# Patient Record
Sex: MALE | Race: WHITE | NOT HISPANIC OR LATINO | Employment: OTHER | URBAN - NONMETROPOLITAN AREA
[De-identification: names, ages, dates, MRNs, and addresses within clinical notes are randomized per-mention and may not be internally consistent; named-entity substitution may affect disease eponyms.]

---

## 2023-11-13 ENCOUNTER — APPOINTMENT (EMERGENCY)
Dept: RADIOLOGY | Facility: HOSPITAL | Age: 68
End: 2023-11-13
Payer: COMMERCIAL

## 2023-11-13 ENCOUNTER — HOSPITAL ENCOUNTER (EMERGENCY)
Facility: HOSPITAL | Age: 68
Discharge: HOME | End: 2023-11-13
Attending: EMERGENCY MEDICINE
Payer: COMMERCIAL

## 2023-11-13 VITALS
WEIGHT: 257.5 LBS | DIASTOLIC BLOOD PRESSURE: 60 MMHG | SYSTOLIC BLOOD PRESSURE: 95 MMHG | TEMPERATURE: 97.7 F | HEART RATE: 109 BPM | RESPIRATION RATE: 18 BRPM | OXYGEN SATURATION: 95 %

## 2023-11-13 DIAGNOSIS — L03.115 CELLULITIS OF RIGHT LOWER EXTREMITY: Primary | ICD-10-CM

## 2023-11-13 LAB
ALBUMIN SERPL BCP-MCNC: 4.1 G/DL (ref 3.5–5)
ALP SERPL-CCNC: 56 U/L (ref 34–104)
ALT SERPL W P-5'-P-CCNC: 14 U/L (ref 7–52)
ANION GAP SERPL CALCULATED.3IONS-SCNC: 9 MMOL/L
AST SERPL W P-5'-P-CCNC: 9 U/L (ref 13–39)
BASOPHILS # BLD AUTO: 0.05 THOUSANDS/ÂΜL (ref 0–0.1)
BASOPHILS NFR BLD AUTO: 1 % (ref 0–1)
BILIRUB SERPL-MCNC: 0.76 MG/DL (ref 0.2–1)
BUN SERPL-MCNC: 31 MG/DL (ref 5–25)
CALCIUM SERPL-MCNC: 8.7 MG/DL (ref 8.4–10.2)
CHLORIDE SERPL-SCNC: 99 MMOL/L (ref 96–108)
CO2 SERPL-SCNC: 27 MMOL/L (ref 21–32)
CREAT SERPL-MCNC: 1.34 MG/DL (ref 0.6–1.3)
EOSINOPHIL # BLD AUTO: 0.13 THOUSAND/ÂΜL (ref 0–0.61)
EOSINOPHIL NFR BLD AUTO: 1 % (ref 0–6)
ERYTHROCYTE [DISTWIDTH] IN BLOOD BY AUTOMATED COUNT: 12.9 % (ref 11.6–15.1)
ERYTHROCYTE [SEDIMENTATION RATE] IN BLOOD: 10 MM/HOUR (ref 0–19)
GFR SERPL CREATININE-BSD FRML MDRD: 53 ML/MIN/1.73SQ M
GLUCOSE SERPL-MCNC: 213 MG/DL (ref 65–140)
HCT VFR BLD AUTO: 46.3 % (ref 36.5–49.3)
HGB BLD-MCNC: 15.9 G/DL (ref 12–17)
IMM GRANULOCYTES # BLD AUTO: 0.07 THOUSAND/UL (ref 0–0.2)
IMM GRANULOCYTES NFR BLD AUTO: 1 % (ref 0–2)
LYMPHOCYTES # BLD AUTO: 1.64 THOUSANDS/ÂΜL (ref 0.6–4.47)
LYMPHOCYTES NFR BLD AUTO: 17 % (ref 14–44)
MCH RBC QN AUTO: 31.6 PG (ref 26.8–34.3)
MCHC RBC AUTO-ENTMCNC: 34.3 G/DL (ref 31.4–37.4)
MCV RBC AUTO: 92 FL (ref 82–98)
MONOCYTES # BLD AUTO: 0.75 THOUSAND/ÂΜL (ref 0.17–1.22)
MONOCYTES NFR BLD AUTO: 8 % (ref 4–12)
NEUTROPHILS # BLD AUTO: 7.11 THOUSANDS/ÂΜL (ref 1.85–7.62)
NEUTS SEG NFR BLD AUTO: 72 % (ref 43–75)
NRBC BLD AUTO-RTO: 0 /100 WBCS
PLATELET # BLD AUTO: 221 THOUSANDS/UL (ref 149–390)
PMV BLD AUTO: 9.5 FL (ref 8.9–12.7)
POTASSIUM SERPL-SCNC: 4.4 MMOL/L (ref 3.5–5.3)
PROT SERPL-MCNC: 6.4 G/DL (ref 6.4–8.4)
RBC # BLD AUTO: 5.03 MILLION/UL (ref 3.88–5.62)
SODIUM SERPL-SCNC: 135 MMOL/L (ref 135–147)
WBC # BLD AUTO: 9.75 THOUSAND/UL (ref 4.31–10.16)

## 2023-11-13 PROCEDURE — 99284 EMERGENCY DEPT VISIT MOD MDM: CPT | Performed by: EMERGENCY MEDICINE

## 2023-11-13 PROCEDURE — 99283 EMERGENCY DEPT VISIT LOW MDM: CPT

## 2023-11-13 PROCEDURE — 36415 COLL VENOUS BLD VENIPUNCTURE: CPT | Performed by: EMERGENCY MEDICINE

## 2023-11-13 PROCEDURE — 85025 COMPLETE CBC W/AUTO DIFF WBC: CPT | Performed by: EMERGENCY MEDICINE

## 2023-11-13 PROCEDURE — 85652 RBC SED RATE AUTOMATED: CPT | Performed by: EMERGENCY MEDICINE

## 2023-11-13 PROCEDURE — 73630 X-RAY EXAM OF FOOT: CPT

## 2023-11-13 PROCEDURE — 80053 COMPREHEN METABOLIC PANEL: CPT | Performed by: EMERGENCY MEDICINE

## 2023-11-13 RX ORDER — AMOXICILLIN AND CLAVULANATE POTASSIUM 875; 125 MG/1; MG/1
1 TABLET, FILM COATED ORAL EVERY 12 HOURS
Qty: 20 TABLET | Refills: 0 | Status: SHIPPED | OUTPATIENT
Start: 2023-11-13 | End: 2023-11-23

## 2023-11-13 RX ORDER — AMOXICILLIN AND CLAVULANATE POTASSIUM 875; 125 MG/1; MG/1
1 TABLET, FILM COATED ORAL ONCE
Status: COMPLETED | OUTPATIENT
Start: 2023-11-13 | End: 2023-11-13

## 2023-11-13 RX ADMIN — AMOXICILLIN AND CLAVULANATE POTASSIUM 1 TABLET: 875; 125 TABLET, FILM COATED ORAL at 10:25

## 2023-11-13 NOTE — ED PROVIDER NOTES
History  Chief Complaint   Patient presents with    Toe Pain     Pt reports pain to right 5th toe x1 week. Patient planes of pain in the right fifth toe for over 1 week. Seen at another facility and told he had gout. Was treated. No relief. Is diabetic. No fevers or chills. No nausea vomiting or diarrhea. No known trauma. History provided by:  Patient   used: No    Toe Pain  Location:  Right fifth toe  Quality:  Achy  Severity:  Mild  Onset quality:  Gradual  Duration:  1 week  Timing:  Constant  Progression:  Worsening  Chronicity:  New  Context:  Atraumatic toe pain  Relieved by:  Nothing  Worsened by: Movement and palpation  Ineffective treatments:  Anti-inflammatory medicine  Associated symptoms: no abdominal pain, no chest pain, no cough, no diarrhea, no ear pain, no fever, no headaches, no loss of consciousness, no nausea, no rash, no shortness of breath, no sore throat, no vomiting and no wheezing        None       Past Medical History:   Diagnosis Date    COPD (chronic obstructive pulmonary disease) ([address])     Diabetes mellitus ([address] )     Sleep apnea        Past Surgical History:   Procedure Laterality Date    BACK SURGERY         History reviewed. No pertinent family history. I have reviewed and agree with the history as documented. E-Cigarette/Vaping     E-Cigarette/Vaping Substances     Social History     Tobacco Use    Smoking status: Every Day     Packs/day: 1.00     Types: Cigarettes    Smokeless tobacco: Never   Substance Use Topics    Alcohol use: Yes     Alcohol/week: 7.0 standard drinks of alcohol     Types: 7 Shots of liquor per week    Drug use: Never       Review of Systems   Constitutional:  Negative for chills and fever. HENT:  Negative for ear pain, hearing loss, sore throat, trouble swallowing and voice change. Eyes:  Negative for pain and discharge. Respiratory:  Negative for cough, shortness of breath and wheezing.     Cardiovascular:  Negative for chest pain and palpitations. Gastrointestinal:  Negative for abdominal pain, blood in stool, constipation, diarrhea, nausea and vomiting. Genitourinary:  Negative for dysuria, flank pain, frequency and hematuria. Musculoskeletal:  Positive for arthralgias. Negative for joint swelling, neck pain and neck stiffness. Skin:  Negative for rash and wound. Neurological:  Negative for dizziness, seizures, loss of consciousness, syncope, facial asymmetry and headaches. Psychiatric/Behavioral:  Negative for hallucinations, self-injury and suicidal ideas. All other systems reviewed and are negative. Physical Exam  Physical Exam  Vitals and nursing note reviewed. Constitutional:       General: He is not in acute distress. Appearance: He is well-developed. HENT:      Head: Normocephalic and atraumatic. Right Ear: External ear normal.      Left Ear: External ear normal.   Eyes:      General: No scleral icterus. Right eye: No discharge. Left eye: No discharge. Extraocular Movements: Extraocular movements intact. Conjunctiva/sclera: Conjunctivae normal.   Cardiovascular:      Rate and Rhythm: Normal rate and regular rhythm. Heart sounds: Normal heart sounds. No murmur heard. Pulmonary:      Effort: Pulmonary effort is normal.      Breath sounds: Normal breath sounds. No wheezing or rales. Abdominal:      General: Bowel sounds are normal. There is no distension. Palpations: Abdomen is soft. Tenderness: There is no abdominal tenderness. There is no guarding or rebound. Musculoskeletal:         General: Swelling and tenderness present. No deformity. Normal range of motion. Cervical back: Normal range of motion and neck supple. Comments: There is no obvious bony deformity between the fourth and fifth toes. There is slight erythema and swelling and warmth at the base of the fourth and fifth toes.   There is an ulcer noted on the lateral aspect of the fourth toe. Most likely pressure sore from the fifth toe. Skin:     General: Skin is warm and dry. Findings: No rash. Neurological:      General: No focal deficit present. Mental Status: He is alert and oriented to person, place, and time. Cranial Nerves: No cranial nerve deficit. Psychiatric:         Mood and Affect: Mood normal.         Behavior: Behavior normal.         Thought Content:  Thought content normal.         Judgment: Judgment normal.         Vital Signs  ED Triage Vitals [11/13/23 0941]   Temperature Pulse Respirations Blood Pressure SpO2   97.7 °F (36.5 °C) (!) 115 18 132/77 95 %      Temp Source Heart Rate Source Patient Position - Orthostatic VS BP Location FiO2 (%)   Temporal Monitor Sitting Right arm --      Pain Score       --           Vitals:    11/13/23 0941 11/13/23 0945   BP: 132/77 123/60   Pulse: (!) 115 (!) 116   Patient Position - Orthostatic VS: Sitting          Visual Acuity      ED Medications  Medications   amoxicillin-clavulanate (AUGMENTIN) 875-125 mg per tablet 1 tablet (has no administration in time range)       Diagnostic Studies  Results Reviewed       Procedure Component Value Units Date/Time    Comprehensive metabolic panel [[id number]]  (Abnormal) Collected: 11/13/23 0947    Lab Status: Final result Specimen: Blood from Arm, Left Updated: 11/13/23 1009     Sodium 135 mmol/L      Potassium 4.4 mmol/L      Chloride 99 mmol/L      CO2 27 mmol/L      ANION GAP 9 mmol/L      BUN 31 mg/dL      Creatinine 1.34 mg/dL      Glucose 213 mg/dL      Calcium 8.7 mg/dL      AST 9 U/L      ALT 14 U/L      Alkaline Phosphatase 56 U/L      Total Protein 6.4 g/dL      Albumin 4.1 g/dL      Total Bilirubin 0.76 mg/dL      eGFR 53 ml/min/1.73sq m     Narrative:      [place] guidelines for Chronic Kidney Disease (CKD):     Stage 1 with normal or high GFR (GFR > 90 mL/min/1.73 square meters)    Stage 2 Mild CKD (GFR = 60-89 mL/min/1.73 square meters)    Stage 3A Moderate CKD (GFR = 45-59 mL/min/1.73 square meters)    Stage 3B Moderate CKD (GFR = 30-44 mL/min/1.73 square meters)    Stage 4 Severe CKD (GFR = 15-29 mL/min/1.73 square meters)    Stage 5 End Stage CKD (GFR <15 mL/min/1.73 square meters)  Note: GFR calculation is accurate only with a steady state creatinine    Sedimentation rate, automated [[id number]]  (Normal) Collected: 11/13/23 0947    Lab Status: Final result Specimen: Blood from Arm, Left Updated: 11/13/23 1002     Sed Rate 10 mm/hour     CBC and differential [[id number]] Collected: 11/13/23 0947    Lab Status: Final result Specimen: Blood from Arm, Left Updated: 11/13/23 0954     WBC 9.75 Thousand/uL      RBC 5.03 Million/uL      Hemoglobin 15.9 g/dL      Hematocrit 46.3 %      MCV 92 fL      MCH 31.6 pg      MCHC 34.3 g/dL      RDW 12.9 %      MPV 9.5 fL      Platelets 992 Thousands/uL      nRBC 0 /100 WBCs      Neutrophils Relative 72 %      Immat GRANS % 1 %      Lymphocytes Relative 17 %      Monocytes Relative 8 %      Eosinophils Relative 1 %      Basophils Relative 1 %      Neutrophils Absolute 7.11 Thousands/µL      Immature Grans Absolute 0.07 Thousand/uL      Lymphocytes Absolute 1.64 Thousands/µL      Monocytes Absolute 0.75 Thousand/µL      Eosinophils Absolute 0.13 Thousand/µL      Basophils Absolute 0.05 Thousands/µL                    XR foot 3+ views RIGHT   ED Interpretation by Vlad Esteban MD (11/13 1002)   No fracture or osteomyelitis noted. Procedures  Procedures         ED Course                               SBIRT 20yo+      Flowsheet Row Most Recent Value   Initial Alcohol Screen: US AUDIT-C     1. How often do you have a drink containing alcohol? 0 Filed at: 11/13/2023 0943   2. How many drinks containing alcohol do you have on a typical day you are drinking? 0 Filed at: 11/13/2023 0943   3b. FEMALE Any Age, or MALE 65+: How often do you have 4 or more drinks on one occassion?  0 Filed at: 11/13/2023 0943   Audit-C Score 0 Filed at: 11/13/2023 6105   DEB: How many times in the past year have you. .. Used an illegal drug or used a prescription medication for non-medical reasons? Never Filed at: 11/13/2023 8405                      Medical Decision Making  Amount and/or Complexity of Data Reviewed  Labs: ordered. Decision-making details documented in ED Course. Radiology: ordered and independent interpretation performed. Decision-making details documented in ED Course. Discussion of management or test interpretation with external provider(s): Differential diagnosis includes but not limited to toe ulceration, cellulitis, osteomyelitis. Risk  Prescription drug management. Disposition  Final diagnoses:   Cellulitis of right lower extremity     Time reflects when diagnosis was documented in both MDM as applicable and the Disposition within this note       Time User Action Codes Description Comment    11/13/2023 10:13 AM Fausto Perez Add [G11.499] Cellulitis of right lower extremity           ED Disposition       ED Disposition   Discharge    Condition   Stable    Date/Time   Mon Nov 13, 2023 [address] discharge to home/self care. Follow-up Information       Follow up With Specialties Details Why Contact Info    Deepak Mejia DPM Podiatry, Wound Care, General Surgery Call today  [address] [facility] [address] [phone]              Patient's Medications   Discharge Prescriptions    AMOXICILLIN-CLAVULANATE (AUGMENTIN) 875-125 MG PER TABLET    Take 1 tablet by mouth every 12 (twelve) hours for 10 days       Start Date: 11/13/2023End Date: 11/23/2023       Order Dose: 1 tablet       Quantity: 20 tablet    Refills: 0       No discharge procedures on file.     PDMP Review       None            ED Provider  Electronically Signed by             Fausto Perez MD  11/13/23 7246

## 2024-04-16 ENCOUNTER — APPOINTMENT (EMERGENCY)
Dept: RADIOLOGY | Facility: HOSPITAL | Age: 69
DRG: 683 | End: 2024-04-16
Payer: COMMERCIAL

## 2024-04-16 ENCOUNTER — APPOINTMENT (EMERGENCY)
Dept: CT IMAGING | Facility: HOSPITAL | Age: 69
DRG: 683 | End: 2024-04-16
Payer: COMMERCIAL

## 2024-04-16 ENCOUNTER — HOSPITAL ENCOUNTER (INPATIENT)
Facility: HOSPITAL | Age: 69
LOS: 5 days | Discharge: HOME HEALTH | DRG: 683 | End: 2024-04-21
Attending: EMERGENCY MEDICINE | Admitting: FAMILY MEDICINE
Payer: COMMERCIAL

## 2024-04-16 DIAGNOSIS — N17.9 AKI (ACUTE KIDNEY INJURY) (HCC): ICD-10-CM

## 2024-04-16 DIAGNOSIS — E83.42 HYPOMAGNESEMIA: ICD-10-CM

## 2024-04-16 DIAGNOSIS — E86.0 DEHYDRATION: Primary | ICD-10-CM

## 2024-04-16 PROBLEM — I25.10 CAD (CORONARY ARTERY DISEASE): Status: ACTIVE | Noted: 2024-04-16

## 2024-04-16 PROBLEM — E11.9 DIABETES MELLITUS (HCC): Status: ACTIVE | Noted: 2024-04-16

## 2024-04-16 PROBLEM — I50.9 CHF (CONGESTIVE HEART FAILURE) (HCC): Status: ACTIVE | Noted: 2024-04-16

## 2024-04-16 PROBLEM — R11.2 NAUSEA AND VOMITING: Status: ACTIVE | Noted: 2024-04-16

## 2024-04-16 PROBLEM — A04.8 HELICOBACTER PYLORI INFECTION: Status: ACTIVE | Noted: 2024-04-16

## 2024-04-16 PROBLEM — E87.20 METABOLIC ACIDOSIS: Status: ACTIVE | Noted: 2024-04-16

## 2024-04-16 PROBLEM — R51.9 HEADACHE: Status: ACTIVE | Noted: 2024-04-16

## 2024-04-16 PROBLEM — E87.5 HYPERKALEMIA: Status: ACTIVE | Noted: 2024-04-16

## 2024-04-16 PROBLEM — N18.9 ACUTE ON CHRONIC KIDNEY FAILURE  (HCC): Status: ACTIVE | Noted: 2024-04-16

## 2024-04-16 LAB
ALBUMIN SERPL BCP-MCNC: 4 G/DL (ref 3.5–5)
ALP SERPL-CCNC: 61 U/L (ref 34–104)
ALT SERPL W P-5'-P-CCNC: 18 U/L (ref 7–52)
ANION GAP SERPL CALCULATED.3IONS-SCNC: 13 MMOL/L (ref 4–13)
ANION GAP SERPL CALCULATED.3IONS-SCNC: 14 MMOL/L (ref 4–13)
APTT PPP: 28 SECONDS (ref 23–37)
AST SERPL W P-5'-P-CCNC: 13 U/L (ref 13–39)
BACTERIA UR QL AUTO: NORMAL /HPF
BASOPHILS # BLD AUTO: 0.04 THOUSANDS/ÂΜL (ref 0–0.1)
BASOPHILS NFR BLD AUTO: 0 % (ref 0–1)
BILIRUB SERPL-MCNC: 0.81 MG/DL (ref 0.2–1)
BILIRUB UR QL STRIP: NEGATIVE
BUN SERPL-MCNC: 102 MG/DL (ref 5–25)
BUN SERPL-MCNC: 103 MG/DL (ref 5–25)
CALCIUM SERPL-MCNC: 7.9 MG/DL (ref 8.4–10.2)
CALCIUM SERPL-MCNC: 8.4 MG/DL (ref 8.4–10.2)
CARDIAC TROPONIN I PNL SERPL HS: 4 NG/L
CHLORIDE SERPL-SCNC: 102 MMOL/L (ref 96–108)
CHLORIDE SERPL-SCNC: 102 MMOL/L (ref 96–108)
CLARITY UR: CLEAR
CO2 SERPL-SCNC: 14 MMOL/L (ref 21–32)
CO2 SERPL-SCNC: 15 MMOL/L (ref 21–32)
COLOR UR: YELLOW
CREAT SERPL-MCNC: 4.41 MG/DL (ref 0.6–1.3)
CREAT SERPL-MCNC: 5.1 MG/DL (ref 0.6–1.3)
CREAT UR-MCNC: 149.9 MG/DL
EOSINOPHIL # BLD AUTO: 1.05 THOUSAND/ÂΜL (ref 0–0.61)
EOSINOPHIL NFR BLD AUTO: 11 % (ref 0–6)
ERYTHROCYTE [DISTWIDTH] IN BLOOD BY AUTOMATED COUNT: 12.9 % (ref 11.6–15.1)
FLUAV RNA RESP QL NAA+PROBE: NEGATIVE
FLUBV RNA RESP QL NAA+PROBE: NEGATIVE
GFR SERPL CREATININE-BSD FRML MDRD: 10 ML/MIN/1.73SQ M
GFR SERPL CREATININE-BSD FRML MDRD: 12 ML/MIN/1.73SQ M
GLUCOSE SERPL-MCNC: 103 MG/DL (ref 65–140)
GLUCOSE SERPL-MCNC: 137 MG/DL (ref 65–140)
GLUCOSE SERPL-MCNC: 140 MG/DL (ref 65–140)
GLUCOSE SERPL-MCNC: 84 MG/DL (ref 65–140)
GLUCOSE UR STRIP-MCNC: NEGATIVE MG/DL
HCT VFR BLD AUTO: 43.4 % (ref 36.5–49.3)
HGB BLD-MCNC: 14.9 G/DL (ref 12–17)
HGB UR QL STRIP.AUTO: NEGATIVE
IMM GRANULOCYTES # BLD AUTO: 0.03 THOUSAND/UL (ref 0–0.2)
IMM GRANULOCYTES NFR BLD AUTO: 0 % (ref 0–2)
INR PPP: 1.09 (ref 0.84–1.19)
KETONES UR STRIP-MCNC: NEGATIVE MG/DL
LACTATE SERPL-SCNC: 1.8 MMOL/L (ref 0.5–2)
LEUKOCYTE ESTERASE UR QL STRIP: NEGATIVE
LIPASE SERPL-CCNC: 54 U/L (ref 11–82)
LYMPHOCYTES # BLD AUTO: 2.09 THOUSANDS/ÂΜL (ref 0.6–4.47)
LYMPHOCYTES NFR BLD AUTO: 22 % (ref 14–44)
MAGNESIUM SERPL-MCNC: 1.6 MG/DL (ref 1.9–2.7)
MCH RBC QN AUTO: 32.7 PG (ref 26.8–34.3)
MCHC RBC AUTO-ENTMCNC: 34.3 G/DL (ref 31.4–37.4)
MCV RBC AUTO: 95 FL (ref 82–98)
MONOCYTES # BLD AUTO: 0.82 THOUSAND/ÂΜL (ref 0.17–1.22)
MONOCYTES NFR BLD AUTO: 9 % (ref 4–12)
NEUTROPHILS # BLD AUTO: 5.64 THOUSANDS/ÂΜL (ref 1.85–7.62)
NEUTS SEG NFR BLD AUTO: 58 % (ref 43–75)
NITRITE UR QL STRIP: NEGATIVE
NON-SQ EPI CELLS URNS QL MICRO: NORMAL /HPF
NRBC BLD AUTO-RTO: 0 /100 WBCS
PH UR STRIP.AUTO: 5 [PH]
PLATELET # BLD AUTO: 155 THOUSANDS/UL (ref 149–390)
PMV BLD AUTO: 10.5 FL (ref 8.9–12.7)
POTASSIUM SERPL-SCNC: 5 MMOL/L (ref 3.5–5.3)
POTASSIUM SERPL-SCNC: 5.4 MMOL/L (ref 3.5–5.3)
PROT SERPL-MCNC: 6.5 G/DL (ref 6.4–8.4)
PROT UR STRIP-MCNC: NEGATIVE MG/DL
PROTHROMBIN TIME: 14.4 SECONDS (ref 11.6–14.5)
RBC # BLD AUTO: 4.56 MILLION/UL (ref 3.88–5.62)
RBC #/AREA URNS AUTO: NORMAL /HPF
RSV RNA RESP QL NAA+PROBE: NEGATIVE
SARS-COV-2 RNA RESP QL NAA+PROBE: NEGATIVE
SODIUM 24H UR-SCNC: 30 MOL/L
SODIUM SERPL-SCNC: 130 MMOL/L (ref 135–147)
SODIUM SERPL-SCNC: 130 MMOL/L (ref 135–147)
SP GR UR STRIP.AUTO: 1.02 (ref 1–1.03)
UROBILINOGEN UR QL STRIP.AUTO: 0.2 E.U./DL
UUN 24H UR-MCNC: 414 MG/DL
WBC # BLD AUTO: 9.67 THOUSAND/UL (ref 4.31–10.16)
WBC #/AREA URNS AUTO: NORMAL /HPF

## 2024-04-16 PROCEDURE — 70450 CT HEAD/BRAIN W/O DYE: CPT

## 2024-04-16 PROCEDURE — 99285 EMERGENCY DEPT VISIT HI MDM: CPT

## 2024-04-16 PROCEDURE — 85730 THROMBOPLASTIN TIME PARTIAL: CPT | Performed by: EMERGENCY MEDICINE

## 2024-04-16 PROCEDURE — 84300 ASSAY OF URINE SODIUM: CPT

## 2024-04-16 PROCEDURE — 82570 ASSAY OF URINE CREATININE: CPT

## 2024-04-16 PROCEDURE — 83605 ASSAY OF LACTIC ACID: CPT | Performed by: EMERGENCY MEDICINE

## 2024-04-16 PROCEDURE — 0241U HB NFCT DS VIR RESP RNA 4 TRGT: CPT | Performed by: EMERGENCY MEDICINE

## 2024-04-16 PROCEDURE — 96365 THER/PROPH/DIAG IV INF INIT: CPT

## 2024-04-16 PROCEDURE — 85025 COMPLETE CBC W/AUTO DIFF WBC: CPT | Performed by: EMERGENCY MEDICINE

## 2024-04-16 PROCEDURE — 81001 URINALYSIS AUTO W/SCOPE: CPT

## 2024-04-16 PROCEDURE — 71045 X-RAY EXAM CHEST 1 VIEW: CPT

## 2024-04-16 PROCEDURE — 87505 NFCT AGENT DETECTION GI: CPT | Performed by: EMERGENCY MEDICINE

## 2024-04-16 PROCEDURE — 84540 ASSAY OF URINE/UREA-N: CPT

## 2024-04-16 PROCEDURE — 87493 C DIFF AMPLIFIED PROBE: CPT | Performed by: EMERGENCY MEDICINE

## 2024-04-16 PROCEDURE — 99285 EMERGENCY DEPT VISIT HI MDM: CPT | Performed by: EMERGENCY MEDICINE

## 2024-04-16 PROCEDURE — 85610 PROTHROMBIN TIME: CPT | Performed by: EMERGENCY MEDICINE

## 2024-04-16 PROCEDURE — 83690 ASSAY OF LIPASE: CPT | Performed by: EMERGENCY MEDICINE

## 2024-04-16 PROCEDURE — 74176 CT ABD & PELVIS W/O CONTRAST: CPT

## 2024-04-16 PROCEDURE — 99223 1ST HOSP IP/OBS HIGH 75: CPT | Performed by: FAMILY MEDICINE

## 2024-04-16 PROCEDURE — 93005 ELECTROCARDIOGRAM TRACING: CPT

## 2024-04-16 PROCEDURE — 82948 REAGENT STRIP/BLOOD GLUCOSE: CPT

## 2024-04-16 PROCEDURE — 84484 ASSAY OF TROPONIN QUANT: CPT | Performed by: EMERGENCY MEDICINE

## 2024-04-16 PROCEDURE — 83735 ASSAY OF MAGNESIUM: CPT | Performed by: EMERGENCY MEDICINE

## 2024-04-16 PROCEDURE — 96375 TX/PRO/DX INJ NEW DRUG ADDON: CPT

## 2024-04-16 PROCEDURE — 36415 COLL VENOUS BLD VENIPUNCTURE: CPT | Performed by: EMERGENCY MEDICINE

## 2024-04-16 PROCEDURE — 80053 COMPREHEN METABOLIC PANEL: CPT | Performed by: EMERGENCY MEDICINE

## 2024-04-16 PROCEDURE — 80048 BASIC METABOLIC PNL TOTAL CA: CPT | Performed by: FAMILY MEDICINE

## 2024-04-16 RX ORDER — ONDANSETRON 2 MG/ML
4 INJECTION INTRAMUSCULAR; INTRAVENOUS EVERY 6 HOURS PRN
Status: DISCONTINUED | OUTPATIENT
Start: 2024-04-16 | End: 2024-04-21 | Stop reason: HOSPADM

## 2024-04-16 RX ORDER — ASPIRIN 81 MG/1
81 TABLET, CHEWABLE ORAL DAILY
COMMUNITY

## 2024-04-16 RX ORDER — ATORVASTATIN CALCIUM 20 MG/1
20 TABLET, FILM COATED ORAL DAILY
COMMUNITY
Start: 2024-01-03

## 2024-04-16 RX ORDER — ATORVASTATIN CALCIUM 20 MG/1
20 TABLET, FILM COATED ORAL
Status: DISCONTINUED | OUTPATIENT
Start: 2024-04-16 | End: 2024-04-21 | Stop reason: HOSPADM

## 2024-04-16 RX ORDER — METRONIDAZOLE 250 MG/1
250 TABLET ORAL EVERY 8 HOURS SCHEDULED
COMMUNITY
Start: 2024-03-28 | End: 2024-04-21

## 2024-04-16 RX ORDER — LISINOPRIL 20 MG/1
20 TABLET ORAL DAILY
COMMUNITY
Start: 2023-11-01

## 2024-04-16 RX ORDER — MAGNESIUM SULFATE 1 G/100ML
1 INJECTION INTRAVENOUS ONCE
Status: COMPLETED | OUTPATIENT
Start: 2024-04-16 | End: 2024-04-16

## 2024-04-16 RX ORDER — METRONIDAZOLE 500 MG/1
250 TABLET ORAL EVERY 8 HOURS SCHEDULED
Status: CANCELLED | OUTPATIENT
Start: 2024-04-16 | End: 2024-04-27

## 2024-04-16 RX ORDER — INSULIN LISPRO 100 [IU]/ML
1-6 INJECTION, SOLUTION INTRAVENOUS; SUBCUTANEOUS
Status: DISCONTINUED | OUTPATIENT
Start: 2024-04-16 | End: 2024-04-21 | Stop reason: HOSPADM

## 2024-04-16 RX ORDER — ONDANSETRON 2 MG/ML
4 INJECTION INTRAMUSCULAR; INTRAVENOUS ONCE
Status: COMPLETED | OUTPATIENT
Start: 2024-04-16 | End: 2024-04-16

## 2024-04-16 RX ORDER — ALBUTEROL SULFATE 90 UG/1
1 AEROSOL, METERED RESPIRATORY (INHALATION) EVERY 4 HOURS PRN
Status: DISCONTINUED | OUTPATIENT
Start: 2024-04-16 | End: 2024-04-21 | Stop reason: HOSPADM

## 2024-04-16 RX ORDER — METFORMIN HYDROCHLORIDE 500 MG/1
1000 TABLET, EXTENDED RELEASE ORAL 2 TIMES DAILY WITH MEALS
COMMUNITY
Start: 2024-02-08

## 2024-04-16 RX ORDER — CETIRIZINE HYDROCHLORIDE 5 MG/1
5 TABLET ORAL DAILY
Status: ON HOLD | COMMUNITY
Start: 2024-03-27 | End: 2024-04-16 | Stop reason: ALTCHOICE

## 2024-04-16 RX ORDER — METOPROLOL SUCCINATE 50 MG/1
50 TABLET, EXTENDED RELEASE ORAL DAILY
Status: DISCONTINUED | OUTPATIENT
Start: 2024-04-17 | End: 2024-04-17

## 2024-04-16 RX ORDER — DULOXETIN HYDROCHLORIDE 20 MG/1
20 CAPSULE, DELAYED RELEASE ORAL DAILY
COMMUNITY

## 2024-04-16 RX ORDER — TETRACYCLINE HYDROCHLORIDE 500 MG/1
500 CAPSULE ORAL 4 TIMES DAILY
COMMUNITY
Start: 2024-03-28 | End: 2024-04-21

## 2024-04-16 RX ORDER — GABAPENTIN 400 MG/1
400 CAPSULE ORAL 3 TIMES DAILY
Status: ON HOLD | COMMUNITY
End: 2024-04-16 | Stop reason: ALTCHOICE

## 2024-04-16 RX ORDER — ALOGLIPTIN 25 MG/1
25 TABLET, FILM COATED ORAL DAILY
Status: ON HOLD | COMMUNITY
End: 2024-04-16

## 2024-04-16 RX ORDER — HEPARIN SODIUM 5000 [USP'U]/ML
5000 INJECTION, SOLUTION INTRAVENOUS; SUBCUTANEOUS EVERY 8 HOURS SCHEDULED
Status: DISCONTINUED | OUTPATIENT
Start: 2024-04-16 | End: 2024-04-21 | Stop reason: HOSPADM

## 2024-04-16 RX ORDER — FUROSEMIDE 40 MG/1
40 TABLET ORAL DAILY
COMMUNITY
Start: 2023-11-01

## 2024-04-16 RX ORDER — CETIRIZINE HYDROCHLORIDE 5 MG/1
5 TABLET ORAL DAILY
COMMUNITY

## 2024-04-16 RX ORDER — METOPROLOL SUCCINATE 50 MG/1
50 TABLET, EXTENDED RELEASE ORAL DAILY
COMMUNITY
Start: 2023-11-15

## 2024-04-16 RX ORDER — ACETAMINOPHEN 325 MG/1
650 TABLET ORAL EVERY 6 HOURS PRN
Status: DISCONTINUED | OUTPATIENT
Start: 2024-04-16 | End: 2024-04-21 | Stop reason: HOSPADM

## 2024-04-16 RX ORDER — SPIRONOLACTONE 25 MG/1
25 TABLET ORAL DAILY
Status: ON HOLD | COMMUNITY
End: 2024-04-16 | Stop reason: ALTCHOICE

## 2024-04-16 RX ORDER — ASPIRIN 81 MG/1
81 TABLET, CHEWABLE ORAL DAILY
Status: DISCONTINUED | OUTPATIENT
Start: 2024-04-17 | End: 2024-04-21 | Stop reason: HOSPADM

## 2024-04-16 RX ORDER — KETOROLAC TROMETHAMINE 30 MG/ML
15 INJECTION, SOLUTION INTRAMUSCULAR; INTRAVENOUS ONCE
Status: COMPLETED | OUTPATIENT
Start: 2024-04-16 | End: 2024-04-16

## 2024-04-16 RX ORDER — PANTOPRAZOLE SODIUM 20 MG/1
20 TABLET, DELAYED RELEASE ORAL
Status: DISCONTINUED | OUTPATIENT
Start: 2024-04-17 | End: 2024-04-21 | Stop reason: HOSPADM

## 2024-04-16 RX ORDER — OMEPRAZOLE 20 MG/1
20 TABLET, DELAYED RELEASE ORAL 2 TIMES DAILY
COMMUNITY
Start: 2024-03-28 | End: 2024-04-27

## 2024-04-16 RX ORDER — BISMUTH SUBSALICYLATE 262 MG/1
524 TABLET, CHEWABLE ORAL
COMMUNITY
Start: 2024-03-28 | End: 2024-04-21

## 2024-04-16 RX ORDER — PREGABALIN 200 MG/1
200 CAPSULE ORAL 2 TIMES DAILY
COMMUNITY
Start: 2024-03-04

## 2024-04-16 RX ORDER — ISOSORBIDE MONONITRATE 30 MG/1
30 TABLET, EXTENDED RELEASE ORAL
COMMUNITY
Start: 2024-02-06

## 2024-04-16 RX ORDER — GLIPIZIDE 10 MG/1
1 TABLET ORAL
Status: ON HOLD | COMMUNITY
End: 2024-04-16

## 2024-04-16 RX ORDER — ALBUTEROL SULFATE 90 UG/1
1 AEROSOL, METERED RESPIRATORY (INHALATION) EVERY 4 HOURS PRN
COMMUNITY

## 2024-04-16 RX ORDER — PREGABALIN 100 MG/1
100 CAPSULE ORAL 2 TIMES DAILY
Status: DISCONTINUED | OUTPATIENT
Start: 2024-04-16 | End: 2024-04-21 | Stop reason: HOSPADM

## 2024-04-16 RX ORDER — DULOXETIN HYDROCHLORIDE 20 MG/1
20 CAPSULE, DELAYED RELEASE ORAL DAILY
Status: DISCONTINUED | OUTPATIENT
Start: 2024-04-16 | End: 2024-04-21 | Stop reason: HOSPADM

## 2024-04-16 RX ADMIN — SODIUM BICARBONATE 100 ML/HR: 84 INJECTION, SOLUTION INTRAVENOUS at 16:57

## 2024-04-16 RX ADMIN — DULOXETINE HYDROCHLORIDE 20 MG: 20 CAPSULE, DELAYED RELEASE ORAL at 16:46

## 2024-04-16 RX ADMIN — ONDANSETRON 4 MG: 2 INJECTION INTRAMUSCULAR; INTRAVENOUS at 11:16

## 2024-04-16 RX ADMIN — HEPARIN SODIUM 5000 UNITS: 5000 INJECTION, SOLUTION INTRAVENOUS; SUBCUTANEOUS at 21:23

## 2024-04-16 RX ADMIN — MAGNESIUM SULFATE HEPTAHYDRATE 1 G: 1 INJECTION, SOLUTION INTRAVENOUS at 12:01

## 2024-04-16 RX ADMIN — ACETAMINOPHEN 325MG 650 MG: 325 TABLET ORAL at 20:35

## 2024-04-16 RX ADMIN — PREGABALIN 100 MG: 100 CAPSULE ORAL at 17:21

## 2024-04-16 RX ADMIN — SODIUM BICARBONATE 125 ML/HR: 84 INJECTION, SOLUTION INTRAVENOUS at 17:41

## 2024-04-16 RX ADMIN — SODIUM CHLORIDE 2000 ML: 0.9 INJECTION, SOLUTION INTRAVENOUS at 11:47

## 2024-04-16 RX ADMIN — KETOROLAC TROMETHAMINE 15 MG: 30 INJECTION, SOLUTION INTRAMUSCULAR; INTRAVENOUS at 11:16

## 2024-04-16 RX ADMIN — HEPARIN SODIUM 5000 UNITS: 5000 INJECTION, SOLUTION INTRAVENOUS; SUBCUTANEOUS at 16:46

## 2024-04-16 RX ADMIN — ATORVASTATIN CALCIUM 20 MG: 20 TABLET, FILM COATED ORAL at 16:46

## 2024-04-16 NOTE — ASSESSMENT & PLAN NOTE
Patient describes a tension type headache with neck pain  Has been going on approximately 2 days, then had nausea vomiting  CT head negative  No signs of hypertensive crisis    Treat conservatively and supportively

## 2024-04-16 NOTE — ASSESSMENT & PLAN NOTE
Creatine on admission 5.1, Baseline creatinine 1.2-1.3, and as high as 1.8 in November 2023  Etiology likely dehydration due to nausea vomiting diarrhea, need to rule out toxicity from H. pylori cocktail?  Recently started end of last month  Imaging CT abdomen pelvis without hydronephrosis or signs of obstruction    Lab Results   Component Value Date    CREATININE 5.10 (H) 04/16/2024    CREATININE 1.34 (H) 11/13/2023     Hold ACE/ARB and diuretic therapy  Avoid hypotension, nephrotoxins, and NSAIDS if possible    Urine studies ordered   IV fluids - supplemented 2 L bolus in ED, continue bicarb gtt  Strict I & Os  Urinary retention protocol and bladder scan   Nephrology consult; recommendations appreciated   With low bicarb will start bicarb gtt

## 2024-04-16 NOTE — ASSESSMENT & PLAN NOTE
Presented with mild hyperkalemia in setting of SUSHILA and metabolic acidosis  Treated with 2 L of IV normal saline bolus in the ER  Continue bicarb drip  Recheck BMP

## 2024-04-16 NOTE — PLAN OF CARE
Problem: PAIN - ADULT  Goal: Verbalizes/displays adequate comfort level or baseline comfort level  Description: Interventions:  - Encourage patient to monitor pain and request assistance  - Assess pain using appropriate pain scale  - Administer analgesics based on type and severity of pain and evaluate response  - Implement non-pharmacological measures as appropriate and evaluate response  - Consider cultural and social influences on pain and pain management  - Notify physician/advanced practitioner if interventions unsuccessful or patient reports new pain  Outcome: Progressing     Problem: INFECTION - ADULT  Goal: Absence or prevention of progression during hospitalization  Description: INTERVENTIONS:  - Assess and monitor for signs and symptoms of infection  - Monitor lab/diagnostic results  - Monitor all insertion sites, i.e. indwelling lines, tubes, and drains  - Monitor endotracheal if appropriate and nasal secretions for changes in amount and color  - [place] appropriate cooling/warming therapies per order  - Administer medications as ordered  - Instruct and encourage patient and family to use good hand hygiene technique  - Identify and instruct in appropriate isolation precautions for identified infection/condition  Outcome: Progressing  Goal: Absence of fever/infection during neutropenic period  Description: INTERVENTIONS:  - Monitor WBC    Outcome: Progressing     Problem: SAFETY ADULT  Goal: Patient will remain free of falls  Description: INTERVENTIONS:  - Educate patient/family on patient safety including physical limitations  - Instruct patient to call for assistance with activity   - Consult OT/PT to assist with strengthening/mobility   - Keep Call bell within reach  - Keep bed low and locked with side rails adjusted as appropriate  - Keep care items and personal belongings within reach  - Initiate and maintain comfort rounds  - Make Fall Risk Sign visible to staff  - Offer Toileting every 2 Hours,  in advance of need  - Initiate/Maintain bed alarm  - Obtain necessary fall risk management equipment: yes  - Apply yellow socks and bracelet for high fall risk patients  - Consider moving patient to room near nurses station  Outcome: Progressing  Goal: Maintain or return to baseline ADL function  Description: INTERVENTIONS:  -  Assess patient's ability to carry out ADLs; assess patient's baseline for ADL function and identify physical deficits which impact ability to perform ADLs (bathing, care of mouth/teeth, toileting, grooming, dressing, etc.)  - Assess/evaluate cause of self-care deficits   - Assess range of motion  - Assess patient's mobility; develop plan if impaired  - Assess patient's need for assistive devices and provide as appropriate  - Encourage maximum independence but intervene and supervise when necessary  - Involve family in performance of ADLs  - Assess for home care needs following discharge   - Consider OT consult to assist with ADL evaluation and planning for discharge  - Provide patient education as appropriate  Outcome: Progressing  Goal: Maintains/Returns to pre admission functional level  Description: INTERVENTIONS:  - Perform AM-PAC 6 Click Basic Mobility/ Daily Activity assessment daily.  - Set and communicate daily mobility goal to care team and patient/family/caregiver.   - Collaborate with rehabilitation services on mobility goals if consulted  - Perform Range of Motion 3 times a day.  - Reposition patient every 2 hours.  - Dangle patient 3 times a day  - Stand patient 3 times a day  - Ambulate patient 3 times a day  - Out of bed to chair 3 times a day   - Out of bed for meals 3 times a day  - Out of bed for toileting  - Record patient progress and toleration of activity level   Outcome: Progressing     Problem: DISCHARGE PLANNING  Goal: Discharge to home or other facility with appropriate resources  Description: INTERVENTIONS:  - Identify barriers to discharge w/patient and  caregiver  - Arrange for needed discharge resources and transportation as appropriate  - Identify discharge learning needs (meds, wound care, etc.)  - Arrange for interpretive services to assist at discharge as needed  - Refer to Case Management Department for coordinating discharge planning if the patient needs post-hospital services based on physician/advanced practitioner order or complex needs related to functional status, cognitive ability, or social support system  Outcome: Progressing     Problem: Knowledge Deficit  Goal: Patient/family/caregiver demonstrates understanding of disease process, treatment plan, medications, and discharge instructions  Description: Complete learning assessment and assess knowledge base.  Interventions:  - Provide teaching at level of understanding  - Provide teaching via preferred learning methods  Outcome: Progressing

## 2024-04-16 NOTE — CASE MANAGEMENT
Case Management Assessment & Discharge Planning Note    Patient name Clay Marcial  Location /-01 MRN [id number]  : 1955 Date 2024       Current Admission Date: 2024  Current Admission Diagnosis:Acute on chronic kidney failure  (HCC)   Patient Active Problem List    Diagnosis Date Noted    Diabetes mellitus (HCC) 2024    CAD (coronary artery disease) 2024    CHF (congestive heart failure) ([facility]) 2024    Acute on chronic kidney failure  (HCC) 2024    Metabolic acidosis 2024    Nausea and vomiting 2024    Hyperkalemia 2024    Helicobacter pylori infection 2024    Headache 2024      LOS (days): 0  Geometric Mean LOS (GMLOS) (days):   Days to GMLOS:     OBJECTIVE:    Risk of Unplanned Readmission Score: 12.11       Current admission status: Inpatient   Preferred Pharmacy:   [facility]/pharmacy #[id number] - [place] [address]  Phone: [phone] Fax: [phone]    Primary Care Provider: No primary care provider on file.    Primary Insurance: [facility]  Secondary Insurance:     ASSESSMENT:  Active Health Care Proxies       Aixa Erickson Health Care Representative - Friend   Primary Phone: [phone] (Mobile)                 Advance Directives  Does patient have a Health Care POA?: Yes  Does patient have Advance Directives?: Yes  Advance Directives: Living will, Power of  for health care (Pt will have Adv Dir brought in.)  Primary Contact: Aixa         Readmission Root Cause  30 Day Readmission: No    Patient Information  Admitted from:: Home  Mental Status: Alert  During Assessment patient was accompanied by: Friend  Assessment information provided by:: Patient  Primary Caregiver: Friend  Caregiver's Name:: Aixa or Abbi  Caregiver's Relationship to Patient:: Friend  Caregiver's Telephone Number:: Robinhood  Support Systems: Friend, Other (Comment) ([facility])  [facility]  Residence: [facility]  [facility] [place]  Home entry access options. Select all that apply.: Stairs  Number of steps to enter home.: 3  Type of Current Residence: 2 story home  Upon entering residence, is there a bedroom on the main floor (no further steps)?: No  A bedroom is located on the following floor levels of residence (select all that apply):: 2nd Floor  Upon entering residence, is there a bathroom on the main floor (no further steps)?: No  Indicate which floors of current residence have a bathroom (select all the apply):: 2nd Floor  Number of steps to 2nd floor from main floor: One Flight  Living Arrangements: Lives Alone  Is patient a ?: Yes  Is patient active with [facility] ([facility])?: Yes  Is patient service connected?:  (Need to call [facility] to determine)    Activities of Daily Living Prior to Admission  Functional Status: Independent  Completes ADLs independently?: Yes  Ambulates independently?: Yes  Does patient use assisted devices?: Yes  Assisted Devices (DME) used: Bedside Commode, CPAP, Shower Chair, Rollator, Wheelchair, Walker, Straight Cane  Does patient currently own DME?: Yes  What DME does the patient currently own?: Bedside Commode, CPAP, Rollator, Shower Chair, Walker, Wheelchair, Straight Cane  Does patient have a history of Outpatient Therapy (PT/OT)?: No  Does the patient have a history of Short-Term Rehab?: No  Does patient have a history of HHC?: No  Does patient currently have HHC?: No    Current Home Health Care  Type of Current Home Care Services: None    Patient Information Continued  Income Source: Pension/skilled nursing  Does patient have prescription coverage?: Yes  Does patient receive dialysis treatments?: No  Does patient have a history of substance abuse?: No    Means of Transportation  Means of Transport to Appts:: Drives Self (Drove to [facility])      Social Determinants of Health (SDOH)      Flowsheet Row Most Recent Value   Housing Stability    In the last  12 months, how many places have you lived? 1   In the last 12 months, was there a time when you did not have a steady place to sleep or slept in a shelter (including now)? N   Transportation Needs    In the past 12 months, has lack of transportation kept you from medical appointments or from getting medications? no   In the past 12 months, has lack of transportation kept you from meetings, work, or from getting things needed for daily living? No   Food Insecurity    Within the past 12 months, you worried that your food would run out before you got the money to buy more. Never true   Within the past 12 months, the food you bought just didn't last and you didn't have money to get more. Never true   Utilities    In the past 12 months has the electric, gas, oil, or water company threatened to shut off services in your home? No            DISCHARGE DETAILS:    Patient lives alone in 2SH, bedroom, bathroom on second floor.  Patient is able to manage stairs but reports it is becoming more challenging.      [facility]. Unsure of service connection.  CM at [facility] is Viv Hernandez, phone [phone].  CM to call during business hours tomorrow and discuss current [facility] support and any newly identified needs.      No Hx HH or SNF.  Sees [facility] in [place] (unsure of providers name) Meds through [facility] or [employer].     DCP: Home? [facility] insurance.   CM at [facility] is Viv Hernandez, phone [phone].    Discharge planning discussed with:: Patient, friend Abbi  [place] of Choice: Yes  Comments - Freedom of Choice: Care through [facility]  CM contacted family/caregiver?: Yes  Were Treatment Team discharge recommendations reviewed with patient/caregiver?: Yes  Did patient/caregiver verbalize understanding of patient care needs?: Yes  Were patient/caregiver advised of the risks associated with not following Treatment Team discharge recommendations?: Yes    Contacts  Patient Contacts: Aixa Go  Relationship to  Patient:: Friend  Contact Method: In Person  Reason/Outcome: Continuity of Care, Emergency Contact, Discharge Planning

## 2024-04-16 NOTE — ED PROVIDER NOTES
History  Chief Complaint   Patient presents with    Vomiting     Pt. Voiced also has had headache.  Symptoms have been going on for 2 days      Patient complains of headache and neck pain that started 2 days ago.  Followed shortly by nausea and vomiting.  Also with diarrhea.  Slightly congested.  Slight cough.  No fevers or chills.  No recent travel.  No sick contacts.  No bloody stools or black stools.  No hematemesis.  Did not take any of his medications.  Minimal p.o. intake over the last 2 days.      History provided by:  Patient   used: No    Vomiting  Severity:  Mild  Duration:  2 days  Timing:  Constant  Progression:  Unchanged  Chronicity:  New  Recent urination:  Normal  Relieved by:  Nothing  Worsened by:  Nothing  Ineffective treatments:  None tried  Associated symptoms: abdominal pain, cough, diarrhea, headaches and URI    Associated symptoms: no chills, no fever and no sore throat        Prior to Admission Medications   Prescriptions Last Dose Informant Patient Reported? Taking?   Alogliptin Benzoate 25 MG TABS   Yes Yes   Sig: Take 25 mg by mouth in the morning   Empagliflozin 25 MG TABS   Yes Yes   Sig: Take 25 mg by mouth in the morning   albuterol (PROVENTIL HFA,VENTOLIN HFA) 90 mcg/act inhaler   Yes Yes   Sig: Inhale 1 puff every 4 (four) hours as needed for shortness of breath or wheezing   atorvastatin (LIPITOR) 20 mg tablet   Yes Yes   Sig: Take 20 mg by mouth daily   cetirizine (ZyrTEC) 5 MG tablet   Yes Yes   Sig: Take 5 mg by mouth daily   furosemide (LASIX) 40 mg tablet   Yes Yes   Sig: Take 40 mg by mouth daily   gabapentin (NEURONTIN) 400 mg capsule   Yes Yes   Sig: Take 400 mg by mouth 3 (three) times a day   glipiZIDE (GLUCOTROL) 10 mg tablet   Yes Yes   Sig: Take 1 tablet by mouth 2 (two) times a day before meals   isosorbide mononitrate (IMDUR) 30 mg 24 hr tablet   Yes Yes   Sig: Take 30 mg by mouth   lisinopril (ZESTRIL) 20 mg tablet   Yes Yes   Sig: Take 20 mg by  mouth daily   metFORMIN (GLUCOPHAGE-XR) 500 mg 24 hr tablet   Yes Yes   Sig: Take 1,000 mg by mouth 2 (two) times a day with meals   metoprolol succinate (TOPROL-XL) 50 mg 24 hr tablet   Yes Yes   Sig: Take 50 mg by mouth daily   metroNIDAZOLE (FLAGYL) 250 mg tablet   Yes Yes   Sig: Take 250 mg by mouth every 8 (eight) hours   pregabalin (LYRICA) 200 MG capsule   Yes Yes   Sig: Take 200 mg by mouth 2 (two) times a day   spironolactone (ALDACTONE) 25 mg tablet   Yes Yes   Sig: Take 25 mg by mouth daily   tetracycline (ACHROMYCIN,SUMYCIN) 500 MG capsule   Yes Yes   Sig: Take 500 mg by mouth 4 (four) times a day   tiotropium-olodaterol (STIOLTO RESPIMAT) 2.5-2.5 MCG/ACT inhaler   Yes Yes   Sig: Inhale 2 puffs daily      Facility-Administered Medications: None       Past Medical History:   Diagnosis Date    COPD (chronic obstructive pulmonary disease) (HCC)     Diabetes mellitus (HCC)     Sleep apnea        Past Surgical History:   Procedure Laterality Date    BACK SURGERY         History reviewed. No pertinent family history.  I have reviewed and agree with the history as documented.    E-Cigarette/Vaping     E-Cigarette/Vaping Substances     Social History     Tobacco Use    Smoking status: Former     Current packs/day: 1.00     Types: Cigarettes    Smokeless tobacco: Never   Substance Use Topics    Alcohol use: Yes     Alcohol/week: 7.0 standard drinks of alcohol     Types: 7 Shots of liquor per week    Drug use: Never       Review of Systems   Constitutional:  Negative for chills and fever.   HENT:  Positive for congestion. Negative for ear pain, hearing loss, sore throat, trouble swallowing and voice change.    Eyes:  Negative for pain and discharge.   Respiratory:  Positive for cough. Negative for shortness of breath and wheezing.    Cardiovascular:  Negative for chest pain and palpitations.   Gastrointestinal:  Positive for abdominal pain, diarrhea and vomiting. Negative for blood in stool, constipation and  nausea.   Genitourinary:  Negative for dysuria, flank pain, frequency and hematuria.   Musculoskeletal:  Negative for joint swelling, neck pain and neck stiffness.   Skin:  Negative for rash and wound.   Neurological:  Positive for headaches. Negative for dizziness, seizures, syncope and facial asymmetry.   Psychiatric/Behavioral:  Negative for hallucinations, self-injury and suicidal ideas.    All other systems reviewed and are negative.      Physical Exam  Physical Exam  Vitals and nursing note reviewed.   Constitutional:       General: He is not in acute distress.     Appearance: He is well-developed.   HENT:      Head: Normocephalic and atraumatic.      Right Ear: External ear normal.      Left Ear: External ear normal.   Eyes:      General: No scleral icterus.        Right eye: No discharge.         Left eye: No discharge.      Extraocular Movements: Extraocular movements intact.      Conjunctiva/sclera: Conjunctivae normal.   Neck:      Comments: No bruit or thrill noted  Cardiovascular:      Rate and Rhythm: Normal rate and regular rhythm.      Heart sounds: Normal heart sounds. No murmur heard.  Pulmonary:      Effort: Pulmonary effort is normal.      Breath sounds: Normal breath sounds. No wheezing or rales.   Abdominal:      General: Bowel sounds are normal. There is no distension.      Palpations: Abdomen is soft.      Tenderness: There is no abdominal tenderness. There is no guarding or rebound.   Musculoskeletal:         General: No deformity. Normal range of motion.      Cervical back: Normal range of motion and neck supple.   Skin:     General: Skin is warm and dry.      Findings: No rash.   Neurological:      General: No focal deficit present.      Mental Status: He is alert and oriented to person, place, and time.      Cranial Nerves: No cranial nerve deficit.   Psychiatric:         Mood and Affect: Mood normal.         Behavior: Behavior normal.         Thought Content: Thought content normal.          Judgment: Judgment normal.         Vital Signs  ED Triage Vitals   Temperature Pulse Respirations Blood Pressure SpO2   04/16/24 1112 04/16/24 1115 04/16/24 1112 04/16/24 1112 04/16/24 1112   98.8 °F (37.1 °C) 89 18 132/51 95 %      Temp Source Heart Rate Source Patient Position - Orthostatic VS BP Location FiO2 (%)   04/16/24 1112 04/16/24 1112 04/16/24 1112 04/16/24 1112 --   Oral Monitor Lying Left arm       Pain Score       04/16/24 1112       6           Vitals:    04/16/24 1112 04/16/24 1115 04/16/24 1205   BP: 132/51  123/58   Pulse:  89 86   Patient Position - Orthostatic VS: Lying  Lying         Visual Acuity      ED Medications  Medications   sodium chloride 0.9 % bolus 2,000 mL (2,000 mL Intravenous New Bag 4/16/24 1147)   ketorolac (TORADOL) injection 15 mg (15 mg Intravenous Given 4/16/24 1116)   ondansetron (ZOFRAN) injection 4 mg (4 mg Intravenous Given 4/16/24 1116)   magnesium sulfate IVPB (premix) SOLN 1 g (0 g Intravenous Stopped 4/16/24 1259)       Diagnostic Studies  Results Reviewed       Procedure Component Value Units Date/Time    FLU/RSV/COVID - if FLU/RSV clinically relevant [[id number]]  (Normal) Collected: 04/16/24 1115    Lab Status: Final result Specimen: Nares from Nose Updated: 04/16/24 1202     SARS-CoV-2 Negative     INFLUENZA A PCR Negative     INFLUENZA B PCR Negative     RSV PCR Negative    Narrative:      FOR PEDIATRIC PATIENTS - copy/paste COVID Guidelines URL to browser: [email or web address]    SARS-CoV-2 assay is a Nucleic Acid Amplification assay intended for the  qualitative detection of nucleic acid from SARS-CoV-2 in nasopharyngeal  swabs. Results are for the presumptive identification of SARS-CoV-2 RNA.    Positive results are indicative of infection with SARS-CoV-2, the virus  causing COVID-19, but do not rule out bacterial infection or co-infection  with other viruses. Laboratories within the [place] and  its  territories are required to report all positive results to the appropriate  public health authorities. Negative results do not preclude SARS-CoV-2  infection and should not be used as the sole basis for treatment or other  patient management decisions. Negative results must be combined with  clinical observations, patient history, and epidemiological information.  This test has not been FDA cleared or approved.    This test has been authorized by FDA under an Emergency Use Authorization  (EUA). This test is only authorized for the duration of time the  declaration that circumstances exist justifying the authorization of the  emergency use of an in vitro diagnostic tests for detection of SARS-CoV-2  virus and/or diagnosis of COVID-19 infection under section 564(b)(1) of  the Act, 21 U.S.C. 360bbb-3(b)(1), unless the authorization is terminated  or revoked sooner. The test has been validated but independent review by FDA  and CLIA is pending.    Test performed using [employer] Gene[employer]: This RT-PCR assay targets N2,  a region unique to SARS-CoV-2. A conserved region in the E-gene was chosen  for pan-Sarbecovirus detection which includes SARS-CoV-2.    According to CMS-2020-01-R, this platform meets the definition of high-throughput technology.    HS Troponin 0hr (reflex protocol) [[id number]]  (Normal) Collected: 04/16/24 1115    Lab Status: Final result Specimen: Blood from Arm, Left Updated: 04/16/24 1147     hs TnI 0hr 4 ng/L     Comprehensive metabolic panel [[id number]]  (Abnormal) Collected: 04/16/24 1115    Lab Status: Final result Specimen: Blood from Arm, Left Updated: 04/16/24 1140     Sodium 130 mmol/L      Potassium 5.4 mmol/L      Chloride 102 mmol/L      CO2 14 mmol/L      ANION GAP 14 mmol/L       mg/dL      Creatinine 5.10 mg/dL      Glucose 84 mg/dL      Calcium 8.4 mg/dL      AST 13 U/L      ALT 18 U/L      Alkaline Phosphatase 61 U/L      Total Protein 6.5 g/dL      Albumin 4.0 g/dL       Total Bilirubin 0.81 mg/dL      eGFR 10 ml/min/1.73sq m     Narrative:      National Kidney Disease Foundation guidelines for Chronic Kidney Disease (CKD):     Stage 1 with normal or high GFR (GFR > 90 mL/min/1.73 square meters)    Stage 2 Mild CKD (GFR = 60-89 mL/min/1.73 square meters)    Stage 3A Moderate CKD (GFR = 45-59 mL/min/1.73 square meters)    Stage 3B Moderate CKD (GFR = 30-44 mL/min/1.73 square meters)    Stage 4 Severe CKD (GFR = 15-29 mL/min/1.73 square meters)    Stage 5 End Stage CKD (GFR <15 mL/min/1.73 square meters)  Note: GFR calculation is accurate only with a steady state creatinine    Magnesium [[id number]]  (Abnormal) Collected: 04/16/24 1115    Lab Status: Final result Specimen: Blood from Arm, Left Updated: 04/16/24 1140     Magnesium 1.6 mg/dL     Lipase [[id number]]  (Normal) Collected: 04/16/24 1115    Lab Status: Final result Specimen: Blood from Arm, Left Updated: 04/16/24 1140     Lipase 54 u/L     Lactic acid, plasma (w/reflex if result > 2.0) [[id number]]  (Normal) Collected: 04/16/24 1115    Lab Status: Final result Specimen: Blood from Arm, Left Updated: 04/16/24 1140     LACTIC ACID 1.8 mmol/L     Narrative:      Result may be elevated if tourniquet was used during collection.    Protime-INR [[id number]]  (Normal) Collected: 04/16/24 1115    Lab Status: Final result Specimen: Blood from Arm, Left Updated: 04/16/24 1137     Protime 14.4 seconds      INR 1.09    APTT [[id number]]  (Normal) Collected: 04/16/24 1115    Lab Status: Final result Specimen: Blood from Arm, Left Updated: 04/16/24 1137     PTT 28 seconds     CBC and differential [[id number]]  (Abnormal) Collected: 04/16/24 1115    Lab Status: Final result Specimen: Blood from Arm, Left Updated: 04/16/24 1123     WBC 9.67 Thousand/uL      RBC 4.56 Million/uL      Hemoglobin 14.9 g/dL      Hematocrit 43.4 %      MCV 95 fL      MCH 32.7 pg      MCHC 34.3 g/dL      RDW 12.9 %      MPV 10.5 fL      Platelets 155 Thousands/uL       nRBC 0 /100 WBCs      Segmented % 58 %      Immature Grans % 0 %      Lymphocytes % 22 %      Monocytes % 9 %      Eosinophils Relative 11 %      Basophils Relative 0 %      Absolute Neutrophils 5.64 Thousands/µL      Absolute Immature Grans 0.03 Thousand/uL      Absolute Lymphocytes 2.09 Thousands/µL      Absolute Monocytes 0.82 Thousand/µL      Eosinophils Absolute 1.05 Thousand/µL      Basophils Absolute 0.04 Thousands/µL     Stool Enteric Bacterial Panel by PCR [[id number]]     Lab Status: No result Specimen: Stool     Clostridium difficile toxin by PCR with EIA [[id number]]     Lab Status: No result Specimen: Stool                    CT head without contrast   Final Result by Denisa Townsend MD (04/16 1237)      No acute intracranial abnormality.  Chronic microangiopathic changes.                  Workstation performed: [id number]         CT abdomen pelvis wo contrast   Final Result by Denisa Townsend MD (04/16 9018)         1. No hydronephrosis or nephrolithiasis.   2. No evidence for bowel obstruction.   3. Hepatic steatosis and cholelithiasis with no signs of acute cholecystitis.      Workstation performed: [id number]         XR chest 1 view portable   ED Interpretation by Nikos Becerra MD (04/16 2057)   NAD                 Procedures  ECG 12 Lead Documentation Only    Date/Time: 4/16/2024 11:12 AM    Performed by: Nikos Becerra MD  Authorized by: Nikos Becerra MD    ECG reviewed by me, the ED Provider: yes    Patient location:  ED  Previous ECG:     Previous ECG:  Unavailable  Rate:     ECG rate:  80  Rhythm:     Rhythm: sinus rhythm    Ectopy:     Ectopy: none    QRS:     QRS axis:  Normal           ED Course  ED Course as of 04/16/24 1259   Tue Apr 16, 2024   1142 His creatinine was 1.34 in November.   1147 Patient seen.  Headache improved.  Neurologic exam is nonfocal.                               SBIRT 22yo+      Flowsheet Row Most Recent Value   Initial Alcohol Screen: US AUDIT-C      1. How often do you have a drink containing alcohol? 5 Filed at: 04/16/2024 1123   2. How many drinks containing alcohol do you have on a typical day you are drinking?  1 Filed at: 04/16/2024 1123   3b. FEMALE Any Age, or MALE 65+: How often do you have 4 or more drinks on one occassion? 0 Filed at: 04/16/2024 1123   Audit-C Score 6 Filed at: 04/16/2024 1123   DEB: How many times in the past year have you...    Used an illegal drug or used a prescription medication for non-medical reasons? Never Filed at: 04/16/2024 1123                      Medical Decision Making  Amount and/or Complexity of Data Reviewed  Labs: ordered.  Radiology: ordered and independent interpretation performed.    Risk  Prescription drug management.  Decision regarding hospitalization.             Disposition  Final diagnoses:   Dehydration   SUSHILA (acute kidney injury) (HCC)   Hypomagnesemia     Time reflects when diagnosis was documented in both MDM as applicable and the Disposition within this note       Time User Action Codes Description Comment    4/16/2024 11:47 AM Nikos Becerra Add [E86.0] Dehydration     4/16/2024 11:47 AM Nikos Becerra Add [N17.9] SUSHILA (acute kidney injury) (HCC)     4/16/2024 11:47 AM Nikos Becerra Add [E83.42] Hypomagnesemia           ED Disposition       ED Disposition   Admit    Condition   Stable    Date/Time   Tue Apr 16, 2024 1252    Comment   Admit to Dr. York service               Follow-up Information    None         Patient's Medications   Discharge Prescriptions    No medications on file       No discharge procedures on file.    PDMP Review       None            ED Provider  Electronically Signed by             Nikos Becerra MD  04/16/24 2064

## 2024-04-16 NOTE — ASSESSMENT & PLAN NOTE
Recently diagnosed by EGD, started on quadruple therapy 3/28 - 4/27  Bismuth subsalicylate 524 mg 4 times daily  Tetracycline 500 mg 4 times daily  Metronidazole 250 mg 4 times daily  Omeprazole 20 mg twice daily    Hold bismuth subsalicylate with metabolic acidosis and SUSHILA  Hold antibiotics with diarrhea starting post antibiotic initiation until C. difficile ruled out  Can continue omeprazole  Patient denies any abdominal pain, has been having nausea vomiting

## 2024-04-16 NOTE — ASSESSMENT & PLAN NOTE
In setting of dehydration and possibly bismuth subsalicylate use  With concurrent SUSHILA and mild hyper kalemia  Anion gap of 14    Continue bicarb drip  S/p 2 L nasal saline bolus  Monitor on BMP

## 2024-04-16 NOTE — H&P
[facility]  H&P  Name: Clay Marcial 68 y.o. male I MRN: [id number]  Unit/Bed#: -01 I Date of Admission: 4/16/2024   Date of Service: 4/16/2024 I Hospital Day: 0      Assessment/Plan   * Acute on chronic kidney failure  (HCC)  Assessment & Plan  Creatine on admission 5.1, Baseline creatinine 1.2-1.3, and as high as 1.8 in November 2023  Etiology likely dehydration due to nausea vomiting diarrhea, need to rule out toxicity from H. pylori cocktail?  Recently started end of last month  Imaging CT abdomen pelvis without hydronephrosis or signs of obstruction    Lab Results   Component Value Date    CREATININE 5.10 (H) 04/16/2024    CREATININE 1.34 (H) 11/13/2023     Hold ACE/ARB and diuretic therapy  Avoid hypotension, nephrotoxins, and NSAIDS if possible    Urine studies ordered   IV fluids - supplemented 2 L bolus in ED, continue bicarb gtt  Strict I & Os  Urinary retention protocol and bladder scan   Nephrology consult; recommendations appreciated   With low bicarb will start bicarb gtt       Helicobacter pylori infection  Assessment & Plan  Recently diagnosed by EGD, started on quadruple therapy 3/28 - 4/27  Bismuth subsalicylate 524 mg 4 times daily  Tetracycline 500 mg 4 times daily  Metronidazole 250 mg 4 times daily  Omeprazole 20 mg twice daily    Hold bismuth subsalicylate with metabolic acidosis and SUSHILA  Hold antibiotics with diarrhea starting post antibiotic initiation until C. difficile ruled out  Can continue omeprazole  Patient denies any abdominal pain, has been having nausea vomiting    Hyperkalemia  Assessment & Plan  Presented with mild hyperkalemia in setting of SUSHILA and metabolic acidosis  Treated with 2 L of IV normal saline bolus in the ER  Continue bicarb drip  Recheck BMP    Nausea and vomiting  Assessment & Plan  Has nausea and vomiting with diarrhea  States he feels like he has been trying to keep hydrated  Denies any abdominal pain    CT abdomen pelvis  "negative for acute pathology  COVID/flu/RSV negative  Treat conservatively    Metabolic acidosis  Assessment & Plan  In setting of dehydration and possibly bismuth subsalicylate use  With concurrent SUSHILA and mild hyper kalemia  Anion gap of 14    Continue bicarb drip  S/p 2 L nasal saline bolus  Monitor on BMP    CHF (congestive heart failure) (HCC)  Assessment & Plan  Wt Readings from Last 3 Encounters:   04/16/24 126 kg (277 lb 12.5 oz)   11/13/23 117 kg (257 lb 8 oz)     Patient with noted history of systolic CHF however no echo is available to review in chart review  Presenting with SUSHILA, hold diuretics, ACE  Can continue beta-blocker with blood pressure hold parameters  Received 2 L bolus in the ER, bicarb drip  Monitor intake and output and daily weights    CAD (coronary artery disease)  Assessment & Plan  Denies any chest pain  EKG with sinus rhythm rate 80  Troponin negative  Continue aspirin, statin and beta-blocker    Diabetes mellitus (HCC)  Assessment & Plan  No results found for: \"HGBA1C\"    No results for input(s): \"POCGLU\" in the last 72 hours.    Blood Sugar Average: Last 72 hrs:    Not maintained on insulin as outpatient, hold oral regimen  Maintain on correctional scale and diabetic diet         VTE Pharmacologic Prophylaxis:   High Risk (Score >/= 5) - Pharmacological DVT Prophylaxis Ordered: heparin. Sequential Compression Devices Ordered.  Code Status: Level 3 - DNAR and DNI   Discussion with family: Updated  (friend) at bedside.    Anticipated Length of Stay: Patient will be admitted on an inpatient basis with an anticipated length of stay of greater than 2 midnights secondary to SUSHILA.    Total Time Spent on Date of Encounter in care of patient:  mins. This time was spent on one or more of the following: performing physical exam; counseling and coordination of care; obtaining or reviewing history; documenting in the medical record; reviewing/ordering tests, medications or procedures; " communicating with other healthcare professionals and discussing with patient's family/caregivers.    Chief Complaint: Nausea vomiting    History of Present Illness:  Clay Marcial is a 68 y.o. male with a PMH of COPD, diabetes, recently diagnosed H. pylori infection, CAD, CHF, CKD who presents with headache and neck pain starting 2 days ago followed by nausea vomiting and diarrhea.  Feels slightly congested.  Has not had any fevers or chills, no recent travel and no sick contacts.  Diarrhea has been nonbloody and emesis none bilious, no hematemesis.  Last few days has not taken his medications.  Initially reports minimal p.o. intake over the last 2 days however states he has been trying to keep hydrated.  States he stopped smoking approximately 4 months ago and drinks about 1 drink per day, has never had any alcohol withdrawal.     Review of Systems:  Review of Systems   Constitutional:  Positive for fatigue. Negative for activity change, chills and fever.   HENT:  Positive for congestion. Negative for rhinorrhea and sore throat.    Eyes:  Negative for visual disturbance.   Respiratory:  Negative for cough, chest tightness and shortness of breath.    Cardiovascular:  Negative for chest pain and palpitations.   Gastrointestinal:  Positive for diarrhea, nausea and vomiting. Negative for abdominal pain and constipation.   Genitourinary:  Negative for difficulty urinating, dysuria, frequency and urgency.   Musculoskeletal:  Positive for neck pain. Negative for arthralgias and myalgias.   Skin:  Negative for rash.   Neurological:  Positive for headaches. Negative for seizures, syncope and weakness.   All other systems reviewed and are negative.      Past Medical and Surgical History:   Past Medical History:   Diagnosis Date    COPD (chronic obstructive pulmonary disease) (HCC)     Diabetes mellitus (HCC) 4/16/2024    Sleep apnea        Past Surgical History:   Procedure Laterality Date    BACK SURGERY          Meds/Allergies:  Prior to Admission medications    Medication Sig Start Date End Date Taking? Authorizing Provider   albuterol (PROVENTIL HFA,VENTOLIN HFA) 90 mcg/act inhaler Inhale 1 puff every 4 (four) hours as needed for shortness of breath or wheezing   Yes Historical Provider, MD   aspirin 81 mg chewable tablet Chew 81 mg daily   Yes Historical Provider, MD   atorvastatin (LIPITOR) 20 mg tablet Take 20 mg by mouth daily 1/3/24  Yes Historical Provider, MD   Empagliflozin 25 MG TABS Take 25 mg by mouth in the morning 2/8/24  Yes Historical Provider, MD   furosemide (LASIX) 40 mg tablet Take 40 mg by mouth daily 11/1/23  Yes Historical Provider, MD   isosorbide mononitrate (IMDUR) 30 mg 24 hr tablet Take 30 mg by mouth 2/6/24  Yes Historical Provider, MD   lisinopril (ZESTRIL) 20 mg tablet Take 20 mg by mouth daily 11/1/23  Yes Historical Provider, MD   metFORMIN (GLUCOPHAGE-XR) 500 mg 24 hr tablet Take 1,000 mg by mouth 2 (two) times a day with meals 2/8/24  Yes Historical Provider, MD   metoprolol succinate (TOPROL-XL) 50 mg 24 hr tablet Take 50 mg by mouth daily 11/15/23  Yes Historical Provider, MD   metroNIDAZOLE (FLAGYL) 250 mg tablet Take 250 mg by mouth every 8 (eight) hours 3/28/24 4/27/24 Yes Historical Provider, MD   omeprazole (PriLOSEC OTC) 20 MG tablet Take 20 mg by mouth 2 (two) times a day 3/28/24 4/27/24 Yes Historical Provider, MD   pregabalin (LYRICA) 200 MG capsule Take 200 mg by mouth 2 (two) times a day 3/4/24  Yes Historical Provider, MD   tetracycline (ACHROMYCIN,SUMYCIN) 500 MG capsule Take 500 mg by mouth 4 (four) times a day 3/28/24 4/27/24 Yes Historical Provider, MD   tiotropium-olodaterol (STIOLTO RESPIMAT) 2.5-2.5 MCG/ACT inhaler Inhale 2 puffs daily   Yes Historical Provider, MD   Alogliptin Benzoate 25 MG TABS Take 25 mg by mouth in the morning  4/16/24 Yes Historical Provider, MD   cetirizine (ZyrTEC) 5 MG tablet Take 5 mg by mouth daily 3/27/24 4/16/24 Yes Historical  "Provider, MD   gabapentin (NEURONTIN) 400 mg capsule Take 400 mg by mouth 3 (three) times a day  4/16/24 Yes Historical Provider, MD   glipiZIDE (GLUCOTROL) 10 mg tablet Take 1 tablet by mouth 2 (two) times a day before meals  4/16/24 Yes Historical Provider, MD   spironolactone (ALDACTONE) 25 mg tablet Take 25 mg by mouth daily  4/16/24 Yes Historical Provider, MD   tiotropium-olodaterol (STIOLTO RESPIMAT) 2.5-2.5 MCG/ACT inhaler Inhale 2 puffs daily 2/20/24 4/16/24 Yes Historical Provider, MD   bismuth subsalicylate (PEPTO BISMOL) 262 MG chewable tablet Chew 524 mg 4 (four) times a day (before meals and at bedtime) 3/28/24 4/27/24  Historical Provider, MD   DULoxetine (CYMBALTA) 20 mg capsule Take 20 mg by mouth daily    Historical Provider, MD     I have reviewed home medications using recent Epic encounter.    Allergies: No Known Allergies    Social History:  Marital Status: Single   Occupation: None  Patient Pre-hospital Living Situation: Home  Patient Pre-hospital Level of Mobility: walks  Patient Pre-hospital Diet Restrictions: None  Substance Use History:   Social History     Substance and Sexual Activity   Alcohol Use Yes    Alcohol/week: 7.0 standard drinks of alcohol    Types: 7 Shots of liquor per week     Social History     Tobacco Use   Smoking Status Former    Current packs/day: 1.00    Types: Cigarettes   Smokeless Tobacco Never     Social History     Substance and Sexual Activity   Drug Use Never       Family History:  History reviewed. No pertinent family history.    Physical Exam:     Vitals:   Blood Pressure: 104/53 (04/16/24 1612)  Pulse: 86 (04/16/24 1612)  Temperature: (!) 97.3 °F (36.3 °C) (04/16/24 1612)  Temp Source: Temporal (04/16/24 1300)  Respirations: 16 (04/16/24 1612)  Height: 5' 11\" (180.3 cm) (04/16/24 1112)  Weight - Scale: 126 kg (277 lb 12.5 oz) (04/16/24 1345)  SpO2: 98 % (04/16/24 1612)    Physical Exam  Vitals and nursing note reviewed.   Constitutional:       General: He is " not in acute distress.     Appearance: Normal appearance. He is obese. He is ill-appearing.   HENT:      Head: Normocephalic and atraumatic.      Nose: No congestion.      Mouth/Throat:      Mouth: Mucous membranes are moist.   Eyes:      Conjunctiva/sclera: Conjunctivae normal.   Cardiovascular:      Rate and Rhythm: Normal rate and regular rhythm.      Pulses: Normal pulses.      Heart sounds: Normal heart sounds. No murmur heard.  Pulmonary:      Effort: Pulmonary effort is normal. No respiratory distress.      Comments: Diminished diffusely  Abdominal:      General: Bowel sounds are normal. There is distension (obsese abdomen, patient states is per usual).      Palpations: Abdomen is soft.      Tenderness: There is no abdominal tenderness.   Musculoskeletal:         General: Normal range of motion.      Right lower leg: Edema (trace) present.      Left lower leg: No edema (trace).   Skin:     General: Skin is warm and dry.   Neurological:      Mental Status: He is alert and oriented to person, place, and time.          Additional Data:     Lab Results:  Results from last 7 days   Lab Units 04/16/24  1115   WBC Thousand/uL 9.67   HEMOGLOBIN g/dL 14.9   HEMATOCRIT % 43.4   PLATELETS Thousands/uL 155   SEGS PCT % 58   LYMPHO PCT % 22   MONO PCT % 9   EOS PCT % 11*     Results from last 7 days   Lab Units 04/16/24  1115   SODIUM mmol/L 130*   POTASSIUM mmol/L 5.4*   CHLORIDE mmol/L 102   CO2 mmol/L 14*   BUN mg/dL 103*   CREATININE mg/dL 5.10*   ANION GAP mmol/L 14*   CALCIUM mg/dL 8.4   ALBUMIN g/dL 4.0   TOTAL BILIRUBIN mg/dL 0.81   ALK PHOS U/L 61   ALT U/L 18   AST U/L 13   GLUCOSE RANDOM mg/dL 84     Results from last 7 days   Lab Units 04/16/24  1115   INR  1.09             Results from last 7 days   Lab Units 04/16/24  1115   LACTIC ACID mmol/L 1.8       Lines/Drains:  Invasive Devices       Peripheral Intravenous Line  Duration             Peripheral IV 04/16/24 Left Antecubital <1 day                         Imaging: Reviewed radiology reports from this admission including: abdominal/pelvic CT and CT head  CT head without contrast   Final Result by Denisa Townsend MD (04/16 1237)      No acute intracranial abnormality.  Chronic microangiopathic changes.                  Workstation performed: [id number]         CT abdomen pelvis wo contrast   Final Result by Denisa Townsend MD (04/16 1241)         1. No hydronephrosis or nephrolithiasis.   2. No evidence for bowel obstruction.   3. Hepatic steatosis and cholelithiasis with no signs of acute cholecystitis.      Workstation performed: [id number]         XR chest 1 view portable   ED Interpretation by Nikos Becerra MD (04/16 0537)   NAD          EKG and Other Studies Reviewed on Admission:   EKG: NSR. HR 80.    ** Please Note: This note has been constructed using a voice recognition system. **

## 2024-04-16 NOTE — ASSESSMENT & PLAN NOTE
Wt Readings from Last 3 Encounters:   04/16/24 126 kg (277 lb 12.5 oz)   11/13/23 117 kg (257 lb 8 oz)     Patient with noted history of systolic CHF however no echo is available to review in chart review  Presenting with SUSHILA, hold diuretics, ACE  Can continue beta-blocker with blood pressure hold parameters  Received 2 L bolus in the ER, bicarb drip  Monitor intake and output and daily weights

## 2024-04-16 NOTE — ASSESSMENT & PLAN NOTE
"No results found for: \"HGBA1C\"    No results for input(s): \"POCGLU\" in the last 72 hours.    Blood Sugar Average: Last 72 hrs:    Not maintained on insulin as outpatient, hold oral regimen  Maintain on correctional scale and diabetic diet  "

## 2024-04-16 NOTE — ASSESSMENT & PLAN NOTE
Has nausea and vomiting with diarrhea  States he feels like he has been trying to keep hydrated  Denies any abdominal pain    CT abdomen pelvis negative for acute pathology  COVID/flu/RSV negative  Treat conservatively

## 2024-04-16 NOTE — ASSESSMENT & PLAN NOTE
Denies any chest pain  EKG with sinus rhythm rate 80  Troponin negative  Continue aspirin, statin and beta-blocker

## 2024-04-17 LAB
ANION GAP SERPL CALCULATED.3IONS-SCNC: 10 MMOL/L (ref 4–13)
ARTERIAL PATENCY WRIST A: YES
ATRIAL RATE: 84 BPM
B-OH-BUTYR SERPL-MCNC: <0.05 MMOL/L (ref 0.02–0.27)
BASE EXCESS BLDA CALC-SCNC: -14.4 MMOL/L
BUN SERPL-MCNC: 102 MG/DL (ref 5–25)
C COLI+JEJUNI TUF STL QL NAA+PROBE: NEGATIVE
C DIFF TOX GENS STL QL NAA+PROBE: NEGATIVE
CALCIUM SERPL-MCNC: 8 MG/DL (ref 8.4–10.2)
CHLORIDE SERPL-SCNC: 105 MMOL/L (ref 96–108)
CO2 SERPL-SCNC: 16 MMOL/L (ref 21–32)
CREAT SERPL-MCNC: 3.95 MG/DL (ref 0.6–1.3)
EC STX1+STX2 GENES STL QL NAA+PROBE: NEGATIVE
ERYTHROCYTE [DISTWIDTH] IN BLOOD BY AUTOMATED COUNT: 12.7 % (ref 11.6–15.1)
GFR SERPL CREATININE-BSD FRML MDRD: 14 ML/MIN/1.73SQ M
GLUCOSE SERPL-MCNC: 108 MG/DL (ref 65–140)
GLUCOSE SERPL-MCNC: 125 MG/DL (ref 65–140)
GLUCOSE SERPL-MCNC: 201 MG/DL (ref 65–140)
GLUCOSE SERPL-MCNC: 204 MG/DL (ref 65–140)
GLUCOSE SERPL-MCNC: 242 MG/DL (ref 65–140)
HCO3 BLDA-SCNC: 12.5 MMOL/L (ref 22–28)
HCT VFR BLD AUTO: 37.4 % (ref 36.5–49.3)
HGB BLD-MCNC: 12.7 G/DL (ref 12–17)
MAGNESIUM SERPL-MCNC: 1.8 MG/DL (ref 1.9–2.7)
MCH RBC QN AUTO: 32 PG (ref 26.8–34.3)
MCHC RBC AUTO-ENTMCNC: 34 G/DL (ref 31.4–37.4)
MCV RBC AUTO: 94 FL (ref 82–98)
NON VENT TYPE- NRB: 21
O2 CT BLDA-SCNC: 19.1 ML/DL (ref 16–23)
OXYHGB MFR BLDA: 94 % (ref 94–97)
P AXIS: 41 DEGREES
PCO2 BLDA: 33.1 MM HG (ref 36–44)
PH BLDA: 7.2 [PH] (ref 7.35–7.45)
PHOSPHATE SERPL-MCNC: 7.6 MG/DL (ref 2.3–4.1)
PLATELET # BLD AUTO: 119 THOUSANDS/UL (ref 149–390)
PMV BLD AUTO: 10.2 FL (ref 8.9–12.7)
PO2 BLDA: 82.7 MM HG (ref 75–129)
POTASSIUM SERPL-SCNC: 5.1 MMOL/L (ref 3.5–5.3)
PR INTERVAL: 194 MS
QRS AXIS: 86 DEGREES
QRSD INTERVAL: 104 MS
QT INTERVAL: 366 MS
QTC INTERVAL: 432 MS
RBC # BLD AUTO: 3.97 MILLION/UL (ref 3.88–5.62)
SALMONELLA SP SPAO STL QL NAA+PROBE: NEGATIVE
SHIGELLA SP+EIEC IPAH STL QL NAA+PROBE: NEGATIVE
SODIUM SERPL-SCNC: 131 MMOL/L (ref 135–147)
SPECIMEN SOURCE: ABNORMAL
T WAVE AXIS: -5 DEGREES
VENTRICULAR RATE: 84 BPM
WBC # BLD AUTO: 5.77 THOUSAND/UL (ref 4.31–10.16)

## 2024-04-17 PROCEDURE — 97116 GAIT TRAINING THERAPY: CPT

## 2024-04-17 PROCEDURE — 82948 REAGENT STRIP/BLOOD GLUCOSE: CPT

## 2024-04-17 PROCEDURE — 83735 ASSAY OF MAGNESIUM: CPT

## 2024-04-17 PROCEDURE — 82010 KETONE BODYS QUAN: CPT | Performed by: INTERNAL MEDICINE

## 2024-04-17 PROCEDURE — 36600 WITHDRAWAL OF ARTERIAL BLOOD: CPT

## 2024-04-17 PROCEDURE — 97167 OT EVAL HIGH COMPLEX 60 MIN: CPT

## 2024-04-17 PROCEDURE — 82805 BLOOD GASES W/O2 SATURATION: CPT | Performed by: INTERNAL MEDICINE

## 2024-04-17 PROCEDURE — 97535 SELF CARE MNGMENT TRAINING: CPT

## 2024-04-17 PROCEDURE — 97163 PT EVAL HIGH COMPLEX 45 MIN: CPT

## 2024-04-17 PROCEDURE — 85027 COMPLETE CBC AUTOMATED: CPT

## 2024-04-17 PROCEDURE — 80048 BASIC METABOLIC PNL TOTAL CA: CPT

## 2024-04-17 PROCEDURE — 99223 1ST HOSP IP/OBS HIGH 75: CPT | Performed by: INTERNAL MEDICINE

## 2024-04-17 PROCEDURE — 84100 ASSAY OF PHOSPHORUS: CPT

## 2024-04-17 PROCEDURE — 99232 SBSQ HOSP IP/OBS MODERATE 35: CPT

## 2024-04-17 RX ORDER — MAGNESIUM SULFATE HEPTAHYDRATE 40 MG/ML
2 INJECTION, SOLUTION INTRAVENOUS ONCE
Qty: 50 ML | Refills: 0 | Status: COMPLETED | OUTPATIENT
Start: 2024-04-17 | End: 2024-04-17

## 2024-04-17 RX ADMIN — SODIUM BICARBONATE 125 ML/HR: 84 INJECTION, SOLUTION INTRAVENOUS at 09:22

## 2024-04-17 RX ADMIN — SODIUM BICARBONATE 100 ML/HR: 84 INJECTION, SOLUTION INTRAVENOUS at 22:06

## 2024-04-17 RX ADMIN — INSULIN LISPRO 3 UNITS: 100 INJECTION, SOLUTION INTRAVENOUS; SUBCUTANEOUS at 17:12

## 2024-04-17 RX ADMIN — SODIUM BICARBONATE 100 ML/HR: 84 INJECTION, SOLUTION INTRAVENOUS at 11:02

## 2024-04-17 RX ADMIN — HEPARIN SODIUM 5000 UNITS: 5000 INJECTION, SOLUTION INTRAVENOUS; SUBCUTANEOUS at 21:27

## 2024-04-17 RX ADMIN — ASPIRIN 81 MG CHEWABLE TABLET 81 MG: 81 TABLET CHEWABLE at 09:37

## 2024-04-17 RX ADMIN — DULOXETINE HYDROCHLORIDE 20 MG: 20 CAPSULE, DELAYED RELEASE ORAL at 09:37

## 2024-04-17 RX ADMIN — PANTOPRAZOLE SODIUM 20 MG: 20 TABLET, DELAYED RELEASE ORAL at 05:03

## 2024-04-17 RX ADMIN — ATORVASTATIN CALCIUM 20 MG: 20 TABLET, FILM COATED ORAL at 17:11

## 2024-04-17 RX ADMIN — HEPARIN SODIUM 5000 UNITS: 5000 INJECTION, SOLUTION INTRAVENOUS; SUBCUTANEOUS at 05:04

## 2024-04-17 RX ADMIN — PREGABALIN 100 MG: 100 CAPSULE ORAL at 17:11

## 2024-04-17 RX ADMIN — HEPARIN SODIUM 5000 UNITS: 5000 INJECTION, SOLUTION INTRAVENOUS; SUBCUTANEOUS at 14:58

## 2024-04-17 RX ADMIN — INSULIN LISPRO 2 UNITS: 100 INJECTION, SOLUTION INTRAVENOUS; SUBCUTANEOUS at 11:56

## 2024-04-17 RX ADMIN — MAGNESIUM SULFATE HEPTAHYDRATE 2 G: 40 INJECTION, SOLUTION INTRAVENOUS at 09:35

## 2024-04-17 RX ADMIN — PREGABALIN 100 MG: 100 CAPSULE ORAL at 09:37

## 2024-04-17 NOTE — PLAN OF CARE
Problem: PAIN - ADULT  Goal: Verbalizes/displays adequate comfort level or baseline comfort level  Description: Interventions:  - Encourage patient to monitor pain and request assistance  - Assess pain using appropriate pain scale  - Administer analgesics based on type and severity of pain and evaluate response  - Implement non-pharmacological measures as appropriate and evaluate response  - Consider cultural and social influences on pain and pain management  - Notify physician/advanced practitioner if interventions unsuccessful or patient reports new pain  Outcome: Progressing     Problem: INFECTION - ADULT  Goal: Absence or prevention of progression during hospitalization  Description: INTERVENTIONS:  - Assess and monitor for signs and symptoms of infection  - Monitor lab/diagnostic results  - Monitor all insertion sites, i.e. indwelling lines, tubes, and drains  - Monitor endotracheal if appropriate and nasal secretions for changes in amount and color  - Black appropriate cooling/warming therapies per order  - Administer medications as ordered  - Instruct and encourage patient and family to use good hand hygiene technique  - Identify and instruct in appropriate isolation precautions for identified infection/condition  Outcome: Progressing  Goal: Absence of fever/infection during neutropenic period  Description: INTERVENTIONS:  - Monitor WBC    Outcome: Progressing     Problem: SAFETY ADULT  Goal: Patient will remain free of falls  Description: INTERVENTIONS:  - Educate patient/family on patient safety including physical limitations  - Instruct patient to call for assistance with activity   - Consult OT/PT to assist with strengthening/mobility   - Keep Call bell within reach  - Keep bed low and locked with side rails adjusted as appropriate  - Keep care items and personal belongings within reach  - Initiate and maintain comfort rounds  - Make Fall Risk Sign visible to staff  - Offer Toileting every 2 Hours,  in advance of need  - Initiate/Maintain bed/chair alarm    - Apply yellow socks and bracelet for high fall risk patients  - Consider moving patient to room near nurses station  Outcome: Progressing  Goal: Maintain or return to baseline ADL function  Description: INTERVENTIONS:  -  Assess patient's ability to carry out ADLs; assess patient's baseline for ADL function and identify physical deficits which impact ability to perform ADLs (bathing, care of mouth/teeth, toileting, grooming, dressing, etc.)  - Assess/evaluate cause of self-care deficits   - Assess range of motion  - Assess patient's mobility; develop plan if impaired  - Assess patient's need for assistive devices and provide as appropriate  - Encourage maximum independence but intervene and supervise when necessary  - Involve family in performance of ADLs  - Assess for home care needs following discharge   - Consider OT consult to assist with ADL evaluation and planning for discharge  - Provide patient education as appropriate  Outcome: Progressing  Goal: Maintains/Returns to pre admission functional level  Description: INTERVENTIONS:  - Perform AM-PAC 6 Click Basic Mobility/ Daily Activity assessment daily.  - Set and communicate daily mobility goal to care team and patient/family/caregiver.   - Collaborate with rehabilitation services on mobility goals if consulted  - Stand patient 3 times a day  - Ambulate patient 3 times a day  - Out of bed to chair 3 times a day   - Out of bed for meals 3 times a day  - Out of bed for toileting  - Record patient progress and toleration of activity level   Outcome: Progressing     Problem: DISCHARGE PLANNING  Goal: Discharge to home or other facility with appropriate resources  Description: INTERVENTIONS:  - Identify barriers to discharge w/patient and caregiver  - Arrange for needed discharge resources and transportation as appropriate  - Identify discharge learning needs (meds, wound care, etc.)  - Arrange for  interpretive services to assist at discharge as needed  - Refer to Case Management Department for coordinating discharge planning if the patient needs post-hospital services based on physician/advanced practitioner order or complex needs related to functional status, cognitive ability, or social support system  Outcome: Progressing     Problem: Knowledge Deficit  Goal: Patient/family/caregiver demonstrates understanding of disease process, treatment plan, medications, and discharge instructions  Description: Complete learning assessment and assess knowledge base.  Interventions:  - Provide teaching at level of understanding  - Provide teaching via preferred learning methods  Outcome: Progressing

## 2024-04-17 NOTE — PHYSICAL THERAPY NOTE
" PHYSICAL THERAPY EVALUATION      NAME:  Clay Marcial  DATE: 04/17/24    AGE:   68 y.o.  Mrn:   [id number]  ADMIT DX:  Dehydration [E86.0]  Hypomagnesemia [E83.42]  Vomiting [R11.10]  SUSHILA (acute kidney injury) (HCC) [N17.9]    Past Medical History:   Diagnosis Date    COPD (chronic obstructive pulmonary disease) ([facility])     Diabetes mellitus ([facility]) 4/16/2024    Sleep apnea      Length Of Stay: 1  Performed at least 2 patient identifiers during session: Name and Birthday       PHYSICAL THERAPY EVALUATION:       04/17/24 0934   PT Last Visit   PT Visit Date 04/17/24   Note Type   Note type Evaluation   Pain Assessment   Pain Assessment Tool 0-10   Pain Score No Pain   Restrictions/Precautions   Weight Bearing Precautions Per Order No   Other Precautions Fall Risk;Multiple lines;Contact/isolation;Chair Alarm;Bed Alarm  (rule-out C-diff)   Home Living   Type of Home House   Home Layout Two level;Bed/bath upstairs;Stairs to enter with rails  (7 ADA B rails; full bathroom upstairs L rail initially then no rail on 2nd half of stairs)   Bathroom Shower/Tub Tub/shower unit   Bathroom Toilet Raised   Bathroom Equipment Grab bars in shower;Shower chair;Grab bars around toilet   Home Equipment Walker;Cane  (\"2 lalita's\")   Prior Function   Level of Butts Independent with ADLs;Independent with functional mobility;Independent with IADLS   Lives With Alone   Receives Help From Friend(s)   IADLs Independent with driving;Independent with meal prep;Independent with medication management   Falls in the last 6 months 0   Comments Mod (I) cane PRN   General   Family/Caregiver Present No   Cognition   Overall Cognitive Status WFL   Arousal/Participation Cooperative   Orientation Level Oriented X4   Following Commands Follows one step commands without difficulty   RLE Assessment   RLE Assessment WFL   LLE Assessment   LLE Assessment WFL   Light Touch   RLE Light Touch Impaired   RLE Light Touch Comments foot/toes   LLE Light Touch " Impaired   LLE Light Touch Comments foot/toes   Proprioception   RLE Proprioception Grossly intact   LLE Proprioception Grossly Intact   Bed Mobility   Supine to Sit 6  Modified independent   Additional items HOB elevated   Transfers   Sit to Stand 5  Supervision   Stand to Sit 5  Supervision   Stand pivot 5  Supervision   Additional items Increased time required;Verbal cues   Additional Comments Using RW   Ambulation/Elevation   Gait pattern Improper Weight shift;Inconsistent michael   Gait Assistance 5  Supervision   Additional items Verbal cues   Assistive Device Rolling walker   Distance 15 ft   Stair Management Assistance   (see tx aspect of note)   Balance   Static Sitting Normal   Dynamic Sitting Good   Static Standing Fair +   Dynamic Standing Fair   Ambulatory Fair   Endurance Deficit   Endurance Deficit Yes   Endurance Deficit Description fatigue, lightheaded, SOB   Activity Tolerance   Activity Tolerance Patient limited by fatigue  (feeling lightheaded)   Medical Staff Made Aware OT Dinorah   Nurse Made Aware Yes   Assessment   Prognosis Good   Problem List Decreased strength;Decreased endurance;Impaired balance;Decreased mobility;Obesity;Impaired sensation   Barriers to Discharge Other (Comment)   Barriers to Discharge Comments bed/bath on 2nd floor in which there is only 1 railing part way up to stairs to which the last few stairs have no rail   Goals   Patient Goals go home   STG Expiration Date 05/01/24   PT Treatment Day 1   Plan   Treatment/Interventions Functional transfer training;LE strengthening/ROM;Elevations;Therapeutic exercise;Endurance training;Patient/family training;Equipment eval/education;Gait training;Compensatory technique education;Spoke to nursing;OT   PT Frequency 3-5x/wk   Discharge Recommendation   Rehab Resource Intensity Level, PT III (Minimum Resource Intensity)   Equipment Recommended   (may benefit from hospital bed on 1st flr to allow for complete 1st flr set up)    Additional Comments using RW - pt owns   Additional Comments 2 get full handrail(s) installed for stairs to 2nd floor, until then utilize BSC on 1st flr   AM-PAC Basic Mobility Inpatient   Turning in Flat Bed Without Bedrails 4   Lying on Back to Sitting on Edge of Flat Bed Without Bedrails 4   Moving Bed to Chair 3   Standing Up From Chair Using Arms 3   Walk in Room 3   Climb 3-5 Stairs With Railing 3   Basic Mobility Inpatient Raw Score 20   Basic Mobility Standardized Score 43.99   [facility] Highest Level Of Mobility   JH-HLM Goal 6: Walk 10 steps or more   JH-HLM Achieved 7: Walk 25 feet or more   Additional Treatment Session   Start Time 1000   End Time 1017   Treatment Assessment Pt tolerates tx session fair.  Interventions consisting of transfer training, gait training, and stair negotiation.  Pt limited by SOB with exertion however SpO2 remains >90% throughout with exertion.  Discussed with OT about pt's BR on 2nd flr, and she is ordering a BSC for pt for 1st flr toileting.   Equipment Use Pt performs transfers with SPV using RW.  Pt ambulates 15 ft x 1 and 20 ft x 2 SPV RW.  Pt navigates 4 steps using B rails and then 4 more steps using R rail only SPV.   End of Consult   Patient Position at End of Consult Bedside chair;Bed/Chair alarm activated;All needs within reach      04/17/24 0956 04/17/24 0957   Vitals   Blood Pressure 103/60 102/60   MAP (mmHg) 74 74   BP Location Right arm Right arm   BP Method Automatic Automatic   Patient Position - Orthostatic VS Sitting Standing     (Please find full objective findings from PT assessment regarding body systems outlined above).     Assessment: Pt is a 68 y.o. male seen for PT evaluation s/p admission to [facility] on 4/16/2024 with Acute on chronic kidney failure  (HCC).  Order placed for PT services.  Upon evaluation: Pt is presenting with impaired functional mobility due to decreased strength, decreased endurance, impaired balance,  gait deviations, impaired judgment, and fall risk requiring  SPV assistance for transfers and ambulation with RW . Pt's clinical presentation is currently unstable/unpredictable given the functional mobility deficits above, especially decreased activity tolerance, decreased functional mobility tolerance, and SOB upon exertion, coupled with fall risks as indicated by -PAC 6-Clicks: 20/24 as well as obesity and combined with medical complications of abnormal renal lab values, abnormal blood sugars, abnormal sodium values, abnormal CO2 values, and need for input for mobility technique/safety.  Pt's PMHx and comorbidities that may affect physical performance and progress include: arthritis, CAD, CHF, COPD, and DM. Personal factors affecting pt at time of IE include: step(s) to enter environment, multi-level environment, inability to perform IADLs, and inability to navigate community distances. Pt will benefit from continued skilled PT services to address deficits as defined above and to maximize level of functional mobility to facilitate return toward PLOF and improved QOL. From PT/mobility standpoint, recommendation at time of d/c would be Level III (Minimum Resource Intensity) and with walker pending progress in order to reduce fall risk and maximize pt's functional independence and consistency with mobility in order to facilitate return to PLOF.  Recommend trial with walker next 1-2 sessions, ther ex next 1-2 sessions, and stair negotiation .     The patient's -[facility] Basic Mobility Inpatient Short Form Raw Score is 20. A Raw score of greater than 16 suggests the patient may benefit from discharge to home. Please also refer to the recommendation of the Physical Therapist for safe discharge planning.       Goals: Pt will: Pt will perform transfers with modified I to increase Indep in home alone environment and prepare for ambulation. Pt will ambulate with RW for >/= 150 ft with  modified I  to decrease risk for  falls, improve activity tolerance, improve gait quality, and promote proper use of assistive device and to access home environment. Pt will complete >/= 12 steps with with unilateral handrail with modified I to return to home with ADA and return to [facility] home. Pt will participate in objective balance assessment to determine baseline fall risk.        Rm Granados, PT,DPT

## 2024-04-17 NOTE — PLAN OF CARE
Problem: OCCUPATIONAL THERAPY ADULT  Goal: Performs self-care activities at highest level of function for planned discharge setting.  See evaluation for individualized goals.  Description: Treatment Interventions: ADL retraining, Visual perceptual retraining, Functional transfer training, Endurance training, UE strengthening/ROM, Cognitive reorientation, Patient/family training, Equipment evaluation/education, Neuromuscular reeducation, Compensatory technique education, Fine motor coordination activities, UE splinting, Continued evaluation, Cardiac education, Activityengagement, Energy conservation          See flowsheet documentation for full assessment, interventions and recommendations.   Note: Limitation: Decreased ADL status, Decreased endurance, Decreased self-care trans, Decreased high-level ADLs  Prognosis: Good  Assessment: Pt is a 68 y.o. male, admitted to [facility] 4/16/2024 d/t experiencing headache and neck pain. Dx: acute on chronic kidney failure. Pt with PMHx impacting their performance during ADL tasks, including: COPD, DM, sleep apnea, CAD, CHF, CKD. Prior to admission to the hospital Pt was performing ADLs without physical assistance. IADLs without physical assistance. Functional transfers/ambulation without physical assistance. Cognitive status was PTA was Intact. OT order placed to assess Pt's ADLs, cognitive status, and performance during functional tasks in order to maximize safety and independence while making most appropriate d/c recommendations. Pt's clinical presentation is currently unstable/unpredictable given new onset deficits that effect Pt's occupational performance and ability to safely return to PLOF including decrease activity tolerance, decrease standing balance, decrease performance during ADL tasks, decrease generalized strength, decrease activity engagement, and decrease performance during functional transfers combined with medical complications of hypotension, abnormal renal lab  values, abnormal CBC, abnormal sodium values, abnormal CO2 values, and need for input for mobility technique/safety. Personal factors affecting Pt at time of initial evaluation include: step(s) to enter environment, multi-level environment, limited home support, and limited insight into impairments. Pt will benefit from continued skilled OT services to address deficits as defined above and to maximize level independence/participation during ADLs and functional tasks to facilitate return toward PLOF and improved quality of life. From an occupational therapy standpoint, recommendation at time of d/c would be Level III: Minimum Resource Intensity Therapy.     Rehab Resource Intensity Level, OT: III (Minimum Resource Intensity)

## 2024-04-17 NOTE — PLAN OF CARE
Problem: PHYSICAL THERAPY ADULT  Goal: Performs mobility at highest level of function for planned discharge setting.  See evaluation for individualized goals.  Description: Treatment/Interventions: Functional transfer training, LE strengthening/ROM, Elevations, Therapeutic exercise, Endurance training, Patient/family training, Equipment eval/education, Gait training, Compensatory technique education, Spoke to nursing, OT  Equipment Recommended:  (may benefit from hospital bed on 1st flr to allow for complete 1st flr set up)       See flowsheet documentation for full assessment, interventions and recommendations.  Note: Prognosis: Good  Problem List: Decreased strength, Decreased endurance, Impaired balance, Decreased mobility, Obesity, Impaired sensation  Assessment: Pt is a 68 y.o. male seen for PT evaluation s/p admission to [facility] on 4/16/2024 with Acute on chronic kidney failure  (HCC).  Order placed for PT services.  Upon evaluation: Pt is presenting with impaired functional mobility due to decreased strength, decreased endurance, impaired balance, gait deviations, impaired judgment, and fall risk requiring  SPV assistance for transfers and ambulation with RW . Pt's clinical presentation is currently unstable/unpredictable given the functional mobility deficits above, especially decreased activity tolerance, decreased functional mobility tolerance, and SOB upon exertion, coupled with fall risks as indicated by AM-PAC 6-Clicks: 20/24 as well as obesity and combined with medical complications of abnormal renal lab values, abnormal blood sugars, abnormal sodium values, abnormal CO2 values, and need for input for mobility technique/safety.  Pt's PMHx and comorbidities that may affect physical performance and progress include: arthritis, CAD, CHF, COPD, and DM. Personal factors affecting pt at time of IE include: step(s) to enter environment, multi-level environment, inability to perform  IADLs, and inability to navigate community distances. Pt will benefit from continued skilled PT services to address deficits as defined above and to maximize level of functional mobility to facilitate return toward PLOF and improved QOL. From PT/mobility standpoint, recommendation at time of d/c would be Level III (Minimum Resource Intensity) and with walker pending progress in order to reduce fall risk and maximize pt's functional independence and consistency with mobility in order to facilitate return to PLOF.  Recommend trial with walker next 1-2 sessions, ther ex next 1-2 sessions, and stair negotiation .  Barriers to Discharge: Other (Comment)  Barriers to Discharge Comments: bed/bath on 2nd floor in which there is only 1 railing part way up to stairs to which the last few stairs have no rail  Rehab Resource Intensity Level, PT: III (Minimum Resource Intensity)    See flowsheet documentation for full assessment.

## 2024-04-17 NOTE — CASE MANAGEMENT
Case Management Discharge Planning Note    Patient name Clay Marcial  Location /-01 MRN [id number]  : 1955 Date 2024       Current Admission Date: 2024  Current Admission Diagnosis:Acute on chronic kidney failure  (HCC)   Patient Active Problem List    Diagnosis Date Noted    Diabetes mellitus (HCC) 2024    CAD (coronary artery disease) 2024    CHF (congestive heart failure) ([facility]) 2024    Acute on chronic kidney failure  (HCC) 2024    Metabolic acidosis 2024    Nausea and vomiting 2024    Hyperkalemia 2024    Helicobacter pylori infection 2024    Headache 2024      LOS (days): 1  Geometric Mean LOS (GMLOS) (days): 3.1  Days to GMLOS:2     OBJECTIVE:  Risk of Unplanned Readmission Score: 21.18         Current admission status: Inpatient   Preferred Pharmacy:   [facility]/pharmacy #[id number] [place] [address]  Phone: [phone] Fax: [phone]    Primary Care Provider: No primary care provider on file.    Primary Insurance: [facility]  Secondary Insurance:     DISCHARGE DETAILS:       Requested Home Health Care         Is the patient interested in C at discharge?: Yes  Home Health Discipline requested:: Occupational Therapy, Physical Therapy, Nursing  Home Health Agency Name:: [facility]  Home Health Follow-Up Provider:: PCP  Home Health Services Needed:: Evaluate Functional Status and Safety, Strengthening/Theraputic Exercises to Improve Function, Gait/ADL Training, Heart Failure Management  Homebound Criteria Met:: Requires the Assistance of Another Person for Safe Ambulation or to Leave the Home, Uses an Assist Device (i.e. cane, walker, etc)  Supporting Clincal Findings:: Limited Endurance, Fatigues Easliy in Short Distances

## 2024-04-17 NOTE — ASSESSMENT & PLAN NOTE
Wt Readings from Last 3 Encounters:   04/17/24 126 kg (277 lb 12.8 oz)   11/13/23 117 kg (257 lb 8 oz)     Patient with noted history of systolic CHF however no echo is available to review in chart review  Presenting with SUSHILA, hold diuretics, ACE  Can continue beta-blocker with blood pressure hold parameters  Received 2 L bolus in the ER, bicarb drip  Monitor intake and output and daily weights

## 2024-04-17 NOTE — PLAN OF CARE
Problem: PHYSICAL THERAPY ADULT  Goal: Performs mobility at highest level of function for planned discharge setting.  See evaluation for individualized goals.  Description: Treatment/Interventions: Functional transfer training, LE strengthening/ROM, Elevations, Therapeutic exercise, Endurance training, Patient/family training, Equipment eval/education, Gait training, Compensatory technique education, Spoke to nursing, OT  Equipment Recommended:  (may benefit from hospital bed on 1st flr to allow for complete 1st flr set up)       See flowsheet documentation for full assessment, interventions and recommendations.  4/17/2024 1240 by Rm Granados PT  Outcome: Progressing  Note: Prognosis: Good  Problem List: Decreased strength, Decreased endurance, Impaired balance, Decreased mobility, Obesity, Impaired sensation  Pt tolerates tx session fair.  Interventions consisting of transfer training, gait training, and stair negotiation.  Pt limited by SOB with exertion however SpO2 remains >90% throughout with exertion.  Discussed with OT about pt's BR on 2nd flr, and she is ordering a BSC for pt for 1st flr toileting.  Barriers to Discharge: Other (Comment)  Barriers to Discharge Comments: bed/bath on 2nd floor in which there is only 1 railing part way up to stairs to which the last few stairs have no rail  Rehab Resource Intensity Level, PT: III (Minimum Resource Intensity)    See flowsheet documentation for full assessment.

## 2024-04-17 NOTE — CONSULTS
Consultation - Nephrology   Clay DAVID Marcial 68 y.o. male MRN: [id number]  Unit/Bed#: -01 Encounter: [id number]    ASSESSMENT & PLAN    68-year-old male with a history of COPD, diabetes, had a recent diagnosis of an H. pylori infection, coronary artery disease, congestive heart failure who presented with head and neck pain followed by nausea vomiting diarrhea complicated by acute kidney injury on chronic kidney disease and a euglycemic anion gap metabolic acidosis    Acute kidney injury present on admission  Patient's creatinine was 5.1 and is now trending down  Patient's urinalysis was bland  Was on an SGLT2i inhibitor, ACE inhibitor loop diuretic, metformin, statin, tetracycline, PPI, metronidazole which may have contributed to patient's acute kidney injury  Good urine output with 1.8 L  CT of the chest abdomen pelvis without contrast shows unremarkable kidneys with no hydronephrosis unremarkable adrenal glands  Would continue bicarb drip  CKD stage III with a baseline creatinine of 1.2-1.3  In the setting of congestive heart failure and type 2 diabetes hopefully creatinine will get to baseline  Anion gap metabolic acidosis  Metformin can cause a a type B acidosis, it can cause a type a acidosis with overdose.  Current lactic acid level 1.8  Anion gap was as high as 14 now down to 10.  Bicarb was as low as 14 now up to 16  Checking an ABG and beta hydroxybutyrate  Was on SGLT2i which can cause euglycemic DKA  Urinalysis was bland however patient admits he was taking it.  Would hold SGLT2i's indefinitely because of this  Is acute kidney injury could have contributed to an anion gap acidosis as well  Continue high sodium bicarbonate drip  Hyperkalemia  Potassium is elevated at 5.3, bicarb drip should help.  ACE inhibitor on hold.  Good urine output  Congestive heart failure with coronary artery disease  Monitor for decompensation and back off of bicarb drip and start Lasix if clinically volume overloaded  Type  2 diabetes mellitus  Avoid hypoglycemia      HISTORY OF PRESENT ILLNESS:  Requesting Physician: Felicia York MD  Reason for Consult: Acute kidney injury    Clay Marcial is a 68 y.o. year old male who was admitted for nausea vomiting diarrhea.  Feeling unwell.  [facility]  He is on a good regimen of medications for coronary artery disease and an ischemic cardiomyopathy.  He is also type II diabetic.  He was feeling in his usual state of health when he was noticing more had neck pain and subsequently had nausea vomiting diarrhea.  He currently feels better.  He denies any acute fevers or chills no nausea or vomiting.  Now.  He is more alert and oriented and is able to answer questions appropriately.  He was also being treated for H. pylori.  His current treatment program for this is on hold.  He was on lisinopril.  He was on empagliflozin and he was taking these regularly    PAST MEDICAL HISTORY:  Past Medical History:   Diagnosis Date    COPD (chronic obstructive pulmonary disease) ([facility])     Diabetes mellitus ([facility]) 4/16/2024    Sleep apnea        PAST SURGICAL HISTORY:  Past Surgical History:   Procedure Laterality Date    BACK SURGERY         ALLERGIES:  No Known Allergies    SOCIAL HISTORY:  Social History     Substance and Sexual Activity   Alcohol Use Yes    Alcohol/week: 7.0 standard drinks of alcohol    Types: 7 Shots of liquor per week     Social History     Substance and Sexual Activity   Drug Use Never     Social History     Tobacco Use   Smoking Status Former    Current packs/day: 1.00    Types: Cigarettes   Smokeless Tobacco Never       FAMILY HISTORY:  History reviewed. No pertinent family history.    MEDICATIONS:    Current Facility-Administered Medications:     acetaminophen (TYLENOL) tablet 650 mg, 650 mg, Oral, Q6H PRN, Jenni Frazier PA-C, 650 mg at 04/16/24 2035    albuterol (PROVENTIL HFA,VENTOLIN HFA) inhaler 1 puff, 1 puff, Inhalation, Q4H PRN, Jenni Frazier PA-C     aspirin chewable tablet 81 mg, 81 mg, Oral, Daily, Jenni Frazier PA-C, 81 mg at 04/17/24 0937    atorvastatin (LIPITOR) tablet 20 mg, 20 mg, Oral, Daily With Dinner, Jenni Frazier PA-C, 20 mg at 04/16/24 1646    DULoxetine (CYMBALTA) delayed release capsule 20 mg, 20 mg, Oral, Daily, Jenni Frazier PA-C, 20 mg at 04/17/24 0937    heparin (porcine) subcutaneous injection 5,000 Units, 5,000 Units, Subcutaneous, Q8H EPHRAIM, Jenni Frazier PA-C, 5,000 Units at 04/17/24 0504    insulin lispro (HumALOG/ADMELOG) 100 units/mL subcutaneous injection 1-6 Units, 1-6 Units, Subcutaneous, TID AC **AND** Fingerstick Glucose (POCT), , , TID AC, Jenni Frazier PA-C    magnesium sulfate 2 g/50 mL IVPB (premix) 2 g, 2 g, Intravenous, Once, Jenni Frazier PA-C, Last Rate: 25 mL/hr at 04/17/24 0935, 2 g at 04/17/24 0935    umeclidinium 62.5 mcg/actuation inhaler AEPB 1 puff, 1 puff, Inhalation, Daily **AND** olodaterol HCl (STRIVERDI RESPIMAT) inhaler 2 puff, 2 puff, Inhalation, Daily, Jenni Frazier PA-C    ondansetron (ZOFRAN) injection 4 mg, 4 mg, Intravenous, Q6H PRN, Jenni Frazier PA-C    pantoprazole (PROTONIX) EC tablet 20 mg, 20 mg, Oral, Early Morning, Jenni Frazier PA-C, 20 mg at 04/17/24 0503    pregabalin (LYRICA) capsule 100 mg, 100 mg, Oral, BID, Jenni Frazier PA-C, 100 mg at 04/17/24 0937    sodium bicarbonate 150 mEq in dextrose 5 % 1,000 mL infusion, 100 mL/hr, Intravenous, Continuous, Ildefonso Prasad,     REVIEW OF SYSTEMS:    A 12 point review of systems was performed and was negative besides what is mentioned in HPI    OBJECTIVE:    Vitals:    04/17/24 0600 04/17/24 0725 04/17/24 0956 04/17/24 0957   BP:  (!) 101/39 103/60 102/60   BP Location:   Right arm Right arm   Pulse:  83 98    Resp:  16     Temp:  97.7 °F (36.5 °C)     TempSrc:       SpO2:  94% 97%    Weight: 126 kg (277 lb 12.8 oz)      Height:            Temp:  [97.3 °F (36.3 °C)-98.8 °F (37.1 °C)] 97.7 °F (36.5 °C)  HR:  [79-98] 98  Resp:  [16-18]  "16  BP: ()/(20-66) 102/60  SpO2:  [92 %-99 %] 97 %   Body mass index is 38.75 kg/m².    I/O last 3 completed shifts:  In: 100 [IV Piggyback:100]  Out: 1850 [Urine:1850]  I/O this shift:  In: 270 [P.O.:270]  Out: -     Weight (last 2 days)       Date/Time Weight    04/17/24 0600 126 (277.8)    04/16/24 1848 126 (277.56)    04/16/24 1546 126 (277.56)    04/16/24 1345 126 (277.78)    04/16/24 1112 126 (277.12)             Physical exam:    General: no acute distress, cooperative  Eyes: conjunctivae pink, anicteric sclerae  ENT: lips and mucous membranes moist, no exudates, normal external ears  Neck: ROM intact, no JVD  Chest: No respiratory distress, no accessory muscle use  CVS: normal rate, non pericardial friction rub  Abdomen: soft, non-tender, non-distended, normoactive bowel sounds  Extremities: no edema of both legs  Skin: no rash  Neuro: awake, alert, oriented, grossly intact  Psych:  Pleasant affect      Invasive Devices:      Lab Results:   Results from last 7 days   Lab Units 04/17/24  0438 04/16/24  2117 04/16/24  1831 04/16/24  1115   WBC Thousand/uL 5.77  --   --  9.67   HEMOGLOBIN g/dL 12.7  --   --  14.9   HEMATOCRIT % 37.4  --   --  43.4   PLATELETS Thousands/uL 119*  --   --  155   POTASSIUM mmol/L 5.1 5.0  --  5.4*   CHLORIDE mmol/L 105 102  --  102   CO2 mmol/L 16* 15*  --  14*   BUN mg/dL 102* 102*  --  103*   CREATININE mg/dL 3.95* 4.41*  --  5.10*   CALCIUM mg/dL 8.0* 7.9*  --  8.4   MAGNESIUM mg/dL 1.8*  --   --  1.6*   PHOSPHORUS mg/dL 7.6*  --   --   --    ALK PHOS U/L  --   --   --  61   ALT U/L  --   --   --  18   AST U/L  --   --   --  13   NITRITE UA   --   --  Negative  --    BLOOD UA   --   --  Negative  --    LEUKOCYTES UA   --   --  Negative  --          Portions of the record may have been created with voice recognition software. Occasional wrong word or \"sound a like\" substitutions may have occurred due to the inherent limitations of voice recognition software. Read the chart " carefully and recognize, using context, where substitutions have occurred.If you have any questions, please contact the dictating provider.

## 2024-04-17 NOTE — PROGRESS NOTES
[facility]  Progress Note  Name: Clay Marcial I  MRN: [id number]  Unit/Bed#: -01 I Date of Admission: 4/16/2024   Date of Service: 4/17/2024 I Hospital Day: 1    Assessment/Plan   * Acute on chronic kidney failure  (HCC)  Assessment & Plan  Creatine on admission 5.1, Baseline creatinine 1.2-1.3, and as high as 1.8 in November 2023  Etiology likely dehydration due to nausea vomiting diarrhea, need to rule out toxicity from H. pylori cocktail?  Recently started end of last month  Imaging CT abdomen pelvis without hydronephrosis or signs of obstruction    Lab Results   Component Value Date    CREATININE 3.95 (H) 04/17/2024    CREATININE 4.41 (H) 04/16/2024     Hold ACE/ARB and diuretic therapy  Avoid hypotension, nephrotoxins, and NSAIDS if possible    Urine studies ordered   IV fluids - supplemented 2 L bolus in ED  Strict I & Os  Urinary retention protocol and bladder scan   Nephrology consult; recommendations appreciated   Continue bicarb gtt       Headache  Assessment & Plan  Patient describes a tension type headache with neck pain  Has been going on approximately 2 days, then had nausea vomiting  CT head negative  No signs of hypertensive crisis    Treat conservatively and supportively    Helicobacter pylori infection  Assessment & Plan  Recently diagnosed by EGD, started on quadruple therapy 3/28 - 4/27  Bismuth subsalicylate 524 mg 4 times daily  Tetracycline 500 mg 4 times daily  Metronidazole 250 mg 4 times daily  Omeprazole 20 mg twice daily    Hold bismuth subsalicylate with metabolic acidosis and SUSHILA  Hold antibiotics with diarrhea starting post antibiotic initiation until C. difficile ruled out  Can continue omeprazole  Patient denies any abdominal pain, has been having nausea vomiting    Hyperkalemia  Assessment & Plan  Presented with mild hyperkalemia in setting of SUSHILA and metabolic acidosis  Treated with 2 L of IV normal saline bolus in the ER  Continue bicarb  "drip  Recheck BMP    Nausea and vomiting  Assessment & Plan  Has nausea and vomiting with diarrhea  States he feels like he has been trying to keep hydrated  Denies any abdominal pain    CT abdomen pelvis negative for acute pathology  COVID/flu/RSV negative  Treat conservatively    Metabolic acidosis  Assessment & Plan  In setting of dehydration and possibly bismuth subsalicylate use  With concurrent SUSHILA and mild hyper kalemia  Anion gap of 14    Continue bicarb drip  S/p 2 L nasal saline bolus  Monitor on BMP    CHF (congestive heart failure) ([facility])  Assessment & Plan  Wt Readings from Last 3 Encounters:   04/17/24 126 kg (277 lb 12.8 oz)   11/13/23 117 kg (257 lb 8 oz)     Patient with noted history of systolic CHF however no echo is available to review in chart review  Presenting with SUSHILA, hold diuretics, ACE  Can continue beta-blocker with blood pressure hold parameters  Received 2 L bolus in the ER, bicarb drip  Monitor intake and output and daily weights    CAD (coronary artery disease)  Assessment & Plan  Denies any chest pain  EKG with sinus rhythm rate 80  Troponin negative  Continue aspirin, statin and beta-blocker    Diabetes mellitus ([facility])  Assessment & Plan  No results found for: \"HGBA1C\"    Recent Labs     04/16/24  1634 04/16/24  2043 04/17/24  0720   POCGLU 103 140 108       Blood Sugar Average: Last 72 hrs:  (P) 117  Not maintained on insulin as outpatient, hold oral regimen  Maintain on correctional scale and diabetic diet             VTE Pharmacologic Prophylaxis:   Moderate Risk (Score 3-4) - Pharmacological DVT Prophylaxis Ordered: heparin.    Mobility:   Basic Mobility Inpatient Raw Score: 20  JH-HLM Goal: 6: Walk 10 steps or more  JH-HLM Achieved: 7: Walk 25 feet or more  JH-HLM Goal NOT achieved. Continue with multidisciplinary rounding and encourage appropriate mobility to improve upon JH-HLM goals.    Patient Centered Rounds: I performed bedside rounds with nursing staff today.   Discussions " with Specialists or Other Care Team Provider: CM, nephrology     Education and Discussions with Family / Patient: Patient declined call to .     Total Time Spent on Date of Encounter in care of patient:  mins. This time was spent on one or more of the following: performing physical exam; counseling and coordination of care; obtaining or reviewing history; documenting in the medical record; reviewing/ordering tests, medications or procedures; communicating with other healthcare professionals and discussing with patient's family/caregivers.    Current Length of Stay: 1 day(s)  Current Patient Status: Inpatient   Certification Statement: The patient will continue to require additional inpatient hospital stay due to SUSHILA  Discharge Plan: Anticipate discharge in 48-72 hrs to discharge location to be determined pending rehab evaluations.    Code Status: Level 3 - DNAR and DNI    Subjective:   Seen and examined. No events overnight. Overall feels a bit better.    Objective:     Vitals:   Temp (24hrs), Av.5 °F (36.4 °C), Min:97.3 °F (36.3 °C), Max:97.7 °F (36.5 °C)    Temp:  [97.3 °F (36.3 °C)-97.7 °F (36.5 °C)] 97.7 °F (36.5 °C)  HR:  [81-98] 98  Resp:  [16] 16  BP: ()/(20-66) 102/60  SpO2:  [92 %-98 %] 97 %  Body mass index is 38.75 kg/m².     Input and Output Summary (last 24 hours):     Intake/Output Summary (Last 24 hours) at 2024 1353  Last data filed at 2024 1102  Gross per 24 hour   Intake 320 ml   Output 2450 ml   Net -2130 ml       Physical Exam:   Physical Exam  Vitals and nursing note reviewed.   Constitutional:       General: He is not in acute distress.     Appearance: Normal appearance. He is obese. He is ill-appearing.   HENT:      Head: Normocephalic and atraumatic.      Nose: No congestion.      Mouth/Throat:      Mouth: Mucous membranes are moist.   Eyes:      Conjunctiva/sclera: Conjunctivae normal.   Cardiovascular:      Rate and Rhythm: Normal rate and regular rhythm.       Pulses: Normal pulses.      Heart sounds: Normal heart sounds. No murmur heard.  Pulmonary:      Effort: Pulmonary effort is normal. No respiratory distress.      Comments: Diminished diffusely  Abdominal:      General: Bowel sounds are normal. Distension: obsese abdomen, patient states is per usual.      Palpations: Abdomen is soft.      Tenderness: There is no abdominal tenderness.   Musculoskeletal:         General: Normal range of motion.      Right lower leg: Edema (trace) present.      Left lower leg: Edema (trace) present.   Skin:     General: Skin is warm and dry.   Neurological:      Mental Status: He is alert and oriented to person, place, and time.          Additional Data:     Labs:  Results from last 7 days   Lab Units 04/17/24  0438 04/16/24  1115   WBC Thousand/uL 5.77 9.67   HEMOGLOBIN g/dL 12.7 14.9   HEMATOCRIT % 37.4 43.4   PLATELETS Thousands/uL 119* 155   SEGS PCT %  --  58   LYMPHO PCT %  --  22   MONO PCT %  --  9   EOS PCT %  --  11*     Results from last 7 days   Lab Units 04/17/24  0438 04/16/24  2117 04/16/24  1115   SODIUM mmol/L 131*   < > 130*   POTASSIUM mmol/L 5.1   < > 5.4*   CHLORIDE mmol/L 105   < > 102   CO2 mmol/L 16*   < > 14*   BUN mg/dL 102*   < > 103*   CREATININE mg/dL 3.95*   < > 5.10*   ANION GAP mmol/L 10   < > 14*   CALCIUM mg/dL 8.0*   < > 8.4   ALBUMIN g/dL  --   --  4.0   TOTAL BILIRUBIN mg/dL  --   --  0.81   ALK PHOS U/L  --   --  61   ALT U/L  --   --  18   AST U/L  --   --  13   GLUCOSE RANDOM mg/dL 125   < > 84    < > = values in this interval not displayed.     Results from last 7 days   Lab Units 04/16/24  1115   INR  1.09     Results from last 7 days   Lab Units 04/17/24  1142 04/17/24  0720 04/16/24  2043 04/16/24  1634   POC GLUCOSE mg/dl 204* 108 140 103         Results from last 7 days   Lab Units 04/16/24  1115   LACTIC ACID mmol/L 1.8       Lines/Drains:  Invasive Devices       Peripheral Intravenous Line  Duration             Peripheral IV 04/16/24  Left Antecubital 1 day                          Imaging: No pertinent imaging reviewed.    Recent Cultures (last 7 days):   Results from last 7 days   Lab Units 04/16/24  1831   C DIFF TOXIN B BY PCR  Negative       Last 24 Hours Medication List:   Current Facility-Administered Medications   Medication Dose Route Frequency Provider Last Rate    acetaminophen  650 mg Oral Q6H PRN Jenni Frazier PA-C      albuterol  1 puff Inhalation Q4H PRN Jenni Frazier PA-C      aspirin  81 mg Oral Daily Jenni Frazier PA-C      atorvastatin  20 mg Oral Daily With Dinner Jenni Frazier PA-C      DULoxetine  20 mg Oral Daily Jenni Frazier PA-C      heparin (porcine)  5,000 Units Subcutaneous Q8H EPHRAIM Jenni Frazier PA-C      insulin lispro  1-6 Units Subcutaneous TID AC Jenni Frazier PA-C      umeclidinium  1 puff Inhalation Daily Jenni Frazier PA-C      And    olodaterol HCl  2 puff Inhalation Daily Jenni Frazier PA-C      ondansetron  4 mg Intravenous Q6H PRN Jenni Frazier PA-C      pantoprazole  20 mg Oral Early Morning Jenni Frazier PA-C      pregabalin  100 mg Oral BID Jenni Frazier PA-C      sodium bicarbonate 150 mEq in dextrose 5 % 1,000 mL infusion  100 mL/hr Intravenous Continuous Ildefonso Prasad,  mL/hr (04/17/24 1102)        Today, Patient Was Seen By: Jenni Frazier PA-C    **Please Note: This note may have been constructed using a voice recognition system.**

## 2024-04-17 NOTE — PLAN OF CARE
Problem: PAIN - ADULT  Goal: Verbalizes/displays adequate comfort level or baseline comfort level  Description: Interventions:  - Encourage patient to monitor pain and request assistance  - Assess pain using appropriate pain scale  - Administer analgesics based on type and severity of pain and evaluate response  - Implement non-pharmacological measures as appropriate and evaluate response  - Consider cultural and social influences on pain and pain management  - Notify physician/advanced practitioner if interventions unsuccessful or patient reports new pain  Outcome: Progressing     Problem: INFECTION - ADULT  Goal: Absence or prevention of progression during hospitalization  Description: INTERVENTIONS:  - Assess and monitor for signs and symptoms of infection  - Monitor lab/diagnostic results  - Monitor all insertion sites, i.e. indwelling lines, tubes, and drains  - Monitor endotracheal if appropriate and nasal secretions for changes in amount and color  - [place] appropriate cooling/warming therapies per order  - Administer medications as ordered  - Instruct and encourage patient and family to use good hand hygiene technique  - Identify and instruct in appropriate isolation precautions for identified infection/condition  Outcome: Progressing  Goal: Absence of fever/infection during neutropenic period  Description: INTERVENTIONS:  - Monitor WBC    Outcome: Progressing     Problem: SAFETY ADULT  Goal: Patient will remain free of falls  Description: INTERVENTIONS:  - Educate patient/family on patient safety including physical limitations  - Instruct patient to call for assistance with activity   - Consult OT/PT to assist with strengthening/mobility   - Keep Call bell within reach  - Keep bed low and locked with side rails adjusted as appropriate  - Keep care items and personal belongings within reach  - Initiate and maintain comfort rounds  - Make Fall Risk Sign visible to staff  - Offer Toileting every 2 Hours,  in advance of need  - Initiate/Maintain bed alarm  - Obtain necessary fall risk management equipment  - Apply yellow socks and bracelet for high fall risk patients  - Consider moving patient to room near nurses station  Outcome: Progressing  Goal: Maintain or return to baseline ADL function  Description: INTERVENTIONS:  -  Assess patient's ability to carry out ADLs; assess patient's baseline for ADL function and identify physical deficits which impact ability to perform ADLs (bathing, care of mouth/teeth, toileting, grooming, dressing, etc.)  - Assess/evaluate cause of self-care deficits   - Assess range of motion  - Assess patient's mobility; develop plan if impaired  - Assess patient's need for assistive devices and provide as appropriate  - Encourage maximum independence but intervene and supervise when necessary  - Involve family in performance of ADLs  - Assess for home care needs following discharge   - Consider OT consult to assist with ADL evaluation and planning for discharge  - Provide patient education as appropriate  Outcome: Progressing  Goal: Maintains/Returns to pre admission functional level  Description: INTERVENTIONS:  - Perform AM-PAC 6 Click Basic Mobility/ Daily Activity assessment daily.  - Set and communicate daily mobility goal to care team and patient/family/caregiver.   - Collaborate with rehabilitation services on mobility goals if consulted  - Perform Range of Motion 3 times a day.  - Reposition patient every 2 hours.  - Dangle patient 3 times a day  - Stand patient 3 times a day  - Ambulate patient 3 times a day  - Out of bed to chair 3 times a day   - Out of bed for meals 3 times a day  - Out of bed for toileting  - Record patient progress and toleration of activity level   Outcome: Progressing     Problem: DISCHARGE PLANNING  Goal: Discharge to home or other facility with appropriate resources  Description: INTERVENTIONS:  - Identify barriers to discharge w/patient and caregiver  -  Arrange for needed discharge resources and transportation as appropriate  - Identify discharge learning needs (meds, wound care, etc.)  - Arrange for interpretive services to assist at discharge as needed  - Refer to Case Management Department for coordinating discharge planning if the patient needs post-hospital services based on physician/advanced practitioner order or complex needs related to functional status, cognitive ability, or social support system  Outcome: Progressing     Problem: Knowledge Deficit  Goal: Patient/family/caregiver demonstrates understanding of disease process, treatment plan, medications, and discharge instructions  Description: Complete learning assessment and assess knowledge base.  Interventions:  - Provide teaching at level of understanding  - Provide teaching via preferred learning methods  Outcome: Progressing

## 2024-04-17 NOTE — CASE MANAGEMENT
Case Management Progress Note    Patient name Clay Marcial  Location /-01 MRN [id number]  : 1955 Date 2024       LOS (days): 1  Geometric Mean LOS (GMLOS) (days):   Days to GMLOS:        OBJECTIVE:        Current admission status: Inpatient  Preferred Pharmacy:   [facility]/pharmacy #[id number] - [place], [place] - [address] [place] [id number]  Phone: [phone] Fax: [phone]    Primary Care Provider: No primary care provider on file.    Primary Insurance: [facility] [employer] [facility]  Secondary Insurance:     PROGRESS NOTE:    DC recommendations are for home health (PT, OT, RN) and BSC.  Met with patient who is agreeable to these suggestions.  [facility] and is not service connected.      CM call to [facility] CM Viv at [phone].  Left VM at 1316.    CM call to [facility] CM Sanchez at [phone], ext [phone].  Left VM at 1325    Update 1418  Call back from Sanchez.  Patient has Medicare Part A, Policy number [id number].  May be able to arrange HH using MCR plan.  DME would still need to be arranged by [facility].      HH referrals sent in Aidin.  Pending any accepting agency.  Pending call back from [facility] regarding process to order BSC.      Update 3272  [facility] Deena H[facility] at [facility].     Call back from [facility], emailing [facility] service request form.  Need to complete and fax back with therapy notes, HP.  BSC will be ordered and delivered through [facility].  Pending MD signature.     Merced HARRISONN, RN  RN Case Manager  Office of [facility]  [phone]  Fax [phone]    DCP: [facility]  accepting.  BSC will be provided by [facility], shipped to 's home after dc.

## 2024-04-17 NOTE — OCCUPATIONAL THERAPY NOTE
"    Occupational Therapy Evaluation     Patient Name: Clay Marcial  Today's Date: 4/17/2024  Problem List  Principal Problem:    Acute on chronic kidney failure  (HCC)  Active Problems:    Diabetes mellitus (HCC)    CAD (coronary artery disease)    CHF (congestive heart failure) (Formerly [facility])    Metabolic acidosis    Nausea and vomiting    Hyperkalemia    Helicobacter pylori infection    Headache    Past Medical History  Past Medical History:   Diagnosis Date    COPD (chronic obstructive pulmonary disease) (Formerly [facility])     Diabetes mellitus (Formerly [facility]) 4/16/2024    Sleep apnea      Past Surgical History  Past Surgical History:   Procedure Laterality Date    BACK SURGERY          04/17/24 0935   Note Type   Note type Evaluation   Pain Assessment   Pain Assessment Tool 0-10   Pain Score No Pain   Restrictions/Precautions   Other Precautions Chair Alarm;Bed Alarm;Multiple lines;Fall Risk;Contact/isolation   Home Living   Type of Home House  (7 ADA B HR)   Home Layout Two level;Bed/bath upstairs   Bathroom Shower/Tub Tub/shower unit   Bathroom Toilet Raised   Bathroom Equipment Grab bars in shower;Shower chair;Grab bars around toilet   Home Equipment Walker;Cane;Electric scooter   Additional Comments Pt reports living in a 2SH alone. SPC use at baseline PRN   Prior Function   Level of [place] Independent with ADLs;Independent with functional mobility;Independent with IADLS   Lives With Alone   Receives Help From Friend(s)   IADLs Independent with meal prep;Independent with driving;Independent with medication management   Falls in the last 6 months 0   Subjective   Subjective \"[facility]\"   ADL   UB Dressing Assistance 5  Supervision/Setup   UB Dressing Deficit Setup;Increased time to complete   LB Dressing Assistance 5  Supervision/Setup   LB Dressing Deficit Setup;Requires assistive device for steadying;Verbal cueing;Increased time to complete   Additional Comments UB ADLs @ S/set-up while seated at EOB. LB ADLs @ S. " Pt able to don socks while seated at EOB. See tx assessment for greater detail regarding ADL performance.   Bed Mobility   Supine to Sit 6  Modified independent   Additional items HOB elevated   Additional Comments Pt supine in bed at beginning of session. Supine to sit @ Mod I.   Transfers   Sit to Stand 5  Supervision   Additional items Increased time required;Verbal cues   Stand to Sit 5  Supervision   Additional items Increased time required;Verbal cues   Stand pivot 5  Supervision   Additional items Increased time required;Verbal cues   Additional Comments STS from EOB to RW @ S. Pt able to complete functional mobility around room to bathroom with RW @ S.   Balance   Static Sitting Normal   Dynamic Sitting Good   Static Standing Fair +   Dynamic Standing Fair   Activity Tolerance   Activity Tolerance Patient limited by fatigue   Medical Staff Made Aware Spoke with PT Rm   Nurse Made Aware Spoke with RN Jackelyn   RUE Assessment   RUE Assessment WFL   LUE Assessment   LUE Assessment WFL   Hand Function   Gross Motor Coordination Functional   Fine Motor Coordination Functional   Cognition   Overall Cognitive Status WFL   Arousal/Participation Alert;Responsive;Cooperative   Attention Within functional limits   Orientation Level Oriented X4   Memory Within functional limits   Following Commands Follows one step commands without difficulty   Assessment   Limitation Decreased ADL status;Decreased endurance;Decreased self-care trans;Decreased high-level ADLs   Prognosis Good   Assessment Pt is a 68 y.o. male, admitted to [facility] 4/16/2024 d/t experiencing headache and neck pain. Dx: acute on chronic kidney failure. Pt with PMHx impacting their performance during ADL tasks, including: COPD, DM, sleep apnea, CAD, CHF, CKD. Prior to admission to the hospital Pt was performing ADLs without physical assistance. IADLs without physical assistance. Functional transfers/ambulation without physical assistance. Cognitive status was  PTA was Intact. OT order placed to assess Pt's ADLs, cognitive status, and performance during functional tasks in order to maximize safety and independence while making most appropriate d/c recommendations. Pt's clinical presentation is currently unstable/unpredictable given new onset deficits that effect Pt's occupational performance and ability to safely return to PLOF including decrease activity tolerance, decrease standing balance, decrease performance during ADL tasks, decrease generalized strength, decrease activity engagement, and decrease performance during functional transfers combined with medical complications of hypotension, abnormal renal lab values, abnormal CBC, abnormal sodium values, abnormal CO2 values, and need for input for mobility technique/safety. Personal factors affecting Pt at time of initial evaluation include: step(s) to enter environment, multi-level environment, limited home support, and limited insight into impairments. Pt will benefit from continued skilled OT services to address deficits as defined above and to maximize level independence/participation during ADLs and functional tasks to facilitate return toward PLOF and improved quality of life. From an occupational therapy standpoint, recommendation at time of d/c would be Level III: Minimum Resource Intensity Therapy.   Plan   Treatment Interventions ADL retraining;Visual perceptual retraining;Functional transfer training;Endurance training;UE strengthening/ROM;Cognitive reorientation;Patient/family training;Equipment evaluation/education;Neuromuscular reeducation;Compensatory technique education;Fine motor coordination activities;UE splinting;Continued evaluation;Cardiac education;Activityengagement;Energy conservation   Goal Expiration Date 05/01/24   OT Treatment Day 1   OT Frequency 1-2x/wk   Discharge Recommendation   Rehab Resource Intensity Level, OT III (Minimum Resource Intensity)   Additional Comments  Pt would benefit  from a [facility] upon return to home as he does not have a first-floor bathroom and is a significant fall safety risk to complete stairs multiple times a day for toileting.   [facility] Daily Activity Inpatient   Lower Body Dressing 3   Bathing 3   Toileting 3   Upper Body Dressing 4   Grooming 3   Eating 4   Daily Activity Raw Score 20   Daily Activity Standardized Score (Calc for Raw Score >=11) 42.03   AM-[facility] Applied Cognition Inpatient   Following a Speech/Presentation 3   Understanding Ordinary Conversation 4   Taking Medications 3   Remembering Where Things Are Placed or Put Away 4   Remembering List of 4-5 Errands 2   Taking Care of Complicated Tasks 2   Applied Cognition Raw Score 18   Applied Cognition Standardized Score 38.07   Additional Treatment Session   Start Time 0955   End Time 1010   Treatment Assessment Pt completed OT tx session #1 focused on ADL performance and functional mobility. Pt seated on toilet at beginning of session. STS from toilet @ S. Pt able to complete posterior perciare and clothing management while standing @ S with cues for thoroughness. Pt able to wash hands while standing at sink @ S. Pt completing functional mobility from bathroom to chair with RW @ S. While seated in chair, pt able to thread briefs. Pt significantly SOB, requiring rest break prior to clothing management while standing, Pt able pull up briefs while standing @ S. Pt seated OOB in chair with PT at end of session with all needs met.     The patient's raw score on the AM-PAC Daily Activity Inpatient Short Form is 20. A raw score of greater than or equal to 19 suggests the patient may benefit from discharge to home. Please refer to the recommendation of the Occupational Therapist for safe discharge planning.    Pt goals to be met by 5/1/2024    Pt will demonstrate ability to complete grooming/hygiene tasks @ Mod I after set-up.  Pt will demonstrate ability to complete supine<>sit @ Mod I in order to increase safety and  independence during ADL tasks.  Pt will demonstrate ability to complete UB ADLs including washing/dressing @ Mod I in order to increase performance and participation during meaningful tasks  Pt will demonstrate ability to complete LB dressing @ Mod I in order to increase safety and independence during meaningful tasks.   Pt will demonstrate ability to milagros/doff socks/shoes while sitting EOB @ Mod I in order to increase safety and independence during meaningful tasks.   Pt will demonstrate ability to complete toileting tasks including CM and pericare @ Mod I in order to increase safety and independence during meaningful tasks.  Pt will demonstrate ability to complete EOB, chair, toilet/commode transfers @ Mod I in order to increase performance and participation during functional tasks.  Pt will demonstrate ability to stand for 5-8 minutes while maintaining Fair + balance with use of LRAD for UB support PRN.  Pt will demonstrate ability to tolerate 30-35 minute OT session with no vc'ing for deep breathing or use of energy conservation techniques in order to increase activity tolerance during functional tasks.   Pt will demonstrate Good carryover of use of energy conservation/compensatory strategies during ADLs and functional tasks in order to increase safety and reduce risk for falls.   Pt will demonstrate Good attention and participation in continued evaluation of functional ambulation house hold distances in order to assist with safe d/c planning.  Pt will attend to continued cognitive assessments 100% of the time in order to provide most appropriate d/c recommendations.   Pt will follow 100% simple 2-step commands and be A&O x4 consistently with environmental cues to increase participation in functional activities.   Pt will identify 3 areas of interest/hobbies and 1 intervention on how to incorporate into daily life in order to increase interaction with environment and peers as well as increase participation in  meaningful tasks.   Pt will demonstrate 100% carryover of BUE HEP in order to increase BUE MS and increase performance during functional tasks upon d/c home.    Dinorah Rodas, OTR/L

## 2024-04-17 NOTE — ASSESSMENT & PLAN NOTE
"No results found for: \"HGBA1C\"    Recent Labs     04/16/24  1634 04/16/24  2043 04/17/24  0720   POCGLU 103 140 108       Blood Sugar Average: Last 72 hrs:  (P) 117  Not maintained on insulin as outpatient, hold oral regimen  Maintain on correctional scale and diabetic diet  "

## 2024-04-17 NOTE — ASSESSMENT & PLAN NOTE
Creatine on admission 5.1, Baseline creatinine 1.2-1.3, and as high as 1.8 in November 2023  Etiology likely dehydration due to nausea vomiting diarrhea, need to rule out toxicity from H. pylori cocktail?  Recently started end of last month  Imaging CT abdomen pelvis without hydronephrosis or signs of obstruction    Lab Results   Component Value Date    CREATININE 3.95 (H) 04/17/2024    CREATININE 4.41 (H) 04/16/2024     Hold ACE/ARB and diuretic therapy  Avoid hypotension, nephrotoxins, and NSAIDS if possible    Urine studies ordered   IV fluids - supplemented 2 L bolus in ED  Strict I & Os  Urinary retention protocol and bladder scan   Nephrology consult; recommendations appreciated   Continue bicarb gtt

## 2024-04-18 PROBLEM — K59.1 FUNCTIONAL DIARRHEA: Status: ACTIVE | Noted: 2024-04-16

## 2024-04-18 PROBLEM — E87.20 METABOLIC ACIDOSIS: Status: RESOLVED | Noted: 2024-04-16 | Resolved: 2024-04-18

## 2024-04-18 PROBLEM — R51.9 HEADACHE: Status: RESOLVED | Noted: 2024-04-16 | Resolved: 2024-04-18

## 2024-04-18 PROBLEM — E87.5 HYPERKALEMIA: Status: RESOLVED | Noted: 2024-04-16 | Resolved: 2024-04-18

## 2024-04-18 LAB
ANION GAP SERPL CALCULATED.3IONS-SCNC: 8 MMOL/L (ref 4–13)
BUN SERPL-MCNC: 87 MG/DL (ref 5–25)
CALCIUM SERPL-MCNC: 8.4 MG/DL (ref 8.4–10.2)
CHLORIDE SERPL-SCNC: 105 MMOL/L (ref 96–108)
CO2 SERPL-SCNC: 22 MMOL/L (ref 21–32)
CREAT SERPL-MCNC: 2.4 MG/DL (ref 0.6–1.3)
ERYTHROCYTE [DISTWIDTH] IN BLOOD BY AUTOMATED COUNT: 12.8 % (ref 11.6–15.1)
GFR SERPL CREATININE-BSD FRML MDRD: 26 ML/MIN/1.73SQ M
GLUCOSE SERPL-MCNC: 165 MG/DL (ref 65–140)
GLUCOSE SERPL-MCNC: 192 MG/DL (ref 65–140)
GLUCOSE SERPL-MCNC: 202 MG/DL (ref 65–140)
GLUCOSE SERPL-MCNC: 204 MG/DL (ref 65–140)
GLUCOSE SERPL-MCNC: 212 MG/DL (ref 65–140)
HCT VFR BLD AUTO: 37.1 % (ref 36.5–49.3)
HGB BLD-MCNC: 13 G/DL (ref 12–17)
MCH RBC QN AUTO: 32.4 PG (ref 26.8–34.3)
MCHC RBC AUTO-ENTMCNC: 35 G/DL (ref 31.4–37.4)
MCV RBC AUTO: 93 FL (ref 82–98)
PLATELET # BLD AUTO: 113 THOUSANDS/UL (ref 149–390)
PMV BLD AUTO: 10.8 FL (ref 8.9–12.7)
POTASSIUM SERPL-SCNC: 5.3 MMOL/L (ref 3.5–5.3)
RBC # BLD AUTO: 4.01 MILLION/UL (ref 3.88–5.62)
SODIUM SERPL-SCNC: 135 MMOL/L (ref 135–147)
WBC # BLD AUTO: 5.33 THOUSAND/UL (ref 4.31–10.16)

## 2024-04-18 PROCEDURE — 82948 REAGENT STRIP/BLOOD GLUCOSE: CPT

## 2024-04-18 PROCEDURE — 85027 COMPLETE CBC AUTOMATED: CPT

## 2024-04-18 PROCEDURE — 99233 SBSQ HOSP IP/OBS HIGH 50: CPT | Performed by: INTERNAL MEDICINE

## 2024-04-18 PROCEDURE — 80048 BASIC METABOLIC PNL TOTAL CA: CPT

## 2024-04-18 PROCEDURE — 99232 SBSQ HOSP IP/OBS MODERATE 35: CPT

## 2024-04-18 RX ORDER — ALBUMIN (HUMAN) 12.5 G/50ML
25 SOLUTION INTRAVENOUS 2 TIMES DAILY
Qty: 200 ML | Refills: 0 | Status: COMPLETED | OUTPATIENT
Start: 2024-04-18 | End: 2024-04-18

## 2024-04-18 RX ORDER — SACCHAROMYCES BOULARDII 250 MG
250 CAPSULE ORAL 2 TIMES DAILY
Status: DISCONTINUED | OUTPATIENT
Start: 2024-04-18 | End: 2024-04-21 | Stop reason: HOSPADM

## 2024-04-18 RX ORDER — LOPERAMIDE HYDROCHLORIDE 2 MG/1
2 CAPSULE ORAL 3 TIMES DAILY PRN
Status: DISCONTINUED | OUTPATIENT
Start: 2024-04-18 | End: 2024-04-21 | Stop reason: HOSPADM

## 2024-04-18 RX ORDER — ALBUMIN (HUMAN) 12.5 G/50ML
25 SOLUTION INTRAVENOUS ONCE
Status: DISCONTINUED | OUTPATIENT
Start: 2024-04-18 | End: 2024-04-18

## 2024-04-18 RX ADMIN — DULOXETINE HYDROCHLORIDE 20 MG: 20 CAPSULE, DELAYED RELEASE ORAL at 08:58

## 2024-04-18 RX ADMIN — PREGABALIN 100 MG: 100 CAPSULE ORAL at 08:58

## 2024-04-18 RX ADMIN — INSULIN LISPRO 2 UNITS: 100 INJECTION, SOLUTION INTRAVENOUS; SUBCUTANEOUS at 11:35

## 2024-04-18 RX ADMIN — Medication 250 MG: at 13:35

## 2024-04-18 RX ADMIN — PREGABALIN 100 MG: 100 CAPSULE ORAL at 17:43

## 2024-04-18 RX ADMIN — ALBUMIN (HUMAN) 25 G: 0.25 INJECTION, SOLUTION INTRAVENOUS at 10:56

## 2024-04-18 RX ADMIN — HEPARIN SODIUM 5000 UNITS: 5000 INJECTION, SOLUTION INTRAVENOUS; SUBCUTANEOUS at 14:00

## 2024-04-18 RX ADMIN — ALBUMIN (HUMAN) 25 G: 0.25 INJECTION, SOLUTION INTRAVENOUS at 21:10

## 2024-04-18 RX ADMIN — HEPARIN SODIUM 5000 UNITS: 5000 INJECTION, SOLUTION INTRAVENOUS; SUBCUTANEOUS at 21:10

## 2024-04-18 RX ADMIN — INSULIN LISPRO 2 UNITS: 100 INJECTION, SOLUTION INTRAVENOUS; SUBCUTANEOUS at 07:53

## 2024-04-18 RX ADMIN — PANTOPRAZOLE SODIUM 20 MG: 20 TABLET, DELAYED RELEASE ORAL at 05:37

## 2024-04-18 RX ADMIN — Medication 250 MG: at 17:47

## 2024-04-18 RX ADMIN — INSULIN LISPRO 2 UNITS: 100 INJECTION, SOLUTION INTRAVENOUS; SUBCUTANEOUS at 17:43

## 2024-04-18 RX ADMIN — HEPARIN SODIUM 5000 UNITS: 5000 INJECTION, SOLUTION INTRAVENOUS; SUBCUTANEOUS at 05:37

## 2024-04-18 RX ADMIN — ASPIRIN 81 MG CHEWABLE TABLET 81 MG: 81 TABLET CHEWABLE at 08:58

## 2024-04-18 RX ADMIN — LOPERAMIDE HYDROCHLORIDE 2 MG: 2 CAPSULE ORAL at 13:38

## 2024-04-18 RX ADMIN — ATORVASTATIN CALCIUM 20 MG: 20 TABLET, FILM COATED ORAL at 17:43

## 2024-04-18 NOTE — ASSESSMENT & PLAN NOTE
Creatine on admission 5.1, Baseline creatinine 1.2-1.3, and as high as 1.8 in November 2023  Etiology likely dehydration due to nausea vomiting diarrhea, need to rule out toxicity from H. pylori cocktail?  Recently started end of last month  Imaging CT abdomen pelvis without hydronephrosis or signs of obstruction    Lab Results   Component Value Date    CREATININE 2.40 (H) 04/18/2024    CREATININE 3.95 (H) 04/17/2024     Hold ACE/ARB and diuretic therapy  Avoid hypotension, nephrotoxins, and NSAIDS if possible    Urine studies ordered   IV fluids - supplemented 2 L bolus in ED  Strict I & Os  Urinary retention protocol and bladder scan   Nephrology consult- stop bicarb gtt. Start albumin

## 2024-04-18 NOTE — PLAN OF CARE
Problem: PAIN - ADULT  Goal: Verbalizes/displays adequate comfort level or baseline comfort level  Description: Interventions:  - Encourage patient to monitor pain and request assistance  - Assess pain using appropriate pain scale  - Administer analgesics based on type and severity of pain and evaluate response  - Implement non-pharmacological measures as appropriate and evaluate response  - Consider cultural and social influences on pain and pain management  - Notify physician/advanced practitioner if interventions unsuccessful or patient reports new pain  Outcome: Progressing     Problem: INFECTION - ADULT  Goal: Absence or prevention of progression during hospitalization  Description: INTERVENTIONS:  - Assess and monitor for signs and symptoms of infection  - Monitor lab/diagnostic results  - Monitor all insertion sites, i.e. indwelling lines, tubes, and drains  - Monitor endotracheal if appropriate and nasal secretions for changes in amount and color  - [place] appropriate cooling/warming therapies per order  - Administer medications as ordered  - Instruct and encourage patient and family to use good hand hygiene technique  - Identify and instruct in appropriate isolation precautions for identified infection/condition  Outcome: Progressing  Goal: Absence of fever/infection during neutropenic period  Description: INTERVENTIONS:  - Monitor WBC    Outcome: Progressing     Problem: SAFETY ADULT  Goal: Patient will remain free of falls  Description: INTERVENTIONS:  - Educate patient/family on patient safety including physical limitations  - Instruct patient to call for assistance with activity   - Consult OT/PT to assist with strengthening/mobility   - Keep Call bell within reach  - Keep bed low and locked with side rails adjusted as appropriate  - Keep care items and personal belongings within reach  - Initiate and maintain comfort rounds  - Make Fall Risk Sign visible to staff  - Offer Toileting every 2 Hours,  in advance of need  - Initiate/Maintain bed alarm  - Obtain necessary fall risk management equipment: yes  - Apply yellow socks and bracelet for high fall risk patients  - Consider moving patient to room near nurses station  Outcome: Progressing  Goal: Maintain or return to baseline ADL function  Description: INTERVENTIONS:  -  Assess patient's ability to carry out ADLs; assess patient's baseline for ADL function and identify physical deficits which impact ability to perform ADLs (bathing, care of mouth/teeth, toileting, grooming, dressing, etc.)  - Assess/evaluate cause of self-care deficits   - Assess range of motion  - Assess patient's mobility; develop plan if impaired  - Assess patient's need for assistive devices and provide as appropriate  - Encourage maximum independence but intervene and supervise when necessary  - Involve family in performance of ADLs  - Assess for home care needs following discharge   - Consider OT consult to assist with ADL evaluation and planning for discharge  - Provide patient education as appropriate  Outcome: Progressing  Goal: Maintains/Returns to pre admission functional level  Description: INTERVENTIONS:  - Perform AM-PAC 6 Click Basic Mobility/ Daily Activity assessment daily.  - Set and communicate daily mobility goal to care team and patient/family/caregiver.   - Collaborate with rehabilitation services on mobility goals if consulted  - Perform Range of Motion 3 times a day.  - Reposition patient every 2 hours.  - Dangle patient 3 times a day  - Stand patient 3 times a day  - Ambulate patient 3 times a day  - Out of bed to chair 3 times a day   - Out of bed for meals 3 times a day  - Out of bed for toileting  - Record patient progress and toleration of activity level   Outcome: Progressing     Problem: DISCHARGE PLANNING  Goal: Discharge to home or other facility with appropriate resources  Description: INTERVENTIONS:  - Identify barriers to discharge w/patient and  caregiver  - Arrange for needed discharge resources and transportation as appropriate  - Identify discharge learning needs (meds, wound care, etc.)  - Arrange for interpretive services to assist at discharge as needed  - Refer to Case Management Department for coordinating discharge planning if the patient needs post-hospital services based on physician/advanced practitioner order or complex needs related to functional status, cognitive ability, or social support system  Outcome: Progressing     Problem: Knowledge Deficit  Goal: Patient/family/caregiver demonstrates understanding of disease process, treatment plan, medications, and discharge instructions  Description: Complete learning assessment and assess knowledge base.  Interventions:  - Provide teaching at level of understanding  - Provide teaching via preferred learning methods  Outcome: Progressing

## 2024-04-18 NOTE — PLAN OF CARE
Problem: PAIN - ADULT  Goal: Verbalizes/displays adequate comfort level or baseline comfort level  Description: Interventions:  - Encourage patient to monitor pain and request assistance  - Assess pain using appropriate pain scale  - Administer analgesics based on type and severity of pain and evaluate response  - Implement non-pharmacological measures as appropriate and evaluate response  - Consider cultural and social influences on pain and pain management  - Notify physician/advanced practitioner if interventions unsuccessful or patient reports new pain  Outcome: Progressing     Problem: INFECTION - ADULT  Goal: Absence or prevention of progression during hospitalization  Description: INTERVENTIONS:  - Assess and monitor for signs and symptoms of infection  - Monitor lab/diagnostic results  - Monitor all insertion sites, i.e. indwelling lines, tubes, and drains  - Monitor endotracheal if appropriate and nasal secretions for changes in amount and color  - [place] appropriate cooling/warming therapies per order  - Administer medications as ordered  - Instruct and encourage patient and family to use good hand hygiene technique  - Identify and instruct in appropriate isolation precautions for identified infection/condition  Outcome: Progressing  Goal: Absence of fever/infection during neutropenic period  Description: INTERVENTIONS:  - Monitor WBC    Outcome: Progressing     Problem: SAFETY ADULT  Goal: Patient will remain free of falls  Description: INTERVENTIONS:  - Educate patient/family on patient safety including physical limitations  - Instruct patient to call for assistance with activity   - Consult OT/PT to assist with strengthening/mobility   - Keep Call bell within reach  - Keep bed low and locked with side rails adjusted as appropriate  - Keep care items and personal belongings within reach  - Initiate and maintain comfort rounds  - Make Fall Risk Sign visible to staff  - Offer Toileting every 2 Hours,  in advance of need  - Initiate/Maintain bed alarm  - Obtain necessary fall risk management equipment: walker  - Apply yellow socks and bracelet for high fall risk patients  - Consider moving patient to room near nurses station  Outcome: Progressing  Goal: Maintain or return to baseline ADL function  Description: INTERVENTIONS:  -  Assess patient's ability to carry out ADLs; assess patient's baseline for ADL function and identify physical deficits which impact ability to perform ADLs (bathing, care of mouth/teeth, toileting, grooming, dressing, etc.)  - Assess/evaluate cause of self-care deficits   - Assess range of motion  - Assess patient's mobility; develop plan if impaired  - Assess patient's need for assistive devices and provide as appropriate  - Encourage maximum independence but intervene and supervise when necessary  - Involve family in performance of ADLs  - Assess for home care needs following discharge   - Consider OT consult to assist with ADL evaluation and planning for discharge  - Provide patient education as appropriate  Outcome: Progressing  Goal: Maintains/Returns to pre admission functional level  Description: INTERVENTIONS:  - Perform AM-PAC 6 Click Basic Mobility/ Daily Activity assessment daily.  - Set and communicate daily mobility goal to care team and patient/family/caregiver.   - Collaborate with rehabilitation services on mobility goals if consulted  - Perform Range of Motion 3 times a day.  - Reposition patient every 2 hours.  - Dangle patient 3 times a day  - Stand patient 3 times a day  - Ambulate patient 3 times a day  - Out of bed to chair 3 times a day   - Out of bed for meals 3 times a day  - Out of bed for toileting  - Record patient progress and toleration of activity level   Outcome: Progressing     Problem: DISCHARGE PLANNING  Goal: Discharge to home or other facility with appropriate resources  Description: INTERVENTIONS:  - Identify barriers to discharge w/patient and  caregiver  - Arrange for needed discharge resources and transportation as appropriate  - Identify discharge learning needs (meds, wound care, etc.)  - Arrange for interpretive services to assist at discharge as needed  - Refer to Case Management Department for coordinating discharge planning if the patient needs post-hospital services based on physician/advanced practitioner order or complex needs related to functional status, cognitive ability, or social support system  Outcome: Progressing     Problem: Knowledge Deficit  Goal: Patient/family/caregiver demonstrates understanding of disease process, treatment plan, medications, and discharge instructions  Description: Complete learning assessment and assess knowledge base.  Interventions:  - Provide teaching at level of understanding  - Provide teaching via preferred learning methods  Outcome: Progressing

## 2024-04-18 NOTE — ASSESSMENT & PLAN NOTE
In setting of dehydration and possibly bismuth subsalicylate use  With concurrent SUSHILA and mild hyper kalemia  Anion gap of 14 - resolved   Continue bicarb drip  S/p 2 L nasal saline bolus  Monitor on BMP

## 2024-04-18 NOTE — ASSESSMENT & PLAN NOTE
Wt Readings from Last 3 Encounters:   04/18/24 124 kg (274 lb 4 oz)   11/13/23 117 kg (257 lb 8 oz)     Patient with noted history of systolic CHF however no echo is available to review in chart review  Presenting with SUSHILA, hold diuretics, ACE  Can continue beta-blocker with blood pressure hold parameters  Received 2 L bolus in the ER, bicarb drip has since been stopped  Monitor intake and output and daily weights

## 2024-04-18 NOTE — PROGRESS NOTES
[facility]  Progress Note  Name: Clay Marcial I  MRN: [id number]  Unit/Bed#: -01 I Date of Admission: 4/16/2024   Date of Service: 4/18/2024 I Hospital Day: 2    Assessment/Plan   * Acute on chronic kidney failure  (HCC)  Assessment & Plan  Creatine on admission 5.1, Baseline creatinine 1.2-1.3, and as high as 1.8 in November 2023  Etiology likely dehydration due to nausea vomiting diarrhea, need to rule out toxicity from H. pylori cocktail?  Recently started end of last month  Imaging CT abdomen pelvis without hydronephrosis or signs of obstruction    Lab Results   Component Value Date    CREATININE 2.40 (H) 04/18/2024    CREATININE 3.95 (H) 04/17/2024     Hold ACE/ARB and diuretic therapy  Avoid hypotension, nephrotoxins, and NSAIDS if possible    Urine studies ordered   IV fluids - supplemented 2 L bolus in ED  Strict I & Os  Urinary retention protocol and bladder scan   Nephrology consult- stop bicarb gtt. Start albumin     Helicobacter pylori infection  Assessment & Plan  Recently diagnosed by EGD, started on quadruple therapy 3/28 - 4/27  Bismuth subsalicylate 524 mg 4 times daily  Tetracycline 500 mg 4 times daily  Metronidazole 250 mg 4 times daily  Omeprazole 20 mg twice daily  Hold bismuth subsalicylate with metabolic acidosis and SUSHILA  Hold antibiotics with diarrhea starting post antibiotic initiation - c diff negative   Can continue omeprazole  Patient denies any abdominal pain, has been having diarrhea    Functional diarrhea  Assessment & Plan  Has nausea and vomiting with diarrhea  States he feels like he has been trying to keep hydrated  Denies any abdominal pain  CT abdomen pelvis negative for acute pathology  COVID/flu/RSV negative  C diff and stool enteric negative   Treat conservatively  Prn imodium and started florastor     CHF (congestive heart failure) ([facility])  Assessment & Plan  Wt Readings from Last 3 Encounters:   04/18/24 124 kg (274 lb 4 oz)   11/13/23  "117 kg (257 lb 8 oz)     Patient with noted history of systolic CHF however no echo is available to review in chart review  Presenting with SUSHILA, hold diuretics, ACE  Can continue beta-blocker with blood pressure hold parameters  Received 2 L bolus in the ER, bicarb drip has since been stopped  Monitor intake and output and daily weights    CAD (coronary artery disease)  Assessment & Plan  Denies any chest pain  EKG with sinus rhythm rate 80  Troponin negative  Continue aspirin, statin and beta-blocker    Diabetes mellitus (HCC)  Assessment & Plan  No results found for: \"HGBA1C\"    Recent Labs     04/17/24  1142 04/17/24  1558 04/17/24  2103 04/18/24  0724   POCGLU 204* 242* 201* 212*         Blood Sugar Average: Last 72 hrs:  (P) [id number]  Not maintained on insulin as outpatient, hold oral regimen  Maintain on correctional scale and diabetic diet  Nephro recommending to hold SGLT2i indefinitely on discharge     Metabolic acidosis-resolved as of 4/18/2024  Assessment & Plan  In setting of dehydration and possibly bismuth subsalicylate use  With concurrent SUSHILA and mild hyper kalemia  Anion gap of 14 - resolved   Continue bicarb drip  S/p 2 L nasal saline bolus  Monitor on BMP             VTE Pharmacologic Prophylaxis:   Moderate Risk (Score 3-4) - Pharmacological DVT Prophylaxis Ordered: heparin.    Mobility:   Basic Mobility Inpatient Raw Score: 20  JH-HLM Goal: 6: Walk 10 steps or more  JH-HLM Achieved: 8: Walk 250 feet ot more  JH-HLM Goal achieved. Continue to encourage appropriate mobility.    Patient Centered Rounds: I performed bedside rounds with nursing staff today.   Discussions with Specialists or Other Care Team Provider: nursing, nephrology and cm    Education and Discussions with Family / Patient: Patient declined call to .     Total Time Spent on Date of Encounter in care of patient:  mins. This time was spent on one or more of the following: performing physical exam; " counseling and coordination of care; obtaining or reviewing history; documenting in the medical record; reviewing/ordering tests, medications or procedures; communicating with other healthcare professionals and discussing with patient's family/caregivers.    Current Length of Stay: 2 day(s)  Current Patient Status: Inpatient   Certification Statement: The patient will continue to require additional inpatient hospital stay due to requiring iv fluids and monitoring creatinine   Discharge Plan: Anticipate discharge in 24-48 hrs to home with home services.    Code Status: Level 3 - DNAR and DNI    Subjective:   Patient states that he is not feeling great because he was having diarrhea all night, but denies chest pain, shortness of breath, abdominal pain, nausea or vomiting. Agreeable to trial imodium.    Objective:     Vitals:   Temp (24hrs), Av.8 °F (36.6 °C), Min:97.7 °F (36.5 °C), Max:97.9 °F (36.6 °C)    Temp:  [97.7 °F (36.5 °C)-97.9 °F (36.6 °C)] 97.9 °F (36.6 °C)  HR:  [] 92  Resp:  [16-18] 17  BP: ()/(40-68) 100/55  SpO2:  [95 %] 95 %  Body mass index is 38.25 kg/m².     Input and Output Summary (last 24 hours):     Intake/Output Summary (Last 24 hours) at 2024 1216  Last data filed at 2024 1100  Gross per 24 hour   Intake 3026.67 ml   Output 4325 ml   Net -1298.33 ml       Physical Exam:   Physical Exam  Vitals reviewed.   Constitutional:       General: He is not in acute distress.     Appearance: Normal appearance. He is obese. He is not ill-appearing.   HENT:      Head: Normocephalic and atraumatic.      Nose: Nose normal.      Mouth/Throat:      Mouth: Mucous membranes are moist.      Pharynx: Oropharynx is clear.   Eyes:      Extraocular Movements: Extraocular movements intact.      Conjunctiva/sclera: Conjunctivae normal.   Cardiovascular:      Rate and Rhythm: Normal rate and regular rhythm.      Pulses: Normal pulses.      Heart sounds: Normal heart sounds. No murmur  heard.  Pulmonary:      Effort: Pulmonary effort is normal. No respiratory distress.      Breath sounds: Normal breath sounds. No wheezing.   Abdominal:      General: Abdomen is flat. Bowel sounds are normal. There is no distension.      Palpations: Abdomen is soft.      Tenderness: There is no abdominal tenderness. There is no guarding.   Musculoskeletal:         General: Normal range of motion.      Cervical back: Normal range of motion.      Right lower leg: No edema.      Left lower leg: No edema.   Skin:     General: Skin is warm.      Comments: Chronic venous stasis changes to BLLE   Neurological:      General: No focal deficit present.      Mental Status: He is alert and oriented to person, place, and time. Mental status is at baseline.      Motor: No weakness.   Psychiatric:         Mood and Affect: Mood normal.         Behavior: Behavior normal.         Thought Content: Thought content normal.         Judgment: Judgment normal.          Additional Data:     Labs:  Results from last 7 days   Lab Units 04/18/24  0451 04/17/24  0438 04/16/24  1115   WBC Thousand/uL 5.33   < > 9.67   HEMOGLOBIN g/dL 13.0   < > 14.9   HEMATOCRIT % 37.1   < > 43.4   PLATELETS Thousands/uL 113*   < > 155   SEGS PCT %  --   --  58   LYMPHO PCT %  --   --  22   MONO PCT %  --   --  9   EOS PCT %  --   --  11*    < > = values in this interval not displayed.     Results from last 7 days   Lab Units 04/18/24  0451 04/16/24  2117 04/16/24  1115   SODIUM mmol/L 135   < > 130*   POTASSIUM mmol/L 5.3   < > 5.4*   CHLORIDE mmol/L 105   < > 102   CO2 mmol/L 22   < > 14*   BUN mg/dL 87*   < > 103*   CREATININE mg/dL 2.40*   < > 5.10*   ANION GAP mmol/L 8   < > 14*   CALCIUM mg/dL 8.4   < > 8.4   ALBUMIN g/dL  --   --  4.0   TOTAL BILIRUBIN mg/dL  --   --  0.81   ALK PHOS U/L  --   --  61   ALT U/L  --   --  18   AST U/L  --   --  13   GLUCOSE RANDOM mg/dL 202*   < > 84    < > = values in this interval not displayed.     Results from last 7  days   Lab Units 04/16/24  1115   INR  1.09     Results from last 7 days   Lab Units 04/18/24  1116 04/18/24  0724 04/17/24  2103 04/17/24  1558 04/17/24  1142 04/17/24  0720 04/16/24  2043 04/16/24  1634   POC GLUCOSE mg/dl 204* 212* 201* 242* 204* 108 140 103         Results from last 7 days   Lab Units 04/16/24  1115   LACTIC ACID mmol/L 1.8       Lines/Drains:  Invasive Devices       Peripheral Intravenous Line  Duration             Peripheral IV 04/16/24 Left Antecubital 2 days                          Imaging: Reviewed radiology reports from this admission including: abdominal/pelvic CT    Recent Cultures (last 7 days):   Results from last 7 days   Lab Units 04/16/24  1831   C DIFF TOXIN B BY PCR  Negative       Last 24 Hours Medication List:   Current Facility-Administered Medications   Medication Dose Route Frequency Provider Last Rate    acetaminophen  650 mg Oral Q6H PRN Jenni Frazier PA-C      albumin human  25 g Intravenous BID Ildefonso Prasad DO      albuterol  1 puff Inhalation Q4H PRN Jenni Frazier PA-C      aspirin  81 mg Oral Daily Jenni Frazier PA-C      atorvastatin  20 mg Oral Daily With Dinner Jenni Frazier PA-C      DULoxetine  20 mg Oral Daily Jenni Frazier PA-C      heparin (porcine)  5,000 Units Subcutaneous Q8H [facility] Jenni Frazier PA-C      insulin lispro  1-6 Units Subcutaneous TID AC Jenni Frazier PA-C      loperamide  2 mg Oral TID PRN Juliana Partida PA-C      umeclidinium  1 puff Inhalation Daily Jenni Frazier PA-C      And    olodaterol HCl  2 puff Inhalation Daily Jenni Frazier PA-C      ondansetron  4 mg Intravenous Q6H PRN Jenni Frazier PA-C      pantoprazole  20 mg Oral Early Morning Jenni Frazier PA-C      pregabalin  100 mg Oral BID Jenni Frazier PA-C      saccharomyces boulardii  250 mg Oral BID Juliana Partida PA-C          Today, Patient Was Seen By: Juliana Partida PA-C    **Please Note: This note may have been constructed using a voice recognition system.**

## 2024-04-18 NOTE — ASSESSMENT & PLAN NOTE
"No results found for: \"HGBA1C\"    Recent Labs     04/17/24  1142 04/17/24  1558 04/17/24  2103 04/18/24  0724   POCGLU 204* 242* 201* 212*         Blood Sugar Average: Last 72 hrs:  (P) [id number]  Not maintained on insulin as outpatient, hold oral regimen  Maintain on correctional scale and diabetic diet  Nephro recommending to hold SGLT2i indefinitely on discharge   "

## 2024-04-18 NOTE — ASSESSMENT & PLAN NOTE
Has nausea and vomiting with diarrhea  States he feels like he has been trying to keep hydrated  Denies any abdominal pain  CT abdomen pelvis negative for acute pathology  COVID/flu/RSV negative  C diff and stool enteric negative   Treat conservatively  Prn imodium and started florastor

## 2024-04-18 NOTE — CASE MANAGEMENT
Case Management Progress Note    Patient name Clay Marcial  Location /-01 MRN [id number]  : 1955 Date 2024       LOS (days): 2  Geometric Mean LOS (GMLOS) (days): 3.1  Days to GMLOS:1.3        OBJECTIVE:        Current admission status: Inpatient  Preferred Pharmacy:   [id number] [place], [place] - [address] [place] [id number]  Phone: [phone] Fax: [phone]    Primary Care Provider: No primary care provider on file.    Primary Insurance: MEDICARE  Secondary Insurance: [facility]    PROGRESS NOTE:    Updated patient and eliana Kruse at bedside regarding the following dc arrangements.  Received copy of Adv Directives.  Placed in bin to scan to chart.      [facility] - Request for service [facility] form completed, signed by MD and faxed with supporting documentation to [phone].  [facility] will [facility] patient's house.    [facility] - accepting.  Order request for PT/RN completed.  Will update HH at time of dc.  Info on AVS for patient.    Transportation - patient will have ride from eliana Kruse on day of dc.

## 2024-04-18 NOTE — PROGRESS NOTES
REQUIRED DOCUMENTATION:     1. This service was provided via Telemedicine.  2. Provider located at [place], [place].  3. TeleMed provider: Ildefonso Prasad DO.  4. Identify all parties in room with patient during tele consult:  Clay Marcial  5.Patient was then informed that this was a Telemedicine visit and that the exam was being conducted confidentially over secure lines. My office door was closed. No one else was in the room.  Patient acknowledged consent and understanding of privacy and security of the Telemedicine visit, and gave us permission to have the assistant stay in the room in order to assist with the history and to conduct the exam.  I informed the patient that I have reviewed their record in Epic and presented the opportunity for them to ask any questions regarding the visit today.  The patient agreed to participate.     NEPHROLOGY PROGRESS NOTE   Clay Marcial 68 y.o. male MRN: [id number]  Unit/Bed#: -01 Encounter: [id number]      ASSESSMENT & PLAN      68-year-old male with a history of COPD, diabetes, had a recent diagnosis of an H. pylori infection, coronary artery disease, congestive heart failure who presented with head and neck pain followed by nausea vomiting diarrhea complicated by acute kidney injury on chronic kidney disease and a euglycemic anion gap metabolic acidosis     Acute kidney injury present on admission  Patient's creatinine was 5.1 and is now trending down  Patient's urinalysis was bland  Was on an SGLT2i inhibitor, ACE inhibitor loop diuretic, metformin, statin, tetracycline, PPI, metronidazole which likely contributed to patient's acute kidney injury  Good urine output with 1.8 L  CT of the chest abdomen pelvis without contrast shows unremarkable kidneys with no hydronephrosis unremarkable adrenal glands  Given history or CHF and tolerating po intake and also now that bicarb is improved will d/c bicarb gtt and start albumin 25 grams x 2  CKD stage III with a baseline  creatinine of 1.2-1.3  In the setting of congestive heart failure and type 2 diabetes hopefully creatinine will get to baseline  Now improving  Anion gap metabolic acidosis  Metformin can cause a a type B acidosis, it can cause a type a acidosis with overdose.  Current lactic acid level 1.8  Anion gap is closed  ABG shows bicarb of 7.2 and mild respirtory acidosis with his history of apnea but metabolic acidosis is present  Was on SGLT2i which can cause euglycemic DKA, beta hydroxybutarate normal  Urinalysis was bland however patient admits he was taking it.  Would hold SGLT2i's indefinitely because of this  Is acute kidney injury could have contributed to an anion gap acidosis as well  Now improved  Hyperkalemia  Potassium is elevated but stable at 5.3  Start low k diet  Congestive heart failure with coronary artery disease  Monitor for decompensation and back off of bicarb drip and start Lasix if clinically volume overloaded  Having some low bps can add midodrine if needed but will start with albumin  Also may be inaccurate bp reading as he is asymptomatic  Type 2 diabetes mellitus  Avoid hypoglycemia    SUBJECTIVE:    Patient feeling better ambulating does not have symptoms from when bp was low  No cp, sob, fevers, chills  Tolerating medications   Tolerating po intake well    12 point review of systems was otherwise negative besides what is mentioned above.    Medications:    Current Facility-Administered Medications:     acetaminophen (TYLENOL) tablet 650 mg, 650 mg, Oral, Q6H PRN, Jenni Frazier PA-C, 650 mg at 04/16/24 2035    albumin human (FLEXBUMIN) 25 % injection 25 g, 25 g, Intravenous, BID, Ildefonso Prasad,     albuterol (PROVENTIL HFA,VENTOLIN HFA) inhaler 1 puff, 1 puff, Inhalation, Q4H PRN, Jenni Frazier PA-C    aspirin chewable tablet 81 mg, 81 mg, Oral, Daily, Jenni Frazier PA-C, 81 mg at 04/18/24 0858    atorvastatin (LIPITOR) tablet 20 mg, 20 mg, Oral, Daily With Dinner, Jenni Frazier PA-C, 20  mg at 04/17/24 1711    DULoxetine (CYMBALTA) delayed release capsule 20 mg, 20 mg, Oral, Daily, Jenni Frazier PA-C, 20 mg at 04/18/24 0858    heparin (porcine) subcutaneous injection 5,000 Units, 5,000 Units, Subcutaneous, Q8H EPHRAIM, Jenni Frazier PA-C, 5,000 Units at 04/18/24 0537    insulin lispro (HumALOG/ADMELOG) 100 units/mL subcutaneous injection 1-6 Units, 1-6 Units, Subcutaneous, TID AC, 2 Units at 04/18/24 0753 **AND** Fingerstick Glucose (POCT), , , TID AC, Jenni Frazier PA-C    umeclidinium 62.5 mcg/actuation inhaler AEPB 1 puff, 1 puff, Inhalation, Daily **AND** olodaterol HCl (STRIVERDI RESPIMAT) inhaler 2 puff, 2 puff, Inhalation, Daily, Jenni Frazier PA-C    ondansetron (ZOFRAN) injection 4 mg, 4 mg, Intravenous, Q6H PRN, Jenni Frazier PA-C    pantoprazole (PROTONIX) EC tablet 20 mg, 20 mg, Oral, Early Morning, Jenni Frazier PA-C, 20 mg at 04/18/24 0537    pregabalin (LYRICA) capsule 100 mg, 100 mg, Oral, BID, Jenni Frazier PA-C, 100 mg at 04/18/24 0858    OBJECTIVE:    Vitals:    04/17/24 1500 04/17/24 2219 04/18/24 0555 04/18/24 0717   BP:  91/68  100/55   BP Location:       Pulse: 89 100  92   Resp:  18  17   Temp:  97.7 °F (36.5 °C)  97.9 °F (36.6 °C)   TempSrc:       SpO2:  95%  95%   Weight:   124 kg (274 lb 4 oz)    Height:            Temp:  [97.7 °F (36.5 °C)-97.9 °F (36.6 °C)] 97.9 °F (36.6 °C)  HR:  [] 92  Resp:  [16-18] 17  BP: ()/(40-68) 100/55  SpO2:  [95 %-97 %] 95 %     Body mass index is 38.25 kg/m².    Weight (last 2 days)       Date/Time Weight    04/18/24 0555 124 (274.25)    04/17/24 0600 126 (277.8)    04/16/24 1848 126 (277.56)    04/16/24 1546 126 (277.56)    04/16/24 1345 126 (277.78)    04/16/24 1112 126 (277.12)            I/O last 3 completed shifts:  In: 3106.7 [P.O.:1950; I.V.:1106.7; IV Piggyback:50]  Out: 4825 [Urine:4825]    I/O this shift:  In: 240 [P.O.:240]  Out: 600 [Urine:600]      Physical exam:    General: no acute distress, cooperative  Eyes:  "conjunctivae pink, anicteric sclerae  ENT: lips and mucous membranes moist, no exudates, normal external ears  Neck: ROM intact, no JVD  Chest: No respiratory distress, no accessory muscle use  CVS: normal rate, non pericardial friction rub,   Abdomen: soft, non-tender, non-distended, normoactive bowel sounds  Extremities: no edema of both legs  Skin: no rash  Neuro: awake, alert, oriented, grossly intact  Psych:  Pleasant affect    Invasive Devices:      Lab Results:   Results from last 7 days   Lab Units 04/18/24  0451 04/17/24  0438 04/16/24  2117 04/16/24  1831 04/16/24  1115   WBC Thousand/uL 5.33 5.77  --   --  9.67   HEMOGLOBIN g/dL 13.0 12.7  --   --  14.9   HEMATOCRIT % 37.1 37.4  --   --  43.4   PLATELETS Thousands/uL 113* 119*  --   --  155   POTASSIUM mmol/L 5.3 5.1 5.0  --  5.4*   CHLORIDE mmol/L 105 105 102  --  102   CO2 mmol/L 22 16* 15*  --  14*   BUN mg/dL 87* 102* 102*  --  103*   CREATININE mg/dL 2.40* 3.95* 4.41*  --  5.10*   CALCIUM mg/dL 8.4 8.0* 7.9*  --  8.4   MAGNESIUM mg/dL  --  1.8*  --   --  1.6*   PHOSPHORUS mg/dL  --  7.6*  --   --   --    ALK PHOS U/L  --   --   --   --  61   ALT U/L  --   --   --   --  18   AST U/L  --   --   --   --  13   NITRITE UA   --   --   --  Negative  --    LEUKOCYTES UA   --   --   --  Negative  --    BLOOD UA   --   --   --  Negative  --          Portions of the record may have been created with voice recognition software. Occasional wrong word or \"sound a like\" substitutions may have occurred due to the inherent limitations of voice recognition software. Read the chart carefully and recognize, using context, where substitutions have occurred.If you have any questions, please contact the dictating provider.    "

## 2024-04-18 NOTE — ASSESSMENT & PLAN NOTE
Recently diagnosed by EGD, started on quadruple therapy 3/28 - 4/27  Bismuth subsalicylate 524 mg 4 times daily  Tetracycline 500 mg 4 times daily  Metronidazole 250 mg 4 times daily  Omeprazole 20 mg twice daily  Hold bismuth subsalicylate with metabolic acidosis and SUSHILA  Hold antibiotics with diarrhea starting post antibiotic initiation - c diff negative   Can continue omeprazole  Patient denies any abdominal pain, has been having diarrhea

## 2024-04-19 LAB
ANION GAP SERPL CALCULATED.3IONS-SCNC: 6 MMOL/L (ref 4–13)
BUN SERPL-MCNC: 63 MG/DL (ref 5–25)
CALCIUM SERPL-MCNC: 9 MG/DL (ref 8.4–10.2)
CHLORIDE SERPL-SCNC: 108 MMOL/L (ref 96–108)
CO2 SERPL-SCNC: 24 MMOL/L (ref 21–32)
CREAT SERPL-MCNC: 1.62 MG/DL (ref 0.6–1.3)
ERYTHROCYTE [DISTWIDTH] IN BLOOD BY AUTOMATED COUNT: 12.9 % (ref 11.6–15.1)
GFR SERPL CREATININE-BSD FRML MDRD: 42 ML/MIN/1.73SQ M
GLUCOSE SERPL-MCNC: 159 MG/DL (ref 65–140)
GLUCOSE SERPL-MCNC: 160 MG/DL (ref 65–140)
GLUCOSE SERPL-MCNC: 197 MG/DL (ref 65–140)
GLUCOSE SERPL-MCNC: 212 MG/DL (ref 65–140)
GLUCOSE SERPL-MCNC: 228 MG/DL (ref 65–140)
HCT VFR BLD AUTO: 36.3 % (ref 36.5–49.3)
HGB BLD-MCNC: 12.7 G/DL (ref 12–17)
MAGNESIUM SERPL-MCNC: 2.1 MG/DL (ref 1.9–2.7)
MCH RBC QN AUTO: 32.9 PG (ref 26.8–34.3)
MCHC RBC AUTO-ENTMCNC: 35 G/DL (ref 31.4–37.4)
MCV RBC AUTO: 94 FL (ref 82–98)
PLATELET # BLD AUTO: 99 THOUSANDS/UL (ref 149–390)
PMV BLD AUTO: 10.4 FL (ref 8.9–12.7)
POTASSIUM SERPL-SCNC: 5.7 MMOL/L (ref 3.5–5.3)
RBC # BLD AUTO: 3.86 MILLION/UL (ref 3.88–5.62)
SODIUM SERPL-SCNC: 138 MMOL/L (ref 135–147)
WBC # BLD AUTO: 4.46 THOUSAND/UL (ref 4.31–10.16)

## 2024-04-19 PROCEDURE — 99232 SBSQ HOSP IP/OBS MODERATE 35: CPT

## 2024-04-19 PROCEDURE — 85027 COMPLETE CBC AUTOMATED: CPT

## 2024-04-19 PROCEDURE — 97116 GAIT TRAINING THERAPY: CPT

## 2024-04-19 PROCEDURE — 99233 SBSQ HOSP IP/OBS HIGH 50: CPT | Performed by: INTERNAL MEDICINE

## 2024-04-19 PROCEDURE — 97535 SELF CARE MNGMENT TRAINING: CPT

## 2024-04-19 PROCEDURE — 80048 BASIC METABOLIC PNL TOTAL CA: CPT

## 2024-04-19 PROCEDURE — 82948 REAGENT STRIP/BLOOD GLUCOSE: CPT

## 2024-04-19 PROCEDURE — 83735 ASSAY OF MAGNESIUM: CPT

## 2024-04-19 RX ORDER — SODIUM BICARBONATE 650 MG/1
325 TABLET ORAL 2 TIMES DAILY
Status: DISCONTINUED | OUTPATIENT
Start: 2024-04-19 | End: 2024-04-21 | Stop reason: HOSPADM

## 2024-04-19 RX ADMIN — SODIUM BICARBONATE 650 MG TABLET 325 MG: at 14:39

## 2024-04-19 RX ADMIN — HEPARIN SODIUM 5000 UNITS: 5000 INJECTION, SOLUTION INTRAVENOUS; SUBCUTANEOUS at 05:49

## 2024-04-19 RX ADMIN — INSULIN LISPRO 1 UNITS: 100 INJECTION, SOLUTION INTRAVENOUS; SUBCUTANEOUS at 07:59

## 2024-04-19 RX ADMIN — HEPARIN SODIUM 5000 UNITS: 5000 INJECTION, SOLUTION INTRAVENOUS; SUBCUTANEOUS at 14:39

## 2024-04-19 RX ADMIN — Medication 250 MG: at 08:00

## 2024-04-19 RX ADMIN — PREGABALIN 100 MG: 100 CAPSULE ORAL at 08:00

## 2024-04-19 RX ADMIN — INSULIN LISPRO 2 UNITS: 100 INJECTION, SOLUTION INTRAVENOUS; SUBCUTANEOUS at 17:27

## 2024-04-19 RX ADMIN — HEPARIN SODIUM 5000 UNITS: 5000 INJECTION, SOLUTION INTRAVENOUS; SUBCUTANEOUS at 21:08

## 2024-04-19 RX ADMIN — INSULIN LISPRO 2 UNITS: 100 INJECTION, SOLUTION INTRAVENOUS; SUBCUTANEOUS at 12:04

## 2024-04-19 RX ADMIN — Medication 250 MG: at 17:26

## 2024-04-19 RX ADMIN — PREGABALIN 100 MG: 100 CAPSULE ORAL at 17:26

## 2024-04-19 RX ADMIN — ATORVASTATIN CALCIUM 20 MG: 20 TABLET, FILM COATED ORAL at 17:26

## 2024-04-19 RX ADMIN — ASPIRIN 81 MG CHEWABLE TABLET 81 MG: 81 TABLET CHEWABLE at 08:00

## 2024-04-19 RX ADMIN — DULOXETINE HYDROCHLORIDE 20 MG: 20 CAPSULE, DELAYED RELEASE ORAL at 08:00

## 2024-04-19 RX ADMIN — SODIUM BICARBONATE 650 MG TABLET 325 MG: at 21:07

## 2024-04-19 RX ADMIN — PANTOPRAZOLE SODIUM 20 MG: 20 TABLET, DELAYED RELEASE ORAL at 05:49

## 2024-04-19 NOTE — PLAN OF CARE
Problem: OCCUPATIONAL THERAPY ADULT  Goal: Performs self-care activities at highest level of function for planned discharge setting.  See evaluation for individualized goals.  Description: Treatment Interventions: ADL retraining, Visual perceptual retraining, Functional transfer training, Endurance training, UE strengthening/ROM, Cognitive reorientation, Patient/family training, Equipment evaluation/education, Neuromuscular reeducation, Compensatory technique education, Fine motor coordination activities, UE splinting, Continued evaluation, Cardiac education, Activityengagement, Energy conservation          See flowsheet documentation for full assessment, interventions and recommendations.   Outcome: Progressing  Note: Limitation: Decreased ADL status, Decreased endurance, Decreased self-care trans, Decreased high-level ADLs  Prognosis: Good  Assessment: Pt completed OT tx session #2 focused on ADL performance and functional mobility. Pt alert and agreeable to participate. Pt demonstrated improvements in activity tolerance and functional mobility this session but continues fatigue easily and require rest breaks throughout. Pt currently completing UB ADLs @ S/set-up, LB ADLs @ S, and functional mobility with RW @ S. Pt demonstrating Good participation and Good potential to achieve goals but is currently demonstrating deficits in decrease activity tolerance, decrease standing balance, decrease performance during ADL tasks, decrease safety awareness , decrease generalized strength, decrease activity engagement, and decrease performance during functional transfers. Continue to recommend Level III: Minimum Resource Intensity Therapy upon discharge to increase safety and independence in ADL tasks and functional mobility.     Rehab Resource Intensity Level, OT: III (Minimum Resource Intensity)

## 2024-04-19 NOTE — PROGRESS NOTES
[facility]  Progress Note  Name: Clay Marcial I  MRN: [id number]  Unit/Bed#: -01 I Date of Admission: 4/16/2024   Date of Service: 4/19/2024 I Hospital Day: 3    Assessment/Plan   * Acute on chronic kidney failure  (HCC)  Assessment & Plan  Creatine on admission 5.1, Baseline creatinine 1.2-1.3, and as high as 1.8 in November 2023  Etiology likely dehydration due to nausea vomiting diarrhea, need to rule out toxicity from H. pylori cocktail?  Recently started end of last month  Imaging CT abdomen pelvis without hydronephrosis or signs of obstruction    Lab Results   Component Value Date    CREATININE 1.62 (H) 04/19/2024    CREATININE 2.40 (H) 04/18/2024     Hold ACE/ARB and diuretic therapy  Avoid hypotension, nephrotoxins, and NSAIDS if possible    Urine studies ordered   IV fluids - supplemented 2 L bolus in ED  Strict I & Os  Urinary retention protocol and bladder scan   Nephrology consult- stop fluids and monitor. Can give albumin in BP drops    Helicobacter pylori infection  Assessment & Plan  Recently diagnosed by EGD, started on quadruple therapy 3/28 - 4/27  Bismuth subsalicylate 524 mg 4 times daily  Tetracycline 500 mg 4 times daily  Metronidazole 250 mg 4 times daily  Omeprazole 20 mg twice daily  Hold bismuth subsalicylate with metabolic acidosis and SUSHILA  Hold antibiotics with diarrhea starting post antibiotic initiation - c diff negative   Can continue omeprazole  Patient denies any abdominal pain, has been having diarrhea    Hyperkalemia  Assessment & Plan  Presented with mild hyperkalemia in setting of SUSHILA and metabolic acidosis  Start sodium bicarb and low potassium diet   Recheck BMP    Functional diarrhea  Assessment & Plan  Has nausea and vomiting with diarrhea  States he feels like he has been trying to keep hydrated  Denies any abdominal pain  CT abdomen pelvis negative for acute pathology  COVID/flu/RSV negative  C diff and stool enteric negative   Treat  "conservatively  Prn imodium and started florastor     CHF (congestive heart failure) (HCC)  Assessment & Plan  Wt Readings from Last 3 Encounters:   04/19/24 122 kg (269 lb 13.5 oz)   11/13/23 117 kg (257 lb 8 oz)     Patient with noted history of systolic CHF however no echo is available to review in chart review  Presenting with SUSHILA, hold diuretics, ACE  Can continue beta-blocker with blood pressure hold parameters  Received 2 L bolus in the ER, bicarb drip has since been stopped  Monitor intake and output and daily weights    CAD (coronary artery disease)  Assessment & Plan  Denies any chest pain  EKG with sinus rhythm rate 80  Troponin negative  Continue aspirin, statin and beta-blocker    Diabetes mellitus (HCC)  Assessment & Plan  No results found for: \"HGBA1C\"    Recent Labs     04/18/24  1116 04/18/24  1606 04/18/24  2106 04/19/24  0711   POCGLU 204* 192* 165* 160*         Blood Sugar Average: Last 72 hrs:  (P) 175.1486171343963842  Not maintained on insulin as outpatient, hold oral regimen  Maintain on correctional scale and diabetic diet  Nephro recommending to hold SGLT2i indefinitely on discharge              VTE Pharmacologic Prophylaxis:   Moderate Risk (Score 3-4) - Pharmacological DVT Prophylaxis Ordered: heparin.    Mobility:   Basic Mobility Inpatient Raw Score: 24  JH-HLM Goal: 8: Walk 250 feet or more  JH-HLM Achieved: 8: Walk 250 feet ot more  JH-HLM Goal achieved. Continue to encourage appropriate mobility.    Patient Centered Rounds: I performed bedside rounds with nursing staff today.   Discussions with Specialists or Other Care Team Provider: nursing and cm    Education and Discussions with Family / Patient: Patient declined call to .     Total Time Spent on Date of Encounter in care of patient:  mins. This time was spent on one or more of the following: performing physical exam; counseling and coordination of care; obtaining or reviewing history; documenting in the medical " record; reviewing/ordering tests, medications or procedures; communicating with other healthcare professionals and discussing with patient's family/caregivers.    Current Length of Stay: 3 day(s)  Current Patient Status: Inpatient   Certification Statement: The patient will continue to require additional inpatient hospital stay due to requiring monitoring of creatinine and diarrhea   Discharge Plan: Anticipate discharge in 24-48 hrs to home with home services.    Code Status: Level 3 - DNAR and DNI    Subjective:   Patient states that he is feeling well today. Diarrhea has resolved and he has not had any further since yesterday. Denies chest pain, shortness of breath and abdominal pain. Does not offer any complaints and is looking forward to going home soon since he is feeling better.    Objective:     Vitals:   Temp (24hrs), Av °F (36.7 °C), Min:97.7 °F (36.5 °C), Max:98.1 °F (36.7 °C)    Temp:  [97.7 °F (36.5 °C)-98.1 °F (36.7 °C)] 97.7 °F (36.5 °C)  HR:  [82-94] 82  Resp:  [17-18] 18  BP: (110-137)/(55-69) 110/55  SpO2:  [91 %-95 %] 91 %  Body mass index is 37.64 kg/m².     Input and Output Summary (last 24 hours):     Intake/Output Summary (Last 24 hours) at 2024 1145  Last data filed at 2024 0828  Gross per 24 hour   Intake 1680 ml   Output 2725 ml   Net -1045 ml       Physical Exam:   Physical Exam  Vitals reviewed.   Constitutional:       General: He is not in acute distress.     Appearance: Normal appearance. He is obese. He is not ill-appearing.   HENT:      Head: Normocephalic and atraumatic.      Nose: Nose normal.      Mouth/Throat:      Mouth: Mucous membranes are moist.      Pharynx: Oropharynx is clear.   Eyes:      Extraocular Movements: Extraocular movements intact.      Conjunctiva/sclera: Conjunctivae normal.   Cardiovascular:      Rate and Rhythm: Normal rate and regular rhythm.      Pulses: Normal pulses.      Heart sounds: Normal heart sounds. No murmur heard.  Pulmonary:       Effort: Pulmonary effort is normal. No respiratory distress.      Breath sounds: Normal breath sounds. No wheezing.   Abdominal:      General: Abdomen is flat. Bowel sounds are normal. There is no distension.      Palpations: Abdomen is soft.      Tenderness: There is no abdominal tenderness. There is no guarding.   Musculoskeletal:         General: Normal range of motion.      Cervical back: Normal range of motion.      Right lower leg: No edema.      Left lower leg: No edema.   Skin:     General: Skin is warm.   Neurological:      General: No focal deficit present.      Mental Status: He is alert and oriented to person, place, and time. Mental status is at baseline.      Motor: No weakness.   Psychiatric:         Mood and Affect: Mood normal.         Behavior: Behavior normal.         Thought Content: Thought content normal.         Judgment: Judgment normal.          Additional Data:     Labs:  Results from last 7 days   Lab Units 04/19/24  0547 04/17/24  0438 04/16/24  1115   WBC Thousand/uL 4.46   < > 9.67   HEMOGLOBIN g/dL 12.7   < > 14.9   HEMATOCRIT % 36.3*   < > 43.4   PLATELETS Thousands/uL 99*   < > 155   SEGS PCT %  --   --  58   LYMPHO PCT %  --   --  22   MONO PCT %  --   --  9   EOS PCT %  --   --  11*    < > = values in this interval not displayed.     Results from last 7 days   Lab Units 04/19/24  0547 04/16/24  2117 04/16/24  1115   SODIUM mmol/L 138   < > 130*   POTASSIUM mmol/L 5.7*   < > 5.4*   CHLORIDE mmol/L 108   < > 102   CO2 mmol/L 24   < > 14*   BUN mg/dL 63*   < > 103*   CREATININE mg/dL 1.62*   < > 5.10*   ANION GAP mmol/L 6   < > 14*   CALCIUM mg/dL 9.0   < > 8.4   ALBUMIN g/dL  --   --  4.0   TOTAL BILIRUBIN mg/dL  --   --  0.81   ALK PHOS U/L  --   --  61   ALT U/L  --   --  18   AST U/L  --   --  13   GLUCOSE RANDOM mg/dL 159*   < > 84    < > = values in this interval not displayed.     Results from last 7 days   Lab Units 04/16/24  1115   INR  1.09     Results from last 7 days   Lab  Units 04/19/24  1105 04/19/24  0711 04/18/24  2106 04/18/24  1606 04/18/24  1116 04/18/24  0724 04/17/24  2103 04/17/24  1558 04/17/24  1142 04/17/24  0720 04/16/24  2043 04/16/24  1634   POC GLUCOSE mg/dl 228* 160* 165* 192* 204* 212* 201* 242* 204* 108 140 103         Results from last 7 days   Lab Units 04/16/24  1115   LACTIC ACID mmol/L 1.8       Lines/Drains:  Invasive Devices       Peripheral Intravenous Line  Duration             Peripheral IV 04/16/24 Left Antecubital 3 days                          Imaging: Reviewed radiology reports from this admission including: abdominal/pelvic CT    Recent Cultures (last 7 days):   Results from last 7 days   Lab Units 04/16/24  1831   C DIFF TOXIN B BY PCR  Negative       Last 24 Hours Medication List:   Current Facility-Administered Medications   Medication Dose Route Frequency Provider Last Rate    acetaminophen  650 mg Oral Q6H PRN Jenni Frazier PA-C      albuterol  1 puff Inhalation Q4H PRN Jenni Frazier PA-C      aspirin  81 mg Oral Daily Jenni Frazier PA-C      atorvastatin  20 mg Oral Daily With Dinner Jenni Frazier PA-C      DULoxetine  20 mg Oral Daily Jenni Frazier PA-C      heparin (porcine)  5,000 Units Subcutaneous Q8H [facility] Jenni Frazier PA-C      insulin lispro  1-6 Units Subcutaneous TID AC Jenni Frazier PA-C      loperamide  2 mg Oral TID PRN Juliana Partida PA-C      umeclidinium  1 puff Inhalation Daily Jenni Frazier PA-C      And    olodaterol HCl  2 puff Inhalation Daily Jenni Frazier PA-C      ondansetron  4 mg Intravenous Q6H PRN Jenni Frazier PA-C      pantoprazole  20 mg Oral Early Morning Jenni Frazier PA-C      pregabalin  100 mg Oral BID Jenni Frazier PA-C      saccharomyces boulardii  250 mg Oral BID Juliana Partida PA-C          Today, Patient Was Seen By: Juliana Partida PA-C    **Please Note: This note may have been constructed using a voice recognition system.**

## 2024-04-19 NOTE — OCCUPATIONAL THERAPY NOTE
Occupational Therapy Progress Note     Patient Name: Clay Marcial  Today's Date: 4/19/2024  Problem List  Principal Problem:    Acute on chronic kidney failure  (HCC)  Active Problems:    Diabetes mellitus (HCC)    CAD (coronary artery disease)    CHF (congestive heart failure) (HCC)    Functional diarrhea    Hyperkalemia    Helicobacter pylori infection       04/19/24 1003   Note Type   Note Type Treatment   Pain Assessment   Pain Assessment Tool 0-10   Pain Score No Pain   Restrictions/Precautions   Other Precautions Chair Alarm;Bed Alarm;Fall Risk   ADL   Where Assessed Chair   UB Bathing Assistance 5  Supervision/Setup   UB Bathing Deficit Setup;Increased time to complete   LB Bathing Assistance 5  Supervision/Setup   LB Bathing Deficit Setup;Verbal cueing;Increased time to complete   UB Dressing Assistance 5  Supervision/Setup   UB Dressing Deficit Setup;Verbal cueing;Increased time to complete   UB Dressing Comments Pt able to don/doff hospital gown provided set-up   LB Dressing Assistance 5  Supervision/Setup   LB Dressing Deficit Setup;Requires assistive device for steadying;Verbal cueing;Increased time to complete   LB Dressing Comments Pt able to doff/don socks while seated and thread briefs, clothing management while standing @ S   Toileting Assistance    (SBA)   Toileting Deficit Setup;Verbal cueing;Increased time to complete   Toileting Comments Pt able to void into toilet while standing with SBA for safety   Bed Mobility   Supine to Sit 6  Modified independent   Additional items HOB elevated   Additional Comments Pt supine in bed at beginning of session. Supine to sit @ Mod I   Transfers   Sit to Stand 5  Supervision   Additional items Increased time required;Verbal cues   Stand to Sit 5  Supervision   Additional items Increased time required;Verbal cues   Stand pivot   (SBA)   Additional items Increased time required;Verbal cues   Additional Comments STS from EOB and chair @ S. Pt able to  complete functional mobility around room and in hallway without AD @ SBA, with RW @ S. Pt seated OOB in chair at end of session with chair alarm intact, call bell within reach and all needs met.   Cognition   Overall Cognitive Status WFL   Arousal/Participation Alert;Responsive;Cooperative   Attention Within functional limits   Orientation Level Oriented X4   Memory Within functional limits   Following Commands Follows one step commands without difficulty   Activity Tolerance   Activity Tolerance Patient tolerated treatment well   Medical Staff Made Aware Spoke with PT Rm   Assessment   Assessment Pt completed OT tx session #2 focused on ADL performance and functional mobility. Pt alert and agreeable to participate. Pt demonstrated improvements in activity tolerance and functional mobility this session but continues fatigue easily and require rest breaks throughout. Pt currently completing UB ADLs @ S/set-up, LB ADLs @ S, and functional mobility with RW @ S. Pt demonstrating Good participation and Good potential to achieve goals but is currently demonstrating deficits in decrease activity tolerance, decrease standing balance, decrease performance during ADL tasks, decrease safety awareness , decrease generalized strength, decrease activity engagement, and decrease performance during functional transfers. Continue to recommend Level III: Minimum Resource Intensity Therapy upon discharge to increase safety and independence in ADL tasks and functional mobility.   Plan   Treatment Interventions ADL retraining;Functional transfer training   Goal Expiration Date 05/01/24   OT Treatment Day 2   OT Frequency 1-2x/wk   Discharge Recommendation   Rehab Resource Intensity Level, OT III (Minimum Resource Intensity)   AM-PAC Daily Activity Inpatient   Lower Body Dressing 3   Bathing 3   Toileting 3   Upper Body Dressing 4   Grooming 3   Eating 4   Daily Activity Raw Score 20   Daily Activity Standardized Score (Calc for Raw  Score >=11) 42.03   -[facility] Applied Cognition Inpatient   Following a Speech/Presentation 3   Understanding Ordinary Conversation 4   Taking Medications 3   Remembering Where Things Are Placed or Put Away 4   Remembering List of 4-5 Errands 2   Taking Care of Complicated Tasks 2   Applied Cognition Raw Score 18   Applied Cognition Standardized Score 38.07     The patient's raw score on the AM-PAC Daily Activity Inpatient Short Form is 20. A raw score of greater than or equal to 19 suggests the patient may benefit from discharge to home. Please refer to the recommendation of the Occupational Therapist for safe discharge planning.    Pt goals to be met by 5/1/2024    Pt will demonstrate ability to complete grooming/hygiene tasks @ Mod I after set-up.  Pt will demonstrate ability to complete supine<>sit @ Mod I in order to increase safety and independence during ADL tasks.  Pt will demonstrate ability to complete UB ADLs including washing/dressing @ Mod I in order to increase performance and participation during meaningful tasks  Pt will demonstrate ability to complete LB dressing @ Mod I in order to increase safety and independence during meaningful tasks.   Pt will demonstrate ability to milagros/doff socks/shoes while sitting EOB @ Mod I in order to increase safety and independence during meaningful tasks.   Pt will demonstrate ability to complete toileting tasks including CM and pericare @ Mod I in order to increase safety and independence during meaningful tasks.  Pt will demonstrate ability to complete EOB, chair, toilet/commode transfers @ Mod I in order to increase performance and participation during functional tasks.  Pt will demonstrate ability to stand for 5-8 minutes while maintaining Fair + balance with use of LRAD for UB support PRN.  Pt will demonstrate ability to tolerate 30-35 minute OT session with no vc'ing for deep breathing or use of energy conservation techniques in order to increase activity  tolerance during functional tasks.   Pt will demonstrate Good carryover of use of energy conservation/compensatory strategies during ADLs and functional tasks in order to increase safety and reduce risk for falls.   Pt will demonstrate Good attention and participation in continued evaluation of functional ambulation house hold distances in order to assist with safe d/c planning.  Pt will attend to continued cognitive assessments 100% of the time in order to provide most appropriate d/c recommendations.   Pt will follow 100% simple 2-step commands and be A&O x4 consistently with environmental cues to increase participation in functional activities.   Pt will identify 3 areas of interest/hobbies and 1 intervention on how to incorporate into daily life in order to increase interaction with environment and peers as well as increase participation in meaningful tasks.   Pt will demonstrate 100% carryover of BUE HEP in order to increase BUE MS and increase performance during functional tasks upon d/c home.    Dinorah Rodas, OTR/L

## 2024-04-19 NOTE — ASSESSMENT & PLAN NOTE
Wt Readings from Last 3 Encounters:   04/19/24 122 kg (269 lb 13.5 oz)   11/13/23 117 kg (257 lb 8 oz)     Patient with noted history of systolic CHF however no echo is available to review in chart review  Presenting with SUSHILA, hold diuretics, ACE  Can continue beta-blocker with blood pressure hold parameters  Received 2 L bolus in the ER, bicarb drip has since been stopped  Monitor intake and output and daily weights

## 2024-04-19 NOTE — ASSESSMENT & PLAN NOTE
"No results found for: \"HGBA1C\"    Recent Labs     04/18/24  1116 04/18/24  1606 04/18/24  2106 04/19/24  0711   POCGLU 204* 192* 165* 160*         Blood Sugar Average: Last 72 hrs:  (P) 175.2142554567233121  Not maintained on insulin as outpatient, hold oral regimen  Maintain on correctional scale and diabetic diet  Nephro recommending to hold SGLT2i indefinitely on discharge   "

## 2024-04-19 NOTE — PLAN OF CARE
Problem: PAIN - ADULT  Goal: Verbalizes/displays adequate comfort level or baseline comfort level  Description: Interventions:  - Encourage patient to monitor pain and request assistance  - Assess pain using appropriate pain scale  - Administer analgesics based on type and severity of pain and evaluate response  - Implement non-pharmacological measures as appropriate and evaluate response  - Consider cultural and social influences on pain and pain management  - Notify physician/advanced practitioner if interventions unsuccessful or patient reports new pain  Outcome: Progressing     Problem: INFECTION - ADULT  Goal: Absence or prevention of progression during hospitalization  Description: INTERVENTIONS:  - Assess and monitor for signs and symptoms of infection  - Monitor lab/diagnostic results  - Monitor all insertion sites, i.e. indwelling lines, tubes, and drains  - Monitor endotracheal if appropriate and nasal secretions for changes in amount and color  - [place] appropriate cooling/warming therapies per order  - Administer medications as ordered  - Instruct and encourage patient and family to use good hand hygiene technique  - Identify and instruct in appropriate isolation precautions for identified infection/condition  Outcome: Progressing  Goal: Absence of fever/infection during neutropenic period  Description: INTERVENTIONS:  - Monitor WBC    Outcome: Progressing     Problem: SAFETY ADULT  Goal: Patient will remain free of falls  Description: INTERVENTIONS:  - Educate patient/family on patient safety including physical limitations  - Instruct patient to call for assistance with activity   - Consult OT/PT to assist with strengthening/mobility   - Keep Call bell within reach  - Keep bed low and locked with side rails adjusted as appropriate  - Keep care items and personal belongings within reach  - Initiate and maintain comfort rounds  - Make Fall Risk Sign visible to staff  - Offer Toileting every 2 Hours,  in advance of need  - Initiate/Maintain bed alarm  - Obtain necessary fall risk management equipment: wqalker   - Apply yellow socks and bracelet for high fall risk patients  - Consider moving patient to room near nurses station  Outcome: Progressing  Goal: Maintain or return to baseline ADL function  Description: INTERVENTIONS:  -  Assess patient's ability to carry out ADLs; assess patient's baseline for ADL function and identify physical deficits which impact ability to perform ADLs (bathing, care of mouth/teeth, toileting, grooming, dressing, etc.)  - Assess/evaluate cause of self-care deficits   - Assess range of motion  - Assess patient's mobility; develop plan if impaired  - Assess patient's need for assistive devices and provide as appropriate  - Encourage maximum independence but intervene and supervise when necessary  - Involve family in performance of ADLs  - Assess for home care needs following discharge   - Consider OT consult to assist with ADL evaluation and planning for discharge  - Provide patient education as appropriate  Outcome: Progressing  Goal: Maintains/Returns to pre admission functional level  Description: INTERVENTIONS:  - Perform AM-PAC 6 Click Basic Mobility/ Daily Activity assessment daily.  - Set and communicate daily mobility goal to care team and patient/family/caregiver.   - Collaborate with rehabilitation services on mobility goals if consulted  - Perform Range of Motion 3 times a day.  - Reposition patient every 2 hours.  - Dangle patient 3 times a day  - Stand patient 3 times a day  - Ambulate patient 3 times a day  - Out of bed to chair 3 times a day   - Out of bed for meals 3 times a day  - Out of bed for toileting  - Record patient progress and toleration of activity level   Outcome: Progressing     Problem: DISCHARGE PLANNING  Goal: Discharge to home or other facility with appropriate resources  Description: INTERVENTIONS:  - Identify barriers to discharge w/patient and  caregiver  - Arrange for needed discharge resources and transportation as appropriate  - Identify discharge learning needs (meds, wound care, etc.)  - Arrange for interpretive services to assist at discharge as needed  - Refer to Case Management Department for coordinating discharge planning if the patient needs post-hospital services based on physician/advanced practitioner order or complex needs related to functional status, cognitive ability, or social support system  Outcome: Progressing     Problem: Knowledge Deficit  Goal: Patient/family/caregiver demonstrates understanding of disease process, treatment plan, medications, and discharge instructions  Description: Complete learning assessment and assess knowledge base.  Interventions:  - Provide teaching at level of understanding  - Provide teaching via preferred learning methods  Outcome: Progressing

## 2024-04-19 NOTE — PHYSICAL THERAPY NOTE
PHYSICAL THERAPY TREATMENT NOTE      NAME:  Clay Marcial  DATE: 04/19/24    Length Of Stay: 3  Performed at least 2 patient identifiers during session: Name and Birthday    TREATMENT FLOW SHEET:       04/19/24 1001   PT Last Visit   PT Visit Date 04/19/24   Note Type   Note Type Treatment   Pain Assessment   Pain Assessment Tool 0-10   Pain Score No Pain   Restrictions/Precautions   Weight Bearing Precautions Per Order No   Other Precautions Chair Alarm;Bed Alarm;Fall Risk   General   Chart Reviewed Yes   Response to Previous Treatment Patient with no complaints from previous session.   Family/Caregiver Present No   Cognition   Overall Cognitive Status WFL   Arousal/Participation Cooperative   Orientation Level Oriented X4   Bed Mobility   Supine to Sit 6  Modified independent   Transfers   Sit to Stand 5  Supervision   Stand to Sit 5  Supervision   Stand pivot 5  Supervision   Additional Comments No AD vs. RW   Ambulation/Elevation   Gait Assistance 5  Supervision   Assistive Device   (No AD vs. RW (longer distances))   Distance 260 ft using RW; 15 ft x 2 no AD   Stair Management Assistance 5  Supervision   Stair Management Technique Alternating pattern  (B rails vs. single rail vs. no rail)   Number of Stairs 8   Balance   Static Sitting Normal   Dynamic Sitting Good   Static Standing Good   Dynamic Standing Fair +   Ambulatory Fair +   Activity Tolerance   Activity Tolerance Patient tolerated treatment well   Medical Staff Made Aware OT Dinorah   Nurse Made Aware RN Amber   Assessment   Prognosis Good   Assessment Pt tolerates tx session well.  Interventions consisting of transfer training, gait training with and without AD, and stair negotiation.  Pt progresses in overall functional mobility status.  Pt states he is eager to D/C home without any complaints.  PT again educates pt on importance of getting a full railing installed on stairs at home.  No c/o feeling SOB, dizzy, or lightheaded throughout.    Barriers to Discharge None   Goals   STG Expiration Date 05/01/24   PT Treatment Day 2   Plan   Progress Progressing toward goals   PT Frequency 3-5x/wk   Discharge Recommendation   Rehab Resource Intensity Level, PT III (Minimum Resource Intensity)   Additional Comments using RW for outdoor community mobility   AM-PAC Basic Mobility Inpatient   Turning in Flat Bed Without Bedrails 4   Lying on Back to Sitting on Edge of Flat Bed Without Bedrails 4   Moving Bed to Chair 3   Standing Up From Chair Using Arms 3   Walk in Room 3   Climb 3-5 Stairs With Railing 3   Basic Mobility Inpatient Raw Score 20   Basic Mobility Standardized Score 43.99   [facility] Highest Level Of Mobility   -HLM Goal 6: Walk 10 steps or more   -HLM Achieved 8: Walk 250 feet or more   Education   Education Provided Mobility training   Patient Demonstrates acceptance/verbal understanding   End of Consult   Patient Position at End of Consult Bedside chair;Bed/Chair alarm activated;All needs within reach       The patient's AM-PAC Basic Mobility Inpatient Short Form Raw Score is 20. A Raw score of greater than 16 suggests the patient may benefit from discharge to home. Please also refer to the recommendation of the Physical Therapist for safe discharge planning.         Rm Granados, PT,DPT

## 2024-04-19 NOTE — ASSESSMENT & PLAN NOTE
Creatine on admission 5.1, Baseline creatinine 1.2-1.3, and as high as 1.8 in November 2023  Etiology likely dehydration due to nausea vomiting diarrhea, need to rule out toxicity from H. pylori cocktail?  Recently started end of last month  Imaging CT abdomen pelvis without hydronephrosis or signs of obstruction    Lab Results   Component Value Date    CREATININE 1.62 (H) 04/19/2024    CREATININE 2.40 (H) 04/18/2024     Hold ACE/ARB and diuretic therapy  Avoid hypotension, nephrotoxins, and NSAIDS if possible    Urine studies ordered   IV fluids - supplemented 2 L bolus in ED  Strict I & Os  Urinary retention protocol and bladder scan   Nephrology consult- stop fluids and monitor. Can give albumin in BP drops

## 2024-04-19 NOTE — ASSESSMENT & PLAN NOTE
Presented with mild hyperkalemia in setting of SUSHILA and metabolic acidosis  Start sodium bicarb and low potassium diet   Recheck BMP

## 2024-04-19 NOTE — PROGRESS NOTES
REQUIRED DOCUMENTATION:     1. This service was provided via Telemedicine.  2. Provider located at [place].  3. TeleMed provider: Ildefonso Prasad DO.  4. Identify all parties in room with patient during tele consult: none  5.Patient was then informed that this was a Telemedicine visit and that the exam was being conducted confidentially over secure lines. My office door was closed. No one else was in the room.  Patient acknowledged consent and understanding of privacy and security of the Telemedicine visit, and gave us permission to have the assistant stay in the room in order to assist with the history and to conduct the exam.  I informed the patient that I have reviewed their record in Epic and presented the opportunity for them to ask any questions regarding the visit today.  The patient agreed to participate.       NEPHROLOGY PROGRESS NOTE   Clay Marcial 68 y.o. male MRN: [id number]  Unit/Bed#: -01 Encounter: [id number]      ASSESSMENT & PLAN       68-year-old male with a history of COPD, diabetes, had a recent diagnosis of an H. pylori infection, coronary artery disease, congestive heart failure who presented with head and neck pain followed by nausea vomiting diarrhea complicated by acute kidney injury on chronic kidney disease and a euglycemic anion gap metabolic acidosis     Acute kidney injury present on admission  Patient's creatinine was 5.1 and is now trending down to 1.6  Patient's urinalysis was bland  Was on an SGLT2i inhibitor, ACE inhibitor loop diuretic, metformin, statin, tetracycline, PPI, metronidazole which likely contributed to patient's acute kidney injury  Good urine output may have a post ATN diuresis.  3 L out.  Appears euvolemic and diarrhea has improved so we will hold off on further intravenous fluids  CT of the chest abdomen pelvis without contrast shows unremarkable kidneys with no hydronephrosis unremarkable adrenal glands  Overall stable.  If blood pressure drops can  restart albumin.    CKD stage III with a baseline creatinine of 1.2-1.3  In the setting of congestive heart failure and type 2 diabetes hopefully creatinine will get to baseline  Continues to improve daily.    Anion gap metabolic acidosis  Metformin can cause a a type B acidosis, it can cause a type a acidosis with overdose.lactic acid level 1.8  Anion gap is closed  ABG shows bicarb of 7.2 and mild respirtory acidosis with his history of apnea but metabolic acidosis is present  Was on SGLT2i which can cause euglycemic DKA, beta hydroxybutarate normal  Urinalysis was bland however patient admits he was taking it.  Would hold SGLT2i's as of this point.  His acute kidney injury could have contributed to an anion gap acidosis as well    Hyperkalemia  Potassium is slightly higher at 5.7.  He does have some CKD we will start with sodium bicarbonate and a low potassium diet.  Maintain a bicarb of 24-26.  If stable, diarrhea improved and potassium still elevated tomorrow can give a dose of Lasix.  Or restart his home Lasix dose.  Given his H. pylori infection hesitating to place on a potassium binder    Congestive heart failure with coronary artery disease  Currently no signs of decompensation  Euvolemic    Type 2 diabetes mellitus  Avoid hypoglycemia        SUBJECTIVE:    Patient was seen today.  He offers no acute complaints.  No chest pain or shortness of breath no fevers or chills.  12 point review of systems was otherwise negative besides what is mentioned above.    Medications:    Current Facility-Administered Medications:     acetaminophen (TYLENOL) tablet 650 mg, 650 mg, Oral, Q6H PRN, Jenni Frazier PA-C, 650 mg at 04/16/24 2035    albuterol (PROVENTIL HFA,VENTOLIN HFA) inhaler 1 puff, 1 puff, Inhalation, Q4H PRN, Jenni Frazier PA-C    aspirin chewable tablet 81 mg, 81 mg, Oral, Daily, Jenni Frazier PA-C, 81 mg at 04/19/24 0800    atorvastatin (LIPITOR) tablet 20 mg, 20 mg, Oral, Daily With Dinner, Jenni  CLAUDINE Frazier, 20 mg at 04/18/24 1743    DULoxetine (CYMBALTA) delayed release capsule 20 mg, 20 mg, Oral, Daily, Jenni Frazier PA-C, 20 mg at 04/19/24 0800    heparin (porcine) subcutaneous injection 5,000 Units, 5,000 Units, Subcutaneous, Q8H EPHRAIM, Jenni Frazier PA-C, 5,000 Units at 04/19/24 0549    insulin lispro (HumALOG/ADMELOG) 100 units/mL subcutaneous injection 1-6 Units, 1-6 Units, Subcutaneous, TID AC, 1 Units at 04/19/24 0759 **AND** Fingerstick Glucose (POCT), , , TID AC, Jenni Frazier PA-C    loperamide (IMODIUM) capsule 2 mg, 2 mg, Oral, TID PRN, Juliana Partida PA-C, 2 mg at 04/18/24 1338    umeclidinium 62.5 mcg/actuation inhaler AEPB 1 puff, 1 puff, Inhalation, Daily **AND** olodaterol HCl (STRIVERDI RESPIMAT) inhaler 2 puff, 2 puff, Inhalation, Daily, Jenni Frazier PA-C    ondansetron (ZOFRAN) injection 4 mg, 4 mg, Intravenous, Q6H PRN, Jenni Frazier PA-C    pantoprazole (PROTONIX) EC tablet 20 mg, 20 mg, Oral, Early Morning, Jenni Frazier PA-C, 20 mg at 04/19/24 0549    pregabalin (LYRICA) capsule 100 mg, 100 mg, Oral, BID, Jenni Frazier PA-C, 100 mg at 04/19/24 0800    saccharomyces boulardii (FLORASTOR) capsule 250 mg, 250 mg, Oral, BID, Juliana Partida PA-C, 250 mg at 04/19/24 0800    OBJECTIVE:    Vitals:    04/18/24 1507 04/18/24 2246 04/19/24 0541 04/19/24 0742   BP: 129/69 137/61  110/55   Pulse: 94 89  82   Resp: 18 17 18   Temp: 97.9 °F (36.6 °C) 98.1 °F (36.7 °C)  97.7 °F (36.5 °C)   TempSrc:       SpO2: 95% 95%  91%   Weight:   122 kg (269 lb 13.5 oz)    Height:            Temp:  [97.7 °F (36.5 °C)-98.1 °F (36.7 °C)] 97.7 °F (36.5 °C)  HR:  [82-94] 82  Resp:  [17-18] 18  BP: (110-137)/(55-69) 110/55  SpO2:  [91 %-95 %] 91 %     Body mass index is 37.64 kg/m².    Weight (last 2 days)       Date/Time Weight    04/19/24 0541 122 (269.84)    04/18/24 1415 125 (275)    04/18/24 0555 124 (274.25)    04/17/24 0600 126 (277.8)            I/O last 3 completed shifts:  In: 3866.7 [P.O.:2760;  "I.V.:1106.7]  Out: 5100 [Urine:5100]    I/O this shift:  In: 120 [P.O.:120]  Out: 250 [Urine:250]      Physical exam:    General: no acute distress, cooperative  Eyes: conjunctivae pink, anicteric sclerae  ENT: lips and mucous membranes moist, no exudates, normal external ears  Neck: ROM intact, no JVD  Chest: No respiratory distress, no accessory muscle use  CVS: normal rate, non pericardial friction rub  Abdomen: soft, non-tender, non-distended, normoactive bowel sounds  Extremities: no edema of both legs  Skin: no rash  Neuro: awake, alert, oriented, grossly intact  Psych:  Pleasant affect    Invasive Devices:      Lab Results:   Results from last 7 days   Lab Units 04/19/24  0547 04/18/24  0451 04/17/24  0438 04/16/24  2117 04/16/24  1831 04/16/24  1115   WBC Thousand/uL 4.46 5.33 5.77  --   --  9.67   HEMOGLOBIN g/dL 12.7 13.0 12.7  --   --  14.9   HEMATOCRIT % 36.3* 37.1 37.4  --   --  43.4   PLATELETS Thousands/uL 99* 113* 119*  --   --  155   POTASSIUM mmol/L 5.7* 5.3 5.1 5.0  --  5.4*   CHLORIDE mmol/L 108 105 105 102  --  102   CO2 mmol/L 24 22 16* 15*  --  14*   BUN mg/dL 63* 87* 102* 102*  --  103*   CREATININE mg/dL 1.62* 2.40* 3.95* 4.41*  --  5.10*   CALCIUM mg/dL 9.0 8.4 8.0* 7.9*  --  8.4   MAGNESIUM mg/dL 2.1  --  1.8*  --   --  1.6*   PHOSPHORUS mg/dL  --   --  7.6*  --   --   --    ALK PHOS U/L  --   --   --   --   --  61   ALT U/L  --   --   --   --   --  18   AST U/L  --   --   --   --   --  13   NITRITE UA   --   --   --   --  Negative  --    LEUKOCYTES UA   --   --   --   --  Negative  --    BLOOD UA   --   --   --   --  Negative  --          Portions of the record may have been created with voice recognition software. Occasional wrong word or \"sound a like\" substitutions may have occurred due to the inherent limitations of voice recognition software. Read the chart carefully and recognize, using context, where substitutions have occurred.If you have any questions, please contact the " dictating provider.

## 2024-04-19 NOTE — PLAN OF CARE
Problem: PHYSICAL THERAPY ADULT  Goal: Performs mobility at highest level of function for planned discharge setting.  See evaluation for individualized goals.  Description: Treatment/Interventions: Functional transfer training, LE strengthening/ROM, Elevations, Therapeutic exercise, Endurance training, Patient/family training, Equipment eval/education, Gait training, Compensatory technique education, Spoke to nursing, OT         See flowsheet documentation for full assessment, interventions and recommendations.  Outcome: Progressing  Note: Prognosis: Good  Problem List: Decreased strength, Decreased endurance, Impaired balance, Decreased mobility, Obesity, Impaired sensation  Assessment: Pt tolerates tx session well.  Interventions consisting of transfer training, gait training with and without AD, and stair negotiation.  Pt progresses in overall functional mobility status.  Pt states he is eager to D/C home without any complaints.  PT again educates pt on importance of getting a full railing installed on stairs at home.  No c/o feeling SOB, dizzy, or lightheaded throughout.  Barriers to Discharge: None  Barriers to Discharge Comments: bed/bath on 2nd floor in which there is only 1 railing part way up to stairs to which the last few stairs have no rail  Rehab Resource Intensity Level, PT: III (Minimum Resource Intensity)    See flowsheet documentation for full assessment.

## 2024-04-19 NOTE — PLAN OF CARE
Problem: PAIN - ADULT  Goal: Verbalizes/displays adequate comfort level or baseline comfort level  Description: Interventions:  - Encourage patient to monitor pain and request assistance  - Assess pain using appropriate pain scale  - Administer analgesics based on type and severity of pain and evaluate response  - Implement non-pharmacological measures as appropriate and evaluate response  - Consider cultural and social influences on pain and pain management  - Notify physician/advanced practitioner if interventions unsuccessful or patient reports new pain  Outcome: Progressing     Problem: INFECTION - ADULT  Goal: Absence or prevention of progression during hospitalization  Description: INTERVENTIONS:  - Assess and monitor for signs and symptoms of infection  - Monitor lab/diagnostic results  - Monitor all insertion sites, i.e. indwelling lines, tubes, and drains  - Monitor endotracheal if appropriate and nasal secretions for changes in amount and color  - [place] appropriate cooling/warming therapies per order  - Administer medications as ordered  - Instruct and encourage patient and family to use good hand hygiene technique  - Identify and instruct in appropriate isolation precautions for identified infection/condition  Outcome: Progressing  Goal: Absence of fever/infection during neutropenic period  Description: INTERVENTIONS:  - Monitor WBC    Outcome: Progressing     Problem: SAFETY ADULT  Goal: Patient will remain free of falls  Description: INTERVENTIONS:  - Educate patient/family on patient safety including physical limitations  - Instruct patient to call for assistance with activity   - Consult OT/PT to assist with strengthening/mobility   - Keep Call bell within reach  - Keep bed low and locked with side rails adjusted as appropriate  - Keep care items and personal belongings within reach  - Initiate and maintain comfort rounds  - Make Fall Risk Sign visible to staff  - Offer Toileting every 2 Hours,  in advance of need  - Initiate/Maintain bed alarm  - Obtain necessary fall risk management equipment  - Apply yellow socks and bracelet for high fall risk patients  - Consider moving patient to room near nurses station  Outcome: Progressing  Goal: Maintain or return to baseline ADL function  Description: INTERVENTIONS:  -  Assess patient's ability to carry out ADLs; assess patient's baseline for ADL function and identify physical deficits which impact ability to perform ADLs (bathing, care of mouth/teeth, toileting, grooming, dressing, etc.)  - Assess/evaluate cause of self-care deficits   - Assess range of motion  - Assess patient's mobility; develop plan if impaired  - Assess patient's need for assistive devices and provide as appropriate  - Encourage maximum independence but intervene and supervise when necessary  - Involve family in performance of ADLs  - Assess for home care needs following discharge   - Consider OT consult to assist with ADL evaluation and planning for discharge  - Provide patient education as appropriate  Outcome: Progressing  Goal: Maintains/Returns to pre admission functional level  Description: INTERVENTIONS:  - Perform AM-PAC 6 Click Basic Mobility/ Daily Activity assessment daily.  - Set and communicate daily mobility goal to care team and patient/family/caregiver.   - Collaborate with rehabilitation services on mobility goals if consulted  - Perform Range of Motion 3 times a day.  - Reposition patient every 2 hours.  - Dangle patient 3 times a day  - Stand patient 3 times a day  - Ambulate patient 3 times a day  - Out of bed to chair 3 times a day   - Out of bed for meals 3 times a day  - Out of bed for toileting  - Record patient progress and toleration of activity level   Outcome: Progressing     Problem: DISCHARGE PLANNING  Goal: Discharge to home or other facility with appropriate resources  Description: INTERVENTIONS:  - Identify barriers to discharge w/patient and caregiver  -  Arrange for needed discharge resources and transportation as appropriate  - Identify discharge learning needs (meds, wound care, etc.)  - Arrange for interpretive services to assist at discharge as needed  - Refer to Case Management Department for coordinating discharge planning if the patient needs post-hospital services based on physician/advanced practitioner order or complex needs related to functional status, cognitive ability, or social support system  Outcome: Progressing     Problem: Knowledge Deficit  Goal: Patient/family/caregiver demonstrates understanding of disease process, treatment plan, medications, and discharge instructions  Description: Complete learning assessment and assess knowledge base.  Interventions:  - Provide teaching at level of understanding  - Provide teaching via preferred learning methods  Outcome: Progressing

## 2024-04-20 PROBLEM — K59.1 FUNCTIONAL DIARRHEA: Status: RESOLVED | Noted: 2024-04-16 | Resolved: 2024-04-20

## 2024-04-20 LAB
ANION GAP SERPL CALCULATED.3IONS-SCNC: 5 MMOL/L (ref 4–13)
ANION GAP SERPL CALCULATED.3IONS-SCNC: 6 MMOL/L (ref 4–13)
BUN SERPL-MCNC: 43 MG/DL (ref 5–25)
BUN SERPL-MCNC: 46 MG/DL (ref 5–25)
CALCIUM SERPL-MCNC: 9 MG/DL (ref 8.4–10.2)
CALCIUM SERPL-MCNC: 9.3 MG/DL (ref 8.4–10.2)
CHLORIDE SERPL-SCNC: 103 MMOL/L (ref 96–108)
CHLORIDE SERPL-SCNC: 106 MMOL/L (ref 96–108)
CO2 SERPL-SCNC: 25 MMOL/L (ref 21–32)
CO2 SERPL-SCNC: 27 MMOL/L (ref 21–32)
CREAT SERPL-MCNC: 1.42 MG/DL (ref 0.6–1.3)
CREAT SERPL-MCNC: 1.42 MG/DL (ref 0.6–1.3)
ERYTHROCYTE [DISTWIDTH] IN BLOOD BY AUTOMATED COUNT: 12.6 % (ref 11.6–15.1)
GFR SERPL CREATININE-BSD FRML MDRD: 49 ML/MIN/1.73SQ M
GFR SERPL CREATININE-BSD FRML MDRD: 49 ML/MIN/1.73SQ M
GLUCOSE SERPL-MCNC: 198 MG/DL (ref 65–140)
GLUCOSE SERPL-MCNC: 211 MG/DL (ref 65–140)
GLUCOSE SERPL-MCNC: 218 MG/DL (ref 65–140)
GLUCOSE SERPL-MCNC: 220 MG/DL (ref 65–140)
GLUCOSE SERPL-MCNC: 243 MG/DL (ref 65–140)
GLUCOSE SERPL-MCNC: 273 MG/DL (ref 65–140)
HCT VFR BLD AUTO: 37.3 % (ref 36.5–49.3)
HGB BLD-MCNC: 12.5 G/DL (ref 12–17)
MCH RBC QN AUTO: 32.2 PG (ref 26.8–34.3)
MCHC RBC AUTO-ENTMCNC: 33.5 G/DL (ref 31.4–37.4)
MCV RBC AUTO: 96 FL (ref 82–98)
PLATELET # BLD AUTO: 86 THOUSANDS/UL (ref 149–390)
PMV BLD AUTO: 10.6 FL (ref 8.9–12.7)
POTASSIUM SERPL-SCNC: 5.4 MMOL/L (ref 3.5–5.3)
POTASSIUM SERPL-SCNC: 5.4 MMOL/L (ref 3.5–5.3)
RBC # BLD AUTO: 3.88 MILLION/UL (ref 3.88–5.62)
SODIUM SERPL-SCNC: 135 MMOL/L (ref 135–147)
SODIUM SERPL-SCNC: 137 MMOL/L (ref 135–147)
WBC # BLD AUTO: 4.52 THOUSAND/UL (ref 4.31–10.16)

## 2024-04-20 PROCEDURE — 99232 SBSQ HOSP IP/OBS MODERATE 35: CPT | Performed by: NURSE PRACTITIONER

## 2024-04-20 PROCEDURE — 80048 BASIC METABOLIC PNL TOTAL CA: CPT

## 2024-04-20 PROCEDURE — 85027 COMPLETE CBC AUTOMATED: CPT

## 2024-04-20 PROCEDURE — 82948 REAGENT STRIP/BLOOD GLUCOSE: CPT

## 2024-04-20 PROCEDURE — 99232 SBSQ HOSP IP/OBS MODERATE 35: CPT

## 2024-04-20 RX ORDER — FUROSEMIDE 40 MG/1
40 TABLET ORAL DAILY
Status: DISCONTINUED | OUTPATIENT
Start: 2024-04-20 | End: 2024-04-21 | Stop reason: HOSPADM

## 2024-04-20 RX ADMIN — Medication 250 MG: at 08:31

## 2024-04-20 RX ADMIN — PREGABALIN 100 MG: 100 CAPSULE ORAL at 08:31

## 2024-04-20 RX ADMIN — INSULIN LISPRO 3 UNITS: 100 INJECTION, SOLUTION INTRAVENOUS; SUBCUTANEOUS at 11:53

## 2024-04-20 RX ADMIN — FUROSEMIDE 40 MG: 40 TABLET ORAL at 08:31

## 2024-04-20 RX ADMIN — DULOXETINE HYDROCHLORIDE 20 MG: 20 CAPSULE, DELAYED RELEASE ORAL at 08:31

## 2024-04-20 RX ADMIN — SODIUM ZIRCONIUM CYCLOSILICATE 10 G: 10 POWDER, FOR SUSPENSION ORAL at 15:30

## 2024-04-20 RX ADMIN — Medication 250 MG: at 17:00

## 2024-04-20 RX ADMIN — INSULIN LISPRO 4 UNITS: 100 INJECTION, SOLUTION INTRAVENOUS; SUBCUTANEOUS at 17:00

## 2024-04-20 RX ADMIN — HEPARIN SODIUM 5000 UNITS: 5000 INJECTION, SOLUTION INTRAVENOUS; SUBCUTANEOUS at 22:10

## 2024-04-20 RX ADMIN — SODIUM BICARBONATE 650 MG TABLET 325 MG: at 22:10

## 2024-04-20 RX ADMIN — HEPARIN SODIUM 5000 UNITS: 5000 INJECTION, SOLUTION INTRAVENOUS; SUBCUTANEOUS at 05:34

## 2024-04-20 RX ADMIN — PANTOPRAZOLE SODIUM 20 MG: 20 TABLET, DELAYED RELEASE ORAL at 05:35

## 2024-04-20 RX ADMIN — PREGABALIN 100 MG: 100 CAPSULE ORAL at 17:00

## 2024-04-20 RX ADMIN — ATORVASTATIN CALCIUM 20 MG: 20 TABLET, FILM COATED ORAL at 17:00

## 2024-04-20 RX ADMIN — SODIUM BICARBONATE 650 MG TABLET 325 MG: at 08:31

## 2024-04-20 RX ADMIN — ASPIRIN 81 MG CHEWABLE TABLET 81 MG: 81 TABLET CHEWABLE at 08:31

## 2024-04-20 RX ADMIN — HEPARIN SODIUM 5000 UNITS: 5000 INJECTION, SOLUTION INTRAVENOUS; SUBCUTANEOUS at 15:30

## 2024-04-20 RX ADMIN — INSULIN LISPRO 2 UNITS: 100 INJECTION, SOLUTION INTRAVENOUS; SUBCUTANEOUS at 08:19

## 2024-04-20 NOTE — ASSESSMENT & PLAN NOTE
Creatine on admission 5.1, Baseline creatinine 1.2-1.3, and as high as 1.8 in November 2023  Etiology likely dehydration due to nausea vomiting diarrhea, need to rule out toxicity from H. pylori cocktail?  Recently started end of last month  Imaging CT abdomen pelvis without hydronephrosis or signs of obstruction    Lab Results   Component Value Date    CREATININE 1.42 (H) 04/20/2024    CREATININE 1.62 (H) 04/19/2024     Hold ACE/ARB and diuretic therapy  Avoid hypotension, nephrotoxins, and NSAIDS if possible    Urine studies ordered   IV fluids - supplemented 2 L bolus in ED  Strict I & Os  Urinary retention protocol and bladder scan   Nephrology consult- stop fluids and monitor. Can give albumin in BP drops

## 2024-04-20 NOTE — ASSESSMENT & PLAN NOTE
Presented with mild hyperkalemia in setting of SUSHILA and metabolic acidosis  Start sodium bicarb and low potassium diet   Recheck BMP  Will give dose of lokelma x1  Resumed lasix 4/20

## 2024-04-20 NOTE — ASSESSMENT & PLAN NOTE
Wt Readings from Last 3 Encounters:   04/20/24 121 kg (267 lb 10.2 oz)   11/13/23 117 kg (257 lb 8 oz)     Patient with noted history of systolic CHF however no echo is available to review in chart review  Presenting with SUSHILA, hold diuretics, ACE  Received 2 L bolus in the ER, bicarb drip has since been stopped  Monitor intake and output and daily weights  Resumed lasix 4/20

## 2024-04-20 NOTE — ASSESSMENT & PLAN NOTE
Recently diagnosed by EGD, started on quadruple therapy 3/28 - 4/27  Bismuth subsalicylate 524 mg 4 times daily  Tetracycline 500 mg 4 times daily  Metronidazole 250 mg 4 times daily  Omeprazole 20 mg twice daily  Hold bismuth subsalicylate with metabolic acidosis and SUSHILA  Hold antibiotics with diarrhea starting post antibiotic initiation - c diff negative   Can continue omeprazole  Patient denies any abdominal pain, has been having diarrhea  Will stop on discharge - should follow up with GI

## 2024-04-20 NOTE — CASE MANAGEMENT
Case Management Discharge Planning Note    Patient name Clay Marcial  Location /-01 MRN [id number]  : 1955 Date 2024       Current Admission Date: 2024  Current Admission Diagnosis:Acute on chronic kidney failure  (HCC)   Patient Active Problem List    Diagnosis Date Noted    Diabetes mellitus (HCC) 2024    CAD (coronary artery disease) 2024    CHF (congestive heart failure) ([facility]) 2024    Acute on chronic kidney failure  (HCC) 2024    Functional diarrhea 2024    Hyperkalemia 2024    Helicobacter pylori infection 2024      LOS (days): 4  Geometric Mean LOS (GMLOS) (days): 3.1  Days to GMLOS:-0.7     OBJECTIVE:  Risk of Unplanned Readmission Score: 17.66         Current admission status: Inpatient   Preferred Pharmacy:   [facility]/pharmacy #[id number] [place], [place] - [address] [place] [id number]  Phone: [phone] Fax: [phone]    Primary Care Provider: No primary care provider on file.    Primary Insurance: [facility]  Secondary Insurance: MEDICARE    DISCHARGE DETAILS:    Per provider possible dc today.    Discharge planning discussed with:: patient  Freedom of Choice: Yes  Comments - Freedom of Choice: Discussed dc, patient is aware he is set up with [facility]              Treatment Team Recommendation: Home with Home Health Care  Discharge Destination Plan:: Home with Home Health Care  Transport at Discharge : Family                             IMM Given (Date):: 24  IMM Given to:: Patient      CM spoke to patient at the bedside, reviewed DC IMM with patient and informed that patient can stay an additional 4 hours for reconsidering appealing the discharge as the medicare rights were review on the day of discharge. Pt verbalized understanding and feels ready to go home and does not intend to stay 4 hours to reconsider.

## 2024-04-20 NOTE — PLAN OF CARE
Problem: PAIN - ADULT  Goal: Verbalizes/displays adequate comfort level or baseline comfort level  Description: Interventions:  - Encourage patient to monitor pain and request assistance  - Assess pain using appropriate pain scale  - Administer analgesics based on type and severity of pain and evaluate response  - Implement non-pharmacological measures as appropriate and evaluate response  - Consider cultural and social influences on pain and pain management  - Notify physician/advanced practitioner if interventions unsuccessful or patient reports new pain  Outcome: Progressing     Problem: INFECTION - ADULT  Goal: Absence or prevention of progression during hospitalization  Description: INTERVENTIONS:  - Assess and monitor for signs and symptoms of infection  - Monitor lab/diagnostic results  - Monitor all insertion sites, i.e. indwelling lines, tubes, and drains  - Monitor endotracheal if appropriate and nasal secretions for changes in amount and color  - [place] appropriate cooling/warming therapies per order  - Administer medications as ordered  - Instruct and encourage patient and family to use good hand hygiene technique  - Identify and instruct in appropriate isolation precautions for identified infection/condition  Outcome: Progressing     Problem: SAFETY ADULT  Goal: Patient will remain free of falls  Description: INTERVENTIONS:  - Educate patient/family on patient safety including physical limitations  - Instruct patient to call for assistance with activity   - Consult OT/PT to assist with strengthening/mobility   - Keep Call bell within reach  - Keep bed low and locked with side rails adjusted as appropriate  - Keep care items and personal belongings within reach    - Apply yellow socks and bracelet for high fall risk patients  - Consider moving patient to room near nurses station  Outcome: Progressing

## 2024-04-20 NOTE — PLAN OF CARE
Problem: PAIN - ADULT  Goal: Verbalizes/displays adequate comfort level or baseline comfort level  Description: Interventions:  - Encourage patient to monitor pain and request assistance  - Assess pain using appropriate pain scale  - Administer analgesics based on type and severity of pain and evaluate response  - Implement non-pharmacological measures as appropriate and evaluate response  - Consider cultural and social influences on pain and pain management  - Notify physician/advanced practitioner if interventions unsuccessful or patient reports new pain  Outcome: Progressing     Problem: INFECTION - ADULT  Goal: Absence or prevention of progression during hospitalization  Description: INTERVENTIONS:  - Assess and monitor for signs and symptoms of infection  - Monitor lab/diagnostic results  - Monitor all insertion sites, i.e. indwelling lines, tubes, and drains  - Monitor endotracheal if appropriate and nasal secretions for changes in amount and color  - [place] appropriate cooling/warming therapies per order  - Administer medications as ordered  - Instruct and encourage patient and family to use good hand hygiene technique  - Identify and instruct in appropriate isolation precautions for identified infection/condition  Outcome: Progressing  Goal: Absence of fever/infection during neutropenic period  Description: INTERVENTIONS:  - Monitor WBC    Outcome: Progressing     Problem: SAFETY ADULT  Goal: Patient will remain free of falls  Description: INTERVENTIONS:  - Educate patient/family on patient safety including physical limitations  - Instruct patient to call for assistance with activity   - Consult OT/PT to assist with strengthening/mobility   - Keep Call bell within reach  - Keep bed low and locked with side rails adjusted as appropriate  - Keep care items and personal belongings within reach  - Initiate and maintain comfort rounds  - Make Fall Risk Sign visible to staff  - Offer Toileting every 2 Hours,  in advance of need  - Initiate/Maintain bed/chair alarm  - Obtain necessary fall risk management equipment  - Apply yellow socks and bracelet for high fall risk patients  - Consider moving patient to room near nurses station  Outcome: Progressing  Goal: Maintain or return to baseline ADL function  Description: INTERVENTIONS:  -  Assess patient's ability to carry out ADLs; assess patient's baseline for ADL function and identify physical deficits which impact ability to perform ADLs (bathing, care of mouth/teeth, toileting, grooming, dressing, etc.)  - Assess/evaluate cause of self-care deficits   - Assess range of motion  - Assess patient's mobility; develop plan if impaired  - Assess patient's need for assistive devices and provide as appropriate  - Encourage maximum independence but intervene and supervise when necessary  - Involve family in performance of ADLs  - Assess for home care needs following discharge   - Consider OT consult to assist with ADL evaluation and planning for discharge  - Provide patient education as appropriate  Outcome: Progressing  Goal: Maintains/Returns to pre admission functional level  Description: INTERVENTIONS:  - Perform AM-PAC 6 Click Basic Mobility/ Daily Activity assessment daily.  - Set and communicate daily mobility goal to care team and patient/family/caregiver.   - Collaborate with rehabilitation services on mobility goals if consulted  - Reposition patient every 2 hours.  - Stand patient 3 times a day  - Ambulate patient 3 times a day  - Out of bed to chair 3 times a day   - Out of bed for meals 3 times a day  - Out of bed for toileting  - Record patient progress and toleration of activity level   Outcome: Progressing     Problem: DISCHARGE PLANNING  Goal: Discharge to home or other facility with appropriate resources  Description: INTERVENTIONS:  - Identify barriers to discharge w/patient and caregiver  - Arrange for needed discharge resources and transportation as  appropriate  - Identify discharge learning needs (meds, wound care, etc.)  - Arrange for interpretive services to assist at discharge as needed  - Refer to Case Management Department for coordinating discharge planning if the patient needs post-hospital services based on physician/advanced practitioner order or complex needs related to functional status, cognitive ability, or social support system  Outcome: Progressing     Problem: Knowledge Deficit  Goal: Patient/family/caregiver demonstrates understanding of disease process, treatment plan, medications, and discharge instructions  Description: Complete learning assessment and assess knowledge base.  Interventions:  - Provide teaching at level of understanding  - Provide teaching via preferred learning methods  Outcome: Progressing

## 2024-04-20 NOTE — PROGRESS NOTES
[facility]  Progress Note  Name: Clay Marcial I  MRN: [id number]  Unit/Bed#: -01 I Date of Admission: 4/16/2024   Date of Service: 4/20/2024 I Hospital Day: 4    Assessment/Plan   * Acute on chronic kidney failure  (HCC)  Assessment & Plan  Creatine on admission 5.1, Baseline creatinine 1.2-1.3, and as high as 1.8 in November 2023  Etiology likely dehydration due to nausea vomiting diarrhea, need to rule out toxicity from H. pylori cocktail?  Recently started end of last month  Imaging CT abdomen pelvis without hydronephrosis or signs of obstruction    Lab Results   Component Value Date    CREATININE 1.42 (H) 04/20/2024    CREATININE 1.62 (H) 04/19/2024     Hold ACE/ARB and diuretic therapy  Avoid hypotension, nephrotoxins, and NSAIDS if possible    Urine studies ordered   IV fluids - supplemented 2 L bolus in ED  Strict I & Os  Urinary retention protocol and bladder scan   Nephrology consult- stop fluids and monitor. Can give albumin in BP drops    Helicobacter pylori infection  Assessment & Plan  Recently diagnosed by EGD, started on quadruple therapy 3/28 - 4/27  Bismuth subsalicylate 524 mg 4 times daily  Tetracycline 500 mg 4 times daily  Metronidazole 250 mg 4 times daily  Omeprazole 20 mg twice daily  Hold bismuth subsalicylate with metabolic acidosis and SUSHILA  Hold antibiotics with diarrhea starting post antibiotic initiation - c diff negative   Can continue omeprazole  Patient denies any abdominal pain, has been having diarrhea  Will stop on discharge - should follow up with GI    Hyperkalemia  Assessment & Plan  Presented with mild hyperkalemia in setting of SUSHILA and metabolic acidosis  Start sodium bicarb and low potassium diet   Recheck BMP  Will give dose of lokelma x1  Resumed lasix 4/20    CHF (congestive heart failure) (HCC)  Assessment & Plan  Wt Readings from Last 3 Encounters:   04/20/24 121 kg (267 lb 10.2 oz)   11/13/23 117 kg (257 lb 8 oz)     Patient with  "noted history of systolic CHF however no echo is available to review in chart review  Presenting with SUSHILA, hold diuretics, ACE  Received 2 L bolus in the ER, bicarb drip has since been stopped  Monitor intake and output and daily weights  Resumed lasix 4/20    CAD (coronary artery disease)  Assessment & Plan  Denies any chest pain  EKG with sinus rhythm rate 80  Troponin negative  Continue aspirin, statin and beta-blocker    Diabetes mellitus (HCC)  Assessment & Plan  No results found for: \"HGBA1C\"    Recent Labs     04/19/24  1105 04/19/24  1647 04/19/24  2104 04/20/24  0758   POCGLU 228* 197* 212* 218*         Blood Sugar Average: Last 72 hrs:  (P) 195.7450056572163289  Not maintained on insulin as outpatient, hold oral regimen  Maintain on correctional scale and diabetic diet  Nephro recommending to hold SGLT2i indefinitely on discharge              VTE Pharmacologic Prophylaxis:   Moderate Risk (Score 3-4) - Pharmacological DVT Prophylaxis Ordered: heparin.    Mobility:   Basic Mobility Inpatient Raw Score: 20  JH-HLM Goal: 6: Walk 10 steps or more  JH-HLM Achieved: 8: Walk 250 feet ot more  JH-HLM Goal achieved. Continue to encourage appropriate mobility.    Patient Centered Rounds: I performed bedside rounds with nursing staff today.   Discussions with Specialists or Other Care Team Provider: nursing, cm and nephrology    Education and Discussions with Family / Patient: Patient declined call to .     Total Time Spent on Date of Encounter in care of patient: This time was spent on one or more of the following: performing physical exam; counseling and coordination of care; obtaining or reviewing history; documenting in the medical record; reviewing/ordering tests, medications or procedures; communicating with other healthcare professionals and discussing with patient's family/caregivers.    Current Length of Stay: 4 day(s)  Current Patient Status: Inpatient   Certification Statement: The patient " will continue to require additional inpatient hospital stay due to monitoring hyperkalemia   Discharge Plan: Anticipate discharge in 24-48 hrs to home with home services.    Code Status: Level 3 - DNAR and DNI    Subjective:   Patient states that he is feeling well today and looking forward to going home. Denies chest pain and shortness of breath.    Objective:     Vitals:   Temp (24hrs), Av °F (36.7 °C), Min:97.7 °F (36.5 °C), Max:98.4 °F (36.9 °C)    Temp:  [97.7 °F (36.5 °C)-98.4 °F (36.9 °C)] 97.9 °F (36.6 °C)  HR:  [81-97] 81  Resp:  [17-18] 18  BP: (121-147)/(63-71) 121/65  SpO2:  [95 %-97 %] 95 %  Body mass index is 37.33 kg/m².     Input and Output Summary (last 24 hours):     Intake/Output Summary (Last 24 hours) at 2024 1338  Last data filed at 2024 1253  Gross per 24 hour   Intake 1680 ml   Output 2850 ml   Net -1170 ml       Physical Exam:   Physical Exam  Vitals reviewed.   Constitutional:       General: He is not in acute distress.     Appearance: Normal appearance. He is obese. He is not ill-appearing.   HENT:      Head: Normocephalic and atraumatic.      Nose: Nose normal.      Mouth/Throat:      Mouth: Mucous membranes are moist.      Pharynx: Oropharynx is clear.   Eyes:      Extraocular Movements: Extraocular movements intact.      Conjunctiva/sclera: Conjunctivae normal.   Cardiovascular:      Rate and Rhythm: Normal rate and regular rhythm.      Pulses: Normal pulses.      Heart sounds: Normal heart sounds. No murmur heard.  Pulmonary:      Effort: Pulmonary effort is normal. No respiratory distress.      Breath sounds: Normal breath sounds. No wheezing.   Abdominal:      General: Abdomen is flat. Bowel sounds are normal. There is no distension.      Palpations: Abdomen is soft.      Tenderness: There is no abdominal tenderness. There is no guarding.   Musculoskeletal:         General: Normal range of motion.      Cervical back: Normal range of motion.      Right lower leg: No  edema.      Left lower leg: No edema.   Skin:     General: Skin is warm.   Neurological:      General: No focal deficit present.      Mental Status: He is alert and oriented to person, place, and time. Mental status is at baseline.      Motor: No weakness.   Psychiatric:         Mood and Affect: Mood normal.         Behavior: Behavior normal.         Thought Content: Thought content normal.         Judgment: Judgment normal.          Additional Data:     Labs:  Results from last 7 days   Lab Units 04/20/24  0521 04/17/24  0438 04/16/24  1115   WBC Thousand/uL 4.52   < > 9.67   HEMOGLOBIN g/dL 12.5   < > 14.9   HEMATOCRIT % 37.3   < > 43.4   PLATELETS Thousands/uL 86*   < > 155   SEGS PCT %  --   --  58   LYMPHO PCT %  --   --  22   MONO PCT %  --   --  9   EOS PCT %  --   --  11*    < > = values in this interval not displayed.     Results from last 7 days   Lab Units 04/20/24  1159 04/16/24  2117 04/16/24  1115   SODIUM mmol/L 135   < > 130*   POTASSIUM mmol/L 5.4*   < > 5.4*   CHLORIDE mmol/L 103   < > 102   CO2 mmol/L 27   < > 14*   BUN mg/dL 43*   < > 103*   CREATININE mg/dL 1.42*   < > 5.10*   ANION GAP mmol/L 5   < > 14*   CALCIUM mg/dL 9.3   < > 8.4   ALBUMIN g/dL  --   --  4.0   TOTAL BILIRUBIN mg/dL  --   --  0.81   ALK PHOS U/L  --   --  61   ALT U/L  --   --  18   AST U/L  --   --  13   GLUCOSE RANDOM mg/dL 220*   < > 84    < > = values in this interval not displayed.     Results from last 7 days   Lab Units 04/16/24  1115   INR  1.09     Results from last 7 days   Lab Units 04/20/24  1131 04/20/24  0758 04/19/24  2104 04/19/24  1647 04/19/24  1105 04/19/24  0711 04/18/24  2106 04/18/24  1606 04/18/24  1116 04/18/24  0724 04/17/24  2103 04/17/24  1558   POC GLUCOSE mg/dl 243* 218* 212* 197* 228* 160* 165* 192* 204* 212* 201* 242*         Results from last 7 days   Lab Units 04/16/24  1115   LACTIC ACID mmol/L 1.8       Lines/Drains:  Invasive Devices       Peripheral Intravenous Line  Duration              Peripheral IV 04/20/24 Right;Ventral (anterior) Forearm <1 day                          Imaging: No pertinent imaging reviewed.    Recent Cultures (last 7 days):   Results from last 7 days   Lab Units 04/16/24  1831   C DIFF TOXIN B BY PCR  Negative       Last 24 Hours Medication List:   Current Facility-Administered Medications   Medication Dose Route Frequency Provider Last Rate    acetaminophen  650 mg Oral Q6H PRN Jenni Frazier PA-C      albuterol  1 puff Inhalation Q4H PRN Jenni Frazier PA-C      aspirin  81 mg Oral Daily Jenni Frazier PA-C      atorvastatin  20 mg Oral Daily With Dinner Jenni Frazier PA-C      DULoxetine  20 mg Oral Daily Jenni Frazier, CLAUDINE      furosemide  40 mg Oral Daily Juliana Partida PA-C      heparin (porcine)  5,000 Units Subcutaneous Q8H EPHRAIM Jenni Frazier PA-C      insulin lispro  1-6 Units Subcutaneous TID AC Jenni Frazier PA-C      loperamide  2 mg Oral TID PRN Juliana Partida PA-C      umeclidinium  1 puff Inhalation Daily Jenni Frazier PA-C      And    olodaterol HCl  2 puff Inhalation Daily Jenni Frazier PA-C      ondansetron  4 mg Intravenous Q6H PRN Jenni Frazier PA-C      pantoprazole  20 mg Oral Early Morning Jenni Frazier PA-C      pregabalin  100 mg Oral BID Jenni Frazier PA-C      saccharomyces boulardii  250 mg Oral BID Juliana Partida PA-C      sodium bicarbonate  325 mg Oral BID Ildefonso Prasad DO          Today, Patient Was Seen By: Juliana Partida PA-C    **Please Note: This note may have been constructed using a voice recognition system.**

## 2024-04-20 NOTE — PROGRESS NOTES
NEPHROLOGY PROGRESS NOTE   Clay Marcial 68 y.o. male MRN: [id number]  Unit/Bed#: -01 Encounter: [id number]    Assessment/Plan:    67 yo man with CKD 3, COPD, T2DM, HF, CAD, recent diagnosis of H. pylori who presented with head and neck pain, N/V/D complicated by SUSHILA on CKD, euglycemic anion gap metabolic acidosis    1.  Acute kidney injury (POA) atop chronic kidney disease  Peak creatinine 5.1 mg/dL and nearing baseline 1.4 mg/dL today.  Etiology attributed to ATN.  UA bland.  Patient hide polypharmacy contributing to acute kidney injury including ACE inhibitor, diuretic, SGLT2 inhibitor, tetracycline, PPI, metformin.    Lasix resumed today.  Remains hyperkalemic-not safe for discharge at this time.  Treat with 10 g Lokelma in addition to Lasix.  Repeat BMP in AM.  Patient agreeable to plan  2.  Stage III chronic kidney disease with baseline creatinine 1.2-1.3 mg/dL  Patient states he follows with [facility] nephrology  3.  Anion gap metabolic acidosis  Beta hydroxybutyrate was normal.  Lactic acid was normal.  AGMA is resolved.  He is on low-dose bicarbonate supplementation to assist with hyperkalemia.  Wean as able  4.  Hyperkalemia  Continue low potassium diet.  Lasix resumed.  Give 1 dose Lokelma  5.  Congestive heart failure with coronary artery disease  Examines mildly hypervolemic but compensated.  Lasix restarted  6.  Type 2 diabetes mellitus  Hold SGLT2 inhibitor indefinitely      ROS  Examined sitting upright in bed.  States he always has 1 leg with swelling.  [facility]  Willing to stay overnight to lower potassium.  A complete 10 point review of systems have been performed and are otherwise negative.       Historical Information   Past Medical History:   Diagnosis Date    COPD (chronic obstructive pulmonary disease) (HCC)     Diabetes mellitus (HCC) 4/16/2024    Sleep apnea      Past Surgical History:   Procedure Laterality Date    BACK SURGERY       Social History  "  Social History     Substance and Sexual Activity   Alcohol Use Yes    Alcohol/week: 7.0 standard drinks of alcohol    Types: 7 Shots of liquor per week     Social History     Substance and Sexual Activity   Drug Use Never     Social History     Tobacco Use   Smoking Status Former    Current packs/day: 1.00    Types: Cigarettes   Smokeless Tobacco Never       Family History:   History reviewed. No pertinent family history.    Medications:  Pertinent medications were reviewed  Current Facility-Administered Medications   Medication Dose Route Frequency Provider Last Rate    acetaminophen  650 mg Oral Q6H PRN Jenni Frazier PA-C      albuterol  1 puff Inhalation Q4H PRN Jenni Frazier PA-C      aspirin  81 mg Oral Daily Jenni Frazier PA-C      atorvastatin  20 mg Oral Daily With Dinner Jenni Frazier PA-C      DULoxetine  20 mg Oral Daily Jenni Frazier PA-C      furosemide  40 mg Oral Daily Juliana Partida PA-C      heparin (porcine)  5,000 Units Subcutaneous Q8H EPHRAIM Jenni Frazier PA-C      insulin lispro  1-6 Units Subcutaneous TID AC Jenni Frazier PA-C      loperamide  2 mg Oral TID PRN Juliana Partida PA-C      umeclidinium  1 puff Inhalation Daily Jenni Frazier PA-C      And    olodaterol HCl  2 puff Inhalation Daily Jenni Frazier PA-C      ondansetron  4 mg Intravenous Q6H PRN Jenni Frazier PA-C      pantoprazole  20 mg Oral Early Morning Jenni Frazier PA-C      pregabalin  100 mg Oral BID Jenni Frazier PA-C      saccharomyces boulardii  250 mg Oral BID Juliana Partida PA-C      sodium bicarbonate  325 mg Oral BID Ildefonso Allenra, DO      Sodium Zirconium Cyclosilicate  10 g Oral Once Bridget Piña, CRNP           No Known Allergies      Vitals:   /65   Pulse 81   Temp 97.9 °F (36.6 °C)   Resp 18   Ht 5' 11\" (1.803 m)   Wt 121 kg (267 lb 10.2 oz)   SpO2 95%   BMI 37.33 kg/m²   Body mass index is 37.33 kg/m².  SpO2: 95 %,   SpO2 Activity: At Rest,   O2 Device: None (Room air)      Intake/Output Summary " "(Last 24 hours) at 4/20/2024 1453  Last data filed at 4/20/2024 1410  Gross per 24 hour   Intake 1680 ml   Output 3175 ml   Net -1495 ml     Invasive Devices       Peripheral Intravenous Line  Duration             Peripheral IV 04/20/24 Right;Ventral (anterior) Forearm <1 day                    Physical Exam  General: conscious, cooperative, in no acute distress  Eyes: conjunctivae pink, anicteric sclerae  ENT: lips and mucous membranes moist  Neck: supple, no JVD, no masses  Chest: Diminished breath sounds bilaterally, fine expiratory wheezing  CVS: S1 & S2, normal rate, regular rhythm  Abdomen: soft, non-tender, non-distended, normoactive bowel sounds obese  Extremities: +1 edema to left leg, no edema to right leg  Skin: no rash  Neuro: awake, alert, oriented      Diagnostic Data:  Lab: I have personally reviewed pertinent lab results.,   CBC:  Results from last 7 days   Lab Units 04/20/24  0521   WBC Thousand/uL 4.52   HEMOGLOBIN g/dL 12.5   HEMATOCRIT % 37.3   PLATELETS Thousands/uL 86*      CMP:   Lab Results   Component Value Date    SODIUM 135 04/20/2024    K 5.4 (H) 04/20/2024     04/20/2024    CO2 27 04/20/2024    BUN 43 (H) 04/20/2024    CREATININE 1.42 (H) 04/20/2024    CALCIUM 9.3 04/20/2024    EGFR 49 04/20/2024   ,   PT/INR: No results found for: \"PT\", \"INR\",   Magnesium: No components found for: \"MAG\",  Phosphorous: No results found for: \"PHOS\"    Microbiology:  @LABRCNTIP,(urinecx:7)@        ASHLEY Gordillo    Portions of the record may have been created with voice recognition software. Occasional wrong word or \"sound a like\" substitutions may have occurred due to the inherent limitations of voice recognition software. Read the chart carefully and recognize, using context, where substitutions have occurred.  "

## 2024-04-20 NOTE — ASSESSMENT & PLAN NOTE
"No results found for: \"HGBA1C\"    Recent Labs     04/19/24  1105 04/19/24  1647 04/19/24  2104 04/20/24  0758   POCGLU 228* 197* 212* 218*         Blood Sugar Average: Last 72 hrs:  (P) 195.7361634808633168  Not maintained on insulin as outpatient, hold oral regimen  Maintain on correctional scale and diabetic diet  Nephro recommending to hold SGLT2i indefinitely on discharge   "

## 2024-04-21 VITALS
HEART RATE: 100 BPM | SYSTOLIC BLOOD PRESSURE: 134 MMHG | BODY MASS INDEX: 37.31 KG/M2 | HEIGHT: 71 IN | WEIGHT: 266.54 LBS | RESPIRATION RATE: 18 BRPM | DIASTOLIC BLOOD PRESSURE: 73 MMHG | TEMPERATURE: 97.5 F | OXYGEN SATURATION: 95 %

## 2024-04-21 PROBLEM — E87.5 HYPERKALEMIA: Status: RESOLVED | Noted: 2024-04-16 | Resolved: 2024-04-21

## 2024-04-21 LAB
ANION GAP SERPL CALCULATED.3IONS-SCNC: 5 MMOL/L (ref 4–13)
BUN SERPL-MCNC: 41 MG/DL (ref 5–25)
CALCIUM SERPL-MCNC: 8.9 MG/DL (ref 8.4–10.2)
CHLORIDE SERPL-SCNC: 102 MMOL/L (ref 96–108)
CO2 SERPL-SCNC: 29 MMOL/L (ref 21–32)
CREAT SERPL-MCNC: 1.34 MG/DL (ref 0.6–1.3)
GFR SERPL CREATININE-BSD FRML MDRD: 53 ML/MIN/1.73SQ M
GLUCOSE SERPL-MCNC: 212 MG/DL (ref 65–140)
GLUCOSE SERPL-MCNC: 224 MG/DL (ref 65–140)
POTASSIUM SERPL-SCNC: 4.8 MMOL/L (ref 3.5–5.3)
SODIUM SERPL-SCNC: 136 MMOL/L (ref 135–147)

## 2024-04-21 PROCEDURE — 99239 HOSP IP/OBS DSCHRG MGMT >30: CPT

## 2024-04-21 PROCEDURE — 82948 REAGENT STRIP/BLOOD GLUCOSE: CPT

## 2024-04-21 PROCEDURE — 80048 BASIC METABOLIC PNL TOTAL CA: CPT

## 2024-04-21 RX ORDER — SODIUM BICARBONATE 325 MG/1
325 TABLET ORAL 2 TIMES DAILY
Qty: 30 TABLET | Refills: 0 | Status: SHIPPED | OUTPATIENT
Start: 2024-04-21 | End: 2024-04-22

## 2024-04-21 RX ADMIN — INSULIN LISPRO 2 UNITS: 100 INJECTION, SOLUTION INTRAVENOUS; SUBCUTANEOUS at 08:20

## 2024-04-21 RX ADMIN — Medication 250 MG: at 08:20

## 2024-04-21 RX ADMIN — SODIUM BICARBONATE 650 MG TABLET 325 MG: at 08:20

## 2024-04-21 RX ADMIN — HEPARIN SODIUM 5000 UNITS: 5000 INJECTION, SOLUTION INTRAVENOUS; SUBCUTANEOUS at 05:00

## 2024-04-21 RX ADMIN — PREGABALIN 100 MG: 100 CAPSULE ORAL at 08:20

## 2024-04-21 RX ADMIN — PANTOPRAZOLE SODIUM 20 MG: 20 TABLET, DELAYED RELEASE ORAL at 05:00

## 2024-04-21 RX ADMIN — FUROSEMIDE 40 MG: 40 TABLET ORAL at 08:20

## 2024-04-21 RX ADMIN — DULOXETINE HYDROCHLORIDE 20 MG: 20 CAPSULE, DELAYED RELEASE ORAL at 08:20

## 2024-04-21 RX ADMIN — ASPIRIN 81 MG CHEWABLE TABLET 81 MG: 81 TABLET CHEWABLE at 08:20

## 2024-04-21 NOTE — CASE MANAGEMENT
Case Management Discharge Planning Note    Patient name Clay Marcial  Location /-01 MRN [id number]  : 1955 Date 2024       Current Admission Date: 2024  Current Admission Diagnosis:Acute on chronic kidney failure  (HCC)   Patient Active Problem List    Diagnosis Date Noted    Diabetes mellitus (HCC) 2024    CAD (coronary artery disease) 2024    CHF (congestive heart failure) (HCC) 2024    Acute on chronic kidney failure  (HCC) 2024    Helicobacter pylori infection 2024      LOS (days): 5  Geometric Mean LOS (GMLOS) (days): 3.1  Days to GMLOS:-1.9     OBJECTIVE:  Risk of Unplanned Readmission Score: 18.34         Current admission status: Inpatient   Preferred Pharmacy:   [facility]/pharmacy #[id number] [place] [address]  Phone: [phone] Fax: [phone]    Primary Care Provider: No primary care provider on file.    Primary Insurance: [facility]  Secondary Insurance: MEDICARE    DISCHARGE DETAILS:        Patient is being dc today: [facility]  [facility] was forward to them as well.

## 2024-04-21 NOTE — ASSESSMENT & PLAN NOTE
Wt Readings from Last 3 Encounters:   04/21/24 121 kg (266 lb 8.6 oz)   11/13/23 117 kg (257 lb 8 oz)     Patient with noted history of systolic CHF however no echo is available to review in chart review  Presenting with SUSHILA, hold diuretics, ACE  Received 2 L bolus in the ER, bicarb drip has since been stopped  Monitor intake and output and daily weights  Resumed lasix 4/20

## 2024-04-21 NOTE — PLAN OF CARE
Problem: PAIN - ADULT  Goal: Verbalizes/displays adequate comfort level or baseline comfort level  Description: Interventions:  - Encourage patient to monitor pain and request assistance  - Assess pain using appropriate pain scale  - Administer analgesics based on type and severity of pain and evaluate response  - Implement non-pharmacological measures as appropriate and evaluate response  - Consider cultural and social influences on pain and pain management  - Notify physician/advanced practitioner if interventions unsuccessful or patient reports new pain  4/21/2024 1157 by Ameena Jensen RN  Outcome: Adequate for Discharge  4/21/2024 1107 by Ameena Jensen RN  Outcome: Progressing     Problem: INFECTION - ADULT  Goal: Absence or prevention of progression during hospitalization  Description: INTERVENTIONS:  - Assess and monitor for signs and symptoms of infection  - Monitor lab/diagnostic results  - Monitor all insertion sites, i.e. indwelling lines, tubes, and drains  - Monitor endotracheal if appropriate and nasal secretions for changes in amount and color  - [place] appropriate cooling/warming therapies per order  - Administer medications as ordered  - Instruct and encourage patient and family to use good hand hygiene technique  - Identify and instruct in appropriate isolation precautions for identified infection/condition  4/21/2024 1157 by Ameena Jensen RN  Outcome: Adequate for Discharge  4/21/2024 1107 by Ameena Jensen RN  Outcome: Progressing  Goal: Absence of fever/infection during neutropenic period  Description: INTERVENTIONS:  - Monitor WBC    4/21/2024 1157 by Ameena Jensen RN  Outcome: Adequate for Discharge  4/21/2024 1107 by Ameena Jensen RN  Outcome: Progressing     Problem: SAFETY ADULT  Goal: Patient will remain free of falls  Description: INTERVENTIONS:  - Educate patient/family on patient safety including physical limitations  - Instruct patient to call for assistance with  activity   - Consult OT/PT to assist with strengthening/mobility   - Keep Call bell within reach  - Keep bed low and locked with side rails adjusted as appropriate  - Keep care items and personal belongings within reach  - Initiate and maintain comfort rounds  - Make Fall Risk Sign visible to staff    - Apply yellow socks and bracelet for high fall risk patients  - Consider moving patient to room near nurses station  4/21/2024 1157 by Ameena Jensen RN  Outcome: Adequate for Discharge  4/21/2024 1107 by Ameena Jensen RN  Outcome: Progressing  Goal: Maintain or return to baseline ADL function  Description: INTERVENTIONS:  -  Assess patient's ability to carry out ADLs; assess patient's baseline for ADL function and identify physical deficits which impact ability to perform ADLs (bathing, care of mouth/teeth, toileting, grooming, dressing, etc.)  - Assess/evaluate cause of self-care deficits   - Assess range of motion  - Assess patient's mobility; develop plan if impaired  - Assess patient's need for assistive devices and provide as appropriate  - Encourage maximum independence but intervene and supervise when necessary  - Involve family in performance of ADLs  - Assess for home care needs following discharge   - Consider OT consult to assist with ADL evaluation and planning for discharge  - Provide patient education as appropriate  4/21/2024 1157 by Ameena Jensen RN  Outcome: Adequate for Discharge  4/21/2024 1107 by Ameena Jensen RN  Outcome: Progressing  Goal: Maintains/Returns to pre admission functional level  Description: INTERVENTIONS:  - Perform AM-PAC 6 Click Basic Mobility/ Daily Activity assessment daily.  - Set and communicate daily mobility goal to care team and patient/family/caregiver.   - Collaborate with rehabilitation services on mobility goals if consulted    - Out of bed for toileting  - Record patient progress and toleration of activity level   4/21/2024 1157 by Ameena Jensen  RN  Outcome: Adequate for Discharge  4/21/2024 1107 by Ameena Jensen RN  Outcome: Progressing     Problem: DISCHARGE PLANNING  Goal: Discharge to home or other facility with appropriate resources  Description: INTERVENTIONS:  - Identify barriers to discharge w/patient and caregiver  - Arrange for needed discharge resources and transportation as appropriate  - Identify discharge learning needs (meds, wound care, etc.)  - Arrange for interpretive services to assist at discharge as needed  - Refer to Case Management Department for coordinating discharge planning if the patient needs post-hospital services based on physician/advanced practitioner order or complex needs related to functional status, cognitive ability, or social support system  4/21/2024 1157 by Ameena Jensen RN  Outcome: Adequate for Discharge  4/21/2024 1107 by Ameena eJnsen RN  Outcome: Progressing     Problem: Knowledge Deficit  Goal: Patient/family/caregiver demonstrates understanding of disease process, treatment plan, medications, and discharge instructions  Description: Complete learning assessment and assess knowledge base.  Interventions:  - Provide teaching at level of understanding  - Provide teaching via preferred learning methods  4/21/2024 1157 by Ameena Jensen RN  Outcome: Adequate for Discharge  4/21/2024 1107 by Ameena Jensen RN  Outcome: Progressing

## 2024-04-21 NOTE — NURSING NOTE
"Patient discharging home today; patient refused 11am blood glucose check and 1130 insulin due to patient discharging and patient stating \"Im leaving\";  IV removed intact, no bleeding noted; AVS printed and reviewed with patient and understanding verbalized; patient ambulates to exits per his request accompanied by family member and this RN.  "

## 2024-04-21 NOTE — ASSESSMENT & PLAN NOTE
"No results found for: \"HGBA1C\"    Recent Labs     04/20/24  1131 04/20/24  1631 04/20/24  2054 04/21/24  0710   POCGLU 243* 273* 211* 224*         Blood Sugar Average: Last 72 hrs:  (P) 210.1252829641189908  Not maintained on insulin as outpatient, hold oral regimen  Maintain on correctional scale and diabetic diet  Nephro recommending to hold SGLT2i indefinitely on discharge   "

## 2024-04-21 NOTE — ASSESSMENT & PLAN NOTE
Creatine on admission 5.1, Baseline creatinine 1.2-1.3, and as high as 1.8 in November 2023  Etiology likely dehydration due to nausea vomiting diarrhea, need to rule out toxicity from H. pylori cocktail?  Recently started end of last month  Imaging CT abdomen pelvis without hydronephrosis or signs of obstruction    Lab Results   Component Value Date    CREATININE 1.34 (H) 04/21/2024    CREATININE 1.42 (H) 04/20/2024     resume ACE/ARB and diuretic therapy  Avoid hypotension, nephrotoxins, and NSAIDS if possible    IV fluids - supplemented 2 L bolus in ED  Strict I & Os  Bmp in 5 days

## 2024-04-21 NOTE — DISCHARGE SUMMARY
[facility]  Discharge- Clay Marcial 1955, 68 y.o. male MRN: [id number]  Unit/Bed#: -01 Encounter: [id number]  Primary Care Provider: No primary care provider on file.   Date and time admitted to hospital: 4/16/2024 11:07 AM    * Acute on chronic kidney failure  (HCC)  Assessment & Plan  Creatine on admission 5.1, Baseline creatinine 1.2-1.3, and as high as 1.8 in November 2023  Etiology likely dehydration due to nausea vomiting diarrhea, need to rule out toxicity from H. pylori cocktail?  Recently started end of last month  Imaging CT abdomen pelvis without hydronephrosis or signs of obstruction    Lab Results   Component Value Date    CREATININE 1.34 (H) 04/21/2024    CREATININE 1.42 (H) 04/20/2024     resume ACE/ARB and diuretic therapy  Avoid hypotension, nephrotoxins, and NSAIDS if possible    IV fluids - supplemented 2 L bolus in ED  Strict I & Os  Bmp in 5 days    Helicobacter pylori infection  Assessment & Plan  Recently diagnosed by EGD, started on quadruple therapy 3/28 - 4/27  Bismuth subsalicylate 524 mg 4 times daily  Tetracycline 500 mg 4 times daily  Metronidazole 250 mg 4 times daily  Omeprazole 20 mg twice daily  Hold bismuth subsalicylate with metabolic acidosis and SUSHILA  Hold antibiotics with diarrhea starting post antibiotic initiation - c diff negative   Can continue omeprazole  Patient denies any abdominal pain, has been having diarrhea  Will stop on discharge - should follow up with GI    CHF (congestive heart failure) ([facility])  Assessment & Plan  Wt Readings from Last 3 Encounters:   04/21/24 121 kg (266 lb 8.6 oz)   11/13/23 117 kg (257 lb 8 oz)     Patient with noted history of systolic CHF however no echo is available to review in chart review  Presenting with SUSHILA, hold diuretics, ACE  Received 2 L bolus in the ER, bicarb drip has since been stopped  Monitor intake and output and daily weights  Resumed lasix 4/20    CAD (coronary artery  "disease)  Assessment & Plan  Denies any chest pain  EKG with sinus rhythm rate 80  Troponin negative  Continue aspirin, statin and beta-blocker    Diabetes mellitus (HCC)  Assessment & Plan  No results found for: \"HGBA1C\"    Recent Labs     04/20/24  1131 04/20/24  1631 04/20/24  2054 04/21/24  0710   POCGLU 243* 273* 211* 224*         Blood Sugar Average: Last 72 hrs:  (P) 210.2006505394744106  Not maintained on insulin as outpatient, hold oral regimen  Maintain on correctional scale and diabetic diet  Nephro recommending to hold SGLT2i indefinitely on discharge     Hyperkalemia-resolved as of 4/21/2024  Assessment & Plan  Presented with mild hyperkalemia in setting of SUSHILA and metabolic acidosis  Start sodium bicarb and low potassium diet   Recheck BMP  Will give dose of lokelma x1  Resumed lasix 4/20      Medical Problems       Resolved Problems  Date Reviewed: 4/21/2024            Resolved    Metabolic acidosis 4/18/2024     Resolved by  Juliana Partida PA-C    Functional diarrhea 4/20/2024     Resolved by  Juliana Partida PA-C    Hyperkalemia 4/21/2024     Resolved by  Juliana Partida PA-C    Headache 4/18/2024     Resolved by  Juliana Partida PA-C        Discharging Physician / Practitioner: Juliana Partida PA-C  PCP: No primary care provider on file.  Admission Date:   Admission Orders (From admission, onward)       Ordered        04/16/24 1259  INPATIENT ADMISSION  Once                          Discharge Date: 04/21/24    Consultations During Hospital Stay:  Nephrology     Procedures Performed:   None     Significant Findings / Test Results:   Chest x-ray with no acute cardiopulmonary disease  CT head with no acute intracranial normality.  Chronic microangiopathic changes  CT abdomen and pelvis with no hydronephrosis or nephrolithiasis.  No evidence for bowel obstruction.  Hepatic steatosis and cholelithiasis with no signs of acute cholecystitis.    Incidental Findings:   None      Test Results Pending at Discharge " "(will require follow up):   None      Outpatient Tests Requested:  None     Complications:  none     Reason for Admission: SUSHILA on CKD    Hospital Course:   Clay Marcial is a 68 y.o. male patient who originally presented to the hospital on 4/16/2024 due to SUSHILA.  Patient was taking quadruple therapy for H. pylori along with all other medications and developed diarrhea which resulted in SUSHILA on CKD.  Nephrology was consulted and patient was treated with IV fluids and cessation of H. pylori medications.  Patient's creatinine returned to baseline at time of discharge.  Patient with intermittent hyperkalemia and was treated with Lokelma.  Patient's diarrhea resolved with addition of Florastor and 1 dose of Imodium after C. difficile and stool enteric resulted negative.  Patient is to follow-up with his GI for H. pylori.  Patient is also to follow-up with nephrologist for management of creatinine.    Please see above list of diagnoses and related plan for additional information.     Condition at Discharge: good    Discharge Day Visit / Exam:   Subjective: Patient states that he is feeling well today and looking forward to going home.  Denies chest pain or shortness of breath.  Does not offer any further complaints at this time.  Vitals: Blood Pressure: 134/73 (04/21/24 0713)  Pulse: 100 (04/21/24 0713)  Temperature: 97.5 °F (36.4 °C) (04/21/24 0713)  Temp Source: Temporal (04/18/24 1415)  Respirations: 18 (04/21/24 0713)  Height: 5' 11\" (180.3 cm) (04/18/24 1416)  Weight - Scale: 121 kg (266 lb 8.6 oz) (04/21/24 0600)  SpO2: 95 % (04/21/24 0713)  Exam:   Physical Exam  Vitals reviewed.   Constitutional:       General: He is not in acute distress.     Appearance: Normal appearance. He is obese. He is not ill-appearing.   HENT:      Head: Normocephalic and atraumatic.      Nose: Nose normal.      Mouth/Throat:      Mouth: Mucous membranes are moist.      Pharynx: Oropharynx is clear.   Eyes:      Extraocular Movements: " Extraocular movements intact.      Conjunctiva/sclera: Conjunctivae normal.   Cardiovascular:      Rate and Rhythm: Normal rate and regular rhythm.      Pulses: Normal pulses.      Heart sounds: Normal heart sounds. No murmur heard.  Pulmonary:      Effort: Pulmonary effort is normal. No respiratory distress.      Breath sounds: Normal breath sounds. No wheezing.   Abdominal:      General: Abdomen is flat. Bowel sounds are normal. There is no distension.      Palpations: Abdomen is soft.      Tenderness: There is no abdominal tenderness. There is no guarding.   Musculoskeletal:         General: Normal range of motion.      Cervical back: Normal range of motion.      Right lower leg: No edema.      Left lower leg: No edema.   Skin:     General: Skin is warm.   Neurological:      General: No focal deficit present.      Mental Status: He is alert and oriented to person, place, and time. Mental status is at baseline.      Motor: No weakness.   Psychiatric:         Mood and Affect: Mood normal.         Behavior: Behavior normal.         Thought Content: Thought content normal.         Judgment: Judgment normal.          Discussion with Family: Patient declined call to .     Discharge instructions/Information to patient and family:   See after visit summary for information provided to patient and family.      Provisions for Follow-Up Care:  See after visit summary for information related to follow-up care and any pertinent home health orders.      Mobility at time of Discharge:   Basic Mobility Inpatient Raw Score: 23  JH-HLM Goal: 7: Walk 25 feet or more  JH-HLM Achieved: 8: Walk 250 feet ot more  HLM Goal achieved. Continue to encourage appropriate mobility.     Disposition:   Home with [facility]NA Services (Reminder: Complete face to face encounter)    Planned Readmission: none     Discharge Statement:  I spent 45 minutes discharging the patient. This time was spent on the day of discharge. I had direct contact  with the patient on the day of discharge. Greater than 50% of the total time was spent examining patient, answering all patient questions, arranging and discussing plan of care with patient as well as directly providing post-discharge instructions.  Additional time then spent on discharge activities.    Discharge Medications:  See after visit summary for reconciled discharge medications provided to patient and/or family.      **Please Note: This note may have been constructed using a voice recognition system**

## 2024-04-21 NOTE — PLAN OF CARE
Problem: DISCHARGE PLANNING  Goal: Discharge to home or other facility with appropriate resources  Description: INTERVENTIONS:  - Identify barriers to discharge w/patient and caregiver  - Arrange for needed discharge resources and transportation as appropriate  - Identify discharge learning needs (meds, wound care, etc.)  - Arrange for interpretive services to assist at discharge as needed  - Refer to Case Management Department for coordinating discharge planning if the patient needs post-hospital services based on physician/advanced practitioner order or complex needs related to functional status, cognitive ability, or social support system  Outcome: Progressing     Problem: PAIN - ADULT  Goal: Verbalizes/displays adequate comfort level or baseline comfort level  Description: Interventions:  - Encourage patient to monitor pain and request assistance  - Assess pain using appropriate pain scale  - Administer analgesics based on type and severity of pain and evaluate response  - Implement non-pharmacological measures as appropriate and evaluate response  - Consider cultural and social influences on pain and pain management  - Notify physician/advanced practitioner if interventions unsuccessful or patient reports new pain  Outcome: Progressing

## 2024-04-22 RX ORDER — SODIUM BICARBONATE 325 MG/1
325 TABLET ORAL 2 TIMES DAILY
Qty: 30 TABLET | Refills: 0 | Status: SHIPPED | OUTPATIENT
Start: 2024-04-22

## 2024-04-22 NOTE — CASE MANAGEMENT
Case Management Progress Note    Patient name Clay Marcial  Location /-01 MRN [id number]  : 1955 Date 2024       LOS (days): 5  Geometric Mean LOS (GMLOS) (days): 3.1  Days to GMLOS:-1.9        OBJECTIVE:        Current admission status: Inpatient  Preferred Pharmacy:   [facility]/pharmacy #[id number] - [place], [place] - [address] [address]  Phone: [phone] Fax: [phone]    [facility] - [place], [place] - [address]  [address] [facility] [address]  Phone: [phone] Fax: [phone]    Primary Care Provider: No primary care provider on file.    Primary Insurance: [facility]  Secondary Insurance: MEDICARE    PROGRESS NOTE:      Received a call from Delia ( from the [facility]) about pt Clay Marcial  1955 is requesting a callback at [phone] ext [phone].   Pt did not  his Sodium Bicarb tablets at the [facility].  CM requested [facility] [place]- provider will escribe

## 2024-04-25 ENCOUNTER — LAB REQUISITION (OUTPATIENT)
Dept: LAB | Facility: HOSPITAL | Age: 69
End: 2024-04-25
Payer: OTHER GOVERNMENT

## 2024-04-25 DIAGNOSIS — N17.9 ACUTE KIDNEY FAILURE, UNSPECIFIED (HCC): ICD-10-CM

## 2024-04-25 LAB
ANION GAP SERPL CALCULATED.3IONS-SCNC: 7 MMOL/L (ref 4–13)
BUN SERPL-MCNC: 25 MG/DL (ref 5–25)
CALCIUM SERPL-MCNC: 8.7 MG/DL (ref 8.4–10.2)
CHLORIDE SERPL-SCNC: 102 MMOL/L (ref 96–108)
CO2 SERPL-SCNC: 27 MMOL/L (ref 21–32)
CREAT SERPL-MCNC: 1.31 MG/DL (ref 0.6–1.3)
GFR SERPL CREATININE-BSD FRML MDRD: 54 ML/MIN/1.73SQ M
GLUCOSE SERPL-MCNC: 334 MG/DL (ref 65–140)
POTASSIUM SERPL-SCNC: 4.3 MMOL/L (ref 3.5–5.3)
SODIUM SERPL-SCNC: 136 MMOL/L (ref 135–147)

## 2024-04-25 PROCEDURE — 80048 BASIC METABOLIC PNL TOTAL CA: CPT | Performed by: PHYSICIAN ASSISTANT

## 2024-05-18 ENCOUNTER — HOSPITAL ENCOUNTER (EMERGENCY)
Facility: HOSPITAL | Age: 69
Discharge: HOME | End: 2024-05-18
Attending: EMERGENCY MEDICINE
Payer: COMMERCIAL

## 2024-05-18 ENCOUNTER — APPOINTMENT (EMERGENCY)
Dept: CT IMAGING | Facility: HOSPITAL | Age: 69
End: 2024-05-18
Payer: COMMERCIAL

## 2024-05-18 VITALS
RESPIRATION RATE: 22 BRPM | BODY MASS INDEX: 39.97 KG/M2 | HEIGHT: 71 IN | SYSTOLIC BLOOD PRESSURE: 112 MMHG | HEART RATE: 92 BPM | DIASTOLIC BLOOD PRESSURE: 60 MMHG | OXYGEN SATURATION: 97 % | TEMPERATURE: 98.1 F | WEIGHT: 285.5 LBS

## 2024-05-18 DIAGNOSIS — R31.9 HEMATURIA: Primary | ICD-10-CM

## 2024-05-18 DIAGNOSIS — E83.42 HYPOMAGNESEMIA: ICD-10-CM

## 2024-05-18 LAB
ALBUMIN SERPL BCP-MCNC: 4.1 G/DL (ref 3.5–5)
ALP SERPL-CCNC: 65 U/L (ref 34–104)
ALT SERPL W P-5'-P-CCNC: 35 U/L (ref 7–52)
ANION GAP SERPL CALCULATED.3IONS-SCNC: 15 MMOL/L (ref 4–13)
AST SERPL W P-5'-P-CCNC: 23 U/L (ref 13–39)
BACTERIA UR QL AUTO: NORMAL /HPF
BASOPHILS # BLD AUTO: 0.05 THOUSANDS/ÂΜL (ref 0–0.1)
BASOPHILS NFR BLD AUTO: 1 % (ref 0–1)
BILIRUB SERPL-MCNC: 0.93 MG/DL (ref 0.2–1)
BILIRUB UR QL STRIP: NEGATIVE
BUN SERPL-MCNC: 19 MG/DL (ref 5–25)
CALCIUM SERPL-MCNC: 7.4 MG/DL (ref 8.4–10.2)
CHLORIDE SERPL-SCNC: 98 MMOL/L (ref 96–108)
CLARITY UR: CLEAR
CO2 SERPL-SCNC: 20 MMOL/L (ref 21–32)
COLOR UR: YELLOW
CREAT SERPL-MCNC: 1.38 MG/DL (ref 0.6–1.3)
EOSINOPHIL # BLD AUTO: 0.34 THOUSAND/ÂΜL (ref 0–0.61)
EOSINOPHIL NFR BLD AUTO: 6 % (ref 0–6)
ERYTHROCYTE [DISTWIDTH] IN BLOOD BY AUTOMATED COUNT: 12.7 % (ref 11.6–15.1)
GFR SERPL CREATININE-BSD FRML MDRD: 51 ML/MIN/1.73SQ M
GLUCOSE SERPL-MCNC: 323 MG/DL (ref 65–140)
GLUCOSE UR STRIP-MCNC: ABNORMAL MG/DL
HCT VFR BLD AUTO: 35.2 % (ref 36.5–49.3)
HGB BLD-MCNC: 12.2 G/DL (ref 12–17)
HGB UR QL STRIP.AUTO: NEGATIVE
IMM GRANULOCYTES # BLD AUTO: 0.02 THOUSAND/UL (ref 0–0.2)
IMM GRANULOCYTES NFR BLD AUTO: 0 % (ref 0–2)
KETONES UR STRIP-MCNC: NEGATIVE MG/DL
LEUKOCYTE ESTERASE UR QL STRIP: ABNORMAL
LYMPHOCYTES # BLD AUTO: 1.61 THOUSANDS/ÂΜL (ref 0.6–4.47)
LYMPHOCYTES NFR BLD AUTO: 29 % (ref 14–44)
MAGNESIUM SERPL-MCNC: 1.3 MG/DL (ref 1.9–2.7)
MCH RBC QN AUTO: 31.9 PG (ref 26.8–34.3)
MCHC RBC AUTO-ENTMCNC: 34.7 G/DL (ref 31.4–37.4)
MCV RBC AUTO: 92 FL (ref 82–98)
MONOCYTES # BLD AUTO: 0.51 THOUSAND/ÂΜL (ref 0.17–1.22)
MONOCYTES NFR BLD AUTO: 9 % (ref 4–12)
NEUTROPHILS # BLD AUTO: 3.04 THOUSANDS/ÂΜL (ref 1.85–7.62)
NEUTS SEG NFR BLD AUTO: 55 % (ref 43–75)
NITRITE UR QL STRIP: NEGATIVE
NON-SQ EPI CELLS URNS QL MICRO: NORMAL /HPF
NRBC BLD AUTO-RTO: 0 /100 WBCS
PH UR STRIP.AUTO: 5.5 [PH]
PLATELET # BLD AUTO: 156 THOUSANDS/UL (ref 149–390)
PMV BLD AUTO: 10.3 FL (ref 8.9–12.7)
POTASSIUM SERPL-SCNC: 4.3 MMOL/L (ref 3.5–5.3)
PROT SERPL-MCNC: 6.7 G/DL (ref 6.4–8.4)
PROT UR STRIP-MCNC: NEGATIVE MG/DL
RBC # BLD AUTO: 3.82 MILLION/UL (ref 3.88–5.62)
RBC #/AREA URNS AUTO: NORMAL /HPF
SODIUM SERPL-SCNC: 133 MMOL/L (ref 135–147)
SP GR UR STRIP.AUTO: 1.01 (ref 1–1.03)
UROBILINOGEN UR QL STRIP.AUTO: 0.2 E.U./DL
WBC # BLD AUTO: 5.57 THOUSAND/UL (ref 4.31–10.16)
WBC #/AREA URNS AUTO: NORMAL /HPF

## 2024-05-18 PROCEDURE — 36415 COLL VENOUS BLD VENIPUNCTURE: CPT | Performed by: EMERGENCY MEDICINE

## 2024-05-18 PROCEDURE — 83735 ASSAY OF MAGNESIUM: CPT | Performed by: EMERGENCY MEDICINE

## 2024-05-18 PROCEDURE — 99283 EMERGENCY DEPT VISIT LOW MDM: CPT

## 2024-05-18 PROCEDURE — 99284 EMERGENCY DEPT VISIT MOD MDM: CPT | Performed by: EMERGENCY MEDICINE

## 2024-05-18 PROCEDURE — 85025 COMPLETE CBC W/AUTO DIFF WBC: CPT | Performed by: EMERGENCY MEDICINE

## 2024-05-18 PROCEDURE — 81001 URINALYSIS AUTO W/SCOPE: CPT | Performed by: EMERGENCY MEDICINE

## 2024-05-18 PROCEDURE — 96366 THER/PROPH/DIAG IV INF ADDON: CPT

## 2024-05-18 PROCEDURE — 74176 CT ABD & PELVIS W/O CONTRAST: CPT

## 2024-05-18 PROCEDURE — 80053 COMPREHEN METABOLIC PANEL: CPT | Performed by: EMERGENCY MEDICINE

## 2024-05-18 PROCEDURE — 96365 THER/PROPH/DIAG IV INF INIT: CPT

## 2024-05-18 RX ORDER — CALCIUM CARBONATE/VITAMIN D3 500-10/5ML
400 LIQUID (ML) ORAL DAILY
Qty: 10 TABLET | Refills: 0 | Status: SHIPPED | OUTPATIENT
Start: 2024-05-18 | End: 2024-05-28

## 2024-05-18 RX ORDER — MAGNESIUM SULFATE HEPTAHYDRATE 40 MG/ML
2 INJECTION, SOLUTION INTRAVENOUS ONCE
Status: COMPLETED | OUTPATIENT
Start: 2024-05-18 | End: 2024-05-18

## 2024-05-18 RX ADMIN — MAGNESIUM SULFATE HEPTAHYDRATE 2 G: 40 INJECTION, SOLUTION INTRAVENOUS at 14:03

## 2024-05-18 NOTE — ED PROVIDER NOTES
History  Chief Complaint   Patient presents with    Blood in Urine     Patient states had dark red blood in urine x2 this am.  Denies pain or burning with urination.  States has frequency. Denies fevers     Patient was recently in the hospital for SUSHILA.  Now complains of dark red blood in his urine x 2 this morning.  Now better.  No dysuria or frequency.  No trauma.  No blood thinners.  No nausea or vomiting or diarrhea.  Eating and drinking well.  No abdominal pain.  No flank pain.  No history of same.      History provided by:  Patient   used: No    Blood in Urine  This is a new problem. The current episode started today. The problem has been resolved since onset. He describes the hematuria as gross hematuria. He describes his urine color as dark red. Irritative symptoms do not include frequency, nocturia or urgency. Obstructive symptoms do not include dribbling, an intermittent stream or a weak stream. Pertinent negatives include no abdominal pain, chills, dysuria, fever, flank pain, nausea, urinary retention or vomiting.       Prior to Admission Medications   Prescriptions Last Dose Informant Patient Reported? Taking?   DULoxetine (CYMBALTA) 20 mg capsule   Yes No   Sig: Take 20 mg by mouth daily   albuterol (PROVENTIL HFA,VENTOLIN HFA) 90 mcg/act inhaler   Yes No   Sig: Inhale 1 puff every 4 (four) hours as needed for shortness of breath or wheezing   aspirin 81 mg chewable tablet   Yes No   Sig: Chew 81 mg daily   atorvastatin (LIPITOR) 20 mg tablet   Yes No   Sig: Take 20 mg by mouth daily   cadexomer iodine (IODOSORB) 0.9 % gel   Yes No   Sig: Apply 1 Application topically daily To promote healing of ulcers/wounds   cetirizine (ZyrTEC) 5 MG tablet   Yes No   Sig: Take 5 mg by mouth daily   furosemide (LASIX) 40 mg tablet   Yes No   Sig: Take 40 mg by mouth daily   isosorbide mononitrate (IMDUR) 30 mg 24 hr tablet   Yes No   Sig: Take 30 mg by mouth   lisinopril (ZESTRIL) 20 mg tablet   Yes  No   Sig: Take 20 mg by mouth daily   metFORMIN (GLUCOPHAGE-XR) 500 mg 24 hr tablet   Yes No   Sig: Take 1,000 mg by mouth 2 (two) times a day with meals   metoprolol succinate (TOPROL-XL) 50 mg 24 hr tablet   Yes No   Sig: Take 50 mg by mouth daily   omeprazole (PriLOSEC OTC) 20 MG tablet   Yes No   Sig: Take 20 mg by mouth 2 (two) times a day   pregabalin (LYRICA) 200 MG capsule   Yes No   Sig: Take 200 mg by mouth 2 (two) times a day   sodium bicarbonate 325 MG tablet   No No   Sig: Take 1 tablet (325 mg total) by mouth 2 (two) times a day   tiotropium-olodaterol (STIOLTO RESPIMAT) 2.5-2.5 MCG/ACT inhaler   Yes No   Sig: Inhale 2 puffs daily      Facility-Administered Medications: None       Past Medical History:   Diagnosis Date    COPD (chronic obstructive pulmonary disease) ([facility])     Diabetes mellitus ([facility]) 04/16/2024    Sleep apnea     UTI (urinary tract infection)        Past Surgical History:   Procedure Laterality Date    BACK SURGERY         History reviewed. No pertinent family history.  I have reviewed and agree with the history as documented.    E-Cigarette/Vaping    E-Cigarette Use Never User      E-Cigarette/Vaping Substances     Social History     Tobacco Use    Smoking status: Former     Current packs/day: 1.00     Types: Cigarettes    Smokeless tobacco: Never   Vaping Use    Vaping status: Never Used   Substance Use Topics    Alcohol use: Yes     Alcohol/week: 7.0 standard drinks of alcohol     Types: 7 Shots of liquor per week    Drug use: Never       Review of Systems   Constitutional:  Negative for chills and fever.   HENT:  Negative for ear pain, hearing loss, sore throat, trouble swallowing and voice change.    Eyes:  Negative for pain and discharge.   Respiratory:  Negative for cough, shortness of breath and wheezing.    Cardiovascular:  Negative for chest pain and palpitations.   Gastrointestinal:  Negative for abdominal pain, blood in stool, constipation, diarrhea, nausea and vomiting.    Genitourinary:  Positive for hematuria. Negative for dysuria, flank pain, frequency, nocturia and urgency.   Musculoskeletal:  Negative for joint swelling, neck pain and neck stiffness.   Skin:  Negative for rash and wound.   Neurological:  Negative for dizziness, seizures, syncope, facial asymmetry and headaches.   Psychiatric/Behavioral:  Negative for hallucinations, self-injury and suicidal ideas.    All other systems reviewed and are negative.      Physical Exam  Physical Exam  Vitals and nursing note reviewed.   Constitutional:       General: He is not in acute distress.     Appearance: He is well-developed.   HENT:      Head: Normocephalic and atraumatic.      Right Ear: External ear normal.      Left Ear: External ear normal.   Eyes:      General: No scleral icterus.        Right eye: No discharge.         Left eye: No discharge.      Extraocular Movements: Extraocular movements intact.      Conjunctiva/sclera: Conjunctivae normal.   Cardiovascular:      Rate and Rhythm: Normal rate and regular rhythm.      Heart sounds: Normal heart sounds. No murmur heard.  Pulmonary:      Effort: Pulmonary effort is normal.      Breath sounds: Normal breath sounds. No wheezing or rales.   Abdominal:      General: Bowel sounds are normal. There is no distension.      Palpations: Abdomen is soft.      Tenderness: There is no abdominal tenderness. There is no guarding or rebound.   Musculoskeletal:         General: No deformity. Normal range of motion.      Cervical back: Normal range of motion and neck supple.      Right lower leg: Edema present.      Left lower leg: Edema present.      Comments: Bilateral lower extremity edema left greater than right.  This is not new.   Skin:     General: Skin is warm and dry.      Findings: No rash.   Neurological:      General: No focal deficit present.      Mental Status: He is alert and oriented to person, place, and time.      Cranial Nerves: No cranial nerve deficit.   Psychiatric:          Mood and Affect: Mood normal.         Behavior: Behavior normal.         Thought Content: Thought content normal.         Judgment: Judgment normal.         Vital Signs  ED Triage Vitals [05/18/24 1305]   Temperature Pulse Respirations Blood Pressure SpO2   98.1 °F (36.7 °C) 101 22 157/80 96 %      Temp Source Heart Rate Source Patient Position - Orthostatic VS BP Location FiO2 (%)   Temporal Monitor Sitting Right arm --      Pain Score       No Pain           Vitals:    05/18/24 1305 05/18/24 1400   BP: 157/80 112/60   Pulse: 101 92   Patient Position - Orthostatic VS: Sitting Sitting         Visual Acuity      ED Medications  Medications   magnesium sulfate 2 g/50 mL IVPB (premix) 2 g (2 g Intravenous New Bag 5/18/24 1403)       Diagnostic Studies  Results Reviewed       Procedure Component Value Units Date/Time    Comprehensive metabolic panel [[id number]]  (Abnormal) Collected: 05/18/24 1325    Lab Status: Final result Specimen: Blood from Arm, Left Updated: 05/18/24 1351     Sodium 133 mmol/L      Potassium 4.3 mmol/L      Chloride 98 mmol/L      CO2 20 mmol/L      ANION GAP 15 mmol/L      BUN 19 mg/dL      Creatinine 1.38 mg/dL      Glucose 323 mg/dL      Calcium 7.4 mg/dL      AST 23 U/L      ALT 35 U/L      Alkaline Phosphatase 65 U/L      Total Protein 6.7 g/dL      Albumin 4.1 g/dL      Total Bilirubin 0.93 mg/dL      eGFR 51 ml/min/1.73sq m     Narrative:      [employer] guidelines for Chronic Kidney Disease (CKD):     Stage 1 with normal or high GFR (GFR > 90 mL/min/1.73 square meters)    Stage 2 Mild CKD (GFR = 60-89 mL/min/1.73 square meters)    Stage 3A Moderate CKD (GFR = 45-59 mL/min/1.73 square meters)    Stage 3B Moderate CKD (GFR = 30-44 mL/min/1.73 square meters)    Stage 4 Severe CKD (GFR = 15-29 mL/min/1.73 square meters)    Stage 5 End Stage CKD (GFR <15 mL/min/1.73 square meters)  Note: GFR calculation is accurate only with a steady state creatinine     Magnesium [[id number]]  (Abnormal) Collected: 05/18/24 1325    Lab Status: Final result Specimen: Blood from Arm, Left Updated: 05/18/24 1351     Magnesium 1.3 mg/dL     Urine Microscopic [[id number]]  (Normal) Collected: 05/18/24 1325    Lab Status: Final result Specimen: Urine, Clean Catch Updated: 05/18/24 1349     RBC, UA None Seen /hpf      WBC, UA 1-2 /hpf      Epithelial Cells Occasional /hpf      Bacteria, UA None Seen /hpf     CBC and differential [[id number]]  (Abnormal) Collected: 05/18/24 1325    Lab Status: Final result Specimen: Blood from Arm, Left Updated: 05/18/24 1341     WBC 5.57 Thousand/uL      RBC 3.82 Million/uL      Hemoglobin 12.2 g/dL      Hematocrit 35.2 %      MCV 92 fL      MCH 31.9 pg      MCHC 34.7 g/dL      RDW 12.7 %      MPV 10.3 fL      Platelets 156 Thousands/uL      nRBC 0 /100 WBCs      Segmented % 55 %      Immature Grans % 0 %      Lymphocytes % 29 %      Monocytes % 9 %      Eosinophils Relative 6 %      Basophils Relative 1 %      Absolute Neutrophils 3.04 Thousands/µL      Absolute Immature Grans 0.02 Thousand/uL      Absolute Lymphocytes 1.61 Thousands/µL      Absolute Monocytes 0.51 Thousand/µL      Eosinophils Absolute 0.34 Thousand/µL      Basophils Absolute 0.05 Thousands/µL     UA w Reflex to Microscopic w Reflex to Culture [[id number]]  (Abnormal) Collected: 05/18/24 1325    Lab Status: Final result Specimen: Urine, Clean Catch Updated: 05/18/24 1339     Color, UA Yellow     Clarity, UA Clear     Specific Gravity, UA 1.010     pH, UA 5.5     Leukocytes, UA Trace     Nitrite, UA Negative     Protein, UA Negative mg/dl      Glucose, UA >=1000 (1%) mg/dl      Ketones, UA Negative mg/dl      Urobilinogen, UA 0.2 E.U./dl      Bilirubin, UA Negative     Occult Blood, UA Negative                   CT abdomen pelvis wo contrast   Final Result by E. Alec Schoenberger, MD (05/18 4314)   No stones, hydronephrosis or other acute intra-abdominal pathology.   .      Workstation  performed: [id number]                    Procedures  Procedures         ED Course  ED Course as of 05/18/24 1439   Sat May 18, 2024   1358 Patient with a history of low magnesium.  Will give a dose here.                               SBIRT 20yo+      Flowsheet Row Most Recent Value   Initial Alcohol Screen: US AUDIT-C     1. How often do you have a drink containing alcohol? 5 Filed at: 05/18/2024 1332   2. How many drinks containing alcohol do you have on a typical day you are drinking?  1 Filed at: 05/18/2024 1332   3b. FEMALE Any Age, or MALE 65+: How often do you have 4 or more drinks on one occassion? 0 Filed at: 05/18/2024 1332   Audit-C Score 6 Filed at: 05/18/2024 1332   DEB: How many times in the past year have you...    Used an illegal drug or used a prescription medication for non-medical reasons? Never Filed at: 05/18/2024 1332                      Medical Decision Making  Amount and/or Complexity of Data Reviewed  Labs: ordered.  Radiology: ordered.    Risk  OTC drugs.  Prescription drug management.             Disposition  Final diagnoses:   Hematuria - By history   Hypomagnesemia     Time reflects when diagnosis was documented in both MDM as applicable and the Disposition within this note       Time User Action Codes Description Comment    5/18/2024  1:59 PM Nikos Becerra Add [R31.9] Hematuria     5/18/2024  1:59 PM Nikos Becerra Modify [R31.9] Hematuria By history    5/18/2024  1:59 PM Nikos Becerra Add [E83.42] Hypomagnesemia           ED Disposition       ED Disposition   Discharge    Condition   Stable    Date/Time   Sat May 18, 2024 1437    Comment   Clay Marcial discharge to home/self care.                   Follow-up Information    None         Patient's Medications   Discharge Prescriptions    MAGNESIUM OXIDE 400 MG CAPS    Take 1 tablet (400 mg total) by mouth in the morning for 10 days       Start Date: 5/18/2024 End Date: 5/28/2024       Order Dose: 400 mg       Quantity: 10  tablet    Refills: 0       No discharge procedures on file.    PDMP Review       None            ED Provider  Electronically Signed by             Nikos Becerra MD  05/18/24 8141

## 2024-08-27 ENCOUNTER — HOSPITAL ENCOUNTER (EMERGENCY)
Facility: HOSPITAL | Age: 69
Discharge: HOME | End: 2024-08-27
Attending: STUDENT IN AN ORGANIZED HEALTH CARE EDUCATION/TRAINING PROGRAM
Payer: COMMERCIAL

## 2024-08-27 ENCOUNTER — APPOINTMENT (EMERGENCY)
Dept: RADIOLOGY | Facility: HOSPITAL | Age: 69
End: 2024-08-27
Payer: COMMERCIAL

## 2024-08-27 VITALS
SYSTOLIC BLOOD PRESSURE: 112 MMHG | HEIGHT: 71 IN | HEART RATE: 90 BPM | DIASTOLIC BLOOD PRESSURE: 60 MMHG | BODY MASS INDEX: 39.17 KG/M2 | RESPIRATION RATE: 23 BRPM | TEMPERATURE: 98.1 F | WEIGHT: 279.76 LBS | OXYGEN SATURATION: 96 %

## 2024-08-27 DIAGNOSIS — R07.9 CHEST PAIN, UNSPECIFIED TYPE: Primary | ICD-10-CM

## 2024-08-27 LAB
ALBUMIN SERPL BCG-MCNC: 4 G/DL (ref 3.5–5)
ALP SERPL-CCNC: 55 U/L (ref 34–104)
ALT SERPL W P-5'-P-CCNC: 23 U/L (ref 7–52)
ANION GAP SERPL CALCULATED.3IONS-SCNC: 11 MMOL/L (ref 4–13)
AST SERPL W P-5'-P-CCNC: 17 U/L (ref 13–39)
ATRIAL RATE: 97 BPM
BASOPHILS # BLD AUTO: 0.05 THOUSANDS/ÂΜL (ref 0–0.1)
BASOPHILS NFR BLD AUTO: 1 % (ref 0–1)
BILIRUB SERPL-MCNC: 0.61 MG/DL (ref 0.2–1)
BUN SERPL-MCNC: 9 MG/DL (ref 5–25)
CALCIUM SERPL-MCNC: 8.6 MG/DL (ref 8.4–10.2)
CARDIAC TROPONIN I PNL SERPL HS: 7 NG/L (ref 8–18)
CHLORIDE SERPL-SCNC: 100 MMOL/L (ref 96–108)
CO2 SERPL-SCNC: 22 MMOL/L (ref 21–32)
CREAT SERPL-MCNC: 1.01 MG/DL (ref 0.6–1.3)
D DIMER PPP FEU-MCNC: 0.3 UG/ML FEU
EOSINOPHIL # BLD AUTO: 0.25 THOUSAND/ÂΜL (ref 0–0.61)
EOSINOPHIL NFR BLD AUTO: 4 % (ref 0–6)
ERYTHROCYTE [DISTWIDTH] IN BLOOD BY AUTOMATED COUNT: 12.7 % (ref 11.6–15.1)
GFR SERPL CREATININE-BSD FRML MDRD: 75 ML/MIN/1.73SQ M
GLUCOSE SERPL-MCNC: 227 MG/DL (ref 65–140)
HCT VFR BLD AUTO: 39.4 % (ref 36.5–49.3)
HGB BLD-MCNC: 13.5 G/DL (ref 12–17)
IMM GRANULOCYTES # BLD AUTO: 0.02 THOUSAND/UL (ref 0–0.2)
IMM GRANULOCYTES NFR BLD AUTO: 0 % (ref 0–2)
LIPASE SERPL-CCNC: 50 U/L (ref 11–82)
LYMPHOCYTES # BLD AUTO: 1.65 THOUSANDS/ÂΜL (ref 0.6–4.47)
LYMPHOCYTES NFR BLD AUTO: 28 % (ref 14–44)
MCH RBC QN AUTO: 31.8 PG (ref 26.8–34.3)
MCHC RBC AUTO-ENTMCNC: 34.3 G/DL (ref 31.4–37.4)
MCV RBC AUTO: 93 FL (ref 82–98)
MONOCYTES # BLD AUTO: 0.41 THOUSAND/ÂΜL (ref 0.17–1.22)
MONOCYTES NFR BLD AUTO: 7 % (ref 4–12)
NEUTROPHILS # BLD AUTO: 3.62 THOUSANDS/ÂΜL (ref 1.85–7.62)
NEUTS SEG NFR BLD AUTO: 60 % (ref 43–75)
NRBC BLD AUTO-RTO: 0 /100 WBCS
P AXIS: 40 DEGREES
PLATELET # BLD AUTO: 175 THOUSANDS/UL (ref 149–390)
PMV BLD AUTO: 9.8 FL (ref 8.9–12.7)
POTASSIUM SERPL-SCNC: 4.2 MMOL/L (ref 3.5–5.3)
PR INTERVAL: 194 MS
PROT SERPL-MCNC: 6.8 G/DL (ref 6.4–8.4)
QRS AXIS: -23 DEGREES
QRSD INTERVAL: 82 MS
QT INTERVAL: 320 MS
QTC INTERVAL: 406 MS
RBC # BLD AUTO: 4.24 MILLION/UL (ref 3.88–5.62)
SODIUM SERPL-SCNC: 133 MMOL/L (ref 135–147)
T WAVE AXIS: 30 DEGREES
VENTRICULAR RATE: 97 BPM
WBC # BLD AUTO: 6 THOUSAND/UL (ref 4.31–10.16)

## 2024-08-27 PROCEDURE — 36415 COLL VENOUS BLD VENIPUNCTURE: CPT | Performed by: STUDENT IN AN ORGANIZED HEALTH CARE EDUCATION/TRAINING PROGRAM

## 2024-08-27 PROCEDURE — 93005 ELECTROCARDIOGRAM TRACING: CPT

## 2024-08-27 PROCEDURE — 80053 COMPREHEN METABOLIC PANEL: CPT | Performed by: STUDENT IN AN ORGANIZED HEALTH CARE EDUCATION/TRAINING PROGRAM

## 2024-08-27 PROCEDURE — 93010 ELECTROCARDIOGRAM REPORT: CPT | Performed by: INTERNAL MEDICINE

## 2024-08-27 PROCEDURE — 85025 COMPLETE CBC W/AUTO DIFF WBC: CPT | Performed by: STUDENT IN AN ORGANIZED HEALTH CARE EDUCATION/TRAINING PROGRAM

## 2024-08-27 PROCEDURE — 96374 THER/PROPH/DIAG INJ IV PUSH: CPT

## 2024-08-27 PROCEDURE — 84484 ASSAY OF TROPONIN QUANT: CPT | Performed by: STUDENT IN AN ORGANIZED HEALTH CARE EDUCATION/TRAINING PROGRAM

## 2024-08-27 PROCEDURE — 71045 X-RAY EXAM CHEST 1 VIEW: CPT

## 2024-08-27 PROCEDURE — 83690 ASSAY OF LIPASE: CPT | Performed by: STUDENT IN AN ORGANIZED HEALTH CARE EDUCATION/TRAINING PROGRAM

## 2024-08-27 PROCEDURE — 99285 EMERGENCY DEPT VISIT HI MDM: CPT

## 2024-08-27 PROCEDURE — 85379 FIBRIN DEGRADATION QUANT: CPT | Performed by: STUDENT IN AN ORGANIZED HEALTH CARE EDUCATION/TRAINING PROGRAM

## 2024-08-27 PROCEDURE — 99285 EMERGENCY DEPT VISIT HI MDM: CPT | Performed by: STUDENT IN AN ORGANIZED HEALTH CARE EDUCATION/TRAINING PROGRAM

## 2024-08-27 RX ORDER — KETOROLAC TROMETHAMINE 30 MG/ML
30 INJECTION, SOLUTION INTRAMUSCULAR; INTRAVENOUS ONCE
Status: COMPLETED | OUTPATIENT
Start: 2024-08-27 | End: 2024-08-27

## 2024-08-27 RX ADMIN — KETOROLAC TROMETHAMINE 30 MG: 30 INJECTION, SOLUTION INTRAMUSCULAR; INTRAVENOUS at 17:02

## 2024-08-27 NOTE — ED PROVIDER NOTES
History  Chief Complaint   Patient presents with    Chest Pain     Patient states he has chest pain that worsens with breathing for one week. States he has been seen for same chest pain before and nothing has been found and may be anxiety related.      68-year-old male.  Patient presents with intermittent retrosternal chest discomfort for approximately 1 week.  History of anxiety.  States that he has experienced this pain in the past.  Pain is exacerbated with deep breathing/cough.  Denies productive cough, shortness of breath.      History provided by:  Patient and medical records  Chest Pain  Pain location:  Substernal area  Pain quality: sharp    Pain radiates to:  Does not radiate  Pain radiates to the back: no    Pain severity:  Severe  Onset quality:  Gradual  Duration:  1 week  Timing:  Intermittent  Progression:  Unchanged  Chronicity:  Recurrent  Worsened by:  Deep breathing  Ineffective treatments:  None tried  Associated symptoms: anxiety    Associated symptoms: no abdominal pain, no back pain, no cough, no diaphoresis, no dizziness, no fever, no headache, no heartburn, no nausea, no near-syncope, no palpitations, no shortness of breath, not vomiting and no weakness    Risk factors: diabetes mellitus and smoking      Prior to Admission Medications   Prescriptions Last Dose Informant Patient Reported? Taking?   DULoxetine (CYMBALTA) 20 mg capsule   Yes No   Sig: Take 20 mg by mouth daily   albuterol (PROVENTIL HFA,VENTOLIN HFA) 90 mcg/act inhaler   Yes No   Sig: Inhale 1 puff every 4 (four) hours as needed for shortness of breath or wheezing   aspirin 81 mg chewable tablet   Yes No   Sig: Chew 81 mg daily   atorvastatin (LIPITOR) 20 mg tablet   Yes No   Sig: Take 20 mg by mouth daily   cadexomer iodine (IODOSORB) 0.9 % gel   Yes No   Sig: Apply 1 Application topically daily To promote healing of ulcers/wounds   cetirizine (ZyrTEC) 5 MG tablet   Yes No   Sig: Take 5 mg by mouth daily   furosemide (LASIX) 40  mg tablet   Yes No   Sig: Take 40 mg by mouth daily   isosorbide mononitrate (IMDUR) 30 mg 24 hr tablet   Yes No   Sig: Take 30 mg by mouth   lisinopril (ZESTRIL) 20 mg tablet   Yes No   Sig: Take 20 mg by mouth daily   metFORMIN (GLUCOPHAGE-XR) 500 mg 24 hr tablet   Yes No   Sig: Take 1,000 mg by mouth 2 (two) times a day with meals   metoprolol succinate (TOPROL-XL) 50 mg 24 hr tablet   Yes No   Sig: Take 50 mg by mouth daily   omeprazole (PriLOSEC OTC) 20 MG tablet   Yes No   Sig: Take 20 mg by mouth 2 (two) times a day   pregabalin (LYRICA) 200 MG capsule   Yes No   Sig: Take 200 mg by mouth 2 (two) times a day   sodium bicarbonate 325 MG tablet   No No   Sig: Take 1 tablet (325 mg total) by mouth 2 (two) times a day   tiotropium-olodaterol (STIOLTO RESPIMAT) 2.5-2.5 MCG/ACT inhaler   Yes No   Sig: Inhale 2 puffs daily      Facility-Administered Medications: None       Past Medical History:   Diagnosis Date    COPD (chronic obstructive pulmonary disease) ([facility])     Diabetes mellitus (HCC) 04/16/2024    Sleep apnea     UTI (urinary tract infection)        Past Surgical History:   Procedure Laterality Date    BACK SURGERY         History reviewed. No pertinent family history.  I have reviewed and agree with the history as documented.    E-Cigarette/Vaping    E-Cigarette Use Never User      E-Cigarette/Vaping Substances     Social History     Tobacco Use    Smoking status: Every Day     Current packs/day: 1.50     Types: Cigarettes    Smokeless tobacco: Never   Vaping Use    Vaping status: Never Used   Substance Use Topics    Alcohol use: Yes     Alcohol/week: 7.0 standard drinks of alcohol     Types: 7 Shots of liquor per week    Drug use: Never       Review of Systems   Constitutional:  Negative for activity change, appetite change, diaphoresis and fever.   HENT:  Negative for congestion, rhinorrhea, sinus pressure, sinus pain and sore throat.    Respiratory:  Positive for wheezing. Negative for cough, chest  tightness and shortness of breath.    Cardiovascular:  Positive for chest pain. Negative for palpitations and near-syncope.   Gastrointestinal:  Negative for abdominal pain, heartburn, nausea and vomiting.   Musculoskeletal:  Negative for arthralgias, back pain and myalgias.   Skin:  Negative for color change, pallor and rash.   Neurological:  Negative for dizziness, syncope, weakness, light-headedness and headaches.   All other systems reviewed and are negative.    Physical Exam  Physical Exam  Vitals and nursing note reviewed.   Constitutional:       General: He is not in acute distress.     Appearance: He is not ill-appearing or toxic-appearing.   HENT:      Head: Normocephalic and atraumatic.      Right Ear: External ear normal.      Left Ear: External ear normal.      Nose: No congestion or rhinorrhea.   Eyes:      General: No scleral icterus.        Right eye: No discharge.         Left eye: No discharge.      Extraocular Movements: Extraocular movements intact.      Conjunctiva/sclera: Conjunctivae normal.   Cardiovascular:      Rate and Rhythm: Normal rate and regular rhythm.      Pulses: Normal pulses.      Heart sounds: Normal heart sounds. No murmur heard.  Pulmonary:      Breath sounds: Decreased breath sounds and wheezing present. No rhonchi or rales.   Chest:      Chest wall: No tenderness.   Abdominal:      General: Bowel sounds are normal. There is no distension.      Palpations: Abdomen is soft.      Tenderness: There is no abdominal tenderness. There is no guarding or rebound.   Musculoskeletal:      Right lower leg: No edema.      Left lower leg: No edema.   Skin:     General: Skin is warm and dry.      Coloration: Skin is not cyanotic, jaundiced or pale.      Findings: No ecchymosis or erythema.   Neurological:      General: No focal deficit present.      Mental Status: He is alert and oriented to person, place, and time.   Psychiatric:         Mood and Affect: Mood normal.         Behavior:  Behavior normal.       Vital Signs  ED Triage Vitals [08/27/24 1500]   Temperature Pulse Respirations Blood Pressure SpO2   98.1 °F (36.7 °C) 96 16 131/75 96 %      Temp Source Heart Rate Source Patient Position - Orthostatic VS BP Location FiO2 (%)   Temporal Monitor Lying Right arm --      Pain Score       10 - Worst Possible Pain         Vitals:    08/27/24 1500 08/27/24 1530 08/27/24 1600 08/27/24 1700   BP: 131/75 118/60 110/65 112/60   Pulse: 96 100 92 90   Patient Position - Orthostatic VS: Lying Sitting Sitting      ED Medications  Medications   ketorolac (TORADOL) injection 30 mg (30 mg Intravenous Given 8/27/24 1702)       Diagnostic Studies  Results Reviewed       Procedure Component Value Units Date/Time    High Sensitivity Troponin I Random [[id number]]  (Abnormal) Collected: 08/27/24 1524    Lab Status: Final result Specimen: Blood from Arm, Left Updated: 08/27/24 1557     HS TnI random 7 ng/L     Comprehensive metabolic panel [[id number]]  (Abnormal) Collected: 08/27/24 1524    Lab Status: Final result Specimen: Blood from Arm, Left Updated: 08/27/24 1552     Sodium 133 mmol/L      Potassium 4.2 mmol/L      Chloride 100 mmol/L      CO2 22 mmol/L      ANION GAP 11 mmol/L      BUN 9 mg/dL      Creatinine 1.01 mg/dL      Glucose 227 mg/dL      Calcium 8.6 mg/dL      AST 17 U/L      ALT 23 U/L      Alkaline Phosphatase 55 U/L      Total Protein 6.8 g/dL      Albumin 4.0 g/dL      Total Bilirubin 0.61 mg/dL      eGFR 75 ml/min/1.73sq m     Narrative:      National Kidney Disease Foundation guidelines for Chronic Kidney Disease (CKD):     Stage 1 with normal or high GFR (GFR > 90 mL/min/1.73 square meters)    Stage 2 Mild CKD (GFR = 60-89 mL/min/1.73 square meters)    Stage 3A Moderate CKD (GFR = 45-59 mL/min/1.73 square meters)    Stage 3B Moderate CKD (GFR = 30-44 mL/min/1.73 square meters)    Stage 4 Severe CKD (GFR = 15-29 mL/min/1.73 square meters)    Stage 5 End Stage CKD (GFR <15 mL/min/1.73 square  meters)  Note: GFR calculation is accurate only with a steady state creatinine    Lipase [[id number]]  (Normal) Collected: 08/27/24 1524    Lab Status: Final result Specimen: Blood from Arm, Left Updated: 08/27/24 1552     Lipase 50 u/L     D-dimer, quantitative [[id number]]  (Normal) Collected: 08/27/24 1524    Lab Status: Final result Specimen: Blood from Arm, Left Updated: 08/27/24 1548     D-Dimer, Quant 0.30 ug/ml FEU     Narrative:      In the evaluation for possible pulmonary embolism, in the appropriate (Well's Score of 4 or less) patient, the age adjusted d-dimer cutoff for this patient can be calculated as:    Age x 0.01 (in ug/mL) for Age-adjusted D-dimer exclusion threshold for a patient over 50 years.    CBC and differential [[id number]] Collected: 08/27/24 1524    Lab Status: Final result Specimen: Blood from Arm, Left Updated: 08/27/24 1530     WBC 6.00 Thousand/uL      RBC 4.24 Million/uL      Hemoglobin 13.5 g/dL      Hematocrit 39.4 %      MCV 93 fL      MCH 31.8 pg      MCHC 34.3 g/dL      RDW 12.7 %      MPV 9.8 fL      Platelets 175 Thousands/uL      nRBC 0 /100 WBCs      Segmented % 60 %      Immature Grans % 0 %      Lymphocytes % 28 %      Monocytes % 7 %      Eosinophils Relative 4 %      Basophils Relative 1 %      Absolute Neutrophils 3.62 Thousands/µL      Absolute Immature Grans 0.02 Thousand/uL      Absolute Lymphocytes 1.65 Thousands/µL      Absolute Monocytes 0.41 Thousand/µL      Eosinophils Absolute 0.25 Thousand/µL      Basophils Absolute 0.05 Thousands/µL                XR chest 1 view portable   ED Interpretation by Festus Arcos DO (08/27 1264)   No acute findings.              Procedures  ECG 12 Lead Documentation Only    Date/Time: 8/27/2024 3:16 PM    Performed by: Festus Arcos DO  Authorized by: Festus Arcos DO    Indications / Diagnosis:  Chest discomfort  Patient location:  ED  Previous ECG:     Previous ECG:  Compared to current    Comparison ECG info:  April  2024    Similarity:  No change  Interpretation:     Interpretation: normal    Rate:     ECG rate:  97    ECG rate assessment: normal    Rhythm:     Rhythm: sinus rhythm    Ectopy:     Ectopy: none    QRS:     QRS axis:  Normal    QRS intervals:  Normal  Conduction:     Conduction: normal    ST segments:     ST segments:  Normal  T waves:     T waves: normal      ED Course  ED Course as of 08/27/24 1714   Tue Aug 27, 2024   1514 Vital signs reviewed.  Patient expresses intermittent chest discomfort for approximately 1 week.  Has had similar symptoms in the past.  History of anxiety.  States that deep breathing/cough exacerbates his pain.  The chest pain is nonreproducible with palpation.  Mild bilateral wheezing noted.  The patient states that he smokes approximately 1.5 packs of cigarettes per day.  Denies shortness of breath, increased wheezing compared to baseline.  Obtain labs, chest x-ray.   1552 PE unlikely given negative D-dimer.  No other significant abnormalities noted on CBC/CMP/lipase.  Troponin is pending.   1601 Troponin negative.  ACS unlikely.   1634 Chest x-ray interpreted by me.  No signs of acute findings.  No signs of pneumothorax.  Mediastinum is not widened.  No signs of focal infiltrates.   1641 The patient states that he continues to have chest pain ONLY with deep breathing/cough. States that this is a chronic issue since he was in the . Will administer a dose of IV Toradol. The patient also states that he has a hx of anxiety and that he follows with psychology and will be following up with a psychiatrist.  Recommendation/strict return precautions were discussed with the patient.  He expressed understanding.  All questions addressed.  Stable for discharge.     HEART Risk Score      Flowsheet Row Most Recent Value   Heart Score Risk Calculator    History 0 Filed at: 08/27/2024 1643   ECG 0 Filed at: 08/27/2024 1643   Age 2 Filed at: 08/27/2024 1643   Risk Factors 2 Filed at: 08/27/2024  1643   Troponin 0 Filed at: 08/27/2024 1643   HEART Score 4 Filed at: 08/27/2024 1643          SBIRT 22yo+      Flowsheet Row Most Recent Value   Initial Alcohol Screen: US AUDIT-C     1. How often do you have a drink containing alcohol? 6 Filed at: 08/27/2024 1459   2. How many drinks containing alcohol do you have on a typical day you are drinking?  0 Filed at: 08/27/2024 1459   3b. FEMALE Any Age, or MALE 65+: How often do you have 4 or more drinks on one occassion? 0 Filed at: 08/27/2024 1459   Audit-C Score 6 Filed at: 08/27/2024 1459   DEB: How many times in the past year have you...    Used an illegal drug or used a prescription medication for non-medical reasons? Never Filed at: 08/27/2024 1459          Medical Decision Making  This patient presents with chest pain.   Diagnostic considerations include ACS, pneumonia, PE, costochondritis, aortic dissection, pneumothorax, pleural effusion. See ED Course.         Problems Addressed:  Chest pain, unspecified type: acute illness or injury    Amount and/or Complexity of Data Reviewed  Labs: ordered. Decision-making details documented in ED Course.  Radiology: ordered and independent interpretation performed. Decision-making details documented in ED Course.  ECG/medicine tests: ordered and independent interpretation performed. Decision-making details documented in ED Course.    Risk  Prescription drug management.      Disposition  Final diagnoses:   Chest pain, unspecified type     Time reflects when diagnosis was documented in both MDM as applicable and the Disposition within this note       Time User Action Codes Description Comment    8/27/2024  4:43 PM Festus Arcos Add [R07.9] Chest pain, unspecified type           ED Disposition       ED Disposition   Discharge    Condition   Stable    Date/Time   Tue Aug 27, 2024 1643    Comment   Clay Marcial discharge to home/self care.                   Follow-up Information    None         Discharge Medication List  as of 8/27/2024  4:44 PM        CONTINUE these medications which have NOT CHANGED    Details   albuterol (PROVENTIL HFA,VENTOLIN HFA) 90 mcg/act inhaler Inhale 1 puff every 4 (four) hours as needed for shortness of breath or wheezing, Historical Med      aspirin 81 mg chewable tablet Chew 81 mg daily, Historical Med      atorvastatin (LIPITOR) 20 mg tablet Take 20 mg by mouth daily, Starting Wed 1/3/2024, Historical Med      cadexomer iodine (IODOSORB) 0.9 % gel Apply 1 Application topically daily To promote healing of ulcers/wounds, Starting Fri 12/22/2023, Historical Med      cetirizine (ZyrTEC) 5 MG tablet Take 5 mg by mouth daily, Historical Med      DULoxetine (CYMBALTA) 20 mg capsule Take 20 mg by mouth daily, Historical Med      furosemide (LASIX) 40 mg tablet Take 40 mg by mouth daily, Starting Wed 11/1/2023, Historical Med      isosorbide mononitrate (IMDUR) 30 mg 24 hr tablet Take 30 mg by mouth, Starting Tue 2/6/2024, Historical Med      lisinopril (ZESTRIL) 20 mg tablet Take 20 mg by mouth daily, Starting Wed 11/1/2023, Historical Med      metFORMIN (GLUCOPHAGE-XR) 500 mg 24 hr tablet Take 1,000 mg by mouth 2 (two) times a day with meals, Starting Thu 2/8/2024, Historical Med      metoprolol succinate (TOPROL-XL) 50 mg 24 hr tablet Take 50 mg by mouth daily, Starting Wed 11/15/2023, Historical Med      omeprazole (PriLOSEC OTC) 20 MG tablet Take 20 mg by mouth 2 (two) times a day, Starting Thu 3/28/2024, Until Sat 4/27/2024, Historical Med      pregabalin (LYRICA) 200 MG capsule Take 200 mg by mouth 2 (two) times a day, Starting Mon 3/4/2024, Historical Med      sodium bicarbonate 325 MG tablet Take 1 tablet (325 mg total) by mouth 2 (two) times a day, Starting Mon 4/22/2024, Print      tiotropium-olodaterol (STIOLTO RESPIMAT) 2.5-2.5 MCG/ACT inhaler Inhale 2 puffs daily, Historical Med             No discharge procedures on file.    PDMP Review       None            ED Provider  Electronically Signed  by             Festus Arcos,   08/27/24 [id number]     No

## 2024-08-27 NOTE — DISCHARGE INSTRUCTIONS
The laboratory/imaging studies, ECG that were obtained did not show any significant abnormalities.    Pain, you can take ibuprofen 600 mg every 6 hours and Tylenol 1000 mg every 6 hours.  Continue all other prescribed medications.  Follow-up with your PCP, psychologist/psychiatrist.    Return to the emergency department for any concerning signs or symptoms.

## 2024-10-16 ENCOUNTER — APPOINTMENT (INPATIENT)
Dept: MRI IMAGING | Facility: HOSPITAL | Age: 69
DRG: 617 | End: 2024-10-16
Payer: COMMERCIAL

## 2024-10-16 ENCOUNTER — APPOINTMENT (EMERGENCY)
Dept: RADIOLOGY | Facility: HOSPITAL | Age: 69
DRG: 617 | End: 2024-10-16
Payer: COMMERCIAL

## 2024-10-16 ENCOUNTER — HOSPITAL ENCOUNTER (INPATIENT)
Facility: HOSPITAL | Age: 69
LOS: 6 days | DRG: 617 | End: 2024-10-22
Attending: STUDENT IN AN ORGANIZED HEALTH CARE EDUCATION/TRAINING PROGRAM | Admitting: FAMILY MEDICINE
Payer: COMMERCIAL

## 2024-10-16 ENCOUNTER — APPOINTMENT (INPATIENT)
Dept: NON INVASIVE DIAGNOSTICS | Facility: HOSPITAL | Age: 69
DRG: 617 | End: 2024-10-16
Payer: COMMERCIAL

## 2024-10-16 ENCOUNTER — APPOINTMENT (INPATIENT)
Dept: CT IMAGING | Facility: HOSPITAL | Age: 69
DRG: 617 | End: 2024-10-16
Payer: COMMERCIAL

## 2024-10-16 DIAGNOSIS — R22.41 LEG MASS, RIGHT: ICD-10-CM

## 2024-10-16 DIAGNOSIS — R26.2 AMBULATORY DYSFUNCTION: ICD-10-CM

## 2024-10-16 DIAGNOSIS — R79.89 ELEVATED SERUM CREATININE: Primary | ICD-10-CM

## 2024-10-16 DIAGNOSIS — N18.31 STAGE 3A CHRONIC KIDNEY DISEASE (HCC): ICD-10-CM

## 2024-10-16 DIAGNOSIS — L03.119 CELLULITIS OF FOOT: ICD-10-CM

## 2024-10-16 DIAGNOSIS — L03.116 CELLULITIS OF LEFT ANTERIOR LOWER LEG: ICD-10-CM

## 2024-10-16 DIAGNOSIS — L08.9 DIABETIC FOOT INFECTION  (HCC): ICD-10-CM

## 2024-10-16 DIAGNOSIS — N17.9 AKI (ACUTE KIDNEY INJURY) (HCC): ICD-10-CM

## 2024-10-16 DIAGNOSIS — I73.9 PAD (PERIPHERAL ARTERY DISEASE) (HCC): ICD-10-CM

## 2024-10-16 DIAGNOSIS — E11.628 DIABETIC FOOT INFECTION  (HCC): ICD-10-CM

## 2024-10-16 DIAGNOSIS — J44.1 CHRONIC OBSTRUCTIVE PULMONARY DISEASE WITH ACUTE EXACERBATION (HCC): ICD-10-CM

## 2024-10-16 DIAGNOSIS — S91.302A OPEN WOUND OF PLANTAR ASPECT OF FOOT, LEFT, INITIAL ENCOUNTER: ICD-10-CM

## 2024-10-16 PROBLEM — N18.30 STAGE 3 CHRONIC KIDNEY DISEASE (HCC): Status: ACTIVE | Noted: 2024-10-16

## 2024-10-16 LAB
ANION GAP SERPL CALCULATED.3IONS-SCNC: 12 MMOL/L (ref 4–13)
BASOPHILS # BLD AUTO: 0.06 THOUSANDS/ΜL (ref 0–0.1)
BASOPHILS NFR BLD AUTO: 1 % (ref 0–1)
BUN SERPL-MCNC: 19 MG/DL (ref 5–25)
CALCIUM SERPL-MCNC: 9.4 MG/DL (ref 8.4–10.2)
CHLORIDE SERPL-SCNC: 92 MMOL/L (ref 96–108)
CO2 SERPL-SCNC: 29 MMOL/L (ref 21–32)
CREAT SERPL-MCNC: 1.58 MG/DL (ref 0.6–1.3)
CRP SERPL QL: 226.2 MG/L
EOSINOPHIL # BLD AUTO: 0.04 THOUSAND/ΜL (ref 0–0.61)
EOSINOPHIL NFR BLD AUTO: 0 % (ref 0–6)
ERYTHROCYTE [DISTWIDTH] IN BLOOD BY AUTOMATED COUNT: 12.3 % (ref 11.6–15.1)
ERYTHROCYTE [SEDIMENTATION RATE] IN BLOOD: 25 MM/HOUR (ref 0–19)
GFR SERPL CREATININE-BSD FRML MDRD: 43 ML/MIN/1.73SQ M
GLUCOSE SERPL-MCNC: 256 MG/DL (ref 65–140)
GLUCOSE SERPL-MCNC: 299 MG/DL (ref 65–140)
GLUCOSE SERPL-MCNC: 320 MG/DL (ref 65–140)
HCT VFR BLD AUTO: 44.5 % (ref 36.5–49.3)
HGB BLD-MCNC: 15.2 G/DL (ref 12–17)
IMM GRANULOCYTES # BLD AUTO: 0.07 THOUSAND/UL (ref 0–0.2)
IMM GRANULOCYTES NFR BLD AUTO: 1 % (ref 0–2)
LACTATE SERPL-SCNC: 2 MMOL/L (ref 0.5–2)
LACTATE SERPL-SCNC: 2.2 MMOL/L (ref 0.5–2)
LYMPHOCYTES # BLD AUTO: 1.02 THOUSANDS/ΜL (ref 0.6–4.47)
LYMPHOCYTES NFR BLD AUTO: 9 % (ref 14–44)
MCH RBC QN AUTO: 32.1 PG (ref 26.8–34.3)
MCHC RBC AUTO-ENTMCNC: 34.2 G/DL (ref 31.4–37.4)
MCV RBC AUTO: 94 FL (ref 82–98)
MONOCYTES # BLD AUTO: 0.81 THOUSAND/ΜL (ref 0.17–1.22)
MONOCYTES NFR BLD AUTO: 7 % (ref 4–12)
NEUTROPHILS # BLD AUTO: 9.65 THOUSANDS/ΜL (ref 1.85–7.62)
NEUTS SEG NFR BLD AUTO: 82 % (ref 43–75)
NRBC BLD AUTO-RTO: 0 /100 WBCS
PLATELET # BLD AUTO: 210 THOUSANDS/UL (ref 149–390)
PMV BLD AUTO: 10.4 FL (ref 8.9–12.7)
POTASSIUM SERPL-SCNC: 4.3 MMOL/L (ref 3.5–5.3)
PROCALCITONIN SERPL-MCNC: 0.19 NG/ML
RBC # BLD AUTO: 4.74 MILLION/UL (ref 3.88–5.62)
SODIUM SERPL-SCNC: 133 MMOL/L (ref 135–147)
WBC # BLD AUTO: 11.65 THOUSAND/UL (ref 4.31–10.16)

## 2024-10-16 PROCEDURE — 73630 X-RAY EXAM OF FOOT: CPT

## 2024-10-16 PROCEDURE — 82948 REAGENT STRIP/BLOOD GLUCOSE: CPT

## 2024-10-16 PROCEDURE — 87205 SMEAR GRAM STAIN: CPT | Performed by: STUDENT IN AN ORGANIZED HEALTH CARE EDUCATION/TRAINING PROGRAM

## 2024-10-16 PROCEDURE — 85025 COMPLETE CBC W/AUTO DIFF WBC: CPT | Performed by: STUDENT IN AN ORGANIZED HEALTH CARE EDUCATION/TRAINING PROGRAM

## 2024-10-16 PROCEDURE — 80048 BASIC METABOLIC PNL TOTAL CA: CPT | Performed by: STUDENT IN AN ORGANIZED HEALTH CARE EDUCATION/TRAINING PROGRAM

## 2024-10-16 PROCEDURE — 96365 THER/PROPH/DIAG IV INF INIT: CPT

## 2024-10-16 PROCEDURE — 73700 CT LOWER EXTREMITY W/O DYE: CPT

## 2024-10-16 PROCEDURE — 86140 C-REACTIVE PROTEIN: CPT | Performed by: STUDENT IN AN ORGANIZED HEALTH CARE EDUCATION/TRAINING PROGRAM

## 2024-10-16 PROCEDURE — 85652 RBC SED RATE AUTOMATED: CPT | Performed by: STUDENT IN AN ORGANIZED HEALTH CARE EDUCATION/TRAINING PROGRAM

## 2024-10-16 PROCEDURE — 83605 ASSAY OF LACTIC ACID: CPT | Performed by: STUDENT IN AN ORGANIZED HEALTH CARE EDUCATION/TRAINING PROGRAM

## 2024-10-16 PROCEDURE — 87070 CULTURE OTHR SPECIMN AEROBIC: CPT | Performed by: STUDENT IN AN ORGANIZED HEALTH CARE EDUCATION/TRAINING PROGRAM

## 2024-10-16 PROCEDURE — 93971 EXTREMITY STUDY: CPT

## 2024-10-16 PROCEDURE — 73590 X-RAY EXAM OF LOWER LEG: CPT

## 2024-10-16 PROCEDURE — 96374 THER/PROPH/DIAG INJ IV PUSH: CPT

## 2024-10-16 PROCEDURE — 99223 1ST HOSP IP/OBS HIGH 75: CPT | Performed by: FAMILY MEDICINE

## 2024-10-16 PROCEDURE — 36415 COLL VENOUS BLD VENIPUNCTURE: CPT | Performed by: STUDENT IN AN ORGANIZED HEALTH CARE EDUCATION/TRAINING PROGRAM

## 2024-10-16 PROCEDURE — 99285 EMERGENCY DEPT VISIT HI MDM: CPT

## 2024-10-16 PROCEDURE — 93005 ELECTROCARDIOGRAM TRACING: CPT

## 2024-10-16 PROCEDURE — 87040 BLOOD CULTURE FOR BACTERIA: CPT | Performed by: STUDENT IN AN ORGANIZED HEALTH CARE EDUCATION/TRAINING PROGRAM

## 2024-10-16 PROCEDURE — 99285 EMERGENCY DEPT VISIT HI MDM: CPT | Performed by: STUDENT IN AN ORGANIZED HEALTH CARE EDUCATION/TRAINING PROGRAM

## 2024-10-16 PROCEDURE — 84145 PROCALCITONIN (PCT): CPT | Performed by: STUDENT IN AN ORGANIZED HEALTH CARE EDUCATION/TRAINING PROGRAM

## 2024-10-16 PROCEDURE — 83036 HEMOGLOBIN GLYCOSYLATED A1C: CPT | Performed by: FAMILY MEDICINE

## 2024-10-16 PROCEDURE — 93971 EXTREMITY STUDY: CPT | Performed by: SURGERY

## 2024-10-16 RX ORDER — ATORVASTATIN CALCIUM 20 MG/1
20 TABLET, FILM COATED ORAL DAILY
Status: DISCONTINUED | OUTPATIENT
Start: 2024-10-17 | End: 2024-10-22 | Stop reason: HOSPADM

## 2024-10-16 RX ORDER — ECHINACEA PURPUREA EXTRACT 125 MG
2 TABLET ORAL 2 TIMES DAILY
Status: ON HOLD | COMMUNITY
Start: 2024-08-30

## 2024-10-16 RX ORDER — PANTOPRAZOLE SODIUM 40 MG/1
40 TABLET, DELAYED RELEASE ORAL
Status: DISCONTINUED | OUTPATIENT
Start: 2024-10-16 | End: 2024-10-22 | Stop reason: HOSPADM

## 2024-10-16 RX ORDER — INSULIN LISPRO 100 [IU]/ML
2-12 INJECTION, SOLUTION INTRAVENOUS; SUBCUTANEOUS
Status: DISCONTINUED | OUTPATIENT
Start: 2024-10-16 | End: 2024-10-22 | Stop reason: HOSPADM

## 2024-10-16 RX ORDER — ASPIRIN 81 MG/1
81 TABLET, CHEWABLE ORAL DAILY
Status: DISCONTINUED | OUTPATIENT
Start: 2024-10-17 | End: 2024-10-16

## 2024-10-16 RX ORDER — NICOTINE 21 MG/24HR
1 PATCH, TRANSDERMAL 24 HOURS TRANSDERMAL DAILY
Status: DISCONTINUED | OUTPATIENT
Start: 2024-10-16 | End: 2024-10-22 | Stop reason: HOSPADM

## 2024-10-16 RX ORDER — TRAZODONE HYDROCHLORIDE 50 MG/1
25 TABLET, FILM COATED ORAL
Status: DISCONTINUED | OUTPATIENT
Start: 2024-10-16 | End: 2024-10-22 | Stop reason: HOSPADM

## 2024-10-16 RX ORDER — HEPARIN SODIUM 5000 [USP'U]/ML
5000 INJECTION, SOLUTION INTRAVENOUS; SUBCUTANEOUS EVERY 8 HOURS SCHEDULED
Status: DISCONTINUED | OUTPATIENT
Start: 2024-10-16 | End: 2024-10-19

## 2024-10-16 RX ORDER — ACETAMINOPHEN 500 MG
1000 TABLET ORAL AS NEEDED
Status: ON HOLD | COMMUNITY
Start: 2024-07-24

## 2024-10-16 RX ORDER — CEFEPIME HYDROCHLORIDE 2 G/50ML
2000 INJECTION, SOLUTION INTRAVENOUS ONCE
Status: COMPLETED | OUTPATIENT
Start: 2024-10-16 | End: 2024-10-16

## 2024-10-16 RX ORDER — LORATADINE 10 MG/1
10 TABLET ORAL DAILY
Status: ON HOLD | COMMUNITY
Start: 2024-08-26

## 2024-10-16 RX ORDER — QUETIAPINE FUMARATE 25 MG/1
25 TABLET, FILM COATED ORAL 2 TIMES DAILY
Status: ON HOLD | COMMUNITY
Start: 2024-09-30

## 2024-10-16 RX ORDER — LORATADINE 10 MG/1
10 TABLET ORAL DAILY
Status: DISCONTINUED | OUTPATIENT
Start: 2024-10-17 | End: 2024-10-22 | Stop reason: HOSPADM

## 2024-10-16 RX ORDER — ISOSORBIDE MONONITRATE 30 MG/1
30 TABLET, EXTENDED RELEASE ORAL DAILY
Status: DISCONTINUED | OUTPATIENT
Start: 2024-10-17 | End: 2024-10-22 | Stop reason: HOSPADM

## 2024-10-16 RX ORDER — ECHINACEA PURPUREA EXTRACT 125 MG
2 TABLET ORAL 2 TIMES DAILY
Status: DISCONTINUED | OUTPATIENT
Start: 2024-10-16 | End: 2024-10-22 | Stop reason: HOSPADM

## 2024-10-16 RX ORDER — DOCUSATE SODIUM 100 MG/1
100 CAPSULE, LIQUID FILLED ORAL 2 TIMES DAILY
Status: DISCONTINUED | OUTPATIENT
Start: 2024-10-16 | End: 2024-10-22 | Stop reason: HOSPADM

## 2024-10-16 RX ORDER — POLYETHYLENE GLYCOL 3350 17 G/17G
17 POWDER, FOR SOLUTION ORAL DAILY
Status: DISCONTINUED | OUTPATIENT
Start: 2024-10-17 | End: 2024-10-22 | Stop reason: HOSPADM

## 2024-10-16 RX ORDER — SODIUM CHLORIDE, SODIUM GLUCONATE, SODIUM ACETATE, POTASSIUM CHLORIDE, MAGNESIUM CHLORIDE, SODIUM PHOSPHATE, DIBASIC, AND POTASSIUM PHOSPHATE .53; .5; .37; .037; .03; .012; .00082 G/100ML; G/100ML; G/100ML; G/100ML; G/100ML; G/100ML; G/100ML
75 INJECTION, SOLUTION INTRAVENOUS CONTINUOUS
Status: DISCONTINUED | OUTPATIENT
Start: 2024-10-16 | End: 2024-10-18

## 2024-10-16 RX ORDER — FLUTICASONE PROPIONATE 50 MCG
2 SPRAY, SUSPENSION (ML) NASAL DAILY
Status: ON HOLD | COMMUNITY
Start: 2024-08-27

## 2024-10-16 RX ORDER — SODIUM CHLORIDE, SODIUM GLUCONATE, SODIUM ACETATE, POTASSIUM CHLORIDE, MAGNESIUM CHLORIDE, SODIUM PHOSPHATE, DIBASIC, AND POTASSIUM PHOSPHATE .53; .5; .37; .037; .03; .012; .00082 G/100ML; G/100ML; G/100ML; G/100ML; G/100ML; G/100ML; G/100ML
1000 INJECTION, SOLUTION INTRAVENOUS ONCE
Status: COMPLETED | OUTPATIENT
Start: 2024-10-16 | End: 2024-10-16

## 2024-10-16 RX ORDER — ONDANSETRON 2 MG/ML
4 INJECTION INTRAMUSCULAR; INTRAVENOUS EVERY 4 HOURS PRN
Status: DISCONTINUED | OUTPATIENT
Start: 2024-10-16 | End: 2024-10-22 | Stop reason: HOSPADM

## 2024-10-16 RX ORDER — INSULIN GLARGINE 100 [IU]/ML
20 INJECTION, SOLUTION SUBCUTANEOUS
Status: DISCONTINUED | OUTPATIENT
Start: 2024-10-16 | End: 2024-10-17

## 2024-10-16 RX ORDER — TRAZODONE HYDROCHLORIDE 50 MG/1
25 TABLET, FILM COATED ORAL
Status: ON HOLD | COMMUNITY
Start: 2024-07-09

## 2024-10-16 RX ORDER — METRONIDAZOLE 500 MG/100ML
500 INJECTION, SOLUTION INTRAVENOUS EVERY 8 HOURS
Status: DISCONTINUED | OUTPATIENT
Start: 2024-10-16 | End: 2024-10-17

## 2024-10-16 RX ORDER — INSULIN LISPRO 100 [IU]/ML
7 INJECTION, SOLUTION INTRAVENOUS; SUBCUTANEOUS
Status: DISCONTINUED | OUTPATIENT
Start: 2024-10-16 | End: 2024-10-17

## 2024-10-16 RX ORDER — VANCOMYCIN HYDROCHLORIDE 750 MG/150ML
750 INJECTION, SOLUTION INTRAVENOUS EVERY 12 HOURS
Status: DISCONTINUED | OUTPATIENT
Start: 2024-10-16 | End: 2024-10-17

## 2024-10-16 RX ORDER — METOPROLOL SUCCINATE 50 MG/1
50 TABLET, EXTENDED RELEASE ORAL DAILY
Status: DISCONTINUED | OUTPATIENT
Start: 2024-10-17 | End: 2024-10-22 | Stop reason: HOSPADM

## 2024-10-16 RX ORDER — DULOXETIN HYDROCHLORIDE 20 MG/1
20 CAPSULE, DELAYED RELEASE ORAL DAILY
Status: DISCONTINUED | OUTPATIENT
Start: 2024-10-17 | End: 2024-10-22 | Stop reason: HOSPADM

## 2024-10-16 RX ORDER — PREGABALIN 100 MG/1
100 CAPSULE ORAL 2 TIMES DAILY
Status: DISCONTINUED | OUTPATIENT
Start: 2024-10-16 | End: 2024-10-22 | Stop reason: HOSPADM

## 2024-10-16 RX ORDER — CEFEPIME HYDROCHLORIDE 2 G/50ML
2000 INJECTION, SOLUTION INTRAVENOUS EVERY 12 HOURS
Status: DISCONTINUED | OUTPATIENT
Start: 2024-10-17 | End: 2024-10-17

## 2024-10-16 RX ORDER — ACETAMINOPHEN 325 MG/1
650 TABLET ORAL EVERY 6 HOURS PRN
Status: DISCONTINUED | OUTPATIENT
Start: 2024-10-16 | End: 2024-10-22 | Stop reason: HOSPADM

## 2024-10-16 RX ORDER — GLIPIZIDE 5 MG/1
5 TABLET ORAL
Status: ON HOLD | COMMUNITY
Start: 2024-10-01

## 2024-10-16 RX ORDER — FLUTICASONE PROPIONATE 50 MCG
2 SPRAY, SUSPENSION (ML) NASAL DAILY
Status: DISCONTINUED | OUTPATIENT
Start: 2024-10-17 | End: 2024-10-22 | Stop reason: HOSPADM

## 2024-10-16 RX ORDER — MORPHINE SULFATE 10 MG/ML
6 INJECTION, SOLUTION INTRAMUSCULAR; INTRAVENOUS ONCE
Status: COMPLETED | OUTPATIENT
Start: 2024-10-16 | End: 2024-10-16

## 2024-10-16 RX ADMIN — PANTOPRAZOLE SODIUM 40 MG: 40 TABLET, DELAYED RELEASE ORAL at 18:15

## 2024-10-16 RX ADMIN — HEPARIN SODIUM 5000 UNITS: 5000 INJECTION INTRAVENOUS; SUBCUTANEOUS at 16:38

## 2024-10-16 RX ADMIN — MORPHINE SULFATE 6 MG: 10 INJECTION INTRAVENOUS at 12:17

## 2024-10-16 RX ADMIN — PREGABALIN 100 MG: 100 CAPSULE ORAL at 18:15

## 2024-10-16 RX ADMIN — CEFEPIME HYDROCHLORIDE 2000 MG: 2 INJECTION, SOLUTION INTRAVENOUS at 13:02

## 2024-10-16 RX ADMIN — TRAZODONE HYDROCHLORIDE 25 MG: 50 TABLET ORAL at 21:17

## 2024-10-16 RX ADMIN — INSULIN LISPRO 7 UNITS: 100 INJECTION, SOLUTION INTRAVENOUS; SUBCUTANEOUS at 17:11

## 2024-10-16 RX ADMIN — SODIUM CHLORIDE, SODIUM GLUCONATE, SODIUM ACETATE, POTASSIUM CHLORIDE, MAGNESIUM CHLORIDE, SODIUM PHOSPHATE, DIBASIC, AND POTASSIUM PHOSPHATE 1000 ML: .53; .5; .37; .037; .03; .012; .00082 INJECTION, SOLUTION INTRAVENOUS at 13:39

## 2024-10-16 RX ADMIN — DOCUSATE SODIUM 100 MG: 100 CAPSULE, LIQUID FILLED ORAL at 21:16

## 2024-10-16 RX ADMIN — NICOTINE 1 PATCH: 14 PATCH, EXTENDED RELEASE TRANSDERMAL at 16:39

## 2024-10-16 RX ADMIN — ONDANSETRON 4 MG: 2 INJECTION INTRAMUSCULAR; INTRAVENOUS at 20:08

## 2024-10-16 RX ADMIN — ACETAMINOPHEN 325MG 650 MG: 325 TABLET ORAL at 21:17

## 2024-10-16 RX ADMIN — SODIUM CHLORIDE, SODIUM GLUCONATE, SODIUM ACETATE, POTASSIUM CHLORIDE, MAGNESIUM CHLORIDE, SODIUM PHOSPHATE, DIBASIC, AND POTASSIUM PHOSPHATE 75 ML/HR: .53; .5; .37; .037; .03; .012; .00082 INJECTION, SOLUTION INTRAVENOUS at 16:38

## 2024-10-16 RX ADMIN — HEPARIN SODIUM 5000 UNITS: 5000 INJECTION INTRAVENOUS; SUBCUTANEOUS at 21:18

## 2024-10-16 RX ADMIN — INSULIN GLARGINE 20 UNITS: 100 INJECTION, SOLUTION SUBCUTANEOUS at 21:18

## 2024-10-16 RX ADMIN — VANCOMYCIN HYDROCHLORIDE 750 MG: 750 INJECTION, SOLUTION INTRAVENOUS at 16:39

## 2024-10-16 RX ADMIN — METRONIDAZOLE 500 MG: 500 INJECTION, SOLUTION INTRAVENOUS at 16:38

## 2024-10-16 RX ADMIN — INSULIN LISPRO 8 UNITS: 100 INJECTION, SOLUTION INTRAVENOUS; SUBCUTANEOUS at 17:10

## 2024-10-16 NOTE — CASE MANAGEMENT
Case Management Discharge Planning Note    Patient name Clay Marcial  Location /-01 MRN [id number]  : 1955 Date 10/16/2024       Current Admission Date: 10/16/2024  Current Admission Diagnosis:Leg pain, Cellulitis of foot, SUSHILA (acute kidney injury) (HCC), Leg mass, right, Ambulatory dysfunction, Cellulitis of left anterior lower leg, Open wound of plantar aspect of foot, left, initial encounter   Patient Active Problem List    Diagnosis Date Noted Date Diagnosed    Diabetes mellitus ([facility]) 2024     CAD (coronary artery disease) 2024     CHF (congestive heart failure) ([facility]) 2024     Acute on chronic kidney failure  ([facility]) 2024     Helicobacter pylori infection 2024       LOS (days): 0  Geometric Mean LOS (GMLOS) (days):   Days to GMLOS:     OBJECTIVE:            Current admission status: Inpatient   Preferred Pharmacy:   [facility]/pharmacy #[id number] - [place], [place] - [address] [place] [id number]  Phone: [phone] Fax: [phone]    [facility] - [place], [place] - [address]  [address] [facility] [address]  Phone: [phone] Fax: [phone]    Primary Care Provider: No primary care provider on file.    Primary Insurance: [employer]  Secondary Insurance:     DISCHARGE DETAILS:        CM received message from [employer] [facility] active with them for SN.   CM submitted JEN referral in [facility].

## 2024-10-16 NOTE — ASSESSMENT & PLAN NOTE
Wt Readings from Last 3 Encounters:   10/16/24 126 kg (277 lb 9 oz)   08/27/24 127 kg (279 lb 12.2 oz)   05/18/24 129 kg (285 lb 7.9 oz)     In the past reviewed listed as systolic but no evidence of 2D echo in the system anyhow if he is on diuretics will hold secondary to needing hydration with acute infection and obtain a 2D echo to have in the system and to evaluate if his EF is just in case he has to go to surgery has been tachycardic we will do an EKG

## 2024-10-16 NOTE — ED PROVIDER NOTES
ED Disposition       ED Disposition   Admit    Condition   Stable    Date/Time   Wed Oct 16, 2024  1:22 PM    Comment   --             Assessment & Plan       Medical Decision Making  This patient presents with leg pain/redness/warmth.   Diagnostic considerations include osteomyelitis, cellulitis, erysipelas, soft tissue abscess.    -Vital signs reviewed.  Patient presents with worsening redness/pain/warmth along the anterior aspect of the left lower leg.  He states that he has been having worsening pain/swelling/redness along the left second/third toes.  Has a large wound along the plantar aspect of the left foot which is being monitored by home nursing.  The patient denies fevers.  Expresses worsening symptoms over the last 24 hours.  Tachycardic upon arrival.  Will obtain infectious workup, x-ray imaging. Hx of DM.     -X-ray imaging without signs of bony erosion to suggest osteomyelitis.  No signs of soft tissue gas.  Low suspicion for necrotizing fasciitis.  Will administer a dose of IV cefepime.    -The case was discussed with [facility] and Podiatry. Will obtain CT LLE w/o contrast in the setting of SUSHILA. The patient was admitted to [facility].         Problems Addressed:  SUSHILA (acute kidney injury) (HCC): acute illness or injury  Ambulatory dysfunction: acute illness or injury  Cellulitis of foot: acute illness or injury  Cellulitis of left anterior lower leg: acute illness or injury  Leg mass, right: acute illness or injury  Open wound of plantar aspect of foot, left, initial encounter: acute illness or injury    Amount and/or Complexity of Data Reviewed  Labs: ordered.     Details: Laboratory w/u +SUSHILA, elevated CRP.   Radiology: ordered and independent interpretation performed.     Details: No signs of soft tissue gas, signs of bony erosion.   Discussion of management or test interpretation with external provider(s): The case and treatment plan were discussed with hospital medicine and podiatry.    Risk  Prescription  drug management.  Parenteral controlled substances.  Decision regarding hospitalization.      Medications   morphine injection 6 mg (6 mg Intravenous Given 10/16/24 1217)     ED Risk Strat Scores           SBIRT 22yo+      Flowsheet Row Most Recent Value   Initial Alcohol Screen: US AUDIT-C     1. How often do you have a drink containing alcohol? 0 Filed at: 10/16/2024 1110   2. How many drinks containing alcohol do you have on a typical day you are drinking?  0 Filed at: 10/16/2024 1110   3b. FEMALE Any Age, or MALE 65+: How often do you have 4 or more drinks on one occassion? 0 Filed at: 10/16/2024 1110   Audit-C Score 0 Filed at: 10/16/2024 1110   DEB: How many times in the past year have you...    Used an illegal drug or used a prescription medication for non-medical reasons? Never Filed at: 10/16/2024 1110          History of Present Illness       Chief Complaint   Patient presents with    Leg Pain     Chronic wound on left foot, home health came to see patient and left leg swollen and painful, pt reports trouble walking with multiple falls at home, denies HS on any of the falls        Past Medical History:   Diagnosis Date    COPD (chronic obstructive pulmonary disease) (HCC)     Diabetes mellitus (HCC) 04/16/2024    Sleep apnea     UTI (urinary tract infection)       Past Surgical History:   Procedure Laterality Date    BACK SURGERY        History reviewed. No pertinent family history.   Social History     Tobacco Use    Smoking status: Every Day     Current packs/day: 1.50     Types: Cigarettes    Smokeless tobacco: Never   Vaping Use    Vaping status: Never Used   Substance Use Topics    Alcohol use: Yes     Alcohol/week: 7.0 standard drinks of alcohol     Types: 7 Shots of liquor per week    Drug use: Never      E-Cigarette/Vaping    E-Cigarette Use Never User       E-Cigarette/Vaping Substances      I have reviewed and agree with the history as documented.       History provided by:  Patient and medical  records  Leg Pain  Location:  Leg  Injury: no    Leg location:  L lower leg  Chronicity:  New (Worsening LLE redness/pain x 1 day. Hx of left plantar wound. Hx of DM. Worsening ambulatory dysfunction x 2 days. Denies fevers/chills.)  Prior injury to area:  Yes  Associated symptoms: decreased ROM, muscle weakness and swelling    Associated symptoms: no fever, no numbness, no stiffness and no tingling      Review of Systems   Constitutional:  Positive for chills. Negative for activity change, appetite change and fever.   Musculoskeletal:  Positive for arthralgias, gait problem and joint swelling. Negative for stiffness.   Skin:  Positive for color change and wound.   All other systems reviewed and are negative.    Objective       ED Triage Vitals   Temperature Pulse Blood Pressure Respirations SpO2 Patient Position - Orthostatic VS   10/16/24 1111 10/16/24 1111 10/16/24 1111 10/16/24 1111 10/16/24 1111 10/16/24 1111   99 °F (37.2 °C) (!) 116 (!) 178/81 18 95 % Sitting      Temp Source Heart Rate Source BP Location FiO2 (%) Pain Score    10/16/24 1111 10/16/24 1111 10/16/24 1111 -- 10/16/24 1217    Temporal Monitor Left arm  5      Vitals      Date and Time Temp Pulse SpO2 Resp BP Pain Score FACES Pain Rating User   10/16/24 1217 -- -- -- -- -- 5 -- CG   10/16/24 1111 99 °F (37.2 °C) 116 95 % 18 178/81 -- -- SS          Physical Exam  Vitals and nursing note reviewed.   Constitutional:       General: He is not in acute distress.     Appearance: He is ill-appearing. He is not toxic-appearing.   HENT:      Head: Normocephalic and atraumatic.      Right Ear: External ear normal.      Left Ear: External ear normal.   Eyes:      Extraocular Movements: Extraocular movements intact.      Conjunctiva/sclera: Conjunctivae normal.   Cardiovascular:      Rate and Rhythm: Regular rhythm. Tachycardia present.      Pulses: Normal pulses.      Heart sounds: Normal heart sounds. No murmur heard.  Pulmonary:      Effort: Pulmonary  effort is normal. No respiratory distress.      Breath sounds: Normal breath sounds. No wheezing or rhonchi.   Abdominal:      General: Bowel sounds are normal.      Palpations: Abdomen is soft.      Tenderness: There is no abdominal tenderness. There is no guarding or rebound.   Musculoskeletal:         General: Swelling, tenderness and signs of injury present.      Comments: 2 x 1 cm left plantar wound with drainage.  The wound is malodorous.  Tenderness to palpation/erythema/warmth along the anterior aspect along the left lower leg.  There is increased swelling/redness/pain along the left second and third toes.    Area of fluctuance/mass versus cyst along the right popliteal fossa.   Skin:     General: Skin is warm.      Findings: Erythema and rash present.   Neurological:      General: No focal deficit present.      Mental Status: He is alert and oriented to person, place, and time.   Psychiatric:         Mood and Affect: Mood normal.         Behavior: Behavior normal.       Results Reviewed       Procedure Component Value Units Date/Time    Wound culture and Gram stain [[id number]] Collected: 10/16/24 1342    Lab Status: In process Specimen: Wound from Foot, Left Updated: 10/16/24 1345    Procalcitonin [[id number]]  (Normal) Collected: 10/16/24 1121    Lab Status: Final result Specimen: Blood from Arm, Right Updated: 10/16/24 1320     Procalcitonin 0.19 ng/ml     Lactic acid, plasma (w/reflex if result > 2.0) [[id number]]  (Abnormal) Collected: 10/16/24 1121    Lab Status: Final result Specimen: Blood from Arm, Right Updated: 10/16/24 1301     LACTIC ACID 2.2 mmol/L     Narrative:      Result may be elevated if tourniquet was used during collection.    Lactic acid 2 Hours [[id number]]     Lab Status: No result Specimen: Blood     Basic metabolic panel [[id number]]  (Abnormal) Collected: 10/16/24 1121    Lab Status: Final result Specimen: Blood from Arm, Right Updated: 10/16/24 1257     Sodium 133 mmol/L       Potassium 4.3 mmol/L      Chloride 92 mmol/L      CO2 29 mmol/L      ANION GAP 12 mmol/L      BUN 19 mg/dL      Creatinine 1.58 mg/dL      Glucose 299 mg/dL      Calcium 9.4 mg/dL      eGFR 43 ml/min/1.73sq m     Narrative:      National Kidney Disease Foundation guidelines for Chronic Kidney Disease (CKD):     Stage 1 with normal or high GFR (GFR > 90 mL/min/1.73 square meters)    Stage 2 Mild CKD (GFR = 60-89 mL/min/1.73 square meters)    Stage 3A Moderate CKD (GFR = 45-59 mL/min/1.73 square meters)    Stage 3B Moderate CKD (GFR = 30-44 mL/min/1.73 square meters)    Stage 4 Severe CKD (GFR = 15-29 mL/min/1.73 square meters)    Stage 5 End Stage CKD (GFR <15 mL/min/1.73 square meters)  Note: GFR calculation is accurate only with a steady state creatinine    C-reactive protein [[id number]]  (Abnormal) Collected: 10/16/24 1121    Lab Status: Final result Specimen: Blood from Arm, Right Updated: 10/16/24 1257     .2 mg/L     Sedimentation rate, automated [[id number]]  (Abnormal) Collected: 10/16/24 1121    Lab Status: Final result Specimen: Blood from Arm, Right Updated: 10/16/24 1238     Sed Rate 25 mm/hour     CBC and differential [[id number]]  (Abnormal) Collected: 10/16/24 1121    Lab Status: Final result Specimen: Blood from Arm, Right Updated: 10/16/24 1234     WBC 11.65 Thousand/uL      RBC 4.74 Million/uL      Hemoglobin 15.2 g/dL      Hematocrit 44.5 %      MCV 94 fL      MCH 32.1 pg      MCHC 34.2 g/dL      RDW 12.3 %      MPV 10.4 fL      Platelets 210 Thousands/uL      nRBC 0 /100 WBCs      Segmented % 82 %      Immature Grans % 1 %      Lymphocytes % 9 %      Monocytes % 7 %      Eosinophils Relative 0 %      Basophils Relative 1 %      Absolute Neutrophils 9.65 Thousands/µL      Absolute Immature Grans 0.07 Thousand/uL      Absolute Lymphocytes 1.02 Thousands/µL      Absolute Monocytes 0.81 Thousand/µL      Eosinophils Absolute 0.04 Thousand/µL      Basophils Absolute 0.06 Thousands/µL     Blood  culture #1 [[id number]] Collected: 10/16/24 1228    Lab Status: In process Specimen: Blood from Arm, Left Updated: 10/16/24 1232    Blood culture #2 [[id number]] Collected: 10/16/24 1121    Lab Status: In process Specimen: Blood from Arm, Right Updated: 10/16/24 1230          XR foot 3+ views LEFT   ED Interpretation by Festus Arcos DO (10/16 1257)   No signs of bony erosion.      Final Interpretation by Des Capellan MD (10/16 1335)      No acute osseous abnormality.      Degenerative changes as described.         Computerized Assisted Algorithm (CAA) may have been used to analyze all applicable images.         Workstation performed: [id number]         XR tibia fibula 2 views LEFT   ED Interpretation by Festus Arcos DO (10/16 1257)   No signs of soft tissue gas.  No acute osseous abnormalities.      Final Interpretation by Des Capellan MD (10/16 1340)      No acute osseous abnormality.            Computerized Assisted Algorithm (CAA) may have been used to analyze all applicable images.               Workstation performed: [id number]         CT lower extremity wo contrast left    (Results Pending)       Procedures    ED Medication and Procedure Management   Prior to Admission Medications   Prescriptions Last Dose Informant Patient Reported? Taking?   DULoxetine (CYMBALTA) 20 mg capsule   Yes No   Sig: Take 20 mg by mouth daily   albuterol (PROVENTIL HFA,VENTOLIN HFA) 90 mcg/act inhaler   Yes No   Sig: Inhale 1 puff every 4 (four) hours as needed for shortness of breath or wheezing   aspirin 81 mg chewable tablet   Yes No   Sig: Chew 81 mg daily   atorvastatin (LIPITOR) 20 mg tablet   Yes No   Sig: Take 20 mg by mouth daily   cadexomer iodine (IODOSORB) 0.9 % gel   Yes No   Sig: Apply 1 Application topically daily To promote healing of ulcers/wounds   cetirizine (ZyrTEC) 5 MG tablet   Yes No   Sig: Take 5 mg by mouth daily   furosemide (LASIX) 40 mg tablet   Yes No   Sig: Take 40 mg by mouth daily   isosorbide  mononitrate (IMDUR) 30 mg 24 hr tablet   Yes No   Sig: Take 30 mg by mouth   lisinopril (ZESTRIL) 20 mg tablet   Yes No   Sig: Take 20 mg by mouth daily   metFORMIN (GLUCOPHAGE-XR) 500 mg 24 hr tablet   Yes No   Sig: Take 1,000 mg by mouth 2 (two) times a day with meals   metoprolol succinate (TOPROL-XL) 50 mg 24 hr tablet   Yes No   Sig: Take 50 mg by mouth daily   omeprazole (PriLOSEC OTC) 20 MG tablet   Yes No   Sig: Take 20 mg by mouth 2 (two) times a day   pregabalin (LYRICA) 200 MG capsule   Yes No   Sig: Take 200 mg by mouth 2 (two) times a day   sodium bicarbonate 325 MG tablet   No No   Sig: Take 1 tablet (325 mg total) by mouth 2 (two) times a day   tiotropium-olodaterol (STIOLTO RESPIMAT) 2.5-2.5 MCG/ACT inhaler   Yes No   Sig: Inhale 2 puffs daily      Facility-Administered Medications: None     Patient's Medications   Discharge Prescriptions    No medications on file     No discharge procedures on file.  ED SEPSIS DOCUMENTATION            Festus Arcos DO  10/16/24 1450

## 2024-10-16 NOTE — PROGRESS NOTES
Patient:    MRN:  [id number]    Reggie Request ID:  [id number]    Level of care reserved:  Home Health Agency    Partner Reserved:  [place] [id number] [phone]    Clinical needs requested:    Geography searched:  [phone]    Start of Service:    Request sent:  2:54pm EDT on 10/16/2024 by Jennifer Sage    Partner reserved:  4:00pm EDT on 10/16/2024 by Jennifer Sage    Choice list shared:  4:00pm EDT on 10/16/2024 by Jennifer Sage

## 2024-10-16 NOTE — CONSULTS
Consultation - Infectious Disease   Name: Clay Marcial 68 y.o. male I MRN: [id number]  Unit/Bed#: -01 I Date of Admission: 10/16/2024   Date of Service: 10/17/2024 I Hospital Day: 1   Inpatient consult to Infectious Diseases  Consult performed by: Yakelin Driscoll MD  Consult ordered by: Zee Newman MD          VIRTUAL CARE DOCUMENTATION:     1. This service was provided via Telemedicine using [employer]     2. Parties in the room with patient during teleconsult Patient only    3. Confidentiality My office door was closed     4. Participants No one else was in the room    5. Patient acknowledged consent and understanding of privacy and security of the  Telemedicine consult. I informed the patient that I have reviewed their record in Epic and presented the opportunity for them to ask any questions regarding the visit today.  The patient agreed to participate.    6. Time spent 6 minutes       Physician Requesting Evaluation: Zee Newman MD   Reason for Evaluation / Principal Problem: Cellulitis    Assessment & Plan  Diabetic foot infection  (HCC)  Affecting left foot with associated left lower leg cellulitis in the setting of a chronic non healing plantar foot ulcer.  CT is notable for cellulitis without abscess or osteo. MRI notes some T2 only hyperintense signal within third metatarsal head and proximal phalanx that is favored to be reactive changes over osteo. Micro not defined, no risk factors for MRSA. He is afebrile with mild leukocytosis and hemodynamically stable.     Discontinue Vanco, cefepime, Flagyl and start cefazolin 2 q8h   Follow-up blood and wound cultures   Follow-up arterial Dopplers left lower extremity   Await podiatry evaluation   Continue supportive care, serial extremity exams, monitor clinical course   Final choice and duration of antibiotics to be determined   Check daily CBC, CMP while inpatient to monitor for any evolving antibiotic toxicity or treatment  failure   Recommend tobacco cessation counseling   Patient is at risk for poor wound healing/progression of infection in the setting of underlying comorbidities    Diabetes mellitus (HCC)  Lab Results   Component Value Date    HGBA1C 7.2 (H) 10/16/2024       Recent Labs     10/16/24  1708 10/16/24  2111 10/17/24  0734 10/17/24  1153   POCGLU 320* 256* 218* 233*       Blood Sugar Average: Last 72 hrs:  (P) 256.75  Risk factor for infection     Recommend strict glycemic control to aid with wound healing    Stage 3 chronic kidney disease (HCC)  Lab Results   Component Value Date    EGFR 39 10/17/2024    EGFR 43 10/16/2024    EGFR 75 08/27/2024    CREATININE 1.73 (H) 10/17/2024    CREATININE 1.58 (H) 10/16/2024    CREATININE 1.01 08/27/2024      Recommend renal dosing of antibiotics, avoid nephrotoxins  Chronic obstructive pulmonary disease with acute exacerbation (HCC)    ROMEL (obstructive sleep apnea)        Reviewed recommendations to change iv abx  with primary team who is in agreement. Will follow.  Please call with concerns or any changes in clinical status or new test results.      HPI: Clay Marcial is a 68 y.o. year old male with type 2 diabetes, obesity, chronic tobacco use, CKD, CAD and CHF.  He presented to the ER on 10/15 with 1 day of left leg pain, redness and generalized weakness with chills.  He has a chronic left plantar wound for the past 5 months for which he has been receiving wound care and [facility]. Patient states the wound has debrided but has continued to progress. Does not recall any imaging done at the [facility].  In the ER he had mild leukocytosis. CT was notable for cellulitis LLE without abscess or osteo.  He was admitted on Vanco, cefepime, Flagyl. MRI was obtained and does not show any definite osteo.   Infectious disease is being consulted for diagnostic work up and antibiotic management.    Review of Systems  Pertinent positives and negatives as noted in HPI.  Rest complete 12 point system-based review of systems is otherwise negative.    PAST MEDICAL HISTORY:  Past Medical History:   Diagnosis Date    COPD (chronic obstructive pulmonary disease) (HCC)     Diabetes mellitus (HCC) 2024    Sleep apnea     UTI (urinary tract infection)      Past Surgical History:   Procedure Laterality Date    BACK SURGERY         FAMILY HISTORY:  Non-contributory    SOCIAL HISTORY:  Social History   Single  Social History     Substance and Sexual Activity   Alcohol Use Yes    Alcohol/week: 7.0 standard drinks of alcohol    Types: 7 Shots of liquor per week     Social History     Substance and Sexual Activity   Drug Use Never     Social History     Tobacco Use   Smoking Status Every Day    Current packs/day: 1.50    Types: Cigarettes   Smokeless Tobacco Never       ALLERGIES:  No Known Allergies    MEDICATIONS:  All current active medications have been reviewed.    Physical Exam     Temp:  [97.7 °F (36.5 °C)-98.8 °F (37.1 °C)] 97.7 °F (36.5 °C)  HR:  [] 102  Resp:  [20-28] 20  BP: (115-142)/(57-71) 115/63  SpO2:  [91 %-97 %] 92 %  Temp (24hrs), Av.2 °F (36.8 °C), Min:97.7 °F (36.5 °C), Max:98.8 °F (37.1 °C)  Current: Temperature: 97.7 °F (36.5 °C)    Intake/Output Summary (Last 24 hours) at 10/17/2024 1317  Last data filed at 10/17/2024 1013  Gross per 24 hour   Intake 180 ml   Output 1100 ml   Net -920 ml         Physical exam findings reported by bedside and primary medical team staff    General Appearance:  Appearing chronically ill, nontoxic, and in no distress, appears stated age   Lungs:   Clear to auscultation bilaterally, no audible wheezes, rhonchi and rales, respirations unlabored   Chest Wall:  No tenderness or deformity   Heart:  Regular rate and rhythm, S1, S2 normal, no murmur, rub or gallop   Abdomen:   Soft, non-tender, non-distended, positive bowel sounds, no masses, no organomegaly    No CVA tenderness   Extremities: Extremities normal, atraumatic, no  cyanosis, clubbing or edema   Skin: Necrotic wound over plantar surface of left forefoot with associated erythema, necrotic slough at base.  Erythema over dorsum of foot and left lower extremity with chronic venous hyperpigmentation changes of bilateral lower extremities   Neurologic: Alert and oriented times 3       Invasive Devices:   Peripheral IV 10/16/24 Distal;Right;Upper Arm (Active)   Site Assessment WDL 10/16/24 1121       Peripheral IV 10/16/24 Left;Proximal;Ventral (anterior) Forearm (Active)   Site Assessment [facility] 10/16/24 1228       Labs, Imaging, & Other Studies     Lab Results:    I have personally reviewed pertinent labs.    Results from last 7 days   Lab Units 10/17/24  0446 10/16/24  1121   WBC Thousand/uL 9.16 11.65*   HEMOGLOBIN g/dL 11.8* 15.2   PLATELETS Thousands/uL 162 210     Results from last 7 days   Lab Units 10/17/24  0446   POTASSIUM mmol/L 3.8   CHLORIDE mmol/L 97   CO2 mmol/L 27   BUN mg/dL 25   CREATININE mg/dL 1.73*   EGFR ml/min/1.73sq m 39   CALCIUM mg/dL 7.7*     Results from last 7 days   Lab Units 10/16/24  1342 10/16/24  1228 10/16/24  1121   BLOOD CULTURE   --  Received in Microbiology Lab. Culture in Progress. Received in Microbiology Lab. Culture in Progress.   GRAM STAIN RESULT  No polys seen*  2+ Gram positive cocci in pairs*  2+ Gram variable rods*  --   --    WOUND CULTURE  Culture too young- will reincubate  --   --        Imaging Studies:   I have personally reviewed pertinent imaging study reports and images in PACS.      EKG, Pathology, and Other Studies:   I have personally reviewed pertinent reports and reviewed external records.    Counseling/Coordination of care:       Total 90 minutes in evaluation of the patient and communication with the patient via telehealth of which 50 minutes was in counseling/coordination of care.  Extensive review of the medical records in epic including review of the notes, radiographs, and laboratory results.  My recommendations were  discussed with the patient in detail who verbalized understanding.

## 2024-10-16 NOTE — ASSESSMENT & PLAN NOTE
"No results found for: \"HGBA1C\"    No results for input(s): \"POCGLU\" in the last 72 hours.    Blood Sugar Average: Last 72 hrs:    Left lower extremity cellulitis with an open wound on plantar side.  Rule out osteomyelitis ER ready discussed with podiatry left lower extremity CT without contrast ordered as secondary to GFR in the 40s negative for gas will order MRI of the lower extremity ABIs also there is a questionable Baker's cyst will order venous duplex to rule out a DVT of the left lower extremity and assess the cyst  Also will obtain formal podiatry evaluation wound care per podiatry has been ordered start cefepime Vanco and Flagyl  Infectious disease consult  Surgical evaluation planning.  Podiatry post all workup evaluation.  Pain control has been ordered  "

## 2024-10-16 NOTE — ASSESSMENT & PLAN NOTE
Lab Results   Component Value Date    EGFR 43 10/16/2024    EGFR 75 08/27/2024    EGFR 51 05/18/2024    CREATININE 1.58 (H) 10/16/2024    CREATININE 1.01 08/27/2024    CREATININE 1.38 (H) 05/18/2024   Baseline creatinine is 1.2-1.3 currently 1.5 does not meet SUSHILA will hydrate as a history of systolic CHF is listed without 2D echo unknown what his EF is we will do a gentle isolate 75 cc/h till evaluation of his EF.  Repeat laboratories tomorrow avoid hypotension avoid NSAIDs IV contrast

## 2024-10-16 NOTE — H&P
"H&P - Hospitalist   Name: Clay Marcial 68 y.o. male I MRN: [id number]  Unit/Bed#: [facility] I Date of Admission: 10/16/2024   Date of Service: 10/16/2024 I Hospital Day: 0     Assessment & Plan  Diabetic foot infection  (HCC)  No results found for: \"HGBA1C\"    No results for input(s): \"POCGLU\" in the last 72 hours.    Blood Sugar Average: Last 72 hrs:    Left lower extremity cellulitis with an open wound on plantar side.  Rule out osteomyelitis ER ready discussed with podiatry left lower extremity CT without contrast ordered as secondary to GFR in the 40s negative for gas will order MRI of the lower extremity ABIs also there is a questionable Baker's cyst will order venous duplex to rule out a DVT of the left lower extremity and assess the cyst  Also will obtain formal podiatry evaluation wound care per podiatry has been ordered start cefepime Vanco and Flagyl  Infectious disease consult  Surgical evaluation planning.  Podiatry post all workup evaluation.  Pain control has been ordered  Diabetes mellitus (HCC)  No results found for: \"HGBA1C\"    No results for input(s): \"POCGLU\" in the last 72 hours.    Blood Sugar Average: Last 72 hrs:    Check hemoglobin A1c start Lantus 20 units at night Humalog 7 units with meals and sliding scale  CAD (coronary artery disease)  Will have to verify what medications he takes and restart  CHF (congestive heart failure) (HCC)  Wt Readings from Last 3 Encounters:   10/16/24 126 kg (277 lb 9 oz)   08/27/24 127 kg (279 lb 12.2 oz)   05/18/24 129 kg (285 lb 7.9 oz)     In the past reviewed listed as systolic but no evidence of 2D echo in the system anyhow if he is on diuretics will hold secondary to needing hydration with acute infection and obtain a 2D echo to have in the system and to evaluate if his EF is just in case he has to go to surgery has been tachycardic we will do an EKG        Lactic acidosis  Does not meet SIRS or sepsis criteria but suspect in light of infection or " dehydration.  Was given fluids repeat lactic acid antibiotics have been started  Stage 3 chronic kidney disease (HCC)  Lab Results   Component Value Date    EGFR 43 10/16/2024    EGFR 75 08/27/2024    EGFR 51 05/18/2024    CREATININE 1.58 (H) 10/16/2024    CREATININE 1.01 08/27/2024    CREATININE 1.38 (H) 05/18/2024   Baseline creatinine is 1.2-1.3 currently 1.5 does not meet SUSHILA will hydrate as a history of systolic CHF is listed without 2D echo unknown what his EF is we will do a gentle isolate 75 cc/h till evaluation of his EF.  Repeat laboratories tomorrow avoid hypotension avoid NSAIDs IV contrast      VTE Pharmacologic Prophylaxis: VTE Score: 3 Moderate Risk (Score 3-4) - Pharmacological DVT Prophylaxis Ordered: heparin.  Code Status: Level 1 - Full Code   Discussion with family: pt    Anticipated Length of Stay: Patient will be admitted on an inpatient basis with an anticipated length of stay of greater than 2 midnights secondary to left lower extremity cellulitis.    History of Present Illness   Chief Complaint: Left leg pain and swelling    Clay Marcial is a 68 y.o. male with a PMH of diabetes who presents with 2 days of left lower extremity swelling pain and redness.  He has had a wound there was a callus 4 months ago that was shaved off.  No fevers no other complaints.    Review of Systems   Constitutional:  Negative for chills and fever.   HENT:  Negative for ear pain and sore throat.    Eyes:  Negative for pain and visual disturbance.   Respiratory:  Negative for cough and shortness of breath.    Cardiovascular:  Positive for leg swelling. Negative for chest pain and palpitations.   Gastrointestinal:  Negative for abdominal pain and vomiting.   Genitourinary:  Negative for dysuria and hematuria.   Musculoskeletal:  Negative for arthralgias and back pain.   Skin:  Negative for color change and rash.   Neurological:  Negative for seizures and syncope.   All other systems reviewed and are  negative.      Historical Information   Past Medical History:   Diagnosis Date    COPD (chronic obstructive pulmonary disease) ([facility])     Diabetes mellitus (HCC) 04/16/2024    Sleep apnea     UTI (urinary tract infection)      Past Surgical History:   Procedure Laterality Date    BACK SURGERY       Social History     Tobacco Use    Smoking status: Every Day     Current packs/day: 1.50     Types: Cigarettes    Smokeless tobacco: Never   Vaping Use    Vaping status: Never Used   Substance and Sexual Activity    Alcohol use: Yes     Alcohol/week: 7.0 standard drinks of alcohol     Types: 7 Shots of liquor per week    Drug use: Never    Sexual activity: Not on file     E-Cigarette/Vaping    E-Cigarette Use Never User      E-Cigarette/Vaping Substances     History reviewed. No pertinent family history.  Social History:  Marital Status: Single     Meds/Allergies   I have reviewed home medications with a medical source (PCP, Pharmacy, other).  Prior to Admission medications    Medication Sig Start Date End Date Taking? Authorizing Provider   albuterol (PROVENTIL HFA,VENTOLIN HFA) 90 mcg/act inhaler Inhale 1 puff every 4 (four) hours as needed for shortness of breath or wheezing    Historical Provider, MD   aspirin 81 mg chewable tablet Chew 81 mg daily    Historical Provider, MD   atorvastatin (LIPITOR) 20 mg tablet Take 20 mg by mouth daily 1/3/24   Historical Provider, MD   cadexomer iodine (IODOSORB) 0.9 % gel Apply 1 Application topically daily To promote healing of ulcers/wounds 12/22/23   Historical Provider, MD   cetirizine (ZyrTEC) 5 MG tablet Take 5 mg by mouth daily    Historical Provider, MD   DULoxetine (CYMBALTA) 20 mg capsule Take 20 mg by mouth daily    Historical Provider, MD   furosemide (LASIX) 40 mg tablet Take 40 mg by mouth daily 11/1/23   Historical Provider, MD   isosorbide mononitrate (IMDUR) 30 mg 24 hr tablet Take 30 mg by mouth 2/6/24   Historical Provider, MD   lisinopril (ZESTRIL) 20 mg tablet  Take 20 mg by mouth daily 11/1/23   Historical Provider, MD   metFORMIN (GLUCOPHAGE-XR) 500 mg 24 hr tablet Take 1,000 mg by mouth 2 (two) times a day with meals 2/8/24   Historical Provider, MD   metoprolol succinate (TOPROL-XL) 50 mg 24 hr tablet Take 50 mg by mouth daily 11/15/23   Historical Provider, MD   omeprazole (PriLOSEC OTC) 20 MG tablet Take 20 mg by mouth 2 (two) times a day 3/28/24 4/27/24  Historical Provider, MD   pregabalin (LYRICA) 200 MG capsule Take 200 mg by mouth 2 (two) times a day 3/4/24   Historical Provider, MD   sodium bicarbonate 325 MG tablet Take 1 tablet (325 mg total) by mouth 2 (two) times a day 4/22/24   Jenni Frazier PA-C   tiotropium-olodaterol (STIOLTO RESPIMAT) 2.5-2.5 MCG/ACT inhaler Inhale 2 puffs daily    Historical Provider, MD     No Known Allergies    Objective :  Temp:  [99 °F (37.2 °C)] 99 °F (37.2 °C)  HR:  [109-125] 109  BP: (111-178)/(62-81) 122/71  Resp:  [18] 18  SpO2:  [60 %-98 %] 60 %    Physical Exam  Vitals and nursing note reviewed.   Constitutional:       General: He is not in acute distress.     Appearance: He is well-developed.   HENT:      Head: Normocephalic and atraumatic.   Eyes:      Conjunctiva/sclera: Conjunctivae normal.   Cardiovascular:      Rate and Rhythm: Regular rhythm. Tachycardia present.      Heart sounds: No murmur heard.  Pulmonary:      Effort: Pulmonary effort is normal. No respiratory distress.      Breath sounds: Normal breath sounds. No wheezing or rales.   Abdominal:      General: There is no distension.      Palpations: Abdomen is soft.      Tenderness: There is no abdominal tenderness.   Musculoskeletal:         General: Swelling (Left lower extremity swelling pain and redness and warmth) present.      Cervical back: Neck supple.   Skin:     General: Skin is warm and dry.      Capillary Refill: Capillary refill takes less than 2 seconds.   Neurological:      Mental Status: He is alert and oriented to person, place, and time.  "  Psychiatric:         Mood and Affect: Mood normal.          Lines/Drains:            Lab Results: I have reviewed the following results:  Results from last 7 days   Lab Units 10/16/24  1121   WBC Thousand/uL 11.65*   HEMOGLOBIN g/dL 15.2   HEMATOCRIT % 44.5   PLATELETS Thousands/uL 210   SEGS PCT % 82*   LYMPHO PCT % 9*   MONO PCT % 7   EOS PCT % 0     Results from last 7 days   Lab Units 10/16/24  1121   SODIUM mmol/L 133*   POTASSIUM mmol/L 4.3   CHLORIDE mmol/L 92*   CO2 mmol/L 29   BUN mg/dL 19   CREATININE mg/dL 1.58*   ANION GAP mmol/L 12   CALCIUM mg/dL 9.4   GLUCOSE RANDOM mg/dL 299*             No results found for: \"HGBA1C\"  Results from last 7 days   Lab Units 10/16/24  1121   LACTIC ACID mmol/L 2.2*   PROCALCITONIN ng/ml 0.19       Imaging Results Review: I reviewed radiology reports from this admission including: CT left lower extremity.  Other Study Results Review: No additional pertinent studies reviewed.    Administrative Statements   I have spent a total time of >45 minutes in caring for this patient on the day of the visit/encounter including Diagnostic results, Documenting in the medical record, Reviewing / ordering tests, medicine, procedures  , Obtaining or reviewing history  , and Communicating with other healthcare professionals .    ** Please Note: This note has been constructed using a voice recognition system. **    "

## 2024-10-16 NOTE — ASSESSMENT & PLAN NOTE
"No results found for: \"HGBA1C\"    No results for input(s): \"POCGLU\" in the last 72 hours.    Blood Sugar Average: Last 72 hrs:    Check hemoglobin A1c start Lantus 20 units at night Humalog 7 units with meals and sliding scale  "

## 2024-10-16 NOTE — PROGRESS NOTES
Clay Marcial is a 68 y.o. male who is currently ordered Vancomycin IV with management by the Pharmacy Consult service.  Relevant clinical data and objective / subjective history reviewed.  Vancomycin Assessment:  Indication and Goal AUC/Trough: Soft tissue (goal -600, trough >10)  Clinical Status:  new start  Micro:     Renal Function:  SCr: 1.58 mg/dL  CrCl: 58.8 mL/min  Renal replacement: Not on dialysis  Days of Therapy: 1  Current Dose: new start  Vancomycin Plan:  New Dosin mg IV q 12 hours  Estimated AUC: 465 mcg*hr/mL  Estimated Trough: 16 mcg/mL  Next Level: 10/18 at 0500  Renal Function Monitoring: Daily BMP and UOP  Pharmacy will continue to follow closely for s/sx of nephrotoxicity, infusion reactions and appropriateness of therapy.  BMP and CBC will be ordered per protocol. We will continue to follow the patient’s culture results and clinical progress daily.    Heather Grullon, Pharmacist

## 2024-10-17 ENCOUNTER — APPOINTMENT (INPATIENT)
Dept: MRI IMAGING | Facility: HOSPITAL | Age: 69
DRG: 617 | End: 2024-10-17
Payer: COMMERCIAL

## 2024-10-17 ENCOUNTER — APPOINTMENT (INPATIENT)
Dept: NON INVASIVE DIAGNOSTICS | Facility: HOSPITAL | Age: 69
DRG: 617 | End: 2024-10-17
Payer: COMMERCIAL

## 2024-10-17 ENCOUNTER — APPOINTMENT (INPATIENT)
Dept: ULTRASOUND IMAGING | Facility: HOSPITAL | Age: 69
DRG: 617 | End: 2024-10-17
Payer: COMMERCIAL

## 2024-10-17 PROBLEM — G47.33 OSA (OBSTRUCTIVE SLEEP APNEA): Status: ACTIVE | Noted: 2024-10-17

## 2024-10-17 PROBLEM — J44.1 CHRONIC OBSTRUCTIVE PULMONARY DISEASE WITH ACUTE EXACERBATION (HCC): Status: ACTIVE | Noted: 2024-10-17

## 2024-10-17 LAB
AMORPH URATE CRY URNS QL MICRO: ABNORMAL /HPF
ANION GAP SERPL CALCULATED.3IONS-SCNC: 8 MMOL/L (ref 4–13)
AORTIC VALVE MEAN VELOCITY: 11.3 M/S
APICAL FOUR CHAMBER EJECTION FRACTION: 55 %
ATRIAL RATE: 110 BPM
ATRIAL RATE: 117 BPM
AV LVOT MEAN GRADIENT: 2 MMHG
AV LVOT PEAK GRADIENT: 4 MMHG
AV MEAN GRADIENT: 5 MMHG
AV PEAK GRADIENT: 8 MMHG
AV VELOCITY RATIO: 0.69
BACTERIA UR QL AUTO: ABNORMAL /HPF
BASOPHILS # BLD AUTO: 0.04 THOUSANDS/ΜL (ref 0–0.1)
BASOPHILS NFR BLD AUTO: 0 % (ref 0–1)
BILIRUB UR QL STRIP: NEGATIVE
BSA FOR ECHO PROCEDURE: 2.42 M2
BUN SERPL-MCNC: 25 MG/DL (ref 5–25)
CALCIUM SERPL-MCNC: 7.7 MG/DL (ref 8.4–10.2)
CHLORIDE SERPL-SCNC: 97 MMOL/L (ref 96–108)
CLARITY UR: CLEAR
CO2 SERPL-SCNC: 27 MMOL/L (ref 21–32)
COLOR UR: ABNORMAL
CREAT SERPL-MCNC: 1.73 MG/DL (ref 0.6–1.3)
CREAT UR-MCNC: 85.2 MG/DL
DOP CALC AO PEAK VEL: 1.38 M/S
DOP CALC AO VTI: 23.14 CM
DOP CALC LVOT PEAK VEL VTI: 17.03 CM
DOP CALC LVOT PEAK VEL: 0.95 M/S
E WAVE DECELERATION TIME: 142 MS
E/A RATIO: 0.93
EOSINOPHIL # BLD AUTO: 0.1 THOUSAND/ΜL (ref 0–0.61)
EOSINOPHIL NFR BLD AUTO: 1 % (ref 0–6)
ERYTHROCYTE [DISTWIDTH] IN BLOOD BY AUTOMATED COUNT: 12.1 % (ref 11.6–15.1)
EST. AVERAGE GLUCOSE BLD GHB EST-MCNC: 160 MG/DL
FRACTIONAL SHORTENING: 45 (ref 28–44)
GFR SERPL CREATININE-BSD FRML MDRD: 39 ML/MIN/1.73SQ M
GLUCOSE SERPL-MCNC: 207 MG/DL (ref 65–140)
GLUCOSE SERPL-MCNC: 218 MG/DL (ref 65–140)
GLUCOSE SERPL-MCNC: 233 MG/DL (ref 65–140)
GLUCOSE SERPL-MCNC: 240 MG/DL (ref 65–140)
GLUCOSE SERPL-MCNC: 245 MG/DL (ref 65–140)
GLUCOSE UR STRIP-MCNC: ABNORMAL MG/DL
HBA1C MFR BLD: 7.2 %
HCT VFR BLD AUTO: 34.7 % (ref 36.5–49.3)
HGB BLD-MCNC: 11.8 G/DL (ref 12–17)
HGB UR QL STRIP.AUTO: ABNORMAL
IMM GRANULOCYTES # BLD AUTO: 0.04 THOUSAND/UL (ref 0–0.2)
IMM GRANULOCYTES NFR BLD AUTO: 0 % (ref 0–2)
INTERVENTRICULAR SEPTUM IN DIASTOLE (PARASTERNAL SHORT AXIS VIEW): 1.8 CM
INTERVENTRICULAR SEPTUM: 1.8 CM (ref 0.6–1.1)
KETONES UR STRIP-MCNC: NEGATIVE MG/DL
LAAS-AP2: 19.5 CM2
LAAS-AP4: 20.8 CM2
LEFT ATRIUM AREA SYSTOLE SINGLE PLANE A4C: 20.8 CM2
LEFT ATRIUM VOLUME (MOD BIPLANE): 64 ML
LEFT ATRIUM VOLUME INDEX (MOD BIPLANE): 26.4 ML/M2
LEFT INTERNAL DIMENSION IN SYSTOLE: 2.2 CM (ref 2.1–4)
LEFT VENTRICLE DIASTOLIC VOLUME (MOD BIPLANE): 150 ML
LEFT VENTRICLE DIASTOLIC VOLUME INDEX (MOD BIPLANE): 62 ML/M2
LEFT VENTRICLE SYSTOLIC VOLUME (MOD BIPLANE): 63 ML
LEFT VENTRICLE SYSTOLIC VOLUME INDEX (MOD BIPLANE): 26 ML/M2
LEFT VENTRICULAR INTERNAL DIMENSION IN DIASTOLE: 4 CM (ref 3.5–6)
LEFT VENTRICULAR POSTERIOR WALL IN END DIASTOLE: 1.6 CM
LEFT VENTRICULAR STROKE VOLUME: 54 ML
LEUKOCYTE ESTERASE UR QL STRIP: ABNORMAL
LV EF: 58 %
LVSV (TEICH): 54 ML
LYMPHOCYTES # BLD AUTO: 1.16 THOUSANDS/ΜL (ref 0.6–4.47)
LYMPHOCYTES NFR BLD AUTO: 13 % (ref 14–44)
MCH RBC QN AUTO: 32.4 PG (ref 26.8–34.3)
MCHC RBC AUTO-ENTMCNC: 34.4 G/DL (ref 31.4–37.4)
MCV RBC AUTO: 94 FL (ref 82–98)
MONOCYTES # BLD AUTO: 0.99 THOUSAND/ΜL (ref 0.17–1.22)
MONOCYTES NFR BLD AUTO: 11 % (ref 4–12)
MV PEAK A VEL: 0.85 M/S
MV PEAK E VEL: 79 CM/S
MV STENOSIS PRESSURE HALF TIME: 41 MS
MV VALVE AREA P 1/2 METHOD: 5.37
NEUTROPHILS # BLD AUTO: 6.83 THOUSANDS/ΜL (ref 1.85–7.62)
NEUTS SEG NFR BLD AUTO: 75 % (ref 43–75)
NITRITE UR QL STRIP: NEGATIVE
NON-SQ EPI CELLS URNS QL MICRO: ABNORMAL /HPF
NRBC BLD AUTO-RTO: 0 /100 WBCS
P AXIS: 65 DEGREES
P AXIS: 88 DEGREES
PH UR STRIP.AUTO: 5.5 [PH]
PLATELET # BLD AUTO: 162 THOUSANDS/UL (ref 149–390)
PMV BLD AUTO: 9.8 FL (ref 8.9–12.7)
POTASSIUM SERPL-SCNC: 3.8 MMOL/L (ref 3.5–5.3)
PR INTERVAL: 164 MS
PR INTERVAL: 170 MS
PROT UR STRIP-MCNC: ABNORMAL MG/DL
QRS AXIS: -10 DEGREES
QRS AXIS: 12 DEGREES
QRSD INTERVAL: 96 MS
QRSD INTERVAL: 98 MS
QT INTERVAL: 328 MS
QT INTERVAL: 330 MS
QTC INTERVAL: 443 MS
QTC INTERVAL: 460 MS
RBC # BLD AUTO: 3.74 MILLION/UL (ref 3.88–5.62)
RBC #/AREA URNS AUTO: ABNORMAL /HPF
RIGHT ATRIUM AREA SYSTOLE A4C: 19.1 CM2
RIGHT VENTRICLE ID DIMENSION: 4.5 CM
SL CV LEFT ATRIUM LENGTH A2C: 4.9 CM
SL CV LV EF: 60
SL CV PED ECHO LEFT VENTRICLE DIASTOLIC VOLUME (MOD BIPLANE) 2D: 71 ML
SL CV PED ECHO LEFT VENTRICLE SYSTOLIC VOLUME (MOD BIPLANE) 2D: 17 ML
SODIUM 24H UR-SCNC: 31 MOL/L
SODIUM SERPL-SCNC: 132 MMOL/L (ref 135–147)
SP GR UR STRIP.AUTO: 1.02 (ref 1–1.03)
T WAVE AXIS: 59 DEGREES
T WAVE AXIS: 69 DEGREES
TR MAX PG: 6 MMHG
TR PEAK VELOCITY: 1.2 M/S
TRICUSPID ANNULAR PLANE SYSTOLIC EXCURSION: 2.3 CM
TRICUSPID VALVE PEAK REGURGITATION VELOCITY: 1.22 M/S
UROBILINOGEN UR QL STRIP.AUTO: 0.2 E.U./DL
VENTRICULAR RATE: 110 BPM
VENTRICULAR RATE: 117 BPM
WBC # BLD AUTO: 9.16 THOUSAND/UL (ref 4.31–10.16)
WBC #/AREA URNS AUTO: ABNORMAL /HPF

## 2024-10-17 PROCEDURE — 99223 1ST HOSP IP/OBS HIGH 75: CPT

## 2024-10-17 PROCEDURE — 82948 REAGENT STRIP/BLOOD GLUCOSE: CPT

## 2024-10-17 PROCEDURE — 73718 MRI LOWER EXTREMITY W/O DYE: CPT

## 2024-10-17 PROCEDURE — 99232 SBSQ HOSP IP/OBS MODERATE 35: CPT | Performed by: FAMILY MEDICINE

## 2024-10-17 PROCEDURE — 93306 TTE W/DOPPLER COMPLETE: CPT

## 2024-10-17 PROCEDURE — 93923 UPR/LXTR ART STDY 3+ LVLS: CPT | Performed by: SURGERY

## 2024-10-17 PROCEDURE — 85025 COMPLETE CBC W/AUTO DIFF WBC: CPT | Performed by: FAMILY MEDICINE

## 2024-10-17 PROCEDURE — 94002 VENT MGMT INPAT INIT DAY: CPT

## 2024-10-17 PROCEDURE — 80048 BASIC METABOLIC PNL TOTAL CA: CPT | Performed by: FAMILY MEDICINE

## 2024-10-17 PROCEDURE — 81001 URINALYSIS AUTO W/SCOPE: CPT

## 2024-10-17 PROCEDURE — 99255 IP/OBS CONSLTJ NEW/EST HI 80: CPT | Performed by: INTERNAL MEDICINE

## 2024-10-17 PROCEDURE — 82570 ASSAY OF URINE CREATININE: CPT

## 2024-10-17 PROCEDURE — 76775 US EXAM ABDO BACK WALL LIM: CPT

## 2024-10-17 PROCEDURE — 93923 UPR/LXTR ART STDY 3+ LVLS: CPT

## 2024-10-17 PROCEDURE — 84300 ASSAY OF URINE SODIUM: CPT

## 2024-10-17 PROCEDURE — 94640 AIRWAY INHALATION TREATMENT: CPT

## 2024-10-17 PROCEDURE — 94760 N-INVAS EAR/PLS OXIMETRY 1: CPT

## 2024-10-17 PROCEDURE — 94660 CPAP INITIATION&MGMT: CPT

## 2024-10-17 RX ORDER — INSULIN GLARGINE 100 [IU]/ML
25 INJECTION, SOLUTION SUBCUTANEOUS
Status: DISCONTINUED | OUTPATIENT
Start: 2024-10-17 | End: 2024-10-18

## 2024-10-17 RX ORDER — CEFAZOLIN SODIUM 2 G/50ML
2000 SOLUTION INTRAVENOUS EVERY 8 HOURS
Status: DISCONTINUED | OUTPATIENT
Start: 2024-10-17 | End: 2024-10-20

## 2024-10-17 RX ORDER — BUDESONIDE 0.5 MG/2ML
0.5 INHALANT ORAL
Status: DISCONTINUED | OUTPATIENT
Start: 2024-10-17 | End: 2024-10-22 | Stop reason: HOSPADM

## 2024-10-17 RX ORDER — INSULIN LISPRO 100 [IU]/ML
8 INJECTION, SOLUTION INTRAVENOUS; SUBCUTANEOUS
Status: DISCONTINUED | OUTPATIENT
Start: 2024-10-17 | End: 2024-10-18

## 2024-10-17 RX ORDER — ASPIRIN 81 MG/1
81 TABLET, CHEWABLE ORAL DAILY
Status: DISCONTINUED | OUTPATIENT
Start: 2024-10-17 | End: 2024-10-22 | Stop reason: HOSPADM

## 2024-10-17 RX ORDER — LEVALBUTEROL INHALATION SOLUTION 1.25 MG/3ML
1.25 SOLUTION RESPIRATORY (INHALATION) EVERY 8 HOURS PRN
Status: DISCONTINUED | OUTPATIENT
Start: 2024-10-17 | End: 2024-10-22 | Stop reason: HOSPADM

## 2024-10-17 RX ADMIN — HEPARIN SODIUM 5000 UNITS: 5000 INJECTION INTRAVENOUS; SUBCUTANEOUS at 05:01

## 2024-10-17 RX ADMIN — BUDESONIDE INHALATION 0.5 MG: 0.5 SUSPENSION RESPIRATORY (INHALATION) at 19:57

## 2024-10-17 RX ADMIN — INSULIN LISPRO 8 UNITS: 100 INJECTION, SOLUTION INTRAVENOUS; SUBCUTANEOUS at 16:38

## 2024-10-17 RX ADMIN — TRAZODONE HYDROCHLORIDE 25 MG: 50 TABLET ORAL at 22:16

## 2024-10-17 RX ADMIN — DULOXETINE HYDROCHLORIDE 20 MG: 20 CAPSULE, DELAYED RELEASE ORAL at 09:19

## 2024-10-17 RX ADMIN — CEFEPIME HYDROCHLORIDE 2000 MG: 2 INJECTION, SOLUTION INTRAVENOUS at 12:43

## 2024-10-17 RX ADMIN — Medication 1000 UNITS: at 09:19

## 2024-10-17 RX ADMIN — METOPROLOL SUCCINATE 50 MG: 50 TABLET, EXTENDED RELEASE ORAL at 09:19

## 2024-10-17 RX ADMIN — UMECLIDINIUM 1 PUFF: 62.5 AEROSOL, POWDER ORAL at 09:20

## 2024-10-17 RX ADMIN — LORATADINE 10 MG: 10 TABLET ORAL at 09:19

## 2024-10-17 RX ADMIN — HEPARIN SODIUM 5000 UNITS: 5000 INJECTION INTRAVENOUS; SUBCUTANEOUS at 22:16

## 2024-10-17 RX ADMIN — PANTOPRAZOLE SODIUM 40 MG: 40 TABLET, DELAYED RELEASE ORAL at 05:00

## 2024-10-17 RX ADMIN — INSULIN LISPRO 4 UNITS: 100 INJECTION, SOLUTION INTRAVENOUS; SUBCUTANEOUS at 09:21

## 2024-10-17 RX ADMIN — DOCUSATE SODIUM 100 MG: 100 CAPSULE, LIQUID FILLED ORAL at 09:19

## 2024-10-17 RX ADMIN — ASPIRIN 81 MG 81 MG: 81 TABLET ORAL at 12:40

## 2024-10-17 RX ADMIN — OLODATEROL RESPIMAT INHALATION SPRAY 2 PUFF: 2.5 SPRAY, METERED RESPIRATORY (INHALATION) at 09:20

## 2024-10-17 RX ADMIN — FLUTICASONE PROPIONATE 2 SPRAY: 50 SPRAY, METERED NASAL at 09:20

## 2024-10-17 RX ADMIN — INSULIN GLARGINE 25 UNITS: 100 INJECTION, SOLUTION SUBCUTANEOUS at 22:16

## 2024-10-17 RX ADMIN — INSULIN LISPRO 7 UNITS: 100 INJECTION, SOLUTION INTRAVENOUS; SUBCUTANEOUS at 09:21

## 2024-10-17 RX ADMIN — ONDANSETRON 4 MG: 2 INJECTION INTRAMUSCULAR; INTRAVENOUS at 22:21

## 2024-10-17 RX ADMIN — NICOTINE 1 PATCH: 14 PATCH, EXTENDED RELEASE TRANSDERMAL at 09:20

## 2024-10-17 RX ADMIN — INSULIN LISPRO 7 UNITS: 100 INJECTION, SOLUTION INTRAVENOUS; SUBCUTANEOUS at 12:17

## 2024-10-17 RX ADMIN — ATORVASTATIN CALCIUM 20 MG: 20 TABLET, FILM COATED ORAL at 09:19

## 2024-10-17 RX ADMIN — SODIUM CHLORIDE, SODIUM GLUCONATE, SODIUM ACETATE, POTASSIUM CHLORIDE, MAGNESIUM CHLORIDE, SODIUM PHOSPHATE, DIBASIC, AND POTASSIUM PHOSPHATE 75 ML/HR: .53; .5; .37; .037; .03; .012; .00082 INJECTION, SOLUTION INTRAVENOUS at 06:50

## 2024-10-17 RX ADMIN — PANTOPRAZOLE SODIUM 40 MG: 40 TABLET, DELAYED RELEASE ORAL at 15:38

## 2024-10-17 RX ADMIN — PREGABALIN 100 MG: 100 CAPSULE ORAL at 09:19

## 2024-10-17 RX ADMIN — METRONIDAZOLE 500 MG: 500 INJECTION, SOLUTION INTRAVENOUS at 00:14

## 2024-10-17 RX ADMIN — INSULIN LISPRO 4 UNITS: 100 INJECTION, SOLUTION INTRAVENOUS; SUBCUTANEOUS at 12:17

## 2024-10-17 RX ADMIN — VANCOMYCIN HYDROCHLORIDE 750 MG: 750 INJECTION, SOLUTION INTRAVENOUS at 04:57

## 2024-10-17 RX ADMIN — HEPARIN SODIUM 5000 UNITS: 5000 INJECTION INTRAVENOUS; SUBCUTANEOUS at 15:39

## 2024-10-17 RX ADMIN — POLYETHYLENE GLYCOL 3350 17 G: 17 POWDER, FOR SOLUTION ORAL at 09:19

## 2024-10-17 RX ADMIN — SALINE NASAL SPRAY 2 SPRAY: 1.5 SOLUTION NASAL at 09:21

## 2024-10-17 RX ADMIN — METRONIDAZOLE 500 MG: 500 INJECTION, SOLUTION INTRAVENOUS at 09:20

## 2024-10-17 RX ADMIN — CYANOCOBALAMIN TAB 500 MCG 500 MCG: 500 TAB at 09:21

## 2024-10-17 RX ADMIN — CEFAZOLIN SODIUM 2000 MG: 2 SOLUTION INTRAVENOUS at 20:26

## 2024-10-17 RX ADMIN — SODIUM CHLORIDE, SODIUM GLUCONATE, SODIUM ACETATE, POTASSIUM CHLORIDE, MAGNESIUM CHLORIDE, SODIUM PHOSPHATE, DIBASIC, AND POTASSIUM PHOSPHATE 75 ML/HR: .53; .5; .37; .037; .03; .012; .00082 INJECTION, SOLUTION INTRAVENOUS at 22:29

## 2024-10-17 RX ADMIN — INSULIN LISPRO 4 UNITS: 100 INJECTION, SOLUTION INTRAVENOUS; SUBCUTANEOUS at 16:38

## 2024-10-17 RX ADMIN — DOCUSATE SODIUM 100 MG: 100 CAPSULE, LIQUID FILLED ORAL at 18:17

## 2024-10-17 RX ADMIN — PREGABALIN 100 MG: 100 CAPSULE ORAL at 18:17

## 2024-10-17 RX ADMIN — ISOSORBIDE MONONITRATE 30 MG: 30 TABLET, EXTENDED RELEASE ORAL at 09:19

## 2024-10-17 RX ADMIN — CEFEPIME HYDROCHLORIDE 2000 MG: 2 INJECTION, SOLUTION INTRAVENOUS at 01:22

## 2024-10-17 NOTE — UTILIZATION REVIEW
Initial Clinical Review    Admission: Date/Time/Statement:   Admission Orders (From admission, onward)       Ordered        10/16/24 1323  INPATIENT ADMISSION  Once                          Orders Placed This Encounter   Procedures    INPATIENT ADMISSION     Standing Status:   Standing     Number of Occurrences:   1     Order Specific Question:   Level of Care     Answer:   Med Surg [16]     Order Specific Question:   Estimated length of stay     Answer:   More than 2 Midnights     Order Specific Question:   Certification     Answer:   I certify that inpatient services are medically necessary for this patient for a duration of greater than two midnights. See H&P and MD Progress Notes for additional information about the patient's course of treatment.     ED Arrival Information       Expected   -    Arrival   10/16/2024 11:03    Acuity   Urgent              Means of arrival   Walk-In    Escorted by   Self    Service   Hospitalist    Admission type   Emergency              Arrival complaint   Left leg redness             Chief Complaint   Patient presents with    Leg Pain     Chronic wound on left foot, home health came to see patient and left leg swollen and painful, pt reports trouble walking with multiple falls at home, denies HS on any of the falls        Initial Presentation: 68 y.o. male who presented self from home to [facility] ED. Admitted as Inpatient for evaluation and treatment of Diabetic foot infection, Cellulitis.     PMHx:  has a past medical history of COPD (chronic obstructive pulmonary disease) ([facility]), Diabetes mellitus (HCC) (04/16/2024), Sleep apnea, and UTI (urinary tract infection).      Presented w/ 2 days of left lower extremity swelling pain and redness. Reports having a wound there, a callus he shaved off about 4 months ago. On exam, L LE w/ swelling, warmth and redness. Abnormal Labs Na 133, Creat 1.58, Glucose 299, Lactic Acid 2.2, WBC 11.65, Sed rate 25, .2,  HbA1c 7.2. CT L LE: Cellulitic changes in the leg, around the ankle joint and at the level of the foot. IN the ED, pt received Morphine IV x1, Cefepime IV x1, IV isolyte bolus 1 L.    Plan: continue IV Cefepime, Start Vano and Flagyl IV. MRI. Venous duplex L LE. Pain control.Start Lantus at night and Humalog w/ meals and sliding scale. Echo. Gentle IV fluids for now.  Podiatrist consulted.    Anticipated Length of Stay/Certification Statement:  Patient will be admitted on an inpatient basis with an anticipated length of stay of greater than 2 midnights secondary to left lower extremity cellulitis.     Date: 10/17/24   Day 2: Pt reports feeling better. Abnormal labs: Na 132, Creat 1.73,  Glucose 207, Ca 7.7. H/H 11.8/34.7. Plan: Continue  IV Cefepime, Vanco and Flagyl,  Pain control, Venous duplex pending. MRI pending. Lantus at night and Humalog w/ meals and sliding scale.  ID consult ordered. Podiatrist consult pending.     ID consult: Cellulitis. D/C IV Vanco, Cefepime and Flagyl. Start Cefazolin Q 8hrs. F/U wound and blood cultures. F/U Duplex L LE. CBC and CMP daily.     Certification Statement: The patient will continue to require additional inpatient hospital stay due to diabetic foot infection       ED Treatment-Medication Administration from 10/16/2024 1102 to 10/16/2024 1431         Date/Time Order Dose Route Action     10/16/2024 1217 morphine injection 6 mg 6 mg Intravenous Given     10/16/2024 1302 cefepime (MAXIPIME) IVPB (premix in dextrose) 2,000 mg 50 mL 2,000 mg Intravenous New Bag     10/16/2024 1339 multi-electrolyte (ISOLYTE-S PH 7.4) bolus 1,000 mL 1,000 mL Intravenous New Bag            Scheduled Medications:  aspirin, 81 mg, Oral, Daily  atorvastatin, 20 mg, Oral, Daily  budesonide, 0.5 mg, Nebulization, Q12H  Cholecalciferol, 1,000 Units, Oral, Daily  cyanocobalamin, 500 mcg, Oral, Daily  docusate sodium, 100 mg, Oral, BID  DULoxetine, 20 mg, Oral, Daily  fluticasone, 2 spray, Nasal,  Daily  heparin (porcine), 5,000 Units, Subcutaneous, Q8H EPHRAIM  insulin glargine, 25 Units, Subcutaneous, HS  insulin lispro, 2-12 Units, Subcutaneous, TID AC  insulin lispro, 8 Units, Subcutaneous, TID With Meals  isosorbide mononitrate, 30 mg, Oral, Daily  loratadine, 10 mg, Oral, Daily  metoprolol succinate, 50 mg, Oral, Daily  nicotine, 1 patch, Transdermal, Daily  umeclidinium, 1 puff, Inhalation, Daily   And  olodaterol HCl, 2 puff, Inhalation, Daily  pantoprazole, 40 mg, Oral, BID AC  polyethylene glycol, 17 g, Oral, Daily  pregabalin, 100 mg, Oral, BID  sodium chloride, 2 spray, Each Nare, BID  traZODone, 25 mg, Oral, HS  vancomycin (VANCOCIN) IVPB (premix in dextrose) 750 mg 150 mL  Dose: 750 mg  Freq: Every 12 hours Route: IV  Last Dose: 750 mg (10/17/24 0457)  Start: 10/16/24 1700 End: 10/17/24 0735 - last dose given 10/17 @ 0457.  metroNIDAZOLE (FLAGYL) IVPB (premix) 500 mg 100 mL  Dose: 500 mg  Freq: Every 8 hours Route: IV  Last Dose: 500 mg (10/17/24 0920)  Start: 10/16/24 1600 End: 10/17/24 1321 - received x2 dose 10/17 @ 0014, 0920.  cefepime (MAXIPIME) IVPB (premix in dextrose) 2,000 mg 50 mL  Dose: 2,000 mg  Freq: Every 12 hours Route: IV  Last Dose: 2,000 mg (10/17/24 1243)  Start: 10/17/24 0100 End: 10/17/24 1321 - received x2 dose 10/17 @ 0122, 1243.    ceFAZolin (ANCEF) IVPB (premix in dextrose) 2,000 mg 50 mL  Dose: 2,000 mg  Freq: Every 8 hours Route: IV  Start: 10/17/24 1330      Continuous IV Infusions:  multi-electrolyte, 75 mL/hr, Intravenous, Continuous      PRN Meds:  acetaminophen, 650 mg, Oral, Q6H PRN - given x1 10/16  levalbuterol, 1.25 mg, Nebulization, Q8H PRN  morphine injection, 2 mg, Intravenous, Q4H PRN  ondansetron, 4 mg, Intravenous, Q4H PRN-given x1 10/16      ED Triage Vitals   Temperature Pulse Respirations Blood Pressure SpO2 Pain Score   10/16/24 1111 10/16/24 1111 10/16/24 1111 10/16/24 1111 10/16/24 1111 10/16/24 1217   99 °F (37.2 °C) (!) 116 18 (!) 178/81 95 % 5      Weight (last 2 days)       Date/Time Weight    10/16/24 1111 126 (277.56)            Vital Signs (last 3 days)       Date/Time Temp Pulse Resp BP MAP (mmHg) SpO2 Calculated FIO2 (%) - Nasal Cannula Nasal Cannula O2 Flow Rate (L/min) O2 Device Patient Position - Orthostatic VS [place] Coma Scale Score Pain    10/17/24 0737 97.7 °F (36.5 °C) 102 -- 115/63 -- 93 % -- -- -- -- -- --    10/16/24 2300 -- -- -- -- -- 95 % 28 2 L/min Nasal cannula -- 15 No Pain    10/16/24 2115 98.2 °F (36.8 °C) 92 20 142/62 89 97 % -- -- -- -- -- --    10/16/24 1935 98.8 °F (37.1 °C) 122 28 142/57 85 91 % -- -- None (Room air) Lying -- --    10/16/24 1549 -- -- -- -- -- -- -- -- None (Room air) -- -- --    10/16/24 1519 -- -- -- -- -- -- -- -- -- -- -- No Pain    10/16/24 1330 -- -- -- 122/71 88 -- -- -- -- -- -- --    10/16/24 1248 -- 109 -- 133/67 92 60 % -- -- -- -- -- --    10/16/24 1230 -- 125 -- 111/62 80 97 % -- -- -- -- -- --    10/16/24 1217 -- -- -- -- -- -- -- -- -- -- -- 5    10/16/24 1200 -- 114 -- 150/68 98 97 % -- -- -- -- -- --    10/16/24 1145 -- -- -- -- -- -- -- -- -- -- 15 --    10/16/24 1130 -- 113 -- 127/70 88 98 % -- -- -- -- -- --    10/16/24 1111 99 °F (37.2 °C) 116 18 178/81 -- 95 % -- -- -- Sitting -- --              Pertinent Labs/Diagnostic Test Results:   Radiology:  MRI foot/forefoot toes left wo contrast   Final Interpretation by Kris Lucas MD (10/17 0292)      1.  There is focal marrow edema in the third metatarsal head and base of the third toe, proximal phalanx. However, this is only seen on T2 signal, favoring reactive changes over osteomyelitis.   2.  Diffuse subcutaneous edema.   3.  Degenerative changes, as described.            Workstation performed: [id number]         US kidney and bladder   Final Interpretation by Kris Lucas MD (10/17 1012)      No evidence of hydronephrosis or renal atrophy.            Workstation performed: [id number]         VAS VENOUS DUPLEX -LOWER LIMB  UNILATERAL   Final Interpretation by Chaitanya Granados DO (10/16 1721)      CT lower extremity wo contrast left   Final Interpretation by Kayla Jacinto MD (10/16 1434)   No CT findings of osteomyelitis.      Cellulitic changes in the leg, around the ankle joint and at the level of the foot      A small 2 mm ossific density of the dorsum of the base of the second metatarsal with well-circumscribed margin, likely sequela of age-indeterminate avulsion, (image 44 series 401)         Workstation performed: [id number]         XR foot 3+ views LEFT   ED Interpretation by Festus Arcos DO (10/16 1257)   No signs of bony erosion.      Final Interpretation by Des Capellan MD (10/16 1335)      No acute osseous abnormality.      Degenerative changes as described.         Computerized Assisted Algorithm (CAA) may have been used to analyze all applicable images.         Workstation performed: [id number]         XR tibia fibula 2 views LEFT   ED Interpretation by Festus Arcos DO (10/16 1257)   No signs of soft tissue gas.  No acute osseous abnormalities.      Final Interpretation by Des Capellan MD (10/16 1340)      No acute osseous abnormality.            Computerized Assisted Algorithm (CAA) may have been used to analyze all applicable images.               Workstation performed: [id number]         VAS OJSE JUAN and waveform analysis, multiple levels    (Results Pending)     Cardiology:  ECG 12 lead   Final Result by George Motley MD (10/17 0822)   Sinus tachycardia with Premature atrial complexes   Otherwise normal ECG   When compared with ECG of 16-OCT-2024 16:37, (unconfirmed)   No significant change was found   Confirmed by George Motley ([id number]) on 10/17/2024 8:22:51 AM      ECG 12 lead   Final Result by George Motley MD (10/17 0824)   Sinus tachycardia with Premature atrial complexes   Otherwise normal ECG   When compared with ECG of 27-AUG-2024 14:59,   Premature atrial complexes are now Present   Confirmed  by George Motley ([id number]) on 10/17/2024 8:24:12 AM            Results from last 7 days   Lab Units 10/17/24  0446 10/16/24  1121   WBC Thousand/uL 9.16 11.65*   HEMOGLOBIN g/dL 11.8* 15.2   HEMATOCRIT % 34.7* 44.5   PLATELETS Thousands/uL 162 210   TOTAL NEUT ABS Thousands/µL 6.83 9.65*         Results from last 7 days   Lab Units 10/17/24  0446 10/16/24  1121   SODIUM mmol/L 132* 133*   POTASSIUM mmol/L 3.8 4.3   CHLORIDE mmol/L 97 92*   CO2 mmol/L 27 29   ANION GAP mmol/L 8 12   BUN mg/dL 25 19   CREATININE mg/dL 1.73* 1.58*   EGFR ml/min/1.73sq m 39 43   CALCIUM mg/dL 7.7* 9.4         Results from last 7 days   Lab Units 10/17/24  1153 10/17/24  0734 10/16/24  2111 10/16/24  1708   POC GLUCOSE mg/dl 233* 218* 256* 320*     Results from last 7 days   Lab Units 10/17/24  0446 10/16/24  1121   GLUCOSE RANDOM mg/dL 207* 299*         Results from last 7 days   Lab Units 10/16/24  1647   HEMOGLOBIN A1C % 7.2*   EAG mg/dl 160     Beta- Hydroxybutyrate   Date Value Ref Range Status   04/17/2024 <0.05 0.02 - 0.27 mmol/L Final      Results from last 7 days   Lab Units 10/16/24  1121   PROCALCITONIN ng/ml 0.19     Results from last 7 days   Lab Units 10/16/24  1647 10/16/24  1121   LACTIC ACID mmol/L 2.0 2.2*     Results from last 7 days   Lab Units 10/16/24  1121   CRP mg/L 226.2*   SED RATE mm/hour 25*             Results from last 7 days   Lab Units 10/17/24  1014   CLARITY UA  Clear   COLOR UA  Dk Yellow   SPEC GRAV UA  1.020   PH UA  5.5   GLUCOSE UA mg/dl >=1000 (1%)*   KETONES UA mg/dl Negative   BLOOD UA  Trace-lysed*   PROTEIN UA mg/dl Trace*   NITRITE UA  Negative   BILIRUBIN UA  Negative   UROBILINOGEN UA E.U./dl 0.2   LEUKOCYTES UA  Elevated glucose may cause decreased leukocyte values. See urine microscopic for UWBC result*   WBC UA /hpf 0-1*   RBC UA /hpf None Seen   BACTERIA UA /hpf Occasional   EPITHELIAL CELLS WET PREP /hpf Occasional   SODIUM UR  31   CREATININE UR mg/dL 85.2     Results from last 7  days   Lab Units 10/16/24  1342 10/16/24  1228 10/16/24  1121   BLOOD CULTURE   --  Received in Microbiology Lab. Culture in Progress. Received in Microbiology Lab. Culture in Progress.   GRAM STAIN RESULT  No polys seen*  2+ Gram positive cocci in pairs*  2+ Gram variable rods*  --   --    WOUND CULTURE  Culture too young- will reincubate  --   --        Past Medical History:   Diagnosis Date    COPD (chronic obstructive pulmonary disease) ([facility])     Diabetes mellitus ([facility]) 04/16/2024    Sleep apnea     UTI (urinary tract infection)      Present on Admission:   Lactic acidosis   CAD (coronary artery disease)   CHF (congestive heart failure) ([facility])   Diabetes mellitus ([facility])      Admitting Diagnosis: Leg pain [M79.606]  Cellulitis of foot [L03.119]  SUSHILA (acute kidney injury) ([facility]) [N17.9]  Leg mass, right [R22.41]  Ambulatory dysfunction [R26.2]  Cellulitis of left anterior lower leg [L03.116]  Open wound of plantar aspect of foot, left, initial encounter [S91.302A]  Age/Sex: 68 y.o. male    Network Utilization Review Department  ATTENTION: Please call with any questions or concerns to [phone] and carefully listen to the prompts so that you are directed to the right person. All voicemails are confidential.   For Discharge needs, contact Care Management DC Support Team at [phone] opt. 2  Send all requests for admission clinical reviews, approved or denied determinations and any other requests to dedicated fax number below belonging to the campus where the patient is receiving treatment. List of dedicated fax numbers for the Facilities:  FACILITY NAME UR FAX NUMBER   ADMISSION DENIALS (Administrative/Medical Necessity) [phone]   DISCHARGE SUPPORT TEAM (NETWORK) [phone]   PARENT CHILD HEALTH (Maternity/NICU/Pediatrics) [phone]   [facility] [phone]   [facility] [phone]   formerly [facility] [phone]   [facility]  [facility] [phone]   [facility] [phone]   [facility] [phone]   [facility] [phone]   [facility] [phone]   [facility] [phone]   [facility] [phone]   [facility] [phone]   [facility] [phone]

## 2024-10-17 NOTE — PLAN OF CARE

## 2024-10-17 NOTE — CONSULTS
Consultation - Nephrology   Clay DAVID Marcial 68 y.o. male MRN: [id number]  Unit/Bed#: -[facility] Encounter: [id number]    ASSESSMENT/PLAN:    SUSHILA (POA) on CKD  -Creatinine on admission 1.58 mg/dL, most recent creatinine 1.73 mg/dL  -Etiology: Potentially a volume depleted state from osmotic diuresis due to uncontrolled hyperglycemia.  We are checking urine studies to further confirm.  An ultrasound of the kidneys and bladder is being obtained to rule out obstruction.  A urinalysis being obtained as a screen for hematuria/proteinuria/pyuria to rule out potential underlying process.  The patient's lisinopril, Jardiance, Lasix and metformin are appropriately on hold.  His urine output has been nonoliguric/net negative since admission.  The patient was placed on IV fluids yesterday evening and he has received about 1.275 L of IV fluids thus far.  His volume status is euvolemic.  Will continue IV fluids throughout the day and then continue to monitor tomorrow morning  -UA: Glucosuria, trace proteinuria  -Renal imaging: No hydronephrosis  -Avoid hypotension, avoid nephrotoxins, avoid NSAIDS  -Trend BMP    CKD stage II/IIIa  -Etiology: Potentially diabetic nephropathy, hypertensive nephrosclerosis, chronic tubulointerstitial nephritis  --Baseline creatinine 1.3 to 1.4 mg/dL    Hypertension/blood pressure  -OP regimen: Metoprolol 50 mg daily, lisinopril 20 mg daily, Imdur 30 mg daily  -Current regimen: Imdur 30 mg, metoprolol 50 mg  -Recent blood pressures: Acceptable    Lactic acidosis  Lactic acid was 2.2 on admission in the setting of acute infection.  It is now improved with IV fluids.    Hyponatremia  -Etiology: When corrected for glucose it is 134.  Address the hyperglycemia and continue to monitor the BMP    Anemia  -Hgb 11.8, acute drop from 15.2 noted.  No active signs of bleeding noted.  Continue to monitor  -Recommend transfuse for goal greater than 7 per primary team    Diabetic foot infection  Management per  podiatry, infectious disease    HISTORY OF PRESENT ILLNESS:  Requesting Physician: Zee Newman MD  Reason for Consult: Acute kidney injury    Clay Marcial is a 68 y.o. year old male with a history of type 2 diabetes, GERD, coronary artery disease, obstructive sleep apnea, chronic bronchitis, tobacco use disorder, congestive heart failure who was admitted to [facility] after presenting with worsening redness/pain/warmth along the anterior aspect of the left lower leg.  He was found to have a diabetic foot infection and left lower extremity cellulitis.  The patient was started on IV antibiotics and a CT without contrast was ordered to rule out osteomyelitis.  The CT showed no findings of osteomyelitis.  A venous duplex of the left lower limb was also obtained to rule out DVT which was negative.  An echocardiogram was ordered which is pending.  On presentation the patient's labs were significant for a sodium 133, glucose 299, chloride 92, creatinine 1.58 mg/dL, GFR 43, lactic acid 2.2, CRP to 226.2 and WBC 11,000.  A renal consultation is requested today for assistance in the management of acute kidney injury.  Patient seen and examined today.  He reports feeling well, he denies nausea, vomiting, diarrhea, fevers, chills, chest pain or shortness of breath.    PAST MEDICAL HISTORY:  Past Medical History:   Diagnosis Date    COPD (chronic obstructive pulmonary disease) (HCC)     Diabetes mellitus ([facility]) 04/16/2024    Sleep apnea     UTI (urinary tract infection)        PAST SURGICAL HISTORY:  Past Surgical History:   Procedure Laterality Date    BACK SURGERY         ALLERGIES:  No Known Allergies    SOCIAL HISTORY:  Social History     Substance and Sexual Activity   Alcohol Use Yes    Alcohol/week: 7.0 standard drinks of alcohol    Types: 7 Shots of liquor per week     Social History     Substance and Sexual Activity   Drug Use Never     Social History     Tobacco Use   Smoking Status Every Day    Current packs/day: 1.50     Types: Cigarettes   Smokeless Tobacco Never       FAMILY HISTORY:  History reviewed. No pertinent family history.    MEDICATIONS:    Current Facility-Administered Medications:     acetaminophen (TYLENOL) tablet 650 mg, 650 mg, Oral, Q6H PRN, Zee Newman MD, 650 mg at 10/16/24 2117    atorvastatin (LIPITOR) tablet 20 mg, 20 mg, Oral, Daily, Stephanie Stone PA-C    cefepime (MAXIPIME) IVPB (premix in dextrose) 2,000 mg 50 mL, 2,000 mg, Intravenous, Q12H, Zee Newman MD, Last Rate: 100 mL/hr at 10/17/24 0122, 2,000 mg at 10/17/24 0122    Cholecalciferol (VITAMIN D3) tablet 1,000 Units, 1,000 Units, Oral, Daily, Stephanie Stone PA-C    cyanocobalamin (VITAMIN B-12) tablet 500 mcg, 500 mcg, Oral, Daily, Stephanie Stone PA-C    docusate sodium (COLACE) capsule 100 mg, 100 mg, Oral, BID, Lina Villanueva MD, 100 mg at 10/16/24 2116    DULoxetine (CYMBALTA) delayed release capsule 20 mg, 20 mg, Oral, Daily, Stephanie Stone PA-C    fluticasone (FLONASE) 50 mcg/act nasal spray 2 spray, 2 spray, Nasal, Daily, Stephanie Stone PA-C    heparin (porcine) subcutaneous injection 5,000 Units, 5,000 Units, Subcutaneous, Q8H EPHRAIM, 5,000 Units at 10/17/24 0501 **AND** [CANCELED] Platelet count, , , Once, Zee Newman MD    insulin glargine (LANTUS) subcutaneous injection 20 Units 0.2 mL, 20 Units, Subcutaneous, HS, Zee Newman MD, 20 Units at 10/16/24 2118    insulin lispro (HumALOG/ADMELOG) 100 units/mL subcutaneous injection 2-12 Units, 2-12 Units, Subcutaneous, TID AC, 8 Units at 10/16/24 1710 **AND** Fingerstick Glucose (POCT), , , TID AC, Zee Newman MD    insulin lispro (HumALOG/ADMELOG) 100 units/mL subcutaneous injection 7 Units, 7 Units, Subcutaneous, TID With Meals, Zee Newman MD, 7 Units at 10/16/24 1711    isosorbide mononitrate (IMDUR) 24 hr tablet 30 mg, 30 mg, Oral, Daily, Stephanie Stone PA-C    loratadine (CLARITIN) tablet 10 mg, 10 mg, Oral, Daily, Stephanie Page  CLAUDINE Stone    metoprolol succinate (TOPROL-XL) 24 hr tablet 50 mg, 50 mg, Oral, Daily, Stephanie Stone PA-C    metroNIDAZOLE (FLAGYL) IVPB (premix) 500 mg 100 mL, 500 mg, Intravenous, Q8H, Zee Newman MD, Last Rate: 200 mL/hr at 10/17/24 0014, 500 mg at 10/17/24 0014    morphine injection 2 mg, 2 mg, Intravenous, Q4H PRN, Zee Newman MD    multi-electrolyte (PLASMALYTE-A/ISOLYTE-S PH 7.4) IV solution, 75 mL/hr, Intravenous, Continuous, Zee Newman MD, Last Rate: 75 mL/hr at 10/17/24 0650, 75 mL/hr at 10/17/24 0650    nicotine (NICODERM CQ) 14 mg/24hr TD 24 hr patch 1 patch, 1 patch, Transdermal, Daily, Zee Newman MD, 1 patch at 10/16/24 1639    umeclidinium 62.5 mcg/actuation inhaler AEPB 1 puff, 1 puff, Inhalation, Daily **AND** olodaterol HCl (STRIVERDI RESPIMAT) inhaler 2 puff, 2 puff, Inhalation, Daily, Stephanie Stone PA-C    ondansetron (ZOFRAN) injection 4 mg, 4 mg, Intravenous, Q4H PRN, Lina Villanueva MD, 4 mg at 10/16/24 2008    pantoprazole (PROTONIX) EC tablet 40 mg, 40 mg, Oral, BID AC, Stephanie Stone PA-C, 40 mg at 10/17/24 0500    polyethylene glycol (MIRALAX) packet 17 g, 17 g, Oral, Daily, Lina Villanueva MD    pregabalin (LYRICA) capsule 100 mg, 100 mg, Oral, BID, Stephanie Stone PA-C, 100 mg at 10/16/24 1815    sodium chloride (OCEAN) 0.65 % nasal spray 2 spray, 2 spray, Each Nare, BID, Stephanie Stone PA-C    traZODone (DESYREL) tablet 25 mg, 25 mg, Oral, HS, Stephanie Stone PA-C, 25 mg at 10/16/24 2117    vancomycin (VANCOCIN) 1,250 mg in sodium chloride 0.9 % 250 mL IVPB, 12.5 mg/kg (Adjusted), Intravenous, Q24H, Zee Newman MD    REVIEW OF SYSTEMS:  Review of Systems   A complete 10 point review of systems was performed and found to be negative unless otherwise noted above or in the HPI.    PHYSICAL EXAM:  Current Weight: Weight - Scale: 126 kg (277 lb 9 oz)  First Weight: Weight - Scale: 126 kg (277 lb 9 oz)  Vitals:    10/16/24 1935  10/16/24 2115 10/16/24 2300 10/17/24 0737   BP: 142/57 142/62  115/63   BP Location: Right arm      Pulse: (!) 122 92  102   Resp: (!) 28 20     Temp: 98.8 °F (37.1 °C) 98.2 °F (36.8 °C)  97.7 °F (36.5 °C)   TempSrc: Temporal Temporal     SpO2: 91% 97% 95% 93%   Weight:           Intake/Output Summary (Last 24 hours) at 10/17/2024 0830  Last data filed at 10/17/2024 0623  Gross per 24 hour   Intake --   Output 850 ml   Net -850 ml     Physical Exam  Vitals and nursing note reviewed.   Constitutional:       General: He is not in acute distress.     Appearance: He is well-developed.   HENT:      Head: Normocephalic and atraumatic.   Cardiovascular:      Rate and Rhythm: Normal rate and regular rhythm.      Heart sounds: No murmur heard.  Pulmonary:      Effort: Pulmonary effort is normal. No respiratory distress.      Breath sounds: Normal breath sounds.   Abdominal:      Palpations: Abdomen is soft.      Tenderness: There is no abdominal tenderness.   Musculoskeletal:      Right lower leg: No edema.      Left lower leg: Edema present.      Comments: Swollen red painful left leg   Skin:     General: Skin is warm and dry.      Capillary Refill: Capillary refill takes less than 2 seconds.   Neurological:      Mental Status: He is alert.   Psychiatric:         Mood and Affect: Mood normal.          Invasive Devices:      Lab Results:   Results from last 7 days   Lab Units 10/17/24  0446 10/16/24  1121   WBC Thousand/uL 9.16 11.65*   HEMOGLOBIN g/dL 11.8* 15.2   HEMATOCRIT % 34.7* 44.5   PLATELETS Thousands/uL 162 210   POTASSIUM mmol/L 3.8 4.3   CHLORIDE mmol/L 97 92*   CO2 mmol/L 27 29   BUN mg/dL 25 19   CREATININE mg/dL 1.73* 1.58*   CALCIUM mg/dL 7.7* 9.4       I have personally reviewed the blood work as stated above and in my note.  I have personally reviewed kidney ultrasound imaging studies.  I have personally reviewed internal medicine note.

## 2024-10-17 NOTE — PROGRESS NOTES
Progress Note - Hospitalist   Name: Clay Marcial 68 y.o. male I MRN: [id number]  Unit/Bed#: -01 I Date of Admission: 10/16/2024   Date of Service: 10/17/2024 I Hospital Day: 1    Assessment & Plan  Diabetic foot infection  (HCC)  Lab Results   Component Value Date    HGBA1C 7.2 (H) 10/16/2024       Recent Labs     10/16/24  1708 10/16/24  2111 10/17/24  0734 10/17/24  1153   POCGLU 320* 256* 218* 233*       Blood Sugar Average: Last 72 hrs:  (P) 256.75  Left lower extremity cellulitis with an open wound on plantar side.  Rule out osteomyelitis ER ready discussed with podiatry left lower extremity CT without contrast ordered as secondary to GFR in the 40s negative for gas will order MRI of the lower extremity- no osteo  ABIs also there is a questionable Baker's cyst will order venous duplex to rule out a DVT no dvt but has bakers cyst    Also will obtain formal podiatry evaluation wound care per podiatry has been ordered start cefepime Vanco and Flagyl  Infectious disease consult  Surgical evaluation planning.  Podiatry post all workup evaluation.  Pain control has been ordered  Diabetes mellitus (HCC)  Lab Results   Component Value Date    HGBA1C 7.2 (H) 10/16/2024       Recent Labs     10/16/24  1708 10/16/24  2111 10/17/24  0734 10/17/24  1153   POCGLU 320* 256* 218* 233*       Blood Sugar Average: Last 72 hrs:  (P) 256.75  Hga1c is actually 7.2 hyperglycemia secondary to infection adjust Lantus to 25 units at night Humalog to 8units with meals and sliding scale  CAD (coronary artery disease)  Asa/ lipitor and isosorbide   Tte ordered as ef unknown   CHF (congestive heart failure) ([facility])  Wt Readings from Last 3 Encounters:   10/16/24 126 kg (277 lb 9 oz)   08/27/24 127 kg (279 lb 12.2 oz)   05/18/24 129 kg (285 lb 7.9 oz)     In the past reviewed listed as systolic but no evidence of 2D echo in the system anyhow if he is on diuretics will hold secondary to needing hydration with acute infection and  obtain a 2D echo to have in the system and to evaluate if his EF is just in case he has to go to surgery has been tachycardic we will do an EKG        Lactic acidosis  Does not meet SIRS or sepsis criteria but suspect in light of infection or dehydration.  Was given fluids repeat lactic acid antibiotics have been started- resolved   Stage 3 chronic kidney disease (HCC)  Lab Results   Component Value Date    EGFR 39 10/17/2024    EGFR 43 10/16/2024    EGFR 75 08/27/2024    CREATININE 1.73 (H) 10/17/2024    CREATININE 1.58 (H) 10/16/2024    CREATININE 1.01 08/27/2024   Baseline creatinine is 1.2-1.3 currently 1.7 does not meet SUSHILA will hydrate as a history of systolic CHF is listed without 2D echo unknown what his EF is we will do a gentle isolate 75 cc/h till evaluation of his EF.  As cr trended up will ask nephro   Chronic obstructive pulmonary disease with acute exacerbation (HCC)  Exp wheezing will do xopenex and pulmicort with acute infection will hold off on steroids  ROMEL (obstructive sleep apnea)  Cpap at home will order    VTE Pharmacologic Prophylaxis: VTE Score: 3 Moderate Risk (Score 3-4) - Pharmacological DVT Prophylaxis Ordered: heparin.    Mobility:   Basic Mobility Inpatient Raw Score: 20  JH-HLM Goal: 6: Walk 10 steps or more  JH-HLM Achieved: 6: Walk 10 steps or more  JH-HLM Goal achieved. Continue to encourage appropriate mobility.    Patient Centered Rounds: I performed bedside rounds with nursing staff today.   Discussions with Specialists or Other Care Team Provider: will discuss with ID and podiatry     Education and Discussions with Family / Patient: pt    Current Length of Stay: 1 day(s)  Current Patient Status: Inpatient   Certification Statement: The patient will continue to require additional inpatient hospital stay due to diabetic foot infection   Discharge Plan: Anticipate discharge in 48 hrs to home.    Code Status: Level 1 - Full Code    Subjective   Seen and examined feeling better no  sob     Objective :  Temp:  [97.7 °F (36.5 °C)-98.8 °F (37.1 °C)] 97.7 °F (36.5 °C)  HR:  [] 102  BP: (115-142)/(57-71) 115/63  Resp:  [20-28] 20  SpO2:  [60 %-97 %] 93 %  O2 Device: Nasal cannula  Nasal Cannula O2 Flow Rate (L/min):  [2 L/min] 2 L/min    Body mass index is 38.71 kg/m².     Input and Output Summary (last 24 hours):     Intake/Output Summary (Last 24 hours) at 10/17/2024 1236  Last data filed at 10/17/2024 1013  Gross per 24 hour   Intake 180 ml   Output 1100 ml   Net -920 ml       Physical Exam  Vitals and nursing note reviewed.   Constitutional:       General: He is not in acute distress.     Appearance: He is well-developed.   HENT:      Head: Normocephalic and atraumatic.   Eyes:      Conjunctiva/sclera: Conjunctivae normal.   Cardiovascular:      Rate and Rhythm: Normal rate and regular rhythm.      Heart sounds: No murmur heard.  Pulmonary:      Effort: Pulmonary effort is normal. No respiratory distress.      Breath sounds: Normal breath sounds.   Abdominal:      Palpations: Abdomen is soft.      Tenderness: There is no abdominal tenderness.   Musculoskeletal:         General: Swelling (left lower extremity) present.      Cervical back: Neck supple.   Skin:     General: Skin is warm and dry.      Capillary Refill: Capillary refill takes less than 2 seconds.      Findings: Erythema present.   Neurological:      Mental Status: He is alert.   Psychiatric:         Mood and Affect: Mood normal.           Lines/Drains:              Lab Results: I have reviewed the following results:   Results from last 7 days   Lab Units 10/17/24  0446   WBC Thousand/uL 9.16   HEMOGLOBIN g/dL 11.8*   HEMATOCRIT % 34.7*   PLATELETS Thousands/uL 162   SEGS PCT % 75   LYMPHO PCT % 13*   MONO PCT % 11   EOS PCT % 1     Results from last 7 days   Lab Units 10/17/24  0446   SODIUM mmol/L 132*   POTASSIUM mmol/L 3.8   CHLORIDE mmol/L 97   CO2 mmol/L 27   BUN mg/dL 25   CREATININE mg/dL 1.73*   ANION GAP mmol/L 8    CALCIUM mg/dL 7.7*   GLUCOSE RANDOM mg/dL 207*         Results from last 7 days   Lab Units 10/17/24  1153 10/17/24  0734 10/16/24  2111 10/16/24  1708   POC GLUCOSE mg/dl 233* 218* 256* 320*     Results from last 7 days   Lab Units 10/16/24  1647   HEMOGLOBIN A1C % 7.2*     Results from last 7 days   Lab Units 10/16/24  1647 10/16/24  1121   LACTIC ACID mmol/L 2.0 2.2*   PROCALCITONIN ng/ml  --  0.19       Recent Cultures (last 7 days):   Results from last 7 days   Lab Units 10/16/24  1342 10/16/24  1228 10/16/24  1121   BLOOD CULTURE   --  Received in Microbiology Lab. Culture in Progress. Received in Microbiology Lab. Culture in Progress.   GRAM STAIN RESULT  No polys seen*  2+ Gram positive cocci in pairs*  2+ Gram variable rods*  --   --    WOUND CULTURE  Culture too young- will reincubate  --   --        Imaging Results Review: I reviewed radiology reports from this admission including: xray(s).  Other Study Results Review: No additional pertinent studies reviewed.    Last 24 Hours Medication List:     Current Facility-Administered Medications:     acetaminophen (TYLENOL) tablet 650 mg, Q6H PRN    aspirin chewable tablet 81 mg, Daily    atorvastatin (LIPITOR) tablet 20 mg, Daily    budesonide (PULMICORT) inhalation solution 0.5 mg, Q12H    cefepime (MAXIPIME) IVPB (premix in dextrose) 2,000 mg 50 mL, Q12H, Last Rate: 2,000 mg (10/17/24 0122)    Cholecalciferol (VITAMIN D3) tablet 1,000 Units, Daily    cyanocobalamin (VITAMIN B-12) tablet 500 mcg, Daily    docusate sodium (COLACE) capsule 100 mg, BID    DULoxetine (CYMBALTA) delayed release capsule 20 mg, Daily    fluticasone (FLONASE) 50 mcg/act nasal spray 2 spray, Daily    heparin (porcine) subcutaneous injection 5,000 Units, Q8H EPHRAIM **AND** [CANCELED] Platelet count, Once    insulin glargine (LANTUS) subcutaneous injection 25 Units 0.25 mL, HS    insulin lispro (HumALOG/ADMELOG) 100 units/mL subcutaneous injection 2-12 Units, TID AC **AND** Fingerstick  Glucose (POCT), TID AC    insulin lispro (HumALOG/ADMELOG) 100 units/mL subcutaneous injection 8 Units, TID With Meals    isosorbide mononitrate (IMDUR) 24 hr tablet 30 mg, Daily    levalbuterol (XOPENEX) inhalation solution 1.25 mg, Q8H PRN    loratadine (CLARITIN) tablet 10 mg, Daily    metoprolol succinate (TOPROL-XL) 24 hr tablet 50 mg, Daily    metroNIDAZOLE (FLAGYL) IVPB (premix) 500 mg 100 mL, Q8H, Last Rate: 500 mg (10/17/24 0920)    morphine injection 2 mg, Q4H PRN    multi-electrolyte (PLASMALYTE-A/ISOLYTE-S PH 7.4) IV solution, Continuous, Last Rate: 75 mL/hr (10/17/24 0650)    nicotine (NICODERM CQ) 14 mg/24hr TD 24 hr patch 1 patch, Daily    umeclidinium 62.5 mcg/actuation inhaler AEPB 1 puff, Daily **AND** olodaterol HCl (STRIVERDI RESPIMAT) inhaler 2 puff, Daily    ondansetron (ZOFRAN) injection 4 mg, Q4H PRN    pantoprazole (PROTONIX) EC tablet 40 mg, BID AC    polyethylene glycol (MIRALAX) packet 17 g, Daily    pregabalin (LYRICA) capsule 100 mg, BID    sodium chloride (OCEAN) 0.65 % nasal spray 2 spray, BID    traZODone (DESYREL) tablet 25 mg, HS    vancomycin (VANCOCIN) 1,250 mg in sodium chloride 0.9 % 250 mL IVPB, Q24H    Administrative Statements   Today, Patient Was Seen By: Zee Newman MD  I have spent a total time of >35 minutes in caring for this patient on the day of the visit/encounter including Documenting in the medical record, Reviewing / ordering tests, medicine, procedures  , and Communicating with other healthcare professionals .    **Please Note: This note may have been constructed using a voice recognition system.**

## 2024-10-17 NOTE — ASSESSMENT & PLAN NOTE
Lab Results   Component Value Date    HGBA1C 7.2 (H) 10/16/2024       Recent Labs     10/16/24  1708 10/16/24  2111 10/17/24  0734 10/17/24  1153   POCGLU 320* 256* 218* 233*       Blood Sugar Average: Last 72 hrs:  (P) 256.75  Hga1c is actually 7.2 hyperglycemia secondary to infection adjust Lantus to 25 units at night Humalog to 8units with meals and sliding scale

## 2024-10-17 NOTE — CONSULTS
Vancomycin IV Pharmacy-to-Dose Consultation     Vancomycin has been discontinued.  Pharmacy will sign off.  Please contact or re-consult with questions.    Estephania Grimaldo, Pharmacist

## 2024-10-17 NOTE — ASSESSMENT & PLAN NOTE
Lab Results   Component Value Date    EGFR 39 10/17/2024    EGFR 43 10/16/2024    EGFR 75 08/27/2024    CREATININE 1.73 (H) 10/17/2024    CREATININE 1.58 (H) 10/16/2024    CREATININE 1.01 08/27/2024   Baseline creatinine is 1.2-1.3 currently 1.7 does not meet SUSHILA will hydrate as a history of systolic CHF is listed without 2D echo unknown what his EF is we will do a gentle isolate 75 cc/h till evaluation of his EF.  As cr trended up will ask nephro

## 2024-10-17 NOTE — ASSESSMENT & PLAN NOTE
Lab Results   Component Value Date    EGFR 39 10/17/2024    EGFR 43 10/16/2024    EGFR 75 08/27/2024    CREATININE 1.73 (H) 10/17/2024    CREATININE 1.58 (H) 10/16/2024    CREATININE 1.01 08/27/2024      Recommend renal dosing of antibiotics, avoid nephrotoxins

## 2024-10-17 NOTE — ASSESSMENT & PLAN NOTE
Lab Results   Component Value Date    HGBA1C 7.2 (H) 10/16/2024       Recent Labs     10/16/24  1708 10/16/24  2111 10/17/24  0734 10/17/24  1153   POCGLU 320* 256* 218* 233*       Blood Sugar Average: Last 72 hrs:  (P) 256.75  Left lower extremity cellulitis with an open wound on plantar side.  Rule out osteomyelitis ER ready discussed with podiatry left lower extremity CT without contrast ordered as secondary to GFR in the 40s negative for gas will order MRI of the lower extremity- no osteo  ABIs also there is a questionable Baker's cyst will order venous duplex to rule out a DVT no dvt but has bakers cyst    Also will obtain formal podiatry evaluation wound care per podiatry has been ordered start cefepime Vanco and Flagyl  Infectious disease consult  Surgical evaluation planning.  Podiatry post all workup evaluation.  Pain control has been ordered

## 2024-10-17 NOTE — PLAN OF CARE

## 2024-10-17 NOTE — PROGRESS NOTES
Progress Note - Infectious Disease   Name: Clay Marcial 68 y.o. male I MRN: [id number]  Unit/Bed#: -01 I Date of Admission: 10/16/2024   Date of Service: 10/17/2024 I Hospital Day: 1        VIRTUAL CARE DOCUMENTATION:     1. This service was provided via Telemedicine using [employer] Kit     2. Parties in the room with patient during teleconsult Patient only    3. Confidentiality My office door was closed     4. Participants No one else was in the room    5. Patient acknowledged consent and understanding of privacy and security of the  Telemedicine consult. I informed the patient that I have reviewed their record in Epic and presented the opportunity for them to ask any questions regarding the visit today.  The patient agreed to participate.    6. Time spent 3 minutes       Assessment & Plan  Diabetic foot infection  (HCC)  Affecting left foot with associated left lower leg cellulitis in the setting of a chronic non healing plantar foot ulcer.  CT is notable for cellulitis without abscess or osteo. MRI notes some T2 only hyperintense signal within third metatarsal head and proximal phalanx that is favored to be reactive changes over osteo. Arterial dopplers note normal JOSE JUAN but left toe pressures are below healing range. Wound cx pending. He is afebrile without leukocytosis and hemodynamically stable, cellulitis is improving.     Continue cefazolin 2 q8h   Follow-up blood and wound cultures   Recommend fu with podiatry and vascular surgery   Recommend limb elevation, diuretics as needed for venous edema , consider compression wraps   Continue supportive care, serial extremity exams, monitor clinical course   On discharge can transition to po keflex 500 qid through 10/25   Check daily CBC, CMP while inpatient to monitor for any evolving antibiotic toxicity or treatment failure   Recommend tobacco cessation counseling   Patient is at risk for poor wound healing/progression of infection in the setting of  underlying comorbidities    Diabetes mellitus (HCC)  Lab Results   Component Value Date    HGBA1C 7.2 (H) 10/16/2024       Recent Labs     10/16/24  2111 10/17/24  0734 10/17/24  1153 10/17/24  1626   POCGLU 256* 218* 233* 240*       Blood Sugar Average: Last 72 hrs:  (P) 253.4  Risk factor for infection     Recommend strict glycemic control to aid with wound healing    Stage 3 chronic kidney disease (HCC)  Lab Results   Component Value Date    EGFR 39 10/17/2024    EGFR 43 10/16/2024    EGFR 75 2024    CREATININE 1.73 (H) 10/17/2024    CREATININE 1.58 (H) 10/16/2024    CREATININE 1.01 2024      Recommend renal dosing of antibiotics, avoid nephrotoxins      Reviewed recommendations to continue iv abx  with primary team who is in agreement. Will follow.  Please call with concerns or any changes in clinical status or new test results.        Subjective:  The patient has no complaints. Improving leg pain and redness  Denies fevers, chills, or sweats.  Denies nausea, vomiting, or diarrhea.    Antibiotics:  cefazolin      Physical Exam     Temp:  [97.7 °F (36.5 °C)-98.2 °F (36.8 °C)] 97.7 °F (36.5 °C)  HR:  [] 115  Resp:  [20] 20  BP: (115-142)/(62-63) 115/63  SpO2:  [92 %-97 %] 92 %  Temp (24hrs), Av °F (36.7 °C), Min:97.7 °F (36.5 °C), Max:98.2 °F (36.8 °C)  Current: Temperature: 97.7 °F (36.5 °C)    Intake/Output Summary (Last 24 hours) at 10/17/2024 1942  Last data filed at 10/17/2024 1623  Gross per 24 hour   Intake 180 ml   Output 1425 ml   Net -1245 ml       Physical exam findings reported by bedside and primary medical team staff      General Appearance:  Appearing well, nontoxic, and in no distress, appears stated age   Lungs:   Clear to auscultation bilaterally, no audible wheezes, rhonchi and rales, respirations unlabored   Heart:  Regular rate and rhythm, S1, S2 normal, no murmur, rub or gallop   Abdomen:   Soft, non-tender, non-distended, positive bowel sounds, no masses, no  organomegaly    No CVA tenderness   Extremities: Extremities normal, atraumatic, no cyanosis, clubbing , 1-2+ LLE edema   Skin: Left foot plantar ulcer in dressing, fading left lower extremity erythema   Neurologic: Alert and oriented times 3,         Invasive Devices:   Peripheral IV 10/16/24 Distal;Right;Upper Arm (Active)   Site Assessment WDL 10/17/24 0922   Dressing Type Transparent 10/17/24 0922   Line Status Flushed;Infusing 10/17/24 0922   Dressing Status Clean;Dry;Intact 10/17/24 0922   Dressing Change Due 10/20/24 10/17/24 0922   Reason Not Rotated Not due 10/17/24 0922       Peripheral IV 10/16/24 Left;Ventral (anterior) Forearm (Active)   Site Assessment WDL 10/17/24 0922   Dressing Type Transparent 10/17/24 0922   Line Status Flushed 10/17/24 0922   Dressing Status Clean;Dry;Intact 10/17/24 0922   Dressing Change Due 10/20/24 10/17/24 0922   Reason Not Rotated Not due 10/17/24 0922       Labs, Imaging, & Other Studies     Lab Results:    I have personally reviewed pertinent labs.    Results from last 7 days   Lab Units 10/17/24  0446 10/16/24  1121   WBC Thousand/uL 9.16 11.65*   HEMOGLOBIN g/dL 11.8* 15.2   PLATELETS Thousands/uL 162 210     Results from last 7 days   Lab Units 10/17/24  0446   POTASSIUM mmol/L 3.8   CHLORIDE mmol/L 97   CO2 mmol/L 27   BUN mg/dL 25   CREATININE mg/dL 1.73*   EGFR ml/min/1.73sq m 39   CALCIUM mg/dL 7.7*     Results from last 7 days   Lab Units 10/16/24  1342 10/16/24  1228 10/16/24  1121   BLOOD CULTURE   --  No Growth at 24 hrs. No Growth at 24 hrs.   GRAM STAIN RESULT  No polys seen*  2+ Gram positive cocci in pairs*  2+ Gram variable rods*  --   --    WOUND CULTURE  Culture too young- will reincubate  --   --        Imaging Studies:   I have personally reviewed pertinent imaging study reports and images in PACS.      EKG, Pathology, and Other Studies:   I have personally reviewed pertinent reports.        Counseling/Coordination of care:       Total 50 minutes  in evaluation of the patient and communication with the patient via telehealth of which 30 minutes was in counseling/coordination of care.  Extensive review of the medical records in epic including review of the notes, radiographs, and laboratory results.  My recommendations were discussed with the patient in detail who verbalized understanding.

## 2024-10-17 NOTE — ASSESSMENT & PLAN NOTE
Lab Results   Component Value Date    HGBA1C 7.2 (H) 10/16/2024       Recent Labs     10/16/24  1708 10/16/24  2111 10/17/24  0734 10/17/24  1153   POCGLU 320* 256* 218* 233*       Blood Sugar Average: Last 72 hrs:  (P) 256.75  Risk factor for infection     Recommend strict glycemic control to aid with wound healing

## 2024-10-17 NOTE — ASSESSMENT & PLAN NOTE
Does not meet SIRS or sepsis criteria but suspect in light of infection or dehydration.  Was given fluids repeat lactic acid antibiotics have been started- resolved

## 2024-10-17 NOTE — ASSESSMENT & PLAN NOTE
Lab Results   Component Value Date    HGBA1C 7.2 (H) 10/16/2024       Recent Labs     10/16/24  2111 10/17/24  0734 10/17/24  1153 10/17/24  1626   POCGLU 256* 218* 233* 240*       Blood Sugar Average: Last 72 hrs:  (P) 253.4  Risk factor for infection     Recommend strict glycemic control to aid with wound healing

## 2024-10-17 NOTE — ASSESSMENT & PLAN NOTE
Affecting left foot with associated left lower leg cellulitis in the setting of a chronic non healing plantar foot ulcer.  CT is notable for cellulitis without abscess or osteo. MRI notes some T2 only hyperintense signal within third metatarsal head and proximal phalanx that is favored to be reactive changes over osteo. Micro not defined, no risk factors for MRSA. He is afebrile with mild leukocytosis and hemodynamically stable.     Discontinue Vanco, cefepime, Flagyl and start cefazolin 2 q8h   Follow-up blood and wound cultures   Follow-up arterial Dopplers left lower extremity   Await podiatry evaluation   Continue supportive care, serial extremity exams, monitor clinical course   Final choice and duration of antibiotics to be determined   Check daily CBC, CMP while inpatient to monitor for any evolving antibiotic toxicity or treatment failure   Recommend tobacco cessation counseling   Patient is at risk for poor wound healing/progression of infection in the setting of underlying comorbidities

## 2024-10-17 NOTE — ASSESSMENT & PLAN NOTE
Affecting left foot with associated left lower leg cellulitis in the setting of a chronic non healing plantar foot ulcer.  CT is notable for cellulitis without abscess or osteo. MRI notes some T2 only hyperintense signal within third metatarsal head and proximal phalanx that is favored to be reactive changes over osteo. Arterial dopplers note normal JOSE JUAN but left toe pressures are below healing range. Wound cx pending. He is afebrile without leukocytosis and hemodynamically stable, cellulitis is improving.     Continue cefazolin 2 q8h   Follow-up blood and wound cultures   Recommend fu with podiatry and vascular surgery   Recommend limb elevation, diuretics as needed for venous edema , consider compression wraps   Continue supportive care, serial extremity exams, monitor clinical course   On discharge can transition to po keflex 500 qid through 10/25   Check daily CBC, CMP while inpatient to monitor for any evolving antibiotic toxicity or treatment failure   Recommend tobacco cessation counseling   Patient is at risk for poor wound healing/progression of infection in the setting of underlying comorbidities

## 2024-10-17 NOTE — PROGRESS NOTES
Clay Marcial is a 68 y.o. male who is currently ordered Vancomycin IV with management by the Pharmacy Consult service.  Relevant clinical data and objective / subjective history reviewed.  Vancomycin Assessment:  Indication and Goal AUC/Trough: Soft tissue (goal -600, trough >10)  Clinical Status: worsening  Micro:   Blood cultures pending  Renal Function:  SCr: 1.73 mg/dL  CrCl: 53.7 mL/min  Renal replacement: Not on dialysis  Days of Therapy: 2  Current Dose: 750 mg IV Q12H  Vancomycin Plan:  New Dosing: changed to 1,250 mg IV Q24H  Estimated AUC: 460 mcg*hr/mL  Estimated Trough: 13.9 mcg/mL  Next Level: 10/18/24 at 0600  Renal Function Monitoring: Daily BMP and UOP  Pharmacy will continue to follow closely for s/sx of nephrotoxicity, infusion reactions and appropriateness of therapy.  BMP and CBC will be ordered per protocol. We will continue to follow the patient’s culture results and clinical progress daily.    Estephania Grimaldo, Pharmacist

## 2024-10-18 ENCOUNTER — APPOINTMENT (INPATIENT)
Dept: NON INVASIVE DIAGNOSTICS | Facility: HOSPITAL | Age: 69
DRG: 617 | End: 2024-10-18
Payer: COMMERCIAL

## 2024-10-18 PROBLEM — E87.20 LACTIC ACIDOSIS: Status: RESOLVED | Noted: 2024-04-16 | Resolved: 2024-10-18

## 2024-10-18 PROBLEM — I50.32 CHRONIC DIASTOLIC CONGESTIVE HEART FAILURE (HCC): Status: ACTIVE | Noted: 2024-04-16

## 2024-10-18 LAB
ANION GAP SERPL CALCULATED.3IONS-SCNC: 7 MMOL/L (ref 4–13)
BACTERIA WND AEROBE CULT: ABNORMAL
BUN SERPL-MCNC: 21 MG/DL (ref 5–25)
CALCIUM SERPL-MCNC: 7.7 MG/DL (ref 8.4–10.2)
CHLORIDE SERPL-SCNC: 98 MMOL/L (ref 96–108)
CO2 SERPL-SCNC: 27 MMOL/L (ref 21–32)
CREAT SERPL-MCNC: 1.49 MG/DL (ref 0.6–1.3)
GFR SERPL CREATININE-BSD FRML MDRD: 46 ML/MIN/1.73SQ M
GLUCOSE SERPL-MCNC: 169 MG/DL (ref 65–140)
GLUCOSE SERPL-MCNC: 190 MG/DL (ref 65–140)
GLUCOSE SERPL-MCNC: 196 MG/DL (ref 65–140)
GLUCOSE SERPL-MCNC: 215 MG/DL (ref 65–140)
GLUCOSE SERPL-MCNC: 276 MG/DL (ref 65–140)
GRAM STN SPEC: ABNORMAL
POTASSIUM SERPL-SCNC: 3.8 MMOL/L (ref 3.5–5.3)
SODIUM SERPL-SCNC: 132 MMOL/L (ref 135–147)

## 2024-10-18 PROCEDURE — 82948 REAGENT STRIP/BLOOD GLUCOSE: CPT

## 2024-10-18 PROCEDURE — 80048 BASIC METABOLIC PNL TOTAL CA: CPT | Performed by: FAMILY MEDICINE

## 2024-10-18 PROCEDURE — 94664 DEMO&/EVAL PT USE INHALER: CPT

## 2024-10-18 PROCEDURE — 99233 SBSQ HOSP IP/OBS HIGH 50: CPT | Performed by: INTERNAL MEDICINE

## 2024-10-18 PROCEDURE — 93926 LOWER EXTREMITY STUDY: CPT

## 2024-10-18 PROCEDURE — 99232 SBSQ HOSP IP/OBS MODERATE 35: CPT | Performed by: FAMILY MEDICINE

## 2024-10-18 PROCEDURE — 94760 N-INVAS EAR/PLS OXIMETRY 1: CPT

## 2024-10-18 PROCEDURE — 94640 AIRWAY INHALATION TREATMENT: CPT

## 2024-10-18 PROCEDURE — 99222 1ST HOSP IP/OBS MODERATE 55: CPT | Performed by: STUDENT IN AN ORGANIZED HEALTH CARE EDUCATION/TRAINING PROGRAM

## 2024-10-18 PROCEDURE — 99232 SBSQ HOSP IP/OBS MODERATE 35: CPT | Performed by: INTERNAL MEDICINE

## 2024-10-18 RX ORDER — INSULIN LISPRO 100 [IU]/ML
10 INJECTION, SOLUTION INTRAVENOUS; SUBCUTANEOUS
Status: DISCONTINUED | OUTPATIENT
Start: 2024-10-18 | End: 2024-10-22 | Stop reason: HOSPADM

## 2024-10-18 RX ORDER — INSULIN GLARGINE 100 [IU]/ML
27 INJECTION, SOLUTION SUBCUTANEOUS
Status: DISCONTINUED | OUTPATIENT
Start: 2024-10-18 | End: 2024-10-20

## 2024-10-18 RX ADMIN — OLODATEROL RESPIMAT INHALATION SPRAY 2 PUFF: 2.5 SPRAY, METERED RESPIRATORY (INHALATION) at 08:27

## 2024-10-18 RX ADMIN — INSULIN GLARGINE 27 UNITS: 100 INJECTION, SOLUTION SUBCUTANEOUS at 21:22

## 2024-10-18 RX ADMIN — INSULIN LISPRO 6 UNITS: 100 INJECTION, SOLUTION INTRAVENOUS; SUBCUTANEOUS at 11:37

## 2024-10-18 RX ADMIN — TRAZODONE HYDROCHLORIDE 25 MG: 50 TABLET ORAL at 21:23

## 2024-10-18 RX ADMIN — INSULIN LISPRO 2 UNITS: 100 INJECTION, SOLUTION INTRAVENOUS; SUBCUTANEOUS at 08:27

## 2024-10-18 RX ADMIN — Medication 1000 UNITS: at 08:27

## 2024-10-18 RX ADMIN — HEPARIN SODIUM 5000 UNITS: 5000 INJECTION INTRAVENOUS; SUBCUTANEOUS at 15:13

## 2024-10-18 RX ADMIN — SALINE NASAL SPRAY 2 SPRAY: 1.5 SOLUTION NASAL at 08:27

## 2024-10-18 RX ADMIN — CYANOCOBALAMIN TAB 500 MCG 500 MCG: 500 TAB at 08:26

## 2024-10-18 RX ADMIN — PANTOPRAZOLE SODIUM 40 MG: 40 TABLET, DELAYED RELEASE ORAL at 15:13

## 2024-10-18 RX ADMIN — INSULIN LISPRO 10 UNITS: 100 INJECTION, SOLUTION INTRAVENOUS; SUBCUTANEOUS at 11:38

## 2024-10-18 RX ADMIN — INSULIN LISPRO 8 UNITS: 100 INJECTION, SOLUTION INTRAVENOUS; SUBCUTANEOUS at 08:28

## 2024-10-18 RX ADMIN — UMECLIDINIUM 1 PUFF: 62.5 AEROSOL, POWDER ORAL at 08:27

## 2024-10-18 RX ADMIN — DOCUSATE SODIUM 100 MG: 100 CAPSULE, LIQUID FILLED ORAL at 08:27

## 2024-10-18 RX ADMIN — ASPIRIN 81 MG 81 MG: 81 TABLET ORAL at 08:27

## 2024-10-18 RX ADMIN — INSULIN LISPRO 2 UNITS: 100 INJECTION, SOLUTION INTRAVENOUS; SUBCUTANEOUS at 16:30

## 2024-10-18 RX ADMIN — BUDESONIDE INHALATION 0.5 MG: 0.5 SUSPENSION RESPIRATORY (INHALATION) at 19:19

## 2024-10-18 RX ADMIN — DOCUSATE SODIUM 100 MG: 100 CAPSULE, LIQUID FILLED ORAL at 17:46

## 2024-10-18 RX ADMIN — METOPROLOL SUCCINATE 50 MG: 50 TABLET, EXTENDED RELEASE ORAL at 08:26

## 2024-10-18 RX ADMIN — PANTOPRAZOLE SODIUM 40 MG: 40 TABLET, DELAYED RELEASE ORAL at 05:13

## 2024-10-18 RX ADMIN — BUDESONIDE INHALATION 0.5 MG: 0.5 SUSPENSION RESPIRATORY (INHALATION) at 07:56

## 2024-10-18 RX ADMIN — PREGABALIN 100 MG: 100 CAPSULE ORAL at 17:46

## 2024-10-18 RX ADMIN — SALINE NASAL SPRAY 2 SPRAY: 1.5 SOLUTION NASAL at 21:23

## 2024-10-18 RX ADMIN — FLUTICASONE PROPIONATE 2 SPRAY: 50 SPRAY, METERED NASAL at 08:27

## 2024-10-18 RX ADMIN — HEPARIN SODIUM 5000 UNITS: 5000 INJECTION INTRAVENOUS; SUBCUTANEOUS at 05:13

## 2024-10-18 RX ADMIN — LORATADINE 10 MG: 10 TABLET ORAL at 08:27

## 2024-10-18 RX ADMIN — INSULIN LISPRO 10 UNITS: 100 INJECTION, SOLUTION INTRAVENOUS; SUBCUTANEOUS at 16:30

## 2024-10-18 RX ADMIN — HEPARIN SODIUM 5000 UNITS: 5000 INJECTION INTRAVENOUS; SUBCUTANEOUS at 21:23

## 2024-10-18 RX ADMIN — NICOTINE 1 PATCH: 14 PATCH, EXTENDED RELEASE TRANSDERMAL at 08:28

## 2024-10-18 RX ADMIN — ATORVASTATIN CALCIUM 20 MG: 20 TABLET, FILM COATED ORAL at 08:27

## 2024-10-18 RX ADMIN — DULOXETINE HYDROCHLORIDE 20 MG: 20 CAPSULE, DELAYED RELEASE ORAL at 08:26

## 2024-10-18 RX ADMIN — ISOSORBIDE MONONITRATE 30 MG: 30 TABLET, EXTENDED RELEASE ORAL at 08:26

## 2024-10-18 RX ADMIN — CEFAZOLIN SODIUM 2000 MG: 2 SOLUTION INTRAVENOUS at 21:23

## 2024-10-18 RX ADMIN — PREGABALIN 100 MG: 100 CAPSULE ORAL at 08:26

## 2024-10-18 RX ADMIN — CEFAZOLIN SODIUM 2000 MG: 2 SOLUTION INTRAVENOUS at 11:37

## 2024-10-18 RX ADMIN — POLYETHYLENE GLYCOL 3350 17 G: 17 POWDER, FOR SOLUTION ORAL at 08:27

## 2024-10-18 RX ADMIN — CEFAZOLIN SODIUM 2000 MG: 2 SOLUTION INTRAVENOUS at 03:40

## 2024-10-18 NOTE — RESPIRATORY THERAPY NOTE
RT Ventilator Management Note  Clay Marcial 68 y.o. male MRN: [id number]  Unit/Bed#: -01 Encounter: [id number]      Daily Screen    No data found in the last 10 encounters.           Physical Exam:   Assessment Type: Assess only      Resp Comments: (P) pt requesting to come off of CPAP at this time. Does not like our machine

## 2024-10-18 NOTE — PLAN OF CARE

## 2024-10-18 NOTE — ASSESSMENT & PLAN NOTE
Lab Results   Component Value Date    EGFR 46 10/18/2024    EGFR 39 10/17/2024    EGFR 43 10/16/2024    CREATININE 1.49 (H) 10/18/2024    CREATININE 1.73 (H) 10/17/2024    CREATININE 1.58 (H) 10/16/2024   Baseline creatinine is 1.2-1.3 creatinine is down to 1.14 fluids discontinued appreciate nephrology evaluation discussion with them.  Continue to hold all nephrotoxins also diuretics at this time.

## 2024-10-18 NOTE — CONSULTS
Consult - Podiatry   Clay Marcial 68 y.o. male MRN: [id number]  Unit/Bed#: -[facility] Encounter: [id number]    Assessment & Plan     Assessment:  Left DFU  DM, A1c 7.2% 10/16/24  PAD    Plan:  - Left foot wound appears necrotic with some malodor and deep probe. Patient would benefit from OR debridement with possible wound vac however will need vascular assessment prior to this given TBI below healing.   - Even though MRI is without definitive OM, early OM cannot be excluded and ulcer does probe deep. Toes 2/3 are slightly blue in color which is concerning. Patient may be at risk for TMA.   - MRI left foot 10/17/24: No definitive evidence of OM however T2 changes noted to 3rd met head and 3rd pp (early OM can not be excluded)  - ABIs 10/17/24: RLE 1.04/115/100. LLE 1.03/ulcer/43 below healing  - Vascular surgery consult pending due to ABIs LLE as above which is below healing  - ID consult reviewed  - CT LLE 10/16/24: no ADA noted.   - Labs reviewed. Leukocytosis resolved  - WBAT for now  - Rest of care per primary team    History of Present Illness     HPI:  Clay Marcial is a 68 y.o. male w/ PMH of diabetes admitted for left foot ulcer and cellulitis. Patient notes ulcer has been present for 4-5 months and treated at [facility]. Notes worsening appearance, odor, redness and swelling of foot over the past week.      Inpatient consult to Podiatry  Consult performed by: Emma Burr DPM  Consult ordered by: Zee Newman MD        Review of Systems   Constitutional: Negative.    HENT: Negative.    Eyes: Negative.    Respiratory: Negative.    Cardiovascular: Negative.    Gastrointestinal: Negative.    Musculoskeletal: Neg   Skin:L foot ulcer   Neurological: PN  Psych: negative.       Historical Information   Past Medical History:   Diagnosis Date    COPD (chronic obstructive pulmonary disease) (HCC)     Diabetes mellitus (HCC) 04/16/2024    Sleep apnea     UTI (urinary tract infection)      Past Surgical History:    Procedure Laterality Date    BACK SURGERY       Social History   Social History     Substance and Sexual Activity   Alcohol Use Yes    Alcohol/week: 7.0 standard drinks of alcohol    Types: 7 Shots of liquor per week     Social History     Substance and Sexual Activity   Drug Use Never     Social History     Tobacco Use   Smoking Status Every Day    Current packs/day: 1.50    Types: Cigarettes   Smokeless Tobacco Never     Family History: History reviewed. No pertinent family history.    Meds/Allergies     Medications Prior to Admission:     acetaminophen (TYLENOL) 500 mg tablet    albuterol (PROVENTIL HFA,VENTOLIN HFA) 90 mcg/act inhaler    aspirin 81 mg chewable tablet    atorvastatin (LIPITOR) 20 mg tablet    cetirizine (ZyrTEC) 5 MG tablet    Cholecalciferol 25 MCG (1000 UT) capsule    cyanocobalamin (VITAMIN B-12) 500 MCG tablet    DULoxetine (CYMBALTA) 20 mg capsule    Empagliflozin 25 MG TABS    fluticasone (FLONASE) 50 mcg/act nasal spray    glipiZIDE (GLUCOTROL) 5 mg tablet    isosorbide mononitrate (IMDUR) 30 mg 24 hr tablet    lisinopril (ZESTRIL) 20 mg tablet    loratadine (CLARITIN) 10 mg tablet    metoprolol succinate (TOPROL-XL) 50 mg 24 hr tablet    omeprazole (PriLOSEC OTC) 20 MG tablet    pregabalin (LYRICA) 200 MG capsule    semaglutide, 2 mg/dose, (Ozempic) 8 mg/ mL injection pen    sodium bicarbonate 325 MG tablet    sodium chloride (OCEAN) 0.65 % nasal spray    tiotropium-olodaterol (STIOLTO RESPIMAT) 2.5-2.5 MCG/ACT inhaler    traZODone (DESYREL) 50 mg tablet    cadexomer iodine (IODOSORB) 0.9 % gel    furosemide (LASIX) 40 mg tablet    metFORMIN (GLUCOPHAGE-XR) 500 mg 24 hr tablet    QUEtiapine (SEROquel) 25 mg tablet    Salicylic Acid 10 % CREA  No Known Allergies    Objective   First Vitals:   Blood Pressure: (!) 178/81 (10/16/24 1111)  Pulse: (!) 116 (10/16/24 1111)  Temperature: 99 °F (37.2 °C) (10/16/24 1111)  Temp Source: Temporal (10/16/24 1111)  Respirations: 18 (10/16/24  "1111)  Height: 5' 11\" (180.3 cm) (10/17/24 1330)  Weight - Scale: 126 kg (277 lb 9 oz) (10/16/24 1111)  SpO2: 95 % (10/16/24 1111)    Current Vitals:   Blood Pressure: 115/63 (10/17/24 1330)  Pulse: (!) 115 (10/17/24 1330)  Temperature: 97.7 °F (36.5 °C) (10/17/24 0737)  Temp Source: Temporal (10/16/24 2115)  Respirations: 20 (10/16/24 2115)  Height: 5' 11\" (180.3 cm) (10/17/24 1330)  Weight - Scale: 126 kg (277 lb) (10/17/24 1330)  SpO2: 91 % (10/17/24 2308)        /63   Pulse (!) 115   Temp 97.7 °F (36.5 °C)   Resp 20   Ht 5' 11\" (1.803 m)   Wt 126 kg (277 lb)   SpO2 91%   BMI 38.63 kg/m²      General Appearance:    Alert, cooperative, no distress   Head:    Normocephalic, without obvious abnormality, atraumatic   Eyes:    PERRL, conjunctiva/corneas clear, EOM's intact        Nose:   Moist mucous membranes   Neck:   Supple, symmetrical, trachea midline   Back:     Symmetric   Lungs:     Respirations unlabored   Heart:    Regular rate and rhythm, S1 and S2 normal, no murmur, rub   or gallop   Abdomen:     Soft, non-tender    B/L LE EXAM   Extremities:   LLE edema, severe   Pulses:   Nonpalpable due to edema. Absent pedal hair   Skin:   Left submet 2/3 head with necrotic, malodorous ulceration, with deep probe. LLE erythema. Blue discoloration to toes 2/3.   Neurologic:   Gross sensation is diminished. Protective sensation is diminished.                 Lab Results:   Admission on 10/16/2024   Component Date Value    WBC 10/16/2024 11.65 (H)     RBC 10/16/2024 4.74     Hemoglobin 10/16/2024 15.2     Hematocrit 10/16/2024 44.5     MCV 10/16/2024 94     MCH 10/16/2024 32.1     MCHC 10/16/2024 34.2     RDW 10/16/2024 12.3     MPV 10/16/2024 10.4     Platelets 10/16/2024 210     nRBC 10/16/2024 0     Segmented % 10/16/2024 82 (H)     Immature Grans % 10/16/2024 1     Lymphocytes % 10/16/2024 9 (L)     Monocytes % 10/16/2024 7     Eosinophils Relative 10/16/2024 0     Basophils Relative 10/16/2024 1     " Absolute Neutrophils 10/16/2024 9.65 (H)     Absolute Immature Grans 10/16/2024 0.07     Absolute Lymphocytes 10/16/2024 1.02     Absolute Monocytes 10/16/2024 0.81     Eosinophils Absolute 10/16/2024 0.04     Basophils Absolute 10/16/2024 0.06     Sodium 10/16/2024 133 (L)     Potassium 10/16/2024 4.3     Chloride 10/16/2024 92 (L)     CO2 10/16/2024 29     ANION GAP 10/16/2024 12     BUN 10/16/2024 19     Creatinine 10/16/2024 1.58 (H)     Glucose 10/16/2024 299 (H)     Calcium 10/16/2024 9.4     eGFR 10/16/2024 43     LACTIC ACID 10/16/2024 2.2 (H)     CRP 10/16/2024 226.2 (H)     Sed Rate 10/16/2024 25 (H)     Blood Culture 10/16/2024 No Growth at 24 hrs.     Blood Culture 10/16/2024 No Growth at 24 hrs.     Procalcitonin 10/16/2024 0.19     LACTIC ACID 10/16/2024 2.0     Wound Culture 10/16/2024 3+ Growth of     Gram Stain Result 10/16/2024 No polys seen (A)     Gram Stain Result 10/16/2024 2+ Gram positive cocci in pairs (A)     Gram Stain Result 10/16/2024 2+ Gram variable rods (A)     Hemoglobin A1C 10/16/2024 7.2 (H)     EAG 10/16/2024 160     Triscuspid Valve Regurgi* 10/17/2024 6.0     RAA A4C 10/17/2024 19.1     PARVEEN A4C 10/17/2024 20.8     LA Volume Index (BP) 10/17/2024 26.4     LV Diastolic Volume (BP) 10/17/2024 150     LV Systolic Volume (BP) 10/17/2024 63     MV Peak A Justin 10/17/2024 0.85     MV stenosis pressure 1/2* 10/17/2024 41     MV Peak E Justin 10/17/2024 79     AV peak gradient 10/17/2024 8     Ao VTI 10/17/2024 23.14     Aortic valve peak veloci* 10/17/2024 1.38     LVOT peak VTI 10/17/2024 17.03     LVOT peak justin 10/17/2024 0.95     E wave deceleration time 10/17/2024 142     E/A ratio 10/17/2024 0.93     MV valve area p 1/2 meth* 10/17/2024 5.37     AV LVOT peak gradient 10/17/2024 4     AV mean gradient 10/17/2024 5     TR Peak Justin 10/17/2024 1.2     LVOT mn grad 10/17/2024 2.0     RVID d 10/17/2024 4.5     A4C EF 10/17/2024 55     Aortic valve mean veloci* 10/17/2024 11.30     Tricuspid  valve peak reg* 10/17/2024 1.22     Left ventricular stroke * 10/17/2024 54.00     IVSd 10/17/2024 1.80     Tricuspid annular plane * 10/17/2024 2.30     LVPWd 10/17/2024 1.60     LA volume (BP) 10/17/2024 64     EF 10/17/2024 58     FS 10/17/2024 45     LVIDS 10/17/2024 2.20     IVS 10/17/2024 1.8     LVIDd 10/17/2024 4.00     LA length (A2C) 10/17/2024 4.90     LEFT VENTRICLE SYSTOLIC * 10/17/2024 17     LV DIASTOLIC VOLUME (MOD* 10/17/2024 71     Left Atrium Area-systoli* 10/17/2024 20.8     Left Atrium Area-systoli* 10/17/2024 19.5     LVSV, 2D 10/17/2024 54     BSA 10/17/2024 2.42     DVI 10/17/2024 0.69     LV Diastolic Volume Inde* 10/17/2024 62.0     LV Systolic Volume Index* 10/17/2024 26.0     LV EF 10/17/2024 60     POC Glucose 10/16/2024 320 (H)     POC Glucose 10/16/2024 256 (H)     Sodium 10/17/2024 132 (L)     Potassium 10/17/2024 3.8     Chloride 10/17/2024 97     CO2 10/17/2024 27     ANION GAP 10/17/2024 8     BUN 10/17/2024 25     Creatinine 10/17/2024 1.73 (H)     Glucose 10/17/2024 207 (H)     Calcium 10/17/2024 7.7 (L)     eGFR 10/17/2024 39     WBC 10/17/2024 9.16     RBC 10/17/2024 3.74 (L)     Hemoglobin 10/17/2024 11.8 (L)     Hematocrit 10/17/2024 34.7 (L)     MCV 10/17/2024 94     MCH 10/17/2024 32.4     MCHC 10/17/2024 34.4     RDW 10/17/2024 12.1     MPV 10/17/2024 9.8     Platelets 10/17/2024 162     nRBC 10/17/2024 0     Segmented % 10/17/2024 75     Immature Grans % 10/17/2024 0     Lymphocytes % 10/17/2024 13 (L)     Monocytes % 10/17/2024 11     Eosinophils Relative 10/17/2024 1     Basophils Relative 10/17/2024 0     Absolute Neutrophils 10/17/2024 6.83     Absolute Immature Grans 10/17/2024 0.04     Absolute Lymphocytes 10/17/2024 1.16     Absolute Monocytes 10/17/2024 0.99     Eosinophils Absolute 10/17/2024 0.10     Basophils Absolute 10/17/2024 0.04     POC Glucose 10/17/2024 218 (H)     Ventricular Rate 10/16/2024 117     Atrial Rate 10/16/2024 117     MI Interval 10/16/2024  164     QRSD Interval 10/16/2024 96     QT Interval 10/16/2024 330     QTC Interval 10/16/2024 460     P Axis 10/16/2024 88     QRS [place] 10/16/2024 -10     T Wave [place] 10/16/2024 59     Ventricular Rate 10/16/2024 110     Atrial Rate 10/16/2024 110     DE Interval 10/16/2024 170     QRSD Interval 10/16/2024 98     QT Interval 10/16/2024 328     QTC Interval 10/16/2024 443     P Axis 10/16/2024 65     QRS [place] 10/16/2024 12     T Wave [place] 10/16/2024 69     Color, UA 10/17/2024 Dk Yellow     Clarity, UA 10/17/2024 Clear     Specific Gravity, UA 10/17/2024 1.020     pH, UA 10/17/2024 5.5     Leukocytes, UA 10/17/2024 Elevated glucose may cause decreased leukocyte values. See urine microscopic for UWBC result (A)     Nitrite, UA 10/17/2024 Negative     Protein, UA 10/17/2024 Trace (A)     Glucose, UA 10/17/2024 >=1000 (1%) (A)     Ketones, UA 10/17/2024 Negative     Urobilinogen, UA 10/17/2024 0.2     Bilirubin, UA 10/17/2024 Negative     Occult Blood, UA 10/17/2024 Trace-lysed (A)     RBC, UA 10/17/2024 None Seen     WBC, UA 10/17/2024 0-1 (A)     Epithelial Cells 10/17/2024 Occasional     Bacteria, UA 10/17/2024 Occasional     AMORPH URATES 10/17/2024 Occasional     Sodium, Ur 10/17/2024 31     Creatinine, Ur 10/17/2024 85.2     POC Glucose 10/17/2024 233 (H)     POC Glucose 10/17/2024 240 (H)     POC Glucose 10/17/2024 245 (H)     Sodium 10/18/2024 132 (L)     Potassium 10/18/2024 3.8     Chloride 10/18/2024 98     CO2 10/18/2024 27     ANION GAP 10/18/2024 7     BUN 10/18/2024 21     Creatinine 10/18/2024 1.49 (H)     Glucose 10/18/2024 169 (H)     Calcium 10/18/2024 7.7 (L)     eGFR 10/18/2024 46     POC Glucose 10/18/2024 190 (H)     POC Glucose 10/18/2024 276 (H)        Results from last 7 days   Lab Units 10/16/24  1342   GRAM STAIN RESULT  No polys seen*  2+ Gram positive cocci in pairs*  2+ Gram variable rods*       Results from last 7 days   Lab Units 10/16/24  1228 10/16/24  1121   BLOOD CULTURE  No  "Growth at 24 hrs. No Growth at 24 hrs.       Invalid input(s): \"LABAEARO\"            Imaging: I have personally reviewed pertinent films in PACS  EKG, Pathology, and Other Studies: I have personally reviewed pertinent reports.      Code Status: Level 1 - Full Code  Advance Directive and Living Will:      Power of : Yes  POLST:          "

## 2024-10-18 NOTE — ASSESSMENT & PLAN NOTE
Lab Results   Component Value Date    HGBA1C 7.2 (H) 10/16/2024       Recent Labs     10/17/24  1153 10/17/24  1626 10/17/24  2123 10/18/24  0740   POCGLU 233* 240* 245* 190*       Blood Sugar Average: Last 72 hrs:  (P) 243.2372833172661568  Infectious disease on board, currently on cefazolin.  Awaiting podiatry evaluation

## 2024-10-18 NOTE — ASSESSMENT & PLAN NOTE
Lab Results   Component Value Date    HGBA1C 7.2 (H) 10/16/2024       Recent Labs     10/17/24  1626 10/17/24  2123 10/18/24  0740 10/18/24  1118   POCGLU 240* 245* 190* 276*       Blood Sugar Average: Last 72 hrs:  (P) 247.25

## 2024-10-18 NOTE — ASSESSMENT & PLAN NOTE
Lab Results   Component Value Date    EGFR 46 10/18/2024    EGFR 39 10/17/2024    EGFR 43 10/16/2024    CREATININE 1.49 (H) 10/18/2024    CREATININE 1.73 (H) 10/17/2024    CREATININE 1.58 (H) 10/16/2024     Reported baseline creatinine to 1.3-1.4 in the setting of diabetes, hypertension.  Admitted with a creatinine of 1.58 on 10/16, increased to 1.7 yesterday, patient received IV fluids and creatinine close to baseline at 1.49 this morning  IV fluids were discontinued this morning.  Keep holding lisinopril, Jardiance, metformin and furosemide for today  Follow daily labs and avoid relative hypotension.  Avoid NSAIDs

## 2024-10-18 NOTE — ASSESSMENT & PLAN NOTE
Wt Readings from Last 3 Encounters:   10/17/24 126 kg (277 lb)   08/27/24 127 kg (279 lb 12.2 oz)   05/18/24 129 kg (285 lb 7.9 oz)       Monitor volume status closely.  IV fluids were discontinued this morning.  Keep holding diuretics and Jardiance for now

## 2024-10-18 NOTE — ASSESSMENT & PLAN NOTE
Lab Results   Component Value Date    HGBA1C 7.2 (H) 10/16/2024       Recent Labs     10/17/24  1153 10/17/24  1626 10/17/24  2123 10/18/24  0740   POCGLU 233* 240* 245* 190*       Blood Sugar Average: Last 72 hrs:  (P) [id number]  Hga1c is actually 7.2 hyperglycemia secondary to infection adjust Lantus to 27 units at night Humalog to 10units with meals and sliding scale

## 2024-10-18 NOTE — PROGRESS NOTES
Progress Note - Hospitalist   Name: Clay Marcial 68 y.o. male I MRN: [id number]  Unit/Bed#: -01 I Date of Admission: 10/16/2024   Date of Service: 10/18/2024 I Hospital Day: 2    Assessment & Plan  Diabetic foot infection  (HCC)  Lab Results   Component Value Date    HGBA1C 7.2 (H) 10/16/2024       Recent Labs     10/17/24  1153 10/17/24  1626 10/17/24  2123 10/18/24  0740   POCGLU 233* 240* 245* 190*       Blood Sugar Average: Last 72 hrs:  (P) 243.8491323566694722  Left lower extremity cellulitis with an open wound on plantar side.  Rule out osteomyelitis ER ready discussed with podiatry left lower extremity CT without contrast ordered as secondary to GFR in the 40s negative for gas ,Baker's cyst will order venous duplex- no dvt but has bakers cyst    MRI is negative for any osteo discussed with podiatry will do bedside debridement also left toe is ABIs as below potential healing awaiting vascular evaluation discussed with infectious disease antibiotics have been switched to Ancef  Pain improved and redness improved   Diabetes mellitus (HCC)  Lab Results   Component Value Date    HGBA1C 7.2 (H) 10/16/2024       Recent Labs     10/17/24  1153 10/17/24  1626 10/17/24  2123 10/18/24  0740   POCGLU 233* 240* 245* 190*       Blood Sugar Average: Last 72 hrs:  (P) [id number]  Hga1c is actually 7.2 hyperglycemia secondary to infection adjust Lantus to 27 units at night Humalog to 10units with meals and sliding scale  CAD (coronary artery disease)  Asa/ lipitor and isosorbide   Tte ordered as ef unknown   Chronic diastolic congestive heart failure (HCC)  Wt Readings from Last 3 Encounters:   10/17/24 126 kg (277 lb)   08/27/24 127 kg (279 lb 12.2 oz)   05/18/24 129 kg (285 lb 7.9 oz)     2D echo obtained EF of 60% which is normal hold Lasix as per nephrology right now not in exacerbation        Lactic acidosis (Resolved: 10/18/2024)  Does not meet SIRS or sepsis criteria but suspect in light of  infection or dehydration.  Was given fluids repeat lactic acid antibiotics have been started- resolved   Stage 3 chronic kidney disease (HCC)  Lab Results   Component Value Date    EGFR 46 10/18/2024    EGFR 39 10/17/2024    EGFR 43 10/16/2024    CREATININE 1.49 (H) 10/18/2024    CREATININE 1.73 (H) 10/17/2024    CREATININE 1.58 (H) 10/16/2024   Baseline creatinine is 1.2-1.3 creatinine is down to 1.14 fluids discontinued appreciate nephrology evaluation discussion with them.  Continue to hold all nephrotoxins also diuretics at this time.  Chronic obstructive pulmonary disease with acute exacerbation (HCC)  No more wheezing continue Pulmicort and Xopenex  ROMEL (obstructive sleep apnea)  Cpap at home     VTE Pharmacologic Prophylaxis: VTE Score: 3 Moderate Risk (Score 3-4) - Pharmacological DVT Prophylaxis Ordered: heparin.    Mobility:   Basic Mobility Inpatient Raw Score: 20  JH-HLM Goal: 6: Walk 10 steps or more  JH-HLM Achieved: 5: Stand (1 or more minutes)  JH-HLM Goal achieved. Continue to encourage appropriate mobility.    Patient Centered Rounds: I performed bedside rounds with nursing staff today.   Discussions with Specialists or Other Care Team Provider: Discussed with infectious disease podiatry    Education and Discussions with Family / Patient: Patient declined call to .     Current Length of Stay: 2 day(s)  Current Patient Status: Inpatient   Certification Statement: The patient will continue to require additional inpatient hospital stay due to left lower extremity diabetic foot infection  Discharge Plan: Anticipate discharge in 48-72 hrs to home.    Code Status: Level 1 - Full Code    Subjective   Seen and examined pain improved in the left lower extremity    Objective :  HR:  [115] 115  BP: (115)/(63) 115/63  SpO2:  [91 %-93 %] 91 %  O2 Device: None (Room air)    Body mass index is 38.63 kg/m².     Input and Output Summary (last 24 hours):     Intake/Output Summary (Last 24 hours) at  10/18/2024 1031  Last data filed at 10/18/2024 0819  Gross per 24 hour   Intake 1190 ml   Output 1990 ml   Net -800 ml       Physical Exam  Vitals and nursing note reviewed.   Constitutional:       General: He is not in acute distress.     Appearance: He is well-developed.   HENT:      Head: Normocephalic and atraumatic.   Eyes:      Conjunctiva/sclera: Conjunctivae normal.   Cardiovascular:      Rate and Rhythm: Normal rate and regular rhythm.      Heart sounds: No murmur heard.  Pulmonary:      Effort: Pulmonary effort is normal. No respiratory distress.      Breath sounds: Normal breath sounds. No wheezing or rales.   Abdominal:      General: There is no distension.      Palpations: Abdomen is soft.      Tenderness: There is no abdominal tenderness.   Musculoskeletal:         General: Swelling (Lower extremity although erythema is improved there is still some underlying venous stasis) present.      Cervical back: Neck supple.   Skin:     General: Skin is warm and dry.      Capillary Refill: Capillary refill takes less than 2 seconds.   Neurological:      Mental Status: He is alert.   Psychiatric:         Mood and Affect: Mood normal.           Lines/Drains:              Lab Results: I have reviewed the following results:   Results from last 7 days   Lab Units 10/17/24  0446   WBC Thousand/uL 9.16   HEMOGLOBIN g/dL 11.8*   HEMATOCRIT % 34.7*   PLATELETS Thousands/uL 162   SEGS PCT % 75   LYMPHO PCT % 13*   MONO PCT % 11   EOS PCT % 1     Results from last 7 days   Lab Units 10/18/24  0538   SODIUM mmol/L 132*   POTASSIUM mmol/L 3.8   CHLORIDE mmol/L 98   CO2 mmol/L 27   BUN mg/dL 21   CREATININE mg/dL 1.49*   ANION GAP mmol/L 7   CALCIUM mg/dL 7.7*   GLUCOSE RANDOM mg/dL 169*         Results from last 7 days   Lab Units 10/18/24  0740 10/17/24  2123 10/17/24  1626 10/17/24  1153 10/17/24  0734 10/16/24  2111 10/16/24  1708   POC GLUCOSE mg/dl 190* 245* 240* 233* 218* 256* 320*     Results from last 7 days   Lab  Units 10/16/24  1647   HEMOGLOBIN A1C % 7.2*     Results from last 7 days   Lab Units 10/16/24  1647 10/16/24  1121   LACTIC ACID mmol/L 2.0 2.2*   PROCALCITONIN ng/ml  --  0.19       Recent Cultures (last 7 days):   Results from last 7 days   Lab Units 10/16/24  1342 10/16/24  1228 10/16/24  1121   BLOOD CULTURE   --  No Growth at 24 hrs. No Growth at 24 hrs.   GRAM STAIN RESULT  No polys seen*  2+ Gram positive cocci in pairs*  2+ Gram variable rods*  --   --    WOUND CULTURE  Culture too young- will reincubate  --   --        Imaging Results Review: No pertinent imaging studies reviewed.  Other Study Results Review: No additional pertinent studies reviewed.    Last 24 Hours Medication List:     Current Facility-Administered Medications:     acetaminophen (TYLENOL) tablet 650 mg, Q6H PRN    aspirin chewable tablet 81 mg, Daily    atorvastatin (LIPITOR) tablet 20 mg, Daily    budesonide (PULMICORT) inhalation solution 0.5 mg, Q12H    ceFAZolin (ANCEF) IVPB (premix in dextrose) 2,000 mg 50 mL, Q8H, Last Rate: 2,000 mg (10/18/24 0340)    Cholecalciferol (VITAMIN D3) tablet 1,000 Units, Daily    cyanocobalamin (VITAMIN B-12) tablet 500 mcg, Daily    docusate sodium (COLACE) capsule 100 mg, BID    DULoxetine (CYMBALTA) delayed release capsule 20 mg, Daily    fluticasone (FLONASE) 50 mcg/act nasal spray 2 spray, Daily    heparin (porcine) subcutaneous injection 5,000 Units, Q8H EPHRAIM **AND** [CANCELED] Platelet count, Once    insulin glargine (LANTUS) subcutaneous injection 27 Units 0.27 mL, HS    insulin lispro (HumALOG/ADMELOG) 100 units/mL subcutaneous injection 10 Units, TID With Meals    insulin lispro (HumALOG/ADMELOG) 100 units/mL subcutaneous injection 2-12 Units, TID AC **AND** Fingerstick Glucose (POCT), TID AC    isosorbide mononitrate (IMDUR) 24 hr tablet 30 mg, Daily    levalbuterol (XOPENEX) inhalation solution 1.25 mg, Q8H PRN    loratadine (CLARITIN) tablet 10 mg, Daily    metoprolol succinate  (TOPROL-XL) 24 hr tablet 50 mg, Daily    morphine injection 2 mg, Q4H PRN    nicotine (NICODERM CQ) 14 mg/24hr TD 24 hr patch 1 patch, Daily    umeclidinium 62.5 mcg/actuation inhaler AEPB 1 puff, Daily **AND** olodaterol HCl (STRIVERDI RESPIMAT) inhaler 2 puff, Daily    ondansetron (ZOFRAN) injection 4 mg, Q4H PRN    pantoprazole (PROTONIX) EC tablet 40 mg, BID AC    polyethylene glycol (MIRALAX) packet 17 g, Daily    pregabalin (LYRICA) capsule 100 mg, BID    sodium chloride (OCEAN) 0.65 % nasal spray 2 spray, BID    traZODone (DESYREL) tablet 25 mg, HS    Administrative Statements   Today, Patient Was Seen By: Zee Newman MD  I have spent a total time of >35 minutes in caring for this patient on the day of the visit/encounter including Diagnostic results, Documenting in the medical record, and Reviewing / ordering tests, medicine, procedures  .    **Please Note: This note may have been constructed using a voice recognition system.**

## 2024-10-18 NOTE — ASSESSMENT & PLAN NOTE
Wt Readings from Last 3 Encounters:   10/17/24 126 kg (277 lb)   08/27/24 127 kg (279 lb 12.2 oz)   05/18/24 129 kg (285 lb 7.9 oz)

## 2024-10-18 NOTE — ASSESSMENT & PLAN NOTE
Wt Readings from Last 3 Encounters:   10/17/24 126 kg (277 lb)   08/27/24 127 kg (279 lb 12.2 oz)   05/18/24 129 kg (285 lb 7.9 oz)     2D echo obtained EF of 60% which is normal hold Lasix as per nephrology right now not in exacerbation

## 2024-10-18 NOTE — ASSESSMENT & PLAN NOTE
Lab Results   Component Value Date    HGBA1C 7.2 (H) 10/16/2024       Recent Labs     10/17/24  1153 10/17/24  1626 10/17/24  2123 10/18/24  0740   POCGLU 233* 240* 245* 190*       Blood Sugar Average: Last 72 hrs:  (P) [id number]  Left lower extremity cellulitis with an open wound on plantar side.  Rule out osteomyelitis ER ready discussed with podiatry left lower extremity CT without contrast ordered as secondary to GFR in the 40s negative for gas ,Baker's cyst will order venous duplex- no dvt but has bakers cyst    MRI is negative for any osteo discussed with podiatry will do bedside debridement also left toe is ABIs as below potential healing awaiting vascular evaluation discussed with infectious disease antibiotics have been switched to Ancef  Pain improved and redness improved

## 2024-10-18 NOTE — ASSESSMENT & PLAN NOTE
68-year-old male with DM, CAD, chronic heart failure, CKD 3, COPD.  Patient presented with 2-day history of left lower extremity pain, redness and nonhealing left plantar wound X 4 months.  Vascular consulted by podiatry for below healing level GTP on duplex.  Patient will require OR debridement    Diagnostics:  LEAD 10/17/2024: Right JOSE JUAN 1.0/115/100 and left JOSE JUAN 1.03/MTP not obtained due to dressing/GTP 43 ankle.  MRI left foot 10/17 :   1.  There is focal marrow edema in the third metatarsal head and base of the third toe, proximal phalanx. However, this is only seen on T2 signal, favoring reactive changes over osteomyelitis.  2.  Diffuse subcutaneous edema.  3.  Degenerative changes, as described.  BC negative X 24 hours  Wound culture GPC 2+     Labs:  WBC 9.16 (11.65)  Hemoglobin 11.8  Creatinine 1.49/eGFR 46 (prior 1.73/39)  Hgb Aic 7.2     Recommendations:  -Non healing left plantar foot wound after callus debridement, + wound culture.  Patient requires operative debridement  -Underlying small vessel disease, overall ankle-brachial index normal, PVR/PPG waveforms are normal at the ankle GTP just below healing for diabetic at 43  -Will evaluate with full LEAD  -Will discuss with attending, Dr Bell.  -Patient not physically examined due to geographic constraints.  Pictures in chart reviewed, testing reviewed, 35 minutes spent

## 2024-10-18 NOTE — ASSESSMENT & PLAN NOTE
Lab Results   Component Value Date    HGBA1C 7.2 (H) 10/16/2024       Recent Labs     10/17/24  1153 10/17/24  1626 10/17/24  2123 10/18/24  0740   POCGLU 233* 240* 245* 190*       Blood Sugar Average: Last 72 hrs:  (P) [id number]  Management as per internal medicine team

## 2024-10-18 NOTE — PLAN OF CARE

## 2024-10-18 NOTE — ASSESSMENT & PLAN NOTE
Lab Results   Component Value Date    EGFR 46 10/18/2024    EGFR 39 10/17/2024    EGFR 43 10/16/2024    CREATININE 1.49 (H) 10/18/2024    CREATININE 1.73 (H) 10/17/2024    CREATININE 1.58 (H) 10/16/2024

## 2024-10-18 NOTE — CONSULTS
Consultation - Vascular Surgery   Name: Clay Marcial 68 y.o. male I MRN: [id number]  Unit/Bed#: -01 I Date of Admission: 10/16/2024   Date of Service: 10/18/2024 I Hospital Day: 2   Inpatient consult to Vascular Surgery  Consult performed by: ASHLEY Samuel  Consult ordered by: Emma Burr DPM      Physician Requesting Evaluation: Zee Newman MD   Reason for Evaluation / Principal Problem: left plantar foot wound     Assessment & Plan  Diabetic foot infection  (HCC)  68-year-old male with DM, CAD, chronic heart failure, CKD 3, COPD.  Patient presented with 2-day history of left lower extremity pain, redness and nonhealing left plantar wound X 4 months.  Vascular consulted by podiatry for below healing level GTP on duplex.  Patient will require OR debridement    Diagnostics:  LEAD 10/17/2024: Right JOSE JUAN 1.0/115/100 and left JOSE JUAN 1.03/MTP not obtained due to dressing/GTP 43 ankle.  MRI left foot 10/17 :   1.  There is focal marrow edema in the third metatarsal head and base of the third toe, proximal phalanx. However, this is only seen on T2 signal, favoring reactive changes over osteomyelitis.  2.  Diffuse subcutaneous edema.  3.  Degenerative changes, as described.  BC negative X 24 hours  Wound culture GPC 2+     Labs:  WBC 9.16 (11.65)  Hemoglobin 11.8  Creatinine 1.49/eGFR 46 (prior 1.73/39)  Hgb Aic 7.2     Recommendations:  -Non healing left plantar foot wound after callus debridement, + wound culture.  Patient requires operative debridement  -Underlying small vessel disease, overall ankle-brachial index normal, PVR/PPG waveforms are normal at the ankle GTP just below healing for diabetic at 43  -Will evaluate with full LEAD  -Will discuss with attending, Dr Bell.  -Patient not physically examined due to geographic constraints.  Pictures in chart reviewed, testing reviewed, 35 minutes spent     Diabetes mellitus (HCC)  Lab Results   Component Value Date    HGBA1C 7.2 (H) 10/16/2024        Recent Labs     10/17/24  1626 10/17/24  2123 10/18/24  0740 10/18/24  1118   POCGLU 240* 245* 190* 276*       Blood Sugar Average: Last 72 hrs:  (P) 247.25    Chronic diastolic congestive heart failure (HCC)  Wt Readings from Last 3 Encounters:   10/17/24 126 kg (277 lb)   08/27/24 127 kg (279 lb 12.2 oz)   05/18/24 129 kg (285 lb 7.9 oz)             Stage 3 chronic kidney disease (HCC)  Lab Results   Component Value Date    EGFR 46 10/18/2024    EGFR 39 10/17/2024    EGFR 43 10/16/2024    CREATININE 1.49 (H) 10/18/2024    CREATININE 1.73 (H) 10/17/2024    CREATININE 1.58 (H) 10/16/2024         History of Present Illness   Clay Marcial is a 68 y.o. male who presents with left lower extremity pain, redness.  Left plantar foot wound at site of prior callus debridement 4 months ago.  Lower extremity arterial duplex showed left JOSE JUAN normal, MTP not obtained due to dressing and GTP 43.  Patient has chronic kidney disease nephrology following.        Objective :  HR:  [115] 115  BP: (115)/(63) 115/63  SpO2:  [91 %-93 %] 91 %  O2 Device: None (Room air)    I/O         10/16 0701  10/17 0700 10/17 0701  10/18 0700 10/18 0701  10/19 0700    P.O.  180 240    I.V. (mL/kg)  950 (7.5)     Total Intake(mL/kg)  1130 (9) 240 (1.9)    Urine (mL/kg/hr) 850 1700 (0.6) 840 (1.2)    Total Output 850 1700 840    Net -325 -021 -600                   Physical Exam        Lab Results: I have reviewed the following results:  Recent Labs     10/16/24  1647 10/17/24  0446 10/18/24  0538   WBC  --  9.16  --    HGB  --  11.8*  --    HCT  --  34.7*  --    PLT  --  162  --    SODIUM  --  132* 132*   K  --  3.8 3.8   CL  --  97 98   CO2  --  27 27   BUN  --  25 21   CREATININE  --  1.73* 1.49*   GLUC  --  207* 169*   LACTICACID 2.0  --   --        Imaging Results Review: I reviewed radiology reports from this admission including: Ultrasound(s).  Other Study Results Review: Other studies reviewed include: MRI left foot

## 2024-10-18 NOTE — RESPIRATORY THERAPY NOTE
RT Protocol Note  Clay Marcial 68 y.o. male MRN: [id number]  Unit/Bed#: -01 Encounter: [id number]    Assessment    Principal Problem:    Diabetic foot infection  (HCC)  Active Problems:    Diabetes mellitus (HCC)    CAD (coronary artery disease)    CHF (congestive heart failure) (HCC)    Lactic acidosis    Stage 3 chronic kidney disease (HCC)    Chronic obstructive pulmonary disease with acute exacerbation (HCC)    ROMEL (obstructive sleep apnea)      Home Pulmonary Medications:         Past Medical History:   Diagnosis Date    COPD (chronic obstructive pulmonary disease) (HCC)     Diabetes mellitus (HCC) 04/16/2024    Sleep apnea     UTI (urinary tract infection)      Social History     Socioeconomic History    Marital status: Single     Spouse name: None    Number of children: None    Years of education: None    Highest education level: None   Occupational History    None   Tobacco Use    Smoking status: Every Day     Current packs/day: 1.50     Types: Cigarettes    Smokeless tobacco: Never   Vaping Use    Vaping status: Never Used   Substance and Sexual Activity    Alcohol use: Yes     Alcohol/week: 7.0 standard drinks of alcohol     Types: 7 Shots of liquor per week    Drug use: Never    Sexual activity: None   Other Topics Concern    None   Social History Narrative    None     Social Determinants of Health     Financial Resource Strain: Not on file   Food Insecurity: No Food Insecurity (4/16/2024)    Hunger Vital Sign     Worried About Running Out of Food in the Last Year: Never true     Ran Out of Food in the Last Year: Never true   Transportation Needs: No Transportation Needs (4/16/2024)    PRAPARE - Transportation     Lack of Transportation (Medical): No     Lack of Transportation (Non-Medical): No   Physical Activity: Not on file   Stress: Not on file   Social Connections: Unknown (6/18/2024)    Received from [facility]    Social Antengo     How often do you feel lonely or isolated from those  "around you? (Adult - for ages 18 years and over): Not on file   Intimate Partner Violence: Not on file   Housing Stability: Unknown (4/16/2024)    Housing Stability Vital Sign     Unable to Pay for Housing in the Last Year: Not on file     Number of Times Moved in the Last Year: 1     Homeless in the Last Year: No       Subjective         Objective    Physical Exam:   Assessment Type: During-treatment  General Appearance: Alert, Awake  Respiratory Pattern: Dyspnea with exertion  Chest Assessment: Chest expansion symmetrical  Bilateral Breath Sounds: Diminished, Expiratory wheezes  Cough: Non-productive    Vitals:  Blood pressure 115/63, pulse (!) 115, temperature 97.7 °F (36.5 °C), resp. rate 20, height 5' 11\" (1.803 m), weight 126 kg (277 lb), SpO2 91%.          Imaging and other studies:           Plan             Resp Comments: (P) Pt requested to stop UDN tx, wants to sleep.   "

## 2024-10-19 LAB
ANION GAP SERPL CALCULATED.3IONS-SCNC: 7 MMOL/L (ref 4–13)
APTT PPP: 42 SECONDS (ref 23–34)
APTT PPP: 66 SECONDS (ref 23–34)
BASOPHILS # BLD AUTO: 0.05 THOUSANDS/ΜL (ref 0–0.1)
BASOPHILS NFR BLD AUTO: 1 % (ref 0–1)
BUN SERPL-MCNC: 20 MG/DL (ref 5–25)
CALCIUM SERPL-MCNC: 8 MG/DL (ref 8.4–10.2)
CHLORIDE SERPL-SCNC: 98 MMOL/L (ref 96–108)
CO2 SERPL-SCNC: 28 MMOL/L (ref 21–32)
CREAT SERPL-MCNC: 1.29 MG/DL (ref 0.6–1.3)
EOSINOPHIL # BLD AUTO: 0.05 THOUSAND/ΜL (ref 0–0.61)
EOSINOPHIL NFR BLD AUTO: 1 % (ref 0–6)
ERYTHROCYTE [DISTWIDTH] IN BLOOD BY AUTOMATED COUNT: 11.8 % (ref 11.6–15.1)
GFR SERPL CREATININE-BSD FRML MDRD: 55 ML/MIN/1.73SQ M
GLUCOSE SERPL-MCNC: 146 MG/DL (ref 65–140)
GLUCOSE SERPL-MCNC: 162 MG/DL (ref 65–140)
GLUCOSE SERPL-MCNC: 166 MG/DL (ref 65–140)
GLUCOSE SERPL-MCNC: 166 MG/DL (ref 65–140)
GLUCOSE SERPL-MCNC: 191 MG/DL (ref 65–140)
HCT VFR BLD AUTO: 33.3 % (ref 36.5–49.3)
HGB BLD-MCNC: 11.4 G/DL (ref 12–17)
IMM GRANULOCYTES # BLD AUTO: 0.03 THOUSAND/UL (ref 0–0.2)
IMM GRANULOCYTES NFR BLD AUTO: 0 % (ref 0–2)
INR PPP: 1.18 (ref 0.85–1.19)
LYMPHOCYTES # BLD AUTO: 0.99 THOUSANDS/ΜL (ref 0.6–4.47)
LYMPHOCYTES NFR BLD AUTO: 12 % (ref 14–44)
MCH RBC QN AUTO: 31.7 PG (ref 26.8–34.3)
MCHC RBC AUTO-ENTMCNC: 34.2 G/DL (ref 31.4–37.4)
MCV RBC AUTO: 93 FL (ref 82–98)
MONOCYTES # BLD AUTO: 0.72 THOUSAND/ΜL (ref 0.17–1.22)
MONOCYTES NFR BLD AUTO: 9 % (ref 4–12)
NEUTROPHILS # BLD AUTO: 6.52 THOUSANDS/ΜL (ref 1.85–7.62)
NEUTS SEG NFR BLD AUTO: 77 % (ref 43–75)
NRBC BLD AUTO-RTO: 0 /100 WBCS
PLATELET # BLD AUTO: 167 THOUSANDS/UL (ref 149–390)
PMV BLD AUTO: 10.2 FL (ref 8.9–12.7)
POTASSIUM SERPL-SCNC: 4 MMOL/L (ref 3.5–5.3)
PROTHROMBIN TIME: 15.4 SECONDS (ref 12.3–15)
RBC # BLD AUTO: 3.6 MILLION/UL (ref 3.88–5.62)
SODIUM SERPL-SCNC: 133 MMOL/L (ref 135–147)
WBC # BLD AUTO: 8.36 THOUSAND/UL (ref 4.31–10.16)

## 2024-10-19 PROCEDURE — 94640 AIRWAY INHALATION TREATMENT: CPT

## 2024-10-19 PROCEDURE — 80048 BASIC METABOLIC PNL TOTAL CA: CPT | Performed by: FAMILY MEDICINE

## 2024-10-19 PROCEDURE — 93922 UPR/L XTREMITY ART 2 LEVELS: CPT | Performed by: STUDENT IN AN ORGANIZED HEALTH CARE EDUCATION/TRAINING PROGRAM

## 2024-10-19 PROCEDURE — 82948 REAGENT STRIP/BLOOD GLUCOSE: CPT

## 2024-10-19 PROCEDURE — 94760 N-INVAS EAR/PLS OXIMETRY 1: CPT

## 2024-10-19 PROCEDURE — 85610 PROTHROMBIN TIME: CPT | Performed by: FAMILY MEDICINE

## 2024-10-19 PROCEDURE — 99232 SBSQ HOSP IP/OBS MODERATE 35: CPT

## 2024-10-19 PROCEDURE — 85025 COMPLETE CBC W/AUTO DIFF WBC: CPT | Performed by: FAMILY MEDICINE

## 2024-10-19 PROCEDURE — 99232 SBSQ HOSP IP/OBS MODERATE 35: CPT | Performed by: FAMILY MEDICINE

## 2024-10-19 PROCEDURE — 85730 THROMBOPLASTIN TIME PARTIAL: CPT | Performed by: FAMILY MEDICINE

## 2024-10-19 PROCEDURE — 93926 LOWER EXTREMITY STUDY: CPT | Performed by: STUDENT IN AN ORGANIZED HEALTH CARE EDUCATION/TRAINING PROGRAM

## 2024-10-19 RX ORDER — HEPARIN SODIUM 1000 [USP'U]/ML
9600 INJECTION, SOLUTION INTRAVENOUS; SUBCUTANEOUS EVERY 6 HOURS PRN
Status: DISCONTINUED | OUTPATIENT
Start: 2024-10-19 | End: 2024-10-22 | Stop reason: HOSPADM

## 2024-10-19 RX ORDER — HEPARIN SODIUM 10000 [USP'U]/100ML
3-30 INJECTION, SOLUTION INTRAVENOUS
Status: DISCONTINUED | OUTPATIENT
Start: 2024-10-19 | End: 2024-10-22 | Stop reason: HOSPADM

## 2024-10-19 RX ORDER — HEPARIN SODIUM 1000 [USP'U]/ML
4800 INJECTION, SOLUTION INTRAVENOUS; SUBCUTANEOUS EVERY 6 HOURS PRN
Status: DISCONTINUED | OUTPATIENT
Start: 2024-10-19 | End: 2024-10-22 | Stop reason: HOSPADM

## 2024-10-19 RX ADMIN — CEFAZOLIN SODIUM 2000 MG: 2 SOLUTION INTRAVENOUS at 12:24

## 2024-10-19 RX ADMIN — HEPARIN SODIUM 18 UNITS/KG/HR: 10000 INJECTION, SOLUTION INTRAVENOUS at 15:44

## 2024-10-19 RX ADMIN — INSULIN LISPRO 2 UNITS: 100 INJECTION, SOLUTION INTRAVENOUS; SUBCUTANEOUS at 07:58

## 2024-10-19 RX ADMIN — ISOSORBIDE MONONITRATE 30 MG: 30 TABLET, EXTENDED RELEASE ORAL at 08:01

## 2024-10-19 RX ADMIN — PANTOPRAZOLE SODIUM 40 MG: 40 TABLET, DELAYED RELEASE ORAL at 05:05

## 2024-10-19 RX ADMIN — FLUTICASONE PROPIONATE 2 SPRAY: 50 SPRAY, METERED NASAL at 08:19

## 2024-10-19 RX ADMIN — ONDANSETRON 4 MG: 2 INJECTION INTRAMUSCULAR; INTRAVENOUS at 08:14

## 2024-10-19 RX ADMIN — ASPIRIN 81 MG 81 MG: 81 TABLET ORAL at 08:02

## 2024-10-19 RX ADMIN — INSULIN GLARGINE 27 UNITS: 100 INJECTION, SOLUTION SUBCUTANEOUS at 21:05

## 2024-10-19 RX ADMIN — PREGABALIN 100 MG: 100 CAPSULE ORAL at 17:23

## 2024-10-19 RX ADMIN — CYANOCOBALAMIN TAB 500 MCG 500 MCG: 500 TAB at 08:01

## 2024-10-19 RX ADMIN — PREGABALIN 100 MG: 100 CAPSULE ORAL at 08:01

## 2024-10-19 RX ADMIN — INSULIN LISPRO 10 UNITS: 100 INJECTION, SOLUTION INTRAVENOUS; SUBCUTANEOUS at 17:23

## 2024-10-19 RX ADMIN — DOCUSATE SODIUM 100 MG: 100 CAPSULE, LIQUID FILLED ORAL at 17:23

## 2024-10-19 RX ADMIN — ATORVASTATIN CALCIUM 20 MG: 20 TABLET, FILM COATED ORAL at 08:02

## 2024-10-19 RX ADMIN — HEPARIN SODIUM 5000 UNITS: 5000 INJECTION INTRAVENOUS; SUBCUTANEOUS at 05:06

## 2024-10-19 RX ADMIN — TRAZODONE HYDROCHLORIDE 25 MG: 50 TABLET ORAL at 21:05

## 2024-10-19 RX ADMIN — SALINE NASAL SPRAY 2 SPRAY: 1.5 SOLUTION NASAL at 08:10

## 2024-10-19 RX ADMIN — CEFAZOLIN SODIUM 2000 MG: 2 SOLUTION INTRAVENOUS at 20:19

## 2024-10-19 RX ADMIN — OLODATEROL RESPIMAT INHALATION SPRAY 2 PUFF: 2.5 SPRAY, METERED RESPIRATORY (INHALATION) at 08:20

## 2024-10-19 RX ADMIN — POLYETHYLENE GLYCOL 3350 17 G: 17 POWDER, FOR SOLUTION ORAL at 08:00

## 2024-10-19 RX ADMIN — INSULIN LISPRO 2 UNITS: 100 INJECTION, SOLUTION INTRAVENOUS; SUBCUTANEOUS at 12:16

## 2024-10-19 RX ADMIN — INSULIN LISPRO 10 UNITS: 100 INJECTION, SOLUTION INTRAVENOUS; SUBCUTANEOUS at 07:58

## 2024-10-19 RX ADMIN — Medication 1000 UNITS: at 08:01

## 2024-10-19 RX ADMIN — DOCUSATE SODIUM 100 MG: 100 CAPSULE, LIQUID FILLED ORAL at 08:01

## 2024-10-19 RX ADMIN — LORATADINE 10 MG: 10 TABLET ORAL at 08:02

## 2024-10-19 RX ADMIN — INSULIN LISPRO 2 UNITS: 100 INJECTION, SOLUTION INTRAVENOUS; SUBCUTANEOUS at 17:24

## 2024-10-19 RX ADMIN — NICOTINE 1 PATCH: 14 PATCH, EXTENDED RELEASE TRANSDERMAL at 08:07

## 2024-10-19 RX ADMIN — INSULIN LISPRO 10 UNITS: 100 INJECTION, SOLUTION INTRAVENOUS; SUBCUTANEOUS at 12:15

## 2024-10-19 RX ADMIN — PANTOPRAZOLE SODIUM 40 MG: 40 TABLET, DELAYED RELEASE ORAL at 17:23

## 2024-10-19 RX ADMIN — CEFAZOLIN SODIUM 2000 MG: 2 SOLUTION INTRAVENOUS at 05:05

## 2024-10-19 RX ADMIN — DULOXETINE HYDROCHLORIDE 20 MG: 20 CAPSULE, DELAYED RELEASE ORAL at 08:01

## 2024-10-19 RX ADMIN — METOPROLOL SUCCINATE 50 MG: 50 TABLET, EXTENDED RELEASE ORAL at 08:02

## 2024-10-19 NOTE — PROGRESS NOTES
Progress Note - Hospitalist   Name: Clay Marcial 68 y.o. male I MRN: [id number]  Unit/Bed#: [facility]-01 I Date of Admission: 10/16/2024   Date of Service: 10/19/2024 I Hospital Day: 3    Assessment & Plan  Diabetic foot infection  (HCC)  Lab Results   Component Value Date    HGBA1C 7.2 (H) 10/16/2024       Recent Labs     10/18/24  1613 10/18/24  2115 10/19/24  0717 10/19/24  1126   POCGLU 196* 215* 191* 162*       Blood Sugar Average: Last 72 hrs:  (P) 228.5  Left lower extremity cellulitis with an open wound on plantar side.  Rule out osteomyelitis ER ready discussed with podiatry left lower extremity CT without contrast ordered as secondary to GFR in the 40s negative for gas ,Baker's cyst will order venous duplex- no dvt but has bakers cyst    MRI is negative for any osteo discussed with podiatry will do bedside debridement also left toe is ABIs as below potential healing awaiting   Had LEADS -left There is evidence of >75% stenosis noted in the proximal anterior tibial  artery.  As discussed with podiatry and vascular surgeon patient needs angio which is good to be done by IR hopefully Monday as the patient needs debridement and in the OR will evaluate if there is a clinical osteo as well looks like podiatry is planning to take to the OR on Wednesday  For now continue Ancef 2 g IV every 8 hours wound culture no microbe growth still swelling of the leg positive erythema foul odor  Diabetes mellitus (HCC)  Lab Results   Component Value Date    HGBA1C 7.2 (H) 10/16/2024       Recent Labs     10/18/24  1613 10/18/24  2115 10/19/24  0717 10/19/24  1126   POCGLU 196* 215* 191* 162*       Blood Sugar Average: Last 72 hrs:  (P) 228.5  Hga1c is actually 7.2 hyperglycemia secondary to infection continue  Lantus to 27 units at night Humalog to 10units with meals and sliding scale. Sugars improved   CAD (coronary artery disease)  Asa/ lipitor and isosorbide   Tte ordered as ef unknown   Chronic diastolic congestive heart  failure (HCC)  Wt Readings from Last 3 Encounters:   10/17/24 126 kg (277 lb)   08/27/24 127 kg (279 lb 12.2 oz)   05/18/24 129 kg (285 lb 7.9 oz)     2D echo obtained EF of 60% which is normal hold Lasix as per nephrology right now not in exacerbation        Stage 3 chronic kidney disease (HCC)  Lab Results   Component Value Date    EGFR 55 10/19/2024    EGFR 46 10/18/2024    EGFR 39 10/17/2024    CREATININE 1.29 10/19/2024    CREATININE 1.49 (H) 10/18/2024    CREATININE 1.73 (H) 10/17/2024   Baseline creatinine is 1.2-1.3 cr down to 1.2 nephrology following especially plan for angio for Monday   Chronic obstructive pulmonary disease with acute exacerbation (HCC)  No more wheezing continue Pulmicort and Xopenex  ROMEL (obstructive sleep apnea)  Cpap at home     VTE Pharmacologic Prophylaxis: VTE Score: 3 Moderate Risk (Score 3-4) - Pharmacological DVT Prophylaxis Ordered: heparin.    Mobility:   Basic Mobility Inpatient Raw Score: 20  JH-HLM Goal: 6: Walk 10 steps or more  JH-HLM Achieved: 6: Walk 10 steps or more  JH-HLM Goal NOT achieved. Continue with multidisciplinary rounding and encourage appropriate mobility to improve upon JH-HLM goals.    Patient Centered Rounds: I performed bedside rounds with nursing staff today.   Discussions with Specialists or Other Care Team Provider: none    Education and Discussions with Family / Patient: Attempted to update  (friend) via phone. Unable to contact.    Current Length of Stay: 3 day(s)  Current Patient Status: Inpatient   Certification Statement: The patient will continue to require additional inpatient hospital stay due to 2/2 diabetic foot infection   Discharge Plan: Anticipate discharge in >72 hrs to discharge location to be determined pending rehab evaluations.    Code Status: Level 1 - Full Code    Subjective   Seen and examined leg pain    Objective :  Temp:  [97 °F (36.1 °C)] 97 °F (36.1 °C)  HR:  [109] 109  BP: (128)/(71) 128/71  Resp:  [18]  18  SpO2:  [93 %-95 %] 93 %  O2 Device: None (Room air)    Body mass index is 38.63 kg/m².     Input and Output Summary (last 24 hours):     Intake/Output Summary (Last 24 hours) at 10/19/2024 1210  Last data filed at 10/19/2024 1113  Gross per 24 hour   Intake --   Output 1905 ml   Net -1905 ml       Physical Exam  Vitals and nursing note reviewed.   Constitutional:       General: He is not in acute distress.     Appearance: He is well-developed.   HENT:      Head: Normocephalic and atraumatic.   Eyes:      Conjunctiva/sclera: Conjunctivae normal.   Cardiovascular:      Rate and Rhythm: Normal rate and regular rhythm.      Heart sounds: No murmur heard.  Pulmonary:      Effort: Pulmonary effort is normal. No respiratory distress.      Breath sounds: Normal breath sounds. No wheezing or rales.   Abdominal:      Palpations: Abdomen is soft.      Tenderness: There is no abdominal tenderness.   Musculoskeletal:         General: Swelling (leg pain and swelling erythema) present.      Cervical back: Neck supple.   Skin:     General: Skin is warm and dry.      Capillary Refill: Capillary refill takes less than 2 seconds.   Neurological:      Mental Status: He is alert and oriented to person, place, and time.   Psychiatric:         Mood and Affect: Mood normal.           Lines/Drains:              Lab Results: I have reviewed the following results:   Results from last 7 days   Lab Units 10/19/24  0440   WBC Thousand/uL 8.36   HEMOGLOBIN g/dL 11.4*   HEMATOCRIT % 33.3*   PLATELETS Thousands/uL 167   SEGS PCT % 77*   LYMPHO PCT % 12*   MONO PCT % 9   EOS PCT % 1     Results from last 7 days   Lab Units 10/19/24  0440   SODIUM mmol/L 133*   POTASSIUM mmol/L 4.0   CHLORIDE mmol/L 98   CO2 mmol/L 28   BUN mg/dL 20   CREATININE mg/dL 1.29   ANION GAP mmol/L 7   CALCIUM mg/dL 8.0*   GLUCOSE RANDOM mg/dL 166*         Results from last 7 days   Lab Units 10/19/24  1126 10/19/24  0717 10/18/24  2115 10/18/24  1613 10/18/24  1118  10/18/24  0740 10/17/24  2123 10/17/24  1626 10/17/24  1153 10/17/24  0734 10/16/24  2111 10/16/24  1708   POC GLUCOSE mg/dl 162* 191* 215* 196* 276* 190* 245* 240* 233* 218* 256* 320*     Results from last 7 days   Lab Units 10/16/24  1647   HEMOGLOBIN A1C % 7.2*     Results from last 7 days   Lab Units 10/16/24  1647 10/16/24  1121   LACTIC ACID mmol/L 2.0 2.2*   PROCALCITONIN ng/ml  --  0.19       Recent Cultures (last 7 days):   Results from last 7 days   Lab Units 10/16/24  1342 10/16/24  1228 10/16/24  1121   BLOOD CULTURE   --  No Growth at 48 hrs. No Growth at 48 hrs.   GRAM STAIN RESULT  No polys seen*  2+ Gram positive cocci in pairs*  2+ Gram variable rods*  --   --    WOUND CULTURE  3+ Growth of  --   --        Imaging Results Review: No pertinent imaging studies reviewed.  Other Study Results Review: No additional pertinent studies reviewed.    Last 24 Hours Medication List:     Current Facility-Administered Medications:     acetaminophen (TYLENOL) tablet 650 mg, Q6H PRN    aspirin chewable tablet 81 mg, Daily    atorvastatin (LIPITOR) tablet 20 mg, Daily    budesonide (PULMICORT) inhalation solution 0.5 mg, Q12H    ceFAZolin (ANCEF) IVPB (premix in dextrose) 2,000 mg 50 mL, Q8H, Last Rate: 2,000 mg (10/19/24 0505)    Cholecalciferol (VITAMIN D3) tablet 1,000 Units, Daily    cyanocobalamin (VITAMIN B-12) tablet 500 mcg, Daily    docusate sodium (COLACE) capsule 100 mg, BID    DULoxetine (CYMBALTA) delayed release capsule 20 mg, Daily    fluticasone (FLONASE) 50 mcg/act nasal spray 2 spray, Daily    heparin (porcine) subcutaneous injection 5,000 Units, Q8H EPHRAIM **AND** [CANCELED] Platelet count, Once    insulin glargine (LANTUS) subcutaneous injection 27 Units 0.27 mL, HS    insulin lispro (HumALOG/ADMELOG) 100 units/mL subcutaneous injection 10 Units, TID With Meals    insulin lispro (HumALOG/ADMELOG) 100 units/mL subcutaneous injection 2-12 Units, TID AC **AND** Fingerstick Glucose (POCT), TID AC     isosorbide mononitrate (IMDUR) 24 hr tablet 30 mg, Daily    levalbuterol (XOPENEX) inhalation solution 1.25 mg, Q8H PRN    loratadine (CLARITIN) tablet 10 mg, Daily    metoprolol succinate (TOPROL-XL) 24 hr tablet 50 mg, Daily    morphine injection 2 mg, Q4H PRN    nicotine (NICODERM CQ) 14 mg/24hr TD 24 hr patch 1 patch, Daily    umeclidinium 62.5 mcg/actuation inhaler AEPB 1 puff, Daily **AND** olodaterol HCl (STRIVERDI RESPIMAT) inhaler 2 puff, Daily    ondansetron (ZOFRAN) injection 4 mg, Q4H PRN    pantoprazole (PROTONIX) EC tablet 40 mg, BID AC    polyethylene glycol (MIRALAX) packet 17 g, Daily    pregabalin (LYRICA) capsule 100 mg, BID    sodium chloride (OCEAN) 0.65 % nasal spray 2 spray, BID    traZODone (DESYREL) tablet 25 mg, HS    Administrative Statements   Today, Patient Was Seen By: Zee Newman MD  I have spent a total time of >35 minutes in caring for this patient on the day of the visit/encounter including Documenting in the medical record, Reviewing / ordering tests, medicine, procedures  , and Obtaining or reviewing history  .    **Please Note: This note may have been constructed using a voice recognition system.**

## 2024-10-19 NOTE — QUICK NOTE
Discussed with vascular fellow on call as has changes in second toe- recs Looking at his chart some more, I'm concerned that this is more than just PAD and that he may have some embolic issue happening. This toe is more concerning for blue toe syndrome. I don't see any contrasted imaging on him, so let's just start him on a heparin gtt and get a CTA abd/pelvis with runoff today to evaluate for any possible source of embolus. This still likely will not require any emergent procedures over the weekend, but will help with procedural planning  Started heparin drip   Asking nephro regarding cta  Also plan angio Monday as is no timing change  Podiatry aware nothing emergent from their end

## 2024-10-19 NOTE — ASSESSMENT & PLAN NOTE
Lab Results   Component Value Date    EGFR 55 10/19/2024    EGFR 46 10/18/2024    EGFR 39 10/17/2024    CREATININE 1.29 10/19/2024    CREATININE 1.49 (H) 10/18/2024    CREATININE 1.73 (H) 10/17/2024   Baseline creatinine is 1.2-1.3 cr down to 1.2 nephrology following especially plan for angio for Monday

## 2024-10-19 NOTE — ASSESSMENT & PLAN NOTE
Lab Results   Component Value Date    HGBA1C 7.2 (H) 10/16/2024       Recent Labs     10/18/24  1613 10/18/24  2115 10/19/24  0717 10/19/24  1126   POCGLU 196* 215* 191* 162*       Blood Sugar Average: Last 72 hrs:  (P) 228.5  Hga1c is actually 7.2 hyperglycemia secondary to infection continue  Lantus to 27 units at night Humalog to 10units with meals and sliding scale. Sugars improved

## 2024-10-19 NOTE — ASSESSMENT & PLAN NOTE
Lab Results   Component Value Date    HGBA1C 7.2 (H) 10/16/2024       Recent Labs     10/18/24  1118 10/18/24  1613 10/18/24  2115 10/19/24  0717   POCGLU 276* 196* 215* 191*   Patient scheduled for LLE angiogram on Monday 10/21.   Asked to risk stratify with recommendations for SUSHILA risk reduction.   Discussed with the patient in depth the risks and benefits of the surgery from a renal standpoint including risk of SUSHILA and administration of IV fluids and holding of medications to reduce the risk of SUSHILA and decrease the unlikely probability of the worst case scenario requiring renal replacement therapy.    Recommend continue to avoid ACE/ARBs, NSAIDs and nephrotoxins.   Minimize IV contrast use, if possible.   Discussed care with Dr. Newman, patient with increased toe discoloration with consideration for emergent angiogram to be performed today.   Discussed with Dr. Mancuso, concern for continued elevated creatinine and high risk for worsening renal function and moderate - high risk for hemodialysis dependent renal failure. Also concern for realistic potential for ability to salvage limb at significant risk to renal function. If proceeding with angiogram would recommend NSS at 125/ ml/hr continuing for 4 hours post procedures. Discussed with Dr. Newman and made aware of recommendations.      Blood Sugar Average: Last 72 hrs:  (P) [id number]  Infectious disease on board, currently on cefazolin.  Awaiting podiatry evaluation

## 2024-10-19 NOTE — ASSESSMENT & PLAN NOTE
Lab Results   Component Value Date    HGBA1C 7.2 (H) 10/16/2024       Recent Labs     10/18/24  1118 10/18/24  1613 10/18/24  2115 10/19/24  0717   POCGLU 276* 196* 215* 191*       Blood Sugar Average: Last 72 hrs:  (P) 234.8627752528789359  Management as per internal medicine team

## 2024-10-19 NOTE — QUICK NOTE
Podiatry Update:    I was updated by nursing and SLIM of worsening appearance of foot. I agree there is likely an embolic event happening causing the toe to appear with worsening blue discoloration (early changes were happening on exam yesterday). Given no ADA nor abscess on imaging, there is no emergent podiatric intervention indicated especially in the setting of PAD. Note from SLIM reviewed and I agree with vascular plan. Patient is at risk of amputation of sorts (likely TMA) however will need to to have successful revasc and monitor to see demarcation of color/embolic changes. Wound itself is baseline with necrotic tissue and malodor. VSS w/o leukocytosis.

## 2024-10-19 NOTE — PROGRESS NOTES
Progress Note - Nephrology   Name: Clay Marcial 68 y.o. male I MRN: [id number]  Unit/Bed#: -01 I Date of Admission: 10/16/2024   Date of Service: 10/19/2024 I Hospital Day: 3     Assessment & Plan  Stage 3 chronic kidney disease (HCC)  Lab Results   Component Value Date    EGFR 55 10/19/2024    EGFR 46 10/18/2024    EGFR 39 10/17/2024    CREATININE 1.29 10/19/2024    CREATININE 1.49 (H) 10/18/2024    CREATININE 1.73 (H) 10/17/2024     Reported baseline creatinine to 1.3-1.4 in the setting of diabetes, hypertension.  Admitted with a creatinine of 1.58 on 10/16, increased to 1.7 yesterday, patient received IV fluids and creatinine close to baseline at 1.29 this morning  IV fluids were discontinued 10/16. Patient states he continues to have decreased appetite, I/O notes 25% of meals eaten.  Continue holding lisinopril, Jardiance, metformin and furosemide. Avoid NSAIDs.  Follow daily labs and avoid relative hypotension.      Diabetic foot infection  (HCC)  Lab Results   Component Value Date    HGBA1C 7.2 (H) 10/16/2024       Recent Labs     10/18/24  1118 10/18/24  1613 10/18/24  2115 10/19/24  0717   POCGLU 276* 196* 215* 191*   Patient scheduled for LLE angiogram on Monday 10/21.   Asked to risk stratify with recommendations for SUSHILA risk reduction.   Discussed with the patient in depth the risks and benefits of the surgery from a renal standpoint including risk of SUSHILA and administration of IV fluids and holding of medications to reduce the risk of SUSHILA and decrease the unlikely probability of the worst case scenario requiring renal replacement therapy.    Recommend continue to avoid ACE/ARBs, NSAIDs and nephrotoxins.   Minimize IV contrast use, if possible.   Discussed care with Dr. Newman, patient with increased toe discoloration with consideration for emergent angiogram to be performed today.   Discussed with Dr. Mancuso, concern for continued elevated creatinine and high risk for worsening renal  "function and moderate - high risk for hemodialysis dependent renal failure. Also concern for realistic potential for ability to salvage limb at significant risk to renal function. If proceeding with angiogram would recommend NSS at 125/ ml/hr continuing for 4 hours post procedures. Discussed with Dr. Newman and made aware of recommendations.      Blood Sugar Average: Last 72 hrs:  (P) [id number]  Infectious disease on board, currently on cefazolin.  Awaiting podiatry evaluation  Diabetes mellitus (HCC)  Lab Results   Component Value Date    HGBA1C 7.2 (H) 10/16/2024       Recent Labs     10/18/24  1118 10/18/24  1613 10/18/24  2115 10/19/24  0717   POCGLU 276* 196* 215* 191*       Blood Sugar Average: Last 72 hrs:  (P) [id number]  Management as per internal medicine team  CAD (coronary artery disease)    Chronic diastolic congestive heart failure (HCC)  Wt Readings from Last 3 Encounters:   10/17/24 126 kg (277 lb)   08/27/24 127 kg (279 lb 12.2 oz)   05/18/24 129 kg (285 lb 7.9 oz)     Monitor volume status closely.  IV fluids were discontinued this morning.  Keep holding diuretics and Jardiance for now      Chronic obstructive pulmonary disease with acute exacerbation (HCC)    ROMEL (obstructive sleep apnea)      I have reviewed the nephrology recommendations including creatinine trends and plans for angiogram Monday 10/21 requiring optimization for SUSHILA risk reduction with hospitalist, and we are in agreement with renal plan including the information outlined above.     Subjective   Patient examined resting in bed, overall states he is feeling \"okay\"  States he has very decreased appetite.  States LLE and foot have had increased edema due to foot wound. Otherwise denies complaints.        Objective :  Temp:  [97 °F (36.1 °C)] 97 °F (36.1 °C)  HR:  [109] 109  BP: (128)/(71) 128/71  Resp:  [18] 18  SpO2:  [93 %-95 %] 93 %  O2 Device: None (Room air)    Current Weight: Weight - Scale: 126 kg (277 " lb)  First Weight: Weight - Scale: 126 kg (277 lb 9 oz)  I/O         10/17 0701  10/18 0700 10/18 0701  10/19 0700 10/19 0701  10/20 0700    P.O. 180 240     I.V. (mL/kg) 950 (7.5)      Total Intake(mL/kg) 1130 (9) 240 (1.9)     Urine (mL/kg/hr) 1700 (0.6) 2240 (0.7) 280 (0.5)    Total Output 1700 2240 280    Net -570 -2000 -280           Unmeasured Urine Occurrence  1 x           Physical Exam  Vitals and nursing note reviewed.   Constitutional:       General: He is not in acute distress.     Appearance: He is well-developed. He is obese. He is ill-appearing.   HENT:      Head: Normocephalic and atraumatic.      Right Ear: External ear normal.      Left Ear: External ear normal.      Nose: Nose normal.      Mouth/Throat:      Mouth: Mucous membranes are moist.      Pharynx: Oropharynx is clear.   Eyes:      Conjunctiva/sclera: Conjunctivae normal.   Cardiovascular:      Rate and Rhythm: Normal rate and regular rhythm.      Heart sounds: Normal heart sounds. No murmur heard.     Comments: Distant heart sounds  Pulmonary:      Effort: Pulmonary effort is normal. No respiratory distress.      Breath sounds: Normal breath sounds.      Comments: Decreased posterior breath sounds  Abdominal:      Palpations: Abdomen is soft.      Tenderness: There is no abdominal tenderness.   Musculoskeletal:         General: No swelling.      Cervical back: Neck supple.      Right lower leg: No edema.      Left lower leg: Edema present.      Comments: LLE edema 3-4+ with erythema and foot wound   Skin:     General: Skin is warm and dry.      Capillary Refill: Capillary refill takes less than 2 seconds.      Coloration: Skin is pale.      Findings: Lesion present.   Neurological:      General: No focal deficit present.      Mental Status: He is alert and oriented to person, place, and time.   Psychiatric:         Mood and Affect: Mood normal.         Medications:    Current Facility-Administered Medications:     acetaminophen (TYLENOL)  tablet 650 mg, 650 mg, Oral, Q6H PRN, Zee Newman MD, 650 mg at 10/16/24 2117    aspirin chewable tablet 81 mg, 81 mg, Oral, Daily, Zee Newman MD, 81 mg at 10/19/24 0802    atorvastatin (LIPITOR) tablet 20 mg, 20 mg, Oral, Daily, Stephanie Stone PA-C, 20 mg at 10/19/24 0802    budesonide (PULMICORT) inhalation solution 0.5 mg, 0.5 mg, Nebulization, Q12H, Zee Newman MD, 0.5 mg at 10/18/24 1919    ceFAZolin (ANCEF) IVPB (premix in dextrose) 2,000 mg 50 mL, 2,000 mg, Intravenous, Q8H, Zee Newman MD, Last Rate: 100 mL/hr at 10/19/24 0505, 2,000 mg at 10/19/24 0505    Cholecalciferol (VITAMIN D3) tablet 1,000 Units, 1,000 Units, Oral, Daily, Stephanie Stone PA-C, 1,000 Units at 10/19/24 0801    cyanocobalamin (VITAMIN B-12) tablet 500 mcg, 500 mcg, Oral, Daily, Stephanie Stone PA-C, 500 mcg at 10/19/24 0801    docusate sodium (COLACE) capsule 100 mg, 100 mg, Oral, BID, Lina Villanueva MD, 100 mg at 10/19/24 0801    DULoxetine (CYMBALTA) delayed release capsule 20 mg, 20 mg, Oral, Daily, Stephanie Stone PA-C, 20 mg at 10/19/24 0801    fluticasone (FLONASE) 50 mcg/act nasal spray 2 spray, 2 spray, Nasal, Daily, Stephanie Stone PA-C, 2 spray at 10/19/24 0819    heparin (porcine) subcutaneous injection 5,000 Units, 5,000 Units, Subcutaneous, Q8H EPHRAIM, 5,000 Units at 10/19/24 0506 **AND** [CANCELED] Platelet count, , , Once, Zee Newman MD    insulin glargine (LANTUS) subcutaneous injection 27 Units 0.27 mL, 27 Units, Subcutaneous, HS, Zee Newman MD, 27 Units at 10/18/24 2122    insulin lispro (HumALOG/ADMELOG) 100 units/mL subcutaneous injection 10 Units, 10 Units, Subcutaneous, TID With Meals, Zee Newman MD, 10 Units at 10/19/24 0758    insulin lispro (HumALOG/ADMELOG) 100 units/mL subcutaneous injection 2-12 Units, 2-12 Units, Subcutaneous, TID AC, 2 Units at 10/19/24 0758 **AND** Fingerstick Glucose (POCT), , , TID AC, Zee Newman MD    isosorbide  mononitrate (IMDUR) 24 hr tablet 30 mg, 30 mg, Oral, Daily, BANDAR Chaudhary-C, 30 mg at 10/19/24 0801    levalbuterol (XOPENEX) inhalation solution 1.25 mg, 1.25 mg, Nebulization, Q8H PRN, Zee Newman MD    loratadine (CLARITIN) tablet 10 mg, 10 mg, Oral, Daily, BANDAR Chaudhary-C, 10 mg at 10/19/24 0802    metoprolol succinate (TOPROL-XL) 24 hr tablet 50 mg, 50 mg, Oral, Daily, BANDAR Chaudhary-C, 50 mg at 10/19/24 0802    morphine injection 2 mg, 2 mg, Intravenous, Q4H PRN, Zee Newman MD    nicotine (NICODERM CQ) 14 mg/24hr TD 24 hr patch 1 patch, 1 patch, Transdermal, Daily, Zee Newman MD, 1 patch at 10/19/24 0807    umeclidinium 62.5 mcg/actuation inhaler AEPB 1 puff, 1 puff, Inhalation, Daily, 1 puff at 10/18/24 0827 **AND** olodaterol HCl (STRIVERDI RESPIMAT) inhaler 2 puff, 2 puff, Inhalation, Daily, FARTUN ChaudharyC, 2 puff at 10/19/24 0820    ondansetron (ZOFRAN) injection 4 mg, 4 mg, Intravenous, Q4H PRN, Lina Villanueva MD, 4 mg at 10/19/24 0814    pantoprazole (PROTONIX) EC tablet 40 mg, 40 mg, Oral, BID AC, BANDAR Chaudhary-C, 40 mg at 10/19/24 0505    polyethylene glycol (MIRALAX) packet 17 g, 17 g, Oral, Daily, Lina Villanueva MD, 17 g at 10/19/24 0800    pregabalin (LYRICA) capsule 100 mg, 100 mg, Oral, BID, BANDAR Chaudhary-C, 100 mg at 10/19/24 0801    sodium chloride (OCEAN) 0.65 % nasal spray 2 spray, 2 spray, Each Nare, BID, Stephanie Stone PA-C, 2 spray at 10/19/24 0810    traZODone (DESYREL) tablet 25 mg, 25 mg, Oral, HS, Stephanie Stone PA-C, 25 mg at 10/18/24 2123      Lab Results: I have reviewed the following results:  Results from last 7 days   Lab Units 10/19/24  0440 10/18/24  0538 10/17/24  0446 10/16/24  1121   WBC Thousand/uL 8.36  --  9.16 11.65*   HEMOGLOBIN g/dL 11.4*  --  11.8* 15.2   HEMATOCRIT % 33.3*  --  34.7* 44.5   PLATELETS Thousands/uL 167  --  162 210   POTASSIUM mmol/L 4.0 3.8 3.8 4.3   CHLORIDE mmol/L  "98 98 97 92*   CO2 mmol/L 28 27 27 29   BUN mg/dL 20 21 25 19   CREATININE mg/dL 1.29 1.49* 1.73* 1.58*   CALCIUM mg/dL 8.0* 7.7* 7.7* 9.4       Administrative Statements     Portions of the record may have been created with voice recognition software. Occasional wrong word or \"sound a like\" substitutions may have occurred due to the inherent limitations of voice recognition software. Read the chart carefully and recognize, using context, where substitutions have occurred.If you have any questions, please contact the dictating provider.  "

## 2024-10-19 NOTE — ASSESSMENT & PLAN NOTE
Lab Results   Component Value Date    HGBA1C 7.2 (H) 10/16/2024       Recent Labs     10/18/24  1613 10/18/24  2115 10/19/24  0717 10/19/24  1126   POCGLU 196* 215* 191* 162*       Blood Sugar Average: Last 72 hrs:  (P) 228.5  Left lower extremity cellulitis with an open wound on plantar side.  Rule out osteomyelitis ER ready discussed with podiatry left lower extremity CT without contrast ordered as secondary to GFR in the 40s negative for gas ,Baker's cyst will order venous duplex- no dvt but has bakers cyst    MRI is negative for any osteo discussed with podiatry will do bedside debridement also left toe is ABIs as below potential healing awaiting   Had LEADS -left There is evidence of >75% stenosis noted in the proximal anterior tibial  artery.  As discussed with podiatry and vascular surgeon patient needs angio which is good to be done by IR hopefully Monday as the patient needs debridement and in the OR will evaluate if there is a clinical osteo as well looks like podiatry is planning to take to the OR on Wednesday  For now continue Ancef 2 g IV every 8 hours wound culture no microbe growth still swelling of the leg positive erythema foul odor

## 2024-10-19 NOTE — QUICK NOTE
Primary team reached out to me today regarding new blue discoloration of pt's L 2nd toe that is significantly different from photos taken yesterday. Pt remains M/S intact with signals in foot. LEADs yesterday is concerning for tibial disease and pt was planned for IR angiogram early next week for DFU w/ cellulitis.     Given his new symptom, however, my concern is for possible concomitant embolic disease. Recommend initiation of heparin gtt as well as CTA abd/pelvis with runoff to evaluate for embolic source.     Primary team reached out to Nephrology given pt's renal function (Cr today 1.29, baseline seems to be around 1.3-1.4), but contrast was discouraged by Nephrology. Given our concern that pt may have an embolic source as well as the need to use contrast for planned angiogram, will rediscuss with Nephrology in the coming days. Continue heparin gtt in the meantime.

## 2024-10-19 NOTE — ASSESSMENT & PLAN NOTE
Lab Results   Component Value Date    EGFR 55 10/19/2024    EGFR 46 10/18/2024    EGFR 39 10/17/2024    CREATININE 1.29 10/19/2024    CREATININE 1.49 (H) 10/18/2024    CREATININE 1.73 (H) 10/17/2024     Reported baseline creatinine to 1.3-1.4 in the setting of diabetes, hypertension.  Admitted with a creatinine of 1.58 on 10/16, increased to 1.7 yesterday, patient received IV fluids and creatinine close to baseline at 1.29 this morning  IV fluids were discontinued 10/16. Patient states he continues to have decreased appetite, I/O notes 25% of meals eaten.  Continue holding lisinopril, Jardiance, metformin and furosemide. Avoid NSAIDs.  Follow daily labs and avoid relative hypotension.

## 2024-10-20 PROBLEM — E87.1 ACUTE HYPONATREMIA: Status: ACTIVE | Noted: 2024-10-20

## 2024-10-20 PROBLEM — I73.9 PAD (PERIPHERAL ARTERY DISEASE) (HCC): Status: ACTIVE | Noted: 2024-10-20

## 2024-10-20 LAB
ANION GAP SERPL CALCULATED.3IONS-SCNC: 7 MMOL/L (ref 4–13)
APTT PPP: 103 SECONDS (ref 23–34)
APTT PPP: 59 SECONDS (ref 23–34)
APTT PPP: 63 SECONDS (ref 23–34)
BUN SERPL-MCNC: 21 MG/DL (ref 5–25)
CALCIUM SERPL-MCNC: 8.3 MG/DL (ref 8.4–10.2)
CHLORIDE SERPL-SCNC: 97 MMOL/L (ref 96–108)
CO2 SERPL-SCNC: 27 MMOL/L (ref 21–32)
CREAT SERPL-MCNC: 1.21 MG/DL (ref 0.6–1.3)
GFR SERPL CREATININE-BSD FRML MDRD: 60 ML/MIN/1.73SQ M
GLUCOSE SERPL-MCNC: 139 MG/DL (ref 65–140)
GLUCOSE SERPL-MCNC: 152 MG/DL (ref 65–140)
GLUCOSE SERPL-MCNC: 157 MG/DL (ref 65–140)
GLUCOSE SERPL-MCNC: 179 MG/DL (ref 65–140)
GLUCOSE SERPL-MCNC: 199 MG/DL (ref 65–140)
POTASSIUM SERPL-SCNC: 5 MMOL/L (ref 3.5–5.3)
SODIUM SERPL-SCNC: 131 MMOL/L (ref 135–147)

## 2024-10-20 PROCEDURE — 94760 N-INVAS EAR/PLS OXIMETRY 1: CPT

## 2024-10-20 PROCEDURE — 80048 BASIC METABOLIC PNL TOTAL CA: CPT | Performed by: FAMILY MEDICINE

## 2024-10-20 PROCEDURE — 99232 SBSQ HOSP IP/OBS MODERATE 35: CPT | Performed by: FAMILY MEDICINE

## 2024-10-20 PROCEDURE — 85730 THROMBOPLASTIN TIME PARTIAL: CPT | Performed by: FAMILY MEDICINE

## 2024-10-20 PROCEDURE — 99232 SBSQ HOSP IP/OBS MODERATE 35: CPT

## 2024-10-20 PROCEDURE — 82948 REAGENT STRIP/BLOOD GLUCOSE: CPT

## 2024-10-20 PROCEDURE — 94640 AIRWAY INHALATION TREATMENT: CPT

## 2024-10-20 RX ORDER — INSULIN GLARGINE 100 [IU]/ML
15 INJECTION, SOLUTION SUBCUTANEOUS
Status: DISCONTINUED | OUTPATIENT
Start: 2024-10-20 | End: 2024-10-22 | Stop reason: HOSPADM

## 2024-10-20 RX ORDER — METRONIDAZOLE 500 MG/100ML
500 INJECTION, SOLUTION INTRAVENOUS EVERY 8 HOURS
Status: DISCONTINUED | OUTPATIENT
Start: 2024-10-20 | End: 2024-10-20

## 2024-10-20 RX ADMIN — METOPROLOL SUCCINATE 50 MG: 50 TABLET, EXTENDED RELEASE ORAL at 09:00

## 2024-10-20 RX ADMIN — TRAZODONE HYDROCHLORIDE 25 MG: 50 TABLET ORAL at 21:18

## 2024-10-20 RX ADMIN — OLODATEROL RESPIMAT INHALATION SPRAY 2 PUFF: 2.5 SPRAY, METERED RESPIRATORY (INHALATION) at 09:07

## 2024-10-20 RX ADMIN — BUDESONIDE INHALATION 0.5 MG: 0.5 SUSPENSION RESPIRATORY (INHALATION) at 21:03

## 2024-10-20 RX ADMIN — SALINE NASAL SPRAY 2 SPRAY: 1.5 SOLUTION NASAL at 09:06

## 2024-10-20 RX ADMIN — DOCUSATE SODIUM 100 MG: 100 CAPSULE, LIQUID FILLED ORAL at 09:00

## 2024-10-20 RX ADMIN — HEPARIN SODIUM 18 UNITS/KG/HR: 10000 INJECTION, SOLUTION INTRAVENOUS at 13:46

## 2024-10-20 RX ADMIN — UMECLIDINIUM 1 PUFF: 62.5 AEROSOL, POWDER ORAL at 09:07

## 2024-10-20 RX ADMIN — CEFAZOLIN SODIUM 2000 MG: 2 SOLUTION INTRAVENOUS at 03:04

## 2024-10-20 RX ADMIN — INSULIN LISPRO 10 UNITS: 100 INJECTION, SOLUTION INTRAVENOUS; SUBCUTANEOUS at 11:00

## 2024-10-20 RX ADMIN — HEPARIN SODIUM 18 UNITS/KG/HR: 10000 INJECTION, SOLUTION INTRAVENOUS at 03:07

## 2024-10-20 RX ADMIN — PANTOPRAZOLE SODIUM 40 MG: 40 TABLET, DELAYED RELEASE ORAL at 16:07

## 2024-10-20 RX ADMIN — HEPARIN SODIUM 4800 UNITS: 1000 INJECTION INTRAVENOUS; SUBCUTANEOUS at 05:39

## 2024-10-20 RX ADMIN — INSULIN LISPRO 2 UNITS: 100 INJECTION, SOLUTION INTRAVENOUS; SUBCUTANEOUS at 11:00

## 2024-10-20 RX ADMIN — PIPERACILLIN AND TAZOBACTAM 4.5 G: 36; 4.5 INJECTION, POWDER, FOR SOLUTION INTRAVENOUS at 13:47

## 2024-10-20 RX ADMIN — LORATADINE 10 MG: 10 TABLET ORAL at 09:00

## 2024-10-20 RX ADMIN — ATORVASTATIN CALCIUM 20 MG: 20 TABLET, FILM COATED ORAL at 09:00

## 2024-10-20 RX ADMIN — PIPERACILLIN AND TAZOBACTAM 4.5 G: 36; 4.5 INJECTION, POWDER, FOR SOLUTION INTRAVENOUS at 11:53

## 2024-10-20 RX ADMIN — DOCUSATE SODIUM 100 MG: 100 CAPSULE, LIQUID FILLED ORAL at 17:51

## 2024-10-20 RX ADMIN — INSULIN LISPRO 2 UNITS: 100 INJECTION, SOLUTION INTRAVENOUS; SUBCUTANEOUS at 09:01

## 2024-10-20 RX ADMIN — DULOXETINE HYDROCHLORIDE 20 MG: 20 CAPSULE, DELAYED RELEASE ORAL at 09:00

## 2024-10-20 RX ADMIN — PIPERACILLIN AND TAZOBACTAM 4.5 G: 36; 4.5 INJECTION, POWDER, FOR SOLUTION INTRAVENOUS at 21:18

## 2024-10-20 RX ADMIN — INSULIN LISPRO 10 UNITS: 100 INJECTION, SOLUTION INTRAVENOUS; SUBCUTANEOUS at 16:08

## 2024-10-20 RX ADMIN — ASPIRIN 81 MG 81 MG: 81 TABLET ORAL at 09:00

## 2024-10-20 RX ADMIN — CYANOCOBALAMIN TAB 500 MCG 500 MCG: 500 TAB at 09:00

## 2024-10-20 RX ADMIN — POLYETHYLENE GLYCOL 3350 17 G: 17 POWDER, FOR SOLUTION ORAL at 09:00

## 2024-10-20 RX ADMIN — ISOSORBIDE MONONITRATE 30 MG: 30 TABLET, EXTENDED RELEASE ORAL at 09:00

## 2024-10-20 RX ADMIN — PREGABALIN 100 MG: 100 CAPSULE ORAL at 17:51

## 2024-10-20 RX ADMIN — PREGABALIN 100 MG: 100 CAPSULE ORAL at 09:00

## 2024-10-20 RX ADMIN — INSULIN GLARGINE 15 UNITS: 100 INJECTION, SOLUTION SUBCUTANEOUS at 21:18

## 2024-10-20 RX ADMIN — INSULIN LISPRO 2 UNITS: 100 INJECTION, SOLUTION INTRAVENOUS; SUBCUTANEOUS at 16:08

## 2024-10-20 RX ADMIN — INSULIN LISPRO 10 UNITS: 100 INJECTION, SOLUTION INTRAVENOUS; SUBCUTANEOUS at 09:01

## 2024-10-20 RX ADMIN — Medication 1000 UNITS: at 09:00

## 2024-10-20 RX ADMIN — PANTOPRAZOLE SODIUM 40 MG: 40 TABLET, DELAYED RELEASE ORAL at 05:39

## 2024-10-20 RX ADMIN — FLUTICASONE PROPIONATE 2 SPRAY: 50 SPRAY, METERED NASAL at 09:06

## 2024-10-20 NOTE — ASSESSMENT & PLAN NOTE
Had LEADS -left There is evidence of >75% stenosis noted in the proximal anterior tibial  artery.  As discussed with podiatry and vascular surgeon patient needs angio which is good to be done by IR   Discoloration of the left second toe yesterday discussed with vascular and podiatry started heparin drip as a concern for a emboli unfortunately nephrology would like to hold off on the CTA as he is high risk for kidney injury especially also undergoing angio tomorrow.  After angio he is going to  need 4 hours of fluid hydration as per nephrology anyhow nephrology to manage post angiogram hydration

## 2024-10-20 NOTE — ASSESSMENT & PLAN NOTE
Lab Results   Component Value Date    EGFR 60 10/20/2024    EGFR 55 10/19/2024    EGFR 46 10/18/2024    CREATININE 1.21 10/20/2024    CREATININE 1.29 10/19/2024    CREATININE 1.49 (H) 10/18/2024     Reported baseline creatinine to 1.3-1.4 in the setting of diabetes, hypertension.  Admitted with a creatinine of 1.58 on 10/16, creatinine trending down at 1.21 this morning, 10/20.  IV fluids were discontinued 10/16. Patient states he continues to have decreased appetite, I/O notes 25% of meals eaten.  UOP 2.1L 10/19.   Continue holding lisinopril, Jardiance, metformin and furosemide. Avoid NSAIDs. Follow daily labs and avoid relative hypotension.

## 2024-10-20 NOTE — PLAN OF CARE
Problem: Potential for Falls  Goal: Patient will remain free of falls  Description: INTERVENTIONS:  - Educate patient/family on patient safety including physical limitations  - Instruct patient to call for assistance with activity   - Consult OT/PT to assist with strengthening/mobility   - Keep Call bell within reach  - Keep bed low and locked with side rails adjusted as appropriate  - Keep care items and personal belongings within reach  - Initiate and maintain comfort rounds  - Make Fall Risk Sign visible to staff  - Offer Toileting every 2 Hours, in advance of need  - Initiate/Maintain bed/chair alarm  - Obtain necessary fall risk management equipment: yellow socks  - Apply yellow socks and bracelet for high fall risk patients  - Consider moving patient to room near nurses station  Outcome: Progressing     Problem: PAIN - ADULT  Goal: Verbalizes/displays adequate comfort level or baseline comfort level  Description: Interventions:  - Encourage patient to monitor pain and request assistance  - Assess pain using appropriate pain scale  - Administer analgesics based on type and severity of pain and evaluate response  - Implement non-pharmacological measures as appropriate and evaluate response  - Consider cultural and social influences on pain and pain management  - Notify physician/advanced practitioner if interventions unsuccessful or patient reports new pain  Outcome: Progressing     Problem: INFECTION - ADULT  Goal: Absence or prevention of progression during hospitalization  Description: INTERVENTIONS:  - Assess and monitor for signs and symptoms of infection  - Monitor lab/diagnostic results  - Monitor all insertion sites, i.e. indwelling lines, tubes, and drains  - Monitor endotracheal if appropriate and nasal secretions for changes in amount and color  - Pound appropriate cooling/warming therapies per order  - Administer medications as ordered  - Instruct and encourage patient and family to use good  hand hygiene technique  - Identify and instruct in appropriate isolation precautions for identified infection/condition  Outcome: Progressing  Goal: Absence of fever/infection during neutropenic period  Description: INTERVENTIONS:  - Monitor WBC    Outcome: Progressing     Problem: SAFETY ADULT  Goal: Patient will remain free of falls  Description: INTERVENTIONS:  - Educate patient/family on patient safety including physical limitations  - Instruct patient to call for assistance with activity   - Consult OT/PT to assist with strengthening/mobility   - Keep Call bell within reach  - Keep bed low and locked with side rails adjusted as appropriate  - Keep care items and personal belongings within reach  - Initiate and maintain comfort rounds  - Make Fall Risk Sign visible to staff  - Offer Toileting every 2 Hours, in advance of need  - Initiate/Maintain bed/chair alarm  - Obtain necessary fall risk management equipment: yellow socks  - Apply yellow socks and bracelet for high fall risk patients  - Consider moving patient to room near nurses station  Outcome: Progressing  Goal: Maintain or return to baseline ADL function  Description: INTERVENTIONS:  -  Assess patient's ability to carry out ADLs; assess patient's baseline for ADL function and identify physical deficits which impact ability to perform ADLs (bathing, care of mouth/teeth, toileting, grooming, dressing, etc.)  - Assess/evaluate cause of self-care deficits   - Assess range of motion  - Assess patient's mobility; develop plan if impaired  - Assess patient's need for assistive devices and provide as appropriate  - Encourage maximum independence but intervene and supervise when necessary  - Involve family in performance of ADLs  - Assess for home care needs following discharge   - Consider OT consult to assist with ADL evaluation and planning for discharge  - Provide patient education as appropriate  Outcome: Progressing  Goal: Maintains/Returns to pre  admission functional level  Description: INTERVENTIONS:  - Perform AM-PAC 6 Click Basic Mobility/ Daily Activity assessment daily.  - Set and communicate daily mobility goal to care team and patient/family/caregiver.   - Collaborate with rehabilitation services on mobility goals if consulted  - Perform Range of Motion 3 times a day.  - Reposition patient every 2 hours.  - Dangle patient 3 times a day  - Stand patient 3 times a day  - Ambulate patient 3 times a day  - Out of bed to chair 3 times a day   - Out of bed for meals 3 times a day  - Out of bed for toileting  - Record patient progress and toleration of activity level   Outcome: Progressing     Problem: DISCHARGE PLANNING  Goal: Discharge to home or other facility with appropriate resources  Description: INTERVENTIONS:  - Identify barriers to discharge w/patient and caregiver  - Arrange for needed discharge resources and transportation as appropriate  - Identify discharge learning needs (meds, wound care, etc.)  - Arrange for interpretive services to assist at discharge as needed  - Refer to Case Management Department for coordinating discharge planning if the patient needs post-hospital services based on physician/advanced practitioner order or complex needs related to functional status, cognitive ability, or social support system  Outcome: Progressing     Problem: Knowledge Deficit  Goal: Patient/family/caregiver demonstrates understanding of disease process, treatment plan, medications, and discharge instructions  Description: Complete learning assessment and assess knowledge base.  Interventions:  - Provide teaching at level of understanding  - Provide teaching via preferred learning methods  Outcome: Progressing     Problem: Nutrition/Hydration-ADULT  Goal: Nutrient/Hydration intake appropriate for improving, restoring or maintaining nutritional needs  Description: Monitor and assess patient's nutrition/hydration status for malnutrition. Collaborate with  interdisciplinary team and initiate plan and interventions as ordered.  Monitor patient's weight and dietary intake as ordered or per policy. Utilize nutrition screening tool and intervene as necessary. Determine patient's food preferences and provide high-protein, high-caloric foods as appropriate.     INTERVENTIONS:  - Monitor oral intake, urinary output, labs, and treatment plans  - Assess nutrition and hydration status and recommend course of action  - Evaluate amount of meals eaten  - Assist patient with eating if necessary   - Allow adequate time for meals  - Recommend/ encourage appropriate diets, oral nutritional supplements, and vitamin/mineral supplements  - Order, calculate, and assess calorie counts as needed  - Recommend, monitor, and adjust tube feedings and TPN/PPN based on assessed needs  - Assess need for intravenous fluids  - Provide specific nutrition/hydration education as appropriate  - Include patient/family/caregiver in decisions related to nutrition  Outcome: Progressing

## 2024-10-20 NOTE — PLAN OF CARE
Problem: Potential for Falls  Goal: Patient will remain free of falls  Description: INTERVENTIONS:  - Educate patient/family on patient safety including physical limitations  - Instruct patient to call for assistance with activity   - Consult OT/PT to assist with strengthening/mobility   - Keep Call bell within reach  - Keep bed low and locked with side rails adjusted as appropriate  - Keep care items and personal belongings within reach  - Initiate and maintain comfort rounds  - Make Fall Risk Sign visible to staff  - Offer Toileting every  Hours, in advance of need  - Initiate/Maintain alarm  - Obtain necessary fall risk management equipment:   - Apply yellow socks and bracelet for high fall risk patients  - Consider moving patient to room near nurses station  Outcome: Progressing     Problem: PAIN - ADULT  Goal: Verbalizes/displays adequate comfort level or baseline comfort level  Description: Interventions:  - Encourage patient to monitor pain and request assistance  - Assess pain using appropriate pain scale  - Administer analgesics based on type and severity of pain and evaluate response  - Implement non-pharmacological measures as appropriate and evaluate response  - Consider cultural and social influences on pain and pain management  - Notify physician/advanced practitioner if interventions unsuccessful or patient reports new pain  Outcome: Progressing     Problem: INFECTION - ADULT  Goal: Absence or prevention of progression during hospitalization  Description: INTERVENTIONS:  - Assess and monitor for signs and symptoms of infection  - Monitor lab/diagnostic results  - Monitor all insertion sites, i.e. indwelling lines, tubes, and drains  - Monitor endotracheal if appropriate and nasal secretions for changes in amount and color  - [place] appropriate cooling/warming therapies per order  - Administer medications as ordered  - Instruct and encourage patient and family to use good hand hygiene technique  -  Identify and instruct in appropriate isolation precautions for identified infection/condition  Outcome: Progressing  Goal: Absence of fever/infection during neutropenic period  Description: INTERVENTIONS:  - Monitor WBC    Outcome: Progressing     Problem: SAFETY ADULT  Goal: Patient will remain free of falls  Description: INTERVENTIONS:  - Educate patient/family on patient safety including physical limitations  - Instruct patient to call for assistance with activity   - Consult OT/PT to assist with strengthening/mobility   - Keep Call bell within reach  - Keep bed low and locked with side rails adjusted as appropriate  - Keep care items and personal belongings within reach  - Initiate and maintain comfort rounds  - Make Fall Risk Sign visible to staff  - Offer Toileting every  Hours, in advance of need  - Initiate/Maintain alarm  - Obtain necessary fall risk management equipment:   - Apply yellow socks and bracelet for high fall risk patients  - Consider moving patient to room near nurses station  Outcome: Progressing  Goal: Maintain or return to baseline ADL function  Description: INTERVENTIONS:  -  Assess patient's ability to carry out ADLs; assess patient's baseline for ADL function and identify physical deficits which impact ability to perform ADLs (bathing, care of mouth/teeth, toileting, grooming, dressing, etc.)  - Assess/evaluate cause of self-care deficits   - Assess range of motion  - Assess patient's mobility; develop plan if impaired  - Assess patient's need for assistive devices and provide as appropriate  - Encourage maximum independence but intervene and supervise when necessary  - Involve family in performance of ADLs  - Assess for home care needs following discharge   - Consider OT consult to assist with ADL evaluation and planning for discharge  - Provide patient education as appropriate  Outcome: Progressing  Goal: Maintains/Returns to pre admission functional level  Description:  INTERVENTIONS:  - Perform AM-PAC 6 Click Basic Mobility/ Daily Activity assessment daily.  - Set and communicate daily mobility goal to care team and patient/family/caregiver.   - Collaborate with rehabilitation services on mobility goals if consulted  - Perform Range of Motion  times a day.  - Reposition patient every  hours.  - Dangle patient  times a day  - Stand patient  times a day  - Ambulate patient  times a day  - Out of bed to chair  times a day   - Out of bed for meals  times a day  - Out of bed for toileting  - Record patient progress and toleration of activity level   Outcome: Progressing     Problem: DISCHARGE PLANNING  Goal: Discharge to home or other facility with appropriate resources  Description: INTERVENTIONS:  - Identify barriers to discharge w/patient and caregiver  - Arrange for needed discharge resources and transportation as appropriate  - Identify discharge learning needs (meds, wound care, etc.)  - Arrange for interpretive services to assist at discharge as needed  - Refer to Case Management Department for coordinating discharge planning if the patient needs post-hospital services based on physician/advanced practitioner order or complex needs related to functional status, cognitive ability, or social support system  Outcome: Progressing     Problem: Knowledge Deficit  Goal: Patient/family/caregiver demonstrates understanding of disease process, treatment plan, medications, and discharge instructions  Description: Complete learning assessment and assess knowledge base.  Interventions:  - Provide teaching at level of understanding  - Provide teaching via preferred learning methods  Outcome: Progressing     Problem: Nutrition/Hydration-ADULT  Goal: Nutrient/Hydration intake appropriate for improving, restoring or maintaining nutritional needs  Description: Monitor and assess patient's nutrition/hydration status for malnutrition. Collaborate with interdisciplinary team and initiate plan and  interventions as ordered.  Monitor patient's weight and dietary intake as ordered or per policy. Utilize nutrition screening tool and intervene as necessary. Determine patient's food preferences and provide high-protein, high-caloric foods as appropriate.     INTERVENTIONS:  - Monitor oral intake, urinary output, labs, and treatment plans  - Assess nutrition and hydration status and recommend course of action  - Evaluate amount of meals eaten  - Assist patient with eating if necessary   - Allow adequate time for meals  - Recommend/ encourage appropriate diets, oral nutritional supplements, and vitamin/mineral supplements  - Order, calculate, and assess calorie counts as needed  - Recommend, monitor, and adjust tube feedings and TPN/PPN based on assessed needs  - Assess need for intravenous fluids  - Provide specific nutrition/hydration education as appropriate  - Include patient/family/caregiver in decisions related to nutrition  Outcome: Progressing

## 2024-10-20 NOTE — ASSESSMENT & PLAN NOTE
Lab Results   Component Value Date    EGFR 60 10/20/2024    EGFR 55 10/19/2024    EGFR 46 10/18/2024    CREATININE 1.21 10/20/2024    CREATININE 1.29 10/19/2024    CREATININE 1.49 (H) 10/18/2024   Baseline creatinine is 1.2-1.3 cr down to 1.2 nephrology following especially plan for angio for Monday

## 2024-10-20 NOTE — PROGRESS NOTES
Progress Note - Nephrology   Name: Clay Marcial 68 y.o. male I MRN: [id number]  Unit/Bed#: -01 I Date of Admission: 10/16/2024   Date of Service: 10/20/2024 I Hospital Day: 4     Assessment & Plan  Stage 3 chronic kidney disease (HCC)  Lab Results   Component Value Date    EGFR 60 10/20/2024    EGFR 55 10/19/2024    EGFR 46 10/18/2024    CREATININE 1.21 10/20/2024    CREATININE 1.29 10/19/2024    CREATININE 1.49 (H) 10/18/2024     Reported baseline creatinine to 1.3-1.4 in the setting of diabetes, hypertension.  Admitted with a creatinine of 1.58 on 10/16, creatinine trending down at 1.21 this morning, 10/20.  IV fluids were discontinued 10/16. Patient states he continues to have decreased appetite, I/O notes 25% of meals eaten.  UOP 2.1L 10/19.   Continue holding lisinopril, Jardiance, metformin and furosemide. Avoid NSAIDs. Follow daily labs and avoid relative hypotension.    Diabetic foot infection  (HCC)  Lab Results   Component Value Date    HGBA1C 7.2 (H) 10/16/2024       Recent Labs     10/19/24  1713 10/19/24  2101 10/20/24  0716 10/20/24  1028   POCGLU 166* 146* 179* 199*   LLE cellulitis with open wound on plantar side.   Patient was previously scheduled for LLE angiogram on Monday 10/21 has been deferred due to increased risk for worsening renal function with continued elevated creatinine and decreased GFR.   Podiatry following. CT without contrast negative for gas, MRI negative for osteo, venous duplex negative for DVT. >75% stenosis in left anterior tibial artery with plan for angio on Monday 10/21 and OR debridement Wed.      Asked to risk stratify with recommendations for SUSHILA risk reduction.   Discussed with the patient in depth the risks and benefits of the surgery from a renal standpoint including risk of SUSHILA and administration of IV fluids and holding of medications to reduce the risk of SUSHILA and decrease the unlikely probability of the worst case scenario requiring renal replacement  therapy.    Recommend continue to avoid ACE/ARBs, NSAIDs and nephrotoxins.   Minimize IV contrast use, if possible. Recommend NSS at 125/ ml/hr continuing for 4 hours post procedures.       Blood Sugar Average: Last 72 hrs:  (P) 204  Infectious disease on board, currently on cefazolin.  Awaiting podiatry evaluation  Diabetes mellitus (HCC)  Lab Results   Component Value Date    HGBA1C 7.2 (H) 10/16/2024       Recent Labs     10/19/24  1713 10/19/24  2101 10/20/24  0716 10/20/24  1028   POCGLU 166* 146* 179* 199*       Blood Sugar Average: Last 72 hrs:  (P) 204  Management as per internal medicine team  CAD (coronary artery disease)    Chronic diastolic congestive heart failure (HCC)  Wt Readings from Last 3 Encounters:   10/17/24 126 kg (277 lb)   08/27/24 127 kg (279 lb 12.2 oz)   05/18/24 129 kg (285 lb 7.9 oz)     Monitor volume status closely.  Keep holding diuretics and Jardiance for now      Chronic obstructive pulmonary disease with acute exacerbation (HCC)    ROMEL (obstructive sleep apnea)    PAD (peripheral artery disease) (HCC)    Acute hyponatremia      I have reviewed the nephrology recommendations including SUSHILA risk reduction with IVF pre and post angio with hospitalist, and we are in agreement with renal plan including the information outlined above.     Subjective   Patient examined resting in bed, denies complaints. States his appetite is somewhat improved compared to yesterday. Discussed increased risk for exacerbation of renal failure and decline in kidney function including possible need for renal replacement therapy with IV contrast during scheduled angiogram on Monday. Discussed risk reduction strategy including IVF and avoidance of nephrotoxins as well as increase in creatinine expected around 3 days post contrast. Patient states understanding and is agreeable to proceed with angio.    Objective :  Temp:  [97.3 °F (36.3 °C)-98.4 °F (36.9 °C)] 97.3 °F (36.3 °C)  HR:  [55] 55  BP:  (133-139)/(63-78) 133/63  Resp:  [17-20] 17  SpO2:  [92 %-95 %] 92 %  O2 Device: None (Room air)    Current Weight: Weight - Scale: 126 kg (277 lb)  First Weight: Weight - Scale: 126 kg (277 lb 9 oz)  I/O         10/18 0701  10/19 0700 10/19 0701  10/20 0700 10/20 0701  10/21 0700    P.O. 240 360     I.V. (mL/kg)       Total Intake(mL/kg) 240 (1.9) 360 (2.9)     Urine (mL/kg/hr) 2240 (0.7) 2155 (0.7)     Total Output 2240 2155     Net -2000 -1795            Unmeasured Urine Occurrence 1 x            Physical Exam  Vitals and nursing note reviewed.   Constitutional:       General: He is not in acute distress.     Appearance: He is well-developed. He is obese. He is ill-appearing.   HENT:      Head: Normocephalic and atraumatic.      Right Ear: External ear normal.      Left Ear: External ear normal.      Nose: Nose normal.      Mouth/Throat:      Mouth: Mucous membranes are moist.      Pharynx: Oropharynx is clear.   Eyes:      Extraocular Movements: Extraocular movements intact.      Conjunctiva/sclera: Conjunctivae normal.      Pupils: Pupils are equal, round, and reactive to light.   Cardiovascular:      Rate and Rhythm: Normal rate and regular rhythm.      Heart sounds: Normal heart sounds. No murmur heard.     Comments: Distant heart sounds  Pulmonary:      Effort: Pulmonary effort is normal. No respiratory distress.      Breath sounds: Normal breath sounds.      Comments: Decreased breath sounds  Abdominal:      Palpations: Abdomen is soft.      Tenderness: There is no abdominal tenderness.   Musculoskeletal:         General: No swelling.      Cervical back: Neck supple.   Skin:     General: Skin is warm and dry.      Capillary Refill: Capillary refill takes less than 2 seconds.   Neurological:      Mental Status: He is alert.   Psychiatric:         Mood and Affect: Mood normal.         Medications:    Current Facility-Administered Medications:     acetaminophen (TYLENOL) tablet 650 mg, 650 mg, Oral, Q6H PRN,  Zee Newman MD, 650 mg at 10/16/24 2117    aspirin chewable tablet 81 mg, 81 mg, Oral, Daily, Zee Newman MD, 81 mg at 10/20/24 0900    atorvastatin (LIPITOR) tablet 20 mg, 20 mg, Oral, Daily, BANDAR Chaudhary-C, 20 mg at 10/20/24 0900    budesonide (PULMICORT) inhalation solution 0.5 mg, 0.5 mg, Nebulization, Q12H, Zee Newman MD, 0.5 mg at 10/18/24 1919    Cholecalciferol (VITAMIN D3) tablet 1,000 Units, 1,000 Units, Oral, Daily, Stephanie Stone PA-C, 1,000 Units at 10/20/24 0900    cyanocobalamin (VITAMIN B-12) tablet 500 mcg, 500 mcg, Oral, Daily, BANDAR Chaudhary-ANN-MARIE, 500 mcg at 10/20/24 0900    docusate sodium (COLACE) capsule 100 mg, 100 mg, Oral, BID, Lina Villanueva MD, 100 mg at 10/20/24 0900    DULoxetine (CYMBALTA) delayed release capsule 20 mg, 20 mg, Oral, Daily, BANDAR Chaudhary-ANN-MARIE, 20 mg at 10/20/24 0900    fluticasone (FLONASE) 50 mcg/act nasal spray 2 spray, 2 spray, Nasal, Daily, BANDAR Chaudhary-C, 2 spray at 10/20/24 0906    heparin (porcine) 25,000 units in 0.45% NaCl 250 mL infusion (premix), 3-30 Units/kg/hr (Order-Specific), Intravenous, Titrated, Zee Newman MD, Last Rate: 24 mL/hr at 10/20/24 0542, 20 Units/kg/hr at 10/20/24 0542    heparin (porcine) injection 4,800 Units, 4,800 Units, Intravenous, Q6H PRN, Zee Newman MD, 4,800 Units at 10/20/24 0539    heparin (porcine) injection 9,600 Units, 9,600 Units, Intravenous, Q6H PRN, Zee Newman MD    insulin glargine (LANTUS) subcutaneous injection 15 Units 0.15 mL, 15 Units, Subcutaneous, HS, Zee Newman MD    insulin lispro (HumALOG/ADMELOG) 100 units/mL subcutaneous injection 10 Units, 10 Units, Subcutaneous, TID With Meals, Zee Newman MD, 10 Units at 10/20/24 1100    insulin lispro (HumALOG/ADMELOG) 100 units/mL subcutaneous injection 2-12 Units, 2-12 Units, Subcutaneous, TID AC, 2 Units at 10/20/24 1100 **AND** Fingerstick Glucose (POCT), , , TID AC, Zee Newman MD     isosorbide mononitrate (IMDUR) 24 hr tablet 30 mg, 30 mg, Oral, Daily, Stephanie Stone PA-C, 30 mg at 10/20/24 0900    levalbuterol (XOPENEX) inhalation solution 1.25 mg, 1.25 mg, Nebulization, Q8H PRN, Zee Newman MD    loratadine (CLARITIN) tablet 10 mg, 10 mg, Oral, Daily, Stephanie Stone PA-C, 10 mg at 10/20/24 0900    metoprolol succinate (TOPROL-XL) 24 hr tablet 50 mg, 50 mg, Oral, Daily, Stephanie Stone PA-C, 50 mg at 10/20/24 0900    morphine injection 2 mg, 2 mg, Intravenous, Q4H PRN, Zee Newman MD    nicotine (NICODERM CQ) 14 mg/24hr TD 24 hr patch 1 patch, 1 patch, Transdermal, Daily, Zee Newman MD, 1 patch at 10/19/24 0807    umeclidinium 62.5 mcg/actuation inhaler AEPB 1 puff, 1 puff, Inhalation, Daily, 1 puff at 10/20/24 0907 **AND** olodaterol HCl (STRIVERDI RESPIMAT) inhaler 2 puff, 2 puff, Inhalation, Daily, Stephanie Stone PA-C, 2 puff at 10/20/24 0907    ondansetron (ZOFRAN) injection 4 mg, 4 mg, Intravenous, Q4H PRN, Lina Villanueva MD, 4 mg at 10/19/24 0814    pantoprazole (PROTONIX) EC tablet 40 mg, 40 mg, Oral, BID AC, Stephanie Stone PA-C, 40 mg at 10/20/24 0539    piperacillin-tazobactam (ZOSYN) 4.5 g in sodium chloride 0.9 % 100 mL IV LOADING DOSE, 4.5 g, Intravenous, Once **FOLLOWED BY** piperacillin-tazobactam (ZOSYN) 4.5 g in sodium chloride 0.9 % 100 mL IVPB (EXTENDED INFUSION), 4.5 g, Intravenous, Q8H, Zee Newman MD    polyethylene glycol (MIRALAX) packet 17 g, 17 g, Oral, Daily, Lina Villanueva MD, 17 g at 10/20/24 0900    pregabalin (LYRICA) capsule 100 mg, 100 mg, Oral, BID, Stephanie Stone PA-C, 100 mg at 10/20/24 0900    sodium chloride (OCEAN) 0.65 % nasal spray 2 spray, 2 spray, Each Nare, BID, BANDAR Chaudhary-C, 2 spray at 10/20/24 0906    traZODone (DESYREL) tablet 25 mg, 25 mg, Oral, HS, Stephanie Stone, PA-C, 25 mg at 10/19/24 2105      Lab Results: I have reviewed the following results:  Results from last 7 days  "  Lab Units 10/20/24  0251 10/19/24  0440 10/18/24  0538 10/17/24  0446 10/16/24  1121   WBC Thousand/uL  --  8.36  --  9.16 11.65*   HEMOGLOBIN g/dL  --  11.4*  --  11.8* 15.2   HEMATOCRIT %  --  33.3*  --  34.7* 44.5   PLATELETS Thousands/uL  --  167  --  162 210   POTASSIUM mmol/L 5.0 4.0 3.8 3.8 4.3   CHLORIDE mmol/L 97 98 98 97 92*   CO2 mmol/L 27 28 27 27 29   BUN mg/dL 21 20 21 25 19   CREATININE mg/dL 1.21 1.29 1.49* 1.73* 1.58*   CALCIUM mg/dL 8.3* 8.0* 7.7* 7.7* 9.4       Administrative Statements     Portions of the record may have been created with voice recognition software. Occasional wrong word or \"sound a like\" substitutions may have occurred due to the inherent limitations of voice recognition software. Read the chart carefully and recognize, using context, where substitutions have occurred.If you have any questions, please contact the dictating provider.  "

## 2024-10-20 NOTE — ASSESSMENT & PLAN NOTE
Wt Readings from Last 3 Encounters:   10/17/24 126 kg (277 lb)   08/27/24 127 kg (279 lb 12.2 oz)   05/18/24 129 kg (285 lb 7.9 oz)     Monitor volume status closely.  Keep holding diuretics and Jardiance for now

## 2024-10-20 NOTE — ASSESSMENT & PLAN NOTE
Lab Results   Component Value Date    HGBA1C 7.2 (H) 10/16/2024       Recent Labs     10/19/24  1713 10/19/24  2101 10/20/24  0716 10/20/24  1028   POCGLU 166* 146* 179* 199*   LLE cellulitis with open wound on plantar side.   Patient was previously scheduled for LLE angiogram on Monday 10/21 has been deferred due to increased risk for worsening renal function with continued elevated creatinine and decreased GFR.   Podiatry following. CT without contrast negative for gas, MRI negative for osteo, venous duplex negative for DVT. >75% stenosis in left anterior tibial artery with plan for angio on Monday 10/21 and OR debridement Wed.      Asked to risk stratify with recommendations for SUSHILA risk reduction.   Discussed with the patient in depth the risks and benefits of the surgery from a renal standpoint including risk of SUSHILA and administration of IV fluids and holding of medications to reduce the risk of SUSHILA and decrease the unlikely probability of the worst case scenario requiring renal replacement therapy.    Recommend continue to avoid ACE/ARBs, NSAIDs and nephrotoxins.   Minimize IV contrast use, if possible. Recommend NSS at 125/ ml/hr continuing for 4 hours post procedures.       Blood Sugar Average: Last 72 hrs:  (P) 204  Infectious disease on board, currently on cefazolin.  Awaiting podiatry evaluation

## 2024-10-20 NOTE — ASSESSMENT & PLAN NOTE
Lab Results   Component Value Date    HGBA1C 7.2 (H) 10/16/2024       Recent Labs     10/19/24  1126 10/19/24  1713 10/19/24  2101 10/20/24  0716   POCGLU 162* 166* 146* 179*       Blood Sugar Average: Last 72 hrs:  (P) [id number]  Hga1c is actually 7.2 hyperglycemia secondary to infection with insulin adjustment his sugars have improved due to going n.p.o. will reduce Lantus to 15 units at night continue Humalog to 10units with meals for today once n.p.o. hold and sliding scale. Sugars improved

## 2024-10-20 NOTE — ASSESSMENT & PLAN NOTE
Lab Results   Component Value Date    HGBA1C 7.2 (H) 10/16/2024       Recent Labs     10/19/24  1713 10/19/24  2101 10/20/24  0716 10/20/24  1028   POCGLU 166* 146* 179* 199*       Blood Sugar Average: Last 72 hrs:  (P) 204  Management as per internal medicine team

## 2024-10-20 NOTE — PROGRESS NOTES
[facility] calling, states that they do not have any lab results that you requested   Progress Note - Hospitalist   Name: Clay Marcial 68 y.o. male I MRN: [id number]  Unit/Bed#: [facility] I Date of Admission: 10/16/2024   Date of Service: 10/20/2024 I Hospital Day: 4    Assessment & Plan  Diabetic foot infection  (HCC)  Lab Results   Component Value Date    HGBA1C 7.2 (H) 10/16/2024       Recent Labs     10/19/24  1126 10/19/24  1713 10/19/24  2101 10/20/24  0716   POCGLU 162* 166* 146* 179*       Blood Sugar Average: Last 72 hrs:  (P) [id number]  Left lower extremity cellulitis with an open wound on plantar side.  Rule out osteomyelitis ER ready discussed with podiatry left lower extremity CT without contrast ordered as secondary to GFR in the 40s negative for gas ,Baker's cyst will order venous duplex- no dvt but has bakers cyst    MRI is negative for any osteo discussed with podiatry will do bedside debridement also left toe is ABIs as below potential healing awaiting   Had LEADS -left There is evidence of >75% stenosis noted in the proximal anterior tibial  artery.  As discussed with podiatry and vascular surgeon patient needs angio which is good to be done by IR hopefully Monday 10/21/2024 as the patient needs debridement and in the OR will evaluate if there is a clinical osteo as well looks like podiatry is planning to take to the OR on Wednesday  Patient has a very foul odor the redness is worsened edema is worsened infection is worsening discontinue Ancef changed to Zosyn to cover gram-negative's and anaerobes wound culture is no growth podiatry infectious disease to reevaluate tomorrow  Diabetes mellitus (HCC)  Lab Results   Component Value Date    HGBA1C 7.2 (H) 10/16/2024       Recent Labs     10/19/24  1126 10/19/24  1713 10/19/24  2101 10/20/24  0716   POCGLU 162* 166* 146* 179*       Blood Sugar Average: Last 72 hrs:  (P) [id number]  Hga1c is actually 7.2 hyperglycemia secondary to infection with insulin adjustment his sugars have improved due to going n.p.o.  will reduce Lantus to 15 units at night continue Humalog to 10units with meals for today once n.p.o. hold and sliding scale. Sugars improved   CAD (coronary artery disease)  Asa/ lipitor and isosorbide   Tte ordered as ef unknown   Chronic diastolic congestive heart failure (HCC)  Wt Readings from Last 3 Encounters:   10/17/24 126 kg (277 lb)   08/27/24 127 kg (279 lb 12.2 oz)   05/18/24 129 kg (285 lb 7.9 oz)     2D echo obtained EF of 60% which is normal hold Lasix as per nephrology right now not in exacerbation        Stage 3 chronic kidney disease (HCC)  Lab Results   Component Value Date    EGFR 60 10/20/2024    EGFR 55 10/19/2024    EGFR 46 10/18/2024    CREATININE 1.21 10/20/2024    CREATININE 1.29 10/19/2024    CREATININE 1.49 (H) 10/18/2024   Baseline creatinine is 1.2-1.3 cr down to 1.2 nephrology following especially plan for angio for Monday   Chronic obstructive pulmonary disease with acute exacerbation (HCC)  No more wheezing continue Pulmicort and Xopenex  ROEML (obstructive sleep apnea)  Cpap at home   PAD (peripheral artery disease) (HCC)  Had LEADS -left There is evidence of >75% stenosis noted in the proximal anterior tibial  artery.  As discussed with podiatry and vascular surgeon patient needs angio which is good to be done by IR   Discoloration of the left second toe yesterday discussed with vascular and podiatry started heparin drip as a concern for a emboli unfortunately nephrology would like to hold off on the CTA as he is high risk for kidney injury especially also undergoing angio tomorrow.  After angio he is going to  need 4 hours of fluid hydration as per nephrology anyhow nephrology to manage post angiogram hydration    VTE Pharmacologic Prophylaxis: VTE Score: 3 Moderate Risk (Score 3-4) - Pharmacological DVT Prophylaxis Ordered: heparin drip.    Mobility:   Basic Mobility Inpatient Raw Score: 20  JH-HLM Goal: 6: Walk 10 steps or more  JH-HLM Achieved: 6: Walk 10 steps or more  JH-HLM  Goal achieved. Continue to encourage appropriate mobility.    Patient Centered Rounds: I performed bedside rounds with nursing staff today.   Discussions with Specialists or Other Care Team Provider: none I discussed with podiatry and discussed with vascular yesterday    Education and Discussions with Family / Patient: Patient declined call to .     Current Length of Stay: 4 day(s)  Current Patient Status: Inpatient   Certification Statement: The patient will continue to require additional inpatient hospital stay due to diabetic foot infection PAD  Discharge Plan: Anticipate discharge in >72 hrs to home.    Code Status: Level 1 - Full Code    Subjective   Patient seen and examined denies chest pain shortness of breath    Objective :  Temp:  [97.3 °F (36.3 °C)-98.4 °F (36.9 °C)] 97.3 °F (36.3 °C)  HR:  [55] 55  BP: (133-139)/(63-78) 133/63  Resp:  [17-20] 17  SpO2:  [92 %-95 %] 92 %  O2 Device: None (Room air)    Body mass index is 38.63 kg/m².     Input and Output Summary (last 24 hours):     Intake/Output Summary (Last 24 hours) at 10/20/2024 1023  Last data filed at 10/20/2024 0700  Gross per 24 hour   Intake 360 ml   Output 1875 ml   Net -1515 ml       Physical Exam  Vitals and nursing note reviewed.   Constitutional:       General: He is not in acute distress.     Appearance: He is well-developed.   HENT:      Head: Normocephalic and atraumatic.   Eyes:      Conjunctiva/sclera: Conjunctivae normal.   Cardiovascular:      Rate and Rhythm: Normal rate and regular rhythm.      Heart sounds: No murmur heard.  Pulmonary:      Effort: Pulmonary effort is normal. No respiratory distress.      Breath sounds: Normal breath sounds. No wheezing or rales.   Abdominal:      Palpations: Abdomen is soft.      Tenderness: There is no abdominal tenderness.   Musculoskeletal:         General: Swelling (Left lower extremity is erythematous it is swollen foul odor) present.      Cervical back: Neck supple.   Skin:      General: Skin is warm and dry.      Capillary Refill: Capillary refill takes less than 2 seconds.   Neurological:      Mental Status: He is alert and oriented to person, place, and time.   Psychiatric:         Mood and Affect: Mood normal.           Lines/Drains:              Lab Results: I have reviewed the following results:   Results from last 7 days   Lab Units 10/19/24  0440   WBC Thousand/uL 8.36   HEMOGLOBIN g/dL 11.4*   HEMATOCRIT % 33.3*   PLATELETS Thousands/uL 167   SEGS PCT % 77*   LYMPHO PCT % 12*   MONO PCT % 9   EOS PCT % 1     Results from last 7 days   Lab Units 10/20/24  0251   SODIUM mmol/L 131*   POTASSIUM mmol/L 5.0   CHLORIDE mmol/L 97   CO2 mmol/L 27   BUN mg/dL 21   CREATININE mg/dL 1.21   ANION GAP mmol/L 7   CALCIUM mg/dL 8.3*   GLUCOSE RANDOM mg/dL 139     Results from last 7 days   Lab Units 10/19/24  1434   INR  1.18     Results from last 7 days   Lab Units 10/20/24  0716 10/19/24  2101 10/19/24  1713 10/19/24  1126 10/19/24  0717 10/18/24  2115 10/18/24  1613 10/18/24  1118 10/18/24  0740 10/17/24  2123 10/17/24  1626 10/17/24  1153   POC GLUCOSE mg/dl 179* 146* 166* 162* 191* 215* 196* 276* 190* 245* 240* 233*     Results from last 7 days   Lab Units 10/16/24  1647   HEMOGLOBIN A1C % 7.2*     Results from last 7 days   Lab Units 10/16/24  1647 10/16/24  1121   LACTIC ACID mmol/L 2.0 2.2*   PROCALCITONIN ng/ml  --  0.19       Recent Cultures (last 7 days):   Results from last 7 days   Lab Units 10/16/24  1342 10/16/24  1228 10/16/24  1121   BLOOD CULTURE   --  No Growth at 72 hrs. No Growth at 72 hrs.   GRAM STAIN RESULT  No polys seen*  2+ Gram positive cocci in pairs*  2+ Gram variable rods*  --   --    WOUND CULTURE  3+ Growth of  --   --        Imaging Results Review: No pertinent imaging studies reviewed.  Other Study Results Review: No additional pertinent studies reviewed.    Last 24 Hours Medication List:     Current Facility-Administered Medications:     acetaminophen  (TYLENOL) tablet 650 mg, Q6H PRN    aspirin chewable tablet 81 mg, Daily    atorvastatin (LIPITOR) tablet 20 mg, Daily    budesonide (PULMICORT) inhalation solution 0.5 mg, Q12H    Cholecalciferol (VITAMIN D3) tablet 1,000 Units, Daily    cyanocobalamin (VITAMIN B-12) tablet 500 mcg, Daily    docusate sodium (COLACE) capsule 100 mg, BID    DULoxetine (CYMBALTA) delayed release capsule 20 mg, Daily    fluticasone (FLONASE) 50 mcg/act nasal spray 2 spray, Daily    heparin (porcine) 25,000 units in 0.45% NaCl 250 mL infusion (premix), Titrated, Last Rate: 20 Units/kg/hr (10/20/24 0542)    heparin (porcine) injection 4,800 Units, Q6H PRN    heparin (porcine) injection 9,600 Units, Q6H PRN    insulin glargine (LANTUS) subcutaneous injection 15 Units 0.15 mL, HS    insulin lispro (HumALOG/ADMELOG) 100 units/mL subcutaneous injection 10 Units, TID With Meals    insulin lispro (HumALOG/ADMELOG) 100 units/mL subcutaneous injection 2-12 Units, TID AC **AND** Fingerstick Glucose (POCT), TID AC    isosorbide mononitrate (IMDUR) 24 hr tablet 30 mg, Daily    levalbuterol (XOPENEX) inhalation solution 1.25 mg, Q8H PRN    loratadine (CLARITIN) tablet 10 mg, Daily    metoprolol succinate (TOPROL-XL) 24 hr tablet 50 mg, Daily    morphine injection 2 mg, Q4H PRN    nicotine (NICODERM CQ) 14 mg/24hr TD 24 hr patch 1 patch, Daily    umeclidinium 62.5 mcg/actuation inhaler AEPB 1 puff, Daily **AND** olodaterol HCl (STRIVERDI RESPIMAT) inhaler 2 puff, Daily    ondansetron (ZOFRAN) injection 4 mg, Q4H PRN    pantoprazole (PROTONIX) EC tablet 40 mg, BID AC    piperacillin-tazobactam (ZOSYN) 4.5 g in sodium chloride 0.9 % 100 mL IV LOADING DOSE, Once **FOLLOWED BY** piperacillin-tazobactam (ZOSYN) 4.5 g in sodium chloride 0.9 % 100 mL IVPB (EXTENDED INFUSION), Q8H    polyethylene glycol (MIRALAX) packet 17 g, Daily    pregabalin (LYRICA) capsule 100 mg, BID    sodium chloride (OCEAN) 0.65 % nasal spray 2 spray, BID    traZODone (DESYREL)  tablet 25 mg, HS    Administrative Statements   Today, Patient Was Seen By: Zee Newman MD  I have spent a total time of >35 minutes in caring for this patient on the day of the visit/encounter including Documenting in the medical record and Reviewing / ordering tests, medicine, procedures  .    **Please Note: This note may have been constructed using a voice recognition system.**

## 2024-10-21 ENCOUNTER — ANESTHESIA (INPATIENT)
Dept: PERIOP | Facility: HOSPITAL | Age: 69
DRG: 617 | End: 2024-10-21
Payer: COMMERCIAL

## 2024-10-21 ENCOUNTER — APPOINTMENT (INPATIENT)
Dept: RADIOLOGY | Facility: HOSPITAL | Age: 69
DRG: 617 | End: 2024-10-21
Payer: COMMERCIAL

## 2024-10-21 ENCOUNTER — ANESTHESIA EVENT (INPATIENT)
Dept: PERIOP | Facility: HOSPITAL | Age: 69
DRG: 617 | End: 2024-10-21
Payer: COMMERCIAL

## 2024-10-21 PROBLEM — R79.89 ELEVATED SERUM CREATININE: Status: ACTIVE | Noted: 2024-10-21

## 2024-10-21 PROBLEM — F17.200 SMOKING: Status: ACTIVE | Noted: 2024-10-21

## 2024-10-21 PROBLEM — IMO0001 SMOKING: Status: ACTIVE | Noted: 2024-10-21

## 2024-10-21 PROBLEM — I10 HTN (HYPERTENSION): Status: ACTIVE | Noted: 2024-10-21

## 2024-10-21 LAB
ANION GAP SERPL CALCULATED.3IONS-SCNC: 9 MMOL/L (ref 4–13)
APTT PPP: 48 SECONDS (ref 23–34)
APTT PPP: 69 SECONDS (ref 23–34)
BACTERIA BLD CULT: NORMAL
BACTERIA BLD CULT: NORMAL
BASOPHILS # BLD AUTO: 0.04 THOUSANDS/ΜL (ref 0–0.1)
BASOPHILS NFR BLD AUTO: 1 % (ref 0–1)
BUN SERPL-MCNC: 20 MG/DL (ref 5–25)
CALCIUM SERPL-MCNC: 8.3 MG/DL (ref 8.4–10.2)
CHLORIDE SERPL-SCNC: 98 MMOL/L (ref 96–108)
CO2 SERPL-SCNC: 25 MMOL/L (ref 21–32)
CREAT SERPL-MCNC: 1.21 MG/DL (ref 0.6–1.3)
EOSINOPHIL # BLD AUTO: 0.18 THOUSAND/ΜL (ref 0–0.61)
EOSINOPHIL NFR BLD AUTO: 2 % (ref 0–6)
ERYTHROCYTE [DISTWIDTH] IN BLOOD BY AUTOMATED COUNT: 11.9 % (ref 11.6–15.1)
GFR SERPL CREATININE-BSD FRML MDRD: 60 ML/MIN/1.73SQ M
GLUCOSE SERPL-MCNC: 143 MG/DL (ref 65–140)
GLUCOSE SERPL-MCNC: 145 MG/DL (ref 65–140)
GLUCOSE SERPL-MCNC: 151 MG/DL (ref 65–140)
GLUCOSE SERPL-MCNC: 167 MG/DL (ref 65–140)
GLUCOSE SERPL-MCNC: 189 MG/DL (ref 65–140)
GLUCOSE SERPL-MCNC: 197 MG/DL (ref 65–140)
HCT VFR BLD AUTO: 33.9 % (ref 36.5–49.3)
HGB BLD-MCNC: 11.4 G/DL (ref 12–17)
IMM GRANULOCYTES # BLD AUTO: 0.04 THOUSAND/UL (ref 0–0.2)
IMM GRANULOCYTES NFR BLD AUTO: 1 % (ref 0–2)
LYMPHOCYTES # BLD AUTO: 1.15 THOUSANDS/ΜL (ref 0.6–4.47)
LYMPHOCYTES NFR BLD AUTO: 15 % (ref 14–44)
MCH RBC QN AUTO: 31.2 PG (ref 26.8–34.3)
MCHC RBC AUTO-ENTMCNC: 33.6 G/DL (ref 31.4–37.4)
MCV RBC AUTO: 93 FL (ref 82–98)
MONOCYTES # BLD AUTO: 0.66 THOUSAND/ΜL (ref 0.17–1.22)
MONOCYTES NFR BLD AUTO: 9 % (ref 4–12)
NEUTROPHILS # BLD AUTO: 5.54 THOUSANDS/ΜL (ref 1.85–7.62)
NEUTS SEG NFR BLD AUTO: 72 % (ref 43–75)
NRBC BLD AUTO-RTO: 0 /100 WBCS
PLATELET # BLD AUTO: 244 THOUSANDS/UL (ref 149–390)
PMV BLD AUTO: 10.2 FL (ref 8.9–12.7)
POTASSIUM SERPL-SCNC: 4 MMOL/L (ref 3.5–5.3)
RBC # BLD AUTO: 3.65 MILLION/UL (ref 3.88–5.62)
SODIUM SERPL-SCNC: 132 MMOL/L (ref 135–147)
WBC # BLD AUTO: 7.64 THOUSAND/UL (ref 4.31–10.16)

## 2024-10-21 PROCEDURE — 99232 SBSQ HOSP IP/OBS MODERATE 35: CPT | Performed by: INTERNAL MEDICINE

## 2024-10-21 PROCEDURE — 11046 DBRDMT MUSC&/FSCA EA ADDL: CPT | Performed by: STUDENT IN AN ORGANIZED HEALTH CARE EDUCATION/TRAINING PROGRAM

## 2024-10-21 PROCEDURE — 94760 N-INVAS EAR/PLS OXIMETRY 1: CPT

## 2024-10-21 PROCEDURE — 85730 THROMBOPLASTIN TIME PARTIAL: CPT | Performed by: FAMILY MEDICINE

## 2024-10-21 PROCEDURE — 87075 CULTR BACTERIA EXCEPT BLOOD: CPT | Performed by: STUDENT IN AN ORGANIZED HEALTH CARE EDUCATION/TRAINING PROGRAM

## 2024-10-21 PROCEDURE — 11043 DBRDMT MUSC&/FSCA 1ST 20/<: CPT | Performed by: STUDENT IN AN ORGANIZED HEALTH CARE EDUCATION/TRAINING PROGRAM

## 2024-10-21 PROCEDURE — 82948 REAGENT STRIP/BLOOD GLUCOSE: CPT

## 2024-10-21 PROCEDURE — 94640 AIRWAY INHALATION TREATMENT: CPT

## 2024-10-21 PROCEDURE — 0Y6S0Z0 DETACHMENT AT LEFT 2ND TOE, COMPLETE, OPEN APPROACH: ICD-10-PCS | Performed by: STUDENT IN AN ORGANIZED HEALTH CARE EDUCATION/TRAINING PROGRAM

## 2024-10-21 PROCEDURE — 94664 DEMO&/EVAL PT USE INHALER: CPT

## 2024-10-21 PROCEDURE — 80048 BASIC METABOLIC PNL TOTAL CA: CPT | Performed by: FAMILY MEDICINE

## 2024-10-21 PROCEDURE — 99233 SBSQ HOSP IP/OBS HIGH 50: CPT | Performed by: STUDENT IN AN ORGANIZED HEALTH CARE EDUCATION/TRAINING PROGRAM

## 2024-10-21 PROCEDURE — 87205 SMEAR GRAM STAIN: CPT | Performed by: STUDENT IN AN ORGANIZED HEALTH CARE EDUCATION/TRAINING PROGRAM

## 2024-10-21 PROCEDURE — 87147 CULTURE TYPE IMMUNOLOGIC: CPT | Performed by: STUDENT IN AN ORGANIZED HEALTH CARE EDUCATION/TRAINING PROGRAM

## 2024-10-21 PROCEDURE — 87077 CULTURE AEROBIC IDENTIFY: CPT | Performed by: STUDENT IN AN ORGANIZED HEALTH CARE EDUCATION/TRAINING PROGRAM

## 2024-10-21 PROCEDURE — 73630 X-RAY EXAM OF FOOT: CPT

## 2024-10-21 PROCEDURE — 28820 AMPUTATION OF TOE: CPT | Performed by: STUDENT IN AN ORGANIZED HEALTH CARE EDUCATION/TRAINING PROGRAM

## 2024-10-21 PROCEDURE — 99233 SBSQ HOSP IP/OBS HIGH 50: CPT | Performed by: FAMILY MEDICINE

## 2024-10-21 PROCEDURE — 88311 DECALCIFY TISSUE: CPT | Performed by: PATHOLOGY

## 2024-10-21 PROCEDURE — 87070 CULTURE OTHR SPECIMN AEROBIC: CPT | Performed by: STUDENT IN AN ORGANIZED HEALTH CARE EDUCATION/TRAINING PROGRAM

## 2024-10-21 PROCEDURE — 85025 COMPLETE CBC W/AUTO DIFF WBC: CPT | Performed by: FAMILY MEDICINE

## 2024-10-21 PROCEDURE — 88305 TISSUE EXAM BY PATHOLOGIST: CPT | Performed by: PATHOLOGY

## 2024-10-21 PROCEDURE — 0KDW0ZZ EXTRACTION OF LEFT FOOT MUSCLE, OPEN APPROACH: ICD-10-PCS | Performed by: STUDENT IN AN ORGANIZED HEALTH CARE EDUCATION/TRAINING PROGRAM

## 2024-10-21 PROCEDURE — 99233 SBSQ HOSP IP/OBS HIGH 50: CPT | Performed by: INTERNAL MEDICINE

## 2024-10-21 RX ORDER — LIDOCAINE HYDROCHLORIDE 10 MG/ML
INJECTION, SOLUTION EPIDURAL; INFILTRATION; INTRACAUDAL; PERINEURAL AS NEEDED
Status: DISCONTINUED | OUTPATIENT
Start: 2024-10-21 | End: 2024-10-21

## 2024-10-21 RX ORDER — LIDOCAINE HYDROCHLORIDE 10 MG/ML
INJECTION, SOLUTION EPIDURAL; INFILTRATION; INTRACAUDAL; PERINEURAL AS NEEDED
Status: DISCONTINUED | OUTPATIENT
Start: 2024-10-21 | End: 2024-10-21 | Stop reason: HOSPADM

## 2024-10-21 RX ORDER — MAGNESIUM HYDROXIDE 1200 MG/15ML
LIQUID ORAL AS NEEDED
Status: DISCONTINUED | OUTPATIENT
Start: 2024-10-21 | End: 2024-10-21 | Stop reason: HOSPADM

## 2024-10-21 RX ORDER — FENTANYL CITRATE 50 UG/ML
INJECTION, SOLUTION INTRAMUSCULAR; INTRAVENOUS AS NEEDED
Status: DISCONTINUED | OUTPATIENT
Start: 2024-10-21 | End: 2024-10-21

## 2024-10-21 RX ORDER — PHENYLEPHRINE HCL IN 0.9% NACL 1 MG/10 ML
SYRINGE (ML) INTRAVENOUS AS NEEDED
Status: DISCONTINUED | OUTPATIENT
Start: 2024-10-21 | End: 2024-10-21

## 2024-10-21 RX ORDER — ONDANSETRON 2 MG/ML
INJECTION INTRAMUSCULAR; INTRAVENOUS AS NEEDED
Status: DISCONTINUED | OUTPATIENT
Start: 2024-10-21 | End: 2024-10-21

## 2024-10-21 RX ORDER — SODIUM CHLORIDE, SODIUM LACTATE, POTASSIUM CHLORIDE, CALCIUM CHLORIDE 600; 310; 30; 20 MG/100ML; MG/100ML; MG/100ML; MG/100ML
INJECTION, SOLUTION INTRAVENOUS CONTINUOUS PRN
Status: DISCONTINUED | OUTPATIENT
Start: 2024-10-21 | End: 2024-10-21

## 2024-10-21 RX ORDER — GLYCOPYRROLATE 0.2 MG/ML
INJECTION INTRAMUSCULAR; INTRAVENOUS AS NEEDED
Status: DISCONTINUED | OUTPATIENT
Start: 2024-10-21 | End: 2024-10-21

## 2024-10-21 RX ORDER — MIDAZOLAM HYDROCHLORIDE 2 MG/2ML
INJECTION, SOLUTION INTRAMUSCULAR; INTRAVENOUS AS NEEDED
Status: DISCONTINUED | OUTPATIENT
Start: 2024-10-21 | End: 2024-10-21

## 2024-10-21 RX ORDER — PROPOFOL 10 MG/ML
INJECTION, EMULSION INTRAVENOUS AS NEEDED
Status: DISCONTINUED | OUTPATIENT
Start: 2024-10-21 | End: 2024-10-21

## 2024-10-21 RX ORDER — SODIUM CHLORIDE, SODIUM GLUCONATE, SODIUM ACETATE, POTASSIUM CHLORIDE, MAGNESIUM CHLORIDE, SODIUM PHOSPHATE, DIBASIC, AND POTASSIUM PHOSPHATE .53; .5; .37; .037; .03; .012; .00082 G/100ML; G/100ML; G/100ML; G/100ML; G/100ML; G/100ML; G/100ML
50 INJECTION, SOLUTION INTRAVENOUS CONTINUOUS
Status: DISCONTINUED | OUTPATIENT
Start: 2024-10-21 | End: 2024-10-21

## 2024-10-21 RX ORDER — FENTANYL CITRATE/PF 50 MCG/ML
25 SYRINGE (ML) INJECTION
Status: DISCONTINUED | OUTPATIENT
Start: 2024-10-21 | End: 2024-10-21 | Stop reason: HOSPADM

## 2024-10-21 RX ORDER — SODIUM CHLORIDE, SODIUM LACTATE, POTASSIUM CHLORIDE, CALCIUM CHLORIDE 600; 310; 30; 20 MG/100ML; MG/100ML; MG/100ML; MG/100ML
125 INJECTION, SOLUTION INTRAVENOUS CONTINUOUS
Status: DISCONTINUED | OUTPATIENT
Start: 2024-10-21 | End: 2024-10-21

## 2024-10-21 RX ADMIN — PROPOFOL 30 MG: 10 INJECTION, EMULSION INTRAVENOUS at 14:19

## 2024-10-21 RX ADMIN — HEPARIN SODIUM 18 UNITS/KG/HR: 10000 INJECTION, SOLUTION INTRAVENOUS at 02:21

## 2024-10-21 RX ADMIN — FENTANYL CITRATE 25 MCG: 50 INJECTION INTRAMUSCULAR; INTRAVENOUS at 14:29

## 2024-10-21 RX ADMIN — PREGABALIN 100 MG: 100 CAPSULE ORAL at 08:46

## 2024-10-21 RX ADMIN — PROPOFOL 50 MCG/KG/MIN: 10 INJECTION, EMULSION INTRAVENOUS at 14:20

## 2024-10-21 RX ADMIN — Medication 1000 UNITS: at 08:45

## 2024-10-21 RX ADMIN — LIDOCAINE HYDROCHLORIDE 25 MG: 10 INJECTION, SOLUTION EPIDURAL; INFILTRATION; INTRACAUDAL; PERINEURAL at 14:19

## 2024-10-21 RX ADMIN — NICOTINE 1 PATCH: 14 PATCH, EXTENDED RELEASE TRANSDERMAL at 08:46

## 2024-10-21 RX ADMIN — SALINE NASAL SPRAY 2 SPRAY: 1.5 SOLUTION NASAL at 22:07

## 2024-10-21 RX ADMIN — DOCUSATE SODIUM 100 MG: 100 CAPSULE, LIQUID FILLED ORAL at 18:10

## 2024-10-21 RX ADMIN — Medication 100 MCG: at 14:38

## 2024-10-21 RX ADMIN — LORATADINE 10 MG: 10 TABLET ORAL at 08:45

## 2024-10-21 RX ADMIN — INSULIN LISPRO 10 UNITS: 100 INJECTION, SOLUTION INTRAVENOUS; SUBCUTANEOUS at 17:33

## 2024-10-21 RX ADMIN — GLYCOPYRROLATE 0.2 MG: 0.2 INJECTION, SOLUTION INTRAMUSCULAR; INTRAVENOUS at 14:16

## 2024-10-21 RX ADMIN — ONDANSETRON 4 MG: 2 INJECTION INTRAMUSCULAR; INTRAVENOUS at 14:16

## 2024-10-21 RX ADMIN — UMECLIDINIUM 1 PUFF: 62.5 AEROSOL, POWDER ORAL at 08:49

## 2024-10-21 RX ADMIN — SODIUM CHLORIDE, SODIUM LACTATE, POTASSIUM CHLORIDE, AND CALCIUM CHLORIDE: .6; .31; .03; .02 INJECTION, SOLUTION INTRAVENOUS at 14:00

## 2024-10-21 RX ADMIN — FLUTICASONE PROPIONATE 2 SPRAY: 50 SPRAY, METERED NASAL at 08:49

## 2024-10-21 RX ADMIN — ISOSORBIDE MONONITRATE 30 MG: 30 TABLET, EXTENDED RELEASE ORAL at 08:46

## 2024-10-21 RX ADMIN — PIPERACILLIN AND TAZOBACTAM 4.5 G: 36; 4.5 INJECTION, POWDER, FOR SOLUTION INTRAVENOUS at 05:47

## 2024-10-21 RX ADMIN — CYANOCOBALAMIN TAB 500 MCG 500 MCG: 500 TAB at 08:46

## 2024-10-21 RX ADMIN — INSULIN LISPRO 2 UNITS: 100 INJECTION, SOLUTION INTRAVENOUS; SUBCUTANEOUS at 07:39

## 2024-10-21 RX ADMIN — PIPERACILLIN AND TAZOBACTAM 4.5 G: 36; 4.5 INJECTION, POWDER, FOR SOLUTION INTRAVENOUS at 14:32

## 2024-10-21 RX ADMIN — METOPROLOL SUCCINATE 50 MG: 50 TABLET, EXTENDED RELEASE ORAL at 08:46

## 2024-10-21 RX ADMIN — DOCUSATE SODIUM 100 MG: 100 CAPSULE, LIQUID FILLED ORAL at 08:45

## 2024-10-21 RX ADMIN — FENTANYL CITRATE 50 MCG: 50 INJECTION INTRAMUSCULAR; INTRAVENOUS at 14:19

## 2024-10-21 RX ADMIN — ASPIRIN 81 MG 81 MG: 81 TABLET ORAL at 08:45

## 2024-10-21 RX ADMIN — ATORVASTATIN CALCIUM 20 MG: 20 TABLET, FILM COATED ORAL at 08:45

## 2024-10-21 RX ADMIN — PREGABALIN 100 MG: 100 CAPSULE ORAL at 18:10

## 2024-10-21 RX ADMIN — FENTANYL CITRATE 25 MCG: 50 INJECTION INTRAMUSCULAR; INTRAVENOUS at 14:33

## 2024-10-21 RX ADMIN — TRAZODONE HYDROCHLORIDE 25 MG: 50 TABLET ORAL at 22:06

## 2024-10-21 RX ADMIN — BUDESONIDE INHALATION 0.5 MG: 0.5 SUSPENSION RESPIRATORY (INHALATION) at 07:18

## 2024-10-21 RX ADMIN — MIDAZOLAM 2 MG: 1 INJECTION INTRAMUSCULAR; INTRAVENOUS at 14:15

## 2024-10-21 RX ADMIN — SODIUM CHLORIDE, SODIUM GLUCONATE, SODIUM ACETATE, POTASSIUM CHLORIDE, MAGNESIUM CHLORIDE, SODIUM PHOSPHATE, DIBASIC, AND POTASSIUM PHOSPHATE 50 ML/HR: .53; .5; .37; .037; .03; .012; .00082 INJECTION, SOLUTION INTRAVENOUS at 11:21

## 2024-10-21 RX ADMIN — PANTOPRAZOLE SODIUM 40 MG: 40 TABLET, DELAYED RELEASE ORAL at 05:47

## 2024-10-21 RX ADMIN — INSULIN GLARGINE 15 UNITS: 100 INJECTION, SOLUTION SUBCUTANEOUS at 22:06

## 2024-10-21 RX ADMIN — SODIUM CHLORIDE, SODIUM LACTATE, POTASSIUM CHLORIDE, AND CALCIUM CHLORIDE 125 ML/HR: .6; .31; .03; .02 INJECTION, SOLUTION INTRAVENOUS at 15:55

## 2024-10-21 RX ADMIN — DULOXETINE HYDROCHLORIDE 20 MG: 20 CAPSULE, DELAYED RELEASE ORAL at 08:45

## 2024-10-21 RX ADMIN — OLODATEROL RESPIMAT INHALATION SPRAY 2 PUFF: 2.5 SPRAY, METERED RESPIRATORY (INHALATION) at 08:49

## 2024-10-21 RX ADMIN — HEPARIN SODIUM 18 UNITS/KG/HR: 10000 INJECTION, SOLUTION INTRAVENOUS at 18:10

## 2024-10-21 RX ADMIN — INSULIN LISPRO 10 UNITS: 100 INJECTION, SOLUTION INTRAVENOUS; SUBCUTANEOUS at 07:40

## 2024-10-21 NOTE — PROGRESS NOTES
Progress Note - Hospitalist   Name: Clay Marcial 68 y.o. male I MRN: [id number]  Unit/Bed#: [facility] I Date of Admission: 10/16/2024   Date of Service: 10/21/2024 I Hospital Day: 5    Assessment & Plan  Diabetic foot infection  (HCC)  Lab Results   Component Value Date    HGBA1C 7.2 (H) 10/16/2024       Recent Labs     10/20/24  1531 10/20/24  2105 10/21/24  0709 10/21/24  1126   POCGLU 152* 157* 189* 145*       Blood Sugar Average: Last 72 hrs:  (P) [id number]  Left lower extremity cellulitis with an open wound on plantar side.  Rule out osteomyelitis ER ready discussed with podiatry left lower extremity CT without contrast ordered as secondary to GFR in the 40s negative for gas ,Baker's cyst will order venous duplex- no dvt but has bakers cyst    MRI is negative for any osteo discussed with podiatry will do bedside debridement also left toe is ABIs as below potential healing awaiting   Had LEADS -left There is evidence of >75% stenosis noted in the proximal anterior tibial  artery.  Antibiotics over the weekend has been changed to Zosyn discussed with infectious disease in agreement.  Also secondary to changes with food discussed with podiatry he needs to go to surgery today hence he is going in the afternoon forPlan for left foot I&D, wound debridement, 2nd toe amputation, poss wv today due to acute worsening of appearance of foot   He will need IR angio suspected will happen Wednesday IR to see.  Vascular is involved.  Suspected embolic event in the foot as well hence over the weekend started heparin drip discussed with vascular continue heparin drip till angiogram.  Heparin on hold for the OR will restart once okay post OR with podiatry  Diabetes mellitus (HCC)  Lab Results   Component Value Date    HGBA1C 7.2 (H) 10/16/2024       Recent Labs     10/20/24  1531 10/20/24  2105 10/21/24  0709 10/21/24  1126   POCGLU 152* 157* 189* 145*       Blood Sugar Average: Last 72 hrs:  (P)  183.3520141991199738  Hga1c is actually 7.2 hyperglycemia secondary to infection with insulin adjustment his sugars have improved due to current n.p.o. status for the OR last night his Lantus was reduced to 15 units at at bedtime Humalog 10 units 3 times daily he has ordered once he is eating with meals adjust insulin based on his sugars once he start eating  CAD (coronary artery disease)  Asa/ lipitor and isosorbide   Tte ordered as ef unknown   Chronic diastolic congestive heart failure (HCC)  Wt Readings from Last 3 Encounters:   10/17/24 126 kg (277 lb)   08/27/24 127 kg (279 lb 12.2 oz)   05/18/24 129 kg (285 lb 7.9 oz)     2D echo obtained EF of 60% which is normal hold Lasix as per nephrology right now not in exacerbation        Stage 3 chronic kidney disease (HCC)  Lab Results   Component Value Date    EGFR 60 10/21/2024    EGFR 60 10/20/2024    EGFR 55 10/19/2024    CREATININE 1.21 10/21/2024    CREATININE 1.21 10/20/2024    CREATININE 1.29 10/19/2024   Baseline creatinine is 1.2-1.3 cr down to 1.2 nephrology following especially plan for angio prior to or started on fluids prior to OR  Chronic obstructive pulmonary disease with acute exacerbation (HCC)  No more wheezing continue Pulmicort and Xopenex  ROMEL (obstructive sleep apnea)  Cpap at home   PAD (peripheral artery disease) ([facility])  Had LEADS -left There is evidence of >75% stenosis noted in the proximal anterior tibial  artery.  As discussed with podiatry and vascular surgeon patient needs angio which is good to be done by IR   Over the weekend as discussed with vascular surgery concern for for embolism and secondary to high risk of kidney failure without improvement of CTA heparin drip has been started discussed with vascular today continue heparin drip till angiogram done by IR, suspect IR will do angiogram on Wednesday yesterday he is going urgently with podiatry    Acute hyponatremia  Sodium improved to 132  HTN (hypertension)    Elevated serum  creatinine      VTE Pharmacologic Prophylaxis: VTE Score: 3 Moderate Risk (Score 3-4) - Pharmacological DVT Prophylaxis Ordered: heparin drip.    Mobility:   Basic Mobility Inpatient Raw Score: 20  JH-HLM Goal: 6: Walk 10 steps or more  JH-HLM Achieved: 6: Walk 10 steps or more  JH-HLM Goal achieved. Continue to encourage appropriate mobility.    Patient Centered Rounds: I performed bedside rounds with nursing staff today.   Discussions with Specialists or Other Care Team Provider: Discussed with podiatry infectious disease and also vascular surgery    Education and Discussions with Family / Patient: Patient declined call to .     Current Length of Stay: 5 day(s)  Current Patient Status: Inpatient   Certification Statement: The patient will continue to require additional inpatient hospital stay due to diabetic foot infection and PAD  Discharge Plan: Anticipate discharge in >72 hrs to discharge location to be determined pending rehab evaluations.    Code Status: Level 1 - Full Code    Subjective   Patient seen and examined denies chest pain or shortness of breath    Objective :  Temp:  [97.7 °F (36.5 °C)] 97.7 °F (36.5 °C)  HR:  [86-93] 86  BP: (130)/(62) 130/62  Resp:  [18] 18  SpO2:  [94 %-96 %] 94 %  O2 Device: None (Room air)    Body mass index is 38.63 kg/m².     Input and Output Summary (last 24 hours):     Intake/Output Summary (Last 24 hours) at 10/21/2024 1212  Last data filed at 10/21/2024 1144  Gross per 24 hour   Intake 240 ml   Output 1400 ml   Net -1160 ml       Physical Exam  Vitals and nursing note reviewed.   Constitutional:       General: He is not in acute distress.     Appearance: He is well-developed.   HENT:      Head: Normocephalic and atraumatic.   Eyes:      Conjunctiva/sclera: Conjunctivae normal.   Cardiovascular:      Rate and Rhythm: Normal rate and regular rhythm.      Heart sounds: No murmur heard.  Pulmonary:      Effort: Pulmonary effort is normal. No respiratory  distress.      Breath sounds: Normal breath sounds. No wheezing or rales.   Abdominal:      General: There is no distension.      Palpations: Abdomen is soft.      Tenderness: There is no abdominal tenderness.   Musculoskeletal:         General: Swelling (Left lower extremity) present.      Cervical back: Neck supple.   Skin:     General: Skin is warm and dry.      Capillary Refill: Capillary refill takes less than 2 seconds.      Findings: Erythema (Left lower extremity) present.   Neurological:      Mental Status: He is alert.   Psychiatric:         Mood and Affect: Mood normal.           Lines/Drains:              Lab Results: I have reviewed the following results:   Results from last 7 days   Lab Units 10/21/24  0452   WBC Thousand/uL 7.64   HEMOGLOBIN g/dL 11.4*   HEMATOCRIT % 33.9*   PLATELETS Thousands/uL 244   SEGS PCT % 72   LYMPHO PCT % 15   MONO PCT % 9   EOS PCT % 2     Results from last 7 days   Lab Units 10/21/24  0452   SODIUM mmol/L 132*   POTASSIUM mmol/L 4.0   CHLORIDE mmol/L 98   CO2 mmol/L 25   BUN mg/dL 20   CREATININE mg/dL 1.21   ANION GAP mmol/L 9   CALCIUM mg/dL 8.3*   GLUCOSE RANDOM mg/dL 167*     Results from last 7 days   Lab Units 10/19/24  1434   INR  1.18     Results from last 7 days   Lab Units 10/21/24  1126 10/21/24  0709 10/20/24  2105 10/20/24  1531 10/20/24  1028 10/20/24  0716 10/19/24  2101 10/19/24  1713 10/19/24  1126 10/19/24  0717 10/18/24  2115 10/18/24  1613   POC GLUCOSE mg/dl 145* 189* 157* 152* 199* 179* 146* 166* 162* 191* 215* 196*     Results from last 7 days   Lab Units 10/16/24  1647   HEMOGLOBIN A1C % 7.2*     Results from last 7 days   Lab Units 10/16/24  1647 10/16/24  1121   LACTIC ACID mmol/L 2.0 2.2*   PROCALCITONIN ng/ml  --  0.19       Recent Cultures (last 7 days):   Results from last 7 days   Lab Units 10/16/24  1342 10/16/24  1228 10/16/24  1121   BLOOD CULTURE   --  No Growth After 4 Days. No Growth After 4 Days.   GRAM STAIN RESULT  No polys seen*   2+ Gram positive cocci in pairs*  2+ Gram variable rods*  --   --    WOUND CULTURE  3+ Growth of  --   --        Imaging Results Review: No pertinent imaging studies reviewed.  Other Study Results Review: No additional pertinent studies reviewed.    Last 24 Hours Medication List:     Current Facility-Administered Medications:     acetaminophen (TYLENOL) tablet 650 mg, Q6H PRN    aspirin chewable tablet 81 mg, Daily    atorvastatin (LIPITOR) tablet 20 mg, Daily    budesonide (PULMICORT) inhalation solution 0.5 mg, Q12H    Cholecalciferol (VITAMIN D3) tablet 1,000 Units, Daily    cyanocobalamin (VITAMIN B-12) tablet 500 mcg, Daily    docusate sodium (COLACE) capsule 100 mg, BID    DULoxetine (CYMBALTA) delayed release capsule 20 mg, Daily    fluticasone (FLONASE) 50 mcg/act nasal spray 2 spray, Daily    heparin (porcine) 25,000 units in 0.45% NaCl 250 mL infusion (premix), Titrated, Last Rate: Stopped (10/21/24 0834)    heparin (porcine) injection 4,800 Units, Q6H PRN    heparin (porcine) injection 9,600 Units, Q6H PRN    insulin glargine (LANTUS) subcutaneous injection 15 Units 0.15 mL, HS    insulin lispro (HumALOG/ADMELOG) 100 units/mL subcutaneous injection 10 Units, TID With Meals    insulin lispro (HumALOG/ADMELOG) 100 units/mL subcutaneous injection 2-12 Units, TID AC **AND** Fingerstick Glucose (POCT), TID AC    isosorbide mononitrate (IMDUR) 24 hr tablet 30 mg, Daily    levalbuterol (XOPENEX) inhalation solution 1.25 mg, Q8H PRN    loratadine (CLARITIN) tablet 10 mg, Daily    metoprolol succinate (TOPROL-XL) 24 hr tablet 50 mg, Daily    morphine injection 2 mg, Q4H PRN    multi-electrolyte (PLASMALYTE-A/ISOLYTE-S PH 7.4) IV solution, Continuous, Last Rate: 50 mL/hr (10/21/24 1121)    nicotine (NICODERM CQ) 14 mg/24hr TD 24 hr patch 1 patch, Daily    umeclidinium 62.5 mcg/actuation inhaler AEPB 1 puff, Daily **AND** olodaterol HCl (STRIVERDI RESPIMAT) inhaler 2 puff, Daily    ondansetron (ZOFRAN) injection 4  mg, Q4H PRN    pantoprazole (PROTONIX) EC tablet 40 mg, BID AC    [COMPLETED] piperacillin-tazobactam (ZOSYN) 4.5 g in sodium chloride 0.9 % 100 mL IV LOADING DOSE, Once, Last Rate: 4.5 g (10/20/24 1153) **FOLLOWED BY** piperacillin-tazobactam (ZOSYN) 4.5 g in sodium chloride 0.9 % 100 mL IVPB (EXTENDED INFUSION), Q8H, Last Rate: 4.5 g (10/21/24 0547)    polyethylene glycol (MIRALAX) packet 17 g, Daily    pregabalin (LYRICA) capsule 100 mg, BID    sodium chloride (OCEAN) 0.65 % nasal spray 2 spray, BID    traZODone (DESYREL) tablet 25 mg, HS    Administrative Statements   Today, Patient Was Seen By: Zee Newman MD  I have spent a total time of >45 minutes in caring for this patient on the day of the visit/encounter including Documenting in the medical record, Reviewing / ordering tests, medicine, procedures  , and Communicating with other healthcare professionals .    **Please Note: This note may have been constructed using a voice recognition system.**

## 2024-10-21 NOTE — ASSESSMENT & PLAN NOTE
Home Rx: Furosemide 40 mg daily, Jardiance 12.5 mg daily  Current Rx: No diuretics  Changes: Keep diuretics on hold today   Physician notified

## 2024-10-21 NOTE — ASSESSMENT & PLAN NOTE
Home Rx: Furosemide 40 mg as needed, Imdur 30 mg daily, lisinopril 20 mg daily, Toprol-XL 50 mg daily  Current Rx: Imdur 30 mg daily, Toprol-XL 50 mg daily  Blood pressure is currently acceptable, keep holding lisinopril and furosemide

## 2024-10-21 NOTE — ASSESSMENT & PLAN NOTE
Lab Results   Component Value Date    HGBA1C 7.2 (H) 10/16/2024       Recent Labs     10/19/24  2101 10/20/24  0716 10/20/24  1028 10/20/24  1531   POCGLU 146* 179* 199* 152*       Blood Sugar Average: Last 72 hrs:  (P) [id number]  Risk factor for infection     Recommend strict glycemic control to aid with wound healing

## 2024-10-21 NOTE — PLAN OF CARE
Problem: Potential for Falls  Goal: Patient will remain free of falls  Description: INTERVENTIONS:  - Educate patient/family on patient safety including physical limitations  - Instruct patient to call for assistance with activity   - Consult OT/PT to assist with strengthening/mobility   - Keep Call bell within reach  - Keep bed low and locked with side rails adjusted as appropriate  - Keep care items and personal belongings within reach  - Initiate and maintain comfort rounds  - Make Fall Risk Sign visible to staff  - Offer Toileting every 2 Hours, in advance of need  - Initiate/Maintain bed alarm  - Obtain necessary fall risk management equipment: non skid socks  - Apply yellow socks and bracelet for high fall risk patients  - Consider moving patient to room near nurses station  Outcome: Progressing     Problem: PAIN - ADULT  Goal: Verbalizes/displays adequate comfort level or baseline comfort level  Description: Interventions:  - Encourage patient to monitor pain and request assistance  - Assess pain using appropriate pain scale  - Administer analgesics based on type and severity of pain and evaluate response  - Implement non-pharmacological measures as appropriate and evaluate response  - Consider cultural and social influences on pain and pain management  - Notify physician/advanced practitioner if interventions unsuccessful or patient reports new pain  Outcome: Progressing     Problem: INFECTION - ADULT  Goal: Absence or prevention of progression during hospitalization  Description: INTERVENTIONS:  - Assess and monitor for signs and symptoms of infection  - Monitor lab/diagnostic results  - Monitor all insertion sites, i.e. indwelling lines, tubes, and drains  - Monitor endotracheal if appropriate and nasal secretions for changes in amount and color  - Bradford appropriate cooling/warming therapies per order  - Administer medications as ordered  - Instruct and encourage patient and family to use good hand  hygiene technique  - Identify and instruct in appropriate isolation precautions for identified infection/condition  Outcome: Progressing  Goal: Absence of fever/infection during neutropenic period  Description: INTERVENTIONS:  - Monitor WBC    Outcome: Progressing     Problem: SAFETY ADULT  Goal: Patient will remain free of falls  Description: INTERVENTIONS:  - Educate patient/family on patient safety including physical limitations  - Instruct patient to call for assistance with activity   - Consult OT/PT to assist with strengthening/mobility   - Keep Call bell within reach  - Keep bed low and locked with side rails adjusted as appropriate  - Keep care items and personal belongings within reach  - Initiate and maintain comfort rounds  - Make Fall Risk Sign visible to staff  - Offer Toileting every 2 Hours, in advance of need  - Initiate/Maintain bed alarm  - Obtain necessary fall risk management equipment: non skid socks  - Apply yellow socks and bracelet for high fall risk patients  - Consider moving patient to room near nurses station  Outcome: Progressing  Goal: Maintain or return to baseline ADL function  Description: INTERVENTIONS:  -  Assess patient's ability to carry out ADLs; assess patient's baseline for ADL function and identify physical deficits which impact ability to perform ADLs (bathing, care of mouth/teeth, toileting, grooming, dressing, etc.)  - Assess/evaluate cause of self-care deficits   - Assess range of motion  - Assess patient's mobility; develop plan if impaired  - Assess patient's need for assistive devices and provide as appropriate  - Encourage maximum independence but intervene and supervise when necessary  - Involve family in performance of ADLs  - Assess for home care needs following discharge   - Consider OT consult to assist with ADL evaluation and planning for discharge  - Provide patient education as appropriate  Outcome: Progressing  Goal: Maintains/Returns to pre admission  functional level  Description: INTERVENTIONS:  - Perform AM-PAC 6 Click Basic Mobility/ Daily Activity assessment daily.  - Set and communicate daily mobility goal to care team and patient/family/caregiver.   - Collaborate with rehabilitation services on mobility goals if consulted  - Perform Range of Motion 3 times a day.  - Reposition patient every 2 hours.  - Dangle patient 3 times a day  - Stand patient 3 times a day  - Ambulate patient 3 times a day  - Out of bed to chair 3 times a day   - Out of bed for meals 3 times a day  - Out of bed for toileting  - Record patient progress and toleration of activity level   Outcome: Progressing     Problem: DISCHARGE PLANNING  Goal: Discharge to home or other facility with appropriate resources  Description: INTERVENTIONS:  - Identify barriers to discharge w/patient and caregiver  - Arrange for needed discharge resources and transportation as appropriate  - Identify discharge learning needs (meds, wound care, etc.)  - Arrange for interpretive services to assist at discharge as needed  - Refer to Case Management Department for coordinating discharge planning if the patient needs post-hospital services based on physician/advanced practitioner order or complex needs related to functional status, cognitive ability, or social support system  Outcome: Progressing     Problem: Knowledge Deficit  Goal: Patient/family/caregiver demonstrates understanding of disease process, treatment plan, medications, and discharge instructions  Description: Complete learning assessment and assess knowledge base.  Interventions:  - Provide teaching at level of understanding  - Provide teaching via preferred learning methods  Outcome: Progressing     Problem: Nutrition/Hydration-ADULT  Goal: Nutrient/Hydration intake appropriate for improving, restoring or maintaining nutritional needs  Description: Monitor and assess patient's nutrition/hydration status for malnutrition. Collaborate with  interdisciplinary team and initiate plan and interventions as ordered.  Monitor patient's weight and dietary intake as ordered or per policy. Utilize nutrition screening tool and intervene as necessary. Determine patient's food preferences and provide high-protein, high-caloric foods as appropriate.     INTERVENTIONS:  - Monitor oral intake, urinary output, labs, and treatment plans  - Assess nutrition and hydration status and recommend course of action  - Evaluate amount of meals eaten  - Assist patient with eating if necessary   - Allow adequate time for meals  - Recommend/ encourage appropriate diets, oral nutritional supplements, and vitamin/mineral supplements  - Order, calculate, and assess calorie counts as needed  - Recommend, monitor, and adjust tube feedings and TPN/PPN based on assessed needs  - Assess need for intravenous fluids  - Provide specific nutrition/hydration education as appropriate  - Include patient/family/caregiver in decisions related to nutrition  Outcome: Progressing

## 2024-10-21 NOTE — ASSESSMENT & PLAN NOTE
Lab Results   Component Value Date    EGFR 60 10/20/2024    EGFR 55 10/19/2024    EGFR 46 10/18/2024    CREATININE 1.21 10/20/2024    CREATININE 1.29 10/19/2024    CREATININE 1.49 (H) 10/18/2024      Recommend renal dosing of antibiotics, avoid nephrotoxins

## 2024-10-21 NOTE — ANESTHESIA PREPROCEDURE EVALUATION
Procedure:  LEFT FOOT DEBRIDEMENT WOUND (WASH OUT) (Left: Foot)    Relevant Problems   ANESTHESIA (within normal limits)      CARDIO   (+) CAD (coronary artery disease)   (+) Chronic diastolic congestive heart failure (HCC)   (+) HTN (hypertension)   (+) PAD (peripheral artery disease) (HCC)      ENDO   (+) Diabetes mellitus, type 2 (HCC)      GI/HEPATIC (within normal limits)      /RENAL   (+) Acute on chronic kidney failure  (HCC)   (+) Stage 3 chronic kidney disease (HCC)      HEMATOLOGY (within normal limits)      NEURO/PSYCH (within normal limits)      PULMONARY   (+) Chronic obstructive pulmonary disease with acute exacerbation (HCC)   (+) ROMEL (obstructive sleep apnea)   (+) Smoking      Orthopedic/Musculoskeletal   (+) Diabetic foot infection  (HCC)      TTE 10/17/2024:    Left Ventricle: Left ventricular cavity size is normal. There is moderate concentric hypertrophy. The left ventricular ejection fraction is 60%. Wall motion is normal.    Mitral Valve: There is mild regurgitation.    Tricuspid Valve: There is mild regurgitation. The right ventricular systolic pressure is normal.    Pericardium: There is no pericardial effusion.    EKG 10/16/2024:  Sinus tachycardia with Premature atrial complexes  Otherwise normal ECG  When compared with ECG of 16-OCT-2024 16:37, (unconfirmed)  No significant change was found    Lab Results   Component Value Date    WBC 7.64 10/21/2024    HGB 11.4 (L) 10/21/2024    HCT 33.9 (L) 10/21/2024    MCV 93 10/21/2024     10/21/2024     Lab Results   Component Value Date    SODIUM 132 (L) 10/21/2024    K 4.0 10/21/2024    CL 98 10/21/2024    CO2 25 10/21/2024    BUN 20 10/21/2024    CREATININE 1.21 10/21/2024    GLUC 167 (H) 10/21/2024    CALCIUM 8.3 (L) 10/21/2024     Lab Results   Component Value Date    INR 1.18 10/19/2024    INR 1.09 04/16/2024    PROTIME 15.4 (H) 10/19/2024    PROTIME 14.4 04/16/2024     Lab Results   Component Value Date    HGBA1C 7.2 (H) 10/16/2024             Physical Exam    Airway    Mallampati score: II  TM Distance: >3 FB  Neck ROM: full     Dental        Cardiovascular  Cardiovascular exam normal    Pulmonary  Pulmonary exam normal     Other Findings        Anesthesia Plan  ASA Score- 3     Anesthesia Type- IV sedation with anesthesia with ASA Monitors.         Additional Monitors:     Airway Plan:            Plan Factors-    Chart reviewed. EKG reviewed.  Existing labs reviewed. Patient summary reviewed.                  Induction- intravenous.    Postoperative Plan-     Perioperative Resuscitation Plan - Level 1 - Full Code.       Informed Consent- Anesthetic plan and risks discussed with patient.  I personally reviewed this patient with the CRNA. Discussed and agreed on the Anesthesia Plan with the CRNA..

## 2024-10-21 NOTE — OP NOTE
OPERATIVE REPORT  PATIENT NAME: Clay Marcial    :  1955  MRN: [id number]  Pt Location: [facility] OR ROOM 02    SURGERY DATE: 10/21/2024    Surgeons and Role:     * Emma Burr DPM - Primary    Preop Diagnosis:  Diabetic foot infection  (HCC) [E11.628, L08.9]    Post-Op Diagnosis Codes:     * Diabetic foot infection  (HCC) [E11.628, L08.9]    Procedure(s):  Left - LEFT FOOT I&D. left second toe amputation with packing    Specimen(s):  ID Type Source Tests Collected by Time Destination   1 : Left second toe Tissue Toe, Left TISSUE EXAM Emma Burr DPM 10/21/2024 1431    A : Left second toe wound culture Wound Wound ANAEROBIC CULTURE AND GRAM STAIN, WOUND CULTURE Emma Burr DPM 10/21/2024 1428        Estimated Blood Loss:   Minimal    Drains:  * No LDAs found *    Anesthesia Type:   Choice    Operative Indications:  Diabetic foot infection  (HCC) [E11.628, L08.9]    Operative Findings:  Necrotic, foul smelling brown-grey drainage extending from ulcer with tracking to distal 2nd intermetatarsal space with necrosis of 2nd toe. 3rd toe with same drainage and necrotic tissues medially with high chance of further changes requiring amputation. Patient will likely need TMA but at minimum repeat debridement with possible 3rd toe amputation pending clinical progression. This next procedure will need to be done after successful revasc.       Complications:   None    Procedure and Technique:    Under mild sedation, the patient was brought into the operating room and remained on stretcher in the supine position. A time out was performed to confirm the correct patient, procedure and site with all parties in agreement. Following IV sedation, an ankle block was performed consisting of 20 ml of 1% Lidocaine. The foot was then scrubbed, prepped and draped in the usual aseptic manner.     Attention was directed to the left foot with necrotic, foul smelling brown-grey drainage extending from ulcer with tracking to distal  2nd intermetatarsal space with necrosis of 2nd toe. Using a #15 blade a full thickness incision was made from the plantar ulcer (ulcer measuring approx [id number]) extending distal to the 2nd toe along the tracking. Cultures taken of brown-grey drainage. The 2nd toe was disarticulated at the 2nd MTPJ and passed off the field for gross tissue exam. The remaining wound was inspected and there was necrotic soft tissue, fat, muscle which was debrided with a rongeur - this did extend to the medial and plantar 3rd toe thus making high chance of 3rd toe loss. The wound was flushed with nss and measured approx [id number] with bone of 2nd metatarsal and 3rd toe exposed (100% of wound debrided). No tracking proximally. Slight necrotic tissue remained. The wound was packed with iodoform and covered with ample DSD, ACE.     The patient tolerated the procedure and anesthesia well and was transported to the PACU with vital signs stable.     As with many limb salvage procedures, we contemplate the possibility of performing further stages to this procedure. Procedures may include debridements, delayed closure, plastic surgery techniques, or more proximal amputations. This procedure may be considered part of a multi-staged limb salvage treatment plan.        I was present for the entire procedure.    Patient Disposition:  PACU            SIGNATURE: Emma Burr DPM  DATE: October 21, 2024  TIME: 4:09 PM

## 2024-10-21 NOTE — ASSESSMENT & PLAN NOTE
Lab Results   Component Value Date    HGBA1C 7.2 (H) 10/16/2024       Recent Labs     10/20/24  1531 10/20/24  2105 10/21/24  0709 10/21/24  1126   POCGLU 152* 157* 189* 145*       Blood Sugar Average: Last 72 hrs:  (P) 183.4298939090952783  Left lower extremity cellulitis with an open wound on plantar side.  Rule out osteomyelitis ER ready discussed with podiatry left lower extremity CT without contrast ordered as secondary to GFR in the 40s negative for gas ,Baker's cyst will order venous duplex- no dvt but has bakers cyst    MRI is negative for any osteo discussed with podiatry will do bedside debridement also left toe is ABIs as below potential healing awaiting   Had LEADS -left There is evidence of >75% stenosis noted in the proximal anterior tibial  artery.  Antibiotics over the weekend has been changed to Zosyn discussed with infectious disease in agreement.  Also secondary to changes with food discussed with podiatry he needs to go to surgery today hence he is going in the afternoon forPlan for left foot I&D, wound debridement, 2nd toe amputation, poss wv today due to acute worsening of appearance of foot   He will need IR angio suspected will happen Wednesday IR to see.  Vascular is involved.  Suspected embolic event in the foot as well hence over the weekend started heparin drip discussed with vascular continue heparin drip till angiogram.  Heparin on hold for the OR will restart once okay post OR with podiatry

## 2024-10-21 NOTE — PROGRESS NOTES
Progress Note - Infectious Disease   Name: Clay Marcial 68 y.o. male I MRN: [id number]  Unit/Bed#: -01 I Date of Admission: 10/16/2024   Date of Service: 10/22/2024 I Hospital Day: 6        VIRTUAL CARE DOCUMENTATION:     1. This service was provided via Telemedicine using [employer]     2. Parties in the room with patient during teleconsult Patient only    3. Confidentiality My office door was closed     4. Participants No one else was in the room    5. Patient acknowledged consent and understanding of privacy and security of the  Telemedicine consult. I informed the patient that I have reviewed their record in Epic and presented the opportunity for them to ask any questions regarding the visit today.  The patient agreed to participate.    6. Time spent 3 minutes       Assessment & Plan  Diabetic foot infection  (HCC)  In the setting of a chronic non healing plantar foot ulcer.    CT is notable for cellulitis without abscess or osteo. MRI notes some T2 only hyperintense signal within third metatarsal head and proximal phalanx that was favored to be reactive changes over osteo. Wound cx grew mixed kushal.   Arterial dopplers noted left toe pressures are below healing range.   He is afebrile without leukocytosis but over the weekend was noted to have worsening redness, necrosis and edema of the foot while on cefazolin as well as discoloration over his second toe concerning for embolic disease  Now s/p left second toe amputation, necrotic foul smelling drainage noted extending from ulcer tracking to distal 2nd intermetatarsal space with necrosis of second toe and additional necrotic drainage of third toe noted, wound cx pending     Continue pip-tazo 4.5 q8h   Add vancomycin dosing per pharmacy   FU operative cultures   Recommend vascular surgery re-evaluation  Await additional operative course   Continue supportive care, serial extremity exams, monitor clinical course   Final choice and duration of  antibiotics to be determined    Check daily CBC, CMP while inpatient to monitor for any evolving antibiotic toxicity or treatment failure   Patient is at risk for poor wound healing/progression of infection in the setting of underlying comorbidities    PAD (peripheral artery disease) (HCC)  LEADs concerning for tibial disease and now with concern for thromboembolic event     Mngt as above   Recommend tobacco cessation counseling  Diabetes mellitus, type 2 (HCC)  Lab Results   Component Value Date    HGBA1C 7.2 (H) 10/16/2024       Recent Labs     10/21/24  1547 10/21/24  2050 10/22/24  0743 10/22/24  1059   POCGLU 143* 197* 193* 207*       Blood Sugar Average: Last 72 hrs:  (P) 171.8  Risk factor for infection     Recommend strict glycemic control to aid with wound healing    Stage 3 chronic kidney disease (HCC)  Lab Results   Component Value Date    EGFR 59 10/22/2024    EGFR 60 10/21/2024    EGFR 60 10/20/2024    CREATININE 1.22 10/22/2024    CREATININE 1.21 10/21/2024    CREATININE 1.21 10/20/2024      Recommend renal dosing of antibiotics, avoid nephrotoxins  HTN (hypertension)    Elevated serum creatinine    Smoking        Reviewed recommendations to continue iv abx  with primary team who is in agreement. Will follow.  Please call with concerns or any changes in clinical status or new test results.        Subjective:  The patient underwent second toe amputation yesterday  Denies fevers, chills, or sweats.  Denies nausea, vomiting, or diarrhea.    Antibiotics:  cefazolin      Physical Exam     Temp:  [97 °F (36.1 °C)-97.8 °F (36.6 °C)] 97.2 °F (36.2 °C)  HR:  [] 91  Resp:  [20-24] 20  BP: (112-137)/(61-77) 137/73  SpO2:  [93 %-99 %] 93 %  Temp (24hrs), Av.3 °F (36.3 °C), Min:97 °F (36.1 °C), Max:97.8 °F (36.6 °C)  Current: Temperature: (!) 97.2 °F (36.2 °C)    Intake/Output Summary (Last 24 hours) at 10/22/2024 1101  Last data filed at 10/22/2024 0441  Gross per 24 hour   Intake 100 ml   Output 1250 ml    Net -1150 ml       Physical exam findings reported by bedside and primary medical team staff      General Appearance:  Appearing well, nontoxic, and in no distress, appears stated age   Lungs:   Clear to auscultation bilaterally, no audible wheezes, rhonchi and rales, respirations unlabored   Heart:  Regular rate and rhythm, S1, S2 normal, no murmur, rub or gallop   Abdomen:   Soft, non-tender, non-distended, positive bowel sounds, no masses, no organomegaly    No CVA tenderness   Extremities: Extremities normal, atraumatic, no cyanosis, clubbing , 1-2+ LLE edema   Skin: Left foot in dressing   Neurologic: Alert and oriented times 3,         Invasive Devices:   Peripheral IV 10/16/24 Distal;Right;Upper Arm (Active)   Site Assessment [facility] 10/17/24 0922   Dressing Type Transparent 10/17/24 0922   Line Status Flushed;Infusing 10/17/24 0922   Dressing Status Clean;Dry;Intact 10/17/24 0922   Dressing Change Due 10/20/24 10/17/24 0922   Reason Not Rotated Not due 10/17/24 0922       Peripheral IV 10/16/24 Left;Ventral (anterior) Forearm (Active)   Site Assessment [facility] 10/17/24 0922   Dressing Type Transparent 10/17/24 0922   Line Status Flushed 10/17/24 0922   Dressing Status Clean;Dry;Intact 10/17/24 0922   Dressing Change Due 10/20/24 10/17/24 0922   Reason Not Rotated Not due 10/17/24 0922       Labs, Imaging, & Other Studies     Lab Results:    I have personally reviewed pertinent labs.    Results from last 7 days   Lab Units 10/22/24  0739 10/21/24  0452 10/19/24  0440   WBC Thousand/uL 7.28 7.64 8.36   HEMOGLOBIN g/dL 11.3* 11.4* 11.4*   PLATELETS Thousands/uL 290 244 167     Results from last 7 days   Lab Units 10/22/24  0739   POTASSIUM mmol/L 4.1   CHLORIDE mmol/L 100   CO2 mmol/L 28   BUN mg/dL 17   CREATININE mg/dL 1.22   EGFR ml/min/1.73sq m 59   CALCIUM mg/dL 8.3*     Results from last 7 days   Lab Units 10/21/24  1428 10/16/24  1342 10/16/24  1228 10/16/24  1121   BLOOD CULTURE   --   --  No Growth After 5  Days. No Growth After 5 Days.   GRAM STAIN RESULT  1+ Gram positive cocci in clusters*  No polys seen* No polys seen*  2+ Gram positive cocci in pairs*  2+ Gram variable rods*  --   --    WOUND CULTURE   --  3+ Growth of  --   --        Imaging Studies:   I have personally reviewed pertinent imaging study reports and images in PACS.      EKG, Pathology, and Other Studies:   I have personally reviewed pertinent reports.        Counseling/Coordination of care:       Total 50 minutes in evaluation of the patient and communication with the patient via telehealth of which 30 minutes was in counseling/coordination of care.  Extensive review of the medical records in epic including review of the notes, radiographs, and laboratory results.  My recommendations were discussed with the patient in detail who verbalized understanding.

## 2024-10-21 NOTE — ANESTHESIA POSTPROCEDURE EVALUATION
Post-Op Assessment Note    CV Status:  Stable    Pain management: adequate       Mental Status:  Sleepy   Hydration Status:  Euvolemic   PONV Controlled:  Controlled   Airway Patency:  Patent     Post Op Vitals Reviewed: Yes    No anethesia notable event occurred.    Staff: CRNA           Last Filed PACU Vitals:  Vitals Value Taken Time   Temp 97 °F (36.1 °C) 10/21/24 1442   Pulse 90 10/21/24 1444   /61 10/21/24 1443   Resp 22 10/21/24 1444   SpO2 100 % 10/21/24 1444   Vitals shown include unfiled device data.    Modified Nolvia:  No data recorded

## 2024-10-21 NOTE — ASSESSMENT & PLAN NOTE
Had LEADS -left There is evidence of >75% stenosis noted in the proximal anterior tibial  artery.  As discussed with podiatry and vascular surgeon patient needs angio which is good to be done by IR   Over the weekend as discussed with vascular surgery concern for for embolism and secondary to high risk of kidney failure without improvement of CTA heparin drip has been started discussed with vascular today continue heparin drip till angiogram done by IR, suspect IR will do angiogram on Wednesday yesterday he is going urgently with podiatry

## 2024-10-21 NOTE — ASSESSMENT & PLAN NOTE
Etiology of elevated creatinine most likely due to autoregulatory failure in the setting of ACE inhibitor use, SGLT2 inhibitor use and diuretic use  Baseline creatinine appears to be around 1.3-1.4 in setting of diabetes and hypertension  Creatinine on admission 1.58  Peak creatinine 1.7 on 10/17  Creatinine today 1.21  Status post treatment with IV fluids this admission  Keep holding lisinopril, furosemide and Jardiance  Patient is scheduled to go to operating room today  Give Isolyte 50 cc/h for 4 hours today prior to surgery  Trend BMP daily

## 2024-10-21 NOTE — PROGRESS NOTES
"Progress Note - Podiatry  Clay Marcial 68 y.o. male MRN: [id number]  Unit/Bed#: MS [facility] Encounter: [id number]    Assessment:  Left DFU/DFI  DM, A1c 7.2% 10/16/24  PAD    Plan:  - Plan for left foot I&D, wound debridement, 2nd toe amputation, poss wv today due to acute worsening of appearance of foot. Consent to be obtained at bedside however patient verbally consents today. 2nd toe unfortunately is not viable. Patient is at risk for TMA.   - Abx per ID  - Case discussed with vascular and IR, will still need revasc however given acute worsening of foot, I&D is taking precedence.   - rest of care per primary team.       MRI left foot 10/17/24: No definitive evidence of OM however T2 changes noted to 3rd met head and 3rd pp (early OM can not be excluded)  ABIs 10/17/24: RLE 1.04/115/100. LLE 1.03/ulcer/43 below healing    Subjective/Objective   Chief Complaint:   Chief Complaint   Patient presents with    Leg Pain     Chronic wound on left foot, home health came to see patient and left leg swollen and painful, pt reports trouble walking with multiple falls at home, denies HS on any of the falls        Subjective: 68 y.o. y/o male was seen and evaluated at bedside.    Blood pressure 130/62, pulse 93, temperature 97.7 °F (36.5 °C), resp. rate 17, height 5' 11\" (1.803 m), weight 126 kg (277 lb), SpO2 95%.,Body mass index is 38.63 kg/m².    Invasive Devices       Peripheral Intravenous Line  Duration             Peripheral IV 10/16/24 Left;Ventral (anterior) Forearm 4 days    Peripheral IV 10/20/24 Proximal;Right;Ventral (anterior) Forearm 1 day                    Physical Exam:   General: Alert, cooperative and no distress  Lungs: Non labored breathing  Heart: Positive S1, S2  Abdomen: Soft, non-tender.  Extremity: + left foot extreme malodor, necrotic drainage, edema, erythema. 2nd toe blue discoloration with absent CRT to distal tip.                 Lab, Imaging and other studies:   CBC:   Lab Results   Component " Value Date    WBC 7.64 10/21/2024    HGB 11.4 (L) 10/21/2024    HCT 33.9 (L) 10/21/2024    MCV 93 10/21/2024     10/21/2024    RBC 3.65 (L) 10/21/2024    MCH 31.2 10/21/2024    MCHC 33.6 10/21/2024    RDW 11.9 10/21/2024    MPV 10.2 10/21/2024    NRBC 0 10/21/2024   , CMP:   Lab Results   Component Value Date    SODIUM 132 (L) 10/21/2024    K 4.0 10/21/2024    CL 98 10/21/2024    CO2 25 10/21/2024    BUN 20 10/21/2024    CREATININE 1.21 10/21/2024    CALCIUM 8.3 (L) 10/21/2024    EGFR 60 10/21/2024       Imaging: I have personally reviewed pertinent films in PACS  EKG, Pathology, and Other Studies: I have personally reviewed pertinent reports.

## 2024-10-21 NOTE — ASSESSMENT & PLAN NOTE
Lab Results   Component Value Date    EGFR 60 10/21/2024    EGFR 60 10/20/2024    EGFR 55 10/19/2024    CREATININE 1.21 10/21/2024    CREATININE 1.21 10/20/2024    CREATININE 1.29 10/19/2024   Baseline creatinine is 1.2-1.3 cr down to 1.2 nephrology following especially plan for angio prior to or started on fluids prior to OR

## 2024-10-21 NOTE — ASSESSMENT & PLAN NOTE
Patient has left lower extremity cellulitis with open wound on plantar side  Plans noted for OR today with podiatry

## 2024-10-21 NOTE — ASSESSMENT & PLAN NOTE
Lab Results   Component Value Date    HGBA1C 7.2 (H) 10/16/2024       Recent Labs     10/20/24  1531 10/20/24  2105 10/21/24  0709 10/21/24  1126   POCGLU 152* 157* 189* 145*       Blood Sugar Average: Last 72 hrs:  (P) [id number]  Hga1c is actually 7.2 hyperglycemia secondary to infection with insulin adjustment his sugars have improved due to current n.p.o. status for the OR last night his Lantus was reduced to 15 units at at bedtime Humalog 10 units 3 times daily he has ordered once he is eating with meals adjust insulin based on his sugars once he start eating

## 2024-10-21 NOTE — CASE MANAGEMENT
Case Management Discharge Planning Note    Patient name Clay Marcial  Location /-01 MRN [id number]  : 1955 Date 10/21/2024       Current Admission Date: 10/16/2024  Current Admission Diagnosis:Diabetic foot infection  (HCC)   Patient Active Problem List    Diagnosis Date Noted Date Diagnosed    HTN (hypertension) 10/21/2024     Elevated serum creatinine 10/21/2024     Smoking 10/21/2024     PAD (peripheral artery disease) (HCC) 10/20/2024     Acute hyponatremia 10/20/2024     Chronic obstructive pulmonary disease with acute exacerbation (HCC) 10/17/2024     ROMEL (obstructive sleep apnea) 10/17/2024     Diabetic foot infection  (HCC) 10/16/2024     Stage 3 chronic kidney disease (HCC) 10/16/2024     Diabetes mellitus, type 2 (HCC) 2024     CAD (coronary artery disease) 2024     Chronic diastolic congestive heart failure (HCC) 2024     Acute on chronic kidney failure  (HCC) 2024     Helicobacter pylori infection 2024       LOS (days): 5  Geometric Mean LOS (GMLOS) (days): 3  Days to GMLOS:-2.1     OBJECTIVE:  Risk of Unplanned Readmission Score: 34.77         Current admission status: Inpatient   Preferred Pharmacy:   [facility]/pharmacy #[id number] - [place], [place] - [address] [place] [id number]  Phone: [phone] Fax: [phone]    [facility] - [place], [place] - [address]  [address] [facility] [address]  Phone: [phone] Fax: [phone]    Primary Care Provider: No primary care provider on file.    Primary Insurance: MEDICARE  Secondary Insurance: [facility]    DISCHARGE DETAILS:     Chart reviewed aware of medical management.  Pt continues with diabetic foot infection, podiatry/neph/IR/vasc following, continues with IV abx.   CM will continue to follow, current dc plan remains [facility].

## 2024-10-21 NOTE — PROGRESS NOTES
Progress Note - Infectious Disease   Name: Clay Marcial 68 y.o. male I MRN: [id number]  Unit/Bed#: -01 I Date of Admission: 10/16/2024   Date of Service: 10/20/2024 I Hospital Day: 4        VIRTUAL CARE DOCUMENTATION:     1. This service was provided via Telemedicine using [employer]     2. Parties in the room with patient during teleconsult Patient only    3. Confidentiality My office door was closed     4. Participants No one else was in the room    5. Patient acknowledged consent and understanding of privacy and security of the  Telemedicine consult. I informed the patient that I have reviewed their record in Epic and presented the opportunity for them to ask any questions regarding the visit today.  The patient agreed to participate.    6. Time spent 3 minutes       Assessment & Plan  Diabetic foot infection  (HCC)  Affecting left foot with associated left lower leg cellulitis in the setting of a chronic non healing plantar foot ulcer.    CT is notable for cellulitis without abscess or osteo. MRI notes some T2 only hyperintense signal within third metatarsal head and proximal phalanx that is favored to be reactive changes over osteo. Wound cx with mixed kushal.   Arterial dopplers with left toe pressures are below healing range.   He is afebrile without leukocytosis but over the weekend was noted to have worsening redness and edema of the foot while on cefazolin as well as discoloration over his second toe concerning for embolic disease     Continue pip-tazo 4.5 q8h   Await operative course with plan for urgent I and D given worsening foot cellulitis, at risk for TMA  Await multidisciplinary evaluation with nephrology, IR, vascular surgery for angiogram, revascularization    Continue supportive care, serial extremity exams, monitor clinical course   Final choice and duration of antibiotics to be determined   Check daily CBC, CMP while inpatient to monitor for any evolving antibiotic toxicity or  treatment failure   Patient is at risk for poor wound healing/progression of infection in the setting of underlying comorbidities    PAD (peripheral artery disease) (HCC)  LEADs concerning for tibial disease and now with concern for thromboembolic event     Mngt as above   Recommend tobacco cessation counseling  Diabetes mellitus (HCC)  Lab Results   Component Value Date    HGBA1C 7.2 (H) 10/16/2024       Recent Labs     10/19/24  2101 10/20/24  0716 10/20/24  1028 10/20/24  1531   POCGLU 146* 179* 199* 152*       Blood Sugar Average: Last 72 hrs:  (P) [id number]  Risk factor for infection     Recommend strict glycemic control to aid with wound healing    Stage 3 chronic kidney disease (HCC)  Lab Results   Component Value Date    EGFR 60 10/20/2024    EGFR 55 10/19/2024    EGFR 46 10/18/2024    CREATININE 1.21 10/20/2024    CREATININE 1.29 10/19/2024    CREATININE 1.49 (H) 10/18/2024      Recommend renal dosing of antibiotics, avoid nephrotoxins      Reviewed recommendations to continue iv abx  with primary team who is in agreement. Will follow.  Please call with concerns or any changes in clinical status or new test results.        Subjective:  The patient was noted to have new worsening bluish discoloration of his second toe over the weekend and also developed worsening redness, foul odor, increasing drainage and swelling over foot  Denies fevers, chills, or sweats.  Denies nausea, vomiting, or diarrhea.    Antibiotics:  cefazolin      Physical Exam     Temp:  [97.3 °F (36.3 °C)-98.4 °F (36.9 °C)] 97.3 °F (36.3 °C)  Resp:  [17-18] 17  BP: (133-139)/(63-78) 133/63  Temp (24hrs), Av.9 °F (36.6 °C), Min:97.3 °F (36.3 °C), Max:98.4 °F (36.9 °C)  Current: Temperature: (!) 97.3 °F (36.3 °C)    Intake/Output Summary (Last 24 hours) at 10/20/2024 2028  Last data filed at 10/20/2024 1900  Gross per 24 hour   Intake 240 ml   Output 1200 ml   Net -960 ml       Physical exam findings reported by bedside and  primary medical team staff      General Appearance:  Appearing well, nontoxic, and in no distress, appears stated age   Lungs:   Clear to auscultation bilaterally, no audible wheezes, rhonchi and rales, respirations unlabored   Heart:  Regular rate and rhythm, S1, S2 normal, no murmur, rub or gallop   Abdomen:   Soft, non-tender, non-distended, positive bowel sounds, no masses, no organomegaly    No CVA tenderness   Extremities: Extremities normal, atraumatic, no cyanosis, clubbing , 1-2+ LLE edema   Skin: Left foot plantar ulcer in dressing with worsening redness over foot and foul odor, second left toe with dusky discoloration   Neurologic: Alert and oriented times 3,         Invasive Devices:   Peripheral IV 10/16/24 Distal;Right;Upper Arm (Active)   Site Assessment WDL 10/17/24 0922   Dressing Type Transparent 10/17/24 0922   Line Status Flushed;Infusing 10/17/24 0922   Dressing Status Clean;Dry;Intact 10/17/24 0922   Dressing Change Due 10/20/24 10/17/24 0922   Reason Not Rotated Not due 10/17/24 0922       Peripheral IV 10/16/24 Left;Ventral (anterior) Forearm (Active)   Site Assessment [facility] 10/17/24 0922   Dressing Type Transparent 10/17/24 0922   Line Status Flushed 10/17/24 0922   Dressing Status Clean;Dry;Intact 10/17/24 0922   Dressing Change Due 10/20/24 10/17/24 0922   Reason Not Rotated Not due 10/17/24 0922       Labs, Imaging, & Other Studies     Lab Results:    I have personally reviewed pertinent labs.    Results from last 7 days   Lab Units 10/19/24  0440 10/17/24  0446 10/16/24  1121   WBC Thousand/uL 8.36 9.16 11.65*   HEMOGLOBIN g/dL 11.4* 11.8* 15.2   PLATELETS Thousands/uL 167 162 210     Results from last 7 days   Lab Units 10/20/24  0251   POTASSIUM mmol/L 5.0   CHLORIDE mmol/L 97   CO2 mmol/L 27   BUN mg/dL 21   CREATININE mg/dL 1.21   EGFR ml/min/1.73sq m 60   CALCIUM mg/dL 8.3*     Results from last 7 days   Lab Units 10/16/24  1342 10/16/24  1228 10/16/24  1121   BLOOD CULTURE   --  No  Growth After 4 Days. No Growth After 4 Days.   GRAM STAIN RESULT  No polys seen*  2+ Gram positive cocci in pairs*  2+ Gram variable rods*  --   --    WOUND CULTURE  3+ Growth of  --   --        Imaging Studies:   I have personally reviewed pertinent imaging study reports and images in PACS.      EKG, Pathology, and Other Studies:   I have personally reviewed pertinent reports.        Counseling/Coordination of care:       Total 50 minutes in evaluation of the patient and communication with the patient via telehealth of which 30 minutes was in counseling/coordination of care.  Extensive review of the medical records in epic including review of the notes, radiographs, and laboratory results.  My recommendations were discussed with the patient in detail who verbalized understanding.

## 2024-10-21 NOTE — ASSESSMENT & PLAN NOTE
Mild hyponatremia, serum sodium level today 132 continue to monitor  Diuretics on hold, monitor with gentle hydration today

## 2024-10-21 NOTE — PROGRESS NOTES
NEPHROLOGY HOSPITAL PROGRESS NOTE   Clay Marcial 68 y.o. male MRN: [id number]  Unit/Bed#: -01 Encounter: [id number]  Reason for Consult: Elevated creatinine on CKD  Assessment & Plan  Elevated serum creatinine  Etiology of elevated creatinine most likely due to autoregulatory failure in the setting of ACE inhibitor use, SGLT2 inhibitor use and diuretic use  Baseline creatinine appears to be around 1.3-1.4 in setting of diabetes and hypertension  Creatinine on admission 1.58  Peak creatinine 1.7 on 10/17  Creatinine today 1.21  Status post treatment with IV fluids this admission  Keep holding lisinopril, furosemide and Jardiance  Patient is scheduled to go to operating room today  Give Isolyte 50 cc/h for 4 hours today prior to surgery  Trend BMP daily  Stage 3 chronic kidney disease (HCC)  Baseline creatinine 1.3-1.4  Needs outpatient follow-up with nephrology  Diabetic foot infection  (HCC)  Patient has left lower extremity cellulitis with open wound on plantar side  Plans noted for OR today with podiatry  Diabetes mellitus (HCC)  Management per primary team  Continue to hold metformin and Jardiance for now  CAD (coronary artery disease)  Management per primary team  Chronic diastolic congestive heart failure (HCC)  Home Rx: Furosemide 40 mg daily, Jardiance 12.5 mg daily  Current Rx: No diuretics  Changes: Keep diuretics on hold today  Chronic obstructive pulmonary disease with acute exacerbation ([facility])  Management per primary team  ROMEL (obstructive sleep apnea)  Management per primary team  PAD (peripheral artery disease) ([facility])  Management per primary team  Acute hyponatremia  Mild hyponatremia, serum sodium level today 132 continue to monitor  Diuretics on hold, monitor with gentle hydration today  HTN (hypertension)  Home Rx: Furosemide 40 mg as needed, Imdur 30 mg daily, lisinopril 20 mg daily, Toprol-XL 50 mg daily  Current Rx: Imdur 30 mg daily, Toprol-XL 50 mg daily  Blood pressure is currently  acceptable, keep holding lisinopril and furosemide    Discussed with internal medicine team.  After discussion, we agreed that kidney function is currently at baseline and to provide low-dose IV fluids prior to the OR today and to keep holding diuretics, lisinopril and Jardiance.    SUBJECTIVE / 24H INTERVAL HISTORY:  Urine output recorded as 1000 cc.  N.p.o. this morning for the OR later today.  Denies dyspnea.    OBJECTIVE:  Current Weight: Weight - Scale: 126 kg (277 lb)  Vitals:    10/19/24 2100 10/20/24 0700 10/20/24 2103 10/21/24 0710   BP: 139/78 133/63  130/62   BP Location: Left arm      Pulse:    93   Resp: 18 17     Temp: 98.4 °F (36.9 °C) (!) 97.3 °F (36.3 °C)  97.7 °F (36.5 °C)   TempSrc: Temporal Temporal     SpO2:   94% 95%   Weight:       Height:           Intake/Output Summary (Last 24 hours) at 10/21/2024 0739  Last data filed at 10/21/2024 0646  Gross per 24 hour   Intake 240 ml   Output 1000 ml   Net -760 ml     Review of Systems   Constitutional:  Negative for chills and fever.   HENT:  Negative for ear pain and sore throat.    Eyes:  Negative for pain and visual disturbance.   Respiratory:  Negative for cough and shortness of breath.    Cardiovascular:  Negative for chest pain and palpitations.   Gastrointestinal:  Negative for abdominal pain and vomiting.   Genitourinary:  Negative for dysuria and hematuria.   Musculoskeletal:  Negative for arthralgias and back pain.   Skin:  Negative for color change and rash.   Neurological:  Negative for seizures and syncope.   All other systems reviewed and are negative.    Physical Exam  Vitals and nursing note reviewed.   Constitutional:       General: He is not in acute distress.     Appearance: He is well-developed. He is obese.   HENT:      Head: Normocephalic and atraumatic.   Eyes:      Conjunctiva/sclera: Conjunctivae normal.   Cardiovascular:      Rate and Rhythm: Normal rate and regular rhythm.      Pulses: Normal pulses.      Heart sounds:  Normal heart sounds. No murmur heard.  Pulmonary:      Effort: Pulmonary effort is normal. No respiratory distress.      Breath sounds: Normal breath sounds.   Abdominal:      Palpations: Abdomen is soft.      Tenderness: There is no abdominal tenderness.   Musculoskeletal:         General: No swelling.      Cervical back: Neck supple.      Comments: Mild bilateral lower extremity edema noted   Skin:     General: Skin is warm and dry.      Capillary Refill: Capillary refill takes less than 2 seconds.   Neurological:      Mental Status: He is alert.   Psychiatric:         Mood and Affect: Mood normal.       Medications:    Current Facility-Administered Medications:     acetaminophen (TYLENOL) tablet 650 mg, 650 mg, Oral, Q6H PRN, Zee Newman MD, 650 mg at 10/16/24 2117    aspirin chewable tablet 81 mg, 81 mg, Oral, Daily, Zee Newman MD, 81 mg at 10/20/24 0900    atorvastatin (LIPITOR) tablet 20 mg, 20 mg, Oral, Daily, BANDAR Chaudhary-ANN-MARIE, 20 mg at 10/20/24 0900    budesonide (PULMICORT) inhalation solution 0.5 mg, 0.5 mg, Nebulization, Q12H, Zee Newman MD, 0.5 mg at 10/21/24 0718    Cholecalciferol (VITAMIN D3) tablet 1,000 Units, 1,000 Units, Oral, Daily, Stephanie Stone PA-C, 1,000 Units at 10/20/24 0900    cyanocobalamin (VITAMIN B-12) tablet 500 mcg, 500 mcg, Oral, Daily, BANDAR Chaudhary-ANN-MARIE, 500 mcg at 10/20/24 0900    docusate sodium (COLACE) capsule 100 mg, 100 mg, Oral, BID, Lnia Villanueva MD, 100 mg at 10/20/24 1751    DULoxetine (CYMBALTA) delayed release capsule 20 mg, 20 mg, Oral, Daily, BANDAR Chaudhary-ANN-MARIE, 20 mg at 10/20/24 0900    fluticasone (FLONASE) 50 mcg/act nasal spray 2 spray, 2 spray, Nasal, Daily, BANDAR Chaudhary-C, 2 spray at 10/20/24 0906    heparin (porcine) 25,000 units in 0.45% NaCl 250 mL infusion (premix), 3-30 Units/kg/hr (Order-Specific), Intravenous, Titrated, Zee Newman MD, Last Rate: 21.6 mL/hr at 10/21/24 0221, 18 Units/kg/hr at  10/21/24 0221    heparin (porcine) injection 4,800 Units, 4,800 Units, Intravenous, Q6H PRN, Zee Newman MD, 4,800 Units at 10/20/24 0539    heparin (porcine) injection 9,600 Units, 9,600 Units, Intravenous, Q6H PRN, Zee Newman MD    insulin glargine (LANTUS) subcutaneous injection 15 Units 0.15 mL, 15 Units, Subcutaneous, HS, Zee Newman MD, 15 Units at 10/20/24 2118    insulin lispro (HumALOG/ADMELOG) 100 units/mL subcutaneous injection 10 Units, 10 Units, Subcutaneous, TID With Meals, Zee Newman MD, 10 Units at 10/20/24 1608    insulin lispro (HumALOG/ADMELOG) 100 units/mL subcutaneous injection 2-12 Units, 2-12 Units, Subcutaneous, TID AC, 2 Units at 10/20/24 1608 **AND** Fingerstick Glucose (POCT), , , TID AC, Zee Newman MD    isosorbide mononitrate (IMDUR) 24 hr tablet 30 mg, 30 mg, Oral, Daily, Stephanie Stone PA-C, 30 mg at 10/20/24 0900    levalbuterol (XOPENEX) inhalation solution 1.25 mg, 1.25 mg, Nebulization, Q8H PRN, Zee Newman MD    loratadine (CLARITIN) tablet 10 mg, 10 mg, Oral, Daily, Stephanie Stone PA-C, 10 mg at 10/20/24 0900    metoprolol succinate (TOPROL-XL) 24 hr tablet 50 mg, 50 mg, Oral, Daily, Stephanie Stone PA-C, 50 mg at 10/20/24 0900    morphine injection 2 mg, 2 mg, Intravenous, Q4H PRN, Zee Newman MD    nicotine (NICODERM CQ) 14 mg/24hr TD 24 hr patch 1 patch, 1 patch, Transdermal, Daily, Zee Newman MD, 1 patch at 10/19/24 0807    umeclidinium 62.5 mcg/actuation inhaler AEPB 1 puff, 1 puff, Inhalation, Daily, 1 puff at 10/20/24 0907 **AND** olodaterol HCl (STRIVERDI RESPIMAT) inhaler 2 puff, 2 puff, Inhalation, Daily, Stephanie Stone PA-C, 2 puff at 10/20/24 0907    ondansetron (ZOFRAN) injection 4 mg, 4 mg, Intravenous, Q4H PRN, Lina Villanueva MD, 4 mg at 10/19/24 0814    pantoprazole (PROTONIX) EC tablet 40 mg, 40 mg, Oral, BID AC, Stephanie Stone PA-C, 40 mg at 10/21/24 0547    [COMPLETED]  "piperacillin-tazobactam (ZOSYN) 4.5 g in sodium chloride 0.9 % 100 mL IV LOADING DOSE, 4.5 g, Intravenous, Once, Last Rate: 200 mL/hr at 10/20/24 1153, 4.5 g at 10/20/24 1153 **FOLLOWED BY** piperacillin-tazobactam (ZOSYN) 4.5 g in sodium chloride 0.9 % 100 mL IVPB (EXTENDED INFUSION), 4.5 g, Intravenous, Q8H, Zee Newman MD, Last Rate: 25 mL/hr at 10/21/24 0547, 4.5 g at 10/21/24 0547    polyethylene glycol (MIRALAX) packet 17 g, 17 g, Oral, Daily, Lina Villanueva MD, 17 g at 10/20/24 0900    pregabalin (LYRICA) capsule 100 mg, 100 mg, Oral, BID, BANDAR Chaudhary-C, 100 mg at 10/20/24 1751    sodium chloride (OCEAN) 0.65 % nasal spray 2 spray, 2 spray, Each Nare, BID, BANDAR Chaudhary-C, 2 spray at 10/20/24 0906    traZODone (DESYREL) tablet 25 mg, 25 mg, Oral, HS, BANDAR Chaudhary-C, 25 mg at 10/20/24 2118    Laboratory Results:  Results from last 7 days   Lab Units 10/21/24  0452 10/20/24  0251 10/19/24  0440 10/18/24  0538 10/17/24  0446 10/16/24  1121   WBC Thousand/uL 7.64  --  8.36  --  9.16 11.65*   HEMOGLOBIN g/dL 11.4*  --  11.4*  --  11.8* 15.2   HEMATOCRIT % 33.9*  --  33.3*  --  34.7* 44.5   PLATELETS Thousands/uL 244  --  167  --  162 210   POTASSIUM mmol/L 4.0 5.0 4.0 3.8 3.8 4.3   CHLORIDE mmol/L 98 97 98 98 97 92*   CO2 mmol/L 25 27 28 27 27 29   BUN mg/dL 20 21 20 21 25 19   CREATININE mg/dL 1.21 1.21 1.29 1.49* 1.73* 1.58*   CALCIUM mg/dL 8.3* 8.3* 8.0* 7.7* 7.7* 9.4       Portions of the record may have been created with voice recognition software. Occasional wrong word or \"sound a like\" substitutions may have occurred due to the inherent limitations of voice recognition software. Read the chart carefully and recognize, using context, where substitutions have occurred.If you have any questions, please contact the dictating provider.    "

## 2024-10-21 NOTE — ASSESSMENT & PLAN NOTE
Affecting left foot with associated left lower leg cellulitis in the setting of a chronic non healing plantar foot ulcer.    CT is notable for cellulitis without abscess or osteo. MRI notes some T2 only hyperintense signal within third metatarsal head and proximal phalanx that is favored to be reactive changes over osteo. Wound cx with mixed kushal.   Arterial dopplers with left toe pressures are below healing range.   He is afebrile without leukocytosis but over the weekend was noted to have worsening redness and edema of the foot while on cefazolin as well as discoloration over his second toe concerning for embolic disease     Continue pip-tazo 4.5 q8h   Await operative course with plan for urgent I and D given worsening foot cellulitis, at risk for TMA  Await multidisciplinary evaluation with nephrology, IR, vascular surgery for angiogram, revascularization    Continue supportive care, serial extremity exams, monitor clinical course   Final choice and duration of antibiotics to be determined   Check daily CBC, CMP while inpatient to monitor for any evolving antibiotic toxicity or treatment failure   Patient is at risk for poor wound healing/progression of infection in the setting of underlying comorbidities

## 2024-10-21 NOTE — ASSESSMENT & PLAN NOTE
LEADs concerning for tibial disease and now with concern for thromboembolic event     Mngt as above   Recommend tobacco cessation counseling

## 2024-10-22 ENCOUNTER — HOSPITAL ENCOUNTER (INPATIENT)
Facility: HOSPITAL | Age: 69
LOS: 14 days | Discharge: NURSING, REHAB OR LONG-TERM CARE | DRG: 240 | End: 2024-11-05
Admitting: INTERNAL MEDICINE
Payer: COMMERCIAL

## 2024-10-22 VITALS
BODY MASS INDEX: 38.78 KG/M2 | TEMPERATURE: 97.2 F | SYSTOLIC BLOOD PRESSURE: 137 MMHG | WEIGHT: 277 LBS | OXYGEN SATURATION: 93 % | HEIGHT: 71 IN | RESPIRATION RATE: 20 BRPM | HEART RATE: 91 BPM | DIASTOLIC BLOOD PRESSURE: 73 MMHG

## 2024-10-22 DIAGNOSIS — N18.30 STAGE 3 CHRONIC KIDNEY DISEASE, UNSPECIFIED WHETHER STAGE 3A OR 3B CKD (HCC): ICD-10-CM

## 2024-10-22 DIAGNOSIS — E11.628 DIABETIC FOOT INFECTION  (HCC): Primary | ICD-10-CM

## 2024-10-22 DIAGNOSIS — E11.42 TYPE 2 DIABETES MELLITUS WITH DIABETIC POLYNEUROPATHY, WITHOUT LONG-TERM CURRENT USE OF INSULIN (HCC): ICD-10-CM

## 2024-10-22 DIAGNOSIS — R79.89 ELEVATED SERUM CREATININE: ICD-10-CM

## 2024-10-22 DIAGNOSIS — S98.132A: ICD-10-CM

## 2024-10-22 DIAGNOSIS — L08.9 DIABETIC FOOT INFECTION  (HCC): Primary | ICD-10-CM

## 2024-10-22 LAB
ANION GAP SERPL CALCULATED.3IONS-SCNC: 7 MMOL/L (ref 4–13)
APTT PPP: 100 SECONDS (ref 23–34)
APTT PPP: 59 SECONDS (ref 23–34)
APTT PPP: 59 SECONDS (ref 23–34)
APTT PPP: 93 SECONDS (ref 23–34)
BASOPHILS # BLD MANUAL: 0 THOUSAND/UL (ref 0–0.1)
BASOPHILS NFR MAR MANUAL: 0 % (ref 0–1)
BUN SERPL-MCNC: 17 MG/DL (ref 5–25)
CALCIUM SERPL-MCNC: 8.3 MG/DL (ref 8.4–10.2)
CHLORIDE SERPL-SCNC: 100 MMOL/L (ref 96–108)
CO2 SERPL-SCNC: 28 MMOL/L (ref 21–32)
CREAT SERPL-MCNC: 1.22 MG/DL (ref 0.6–1.3)
EOSINOPHIL # BLD MANUAL: 0.07 THOUSAND/UL (ref 0–0.4)
EOSINOPHIL NFR BLD MANUAL: 1 % (ref 0–6)
ERYTHROCYTE [DISTWIDTH] IN BLOOD BY AUTOMATED COUNT: 12 % (ref 11.6–15.1)
GFR SERPL CREATININE-BSD FRML MDRD: 59 ML/MIN/1.73SQ M
GLUCOSE SERPL-MCNC: 183 MG/DL (ref 65–140)
GLUCOSE SERPL-MCNC: 193 MG/DL (ref 65–140)
GLUCOSE SERPL-MCNC: 197 MG/DL (ref 65–140)
GLUCOSE SERPL-MCNC: 207 MG/DL (ref 65–140)
GLUCOSE SERPL-MCNC: 226 MG/DL (ref 65–140)
HCT VFR BLD AUTO: 33.6 % (ref 36.5–49.3)
HGB BLD-MCNC: 11.3 G/DL (ref 12–17)
LG PLATELETS BLD QL SMEAR: PRESENT
LYMPHOCYTES # BLD AUTO: 1.67 THOUSAND/UL (ref 0.6–4.47)
LYMPHOCYTES # BLD AUTO: 21 % (ref 14–44)
MCH RBC QN AUTO: 31.3 PG (ref 26.8–34.3)
MCHC RBC AUTO-ENTMCNC: 33.6 G/DL (ref 31.4–37.4)
MCV RBC AUTO: 93 FL (ref 82–98)
MONOCYTES # BLD AUTO: 0.58 THOUSAND/UL (ref 0–1.22)
MONOCYTES NFR BLD: 8 % (ref 4–12)
NEUTROPHILS # BLD MANUAL: 4.95 THOUSAND/UL (ref 1.85–7.62)
NEUTS SEG NFR BLD AUTO: 68 % (ref 43–75)
PLATELET # BLD AUTO: 290 THOUSANDS/UL (ref 149–390)
PLATELET BLD QL SMEAR: ADEQUATE
PLATELET CLUMP BLD QL SMEAR: PRESENT
PMV BLD AUTO: 9.8 FL (ref 8.9–12.7)
POTASSIUM SERPL-SCNC: 4.1 MMOL/L (ref 3.5–5.3)
RBC # BLD AUTO: 3.61 MILLION/UL (ref 3.88–5.62)
RBC MORPH BLD: NORMAL
SODIUM SERPL-SCNC: 135 MMOL/L (ref 135–147)
VARIANT LYMPHS # BLD AUTO: 2 %
WBC # BLD AUTO: 7.28 THOUSAND/UL (ref 4.31–10.16)

## 2024-10-22 PROCEDURE — 99222 1ST HOSP IP/OBS MODERATE 55: CPT | Performed by: INTERNAL MEDICINE

## 2024-10-22 PROCEDURE — 94760 N-INVAS EAR/PLS OXIMETRY 1: CPT

## 2024-10-22 PROCEDURE — 99232 SBSQ HOSP IP/OBS MODERATE 35: CPT | Performed by: INTERNAL MEDICINE

## 2024-10-22 PROCEDURE — 82948 REAGENT STRIP/BLOOD GLUCOSE: CPT

## 2024-10-22 PROCEDURE — 85730 THROMBOPLASTIN TIME PARTIAL: CPT | Performed by: FAMILY MEDICINE

## 2024-10-22 PROCEDURE — 85027 COMPLETE CBC AUTOMATED: CPT | Performed by: FAMILY MEDICINE

## 2024-10-22 PROCEDURE — 80048 BASIC METABOLIC PNL TOTAL CA: CPT | Performed by: FAMILY MEDICINE

## 2024-10-22 PROCEDURE — 90662 IIV NO PRSV INCREASED AG IM: CPT

## 2024-10-22 PROCEDURE — 85007 BL SMEAR W/DIFF WBC COUNT: CPT | Performed by: FAMILY MEDICINE

## 2024-10-22 PROCEDURE — 99239 HOSP IP/OBS DSCHRG MGMT >30: CPT | Performed by: INTERNAL MEDICINE

## 2024-10-22 PROCEDURE — 99233 SBSQ HOSP IP/OBS HIGH 50: CPT | Performed by: INTERNAL MEDICINE

## 2024-10-22 PROCEDURE — 94640 AIRWAY INHALATION TREATMENT: CPT

## 2024-10-22 PROCEDURE — 85730 THROMBOPLASTIN TIME PARTIAL: CPT

## 2024-10-22 PROCEDURE — 90471 IMMUNIZATION ADMIN: CPT

## 2024-10-22 RX ORDER — PREGABALIN 100 MG/1
100 CAPSULE ORAL 2 TIMES DAILY
Status: CANCELLED | OUTPATIENT
Start: 2024-10-22

## 2024-10-22 RX ORDER — HEPARIN SODIUM 1000 [USP'U]/ML
9600 INJECTION, SOLUTION INTRAVENOUS; SUBCUTANEOUS EVERY 6 HOURS PRN
Status: DISCONTINUED | OUTPATIENT
Start: 2024-10-22 | End: 2024-10-29

## 2024-10-22 RX ORDER — ATORVASTATIN CALCIUM 20 MG/1
20 TABLET, FILM COATED ORAL DAILY
Status: DISCONTINUED | OUTPATIENT
Start: 2024-10-23 | End: 2024-11-05 | Stop reason: HOSPADM

## 2024-10-22 RX ORDER — LEVALBUTEROL INHALATION SOLUTION 1.25 MG/3ML
1.25 SOLUTION RESPIRATORY (INHALATION) EVERY 8 HOURS PRN
Status: DISCONTINUED | OUTPATIENT
Start: 2024-10-22 | End: 2024-10-22

## 2024-10-22 RX ORDER — HEPARIN SODIUM 1000 [USP'U]/ML
9600 INJECTION, SOLUTION INTRAVENOUS; SUBCUTANEOUS EVERY 6 HOURS PRN
Status: CANCELLED | OUTPATIENT
Start: 2024-10-22

## 2024-10-22 RX ORDER — NICOTINE 21 MG/24HR
1 PATCH, TRANSDERMAL 24 HOURS TRANSDERMAL DAILY
Status: DISCONTINUED | OUTPATIENT
Start: 2024-10-23 | End: 2024-11-05 | Stop reason: HOSPADM

## 2024-10-22 RX ORDER — BUDESONIDE 0.5 MG/2ML
0.5 INHALANT ORAL
Status: CANCELLED | OUTPATIENT
Start: 2024-10-22

## 2024-10-22 RX ORDER — NICOTINE 21 MG/24HR
1 PATCH, TRANSDERMAL 24 HOURS TRANSDERMAL DAILY
Status: CANCELLED | OUTPATIENT
Start: 2024-10-23

## 2024-10-22 RX ORDER — HEPARIN SODIUM 10000 [USP'U]/100ML
3-30 INJECTION, SOLUTION INTRAVENOUS
Status: DISCONTINUED | OUTPATIENT
Start: 2024-10-22 | End: 2024-10-29

## 2024-10-22 RX ORDER — INSULIN GLARGINE 100 [IU]/ML
15 INJECTION, SOLUTION SUBCUTANEOUS
Status: DISCONTINUED | OUTPATIENT
Start: 2024-10-22 | End: 2024-10-24

## 2024-10-22 RX ORDER — BUDESONIDE 0.5 MG/2ML
0.5 INHALANT ORAL
Status: DISCONTINUED | OUTPATIENT
Start: 2024-10-23 | End: 2024-11-05 | Stop reason: HOSPADM

## 2024-10-22 RX ORDER — DOCUSATE SODIUM 100 MG/1
100 CAPSULE, LIQUID FILLED ORAL 2 TIMES DAILY
Status: DISCONTINUED | OUTPATIENT
Start: 2024-10-23 | End: 2024-11-05 | Stop reason: HOSPADM

## 2024-10-22 RX ORDER — INSULIN LISPRO 100 [IU]/ML
2-12 INJECTION, SOLUTION INTRAVENOUS; SUBCUTANEOUS
Status: CANCELLED | OUTPATIENT
Start: 2024-10-22

## 2024-10-22 RX ORDER — ATORVASTATIN CALCIUM 20 MG/1
20 TABLET, FILM COATED ORAL DAILY
Status: CANCELLED | OUTPATIENT
Start: 2024-10-23

## 2024-10-22 RX ORDER — HEPARIN SODIUM 1000 [USP'U]/ML
4800 INJECTION, SOLUTION INTRAVENOUS; SUBCUTANEOUS EVERY 6 HOURS PRN
Status: CANCELLED | OUTPATIENT
Start: 2024-10-22

## 2024-10-22 RX ORDER — TRAZODONE HYDROCHLORIDE 50 MG/1
25 TABLET, FILM COATED ORAL
Status: DISCONTINUED | OUTPATIENT
Start: 2024-10-22 | End: 2024-11-05 | Stop reason: HOSPADM

## 2024-10-22 RX ORDER — LORATADINE 10 MG/1
10 TABLET ORAL DAILY
Status: CANCELLED | OUTPATIENT
Start: 2024-10-23

## 2024-10-22 RX ORDER — PANTOPRAZOLE SODIUM 40 MG/1
40 TABLET, DELAYED RELEASE ORAL
Status: CANCELLED | OUTPATIENT
Start: 2024-10-22

## 2024-10-22 RX ORDER — PANTOPRAZOLE SODIUM 40 MG/1
40 TABLET, DELAYED RELEASE ORAL
Status: DISCONTINUED | OUTPATIENT
Start: 2024-10-23 | End: 2024-11-05 | Stop reason: HOSPADM

## 2024-10-22 RX ORDER — ECHINACEA PURPUREA EXTRACT 125 MG
2 TABLET ORAL 2 TIMES DAILY
Status: DISCONTINUED | OUTPATIENT
Start: 2024-10-22 | End: 2024-11-05 | Stop reason: HOSPADM

## 2024-10-22 RX ORDER — ISOSORBIDE MONONITRATE 30 MG/1
30 TABLET, EXTENDED RELEASE ORAL DAILY
Status: CANCELLED | OUTPATIENT
Start: 2024-10-23

## 2024-10-22 RX ORDER — LEVALBUTEROL INHALATION SOLUTION 1.25 MG/3ML
1.25 SOLUTION RESPIRATORY (INHALATION) EVERY 8 HOURS PRN
Status: CANCELLED | OUTPATIENT
Start: 2024-10-22

## 2024-10-22 RX ORDER — METOPROLOL SUCCINATE 50 MG/1
50 TABLET, EXTENDED RELEASE ORAL DAILY
Status: DISCONTINUED | OUTPATIENT
Start: 2024-10-23 | End: 2024-11-05 | Stop reason: HOSPADM

## 2024-10-22 RX ORDER — TRAZODONE HYDROCHLORIDE 50 MG/1
25 TABLET, FILM COATED ORAL
Status: CANCELLED | OUTPATIENT
Start: 2024-10-22

## 2024-10-22 RX ORDER — ONDANSETRON 2 MG/ML
4 INJECTION INTRAMUSCULAR; INTRAVENOUS EVERY 4 HOURS PRN
Status: DISCONTINUED | OUTPATIENT
Start: 2024-10-22 | End: 2024-11-05 | Stop reason: HOSPADM

## 2024-10-22 RX ORDER — DOCUSATE SODIUM 100 MG/1
100 CAPSULE, LIQUID FILLED ORAL 2 TIMES DAILY
Status: CANCELLED | OUTPATIENT
Start: 2024-10-22

## 2024-10-22 RX ORDER — POLYETHYLENE GLYCOL 3350 17 G/17G
17 POWDER, FOR SOLUTION ORAL DAILY
Status: CANCELLED | OUTPATIENT
Start: 2024-10-23

## 2024-10-22 RX ORDER — FLUTICASONE PROPIONATE 50 MCG
2 SPRAY, SUSPENSION (ML) NASAL DAILY
Status: DISCONTINUED | OUTPATIENT
Start: 2024-10-23 | End: 2024-11-05 | Stop reason: HOSPADM

## 2024-10-22 RX ORDER — INSULIN GLARGINE 100 [IU]/ML
15 INJECTION, SOLUTION SUBCUTANEOUS
Status: CANCELLED | OUTPATIENT
Start: 2024-10-22

## 2024-10-22 RX ORDER — INSULIN LISPRO 100 [IU]/ML
10 INJECTION, SOLUTION INTRAVENOUS; SUBCUTANEOUS
Status: CANCELLED | OUTPATIENT
Start: 2024-10-22

## 2024-10-22 RX ORDER — DULOXETIN HYDROCHLORIDE 20 MG/1
20 CAPSULE, DELAYED RELEASE ORAL DAILY
Status: CANCELLED | OUTPATIENT
Start: 2024-10-23

## 2024-10-22 RX ORDER — ASPIRIN 81 MG/1
81 TABLET, CHEWABLE ORAL DAILY
Status: CANCELLED | OUTPATIENT
Start: 2024-10-23

## 2024-10-22 RX ORDER — ASPIRIN 81 MG/1
81 TABLET, CHEWABLE ORAL DAILY
Status: DISCONTINUED | OUTPATIENT
Start: 2024-10-23 | End: 2024-11-05 | Stop reason: HOSPADM

## 2024-10-22 RX ORDER — VANCOMYCIN HYDROCHLORIDE 1 G/200ML
1000 INJECTION, SOLUTION INTRAVENOUS EVERY 12 HOURS
Status: DISCONTINUED | OUTPATIENT
Start: 2024-10-23 | End: 2024-10-25

## 2024-10-22 RX ORDER — INSULIN LISPRO 100 [IU]/ML
2-12 INJECTION, SOLUTION INTRAVENOUS; SUBCUTANEOUS
Status: DISCONTINUED | OUTPATIENT
Start: 2024-10-23 | End: 2024-10-26

## 2024-10-22 RX ORDER — HEPARIN SODIUM 1000 [USP'U]/ML
4800 INJECTION, SOLUTION INTRAVENOUS; SUBCUTANEOUS EVERY 6 HOURS PRN
Status: DISCONTINUED | OUTPATIENT
Start: 2024-10-22 | End: 2024-10-29

## 2024-10-22 RX ORDER — INSULIN LISPRO 100 [IU]/ML
10 INJECTION, SOLUTION INTRAVENOUS; SUBCUTANEOUS
Status: DISCONTINUED | OUTPATIENT
Start: 2024-10-23 | End: 2024-10-29

## 2024-10-22 RX ORDER — DULOXETIN HYDROCHLORIDE 20 MG/1
20 CAPSULE, DELAYED RELEASE ORAL DAILY
Status: DISCONTINUED | OUTPATIENT
Start: 2024-10-23 | End: 2024-11-05 | Stop reason: HOSPADM

## 2024-10-22 RX ORDER — METOPROLOL SUCCINATE 50 MG/1
50 TABLET, EXTENDED RELEASE ORAL DAILY
Status: CANCELLED | OUTPATIENT
Start: 2024-10-23

## 2024-10-22 RX ORDER — VANCOMYCIN HYDROCHLORIDE 750 MG/150ML
750 INJECTION, SOLUTION INTRAVENOUS EVERY 12 HOURS
Status: CANCELLED | OUTPATIENT
Start: 2024-10-22

## 2024-10-22 RX ORDER — ACETAMINOPHEN 325 MG/1
650 TABLET ORAL EVERY 6 HOURS PRN
Status: CANCELLED | OUTPATIENT
Start: 2024-10-22

## 2024-10-22 RX ORDER — POLYETHYLENE GLYCOL 3350 17 G/17G
17 POWDER, FOR SOLUTION ORAL DAILY
Status: DISCONTINUED | OUTPATIENT
Start: 2024-10-23 | End: 2024-11-05 | Stop reason: HOSPADM

## 2024-10-22 RX ORDER — PREGABALIN 100 MG/1
100 CAPSULE ORAL 2 TIMES DAILY
Status: DISCONTINUED | OUTPATIENT
Start: 2024-10-23 | End: 2024-11-05 | Stop reason: HOSPADM

## 2024-10-22 RX ORDER — LORATADINE 10 MG/1
10 TABLET ORAL DAILY
Status: DISCONTINUED | OUTPATIENT
Start: 2024-10-23 | End: 2024-11-05 | Stop reason: HOSPADM

## 2024-10-22 RX ORDER — VANCOMYCIN HYDROCHLORIDE 750 MG/150ML
750 INJECTION, SOLUTION INTRAVENOUS EVERY 12 HOURS
Status: DISCONTINUED | OUTPATIENT
Start: 2024-10-22 | End: 2024-10-22 | Stop reason: HOSPADM

## 2024-10-22 RX ORDER — FLUTICASONE PROPIONATE 50 MCG
2 SPRAY, SUSPENSION (ML) NASAL DAILY
Status: CANCELLED | OUTPATIENT
Start: 2024-10-23

## 2024-10-22 RX ORDER — ACETAMINOPHEN 325 MG/1
650 TABLET ORAL EVERY 6 HOURS PRN
Status: DISCONTINUED | OUTPATIENT
Start: 2024-10-22 | End: 2024-11-05 | Stop reason: HOSPADM

## 2024-10-22 RX ORDER — ISOSORBIDE MONONITRATE 30 MG/1
30 TABLET, EXTENDED RELEASE ORAL DAILY
Status: DISCONTINUED | OUTPATIENT
Start: 2024-10-23 | End: 2024-11-05 | Stop reason: HOSPADM

## 2024-10-22 RX ORDER — HEPARIN SODIUM 10000 [USP'U]/100ML
3-30 INJECTION, SOLUTION INTRAVENOUS
Status: CANCELLED | OUTPATIENT
Start: 2024-10-22

## 2024-10-22 RX ORDER — ECHINACEA PURPUREA EXTRACT 125 MG
2 TABLET ORAL 2 TIMES DAILY
Status: CANCELLED | OUTPATIENT
Start: 2024-10-22

## 2024-10-22 RX ORDER — ALBUTEROL SULFATE 0.83 MG/ML
2.5 SOLUTION RESPIRATORY (INHALATION) EVERY 6 HOURS PRN
Status: DISCONTINUED | OUTPATIENT
Start: 2024-10-22 | End: 2024-11-05 | Stop reason: HOSPADM

## 2024-10-22 RX ORDER — ONDANSETRON 2 MG/ML
4 INJECTION INTRAMUSCULAR; INTRAVENOUS EVERY 4 HOURS PRN
Status: CANCELLED | OUTPATIENT
Start: 2024-10-22

## 2024-10-22 RX ADMIN — DOCUSATE SODIUM 100 MG: 100 CAPSULE, LIQUID FILLED ORAL at 17:51

## 2024-10-22 RX ADMIN — INSULIN LISPRO 4 UNITS: 100 INJECTION, SOLUTION INTRAVENOUS; SUBCUTANEOUS at 11:31

## 2024-10-22 RX ADMIN — HEPARIN SODIUM 20 UNITS/KG/HR: 10000 INJECTION, SOLUTION INTRAVENOUS at 04:41

## 2024-10-22 RX ADMIN — CYANOCOBALAMIN TAB 500 MCG 500 MCG: 500 TAB at 08:41

## 2024-10-22 RX ADMIN — INSULIN LISPRO 2 UNITS: 100 INJECTION, SOLUTION INTRAVENOUS; SUBCUTANEOUS at 16:08

## 2024-10-22 RX ADMIN — HEPARIN SODIUM 16 UNITS/KG/HR: 10000 INJECTION, SOLUTION INTRAVENOUS at 16:05

## 2024-10-22 RX ADMIN — BUDESONIDE INHALATION 0.5 MG: 0.5 SUSPENSION RESPIRATORY (INHALATION) at 07:35

## 2024-10-22 RX ADMIN — ATORVASTATIN CALCIUM 20 MG: 20 TABLET, FILM COATED ORAL at 08:42

## 2024-10-22 RX ADMIN — ISOSORBIDE MONONITRATE 30 MG: 30 TABLET, EXTENDED RELEASE ORAL at 08:41

## 2024-10-22 RX ADMIN — HEPARIN SODIUM 18 UNITS/KG/HR: 10000 INJECTION, SOLUTION INTRAVENOUS at 08:17

## 2024-10-22 RX ADMIN — PIPERACILLIN AND TAZOBACTAM 4.5 G: 36; 4.5 INJECTION, POWDER, FOR SOLUTION INTRAVENOUS at 08:52

## 2024-10-22 RX ADMIN — INSULIN LISPRO 10 UNITS: 100 INJECTION, SOLUTION INTRAVENOUS; SUBCUTANEOUS at 16:08

## 2024-10-22 RX ADMIN — PIPERACILLIN SODIUM AND TAZOBACTAM SODIUM 4.5 G: 36; 4.5 INJECTION, POWDER, LYOPHILIZED, FOR SOLUTION INTRAVENOUS at 22:31

## 2024-10-22 RX ADMIN — DULOXETINE HYDROCHLORIDE 20 MG: 20 CAPSULE, DELAYED RELEASE ORAL at 08:42

## 2024-10-22 RX ADMIN — PREGABALIN 100 MG: 100 CAPSULE ORAL at 17:52

## 2024-10-22 RX ADMIN — PREGABALIN 100 MG: 100 CAPSULE ORAL at 08:41

## 2024-10-22 RX ADMIN — INFLUENZA A VIRUS A/VICTORIA/4897/2022 IVR-238 (H1N1) ANTIGEN (FORMALDEHYDE INACTIVATED), INFLUENZA A VIRUS A/CALIFORNIA/122/2022 SAN-022 (H3N2) ANTIGEN (FORMALDEHYDE INACTIVATED), AND INFLUENZA B VIRUS B/MICHIGAN/01/2021 ANTIGEN (FORMALDEHYDE INACTIVATED) 0.5 ML: 60; 60; 60 INJECTION, SUSPENSION INTRAMUSCULAR at 21:55

## 2024-10-22 RX ADMIN — UMECLIDINIUM 1 PUFF: 62.5 AEROSOL, POWDER ORAL at 08:46

## 2024-10-22 RX ADMIN — PIPERACILLIN AND TAZOBACTAM 4.5 G: 36; 4.5 INJECTION, POWDER, FOR SOLUTION INTRAVENOUS at 00:11

## 2024-10-22 RX ADMIN — INSULIN LISPRO 2 UNITS: 100 INJECTION, SOLUTION INTRAVENOUS; SUBCUTANEOUS at 08:44

## 2024-10-22 RX ADMIN — INSULIN LISPRO 10 UNITS: 100 INJECTION, SOLUTION INTRAVENOUS; SUBCUTANEOUS at 11:31

## 2024-10-22 RX ADMIN — PIPERACILLIN AND TAZOBACTAM 4.5 G: 36; 4.5 INJECTION, POWDER, FOR SOLUTION INTRAVENOUS at 15:45

## 2024-10-22 RX ADMIN — TRAZODONE HYDROCHLORIDE 25 MG: 50 TABLET ORAL at 21:53

## 2024-10-22 RX ADMIN — Medication 1000 UNITS: at 08:41

## 2024-10-22 RX ADMIN — VANCOMYCIN HYDROCHLORIDE 2000 MG: 1 INJECTION, POWDER, LYOPHILIZED, FOR SOLUTION INTRAVENOUS at 12:15

## 2024-10-22 RX ADMIN — HEPARIN SODIUM 16 UNITS/KG/HR: 10000 INJECTION, SOLUTION INTRAVENOUS at 21:46

## 2024-10-22 RX ADMIN — DOCUSATE SODIUM 100 MG: 100 CAPSULE, LIQUID FILLED ORAL at 08:41

## 2024-10-22 RX ADMIN — NICOTINE 1 PATCH: 14 PATCH, EXTENDED RELEASE TRANSDERMAL at 08:46

## 2024-10-22 RX ADMIN — LORATADINE 10 MG: 10 TABLET ORAL at 08:41

## 2024-10-22 RX ADMIN — VANCOMYCIN HYDROCHLORIDE 1000 MG: 1 INJECTION, SOLUTION INTRAVENOUS at 23:00

## 2024-10-22 RX ADMIN — OLODATEROL RESPIMAT INHALATION SPRAY 2 PUFF: 2.5 SPRAY, METERED RESPIRATORY (INHALATION) at 08:46

## 2024-10-22 RX ADMIN — HEPARIN SODIUM 4800 UNITS: 1000 INJECTION INTRAVENOUS; SUBCUTANEOUS at 01:12

## 2024-10-22 RX ADMIN — METOPROLOL SUCCINATE 50 MG: 50 TABLET, EXTENDED RELEASE ORAL at 08:49

## 2024-10-22 RX ADMIN — PANTOPRAZOLE SODIUM 40 MG: 40 TABLET, DELAYED RELEASE ORAL at 15:45

## 2024-10-22 RX ADMIN — FLUTICASONE PROPIONATE 2 SPRAY: 50 SPRAY, METERED NASAL at 08:46

## 2024-10-22 RX ADMIN — INSULIN LISPRO 10 UNITS: 100 INJECTION, SOLUTION INTRAVENOUS; SUBCUTANEOUS at 08:46

## 2024-10-22 RX ADMIN — ASPIRIN 81 MG 81 MG: 81 TABLET ORAL at 08:41

## 2024-10-22 RX ADMIN — INSULIN GLARGINE 15 UNITS: 100 INJECTION, SOLUTION SUBCUTANEOUS at 21:53

## 2024-10-22 RX ADMIN — POLYETHYLENE GLYCOL 3350 17 G: 17 POWDER, FOR SOLUTION ORAL at 08:41

## 2024-10-22 RX ADMIN — PANTOPRAZOLE SODIUM 40 MG: 40 TABLET, DELAYED RELEASE ORAL at 05:32

## 2024-10-22 NOTE — ASSESSMENT & PLAN NOTE
Baseline creatinine 1.3-1.4: Secondary to diabetic kidney disease/hypertensive nephrosclerosis/arteriolar nephrosclerosis  Needs outpatient follow-up with nephrology

## 2024-10-22 NOTE — TRANSPORTATION MEDICAL NECESSITY
"Section I - General Information    Name of Patient: Clay Marcial                 : 1955    Medicare #: [id number]  Transport Date: 10/22/24 (PCS is valid for round trips on this date and for all repetitive trips in the 60-day range as noted below.)  Origin: [facility] MED SURG UNIT                                                         Destination: [address] [address]  Is the pt's stay covered under Medicare Part A (PPS/DRG)   [x]     Closest appropriate facility? If no, why is transport to more distant facility required? Yes  If hospice pt, is this transport related to pt's terminal illness? No       Section II - Medical Necessity Questionnaire  Ambulance transportation is medically necessary only if other means of transport are contraindicated or would be potentially harmful to the patient. To meet this requirement, the patient must either be \"bed confined\" or suffer from a condition such that transport by means other than ambulance is contraindicated by the patient's condition. The following questions must be answered by the medical professional signing below for this form to be valid:    1)  Describe the MEDICAL CONDITION (physical and/or mental) of this patient AT THE TIME OF AMBULANCE TRANSPORT that requires the patient to be transported in an ambulance and why transport by other means is contraindicated by the patient's condition:Patient being transferred to higher level of care because of concern for possible embolic event and angiography will be a high risk procedure which will be scheduled at [facility]. Intervention Radiology recommending transfer to [facility].     2) Is the patient \"bed confined\" as defined below?     No  To be \"be confined\" the patient must satisfy all three of the following conditions: (1) unable to get up from bed without Assistance; AND (2) unable to ambulate; AND (3) unable to sit in a chair or wheelchair.    3) Can this patient safely be " transported by car or wheelchair van (i.e., seated during transport without a medical attendant or monitoring)?   No    4) In addition to completing questions 1-3 above, please check any of the following conditions that apply*:   *Note: supporting documentation for any boxes checked must be maintained in the patient's medical records.    IV meds/fluids required   Medical attendant required     If hosp-hosp transfer, describe services needed at 2nd facility not available at 1st facility? Services not available at campus.    Section III - Signature of Physician or Healthcare Professional  I certify that the above information is true and correct based on my evaluation of this patient, and represent that the patient requires transport by ambulance and that other forms of transport are contraindicated. I understand that this information will be used by the Centers for Medicare and Medicaid Services (CMS) to support the determination of medical necessity for ambulance services, and I represent that I have personal knowledge of the patient's condition at time of transport.    []  If this box is checked, I also certify that the patient is physically or mentally incapable of signing the ambulance service's claim and that the institution with which I am affiliated has furnished care, services, or assistance to the patient.    My signature below is made on behalf of the patient pursuant to 42 CFR §424.36(b)(4). In accordance with 42 CFR §424.37, the specific reason(s) that the patient is physically or mentally incapable of signing the claim form is as follows:       Signature of Physician* or Healthcare Professional______________________________________________________________  Signature Date 10/22/24 (For scheduled repetitive transports, this form is not valid for transports performed more than 60 days after this date)    Printed Name & Credentials of Physician or Healthcare Professional (MD, DO, RN, etc.)Juan M Ceballos  MSW  *Form must be signed by patient's attending physician for scheduled, repetitive transports. For non-repetitive, unscheduled ambulance transports, if unable to obtain the signature of the attending physician, any of the following may sign (choose appropriate option below)  [] Physician Assistant []  Clinical Nurse Specialist []  Registered Nurse  []  Nurse Practitioner  [x] Discharge Planner

## 2024-10-22 NOTE — ASSESSMENT & PLAN NOTE
Lab Results   Component Value Date    EGFR 59 10/22/2024    EGFR 60 10/21/2024    EGFR 60 10/20/2024    CREATININE 1.22 10/22/2024    CREATININE 1.21 10/21/2024    CREATININE 1.21 10/20/2024   Baseline creatinine is 1.2-1.3 cr down to 1.2 nephrology following especially plan for angio prior to or started on fluids prior to OR

## 2024-10-22 NOTE — ANESTHESIA POSTPROCEDURE EVALUATION
Post-Op Assessment Note    CV Status:  Stable    Pain management: adequate       Mental Status:  Alert and awake   Hydration Status:  Euvolemic   PONV Controlled:  Controlled   Airway Patency:  Patent     Post Op Vitals Reviewed: Yes    No anethesia notable event occurred.    Staff: Anesthesiologist           Last Filed PACU Vitals:  Vitals Value Taken Time   Temp 97.2 °F (36.2 °C) 10/21/24 1457   Pulse 100 10/21/24 1457   /61 10/21/24 1457   Resp 24 10/21/24 1457   SpO2 95 % 10/21/24 1457       Modified Nolvia:  Activity: 2 (10/21/2024  2:57 PM)  Respiration: 2 (10/21/2024  2:57 PM)  Circulation: 2 (10/21/2024  2:57 PM)  Consciousness: 2 (10/21/2024  2:57 PM)  Oxygen Saturation: 2 (10/21/2024  2:57 PM)  Modified Nolvia Score: 10 (10/21/2024  2:57 PM)

## 2024-10-22 NOTE — ASSESSMENT & PLAN NOTE
In the setting of a chronic non healing plantar foot ulcer.    CT is notable for cellulitis without abscess or osteo. MRI notes some T2 only hyperintense signal within third metatarsal head and proximal phalanx that was favored to be reactive changes over osteo. Wound cx grew mixed kushal.   Arterial dopplers noted left toe pressures are below healing range.   He is afebrile without leukocytosis but over the weekend was noted to have worsening redness, necrosis and edema of the foot while on cefazolin as well as discoloration over his second toe concerning for embolic disease  Now s/p left second toe amputation, necrotic foul smelling drainage noted extending from ulcer tracking to distal 2nd intermetatarsal space with necrosis of second toe and additional necrotic drainage of third toe noted, wound cx pending     Continue pip-tazo 4.5 q8h   Add vancomycin dosing per pharmacy   FU operative cultures   Recommend vascular surgery re-evaluation  Await additional operative course   Continue supportive care, serial extremity exams, monitor clinical course   Final choice and duration of antibiotics to be determined    Check daily CBC, CMP while inpatient to monitor for any evolving antibiotic toxicity or treatment failure   Patient is at risk for poor wound healing/progression of infection in the setting of underlying comorbidities

## 2024-10-22 NOTE — ASSESSMENT & PLAN NOTE
Continue Imdur, beta-blocker  Blood pressure currently acceptable  Hold ACE inhibitor diuretics for now

## 2024-10-22 NOTE — PROGRESS NOTES
Clay Marcial is a 68 y.o. male who is currently ordered Vancomycin IV with management by the Pharmacy Consult service.  Relevant clinical data and objective / subjective history reviewed.  Vancomycin Assessment:  Indication and Goal AUC/Trough: Soft tissue (goal -600, trough >10); Bone/joint infection (goal -600, trough >10)  Clinical Status:  new start   Micro:     Renal Function:  SCr: 1.22 mg/dL  CrCl: 76.2 mL/min  Renal replacement: Not on dialysis  Days of Therapy: 1  Current Dose: 2000mg loading dose  Vancomycin Plan:  New Dosinmg IV Q12H  Estimated AUC: 416 mcg*hr/mL  Estimated Trough: 13.4 mcg/mL  Next Level: 10/24/24 @ 0600  Renal Function Monitoring: Daily BMP and UOP  Pharmacy will continue to follow closely for s/sx of nephrotoxicity, infusion reactions and appropriateness of therapy.  BMP and CBC will be ordered per protocol. We will continue to follow the patient’s culture results and clinical progress daily.    Wendy Degroot, Pharmacist

## 2024-10-22 NOTE — ASSESSMENT & PLAN NOTE
Lab Results   Component Value Date    HGBA1C 7.2 (H) 10/16/2024       Recent Labs     10/21/24  1547 10/21/24  2050 10/22/24  0743 10/22/24  1059   POCGLU 143* 197* 193* 207*       Blood Sugar Average: Last 72 hrs:  (P) 171.8  Risk factor for infection     Recommend strict glycemic control to aid with wound healing

## 2024-10-22 NOTE — PROGRESS NOTES
Progress Note - Nephrology   Name: Clay Marcial 68 y.o. male I MRN: [id number]  Unit/Bed#: -01 I Date of Admission: 10/16/2024   Date of Service: 10/22/2024 I Hospital Day: 6     Assessment & Plan  Elevated serum creatinine  Etiology of elevated creatinine most likely due to autoregulatory failure in the setting of ACE inhibitor use, SGLT2 inhibitor use and diuretic use  Baseline creatinine appears to be around 1.3-1.4 in setting of diabetes and hypertension  Creatinine on admission 1.58  Peak creatinine 1.7 on 10/17    Current creatinine: 1.22 back to baseline!    Keep holding lisinopril, furosemide and Jardiance  Avoid nephrotoxic agents such as NSAIDs and hypotension as well  Ongoing treatment for diabetic foot infection  Stage 3 chronic kidney disease (HCC)  Baseline creatinine 1.3-1.4: Secondary to diabetic kidney disease/hypertensive nephrosclerosis/arteriolar nephrosclerosis  Needs outpatient follow-up with nephrology  Diabetic foot infection  (HCC)  Patient has left lower extremity cellulitis with open wound on plantar side  Status post OR 10/21: Patient most likely will require TMA but minimal debridement with possible third toe amputation pending progression and potential successful revascularization  Diabetes mellitus, type 2 (HCC)  Management per primary team  Continue to hold metformin and Jardiance for now  CAD (coronary artery disease)  Management per primary team  Chronic diastolic congestive heart failure (HCC)  Home Rx: Furosemide 40 mg daily, Jardiance 12.5 mg daily  Current Rx: No diuretics  Changes: Keep diuretics on hold for now.  Chronic obstructive pulmonary disease with acute exacerbation ([facility])  Management per primary team  ROMEL (obstructive sleep apnea)  Management per primary team  PAD (peripheral artery disease) ([facility])  Management per primary team/podiatry  Acute hyponatremia  Resolved at 135  HTN (hypertension)  Home Rx: Furosemide 40 mg as needed, Imdur 30 mg daily, lisinopril  20 mg daily, Toprol-XL 50 mg daily  Current Rx: Imdur 30 mg daily, Toprol-XL 50 mg daily  Blood pressure is currently acceptable, keep holding lisinopril and furosemide    I have reviewed the nephrology recommendations including management of CKD with holding Jardiance diuretics and lisinopril, with SLIM, and we are in agreement with renal plan including the information outlined above.     Subjective   Brief History of Admission -68-year-old male with a history of CAD/chronic diastolic CHF/COPD/ROMEL/PAD/diabetes mellitus type 2 admitted with diabetic left lower extremity foot ulcer.  We are seeing the patient for SUSHILA on CKD.    Feeling fairly well today  No chest pain or shortness of breath  No nausea vomiting or diarrhea  Urinating well    Objective :  Temp:  [97 °F (36.1 °C)-97.8 °F (36.6 °C)] 97.2 °F (36.2 °C)  HR:  [] 91  BP: (112-137)/(61-77) 137/73  Resp:  [20-24] 20  SpO2:  [93 %-99 %] 93 %  O2 Device: None (Room air)  Nasal Cannula O2 Flow Rate (L/min):  [6 L/min] 6 L/min    Current Weight: Weight - Scale: 126 kg (277 lb)  First Weight: Weight - Scale: 126 kg (277 lb 9 oz)  I/O         10/20 0701  10/21 0700 10/21 0701  10/22 0700 10/22 0701  10/23 0700    P.O. 240      I.V. (mL/kg)  100 (0.8)     Total Intake(mL/kg) 240 (1.9) 100 (0.8)     Urine (mL/kg/hr) 1000 (0.3) 1250 (0.4)     Total Output 1000 1250     Net -760 -1150                  Physical Exam  Physical Exam: General:  No acute distress  Skin:  No acute rash  Eyes:  No scleral icterus and noninjected  ENT:  Moist mucous membranes  Neck:  Supple, no jugular venous distention, trachea midline, overall appearance is normal  Chest: End expiratory rhonchi bilaterally  CVS:  Regular rate and rhythm, without a rub or gallops  Abdomen:  Normal bowel sounds, soft and nontender and nondistended  Extremities: Left lower extremity edema/pretibial erythema and wrapped, and no cyanosis, no significant arthritic changes  Neuro:  No gross focality  Psych:   Alert and oriented and appropriate    Medications:    Current Facility-Administered Medications:     acetaminophen (TYLENOL) tablet 650 mg, 650 mg, Oral, Q6H PRN, Wilton Anthony DO, 650 mg at 10/16/24 2117    aspirin chewable tablet 81 mg, 81 mg, Oral, Daily, Wilton Anthony, DO, 81 mg at 10/22/24 0841    atorvastatin (LIPITOR) tablet 20 mg, 20 mg, Oral, Daily, Wilton Anthony, DO, 20 mg at 10/22/24 0842    budesonide (PULMICORT) inhalation solution 0.5 mg, 0.5 mg, Nebulization, Q12H, Wilton Anthony, DO, 0.5 mg at 10/22/24 0735    Cholecalciferol (VITAMIN D3) tablet 1,000 Units, 1,000 Units, Oral, Daily, Wilton Anthony DO, 1,000 Units at 10/22/24 0841    cyanocobalamin (VITAMIN B-12) tablet 500 mcg, 500 mcg, Oral, Daily, Wilton Anthony DO, 500 mcg at 10/22/24 0841    docusate sodium (COLACE) capsule 100 mg, 100 mg, Oral, BID, Wilton Anthony DO, 100 mg at 10/22/24 0841    DULoxetine (CYMBALTA) delayed release capsule 20 mg, 20 mg, Oral, Daily, Wilton Anthony DO, 20 mg at 10/22/24 0842    fluticasone (FLONASE) 50 mcg/act nasal spray 2 spray, 2 spray, Nasal, Daily, Wilton Anthony DO, 2 spray at 10/22/24 0846    heparin (porcine) 25,000 units in 0.45% NaCl 250 mL infusion (premix), 3-30 Units/kg/hr (Order-Specific), Intravenous, Titrated, Wilton Anthony DO, Last Rate: 21.6 mL/hr at 10/22/24 0817, 18 Units/kg/hr at 10/22/24 0817    heparin (porcine) injection 4,800 Units, 4,800 Units, Intravenous, Q6H PRN, Wilton Anthony DO, 4,800 Units at 10/22/24 0112    heparin (porcine) injection 9,600 Units, 9,600 Units, Intravenous, Q6H PRN, Wilton Anthony,     insulin glargine (LANTUS) subcutaneous injection 15 Units 0.15 mL, 15 Units, Subcutaneous, HS, Wilton Anthony, , 15 Units at 10/21/24 2206    insulin lispro (HumALOG/ADMELOG) 100 units/mL subcutaneous injection 10 Units, 10 Units, Subcutaneous, TID With Meals, Wilton Anthony, , 10  Units at 10/22/24 0846    insulin lispro (HumALOG/ADMELOG) 100 units/mL subcutaneous injection 2-12 Units, 2-12 Units, Subcutaneous, TID AC, 2 Units at 10/22/24 0844 **AND** Fingerstick Glucose (POCT), , , TID AC, Wilton Anthony DO    isosorbide mononitrate (IMDUR) 24 hr tablet 30 mg, 30 mg, Oral, Daily, Wilton Anthony DO, 30 mg at 10/22/24 0841    levalbuterol (XOPENEX) inhalation solution 1.25 mg, 1.25 mg, Nebulization, Q8H PRN, Wilton Anthony DO    loratadine (CLARITIN) tablet 10 mg, 10 mg, Oral, Daily, Wilton Anthony DO, 10 mg at 10/22/24 0841    metoprolol succinate (TOPROL-XL) 24 hr tablet 50 mg, 50 mg, Oral, Daily, Wilton Anthony DO, 50 mg at 10/22/24 0849    morphine injection 2 mg, 2 mg, Intravenous, Q4H PRN, Wilton Anthony DO    nicotine (NICODERM CQ) 14 mg/24hr TD 24 hr patch 1 patch, 1 patch, Transdermal, Daily, Wilton Anthony DO, 1 patch at 10/22/24 0846    umeclidinium 62.5 mcg/actuation inhaler AEPB 1 puff, 1 puff, Inhalation, Daily, 1 puff at 10/22/24 0846 **AND** olodaterol HCl (STRIVERDI RESPIMAT) inhaler 2 puff, 2 puff, Inhalation, Daily, Wilton Anthony DO, 2 puff at 10/22/24 0846    ondansetron (ZOFRAN) injection 4 mg, 4 mg, Intravenous, Q4H PRN, Wilton Anthony DO, 4 mg at 10/19/24 0814    pantoprazole (PROTONIX) EC tablet 40 mg, 40 mg, Oral, BID AC, Wilton Anthony DO, 40 mg at 10/22/24 0532    [COMPLETED] piperacillin-tazobactam (ZOSYN) 4.5 g in sodium chloride 0.9 % 100 mL IV LOADING DOSE, 4.5 g, Intravenous, Once, Last Rate: 200 mL/hr at 10/20/24 1153, 4.5 g at 10/20/24 1153 **FOLLOWED BY** piperacillin-tazobactam (ZOSYN) 4.5 g in sodium chloride 0.9 % 100 mL IVPB (EXTENDED INFUSION), 4.5 g, Intravenous, Q8H, Wilton Anthony DO, Last Rate: 25 mL/hr at 10/22/24 0852, 4.5 g at 10/22/24 0852    polyethylene glycol (MIRALAX) packet 17 g, 17 g, Oral, Daily, Wilton Anthony DO, 17 g at 10/22/24 0841    pregabalin  "(LYRICA) capsule 100 mg, 100 mg, Oral, BID, Wilton Anthony, DO, 100 mg at 10/22/24 0841    sodium chloride (OCEAN) 0.65 % nasal spray 2 spray, 2 spray, Each Nare, BID, Wilton Anthony, DO, 2 spray at 10/21/24 2207    traZODone (DESYREL) tablet 25 mg, 25 mg, Oral, HS, Wilton Garciaeter, DO, 25 mg at 10/21/24 2206      Lab Results: I have reviewed the following results:  Results from last 7 days   Lab Units 10/22/24  0739 10/21/24  0452 10/20/24  0251 10/19/24  0440 10/18/24  0538 10/17/24  0446 10/16/24  1121   WBC Thousand/uL 7.28 7.64  --  8.36  --  9.16 11.65*   HEMOGLOBIN g/dL 11.3* 11.4*  --  11.4*  --  11.8* 15.2   HEMATOCRIT % 33.6* 33.9*  --  33.3*  --  34.7* 44.5   PLATELETS Thousands/uL 290 244  --  167  --  162 210   POTASSIUM mmol/L 4.1 4.0 5.0 4.0 3.8 3.8 4.3   CHLORIDE mmol/L 100 98 97 98 98 97 92*   CO2 mmol/L 28 25 27 28 27 27 29   BUN mg/dL 17 20 21 20 21 25 19   CREATININE mg/dL 1.22 1.21 1.21 1.29 1.49* 1.73* 1.58*   CALCIUM mg/dL 8.3* 8.3* 8.3* 8.0* 7.7* 7.7* 9.4       Administrative Statements     Portions of the record may have been created with voice recognition software. Occasional wrong word or \"sound a like\" substitutions may have occurred due to the inherent limitations of voice recognition software. Read the chart carefully and recognize, using context, where substitutions have occurred.If you have any questions, please contact the dictating provider.  "

## 2024-10-22 NOTE — PROGRESS NOTES
Progress Note - Hospitalist   Name: Clay Marcial 68 y.o. male I MRN: [id number]  Unit/Bed#: [facility] I Date of Admission: 10/16/2024   Date of Service: 10/22/2024 I Hospital Day: 6    Assessment & Plan  Diabetic foot infection  (HCC)  Lab Results   Component Value Date    HGBA1C 7.2 (H) 10/16/2024       Recent Labs     10/21/24  1547 10/21/24  2050 10/22/24  0743 10/22/24  1059   POCGLU 143* 197* 193* 207*       Blood Sugar Average: Last 72 hrs:  (P) 171.8  Left lower extremity cellulitis with an open wound on plantar side.  Rule out osteomyelitis ER ready discussed with podiatry left lower extremity CT without contrast ordered as secondary to GFR in the 40s negative for gas ,Baker's cyst will order venous duplex- no dvt but has bakers cyst    MRI is negative for any osteo discussed with podiatry will do bedside debridement also left toe is ABIs as below potential healing awaiting   Had LEADS -left There is evidence of >75% stenosis noted in the proximal anterior tibial  artery.  Antibiotics over the weekend has been changed to Zosyn.  Based on wound culture growing gram-positive cocci in clusters, vancomycin was started after discussion with infectious disease.  Follow-up on wound culture results.    Patient is postoperative day #1 status post left foot I&D, wound debridement, 2nd toe amputation, high risk for TMA however podiatry awaiting revascularization prior to further procedures.  He will need IR angio Vascular is involved.  Discussed with interventional radiology and because of concern for possible embolic event angiography will be a high risk procedure which will be scheduled at \[facility]\"".  IR recommending transfer to \[facility]\"".  Discussed with Dr. Morrissey.  Transfer initiated.  Continue with heparin GTT in the interim.  Diabetes mellitus, type 2 (HCC)  Lab Results   Component Value Date    HGBA1C 7.2 (H) 10/16/2024       Recent Labs     10/21/24  1547 10/21/24  2050 10/22/24  0743 10/22/24  1059   POCGLU 143* 197*  193* 207*       Blood Sugar Average: Last 72 hrs:  (P) 171.8  Hga1c is actually 7.2 hyperglycemia secondary to infection with insulin adjustment his sugars have improved   Continue with current basal bolus insulin regimen.  CAD (coronary artery disease)  Asa/ lipitor and isosorbide     Chronic diastolic congestive heart failure (HCC)  Wt Readings from Last 3 Encounters:   10/17/24 126 kg (277 lb)   08/27/24 127 kg (279 lb 12.2 oz)   05/18/24 129 kg (285 lb 7.9 oz)     2D echo obtained EF of 60% which is normal   hold Lasix as per nephrology right now not in exacerbation        Stage 3 chronic kidney disease (HCC)  Lab Results   Component Value Date    EGFR 59 10/22/2024    EGFR 60 10/21/2024    EGFR 60 10/20/2024    CREATININE 1.22 10/22/2024    CREATININE 1.21 10/21/2024    CREATININE 1.21 10/20/2024   Baseline creatinine is 1.2-1.3 cr down to 1.2 nephrology following especially plan for angio prior to or started on fluids prior to OR  Chronic obstructive pulmonary disease with acute exacerbation (HCC)  No more wheezing   continue Pulmicort and Xopenex  ROMEL (obstructive sleep apnea)  Cpap at home   PAD (peripheral artery disease) (HCC)  Had LEADS -left There is evidence of >75% stenosis noted in the proximal anterior tibial  artery.  As discussed with podiatry and vascular surgeon patient needs angio which is good to be done by IR   Over the weekend as discussed with vascular surgery concern for for embolism and secondary to high risk of kidney failure without improvement of CTA heparin drip has been started discussed with vascular today continue heparin drip till angiogram done by IR,   Await transfer to [facility] for angiography.  Acute hyponatremia  Sodium improved to 135  Continue to follow BMP closely  HTN (hypertension)  Currently on Imdur and Toprol-XL.  Blood pressure acceptable.  Continue to hold ACE inhibitor and diuretics for now.  Elevated serum creatinine  Likely secondary to ACE inhibitor use, SGLT 2  inhibitor use and diuretic use.  Admission creatinine 1.58 with peak of 1.7.  Creatinine back to baseline now.  Continue to hold nephrotoxic medications for now and avoid hypotension IV dye if possible.  Nephrology input appreciated.  Smoking      VTE Pharmacologic Prophylaxis: VTE Score: 3 High Risk (Score >/= 5) - Pharmacological DVT Prophylaxis Ordered: heparin drip. Sequential Compression Devices Ordered.    Mobility:   Basic Mobility Inpatient Raw Score: 20  JH-HLM Goal: 6: Walk 10 steps or more  JH-HLM Achieved: 6: Walk 10 steps or more  JH-HLM Goal achieved. Continue to encourage appropriate mobility.    Patient Centered Rounds: I performed bedside rounds with nursing staff today.   Discussions with Specialists or Other Care Team Provider: Discussed with interventional radiology    Education and Discussions with Family / Patient: Updated  (friend) at bedside.    Current Length of Stay: 6 day(s)  Current Patient Status: Inpatient   Certification Statement: The patient will continue to require additional inpatient hospital stay due to await transfer to [facility] for angiography  Discharge Plan: Anticipate discharge tomorrow to [facility]    Code Status: Level 1 - Full Code    Subjective   Seen and examined at bedside.  Denies any pain or discomfort in the left foot.  Remains afebrile.  No acute events overnight.    Objective :  Temp:  [97 °F (36.1 °C)-97.8 °F (36.6 °C)] 97.2 °F (36.2 °C)  HR:  [] 91  BP: (112-137)/(61-77) 137/73  Resp:  [20-24] 20  SpO2:  [93 %-99 %] 93 %  O2 Device: None (Room air)  Nasal Cannula O2 Flow Rate (L/min):  [6 L/min] 6 L/min    Body mass index is 38.63 kg/m².     Input and Output Summary (last 24 hours):     Intake/Output Summary (Last 24 hours) at 10/22/2024 1252  Last data filed at 10/22/2024 0441  Gross per 24 hour   Intake 100 ml   Output 850 ml   Net -750 ml       Physical Exam  Constitutional:       Appearance: He is obese. He is ill-appearing.   HENT:      Head:  Normocephalic and atraumatic.      Nose: Nose normal.      Mouth/Throat:      Mouth: Mucous membranes are moist.   Eyes:      Extraocular Movements: Extraocular movements intact.      Pupils: Pupils are equal, round, and reactive to light.   Cardiovascular:      Rate and Rhythm: Normal rate and regular rhythm.   Pulmonary:      Effort: Pulmonary effort is normal.      Breath sounds: Normal breath sounds.      Comments: Diminished breath sounds bilateral lung base  Abdominal:      General: Abdomen is flat. Bowel sounds are normal.   Musculoskeletal:      Comments: Left foot dressing in place.  Erythema left lower extremity   Neurological:      General: No focal deficit present.      Mental Status: He is alert and oriented to person, place, and time. Mental status is at baseline.           Lines/Drains:              Lab Results: I have reviewed the following results:   Results from last 7 days   Lab Units 10/22/24  0739 10/21/24  0452   WBC Thousand/uL 7.28 7.64   HEMOGLOBIN g/dL 11.3* 11.4*   HEMATOCRIT % 33.6* 33.9*   PLATELETS Thousands/uL 290 244   SEGS PCT %  --  72   LYMPHO PCT % 21 15   MONO PCT % 8 9   EOS PCT % 1 2     Results from last 7 days   Lab Units 10/22/24  0739   SODIUM mmol/L 135   POTASSIUM mmol/L 4.1   CHLORIDE mmol/L 100   CO2 mmol/L 28   BUN mg/dL 17   CREATININE mg/dL 1.22   ANION GAP mmol/L 7   CALCIUM mg/dL 8.3*   GLUCOSE RANDOM mg/dL 197*     Results from last 7 days   Lab Units 10/19/24  1434   INR  1.18     Results from last 7 days   Lab Units 10/22/24  1059 10/22/24  0743 10/21/24  2050 10/21/24  1547 10/21/24  1456 10/21/24  1126 10/21/24  0709 10/20/24  2105 10/20/24  1531 10/20/24  1028 10/20/24  0716 10/19/24  2101   POC GLUCOSE mg/dl 207* 193* 197* 143* 151* 145* 189* 157* 152* 199* 179* 146*     Results from last 7 days   Lab Units 10/16/24  1647   HEMOGLOBIN A1C % 7.2*     Results from last 7 days   Lab Units 10/16/24  1647 10/16/24  1121   LACTIC ACID mmol/L 2.0 2.2*    PROCALCITONIN ng/ml  --  0.19       Recent Cultures (last 7 days):   Results from last 7 days   Lab Units 10/21/24  1428 10/16/24  1342 10/16/24  1228 10/16/24  1121   BLOOD CULTURE   --   --  No Growth After 5 Days. No Growth After 5 Days.   GRAM STAIN RESULT  1+ Gram positive cocci in clusters*  No polys seen* No polys seen*  2+ Gram positive cocci in pairs*  2+ Gram variable rods*  --   --    WOUND CULTURE   --  3+ Growth of  --   --              Last 24 Hours Medication List:     Current Facility-Administered Medications:     acetaminophen (TYLENOL) tablet 650 mg, Q6H PRN    aspirin chewable tablet 81 mg, Daily    atorvastatin (LIPITOR) tablet 20 mg, Daily    budesonide (PULMICORT) inhalation solution 0.5 mg, Q12H    Cholecalciferol (VITAMIN D3) tablet 1,000 Units, Daily    cyanocobalamin (VITAMIN B-12) tablet 500 mcg, Daily    docusate sodium (COLACE) capsule 100 mg, BID    DULoxetine (CYMBALTA) delayed release capsule 20 mg, Daily    fluticasone (FLONASE) 50 mcg/act nasal spray 2 spray, Daily    heparin (porcine) 25,000 units in 0.45% NaCl 250 mL infusion (premix), Titrated, Last Rate: 18 Units/kg/hr (10/22/24 0817)    heparin (porcine) injection 4,800 Units, Q6H PRN    heparin (porcine) injection 9,600 Units, Q6H PRN    insulin glargine (LANTUS) subcutaneous injection 15 Units 0.15 mL, HS    insulin lispro (HumALOG/ADMELOG) 100 units/mL subcutaneous injection 10 Units, TID With Meals    insulin lispro (HumALOG/ADMELOG) 100 units/mL subcutaneous injection 2-12 Units, TID AC **AND** Fingerstick Glucose (POCT), TID AC    isosorbide mononitrate (IMDUR) 24 hr tablet 30 mg, Daily    levalbuterol (XOPENEX) inhalation solution 1.25 mg, Q8H PRN    loratadine (CLARITIN) tablet 10 mg, Daily    metoprolol succinate (TOPROL-XL) 24 hr tablet 50 mg, Daily    morphine injection 2 mg, Q4H PRN    nicotine (NICODERM CQ) 14 mg/24hr TD 24 hr patch 1 patch, Daily    umeclidinium 62.5 mcg/actuation inhaler AEPB 1 puff, Daily  **AND** olodaterol HCl (STRIVERDI RESPIMAT) inhaler 2 puff, Daily    ondansetron (ZOFRAN) injection 4 mg, Q4H PRN    pantoprazole (PROTONIX) EC tablet 40 mg, BID AC    [COMPLETED] piperacillin-tazobactam (ZOSYN) 4.5 g in sodium chloride 0.9 % 100 mL IV LOADING DOSE, Once, Last Rate: 4.5 g (10/20/24 1153) **FOLLOWED BY** piperacillin-tazobactam (ZOSYN) 4.5 g in sodium chloride 0.9 % 100 mL IVPB (EXTENDED INFUSION), Q8H, Last Rate: 4.5 g (10/22/24 0852)    polyethylene glycol (MIRALAX) packet 17 g, Daily    pregabalin (LYRICA) capsule 100 mg, BID    sodium chloride (OCEAN) 0.65 % nasal spray 2 spray, BID    traZODone (DESYREL) tablet 25 mg, HS    vancomycin (VANCOCIN) 2,000 mg in sodium chloride 0.9 % 500 mL IVPB, Once, Last Rate: 2,000 mg (10/22/24 1215)    vancomycin (VANCOCIN) IVPB (premix in dextrose) 750 mg 150 mL, Q12H    Administrative Statements   Today, Patient Was Seen By: Tiarra Spence MD      **Please Note: This note may have been constructed using a voice recognition system.**

## 2024-10-22 NOTE — ASSESSMENT & PLAN NOTE
Likely secondary to ACE inhibitor use, SGLT 2 inhibitor use and diuretic use.  Admission creatinine 1.58 with peak of 1.7.  Creatinine back to baseline now.  Continue to hold nephrotoxic medications for now and avoid hypotension IV dye if possible.  Nephrology input appreciated.

## 2024-10-22 NOTE — PLAN OF CARE
Problem: Potential for Falls  Goal: Patient will remain free of falls  Description: INTERVENTIONS:  - Educate patient/family on patient safety including physical limitations  - Instruct patient to call for assistance with activity   - Consult OT/PT to assist with strengthening/mobility   - Keep Call bell within reach  - Keep bed low and locked with side rails adjusted as appropriate  - Keep care items and personal belongings within reach  - Initiate and maintain comfort rounds  - Make Fall Risk Sign visible to staff  - Offer Toileting every 2 Hours, in advance of need  - Initiate/Maintain bed and chair alarm  - Obtain necessary fall risk management equipment:   - Apply yellow socks and bracelet for high fall risk patients  - Consider moving patient to room near nurses station  Outcome: Progressing     Problem: PAIN - ADULT  Goal: Verbalizes/displays adequate comfort level or baseline comfort level  Description: Interventions:  - Encourage patient to monitor pain and request assistance  - Assess pain using appropriate pain scale  - Administer analgesics based on type and severity of pain and evaluate response  - Implement non-pharmacological measures as appropriate and evaluate response  - Consider cultural and social influences on pain and pain management  - Notify physician/advanced practitioner if interventions unsuccessful or patient reports new pain  Outcome: Progressing     Problem: INFECTION - ADULT  Goal: Absence or prevention of progression during hospitalization  Description: INTERVENTIONS:  - Assess and monitor for signs and symptoms of infection  - Monitor lab/diagnostic results  - Monitor all insertion sites, i.e. indwelling lines, tubes, and drains  - Monitor endotracheal if appropriate and nasal secretions for changes in amount and color  - Millen appropriate cooling/warming therapies per order  - Administer medications as ordered  - Instruct and encourage patient and family to use good hand  Detail Level: Detailed hygiene technique  - Identify and instruct in appropriate isolation precautions for identified infection/condition  Outcome: Progressing  Goal: Absence of fever/infection during neutropenic period  Description: INTERVENTIONS:  - Monitor WBC    Outcome: Progressing     Problem: SAFETY ADULT  Goal: Patient will remain free of falls  Description: INTERVENTIONS:  - Educate patient/family on patient safety including physical limitations  - Instruct patient to call for assistance with activity   - Consult OT/PT to assist with strengthening/mobility   - Keep Call bell within reach  - Keep bed low and locked with side rails adjusted as appropriate  - Keep care items and personal belongings within reach  - Initiate and maintain comfort rounds  - Make Fall Risk Sign visible to staff  - Offer Toileting every 2 Hours, in advance of need  - Initiate/Maintain bed and chair alarm  - Obtain necessary fall risk management equipment:   - Apply yellow socks and bracelet for high fall risk patients  - Consider moving patient to room near nurses station  Outcome: Progressing  Goal: Maintain or return to baseline ADL function  Description: INTERVENTIONS:  - Educate patient/family on patient safety including physical limitations  - Instruct patient to call for assistance with activity   - Consult OT/PT to assist with strengthening/mobility   - Keep Call bell within reach  - Keep bed low and locked with side rails adjusted as appropriate  - Keep care items and personal belongings within reach  - Initiate and maintain comfort rounds  - Make Fall Risk Sign visible to staff  - Offer Toileting every 2 Hours, in advance of need  - Initiate/Maintain bed and chair alarm  - Obtain necessary fall risk management equipment:   - Apply yellow socks and bracelet for high fall risk patients  - Consider moving patient to room near nurses station  Outcome: Progressing  Goal: Maintains/Returns to pre admission functional level  Description:  INTERVENTIONS:  - Perform AM-PAC 6 Click Basic Mobility/ Daily Activity assessment daily.  - Set and communicate daily mobility goal to care team and patient/family/caregiver.   - Collaborate with rehabilitation services on mobility goals if consulted  - Perform Range of Motion 3 times a day.  - Reposition patient every 2 hours.  - Dangle patient 3 times a day  - Stand patient 3 times a day  - Ambulate patient 3 times a day  - Out of bed to chair 3 times a day   - Out of bed for meals 3 times a day  - Out of bed for toileting  - Record patient progress and toleration of activity level   Outcome: Progressing     Problem: DISCHARGE PLANNING  Goal: Discharge to home or other facility with appropriate resources  Description: INTERVENTIONS:  - Identify barriers to discharge w/patient and caregiver  - Arrange for needed discharge resources and transportation as appropriate  - Identify discharge learning needs (meds, wound care, etc.)  - Arrange for interpretive services to assist at discharge as needed  - Refer to Case Management Department for coordinating discharge planning if the patient needs post-hospital services based on physician/advanced practitioner order or complex needs related to functional status, cognitive ability, or social support system  Outcome: Progressing     Problem: Knowledge Deficit  Goal: Patient/family/caregiver demonstrates understanding of disease process, treatment plan, medications, and discharge instructions  Description: Complete learning assessment and assess knowledge base.  Interventions:  - Provide teaching at level of understanding  - Provide teaching via preferred learning methods  Outcome: Progressing     Problem: Nutrition/Hydration-ADULT  Goal: Nutrient/Hydration intake appropriate for improving, restoring or maintaining nutritional needs  Description: Monitor and assess patient's nutrition/hydration status for malnutrition. Collaborate with interdisciplinary team and initiate plan  and interventions as ordered.  Monitor patient's weight and dietary intake as ordered or per policy. Utilize nutrition screening tool and intervene as necessary. Determine patient's food preferences and provide high-protein, high-caloric foods as appropriate.     INTERVENTIONS:  - Monitor oral intake, urinary output, labs, and treatment plans  - Assess nutrition and hydration status and recommend course of action  - Evaluate amount of meals eaten  - Assist patient with eating if necessary   - Allow adequate time for meals  - Recommend/ encourage appropriate diets, oral nutritional supplements, and vitamin/mineral supplements  - Order, calculate, and assess calorie counts as needed  - Recommend, monitor, and adjust tube feedings and TPN/PPN based on assessed needs  - Assess need for intravenous fluids  - Provide specific nutrition/hydration education as appropriate  - Include patient/family/caregiver in decisions related to nutrition  Outcome: Progressing

## 2024-10-22 NOTE — ASSESSMENT & PLAN NOTE
Lab Results   Component Value Date    HGBA1C 7.2 (H) 10/16/2024       Recent Labs     10/21/24  2050 10/22/24  0743 10/22/24  1059 10/22/24  1603   POCGLU 197* 193* 207* 183*       Blood Sugar Average: Last 72 hrs:    Continue basal bolus regimen  Sliding scale insulin  Monitor blood glucose

## 2024-10-22 NOTE — ASSESSMENT & PLAN NOTE
68-year-old male with history of diabetes, CKD who initially presented initially to [facility] with left lower extremity cellulitis with open wound.  Underwent incision and drainage, wound debridement second toe amputation Case was discussed with IR and vascular recommended transfer for IR angio  Continue heparin infusion  Follow-up vascular and IR recommendations  Currently on Zosyn and vancomycin per ID  Monitor WBC count   will keep n.p.o. at this time  Pain control

## 2024-10-22 NOTE — ASSESSMENT & PLAN NOTE
Currently on Imdur and Toprol-XL.  Blood pressure acceptable.  Continue to hold ACE inhibitor and diuretics for now.

## 2024-10-22 NOTE — ASSESSMENT & PLAN NOTE
Etiology of elevated creatinine most likely due to autoregulatory failure in the setting of ACE inhibitor use, SGLT2 inhibitor use and diuretic use  Baseline creatinine appears to be around 1.3-1.4 in setting of diabetes and hypertension  Creatinine on admission 1.58  Peak creatinine 1.7 on 10/17    Current creatinine: 1.22 back to baseline!    Keep holding lisinopril, furosemide and Jardiance  Avoid nephrotoxic agents such as NSAIDs and hypotension as well  Ongoing treatment for diabetic foot infection

## 2024-10-22 NOTE — ASSESSMENT & PLAN NOTE
Lab Results   Component Value Date    HGBA1C 7.2 (H) 10/16/2024       Recent Labs     10/21/24  1547 10/21/24  2050 10/22/24  0743 10/22/24  1059   POCGLU 143* 197* 193* 207*       Blood Sugar Average: Last 72 hrs:  (P) 171.8  Hga1c is actually 7.2 hyperglycemia secondary to infection with insulin adjustment his sugars have improved   Continue with current basal bolus insulin regimen.

## 2024-10-22 NOTE — ASSESSMENT & PLAN NOTE
Had LEADS -left There is evidence of >75% stenosis noted in the proximal anterior tibial  artery.  As discussed with podiatry and vascular surgeon patient needs angio which is good to be done by IR   Over the weekend as discussed with vascular surgery concern for for embolism and secondary to high risk of kidney failure without improvement of CTA heparin drip has been started discussed with vascular today continue heparin drip till angiogram done by IR,   Await [facility] to [facility] for [facility].

## 2024-10-22 NOTE — ASSESSMENT & PLAN NOTE
Lab Results   Component Value Date    EGFR 59 10/22/2024    EGFR 60 10/21/2024    EGFR 60 10/20/2024    CREATININE 1.22 10/22/2024    CREATININE 1.21 10/21/2024    CREATININE 1.21 10/20/2024   Creatinine currently at baseline  Continue to monitor  Nephrology currently on board  Follow-up recommendations

## 2024-10-22 NOTE — ASSESSMENT & PLAN NOTE
Home Rx: Furosemide 40 mg daily, Jardiance 12.5 mg daily  Current Rx: No diuretics  Changes: Keep diuretics on hold for now.

## 2024-10-22 NOTE — CASE MANAGEMENT
Case Management Assessment & Discharge Planning Note    Patient name Clay Marcial  Location /-01 MRN [id number]  : 1955 Date 10/22/2024       Current Admission Date: 10/16/2024  Current Admission Diagnosis:Diabetic foot infection  (HCC)   Patient Active Problem List    Diagnosis Date Noted Date Diagnosed    HTN (hypertension) 10/21/2024     Elevated serum creatinine 10/21/2024     Smoking 10/21/2024     PAD (peripheral artery disease) (HCC) 10/20/2024     Acute hyponatremia 10/20/2024     Chronic obstructive pulmonary disease with acute exacerbation (HCC) 10/17/2024     ROMEL (obstructive sleep apnea) 10/17/2024     Diabetic foot infection  (HCC) 10/16/2024     Stage 3 chronic kidney disease (HCC) 10/16/2024     Diabetes mellitus, type 2 (HCC) 2024     CAD (coronary artery disease) 2024     Chronic diastolic congestive heart failure (HCC) 2024     Acute on chronic kidney failure  (HCC) 2024     Helicobacter pylori infection 2024       LOS (days): 6  Geometric Mean LOS (GMLOS) (days): 3  Days to GMLOS:-2.8     OBJECTIVE:    Risk of Unplanned Readmission Score: 32.3         Current admission status: Inpatient  Referral Reason: Other (Discharge planning)    Preferred Pharmacy:   [facility] #[id number] - [place], [place] - [address] [address]  Phone: [phone] Fax: [phone]    [facility] - [place] - [address] [facility]  [address] [facility] [address]  Phone: [phone] Fax: [phone]    Primary Care Provider: No primary care provider on file.    Primary Insurance: MEDICARE  Secondary Insurance: [facility] [employer] [facility]    ASSESSMENT:  Active Health Care Proxies       Aixa Erickson Care Representative - Friend   Primary Phone: [phone] (Mobile)                      Social Determinants of Health (SDOH)      Flowsheet Row Most Recent Value   Housing Stability    In the  last 12 months, was there a time when you were not able to pay the mortgage or rent on time? N   In the past 12 months, how many times have you moved where you were living? 0   At any time in the past 12 months, were you homeless or living in a shelter (including now)? N   Transportation Needs    In the past 12 months, has lack of transportation kept you from medical appointments or from getting medications? no   In the past 12 months, has lack of transportation kept you from meetings, work, or from getting things needed for daily living? No   Food Insecurity    Within the past 12 months, you worried that your food would run out before you got the money to buy more. Never true   Within the past 12 months, the food you bought just didn't last and you didn't have money to get more. Never true   Utilities    In the past 12 months has the electric, gas, oil, or water company threatened to shut off services in your home? No            DISCHARGE DETAILS:    Please see flowsheet for full assessment and discharge planning information.     CM met with patient at bedside on 10/21/24 to introduce self, explain role, complete CM assessment, and discuss DC planning.     Pt lives alone in a two story home. However, Pt states that he currently has a friend living with him who he is helping out. PTA, independent with ADLs. Pt uses CPAP. Pt [facility].     CM will continue to follow for discharge planning needs.

## 2024-10-22 NOTE — H&P
H&P - Hospitalist   Name: Clay Marcial 68 y.o. male I MRN: [id number]  [facility] I Date of Admission: (Not on file)   Date of Service: 10/22/2024 I Hospital Day: 0     Assessment & Plan  Diabetic foot infection  (HCC)  68-year-old male with history of diabetes, CKD who initially presented initially to [facility] with left lower extremity cellulitis with open wound.  Underwent incision and drainage, wound debridement second toe amputation Case was discussed with IR and vascular recommended transfer for IR angio  Continue heparin infusion  Follow-up vascular and IR recommendations  Currently on Zosyn and vancomycin per ID  Monitor WBC count   will keep n.p.o. at this time  Pain control    Diabetes mellitus, type 2 (HCC)  Lab Results   Component Value Date    HGBA1C 7.2 (H) 10/16/2024       Recent Labs     10/21/24  2050 10/22/24  0743 10/22/24  1059 10/22/24  1603   POCGLU 197* 193* 207* 183*       Blood Sugar Average: Last 72 hrs:    Continue basal bolus regimen  Sliding scale insulin  Monitor blood glucose  CAD (coronary artery disease)  Continue aspirin, Lipitor, isosorbide dinitrate, beta-blocker  Stage 3 chronic kidney disease (HCC)  Lab Results   Component Value Date    EGFR 59 10/22/2024    EGFR 60 10/21/2024    EGFR 60 10/20/2024    CREATININE 1.22 10/22/2024    CREATININE 1.21 10/21/2024    CREATININE 1.21 10/20/2024   Creatinine currently at baseline  Continue to monitor  Nephrology currently on board  Follow-up recommendations  Chronic obstructive pulmonary disease with acute exacerbation (HCC)  Has since resolved  Continue Pulmicort and Xopenex  HTN (hypertension)  Continue Imdur, beta-blocker  Blood pressure currently acceptable  Hold ACE inhibitor diuretics for now      VTE Pharmacologic Prophylaxis:   Moderate Risk (Score 3-4) - Pharmacological DVT Prophylaxis Ordered: heparin drip.  Code Status: Level 1 - Full Code   Discussion with family: Patient declined call to .      Anticipated Length of Stay: Patient will be admitted on an inpatient basis with an anticipated length of stay of greater than 2 midnights secondary to diabetic foot infection.    History of Present Illness   Chief Complaint: Lower extremity pain    Clay Marcial is a 68 y.o. male with past medical history significant of type 2 diabetes, hypertension, COPD, CKD, peripheral artery disease initially presented to [facility] with left lower extremity pain redness and swelling.  Was noted to have cellulitis and was treated with antibiotics .  Ultimately transferred for IR angio and vascular evaluation [facility].  Currently denies chest pain, shortness of breath, cough, fevers, chills    Review of Systems   Constitutional:  Negative for appetite change, chills, diaphoresis, fatigue, fever and unexpected weight change.   HENT:  Negative for congestion, rhinorrhea and sore throat.    Eyes:  Negative for photophobia and visual disturbance.   Respiratory:  Negative for cough, shortness of breath and wheezing.    Cardiovascular:  Negative for chest pain, palpitations and leg swelling.   Gastrointestinal:  Negative for abdominal pain, anal bleeding, blood in stool, constipation, diarrhea, nausea and vomiting.   Genitourinary:  Negative for decreased urine volume, difficulty urinating, dysuria, flank pain, frequency, hematuria and urgency.   Musculoskeletal:  Negative for arthralgias, back pain, joint swelling and myalgias.   Skin:  Positive for color change and wound. Negative for rash.   Neurological:  Negative for dizziness, seizures, facial asymmetry, speech difficulty, numbness and headaches.   Psychiatric/Behavioral:  Negative for agitation, confusion and decreased concentration. The patient is not nervous/anxious.        Historical Information   Past Medical History:   Diagnosis Date    COPD (chronic obstructive pulmonary disease) (HCC)     Diabetes mellitus ([facility]) 04/16/2024    Sleep apnea     UTI  (urinary tract infection)      Past Surgical History:   Procedure Laterality Date    BACK SURGERY      TONSILLECTOMY      WOUND DEBRIDEMENT Left 10/21/2024    Procedure: LEFT FOOT I&D, left second toe amputation with packing;  Surgeon: Emma Burr DPM;  Location:  MAIN OR;  Service: Podiatry     Social History     Tobacco Use    Smoking status: Every Day     Current packs/day: 1.50     Types: Cigarettes    Smokeless tobacco: Never   Vaping Use    Vaping status: Never Used   Substance and Sexual Activity    Alcohol use: Yes     Alcohol/week: 7.0 standard drinks of alcohol     Types: 7 Shots of liquor per week    Drug use: Never    Sexual activity: Not on file     E-Cigarette/Vaping    E-Cigarette Use Never User      E-Cigarette/Vaping Substances     No family history on file.  Social History:  Marital Status: Single     Patient Pre-hospital Living Situation: Home  Patient Pre-hospital Level of Mobility:  w assistance  Patient Pre-hospital Diet Restrictions: dm    Meds/Allergies   I have reviewed home medications with patient personally.  Prior to Admission medications    Medication Sig Start Date End Date Taking? Authorizing Provider   acetaminophen (TYLENOL) 500 mg tablet Take 1,000 mg by mouth as needed EVERY 6 to 8 HOURS 7/24/24   Historical Provider, MD   albuterol (PROVENTIL HFA,VENTOLIN HFA) 90 mcg/act inhaler Inhale 1 puff every 4 (four) hours as needed for shortness of breath or wheezing    Historical Provider, MD   aspirin 81 mg chewable tablet Chew 81 mg daily    Historical Provider, MD   atorvastatin (LIPITOR) 20 mg tablet Take 20 mg by mouth daily 1/3/24   Historical Provider, MD   cadexomer iodine (IODOSORB) 0.9 % gel Apply 1 Application topically daily To promote healing of ulcers/wounds  Patient not taking: Reported on 10/16/2024 12/22/23   Historical Provider, MD   cetirizine (ZyrTEC) 5 MG tablet Take 5 mg by mouth daily    Historical Provider, MD   Cholecalciferol 25 MCG (1000 UT) capsule Take  25 mcg by mouth daily 7/22/24   Historical Provider, MD   cyanocobalamin (VITAMIN B-12) 500 MCG tablet Take 500 mcg by mouth daily 7/22/24   Historical Provider, MD   DULoxetine (CYMBALTA) 20 mg capsule Take 20 mg by mouth daily    Historical Provider, MD   Empagliflozin 25 MG TABS Take 12.5 mg by mouth daily 10/10/24   Historical Provider, MD   fluticasone (FLONASE) 50 mcg/act nasal spray 2 sprays into each nostril daily 8/27/24   Historical Provider, MD   furosemide (LASIX) 40 mg tablet Take 40 mg by mouth daily AS NEEDED 11/1/23   Historical Provider, MD   glipiZIDE (GLUCOTROL) 5 mg tablet Take 5 mg by mouth 2 (two) times a day before meals 10/1/24   Historical Provider, MD   isosorbide mononitrate (IMDUR) 30 mg 24 hr tablet Take 30 mg by mouth daily 2/6/24   Historical Provider, MD   lisinopril (ZESTRIL) 20 mg tablet Take 20 mg by mouth daily 11/1/23   Historical Provider, MD   loratadine (CLARITIN) 10 mg tablet Take 10 mg by mouth daily 8/26/24   Historical Provider, MD   metFORMIN (GLUCOPHAGE-XR) 500 mg 24 hr tablet Take 1,000 mg by mouth 2 (two) times a day with meals  Patient not taking: Reported on 10/16/2024 2/8/24   Historical Provider, MD   metoprolol succinate (TOPROL-XL) 50 mg 24 hr tablet Take 50 mg by mouth daily 11/15/23   Historical Provider, MD   omeprazole (PriLOSEC OTC) 20 MG tablet Take 20 mg by mouth 2 (two) times a day 3/28/24 10/16/24  Historical Provider, MD   pregabalin (LYRICA) 200 MG capsule Take 200 mg by mouth 2 (two) times a day 3/4/24   Historical Provider, MD   QUEtiapine (SEROquel) 25 mg tablet Take 25 mg by mouth 2 (two) times a day PRN 9/30/24   Historical Provider, MD   Salicylic Acid 10 % CREA Apply topically daily  Patient not taking: Reported on 10/16/2024 9/17/24   Historical Provider, MD   semaglutide, 2 mg/dose, (Ozempic) 8 mg/ mL injection pen Inject 2 mg under the skin every 7 days 10/10/24   Historical Provider, MD   sodium bicarbonate 325 MG tablet Take 1 tablet (325  mg total) by mouth 2 (two) times a day 4/22/24   Jenni Frazier PA-C   sodium chloride (OCEAN) 0.65 % nasal spray 2 sprays into each nostril 2 (two) times a day 8/30/24   Historical Provider, MD   tiotropium-olodaterol (STIOLTO RESPIMAT) 2.5-2.5 MCG/ACT inhaler Inhale 2 puffs daily    Historical Provider, MD   traZODone (DESYREL) 50 mg tablet Take 25 mg by mouth daily at bedtime 7/9/24   Historical Provider, MD     No Known Allergies    Objective :  HR:  [91] 91  BP: (137)/(73) 137/73  Resp:  [20] 20  SpO2:  [93 %-97 %] 93 %  O2 Device: None (Room air)    Physical Exam  Constitutional:       General: He is not in acute distress.     Appearance: He is well-developed. He is not diaphoretic.   HENT:      Head: Normocephalic and atraumatic.      Nose: Nose normal.      Mouth/Throat:      Pharynx: No oropharyngeal exudate.   Eyes:      General: No scleral icterus.     Conjunctiva/sclera: Conjunctivae normal.   Cardiovascular:      Rate and Rhythm: Normal rate and regular rhythm.      Heart sounds: Normal heart sounds. No murmur heard.     No friction rub. No gallop.   Pulmonary:      Effort: Pulmonary effort is normal. No respiratory distress.      Breath sounds: Normal breath sounds. No wheezing or rales.   Chest:      Chest wall: No tenderness.   Abdominal:      General: Bowel sounds are normal. There is no distension.      Palpations: Abdomen is soft.      Tenderness: There is no abdominal tenderness. There is no guarding.   Musculoskeletal:         General: No tenderness or deformity. Normal range of motion.      Cervical back: Normal range of motion and neck supple.   Skin:     General: Skin is warm and dry.      Findings: No erythema.   Neurological:      Mental Status: He is alert. Mental status is at baseline.          Lines/Drains:            Lab Results: I have reviewed the following results:  Results from last 7 days   Lab Units 10/22/24  0739 10/21/24  0452   WBC Thousand/uL 7.28 7.64   HEMOGLOBIN g/dL 11.3*  11.4*   HEMATOCRIT % 33.6* 33.9*   PLATELETS Thousands/uL 290 244   SEGS PCT %  --  72   LYMPHO PCT % 21 15   MONO PCT % 8 9   EOS PCT % 1 2     Results from last 7 days   Lab Units 10/22/24  0739   SODIUM mmol/L 135   POTASSIUM mmol/L 4.1   CHLORIDE mmol/L 100   CO2 mmol/L 28   BUN mg/dL 17   CREATININE mg/dL 1.22   ANION GAP mmol/L 7   CALCIUM mg/dL 8.3*   GLUCOSE RANDOM mg/dL 197*     Results from last 7 days   Lab Units 10/19/24  1434   INR  1.18     Results from last 7 days   Lab Units 10/22/24  1603 10/22/24  1059 10/22/24  0743 10/21/24  2050 10/21/24  1547 10/21/24  1456 10/21/24  1126 10/21/24  0709 10/20/24  2105 10/20/24  1531 10/20/24  1028 10/20/24  0716   POC GLUCOSE mg/dl 183* 207* 193* 197* 143* 151* 145* 189* 157* 152* 199* 179*     Lab Results   Component Value Date    HGBA1C 7.2 (H) 10/16/2024     Results from last 7 days   Lab Units 10/16/24  1647 10/16/24  1121   LACTIC ACID mmol/L 2.0 2.2*   PROCALCITONIN ng/ml  --  0.19       Imaging Results Review: I personally reviewed the following image studies/reports in PACS and discussed pertinent findings with Radiology: chest xray. My interpretation of the radiology images/reports is:  .  Other Study Results Review: EKG was reviewed.     Administrative Statements   I have spent a total time of 60 minutes in caring for this patient on the day of the visit/encounter including Diagnostic results.    ** Please Note: This note has been constructed using a voice recognition system. **

## 2024-10-22 NOTE — ASSESSMENT & PLAN NOTE
Lab Results   Component Value Date    HGBA1C 7.2 (H) 10/16/2024       Recent Labs     10/21/24  1547 10/21/24  2050 10/22/24  0743 10/22/24  1059   POCGLU 143* 197* 193* 207*       Blood Sugar Average: Last 72 hrs:  (P) 171.8  Left lower extremity cellulitis with an open wound on plantar side.  Rule out osteomyelitis ER ready discussed with podiatry left lower extremity CT without contrast ordered as secondary to GFR in the 40s negative for gas ,Baker's cyst will order venous duplex- no dvt but has bakers cyst    MRI is negative for any osteo discussed with podiatry will do bedside debridement also left toe is ABIs as below potential healing awaiting   Had LEADS -left There is evidence of >75% stenosis noted in the proximal anterior tibial  artery.  Antibiotics over the weekend has been changed to Zosyn.  Based on wound culture growing gram-positive cocci in clusters, vancomycin was started after discussion with infectious disease.  Follow-up on wound culture results.    Patient is postoperative day #1 status post left foot I&D, wound debridement, 2nd toe amputation, high risk for TMA however podiatry awaiting revascularization prior to further procedures.  He will need IR angio Vascular is involved.  Discussed with interventional radiology and because of concern for possible embolic event angiography will be a high risk procedure which will be scheduled at [facility].  IR recommending transfer to [facility].  Discussed with Dr. Morrissey.  Transfer initiated.  Continue with heparin GTT in the interim.

## 2024-10-22 NOTE — ASSESSMENT & PLAN NOTE
Patient has left lower extremity cellulitis with open wound on plantar side  Status post OR 10/21: Patient most likely will require TMA but minimal debridement with possible third toe amputation pending progression and potential successful revascularization

## 2024-10-22 NOTE — ASSESSMENT & PLAN NOTE
Lab Results   Component Value Date    EGFR 59 10/22/2024    EGFR 60 10/21/2024    EGFR 60 10/20/2024    CREATININE 1.22 10/22/2024    CREATININE 1.21 10/21/2024    CREATININE 1.21 10/20/2024      Recommend renal dosing of antibiotics, avoid nephrotoxins

## 2024-10-23 LAB
ANION GAP SERPL CALCULATED.3IONS-SCNC: 6 MMOL/L (ref 4–13)
APTT PPP: 106 SECONDS (ref 23–34)
APTT PPP: 181 SECONDS (ref 23–34)
APTT PPP: 52 SECONDS (ref 23–34)
BACTERIA SPEC ANAEROBE CULT: ABNORMAL
BACTERIA SPEC ANAEROBE CULT: ABNORMAL
BUN SERPL-MCNC: 17 MG/DL (ref 5–25)
CALCIUM SERPL-MCNC: 8.3 MG/DL (ref 8.4–10.2)
CHLORIDE SERPL-SCNC: 101 MMOL/L (ref 96–108)
CO2 SERPL-SCNC: 30 MMOL/L (ref 21–32)
CREAT SERPL-MCNC: 1.3 MG/DL (ref 0.6–1.3)
GFR SERPL CREATININE-BSD FRML MDRD: 55 ML/MIN/1.73SQ M
GLUCOSE SERPL-MCNC: 163 MG/DL (ref 65–140)
GLUCOSE SERPL-MCNC: 166 MG/DL (ref 65–140)
GLUCOSE SERPL-MCNC: 184 MG/DL (ref 65–140)
GLUCOSE SERPL-MCNC: 185 MG/DL (ref 65–140)
GLUCOSE SERPL-MCNC: 188 MG/DL (ref 65–140)
MAGNESIUM SERPL-MCNC: 1.9 MG/DL (ref 1.9–2.7)
POTASSIUM SERPL-SCNC: 4.2 MMOL/L (ref 3.5–5.3)
SODIUM SERPL-SCNC: 137 MMOL/L (ref 135–147)

## 2024-10-23 PROCEDURE — 87081 CULTURE SCREEN ONLY: CPT | Performed by: INTERNAL MEDICINE

## 2024-10-23 PROCEDURE — 85730 THROMBOPLASTIN TIME PARTIAL: CPT | Performed by: FAMILY MEDICINE

## 2024-10-23 PROCEDURE — 94760 N-INVAS EAR/PLS OXIMETRY 1: CPT

## 2024-10-23 PROCEDURE — 99223 1ST HOSP IP/OBS HIGH 75: CPT | Performed by: PODIATRIST

## 2024-10-23 PROCEDURE — 80048 BASIC METABOLIC PNL TOTAL CA: CPT | Performed by: INTERNAL MEDICINE

## 2024-10-23 PROCEDURE — NC001 PR NO CHARGE: Performed by: RADIOLOGY

## 2024-10-23 PROCEDURE — 99222 1ST HOSP IP/OBS MODERATE 55: CPT | Performed by: INTERNAL MEDICINE

## 2024-10-23 PROCEDURE — 94640 AIRWAY INHALATION TREATMENT: CPT

## 2024-10-23 PROCEDURE — 82948 REAGENT STRIP/BLOOD GLUCOSE: CPT

## 2024-10-23 PROCEDURE — 99232 SBSQ HOSP IP/OBS MODERATE 35: CPT | Performed by: PHYSICIAN ASSISTANT

## 2024-10-23 PROCEDURE — 99232 SBSQ HOSP IP/OBS MODERATE 35: CPT | Performed by: SURGERY

## 2024-10-23 PROCEDURE — 94664 DEMO&/EVAL PT USE INHALER: CPT

## 2024-10-23 PROCEDURE — 99232 SBSQ HOSP IP/OBS MODERATE 35: CPT | Performed by: INTERNAL MEDICINE

## 2024-10-23 PROCEDURE — NC001 PR NO CHARGE

## 2024-10-23 PROCEDURE — NC001 PR NO CHARGE: Performed by: INTERNAL MEDICINE

## 2024-10-23 PROCEDURE — 83735 ASSAY OF MAGNESIUM: CPT | Performed by: INTERNAL MEDICINE

## 2024-10-23 PROCEDURE — NC001 PR NO CHARGE: Performed by: PHYSICIAN ASSISTANT

## 2024-10-23 RX ORDER — OXYCODONE HYDROCHLORIDE 5 MG/1
5 TABLET ORAL EVERY 4 HOURS PRN
Status: DISCONTINUED | OUTPATIENT
Start: 2024-10-23 | End: 2024-11-05 | Stop reason: HOSPADM

## 2024-10-23 RX ADMIN — DOCUSATE SODIUM 100 MG: 100 CAPSULE, LIQUID FILLED ORAL at 08:27

## 2024-10-23 RX ADMIN — HEPARIN SODIUM 18 UNITS/KG/HR: 10000 INJECTION, SOLUTION INTRAVENOUS at 04:52

## 2024-10-23 RX ADMIN — PANTOPRAZOLE SODIUM 40 MG: 40 TABLET, DELAYED RELEASE ORAL at 06:05

## 2024-10-23 RX ADMIN — INSULIN LISPRO 2 UNITS: 100 INJECTION, SOLUTION INTRAVENOUS; SUBCUTANEOUS at 17:10

## 2024-10-23 RX ADMIN — DULOXETINE HYDROCHLORIDE 20 MG: 20 CAPSULE, DELAYED RELEASE ORAL at 08:28

## 2024-10-23 RX ADMIN — INSULIN LISPRO 10 UNITS: 100 INJECTION, SOLUTION INTRAVENOUS; SUBCUTANEOUS at 08:25

## 2024-10-23 RX ADMIN — Medication 1000 UNITS: at 08:27

## 2024-10-23 RX ADMIN — LORATADINE 10 MG: 10 TABLET ORAL at 08:28

## 2024-10-23 RX ADMIN — ASPIRIN 81 MG CHEWABLE TABLET 81 MG: 81 TABLET CHEWABLE at 08:28

## 2024-10-23 RX ADMIN — BUDESONIDE 0.5 MG: 0.5 INHALANT RESPIRATORY (INHALATION) at 07:40

## 2024-10-23 RX ADMIN — DOCUSATE SODIUM 100 MG: 100 CAPSULE, LIQUID FILLED ORAL at 17:10

## 2024-10-23 RX ADMIN — PIPERACILLIN SODIUM AND TAZOBACTAM SODIUM 4.5 G: 36; 4.5 INJECTION, POWDER, LYOPHILIZED, FOR SOLUTION INTRAVENOUS at 06:05

## 2024-10-23 RX ADMIN — PIPERACILLIN SODIUM AND TAZOBACTAM SODIUM 4.5 G: 36; 4.5 INJECTION, POWDER, LYOPHILIZED, FOR SOLUTION INTRAVENOUS at 21:52

## 2024-10-23 RX ADMIN — INSULIN LISPRO 10 UNITS: 100 INJECTION, SOLUTION INTRAVENOUS; SUBCUTANEOUS at 11:35

## 2024-10-23 RX ADMIN — VANCOMYCIN HYDROCHLORIDE 1000 MG: 1 INJECTION, SOLUTION INTRAVENOUS at 11:35

## 2024-10-23 RX ADMIN — ISOSORBIDE MONONITRATE 30 MG: 30 TABLET, EXTENDED RELEASE ORAL at 08:27

## 2024-10-23 RX ADMIN — VANCOMYCIN HYDROCHLORIDE 1000 MG: 1 INJECTION, SOLUTION INTRAVENOUS at 23:30

## 2024-10-23 RX ADMIN — NICOTINE 1 PATCH: 14 PATCH, EXTENDED RELEASE TRANSDERMAL at 08:26

## 2024-10-23 RX ADMIN — HEPARIN SODIUM 4800 UNITS: 1000 INJECTION INTRAVENOUS; SUBCUTANEOUS at 17:09

## 2024-10-23 RX ADMIN — PANTOPRAZOLE SODIUM 40 MG: 40 TABLET, DELAYED RELEASE ORAL at 17:11

## 2024-10-23 RX ADMIN — PIPERACILLIN SODIUM AND TAZOBACTAM SODIUM 4.5 G: 36; 4.5 INJECTION, POWDER, LYOPHILIZED, FOR SOLUTION INTRAVENOUS at 13:45

## 2024-10-23 RX ADMIN — ATORVASTATIN CALCIUM 20 MG: 20 TABLET, FILM COATED ORAL at 08:27

## 2024-10-23 RX ADMIN — POLYETHYLENE GLYCOL 3350 17 G: 17 POWDER, FOR SOLUTION ORAL at 08:26

## 2024-10-23 RX ADMIN — HEPARIN SODIUM 17 UNITS/KG/HR: 10000 INJECTION, SOLUTION INTRAVENOUS at 17:08

## 2024-10-23 RX ADMIN — BUDESONIDE 0.5 MG: 0.5 INHALANT RESPIRATORY (INHALATION) at 19:34

## 2024-10-23 RX ADMIN — CYANOCOBALAMIN TAB 500 MCG 500 MCG: 500 TAB at 08:28

## 2024-10-23 RX ADMIN — METOPROLOL SUCCINATE 50 MG: 50 TABLET, EXTENDED RELEASE ORAL at 08:28

## 2024-10-23 RX ADMIN — TRAZODONE HYDROCHLORIDE 25 MG: 50 TABLET ORAL at 21:52

## 2024-10-23 RX ADMIN — INSULIN LISPRO 2 UNITS: 100 INJECTION, SOLUTION INTRAVENOUS; SUBCUTANEOUS at 11:34

## 2024-10-23 RX ADMIN — INSULIN LISPRO 2 UNITS: 100 INJECTION, SOLUTION INTRAVENOUS; SUBCUTANEOUS at 08:25

## 2024-10-23 RX ADMIN — INSULIN GLARGINE 15 UNITS: 100 INJECTION, SOLUTION SUBCUTANEOUS at 21:52

## 2024-10-23 RX ADMIN — INSULIN LISPRO 10 UNITS: 100 INJECTION, SOLUTION INTRAVENOUS; SUBCUTANEOUS at 17:12

## 2024-10-23 RX ADMIN — HEPARIN SODIUM 4800 UNITS: 1000 INJECTION INTRAVENOUS; SUBCUTANEOUS at 00:34

## 2024-10-23 RX ADMIN — PREGABALIN 100 MG: 100 CAPSULE ORAL at 17:11

## 2024-10-23 RX ADMIN — PREGABALIN 100 MG: 100 CAPSULE ORAL at 08:27

## 2024-10-23 NOTE — PROGRESS NOTES
NEPHROLOGY HOSPITAL PROGRESS NOTE   Clay Marcial 68 y.o. male MRN: [id number]  Unit/Bed#: [facility] 825-01 Encounter: [id number]  Reason for Consult: Elevated creatinine on CKD  Assessment & Plan  Elevated serum creatinine  Etiology of elevated creatinine most likely due to autoregulatory failure in the setting of ACE inhibitor use, SGLT2 inhibitor use and diuretic use  Baseline creatinine appears to be around 1.3-1.4 in setting of diabetes and hypertension  Creatinine on recent admission 1.58  Peak creatinine 1.7 on 10/17  Creatinine today 1.30 at baseline  Keep holding lisinopril, furosemide and Jardiance  Patient is scheduled for lower extremity angiogram on Friday  We will provide recommendations regarding pre and post angiogram IV fluids based on volume status in next 24 to 48 hours  Benefits and risks of lower extremity angiogram from nephrology perspective discussed with the patient  Stage 3 chronic kidney disease (HCC)  Baseline creatinine 1.3-1.4  Needs outpatient follow-up with nephrology  Diabetic foot infection  (HCC)  Patient with nonhealing left foot wounds  Status post left second toe amputation/debridement with podiatry on 10/21 for worsening wound  Vascular surgery following  IR has been consulted for lower extremity angiogram  Diabetes mellitus, type 2 (HCC)  Management per primary team  Continue to hold metformin and Jardiance for now  CAD (coronary artery disease)  Management per primary team  Chronic obstructive pulmonary disease with acute exacerbation (HCC)  Management per primary team  HTN (hypertension)  Home Rx: Furosemide 40 mg as needed, Imdur 30 mg daily, lisinopril 20 mg daily, Toprol-XL 50 mg daily  Current Rx:) Total 30 mg daily, Toprol-XL 50 mg daily  Blood pressure is currently acceptable, keep holding lisinopril and furosemide    Discussed with internal medicine team.  After discussion, we agreed to continue current therapy with holding lisinopril, diuretics and SGLT2 inhibitor and  "decision regarding IV fluids on day of angiogram depending upon volume status.    SUBJECTIVE / 24H INTERVAL HISTORY:  Urine output recorded as 1000 cc.  Denies dyspnea.  Has chronic left lower extremity swelling.    OBJECTIVE:  Current Weight: Weight - Scale: 126 kg (278 lb)  Vitals:    10/22/24 2154 10/22/24 2231 10/22/24 2251 10/23/24 0736   BP: 141/72   140/73   Pulse: 84   89   Resp: 20 20  16   Temp: 98.1 °F (36.7 °C)   98.2 °F (36.8 °C)   SpO2: 94%   93%   Weight:   126 kg (278 lb)    Height:   5' 11\" (1.803 m)        Intake/Output Summary (Last 24 hours) at 10/23/2024 1040  Last data filed at 10/23/2024 0544  Gross per 24 hour   Intake --   Output 1000 ml   Net -1000 ml     Review of Systems   Constitutional:  Negative for chills and fever.   HENT:  Negative for ear pain and sore throat.    Eyes:  Negative for pain and visual disturbance.   Respiratory:  Negative for cough and shortness of breath.    Cardiovascular:  Positive for leg swelling. Negative for chest pain and palpitations.   Gastrointestinal:  Negative for abdominal pain and vomiting.   Genitourinary:  Negative for dysuria and hematuria.   Musculoskeletal:  Negative for arthralgias and back pain.   Skin:  Negative for color change and rash.   Neurological:  Negative for seizures and syncope.   All other systems reviewed and are negative.    Physical Exam  Vitals and nursing note reviewed.   Constitutional:       General: He is not in acute distress.     Appearance: He is well-developed. He is obese.   HENT:      Head: Normocephalic and atraumatic.   Eyes:      Conjunctiva/sclera: Conjunctivae normal.   Cardiovascular:      Rate and Rhythm: Normal rate and regular rhythm.      Pulses: Normal pulses.      Heart sounds: Normal heart sounds. No murmur heard.  Pulmonary:      Effort: Pulmonary effort is normal. No respiratory distress.      Breath sounds: Normal breath sounds.   Abdominal:      Palpations: Abdomen is soft.      Tenderness: There is no " abdominal tenderness.   Musculoskeletal:         General: No swelling.      Cervical back: Neck supple.      Right lower leg: No edema.      Left lower leg: Edema present.   Skin:     General: Skin is warm and dry.      Capillary Refill: Capillary refill takes less than 2 seconds.   Neurological:      Mental Status: He is alert.   Psychiatric:         Mood and Affect: Mood normal.       Medications:    Current Facility-Administered Medications:     acetaminophen (TYLENOL) tablet 650 mg, 650 mg, Oral, Q6H PRN, Stefan Ordonez MD    albuterol inhalation solution 2.5 mg, 2.5 mg, Nebulization, Q6H PRN, Robert Villegas MD    aspirin chewable tablet 81 mg, 81 mg, Oral, Daily, Stefan Ordonez MD, 81 mg at 10/23/24 0828    atorvastatin (LIPITOR) tablet 20 mg, 20 mg, Oral, Daily, Stefan Ordonez MD, 20 mg at 10/23/24 0827    budesonide (PULMICORT) inhalation solution 0.5 mg, 0.5 mg, Nebulization, Q12H, Stefan Odronez MD, 0.5 mg at 10/23/24 0740    Cholecalciferol (VITAMIN D3) tablet 1,000 Units, 1,000 Units, Oral, Daily, Stefan Ordonez MD, 1,000 Units at 10/23/24 0827    cyanocobalamin (VITAMIN B-12) tablet 500 mcg, 500 mcg, Oral, Daily, Stefan Ordonez MD, 500 mcg at 10/23/24 0828    docusate sodium (COLACE) capsule 100 mg, 100 mg, Oral, BID, Stefan Ordonez MD, 100 mg at 10/23/24 0827    DULoxetine (CYMBALTA) delayed release capsule 20 mg, 20 mg, Oral, Daily, Stefan Ordonez MD, 20 mg at 10/23/24 0828    fluticasone (FLONASE) 50 mcg/act nasal spray 2 spray, 2 spray, Nasal, Daily, Stefan Ordonez MD    heparin (porcine) 25,000 units in 0.45% NaCl 250 mL infusion (premix), 3-30 Units/kg/hr (Order-Specific), Intravenous, Titrated, Stefan Ordonez MD, Last Rate: 18 mL/hr at 10/23/24 0845, 15 Units/kg/hr at 10/23/24 0845    heparin (porcine) injection 4,800 Units, 4,800 Units, Intravenous, Q6H PRN, Stefan Ordonez MD, 4,800 Units at 10/23/24 0034    heparin (porcine) injection 9,600 Units, 9,600 Units, Intravenous, Q6H PRN, Stefan Ordonez,  MD    insulin glargine (LANTUS) subcutaneous injection 15 Units 0.15 mL, 15 Units, Subcutaneous, HS, Stefan Ordonez MD, 15 Units at 10/22/24 2153    insulin lispro (HumALOG/ADMELOG) 100 units/mL subcutaneous injection 10 Units, 10 Units, Subcutaneous, TID With Meals, Stefan Ordonez MD, 10 Units at 10/23/24 0825    insulin lispro (HumALOG/ADMELOG) 100 units/mL subcutaneous injection 2-12 Units, 2-12 Units, Subcutaneous, TID AC, 2 Units at 10/23/24 0825 **AND** Fingerstick Glucose (POCT), , , TID AC, Stefan Ordonez MD    isosorbide mononitrate (IMDUR) 24 hr tablet 30 mg, 30 mg, Oral, Daily, Stefan Ordonez MD, 30 mg at 10/23/24 0827    loratadine (CLARITIN) tablet 10 mg, 10 mg, Oral, Daily, Stefan Ordonez MD, 10 mg at 10/23/24 0828    metoprolol succinate (TOPROL-XL) 24 hr tablet 50 mg, 50 mg, Oral, Daily, Stefan Ordonez MD, 50 mg at 10/23/24 0828    morphine injection 2 mg, 2 mg, Intravenous, Q4H PRN, Stefan Ordonez MD    nicotine (NICODERM CQ) 14 mg/24hr TD 24 hr patch 1 patch, 1 patch, Transdermal, Daily, Stefan Ordonez MD, 1 patch at 10/23/24 0826    umeclidinium 62.5 mcg/actuation inhaler AEPB 1 puff, 1 puff, Inhalation, Daily **AND** olodaterol HCl (STRIVERDI RESPIMAT) inhaler 2 puff, 2 puff, Inhalation, Daily, Stefan Ordonez MD    ondansetron (ZOFRAN) injection 4 mg, 4 mg, Intravenous, Q4H PRN, Stefan Ordonez MD    pantoprazole (PROTONIX) EC tablet 40 mg, 40 mg, Oral, BID AC, Stefan Ordonez MD, 40 mg at 10/23/24 0605    piperacillin-tazobactam (ZOSYN) 4.5 g in sodium chloride 0.9 % 100 mL IVPB (EXTENDED INFUSION), 4.5 g, Intravenous, Q8H, Stefan Ordonez MD, Last Rate: 25 mL/hr at 10/23/24 0605, 4.5 g at 10/23/24 0605    polyethylene glycol (MIRALAX) packet 17 g, 17 g, Oral, Daily, Stefan Ordonez MD, 17 g at 10/23/24 0826    pregabalin (LYRICA) capsule 100 mg, 100 mg, Oral, BID, Stefan Ordonez MD, 100 mg at 10/23/24 0827    sodium chloride (OCEAN) 0.65 % nasal spray 2 spray, 2 spray, Each Nare, BID, Stefan Ordonez MD     "traZODone (DESYREL) tablet 25 mg, 25 mg, Oral, HS, Stefan Ordonez MD, 25 mg at 10/22/24 2153    vancomycin (VANCOCIN) IVPB (premix in dextrose) 1,000 mg 200 mL, 1,000 mg, Intravenous, Q12H, Stefan Ordonez MD, Last Rate: 200 mL/hr at 10/22/24 2300, 1,000 mg at 10/22/24 2300    Laboratory Results:  Results from last 7 days   Lab Units 10/23/24  0613 10/22/24  0739 10/21/24  0452 10/20/24  0251 10/19/24  0440 10/18/24  0538 10/17/24  0446 10/16/24  1121   WBC Thousand/uL  --  7.28 7.64  --  8.36  --  9.16 11.65*   HEMOGLOBIN g/dL  --  11.3* 11.4*  --  11.4*  --  11.8* 15.2   HEMATOCRIT %  --  33.6* 33.9*  --  33.3*  --  34.7* 44.5   PLATELETS Thousands/uL  --  290 244  --  167  --  162 210   POTASSIUM mmol/L 4.2 4.1 4.0 5.0 4.0 3.8 3.8 4.3   CHLORIDE mmol/L 101 100 98 97 98 98 97 92*   CO2 mmol/L 30 28 25 27 28 27 27 29   BUN mg/dL 17 17 20 21 20 21 25 19   CREATININE mg/dL 1.30 1.22 1.21 1.21 1.29 1.49* 1.73* 1.58*   CALCIUM mg/dL 8.3* 8.3* 8.3* 8.3* 8.0* 7.7* 7.7* 9.4   MAGNESIUM mg/dL 1.9  --   --   --   --   --   --   --        Portions of the record may have been created with voice recognition software. Occasional wrong word or \"sound a like\" substitutions may have occurred due to the inherent limitations of voice recognition software. Read the chart carefully and recognize, using context, where substitutions have occurred.If you have any questions, please contact the dictating provider.    "

## 2024-10-23 NOTE — CONSULTS
Patient previously seen in consultation at [facility].  We will see today and write a progress note.

## 2024-10-23 NOTE — CONSULTS
Vascular Surgery    See consultation as completed by ASHLEY Samuel  10/18/24. Current recommendations per daily progress note as completed by me.

## 2024-10-23 NOTE — CONSULTS
Consultation - Infectious Disease   Clay Marcial 68 y.o. male MRN: [id number]  Unit/Bed#: [facility] 825-01 Encounter: [id number]      Inpatient consult to Infectious Diseases  Consult performed by: Jerry Bliss MD  Consult ordered by: Mansi Lou PA-C          IMPRESSION & RECOMMENDATIONS:   Impression:  1.  Diabetic left foot infection with probable osteomyelitis s/p left second toe amputation with wound debridement and packing.  2.  Type II DM with PAD, history of chronic left foot plantar ulcer  3.  History of chronic tobacco abuse with COPD and possible ROMEL  4.  CAD    Recommendations:    Discuss therapy with the primary service.  1.  Agree that with probing to bone osteomyelitis present  2.  Await angiography to determine further surgery  3.  Continue piperacillin/tazobactam which should be active against the patient's anaerobes.  In addition we will obtain nares MRSA cultures and continue vancomycin with further dosing by pharmacy pending result.        HISTORY OF PRESENT ILLNESS:    Reason for Consult: Left diabetic foot infection  HPI: Clay Marcial is a 68 y.o. year old male with a past history of DM type II with a chronic left plantar wound for the prior 5 months [facility]  treatment that included debridement, obesity, chronic tobacco use, CKD, CAD that presented to the [facility] ER for admission on 10/16 with worsening left leg pain, redness and warmth without fevers.  Plain x-ray films showed no acute osseous abnormality, CT scan was compatible with cellulitic changes in the leg around the ankle joint at the level of the foot without CT findings of osteomyelitis, RI revealed focal marrow edema in the third metatarsal head and base of the third toe proximal phalanx that favored reactive changes over osteomyelitis.  Patient was originally started on IV cefepime, metronidazole and vancomycin.  Patient was seen by my ID colleague Dr. Driscoll and  antibiotics were changed on 10/17 to cefazolin 2 g every 8 hours IV which continued until 10/21 when it was changed to piperacillin/tazobactam by extended infusion IV which continue until the present with the addition of vancomycin IV from 10/22 to present.  Dietary consultation on 10/18 felt that osteomyelitis was still an active potential issue because probing the ulcer was deep and they felt the patient was at risk for a TMA.  Vascular surgery consultation on 10/18 found PAD with tibioperoneal disease and a nonhealing left diabetic foot ulcer.  And LLE a gram was recommended.  On 10/19 the patient developed left second toe cyanosis and vascular surgery recommended a CTA of the abdomen and pelvis with runoff to evaluate for embolic source as well as heparinization.  With increased foul odor coming from the wound the antibiotic was changed on 10/22 piperacillin/tazobactam.  On 10/21 patient underwent operative intervention by podiatry with a left foot I&D, left second toe amputation with packing.  A TTE was negative for thrombus.  Patient was transferred here on 10/22 with the need of a IR angiogram which is scheduled for 10/25.  Patient is currently on piperacillin/tazobactam and vancomycin IV with further dosing by pharmacy.  Wound cultures are pending with 1+ gram-positive cocci in clusters seen on Gram stain and anaerobic culture showing Bacteroides pyogenes and Finegoldia magna with susceptibilities pending      Review of Systems   Constitutional:  Positive for activity change.   Cardiovascular:  Positive for leg swelling (LLE edema).   Musculoskeletal:  Positive for gait problem.   Skin:  Positive for color change (LLE increased redness) and wound (Left foot plantar wound with foul drainage).     A [id number] point system-based review of systems is otherwise negative.    PAST MEDICAL HISTORY:  Past Medical History:   Diagnosis Date    COPD (chronic obstructive pulmonary disease) (HCC)     Diabetes mellitus  ([facility]) 2024    Sleep apnea     UTI (urinary tract infection)      Past Surgical History:   Procedure Laterality Date    BACK SURGERY      TONSILLECTOMY      WOUND DEBRIDEMENT Left 10/21/2024    Procedure: LEFT FOOT I&D, left second toe amputation with packing;  Surgeon: Emma Burr DPM;  Location:  MAIN OR;  Service: Podiatry       FAMILY HISTORY:  Non-contributory    SOCIAL HISTORY:  Social History   Single  Social History     Substance and Sexual Activity   Alcohol Use Yes    Alcohol/week: 7.0 standard drinks of alcohol    Types: 7 Shots of liquor per week     Social History     Substance and Sexual Activity   Drug Use Never     Social History     Tobacco Use   Smoking Status Every Day    Current packs/day: 1.50    Types: Cigarettes   Smokeless Tobacco Never       ALLERGIES:  No Known Allergies    MEDICATIONS:  All current active medications have been reviewed.      PHYSICAL EXAM:  Temp:  [97.8 °F (36.6 °C)-98.6 °F (37 °C)] 97.8 °F (36.6 °C)  HR:  [77-89] 77  Resp:  [16-20] 18  BP: (135-144)/(71-73) 135/72  SpO2:  [93 %-96 %] 96 %  Temp (24hrs), Av.2 °F (36.8 °C), Min:97.8 °F (36.6 °C), Max:98.6 °F (37 °C)  Current: Temperature: 97.8 °F (36.6 °C)    Intake/Output Summary (Last 24 hours) at 10/23/2024 1507  Last data filed at 10/23/2024 0544  Gross per 24 hour   Intake --   Output 1000 ml   Net -1000 ml       General Appearance:  Appearing chronically ill, appropriately responsive nontoxic, and in no distress, appears stated age   Head:  Normocephalic, without obvious abnormality, atraumatic   Eyes:  PERRL, conjunctiva pink and sclera anicteric, both eyes   Nose: Nares normal, mucosa normal, no drainage   Throat: Oropharynx moist without lesions; lips, mucosa, and tongue normal; edentulous   Neck: Supple, symmetrical, trachea midline, no adenopathy, no tenderness/mass/nodules   Back:   Symmetric, no curvature, ROM normal, no CVA tenderness   Lungs:   Increased AP diameter with decreased respiratory  expansion   Chest Wall:  No tenderness or deformity   Heart:  Regular rate and rhythm, S1, S2 normal, no murmur, rub or gallop   Abdomen:   Soft, non-tender, non-distended but protuberant, positive bowel sounds, no masses, no organomegaly    No CVA tenderness   Extremities: LLE with +2/4 edema and erythema, dry surgical dressing, s/p left second toe amputation, wound as per picture 10/23, pulses decreased   Skin: As above.   Neurologic: Alert and oriented times 3, extremity strength 5/5 and symmetric           Invasive Devices:   Peripheral IV 10/20/24 Proximal;Right;Ventral (anterior) Forearm (Active)   Site Assessment WDL 10/23/24 0743   Dressing Type Transparent 10/23/24 0743   Line Status Infusing 10/23/24 0743   Dressing Status Clean;Dry;Intact 10/23/24 0743   Dressing Change Due 10/24/24 10/22/24 0855   Reason Not Rotated Not due 10/22/24 0855       Peripheral IV 10/22/24 Dorsal (posterior);Left Hand (Active)   Site Assessment WDL 10/23/24 0743   Dressing Type Transparent 10/23/24 0743   Line Status Flushed;Saline locked 10/23/24 0743   Dressing Status Clean;Dry;Intact 10/23/24 0743       LABS, IMAGING, & OTHER STUDIES:  Lab Results:      I have personally reviewed pertinent labs.    Results from last 7 days   Lab Units 10/22/24  0739 10/21/24  0452 10/19/24  0440   WBC Thousand/uL 7.28 7.64 8.36   HEMOGLOBIN g/dL 11.3* 11.4* 11.4*   PLATELETS Thousands/uL 290 244 167     Results from last 7 days   Lab Units 10/23/24  0613 10/22/24  0739 10/21/24  0452   SODIUM mmol/L 137 135 132*   POTASSIUM mmol/L 4.2 4.1 4.0   CHLORIDE mmol/L 101 100 98   CO2 mmol/L 30 28 25   BUN mg/dL 17 17 20   CREATININE mg/dL 1.30 1.22 1.21   EGFR ml/min/1.73sq m 55 59 60   CALCIUM mg/dL 8.3* 8.3* 8.3*     Results from last 7 days   Lab Units 10/21/24  1428   GRAM STAIN RESULT  1+ Gram positive cocci in clusters*  No polys seen*   WOUND CULTURE  Culture results to follow.       Imaging Studies:   I have personally reviewed pertinent  imaging study reports and images in PACS.        EKG, Pathology, and Other Studies:   I have personally reviewed pertinent reports.

## 2024-10-23 NOTE — CONSULTS
e-Consult (IPC)  - Interventional Radiology  Clay Marcial 68 y.o. male MRN: [id number]  Unit/Bed#: [facility] 825-01 Encounter: [id number]          Interventional Radiology has been consulted to evaluate Clay Marcial    We were consulted by Vascular surgery  concerning this patient with Left foot non healing ulcer x4 months, tibial disease .    Inpatient Consult to IR  Consult performed by: Jason Onofre  Consult ordered by: Stefan Ordonez MD      For Aortogram with LLE run off possible PTA/ stenting   10/23/24    Assessment/Recommendation:   Clay Marcial is a 68 year old male with a PMHx of T2DM, CKD3, CAD, CHF and COPD who initially presented to [facility] with LLE cellulitis with open wound. Patient underwent I&D, wound debridement, 2nd toe amputation with podiatry on 10/21 for worsening wound. Patient transferred to [facility] for angiogram of LLE with possible intervention on 10/22.     Patient found to have Left >75% stenosis in proximal AT JOSE JUAN 1.03/MTP deferred/GTP 43 by duplex on 10/18    INR 1.18  Platelets 244    GFR  55  WBC 7.28  Hgb 11.3  sCr 1.3      Plan  Angiogram of LLE with possible intervention to be preformed possibly 10/24 pending IR schedule and availability    Patient to be NPO at midnight prior to procedure   Consider IV hydration prior to procedure   Consideration for embolic work-up,continue hep gtt  Appreciate podiatry recommendations, local wound care - patient at risk of more proximal amputation   ABX per ID - Zosyn/Vanco  Continue ASA/Statin  Continue to encourage smoking cessation, on NRT  Remainder of care per primary team    Case discussed with Attending physicians     31 + minutes, >50% of the total time devoted to medical consultative verbal/EMR discussion between providers. Written report will be generated in the EMR.     Thank you for allowing Interventional Radiology to participate in the care of Clay Marcial. Please don't hesitate to call or TigerText us with any questions.      Jason Onofre

## 2024-10-23 NOTE — ASSESSMENT & PLAN NOTE
Initially at [facility] hospital with left lower extremity cellulitis with open wound.    Evaluated by podiatry, ID, vascular at [facility]  S/p I&D, L 2nd toe amputation with podiatry on 10/21 given concern for acutely worsening wound/infection   Will likely need additional podiatric intervention however needs vascular optimization first   Transferred to [facility] for vascular/IR evaluations given concern also for embolic process/ischemia   Plan for agram on Friday 10/25Underwent incision and drainage, wound debridement second toe amputation Case was discussed with IR and vascular recommended transfer for IR angio  Continue zosyn, vanco per ID.  Was previously on Ancef but had worsening erythema/wound changes.   Wound culture from OR on 10/21 with GPC in clusters  Continue heparin gtt  Monitor temps, WBC

## 2024-10-23 NOTE — PLAN OF CARE
Problem: PAIN - ADULT  Goal: Verbalizes/displays adequate comfort level or baseline comfort level  Description: Interventions:  - Encourage patient to monitor pain and request assistance  - Assess pain using appropriate pain scale  - Administer analgesics based on type and severity of pain and evaluate response  - Implement non-pharmacological measures as appropriate and evaluate response  - Consider cultural and social influences on pain and pain management  - Notify physician/advanced practitioner if interventions unsuccessful or patient reports new pain  Outcome: Progressing     Problem: INFECTION - ADULT  Goal: Absence or prevention of progression during hospitalization  Description: INTERVENTIONS:  - Assess and monitor for signs and symptoms of infection  - Monitor lab/diagnostic results  - Monitor all insertion sites, i.e. indwelling lines, tubes, and drains  - Monitor endotracheal if appropriate and nasal secretions for changes in amount and color  - Edwards appropriate cooling/warming therapies per order  - Administer medications as ordered  - Instruct and encourage patient and family to use good hand hygiene technique  - Identify and instruct in appropriate isolation precautions for identified infection/condition  Outcome: Progressing  Goal: Absence of fever/infection during neutropenic period  Description: INTERVENTIONS:  - Monitor WBC    Outcome: Progressing     Problem: SAFETY ADULT  Goal: Patient will remain free of falls  Description: INTERVENTIONS:  - Educate patient/family on patient safety including physical limitations  - Instruct patient to call for assistance with activity   - Consult OT/PT to assist with strengthening/mobility   - Keep Call bell within reach  - Keep bed low and locked with side rails adjusted as appropriate  - Keep care items and personal belongings within reach  - Initiate and maintain comfort rounds  - Make Fall Risk Sign visible to staff  - Apply yellow socks and bracelet  for high fall risk patients  - Consider moving patient to room near nurses station  Outcome: Progressing  Goal: Maintain or return to baseline ADL function  Description: INTERVENTIONS:  -  Assess patient's ability to carry out ADLs; assess patient's baseline for ADL function and identify physical deficits which impact ability to perform ADLs (bathing, care of mouth/teeth, toileting, grooming, dressing, etc.)  - Assess/evaluate cause of self-care deficits   - Assess range of motion  - Assess patient's mobility; develop plan if impaired  - Assess patient's need for assistive devices and provide as appropriate  - Encourage maximum independence but intervene and supervise when necessary  - Involve family in performance of ADLs  - Assess for home care needs following discharge   - Consider OT consult to assist with ADL evaluation and planning for discharge  - Provide patient education as appropriate  Outcome: Progressing  Goal: Maintains/Returns to pre admission functional level  Description: INTERVENTIONS:  - Perform AM-PAC 6 Click Basic Mobility/ Daily Activity assessment daily.  - Set and communicate daily mobility goal to care team and patient/family/caregiver.   - Collaborate with rehabilitation services on mobility goals if consulted  - Out of bed for toileting  - Record patient progress and toleration of activity level   Outcome: Progressing     Problem: DISCHARGE PLANNING  Goal: Discharge to home or other facility with appropriate resources  Description: INTERVENTIONS:  - Identify barriers to discharge w/patient and caregiver  - Arrange for needed discharge resources and transportation as appropriate  - Identify discharge learning needs (meds, wound care, etc.)  - Arrange for interpretive services to assist at discharge as needed  - Refer to Case Management Department for coordinating discharge planning if the patient needs post-hospital services based on physician/advanced practitioner order or complex needs  related to functional status, cognitive ability, or social support system  Outcome: Progressing     Problem: Knowledge Deficit  Goal: Patient/family/caregiver demonstrates understanding of disease process, treatment plan, medications, and discharge instructions  Description: Complete learning assessment and assess knowledge base.  Interventions:  - Provide teaching at level of understanding  - Provide teaching via preferred learning methods  Outcome: Progressing

## 2024-10-23 NOTE — APP STUDENT NOTE
MALICK STUDENT  Inpatient Progress Note for TRAINING ONLY  Not Part of Legal Medical Record     Progress Note - Clay Marcial 68 y.o. male MRN: [id number]  Unit/Bed#: [facility] 825-01 Encounter: [id number]    Assessment:    Principal Problem:    Diabetic foot infection  (HCC)  Active Problems:    Diabetes mellitus, type 2 (HCC)    CAD (coronary artery disease)    Stage 3 chronic kidney disease (HCC)    Chronic obstructive pulmonary disease with acute exacerbation (HCC)    HTN (hypertension)    Elevated serum creatinine      Plan:    Diabetic foot infection- Pt presented with LLE erythema and swelling as well as a left foot wound. 10/21/24- Underwent incision and drainage, wound debridement, and second toe amputation of L foot. Pt transferred to [facility] for IR angiogram. No longer NPO as IR angiogram is now scheduled for Friday. Podiatry consulted. Continue heparin infusion. Continue IV vancomycin and zosyn.  Type 2 DM- Continue basal bolus regimen. Sliding scale insulin. Monitor blood glucose.  CAD- Continue aspirin, atorvastatin, isosorbide dinitrate, and metoprolol.  CKD stage 3- Creatinine currently at baseline. Continue to monitor Creatinine. Nephrology following for optimization for IR angiogram.  COPD with acute exacerbation-Has resolved. Continue Pulmicort and Xopenex  HTN- Continue isosorbide mononitrate and metoprolol. Hold Lisinopril for now.       VTE Pharmacologic Prophylaxis:   Pharmacologic: Heparin Drip  Mechanical VTE Prophylaxis in Place: No    Patient Centered Rounds: I have performed bedside rounds with nursing staff today.    Discussions with Specialists or Other Care Team Provider: Nephrology, IR, ID    Education and Discussions with Family / Patient: Patient    Time Spent for Care: 45 minutes.  More than 50% of total time spent on counseling and coordination of care as described above.    Current Length of Stay: 1 day(s)    Current Patient Status: Inpatient   Certification Statement: The patient will  continue to require additional inpatient hospital stay due to IV antibiotics and IR angiogram Friday 10/ 25/24.     Discharge Plan: Discharge in over 72 hours    Code Status: Level 1 - Full Code    Subjective:   Clay is a 68 year old male with a PMH of type 2 DM, HTN, COPD, CKD stage 3, and PAD who initially presented to ED with LLE pain, redness, and swelling (10/16/24) and was transferred to [facility] yesterday (10/22/23) for continued management of LLE cellulitis, wound care of L DM foot wound, and IR angiogram. He states he had no issues overnight. He denies any pain. He was NPO this morning as IR angiogram was initially planned for today but is now planned for Friday so he was started on diabetic diet this morning. He states he is urinating regularly and is having regular BM. His last BM was yesterday. He reports + LLE redness and edema. He denies f/c, headache, changes in hearing or vision, cough, SOB, chest pain, palpations, N/V, diarrhea, constipation, abdominal pain, dysuria, dizziness, and numbness/tingling.     Objective:     Vitals:   Temp (24hrs), Av.3 °F (36.8 °C), Min:98.1 °F (36.7 °C), Max:98.6 °F (37 °C)    Temp:  [98.1 °F (36.7 °C)-98.6 °F (37 °C)] 98.2 °F (36.8 °C)  HR:  [84-89] 89  Resp:  [16-20] 16  BP: (140-144)/(71-73) 140/73  SpO2:  [93 %-94 %] 93 %  Body mass index is 38.77 kg/m².     Input and Output Summary (last 24 hours):       Intake/Output Summary (Last 24 hours) at 10/23/2024 1349  Last data filed at 10/23/2024 0544  Gross per 24 hour   Intake --   Output 1000 ml   Net -1000 ml       Physical Exam:     Physical Exam  Constitutional:       General: He is not in acute distress.  HENT:      Head: Normocephalic and atraumatic.   Cardiovascular:      Rate and Rhythm: Normal rate and regular rhythm.      Heart sounds: No murmur heard.     No friction rub. No gallop.   Pulmonary:      Effort: Pulmonary effort is normal. No respiratory distress.      Breath sounds: Normal breath  sounds.   Abdominal:      General: Bowel sounds are normal. There is no distension.      Palpations: Abdomen is soft.      Tenderness: There is no abdominal tenderness.   Musculoskeletal:         General: Normal range of motion.      Left lower leg: Edema present.      Comments: LLE erythema and swelling   Skin:     General: Skin is warm and dry.      Findings: Erythema present.      Comments: Left foot wound wrapped in dressing. Erythema of LLE up to knee.   Neurological:      General: No focal deficit present.      Mental Status: He is alert and oriented to person, place, and time.   Psychiatric:         Mood and Affect: Mood normal.         Thought Content: Thought content normal.           Historical Information   Past Medical History:   Diagnosis Date    COPD (chronic obstructive pulmonary disease) (HCC)     Diabetes mellitus (HCC) 04/16/2024    Sleep apnea     UTI (urinary tract infection)      Past Surgical History:   Procedure Laterality Date    BACK SURGERY      TONSILLECTOMY      WOUND DEBRIDEMENT Left 10/21/2024    Procedure: LEFT FOOT I&D, left second toe amputation with packing;  Surgeon: Emma Burr DPM;  Location:  MAIN OR;  Service: Podiatry     Social History   Social History     Substance and Sexual Activity   Alcohol Use Yes    Alcohol/week: 7.0 standard drinks of alcohol    Types: 7 Shots of liquor per week     Social History     Substance and Sexual Activity   Drug Use Never     Social History     Tobacco Use   Smoking Status Every Day    Current packs/day: 1.50    Types: Cigarettes   Smokeless Tobacco Never     Family History: Family history non-contributory    Meds/Allergies   all medications and allergies reviewed  No Known Allergies    Additional Data:     Labs:    Results from last 7 days   Lab Units 10/22/24  0739 10/21/24  0452   WBC Thousand/uL 7.28 7.64   HEMOGLOBIN g/dL 11.3* 11.4*   HEMATOCRIT % 33.6* 33.9*   PLATELETS Thousands/uL 290 244   SEGS PCT %  --  72   LYMPHO PCT % 21  15   MONO PCT % 8 9   EOS PCT % 1 2     Results from last 7 days   Lab Units 10/23/24  0613   SODIUM mmol/L 137   POTASSIUM mmol/L 4.2   CHLORIDE mmol/L 101   CO2 mmol/L 30   BUN mg/dL 17   CREATININE mg/dL 1.30   ANION GAP mmol/L 6   CALCIUM mg/dL 8.3*   GLUCOSE RANDOM mg/dL 185*     Results from last 7 days   Lab Units 10/19/24  1434   INR  1.18     Results from last 7 days   Lab Units 10/23/24  1037 10/23/24  0734 10/22/24  2152 10/22/24  1603 10/22/24  1059 10/22/24  0743 10/21/24  2050 10/21/24  1547 10/21/24  1456 10/21/24  1126 10/21/24  0709 10/20/24  2105   POC GLUCOSE mg/dl 166* 188* 226* 183* 207* 193* 197* 143* 151* 145* 189* 157*     Results from last 7 days   Lab Units 10/16/24  1647   HEMOGLOBIN A1C % 7.2*     Results from last 7 days   Lab Units 10/16/24  1647   LACTIC ACID mmol/L 2.0         * I Have Reviewed All Lab Data Listed Above.  * Additional Pertinent Lab Tests Reviewed: All Labs Within Last 24 Hours Reviewed    Imaging:    Imaging Reports Reviewed Today Include: 10/17/24 MRI L foot and 10/21/24 XR foot 3+ vw left    Recent Cultures (last 7 days):     Results from last 7 days   Lab Units 10/21/24  1428   GRAM STAIN RESULT  1+ Gram positive cocci in clusters*  No polys seen*   WOUND CULTURE  Culture results to follow.       Last 24 Hours Medication List:   Current Facility-Administered Medications   Medication Dose Route Frequency Provider Last Rate    acetaminophen  650 mg Oral Q6H PRN Stefan Ordonez MD      albuterol  2.5 mg Nebulization Q6H PRN Robert Villegas MD      aspirin  81 mg Oral Daily Stefan Ordonez MD      atorvastatin  20 mg Oral Daily Stefan Ordonez MD      budesonide  0.5 mg Nebulization Q12H Stefan Ordonez MD      Cholecalciferol  1,000 Units Oral Daily Stefan Ordonez MD      cyanocobalamin  500 mcg Oral Daily Stefan Ordonez MD      docusate sodium  100 mg Oral BID Stefan Ordonez MD      DULoxetine  20 mg Oral Daily Stefan Ordonez MD      fluticasone  2 spray Nasal Daily  Stefan Ordonez MD      heparin (porcine)  3-30 Units/kg/hr (Order-Specific) Intravenous Titrated Stefan Ordonez MD 15 Units/kg/hr (10/23/24 0845)    heparin (porcine)  4,800 Units Intravenous Q6H PRN Stefan Ordonez MD      heparin (porcine)  9,600 Units Intravenous Q6H PRN Stefan Ordonez MD      insulin glargine  15 Units Subcutaneous HS Stefan Ordonez MD      insulin lispro  10 Units Subcutaneous TID With Meals Stefan Ordonez MD      insulin lispro  2-12 Units Subcutaneous TID AC Stefan Ordonez MD      isosorbide mononitrate  30 mg Oral Daily Stefan Ordonez MD      loratadine  10 mg Oral Daily Stefan Ordonez MD      metoprolol succinate  50 mg Oral Daily Stefan Ordonez MD      morphine injection  2 mg Intravenous Q4H PRN Mansi Lou PA-C      nicotine  1 patch Transdermal Daily Stefan Ordonez MD      umeclidinium  1 puff Inhalation Daily Stefan Ordonez MD      And    olodaterol HCl  2 puff Inhalation Daily Stefan Ordonez MD      ondansetron  4 mg Intravenous Q4H PRN Stefan Ordonez MD      oxyCODONE  5 mg Oral Q4H PRN Mansi Lou PA-C      oxyCODONE  2.5 mg Oral Q4H PRN Mansi Lou PA-C      pantoprazole  40 mg Oral BID AC Stefan Ordonez MD      piperacillin-tazobactam  4.5 g Intravenous Q8H Setfan Ordonez MD 4.5 g (10/23/24 1345)    polyethylene glycol  17 g Oral Daily Stefan Ordonez MD      pregabalin  100 mg Oral BID Stefan Ordonez MD      sodium chloride  2 spray Each Nare BID Stefan Ordonez MD      traZODone  25 mg Oral HS Stefan Ordonez MD      vancomycin  1,000 mg Intravenous Q12H Stefan Ordonez MD 1,000 mg (10/23/24 1135)        Today, Patient Was Seen By: Gwen REES    ** Please Note: Dictation voice to text software may have been used in the creation of this document. **

## 2024-10-23 NOTE — PROGRESS NOTES
Clay Marcial is a 68 y.o. male who is currently ordered Vancomycin IV with management by the Pharmacy Consult service.  Relevant clinical data and objective / subjective history reviewed.  Vancomycin Assessment:  Indication and Goal AUC/Trough: Soft tissue (goal -600, trough >10); Bone/joint infection (goal -600, trough >10), -600, trough >10  Clinical Status: stable  Micro:     Renal Function:  SCr: 1.3 mg/dL  (baseline 1.2-1.3)  CrCl: 71.5 mL/min  Renal replacement: Not on dialysis  Days of Therapy: 2  Current Dose 1000mg iv q12h  Vancomycin Plan:  New Dosinmg iv q12hrs  Estimated AUC: 555 mcg*hr/mL  Estimated Trough: 18.5 mcg/mL  Next Level: 10/24 0600  Renal Function Monitoring: Daily BMP and UOP  Pharmacy will continue to follow closely for s/sx of nephrotoxicity, infusion reactions and appropriateness of therapy.  BMP and CBC will be ordered per protocol. We will continue to follow the patient’s culture results and clinical progress daily.    Mckenna Garza, Pharmacist

## 2024-10-23 NOTE — ASSESSMENT & PLAN NOTE
Assessment:  68yoM with tobacco use, CAD, CHF, DM (A1C 7.2), CKD3, T2DM, CKD 3, COPD, chronic low back pain, who presented with LLE pain, with diabetic foot ulcer x 5months, [facility], during admission noted to have ischemic changes to L 2nd toe during concerning for embolic phenomenon. With nonhealing wound, toe pressure below healing potential for a diabetic, [facility] with possible intervention, notably requiring L 2nd toe amp, debridement with podiatry on 10/21 for worsening wound.     10/18/24 LEADs  Right: [facility] 1.04//  Left >75% stenosis in proximal AT [facility]UAN 1.03/MTP deferred/GTP 43    10/21 OR Wound Cx 1+ Gram pos cocci in clusters  10/16 Wound culture Gram pos cocci in pairs. Gram variable rods  10/16 Blood Cultures NG x 5 days    WBC 7.28  Hgb 11.3  sCr 1.3    Recommendations:  Patient with nonhealing L foot wounds, with worsening, toe pressure below healing potential for a diabetic, would benefit from angiogram with possible intervention. IR consulted  - Time dependent on IR staffing/availability  - Appreciate nephrology following for renal optimization with pending angiogram  - Consideration for embolic work-up, continue hep gtt  - Appreciate podiatry recommendations, local wound care - patient at risk of more proximal amputation   - ABX per ID - Zosyn/Vanco  - Continue ASA/Statin  - Continue to encourage smoking cessation, patient motivated to quit, on NRT  - Remainder of care per primary team  - Will discuss with Dr. Decker

## 2024-10-23 NOTE — ASSESSMENT & PLAN NOTE
Lab Results   Component Value Date    HGBA1C 7.2 (H) 10/16/2024       Recent Labs     10/22/24  0743 10/22/24  1059 10/22/24  1603 10/22/24  2152   POCGLU 193* 207* 183* 226*       Blood Sugar Average: Last 72 hrs:  (P) 226    Would continue to benefit from adequate blood glucose control for wound healing

## 2024-10-23 NOTE — PROGRESS NOTES
Progress Note - Hospitalist   Name: Clay Marcial 68 y.o. male I MRN: [id number]  Unit/Bed#: [facility] 825-01 I Date of Admission: 10/22/2024   Date of Service: 10/23/2024 I Hospital Day: 1    Assessment & Plan  Diabetic foot infection  (HCC)  Initially at [facility] with left lower extremity cellulitis with open wound.    Evaluated by podiatry, ID, vascular at [facility]  S/p I&D, L 2nd toe amputation with podiatry on 10/21 given concern for acutely worsening wound/infection   Will likely need additional podiatric intervention however needs vascular optimization first   [facility]/IR evaluations given concern also for embolic process/ischemia   Plan for agram on Friday 10/25Underwent incision and drainage, wound debridement second toe amputation Case was discussed with IR and vascular recommended transfer for IR angio  Continue zosyn vanco per ID.  Was previously on Ancef but had worsening erythema/wound changes.   Wound culture from OR on 10/21 with GPC in clusters  Continue heparin gtt  Monitor temps, WBC  Diabetes mellitus, type 2 (HCC)  Lab Results   Component Value Date    HGBA1C 7.2 (H) 10/16/2024       Recent Labs     10/22/24  1603 10/22/24  2152 10/23/24  0734 10/23/24  1037   POCGLU 183* 226* 188* 166*       Blood Sugar Average: Last 72 hrs:  (P) [id number]  Continue Lantus 15 units QHS, SSI.    Risk factor for primary problem   Stage 3 chronic kidney disease (HCC)  Lab Results   Component Value Date    EGFR 55 10/23/2024    EGFR 59 10/22/2024    EGFR 60 10/21/2024    CREATININE 1.30 10/23/2024    CREATININE 1.22 10/22/2024    CREATININE 1.21 10/21/2024   Creatinine currently at baseline  Continue to monitor  Nephrology currently on board for optimization prior to agram  Follow-up recommendations  CAD (coronary artery disease)  Continue aspirin, Lipitor, isosorbide dinitrate, beta-blocker  Chronic obstructive pulmonary disease with acute exacerbation (HCC)  Has since  resolved  Continue Pulmicort and Xopenex  HTN (hypertension)  Continue Imdur, beta-blocker  Blood pressure currently acceptable  Hold ACE inhibitor diuretics for now    VTE Pharmacologic Prophylaxis:   Moderate Risk (Score 3-4) - Pharmacological DVT Prophylaxis Ordered: heparin drip.    Mobility:   Basic Mobility Inpatient Raw Score: 21  JH-HLM Goal: 6: Walk 10 steps or more  JH-HLM Achieved: 6: Walk 10 steps or more  JH-HLM Goal achieved. Continue to encourage appropriate mobility.    Patient Centered Rounds: I evaluated the patient without nursing staff present due to w/ other pt.    Discussions with Specialists or Other Care Team Provider: nephrology, vascular, ID.  Will d/w CM     Education and Discussions with Family / Patient: Patient declined call to .     Current Length of Stay: 1 day(s)  Current Patient Status: Inpatient   Certification Statement: The patient will continue to require additional inpatient hospital stay due to IV abx, pending agram Friday  Discharge Plan: Anticipate discharge in >72 hrs to discharge location to be determined pending rehab evaluations.    Code Status: Level 1 - Full Code    Subjective   No acute complaints at this time, he reports 2/2 neuropathy he has no sensation in his foot/denies pain.  Tolerating abx.      Objective :  Temp:  [98.1 °F (36.7 °C)-98.6 °F (37 °C)] 98.2 °F (36.8 °C)  HR:  [84-89] 89  BP: (140-144)/(71-73) 140/73  Resp:  [16-20] 16  SpO2:  [93 %-94 %] 93 %  O2 Device: None (Room air)    Body mass index is 38.77 kg/m².     Input and Output Summary (last 24 hours):     Intake/Output Summary (Last 24 hours) at 10/23/2024 1127  Last data filed at 10/23/2024 0544  Gross per 24 hour   Intake --   Output 1000 ml   Net -1000 ml       Physical Exam  Vitals reviewed.   Constitutional:       General: He is not in acute distress.     Appearance: He is not toxic-appearing.   HENT:      Head: Normocephalic and atraumatic.   Eyes:      Extraocular Movements:  Extraocular movements intact.   Cardiovascular:      Rate and Rhythm: Normal rate and regular rhythm.   Pulmonary:      Effort: Pulmonary effort is normal.      Breath sounds: Normal breath sounds.   Abdominal:      General: Bowel sounds are normal. There is no distension.      Palpations: Abdomen is soft.      Tenderness: There is no abdominal tenderness.   Musculoskeletal:         General: Normal range of motion.      Comments: LLE wrapped, with erythema up to knee.     Skin:     General: Skin is warm and dry.   Neurological:      General: No focal deficit present.      Mental Status: He is alert and oriented to person, place, and time.   Psychiatric:         Mood and Affect: Mood normal.         Behavior: Behavior normal.         Thought Content: Thought content normal.         Lines/Drains:              Lab Results: I have reviewed the following results:   Results from last 7 days   Lab Units 10/22/24  0739 10/21/24  0452   WBC Thousand/uL 7.28 7.64   HEMOGLOBIN g/dL 11.3* 11.4*   HEMATOCRIT % 33.6* 33.9*   PLATELETS Thousands/uL 290 244   SEGS PCT %  --  72   LYMPHO PCT % 21 15   MONO PCT % 8 9   EOS PCT % 1 2     Results from last 7 days   Lab Units 10/23/24  0613   SODIUM mmol/L 137   POTASSIUM mmol/L 4.2   CHLORIDE mmol/L 101   CO2 mmol/L 30   BUN mg/dL 17   CREATININE mg/dL 1.30   ANION GAP mmol/L 6   CALCIUM mg/dL 8.3*   GLUCOSE RANDOM mg/dL 185*     Results from last 7 days   Lab Units 10/19/24  1434   INR  1.18     Results from last 7 days   Lab Units 10/23/24  1037 10/23/24  0734 10/22/24  2152 10/22/24  1603 10/22/24  1059 10/22/24  0743 10/21/24  2050 10/21/24  1547 10/21/24  1456 10/21/24  1126 10/21/24  0709 10/20/24  2105   POC GLUCOSE mg/dl 166* 188* 226* 183* 207* 193* 197* 143* 151* 145* 189* 157*     Results from last 7 days   Lab Units 10/16/24  1647   HEMOGLOBIN A1C % 7.2*     Results from last 7 days   Lab Units 10/16/24  1647   LACTIC ACID mmol/L 2.0       Recent Cultures (last 7 days):    Results from last 7 days   Lab Units 10/21/24  1428 10/16/24  1342 10/16/24  1228   BLOOD CULTURE   --   --  No Growth After 5 Days.   GRAM STAIN RESULT  1+ Gram positive cocci in clusters*  No polys seen* No polys seen*  2+ Gram positive cocci in pairs*  2+ Gram variable rods*  --    WOUND CULTURE  No growth 3+ Growth of  --        Imaging Results Review: No pertinent imaging studies reviewed.  Other Study Results Review: No additional pertinent studies reviewed.    Last 24 Hours Medication List:     Current Facility-Administered Medications:     acetaminophen (TYLENOL) tablet 650 mg, Q6H PRN    albuterol inhalation solution 2.5 mg, Q6H PRN    aspirin chewable tablet 81 mg, Daily    atorvastatin (LIPITOR) tablet 20 mg, Daily    budesonide (PULMICORT) inhalation solution 0.5 mg, Q12H    Cholecalciferol (VITAMIN D3) tablet 1,000 Units, Daily    cyanocobalamin (VITAMIN B-12) tablet 500 mcg, Daily    docusate sodium (COLACE) capsule 100 mg, BID    DULoxetine (CYMBALTA) delayed release capsule 20 mg, Daily    fluticasone (FLONASE) 50 mcg/act nasal spray 2 spray, Daily    heparin (porcine) 25,000 units in 0.45% NaCl 250 mL infusion (premix), Titrated, Last Rate: 15 Units/kg/hr (10/23/24 0845)    heparin (porcine) injection 4,800 Units, Q6H PRN    heparin (porcine) injection 9,600 Units, Q6H PRN    insulin glargine (LANTUS) subcutaneous injection 15 Units 0.15 mL, HS    insulin lispro (HumALOG/ADMELOG) 100 units/mL subcutaneous injection 10 Units, TID With Meals    insulin lispro (HumALOG/ADMELOG) 100 units/mL subcutaneous injection 2-12 Units, TID AC **AND** Fingerstick Glucose (POCT), TID AC    isosorbide mononitrate (IMDUR) 24 hr tablet 30 mg, Daily    loratadine (CLARITIN) tablet 10 mg, Daily    metoprolol succinate (TOPROL-XL) 24 hr tablet 50 mg, Daily    morphine injection 2 mg, Q4H PRN    nicotine (NICODERM CQ) 14 mg/24hr TD 24 hr patch 1 patch, Daily    umeclidinium 62.5 mcg/actuation inhaler AEPB 1 puff,  Daily **AND** olodaterol HCl (STRIVERDI RESPIMAT) inhaler 2 puff, Daily    ondansetron (ZOFRAN) injection 4 mg, Q4H PRN    oxyCODONE (ROXICODONE) IR tablet 5 mg, Q4H PRN    oxyCODONE (ROXICODONE) split tablet 2.5 mg, Q4H PRN    pantoprazole (PROTONIX) EC tablet 40 mg, BID AC    piperacillin-tazobactam (ZOSYN) 4.5 g in sodium chloride 0.9 % 100 mL IVPB (EXTENDED INFUSION), Q8H, Last Rate: 4.5 g (10/23/24 0605)    polyethylene glycol (MIRALAX) packet 17 g, Daily    pregabalin (LYRICA) capsule 100 mg, BID    sodium chloride (OCEAN) 0.65 % nasal spray 2 spray, BID    traZODone (DESYREL) tablet 25 mg, HS    vancomycin (VANCOCIN) IVPB (premix in dextrose) 1,000 mg 200 mL, Q12H, Last Rate: 1,000 mg (10/22/24 2300)    Administrative Statements   Today, Patient Was Seen By: Mansi Lou PA-C      **Please Note: This note may have been constructed using a voice recognition system.**

## 2024-10-23 NOTE — ASSESSMENT & PLAN NOTE
Home Rx: Furosemide 40 mg as needed, Imdur 30 mg daily, lisinopril 20 mg daily, Toprol-XL 50 mg daily  Current Rx:) Total 30 mg daily, Toprol-XL 50 mg daily  Blood pressure is currently acceptable, keep holding lisinopril and furosemide

## 2024-10-23 NOTE — CONSULTS
Podiatry - Consultation    Patient Information:   Clay Marcial 68 y.o. male MRN: [id number]  Unit/Bed#: [facility] 825-01 Encounter: [id number]  PCP: No primary care provider on file.  Date of Admission:  10/22/2024  Date of Consultation: 10/23/24  Requesting Physician: Perico Villa MD      ASSESSMENT:    lCay Marcial is a 68 y.o. male with:    Diabetic foot infection s/p left second toe amputation and wound debridement, packed open  Type 2 diabetes mellitus with most recent A1c 7.2% collected 10/16/2024  Coronary artery disease  Stage III chronic kidney disease  Chronic obstructive pulmonary disease  Hypertension    PLAN:    Patient was seen and evaluated at bedside with all questions and concerns addressed.  Reviewed postoperative x-ray of 10/21/2024, and the x-ray shows regular postoperative changes of the second toe amputation.  Packing was in place.  Reviewed intraoperative wound culture of 10/21/2024.  1+ gram-positive cocci in cluster was found.  Will follow-up with the final result.  Reviewed LEADs.  Right foot is blood flow is normal.  Left foot JOSE JUAN is normal, metatarsal pressure was not obtained because of bandage on the wound, and toe pressure was 43 mmHg, which is below healing potential.  Reviewed notes from vascular and IR.  Tentative angiogram 10/24/2024.  Pending nephrology workup for optimized kidney function.  Will follow-up nephrology work, IR schedule and the result of angiogram.  Dressing was changed. The current dressing is plain packing DSD with Betadine paint the macerated wound edge. Next dressing change by podiatry is planned on 10/25/2024 after possible angiogram with IR.  Left second toe wound does not show acute signs of clinical infection.  However, there is high amount of serosanguineous drainage. Please refer to physical examination for details.   Patients' lab and vital signs were reviewed. Patient is afebrile with no leukocytosis. Patient does not  meet the SIRS criteria. Will  keep monitoring.  Continue IV vancomycin and Zosyn pre recommendation from ID  Podiatry to perform dressing change at this point.  Follow-up with  SLIM, vascular surgery, IR, ID, PT, OT, CM recommendations  Elevation and offloading on green foam wedges or pillows when non-ambulatory.  Rest of care per primary team.  Will discuss this plan with my attending and update as needed.     Weightbearing status: Nonweightbearing to the left foot    SUBJECTIVE:    History of Present Illness:    Clay Marcial is a 68 y.o. male who is originally admitted 10/22/2024 due to a transfer from another [place] [facility] for vascular workup after the left second toe amputation and wound debridement.  Source control has finished while the circulation is compromised.  Vascular surgery and IR was immediately consulted in the ED and planned a gram tentative 10/24/2024, pending nephrology workup for optimizing the kidney function.. Patient has a past medical history of type 2 diabetes, coronary artery disease, stage III chronic kidney disease, COPD, hypertension.    We are consulted for left second toe amputation.  The second toe and submet 3 wounds showed a progressive cellulitis and gangrene, which required for immediate source control in the other [place]. The amputation site was packed open upon transfer.  Patient denies any headache, dizziness, short of breath, chest pain, nausea, vomiting, stomachache, diarrhea, chills, fever or any other systemic sign of infection.    Review of Systems:    Constitutional: Negative.    HENT: Negative.    Eyes: Negative.    Respiratory: Negative.    Cardiovascular: Negative.    Gastrointestinal: Negative.    Musculoskeletal: Left second toe amputation with surgical site packed open  Skin: Left the second toe amputation site packed open  Neurological: Diminished sensation to bilateral feet  Psych: Negative.     Past Medical and Surgical History:     Past Medical History:   Diagnosis  Date    COPD (chronic obstructive pulmonary disease) (HCC)     Diabetes mellitus (HCC) 04/16/2024    Sleep apnea     UTI (urinary tract infection)        Past Surgical History:   Procedure Laterality Date    BACK SURGERY      TONSILLECTOMY      WOUND DEBRIDEMENT Left 10/21/2024    Procedure: LEFT FOOT I&D, left second toe amputation with packing;  Surgeon: Emma Burr DPM;  Location:  MAIN OR;  Service: Podiatry       Meds/Allergies:      Medications Prior to Admission:     acetaminophen (TYLENOL) 500 mg tablet    albuterol (PROVENTIL HFA,VENTOLIN HFA) 90 mcg/act inhaler    aspirin 81 mg chewable tablet    atorvastatin (LIPITOR) 20 mg tablet    cadexomer iodine (IODOSORB) 0.9 % gel    cetirizine (ZyrTEC) 5 MG tablet    Cholecalciferol 25 MCG (1000 UT) capsule    cyanocobalamin (VITAMIN B-12) 500 MCG tablet    DULoxetine (CYMBALTA) 20 mg capsule    Empagliflozin 25 MG TABS    fluticasone (FLONASE) 50 mcg/act nasal spray    furosemide (LASIX) 40 mg tablet    glipiZIDE (GLUCOTROL) 5 mg tablet    isosorbide mononitrate (IMDUR) 30 mg 24 hr tablet    lisinopril (ZESTRIL) 20 mg tablet    loratadine (CLARITIN) 10 mg tablet    metFORMIN (GLUCOPHAGE-XR) 500 mg 24 hr tablet    metoprolol succinate (TOPROL-XL) 50 mg 24 hr tablet    omeprazole (PriLOSEC OTC) 20 MG tablet    pregabalin (LYRICA) 200 MG capsule    QUEtiapine (SEROquel) 25 mg tablet    Salicylic Acid 10 % CREA    semaglutide, 2 mg/dose, (Ozempic) 8 mg/ mL injection pen    sodium bicarbonate 325 MG tablet    sodium chloride (OCEAN) 0.65 % nasal spray    tiotropium-olodaterol (STIOLTO RESPIMAT) 2.5-2.5 MCG/ACT inhaler    traZODone (DESYREL) 50 mg tablet    No Known Allergies    Social History:     Marital Status: Single    Substance Use History:   Social History     Substance and Sexual Activity   Alcohol Use Yes    Alcohol/week: 7.0 standard drinks of alcohol    Types: 7 Shots of liquor per week     Social History     Tobacco Use   Smoking Status Every Day     "Current packs/day: 1.50    Types: Cigarettes   Smokeless Tobacco Never     Social History     Substance and Sexual Activity   Drug Use Never       Family History:    History reviewed. No pertinent family history.      OBJECTIVE:    Vitals:   Blood Pressure: 140/73 (10/23/24 0736)  Pulse: 89 (10/23/24 0736)  Temperature: 98.2 °F (36.8 °C) (10/23/24 0736)  Respirations: 16 (10/23/24 0736)  Height: 5' 11\" (180.3 cm) (10/22/24 2251)  Weight - Scale: 126 kg (278 lb) (10/22/24 2251)  SpO2: 93 % (10/23/24 0736)    Physical Exam:    General Appearance: Alert, cooperative, no distress.  HEENT: Head normocephalic, atraumatic, without obvious abnormality.  Heart: Normal rate and rhythm.  Lungs: Non-labored breathing. No respiratory distress.  Abdomen: Without distension.  Psychiatric: AAOx3  Lower Extremity:    Vascular:   DP: Right: 2+ Left: non-palpable  PT: Right: 2+ Left: non-palpable  CRT < 3 seconds at the digits. + 2/4 edema noted at bilateral lower extremities.   Pedal hair is absent.   Skin temperature is warm bilaterally.    Musculoskeletal:  MMT is 5/5 in all muscle compartments bilaterally.   ROM at the 1st MPJ and ankle joint are reduced bilaterally with the leg extended.   No Pain on palpation of left foot wound.   He has post left second toe amputation and wound debridement    Dermatological:  Lower extremity wound(s) as noted below:    Left lower limb shows left second toe amputation and wound debridement packing open.    Wound #: 1  Location: Left second toe amputation site connected to submet 2 wound debridement site  Length 8 cm: Width 3 cm: Depth 3 cm:   Deepest Tissue Noted in Base: Bone (second metatarsal head)  Probe to Bone: Yes  Peripheral Skin Description: Attached and macerated  Granulation: 50% Fibrotic Tissue: 50% Necrotic Tissue: 0%   Drainage Amount: copious, serosanguinous  Signs of Infection: Yes. positive  probe to bone, negative crepitus, fluctuance, negative purulence, negative periwound " "skin erythema, edema, negative proximal tracking erythema    Neurological:  Gross sensation is diminished.   Light touch is diminished.   Protective sensation is diminished.    Clinical Images 10/23/24:            Additional data:     Lab Results: I have personally reviewed pertinent labs including:    Results from last 7 days   Lab Units 10/22/24  0739 10/21/24  0452   WBC Thousand/uL 7.28 7.64   HEMOGLOBIN g/dL 11.3* 11.4*   HEMATOCRIT % 33.6* 33.9*   PLATELETS Thousands/uL 290 244   SEGS PCT %  --  72   LYMPHO PCT % 21 15   MONO PCT % 8 9   EOS PCT % 1 2     Results from last 7 days   Lab Units 10/23/24  0613   POTASSIUM mmol/L 4.2   CHLORIDE mmol/L 101   CO2 mmol/L 30   BUN mg/dL 17   CREATININE mg/dL 1.30   CALCIUM mg/dL 8.3*     Results from last 7 days   Lab Units 10/19/24  1434   INR  1.18       Cultures: I have personally reviewed pertinent cultures including:    Results from last 7 days   Lab Units 10/21/24  1428 10/16/24  1342 10/16/24  1228 10/16/24  1121   BLOOD CULTURE   --   --  No Growth After 5 Days. No Growth After 5 Days.   GRAM STAIN RESULT  1+ Gram positive cocci in clusters*  No polys seen* No polys seen*  2+ Gram positive cocci in pairs*  2+ Gram variable rods*  --   --    WOUND CULTURE  No growth 3+ Growth of  --   --      Results from last 7 days   Lab Units 10/21/24  1428   ANAEROBIC CULTURE  Culture results to follow.       Imaging: I have personally reviewed pertinent reports in PACS.  EKG, Pathology, and Other Studies: I have personally reviewed pertinent reports.    Time Spent for Care: 30 minutes.  More than 50% of total time spent on counseling and coordination of care as described above.      ** Please Note: Portions of the record may have been created with voice recognition software. Occasional wrong word or \"sound a like\" substitutions may have occurred due to the inherent limitations of voice recognition software. Read the chart carefully and recognize, using context, where " substitutions have occurred. **

## 2024-10-23 NOTE — CASE MANAGEMENT
Case Management Assessment & Discharge Planning Note    Patient name Clay Marcial  Location [facility] 825/[facility] 825-01 MRN [id number]  : 1955 Date 10/23/2024       Current Admission Date: 10/22/2024  Current Admission Diagnosis:Diabetic foot infection  (HCC)   Patient Active Problem List    Diagnosis Date Noted Date Diagnosed    HTN (hypertension) 10/21/2024     Elevated serum creatinine 10/21/2024     Smoking 10/21/2024     PAD (peripheral artery disease) (HCC) 10/20/2024     Acute hyponatremia 10/20/2024     Chronic obstructive pulmonary disease with acute exacerbation (HCC) 10/17/2024     ROMEL (obstructive sleep apnea) 10/17/2024     Diabetic foot infection  (HCC) 10/16/2024     Stage 3 chronic kidney disease (HCC) 10/16/2024     Diabetes mellitus, type 2 (HCC) 2024     CAD (coronary artery disease) 2024     Chronic diastolic congestive heart failure (HCC) 2024     Acute on chronic kidney failure  (HCC) 2024     Helicobacter pylori infection 2024       LOS (days): 1  Geometric Mean LOS (GMLOS) (days): 3.1  Days to GMLOS:2.4     OBJECTIVE:    Risk of Unplanned Readmission Score: 33.64         Current admission status: Inpatient       Preferred Pharmacy:   [facility] #[id number] - [place], [place] - [address] [place] [id number]  Phone: [phone] Fax: [phone]    [facility] [place] - [address]  [address] [address]  Phone: [phone] Fax: [phone]    Primary Care Provider: No primary care provider on file.    Primary Insurance:   Secondary Insurance:     ASSESSMENT:  Active Health Care Proxies       Aixa Erickson [facility] Care Representative - Friend   Primary Phone: [phone] (Mobile)                           Readmission Root Cause  30 Day Readmission: No    Patient Information  Admitted from:: Facility (tx from [facility])  Mental Status: Alert                                   DISCHARGE  "DETAILS:              Pt transferred from  [facility] 10/[facility], see CM open dated 10/22    \" Pt lives alone in a two story home. However, Pt states that he currently has a friend living with him who he is helping out. PTA, independent with ADLs. Pt uses CPAP. [facility]\"     Of note, pt was active with [facility] prior to hospitalization. Previous CM placed return referral in [facility] to follow for needs.   "

## 2024-10-23 NOTE — ASSESSMENT & PLAN NOTE
Lab Results   Component Value Date    HGBA1C 7.2 (H) 10/16/2024       Recent Labs     10/22/24  1603 10/22/24  2152 10/23/24  0734 10/23/24  1037   POCGLU 183* 226* 188* 166*       Blood Sugar Average: Last 72 hrs:  (P) [id number]  Continue Lantus 15 units QHS, SSI.    Risk factor for primary problem

## 2024-10-23 NOTE — UTILIZATION REVIEW
Initial Clinical Review    Admission: Date/Time/Statement:   Admission Orders (From admission, onward)       Ordered        10/22/24 2108  INPATIENT ADMISSION  Once                          Orders Placed This Encounter   Procedures    INPATIENT ADMISSION     Standing Status:   Standing     Number of Occurrences:   1     Order Specific Question:   Level of Care     Answer:   Med Surg [16]     Order Specific Question:   Estimated length of stay     Answer:   More than 2 Midnights     Order Specific Question:   Certification     Answer:   I certify that inpatient services are medically necessary for this patient for a duration of greater than two midnights. See H&P and MD Progress Notes for additional information about the patient's course of treatment.     Initial Presentation: transferred from [facility]  68-year-old male with history of diabetes, CKD who initially presented initially to [facility] with left lower extremity cellulitis with open wound. Underwent incision and drainage, wound debridement second toe amputation Case was discussed with IR and vascular recommended transfer for IR angio.  Transferred to [facility] & admitted to inpatient status for diabetic foot infection.    Anticipated Length of Stay/Certification Statement:   Patient will be admitted on an inpatient basis with an anticipated length of stay of greater than 2 midnights secondary to diabetic foot infection.     Date: 10/23/24   Day 2:   IVABT in progress for diabetic foot infection. Vascular following, IR consulted.   Per vascular: ischemic changes to L 2nd toe during concerning for embolic phenomenon.   Patient with nonhealing L foot wounds, with worsening, toe pressure below healing potential for a diabetic, would benefit from angiogram with possible intervention. IR consulted. Continue IVABT, ASA, statin.       Scheduled Medications:  aspirin, 81 mg, Oral, Daily  atorvastatin, 20 mg, Oral, Daily  budesonide, 0.5 mg,  Per pt. Request, her cousin (Edinson Swartz) updated on plan of care. All questions answered. Nebulization, Q12H  Cholecalciferol, 1,000 Units, Oral, Daily  cyanocobalamin, 500 mcg, Oral, Daily  docusate sodium, 100 mg, Oral, BID  DULoxetine, 20 mg, Oral, Daily  fluticasone, 2 spray, Nasal, Daily  insulin glargine, 15 Units, Subcutaneous, HS  insulin lispro, 10 Units, Subcutaneous, TID With Meals  insulin lispro, 2-12 Units, Subcutaneous, TID AC  isosorbide mononitrate, 30 mg, Oral, Daily  loratadine, 10 mg, Oral, Daily  metoprolol succinate, 50 mg, Oral, Daily  nicotine, 1 patch, Transdermal, Daily  umeclidinium, 1 puff, Inhalation, Daily   And  olodaterol HCl, 2 puff, Inhalation, Daily  pantoprazole, 40 mg, Oral, BID AC  piperacillin-tazobactam, 4.5 g, Intravenous, Q8H  polyethylene glycol, 17 g, Oral, Daily  pregabalin, 100 mg, Oral, BID  sodium chloride, 2 spray, Each Nare, BID  traZODone, 25 mg, Oral, HS  vancomycin, 1,000 mg, Intravenous, Q12H      Continuous IV Infusions:  heparin (porcine), 3-30 Units/kg/hr (Order-Specific), Intravenous, Titrated      PRN Meds:  acetaminophen, 650 mg, Oral, Q6H PRN  albuterol, 2.5 mg, Nebulization, Q6H PRN  heparin (porcine), 4,800 Units, Intravenous, Q6H PRN  heparin (porcine), 9,600 Units, Intravenous, Q6H PRN  morphine injection, 2 mg, Intravenous, Q4H PRN  ondansetron, 4 mg, Intravenous, Q4H PRN      ED Triage Vitals   Temperature Pulse Respirations Blood Pressure SpO2 Pain Score   10/22/24 2128 10/22/24 2128 10/22/24 2128 10/22/24 2128 10/22/24 2128 10/22/24 2154   98.6 °F (37 °C) 85 18 144/71 94 % No Pain     Weight (last 2 days)       Date/Time Weight    10/22/24 2251 126 (278)            Vital Signs (last 3 days)       Date/Time Temp Pulse Resp BP MAP (mmHg) SpO2 O2 Device [place] Coma Scale Score Pain    10/23/24 0741 -- -- -- -- -- -- None (Room air) 15 No Pain    10/23/24 07:36:23 98.2 °F (36.8 °C) 89 16 140/73 95 93 % -- -- --    10/22/24 2231 -- -- 20 -- -- -- None (Room air) 15 --    10/22/24 21:54:11 98.1 °F (36.7 °C) 84 20 141/72 95 94 % -- -- No Pain     10/22/24 21:28:01 98.6 °F (37 °C) 85 18 144/71 -- 94 % -- -- --              Pertinent Labs/Diagnostic Test Results:   Radiology:  No orders to display     Cardiology:  No orders to display     GI:  No orders to display           Results from last 7 days   Lab Units 10/22/24  0739 10/21/24  0452 10/19/24  0440 10/17/24  0446 10/16/24  1121   WBC Thousand/uL 7.28 7.64 8.36 9.16 11.65*   HEMOGLOBIN g/dL 11.3* 11.4* 11.4* 11.8* 15.2   HEMATOCRIT % 33.6* 33.9* 33.3* 34.7* 44.5   PLATELETS Thousands/uL 290 244 167 162 210   TOTAL NEUT ABS Thousands/µL  --  5.54 6.52 6.83 9.65*         Results from last 7 days   Lab Units 10/23/24  0613 10/22/24  0739 10/21/24  0452 10/20/24  0251 10/19/24  0440   SODIUM mmol/L 137 135 132* 131* 133*   POTASSIUM mmol/L 4.2 4.1 4.0 5.0 4.0   CHLORIDE mmol/L 101 100 98 97 98   CO2 mmol/L 30 28 25 27 28   ANION GAP mmol/L 6 7 9 7 7   BUN mg/dL 17 17 20 21 20   CREATININE mg/dL 1.30 1.22 1.21 1.21 1.29   EGFR ml/min/1.73sq m 55 59 60 60 55   CALCIUM mg/dL 8.3* 8.3* 8.3* 8.3* 8.0*   MAGNESIUM mg/dL 1.9  --   --   --   --          Results from last 7 days   Lab Units 10/23/24  0734 10/22/24  2152 10/22/24  1603 10/22/24  1059 10/22/24  0743 10/21/24  2050 10/21/24  1547 10/21/24  1456 10/21/24  1126 10/21/24  0709 10/20/24  2105 10/20/24  1531   POC GLUCOSE mg/dl 188* 226* 183* 207* 193* 197* 143* 151* 145* 189* 157* 152*     Results from last 7 days   Lab Units 10/23/24  0613 10/22/24  0739 10/21/24  0452 10/20/24  0251 10/19/24  0440 10/18/24  0538 10/17/24  0446 10/16/24  1121   GLUCOSE RANDOM mg/dL 185* 197* 167* 139 166* 169* 207* 299*         Results from last 7 days   Lab Units 10/16/24  1647   HEMOGLOBIN A1C % 7.2*   EAG mg/dl 160     Beta- Hydroxybutyrate   Date Value Ref Range Status   04/17/2024 <0.05 0.02 - 0.27 mmol/L Final                              Results from last 7 days   Lab Units 10/23/24  0613 10/22/24  2245 10/22/24  1451 10/19/24  2044 10/19/24  1434   PROTIME  seconds  --   --   --   --  15.4*   INR   --   --   --   --  1.18   PTT seconds 181* 59* 93*   < > 42*    < > = values in this interval not displayed.         Results from last 7 days   Lab Units 10/16/24  1121   PROCALCITONIN ng/ml 0.19     Results from last 7 days   Lab Units 10/16/24  1647 10/16/24  1121   LACTIC ACID mmol/L 2.0 2.2*                                     Results from last 7 days   Lab Units 10/16/24  1121   CRP mg/L 226.2*   SED RATE mm/hour 25*             Results from last 7 days   Lab Units 10/17/24  1014   CLARITY UA  Clear   COLOR UA  Dk Yellow   SPEC GRAV UA  1.020   PH UA  5.5   GLUCOSE UA mg/dl >=1000 (1%)*   KETONES UA mg/dl Negative   BLOOD UA  Trace-lysed*   PROTEIN UA mg/dl Trace*   NITRITE UA  Negative   BILIRUBIN UA  Negative   UROBILINOGEN UA E.U./dl 0.2   LEUKOCYTES UA  Elevated glucose may cause decreased leukocyte values. See urine microscopic for UWBC result*   WBC UA /hpf 0-1*   RBC UA /hpf None Seen   BACTERIA UA /hpf Occasional   EPITHELIAL CELLS WET PREP /hpf Occasional   SODIUM UR  31   CREATININE UR mg/dL 85.2                                 Results from last 7 days   Lab Units 10/21/24  1428 10/16/24  1342 10/16/24  1228 10/16/24  1121   BLOOD CULTURE   --   --  No Growth After 5 Days. No Growth After 5 Days.   GRAM STAIN RESULT  1+ Gram positive cocci in clusters*  No polys seen* No polys seen*  2+ Gram positive cocci in pairs*  2+ Gram variable rods*  --   --    WOUND CULTURE  No growth 3+ Growth of  --   --                    Past Medical History:   Diagnosis Date    COPD (chronic obstructive pulmonary disease) (HCC)     Diabetes mellitus (HCC) 04/16/2024    Sleep apnea     UTI (urinary tract infection)      Present on Admission:   Stage 3 chronic kidney disease (HCC)   Diabetic foot infection  (HCC)   Diabetes mellitus, type 2 (HCC)   CAD (coronary artery disease)   HTN (hypertension)   Chronic obstructive pulmonary disease with acute exacerbation  (HCC)      Admitting Diagnosis: Diabetic foot infection  (HCC) [E11.628, L08.9]  Age/Sex: 68 y.o. male    Network Utilization Review Department  ATTENTION: Please call with any questions or concerns to [phone] and carefully listen to the prompts so that you are directed to the right person. All voicemails are confidential.   For Discharge needs, contact Care Management DC Support Team at [phone] opt. [phone]  Send all requests for admission clinical reviews, approved or denied determinations and any other requests to dedicated fax number below belonging to the campus where the patient is receiving treatment. List of dedicated fax numbers for the Facilities:  FACILITY NAME UR FAX NUMBER   ADMISSION DENIALS (Administrative/Medical Necessity) [phone]   DISCHARGE SUPPORT TEAM (NETWORK) [phone]   PARENT CHILD HEALTH (Maternity/NICU/Pediatrics) [phone]   [facility] [phone]   [facility] [phone]   [facility] [phone]   [facility] [phone]   [facility] [phone]   [facility] [phone]   [facility] [phone]   [facility] [phone]   [facility] [phone]   [facility] [phone]   [facility] [phone]   [facility] [phone]

## 2024-10-23 NOTE — PROGRESS NOTES
Clay Marcial is a 68 y.o. male who is currently ordered Vancomycin IV with management by the Pharmacy Consult service.  Relevant clinical data and objective / subjective history reviewed.  Vancomycin Assessment:  Indication and Goal AUC/Trough: Soft tissue (goal -600, trough >10); Bone/joint infection (goal -600, trough >10), -600, trough >10  Clinical Status: worsening  Micro:   Anaerobic culture no results; wound culture 1+ gram positive cocci in clusters; blood culture no growth after 5 days  Renal Function:  SCr: 1.22 mg/dL  CrCl: 76.2 mL/min  Renal replacement: Not on dialysis  Days of Therapy: 1  Current Dose: 2000 mg IV load; 1000 mg IV QA12H  Vancomycin Plan:  New Dosin mg IV Q12H  Estimated AUC: 522 mcg*hr/mL  Estimated Trough: 17.2 mcg/mL  Next Level: 10- at 0600  Renal Function Monitoring: Daily BMP and UOP  Pharmacy will continue to follow closely for s/sx of nephrotoxicity, infusion reactions and appropriateness of therapy.  BMP and CBC will be ordered per protocol. We will continue to follow the patient’s culture results and clinical progress daily.    Aníbal Rodrigues, Pharmacist

## 2024-10-23 NOTE — ASSESSMENT & PLAN NOTE
Lab Results   Component Value Date    EGFR 55 10/23/2024    EGFR 59 10/22/2024    EGFR 60 10/21/2024    CREATININE 1.30 10/23/2024    CREATININE 1.22 10/22/2024    CREATININE 1.21 10/21/2024   Lisinopril, Lasix, Jardiance on hold, Cr back to baseline; appreciate nephrology recs

## 2024-10-23 NOTE — PROGRESS NOTES
Vascular Surgery   Progress Note      Confirmed w/ IR-- plan for Agram on Fri, 10/25/2024 ~ 1pm.      Vikki Burnham-Scaff  10/23/2024

## 2024-10-23 NOTE — RESPIRATORY THERAPY NOTE
RT Protocol Note  Clay Marcial 68 y.o. male MRN: [id number]  Unit/Bed#: [facility] 825-01 Encounter: [id number]    Assessment    Principal Problem:    Diabetic foot infection  (HCC)  Active Problems:    Diabetes mellitus, type 2 (HCC)    CAD (coronary artery disease)    Stage 3 chronic kidney disease (HCC)    Chronic obstructive pulmonary disease with acute exacerbation (HCC)    HTN (hypertension)      Home Pulmonary Medications:  Pulmicort, albuterol       Past Medical History:   Diagnosis Date    COPD (chronic obstructive pulmonary disease) (HCC)     Diabetes mellitus (HCC) 04/16/2024    Sleep apnea     UTI (urinary tract infection)      Social History     Socioeconomic History    Marital status: Single     Spouse name: None    Number of children: None    Years of education: None    Highest education level: None   Occupational History    None   Tobacco Use    Smoking status: Every Day     Current packs/day: 1.50     Types: Cigarettes    Smokeless tobacco: Never   Vaping Use    Vaping status: Never Used   Substance and Sexual Activity    Alcohol use: Yes     Alcohol/week: 7.0 standard drinks of alcohol     Types: 7 Shots of liquor per week    Drug use: Never    Sexual activity: None   Other Topics Concern    None   Social History Narrative    None     Social Determinants of Health     Financial Resource Strain: Not on file   Food Insecurity: No Food Insecurity (10/22/2024)    Nursing - Inadequate Food Risk Classification     Worried About Running Out of Food in the Last Year: Never true     Ran Out of Food in the Last Year: Never true     Ran Out of Food in the Last Year: 1   Transportation Needs: No Transportation Needs (10/22/2024)    Nursing - Transportation Risk Classification     Lack of Transportation: Not on file     Lack of Transportation: 2   Physical Activity: Not on file   Stress: Not on file   Social Connections: Unknown (6/18/2024)    Received from [employer]     How often do you  feel lonely or isolated from those around you? (Adult - for ages 18 years and over): Not on file   Intimate Partner Violence: Unknown (10/22/2024)    Nursing IPS     Feels Physically and Emotionally Safe: Not on file     Physically Hurt by Someone: Not on file     Humiliated or Emotionally Abused by Someone: Not on file     Physically Hurt by Someone: 2     Hurt or Threatened by Someone: 2   Housing Stability: Unknown (10/22/2024)    Nursing: Inadequate Housing Risk Classification     Has Housing: Not on file     Worried About Losing Housing: Not on file     Unable to Get Utilities: Not on file     Unable to Pay for Housing in the Last Year: 2     Has Housin       Subjective         Objective    Physical Exam:   Assessment Type: (P) Assess only  General Appearance: (P) Alert, Awake  Respiratory Pattern: (P) Normal  Chest Assessment: (P) Chest expansion symmetrical  Bilateral Breath Sounds: (P) Diminished    Vitals:  Blood pressure 141/72, pulse 84, temperature 98.1 °F (36.7 °C), resp. rate (P) 20, SpO2 94%.          Imaging and other studies: Results Review Statement: No pertinent imaging studies reviewed.          Plan    Respiratory Plan: (P) Home Bronchodilator Patient pathway        Resp Comments: (P) pt assessed per resp protocol. Pt here for diabetic foot infection, not respiratory related. Pt has history of COPD and takes pulmicort at home w/ albuterol prn. Pt currently diminished bbs, sp02 93% on ra. Pt refuses to wear cpap while here. Will d/c cpap order, and add albuterol prn. Will follow per protocol.

## 2024-10-23 NOTE — PROGRESS NOTES
Progress Note - Vascular Surgery   Name: Clay Marcial 68 y.o. male I MRN: [id number]  [facility] Date of Admission: 10/22/2024   Date of Service: 10/23/2024 I Hospital Day: 1     Assessment & Plan  Diabetic foot infection  (HCC)  Assessment:  68yoM with tobacco use, CAD, CHF, DM (A1C 7.2), CKD3, T2DM, CKD 3, COPD, chronic low back pain, who presented with LLE pain, with diabetic foot ulcer x 5months, cellulitis presented to [facility], during admission noted to have ischemic changes to L 2nd toe during concerning for embolic phenomenon. With nonhealing wound, toe pressure below healing potential for a diabetic, patient transferred to [facility] for angiogram with possible intervention, notably requiring L 2nd toe amp, debridement with podiatry on 10/21 for worsening wound.     10/18/24 LEADs  Right: JOSE JUAN 1.04//  Left >75% stenosis in proximal AT JOSE JUAN 1.03/MTP deferred/GTP 43    10/21 OR Wound Cx 1+ Gram pos cocci in clusters  10/16 Wound culture Gram pos cocci in pairs. Gram variable rods  10/16 Blood Cultures NG x 5 days    WBC 7.28  Hgb 11.3  sCr 1.3    Recommendations:  Patient with nonhealing L foot wounds, with worsening, toe pressure below healing potential for a diabetic, would benefit from angiogram with possible intervention. IR consulted  - Time dependent on IR staffing/availability  - Appreciate nephrology following for renal optimization with pending angiogram  - Consideration for embolic work-up, continue hep gtt  - Appreciate podiatry recommendations, local wound care - patient at risk of more proximal amputation   - ABX per ID - Zosyn/Vanco  - Continue ASA/Statin  - Continue to encourage smoking cessation, patient motivated to quit, on NRT  - Remainder of care per primary team  - Will discuss with Dr. Decker    Diabetes mellitus, type 2 ([facility])  Lab Results   Component Value Date    HGBA1C 7.2 (H) 10/16/2024       Recent Labs     10/22/24  0743 10/22/24  1059 10/22/24  1603  10/22/24  2152   POCGLU 193* 207* 183* 226*       Blood Sugar Average: Last 72 hrs:  (P) 226    Would continue to benefit from adequate blood glucose control for wound healing  CAD (coronary artery disease)  Management per primary team  Stage 3 chronic kidney disease (HCC)  Lab Results   Component Value Date    EGFR 55 10/23/2024    EGFR 59 10/22/2024    EGFR 60 10/21/2024    CREATININE 1.30 10/23/2024    CREATININE 1.22 10/22/2024    CREATININE 1.21 10/21/2024   Lisinopril, Lasix, Jardiance on hold, Cr back to baseline; appreciate nephrology recs  Chronic obstructive pulmonary disease with acute exacerbation (HCC)  Management per primary team   HTN (hypertension)  Management per primary team     24 Hour Events : No acute events overnight    Subjective : Patient seen and examined without acute issues overnight. Patient states wound to L foot started approximately 5 months ago after callus was shaved down, multiple different wound care strategies attempted without improvement, wound care nursing concerned with recent acute new erythema and wound worsening and patient presented for medical attention. States pain to L foot well controlled. Denies fevers, chills, nausea. Denies significant claudication or rest pain symptoms, states he can walk 50 yds before having to stop secondary to shortness of breath. Ambulates and performs ADLs independently. 2PPD smoker, state he plans to quit smoking by his upcoming birthday.     With regard to new ischemic/cyanotic L 2nd digit this admission, denies previous similar episodes. Denies personal history of CVA, MI, bleeding/clotting disorders. Does have history ofchronic back pain s/p remote history of spinal surgery.     Objective :  Temp:  [98.1 °F (36.7 °C)-98.6 °F (37 °C)] 98.1 °F (36.7 °C)  HR:  [84-91] 84  BP: (137-144)/(71-73) 141/72  Resp:  [18-20] 20  SpO2:  [93 %-97 %] 94 %  O2 Device: None (Room air)     I/O         10/21 0701  10/22 0700 10/22 0701  10/23 0700 10/23  0701  10/24 0700    Urine (mL/kg/hr)  1000     Total Output  1000     Net  -1000                    Physical Exam  Constitutional:       General: He is not in acute distress.     Appearance: He is obese. He is not toxic-appearing or diaphoretic.   HENT:      Head: Normocephalic and atraumatic.   Eyes:      Extraocular Movements: Extraocular movements intact.   Cardiovascular:      Rate and Rhythm: Normal rate.      Pulses:           Radial pulses are 2+ on the right side and 2+ on the left side.        Femoral pulses are 2+ on the right side and 2+ on the left side.       Popliteal pulses are 2+ on the right side and 2+ on the left side.        Dorsalis pedis pulses are 2+ on the right side and detected w/ Doppler on the left side.        Posterior tibial pulses are 2+ on the right side and detected w/ Doppler on the left side.   Pulmonary:      Effort: No respiratory distress.   Abdominal:      Palpations: Abdomen is soft.      Tenderness: There is no abdominal tenderness. There is no guarding or rebound.   Musculoskeletal:      Comments: Bilateral LE M/S intact   Feet:      Comments: L foot with clean and dry dressing in place.   Skin:     General: Skin is warm and dry.   Neurological:      General: No focal deficit present.      Mental Status: He is alert and oriented to person, place, and time. Mental status is at baseline.      Sensory: Sensation is intact.      Motor: Motor function is intact.   Psychiatric:         Mood and Affect: Mood and affect normal.         Behavior: Behavior is cooperative.             Lab Results: I have reviewed the following results:  CBC with diff:   Lab Results   Component Value Date    WBC 7.28 10/22/2024    HGB 11.3 (L) 10/22/2024    HCT 33.6 (L) 10/22/2024    MCV 93 10/22/2024     10/22/2024    RBC 3.61 (L) 10/22/2024    MCH 31.3 10/22/2024    MCHC 33.6 10/22/2024    RDW 12.0 10/22/2024    MPV 9.8 10/22/2024    NRBC 0 10/21/2024   ,   BMP/CMP:  Lab Results   Component  "Value Date    SODIUM 137 10/23/2024    K 4.2 10/23/2024     10/23/2024    CO2 30 10/23/2024    BUN 17 10/23/2024    CREATININE 1.30 10/23/2024    CALCIUM 8.3 (L) 10/23/2024    AST 17 08/27/2024    ALT 23 08/27/2024    ALKPHOS 55 08/27/2024    EGFR 55 10/23/2024   ,   Lipid Panel: No results found for: \"CHOL\",   Coags:   Lab Results   Component Value Date     (HH) 10/23/2024    INR 1.18 10/19/2024   ,   Blood Culture:   Lab Results   Component Value Date    BLOODCX No Growth After 5 Days. 10/16/2024   ,   Urinalysis:   Lab Results   Component Value Date    COLORU Dk Yellow 10/17/2024    CLARITYU Clear 10/17/2024    SPECGRAV 1.020 10/17/2024    PHUR 5.5 10/17/2024    LEUKOCYTESUR (A) 10/17/2024     Elevated glucose may cause decreased leukocyte values. See urine microscopic for UWBC result    NITRITE Negative 10/17/2024    GLUCOSEU >=1000 (1%) (A) 10/17/2024    KETONESU Negative 10/17/2024    BILIRUBINUR Negative 10/17/2024    BLOODU Trace-lysed (A) 10/17/2024   ,   Urine Culture: No results found for: \"URINECX\",   Wound Culure:   Lab Results   Component Value Date    WOUNDCULT No growth 10/21/2024             VTE Prophylaxis: RX contraindicated due to: Heparin gtt  "

## 2024-10-23 NOTE — ASSESSMENT & PLAN NOTE
Patient with nonhealing left foot wounds  Status post left second toe amputation/debridement with podiatry on 10/21 for worsening wound  Vascular surgery following  IR has been consulted for lower extremity angiogram

## 2024-10-23 NOTE — ASSESSMENT & PLAN NOTE
Lab Results   Component Value Date    EGFR 55 10/23/2024    EGFR 59 10/22/2024    EGFR 60 10/21/2024    CREATININE 1.30 10/23/2024    CREATININE 1.22 10/22/2024    CREATININE 1.21 10/21/2024   Creatinine currently at baseline  Continue to monitor  Nephrology currently on board for optimization prior to agram  Follow-up recommendations

## 2024-10-23 NOTE — ASSESSMENT & PLAN NOTE
Etiology of elevated creatinine most likely due to autoregulatory failure in the setting of ACE inhibitor use, SGLT2 inhibitor use and diuretic use  Baseline creatinine appears to be around 1.3-1.4 in setting of diabetes and hypertension  Creatinine on recent admission 1.58  Peak creatinine 1.7 on 10/17  Creatinine today 1.30 at baseline  Keep holding lisinopril, furosemide and Jardiance  Patient is scheduled for lower extremity angiogram on Friday  We will provide recommendations regarding pre and post angiogram IV fluids based on volume status in next 24 to 48 hours  Benefits and risks of lower extremity angiogram from nephrology perspective discussed with the patient

## 2024-10-24 LAB
ANION GAP SERPL CALCULATED.3IONS-SCNC: 7 MMOL/L (ref 4–13)
APTT PPP: 70 SECONDS (ref 23–34)
APTT PPP: 82 SECONDS (ref 23–34)
BACTERIA WND AEROBE CULT: ABNORMAL
BUN SERPL-MCNC: 13 MG/DL (ref 5–25)
CALCIUM SERPL-MCNC: 8.7 MG/DL (ref 8.4–10.2)
CHLORIDE SERPL-SCNC: 101 MMOL/L (ref 96–108)
CO2 SERPL-SCNC: 29 MMOL/L (ref 21–32)
CREAT SERPL-MCNC: 1.14 MG/DL (ref 0.6–1.3)
ERYTHROCYTE [DISTWIDTH] IN BLOOD BY AUTOMATED COUNT: 12 % (ref 11.6–15.1)
GFR SERPL CREATININE-BSD FRML MDRD: 64 ML/MIN/1.73SQ M
GLUCOSE SERPL-MCNC: 193 MG/DL (ref 65–140)
GLUCOSE SERPL-MCNC: 209 MG/DL (ref 65–140)
GLUCOSE SERPL-MCNC: 209 MG/DL (ref 65–140)
GLUCOSE SERPL-MCNC: 238 MG/DL (ref 65–140)
GLUCOSE SERPL-MCNC: 250 MG/DL (ref 65–140)
GRAM STN SPEC: ABNORMAL
GRAM STN SPEC: ABNORMAL
HCT VFR BLD AUTO: 35.2 % (ref 36.5–49.3)
HGB BLD-MCNC: 11.7 G/DL (ref 12–17)
MCH RBC QN AUTO: 32.1 PG (ref 26.8–34.3)
MCHC RBC AUTO-ENTMCNC: 33.2 G/DL (ref 31.4–37.4)
MCV RBC AUTO: 97 FL (ref 82–98)
PLATELET # BLD AUTO: 359 THOUSANDS/UL (ref 149–390)
PMV BLD AUTO: 9.7 FL (ref 8.9–12.7)
POTASSIUM SERPL-SCNC: 4.2 MMOL/L (ref 3.5–5.3)
RBC # BLD AUTO: 3.64 MILLION/UL (ref 3.88–5.62)
SODIUM SERPL-SCNC: 137 MMOL/L (ref 135–147)
VANCOMYCIN SERPL-MCNC: 15 UG/ML (ref 10–20)
WBC # BLD AUTO: 5.34 THOUSAND/UL (ref 4.31–10.16)

## 2024-10-24 PROCEDURE — 94760 N-INVAS EAR/PLS OXIMETRY 1: CPT

## 2024-10-24 PROCEDURE — 85027 COMPLETE CBC AUTOMATED: CPT | Performed by: PHYSICIAN ASSISTANT

## 2024-10-24 PROCEDURE — 94640 AIRWAY INHALATION TREATMENT: CPT

## 2024-10-24 PROCEDURE — 82948 REAGENT STRIP/BLOOD GLUCOSE: CPT

## 2024-10-24 PROCEDURE — 99232 SBSQ HOSP IP/OBS MODERATE 35: CPT | Performed by: PODIATRIST

## 2024-10-24 PROCEDURE — 85730 THROMBOPLASTIN TIME PARTIAL: CPT | Performed by: FAMILY MEDICINE

## 2024-10-24 PROCEDURE — 99232 SBSQ HOSP IP/OBS MODERATE 35: CPT | Performed by: PHYSICIAN ASSISTANT

## 2024-10-24 PROCEDURE — 94664 DEMO&/EVAL PT USE INHALER: CPT

## 2024-10-24 PROCEDURE — 99232 SBSQ HOSP IP/OBS MODERATE 35: CPT | Performed by: INTERNAL MEDICINE

## 2024-10-24 PROCEDURE — 80202 ASSAY OF VANCOMYCIN: CPT | Performed by: INTERNAL MEDICINE

## 2024-10-24 PROCEDURE — 80048 BASIC METABOLIC PNL TOTAL CA: CPT | Performed by: PHYSICIAN ASSISTANT

## 2024-10-24 RX ORDER — BISACODYL 10 MG
10 SUPPOSITORY, RECTAL RECTAL DAILY PRN
Status: DISCONTINUED | OUTPATIENT
Start: 2024-10-24 | End: 2024-11-05 | Stop reason: HOSPADM

## 2024-10-24 RX ORDER — SODIUM CHLORIDE, SODIUM GLUCONATE, SODIUM ACETATE, POTASSIUM CHLORIDE, MAGNESIUM CHLORIDE, SODIUM PHOSPHATE, DIBASIC, AND POTASSIUM PHOSPHATE .53; .5; .37; .037; .03; .012; .00082 G/100ML; G/100ML; G/100ML; G/100ML; G/100ML; G/100ML; G/100ML
50 INJECTION, SOLUTION INTRAVENOUS CONTINUOUS
Status: DISCONTINUED | OUTPATIENT
Start: 2024-10-25 | End: 2024-10-29

## 2024-10-24 RX ORDER — INSULIN GLARGINE 100 [IU]/ML
18 INJECTION, SOLUTION SUBCUTANEOUS
Status: DISCONTINUED | OUTPATIENT
Start: 2024-10-24 | End: 2024-10-25

## 2024-10-24 RX ADMIN — BUDESONIDE 0.5 MG: 0.5 INHALANT RESPIRATORY (INHALATION) at 19:03

## 2024-10-24 RX ADMIN — LORATADINE 10 MG: 10 TABLET ORAL at 08:56

## 2024-10-24 RX ADMIN — ISOSORBIDE MONONITRATE 30 MG: 30 TABLET, EXTENDED RELEASE ORAL at 08:56

## 2024-10-24 RX ADMIN — DULOXETINE HYDROCHLORIDE 20 MG: 20 CAPSULE, DELAYED RELEASE ORAL at 08:56

## 2024-10-24 RX ADMIN — CYANOCOBALAMIN TAB 500 MCG 500 MCG: 500 TAB at 08:56

## 2024-10-24 RX ADMIN — NICOTINE 1 PATCH: 14 PATCH, EXTENDED RELEASE TRANSDERMAL at 08:56

## 2024-10-24 RX ADMIN — BUDESONIDE 0.5 MG: 0.5 INHALANT RESPIRATORY (INHALATION) at 08:12

## 2024-10-24 RX ADMIN — INSULIN LISPRO 6 UNITS: 100 INJECTION, SOLUTION INTRAVENOUS; SUBCUTANEOUS at 11:52

## 2024-10-24 RX ADMIN — INSULIN LISPRO 2 UNITS: 100 INJECTION, SOLUTION INTRAVENOUS; SUBCUTANEOUS at 08:57

## 2024-10-24 RX ADMIN — ATORVASTATIN CALCIUM 20 MG: 20 TABLET, FILM COATED ORAL at 08:56

## 2024-10-24 RX ADMIN — METOPROLOL SUCCINATE 50 MG: 50 TABLET, EXTENDED RELEASE ORAL at 08:56

## 2024-10-24 RX ADMIN — HEPARIN SODIUM 15 UNITS/KG/HR: 10000 INJECTION, SOLUTION INTRAVENOUS at 06:34

## 2024-10-24 RX ADMIN — Medication 1000 UNITS: at 08:56

## 2024-10-24 RX ADMIN — PANTOPRAZOLE SODIUM 40 MG: 40 TABLET, DELAYED RELEASE ORAL at 17:33

## 2024-10-24 RX ADMIN — INSULIN GLARGINE 18 UNITS: 100 INJECTION, SOLUTION SUBCUTANEOUS at 21:28

## 2024-10-24 RX ADMIN — HEPARIN SODIUM 15 UNITS/KG/HR: 10000 INJECTION, SOLUTION INTRAVENOUS at 19:42

## 2024-10-24 RX ADMIN — INSULIN LISPRO 4 UNITS: 100 INJECTION, SOLUTION INTRAVENOUS; SUBCUTANEOUS at 17:33

## 2024-10-24 RX ADMIN — PIPERACILLIN SODIUM AND TAZOBACTAM SODIUM 4.5 G: 36; 4.5 INJECTION, POWDER, LYOPHILIZED, FOR SOLUTION INTRAVENOUS at 15:26

## 2024-10-24 RX ADMIN — PREGABALIN 100 MG: 100 CAPSULE ORAL at 08:56

## 2024-10-24 RX ADMIN — PREGABALIN 100 MG: 100 CAPSULE ORAL at 17:33

## 2024-10-24 RX ADMIN — PIPERACILLIN SODIUM AND TAZOBACTAM SODIUM 4.5 G: 36; 4.5 INJECTION, POWDER, LYOPHILIZED, FOR SOLUTION INTRAVENOUS at 06:35

## 2024-10-24 RX ADMIN — PANTOPRAZOLE SODIUM 40 MG: 40 TABLET, DELAYED RELEASE ORAL at 06:35

## 2024-10-24 RX ADMIN — INSULIN LISPRO 10 UNITS: 100 INJECTION, SOLUTION INTRAVENOUS; SUBCUTANEOUS at 08:58

## 2024-10-24 RX ADMIN — INSULIN LISPRO 10 UNITS: 100 INJECTION, SOLUTION INTRAVENOUS; SUBCUTANEOUS at 17:35

## 2024-10-24 RX ADMIN — DOCUSATE SODIUM 100 MG: 100 CAPSULE, LIQUID FILLED ORAL at 17:33

## 2024-10-24 RX ADMIN — ASPIRIN 81 MG CHEWABLE TABLET 81 MG: 81 TABLET CHEWABLE at 08:56

## 2024-10-24 RX ADMIN — TRAZODONE HYDROCHLORIDE 25 MG: 50 TABLET ORAL at 21:27

## 2024-10-24 RX ADMIN — INSULIN LISPRO 10 UNITS: 100 INJECTION, SOLUTION INTRAVENOUS; SUBCUTANEOUS at 11:53

## 2024-10-24 RX ADMIN — DOCUSATE SODIUM 100 MG: 100 CAPSULE, LIQUID FILLED ORAL at 08:56

## 2024-10-24 RX ADMIN — VANCOMYCIN HYDROCHLORIDE 1000 MG: 1 INJECTION, SOLUTION INTRAVENOUS at 11:58

## 2024-10-24 RX ADMIN — PIPERACILLIN SODIUM AND TAZOBACTAM SODIUM 4.5 G: 36; 4.5 INJECTION, POWDER, LYOPHILIZED, FOR SOLUTION INTRAVENOUS at 22:10

## 2024-10-24 RX ADMIN — VANCOMYCIN HYDROCHLORIDE 1000 MG: 1 INJECTION, SOLUTION INTRAVENOUS at 23:46

## 2024-10-24 NOTE — PROGRESS NOTES
Progress Note - Infectious Disease   Clay Marcial 68 y.o. male MRN: [id number]  Unit/Bed#: [facility] 825-01 Encounter: [id number]      Impression:  1.  Diabetic left foot infection with probable osteomyelitis s/p left second toe amputation with wound debridement and packing.  2.  Type II DM with PAD, history of chronic left foot plantar ulcer  3.  History of chronic tobacco abuse with COPD and possible ROMEL  4.  CAD    Recommendations:  Patient is afebrile with normal WBC count.  Patient seen and therapy discussed with primary hospitalist service  1.  Description and podiatry picture 10/24 noted with purulent drainage and positive probing to bone.  2.  Wound cultures are showing a variety of organisms including anaerobic growth of Bacteroides pyogenous and Finegoldia magna and aerobic growth of actinomyces species as well as Globicatella.  3.  Agree with plans for a gram tomorrow followed by further podiatric surgery.  4.  Check nares MRSA culture results  5.  Pending above continue vancomycin 1 g every 12 hours IV with further dosing by pharmacy and piperacillin/tazobactam 4.5 g every 8 hours IV by extended infusion.    Antibiotics:  1.  Vancomycin 1 g every 12 hours IV, day 3 Rx  2.  Piperacillin/tazobactam 4.5 g every 8 hours IV by extended infusion, day 5 Rx    Subjective:  The patient has no complaints.  Denies fevers, chills, or sweats.  Denies nausea, vomiting, or diarrhea.      Objective:  Vitals:  Temp:  [97.8 °F (36.6 °C)-97.9 °F (36.6 °C)] 97.9 °F (36.6 °C)  HR:  [77-78] 78  Resp:  [16-23] 16  BP: (135-154)/(72-87) 146/85  SpO2:  [93 %-96 %] 94 %  Temp (24hrs), Av.8 °F (36.6 °C), Min:97.8 °F (36.6 °C), Max:97.9 °F (36.6 °C)  Current: Temperature: 97.9 °F (36.6 °C)    Physical Exam:     General Appearance:  Alert, appropriately responsive, chronically ill-appearing nontoxic, no acute distress.   Throat: Oropharynx moist without lesions.  Lips, mucosa, and tongue normal, edentulous   Neck: Supple,  symmetrical, trachea midline, no adenopathy,  no tenderness/mass/nodules   Lungs:   Increased AP diameter with deep creased respiratory expansion   Heart:  Regular rate and rhythm, S1, S2 normal, no murmur, rub or gallop   Abdomen:   Soft, non-tender, non-distended, positive bowel sounds.  No masses, no organomegaly    No CVA tenderness   Extremities: LLE with +2/4 edema and erythema dry surgical dressing in place, s/p left second toe amputation wound description as per picture 10/24 with purulent odiferous discharge.  Peripheral pulses decreased   Skin: As above.         Invasive Devices       Peripheral Intravenous Line  Duration             Peripheral IV 10/20/24 Proximal;Right;Ventral (anterior) Forearm 4 days    Peripheral IV 10/22/24 Dorsal (posterior);Left Hand 2 days    Peripheral IV 10/24/24 Left;Proximal;Ventral (anterior) Forearm <1 day                    Labs, Imaging, & Other studies:   All pertinent labs were personally reviewed  Results from last 7 days   Lab Units 10/24/24  0643 10/22/24  0739 10/21/24  0452   WBC Thousand/uL 5.34 7.28 7.64   HEMOGLOBIN g/dL 11.7* 11.3* 11.4*   PLATELETS Thousands/uL 359 290 244     Results from last 7 days   Lab Units 10/24/24  0643 10/23/24  0613 10/22/24  0739   SODIUM mmol/L 137 137 135   POTASSIUM mmol/L 4.2 4.2 4.1   CHLORIDE mmol/L 101 101 100   CO2 mmol/L 29 30 28   BUN mg/dL 13 17 17   CREATININE mg/dL 1.14 1.30 1.22   EGFR ml/min/1.73sq m 64 55 59   CALCIUM mg/dL 8.7 8.3* 8.3*     Results from last 7 days   Lab Units 10/21/24  1428   GRAM STAIN RESULT  1+ Gram positive cocci in clusters*  No polys seen*   WOUND CULTURE  1+ Growth of Actinomyces species*  Few Colonies of Diphtheroids  Few Colonies of - Globicatella Species Globicatella*  1 colony Staphylococcus coagulase negative*

## 2024-10-24 NOTE — ASSESSMENT & PLAN NOTE
Patient with nonhealing left foot wounds  Status post left second toe amputation/debridement with podiatry on 10/21 for worsening wound  Vascular surgery and IR following  Plans noted for IR lower extremity angiogram on 10/25

## 2024-10-24 NOTE — PROGRESS NOTES
Clay Marcial is a 68 y.o. male who is currently ordered Vancomycin IV with management by the Pharmacy Consult service.  Relevant clinical data and objective / subjective history reviewed.  Vancomycin Assessment:  Indication and Goal AUC/Trough: Soft tissue (goal -600, trough >10); Bone/joint infection (goal -600, trough >10), -600, trough >10  Clinical Status: stable  Micro:     Renal Function:  SCr: 1.14 mg/dL  CrCl: 81.5 mL/min  Renal replacement: Not on dialysis  Days of Therapy: 3  Current Dose:  1000 mg IV q12h  Vancomycin Plan:  New Dosing: continue current dosing   Estimated AUC: 460 mcg*hr/mL  Estimated Trough: 14.6 mcg/mL  Next Level: 10/31 am labs   Renal Function Monitoring: Daily BMP and UOP  Pharmacy will continue to follow closely for s/sx of nephrotoxicity, infusion reactions and appropriateness of therapy.  BMP and CBC will be ordered per protocol. We will continue to follow the patient’s culture results and clinical progress daily.    Phillip Ferguson, Pharmacist

## 2024-10-24 NOTE — ASSESSMENT & PLAN NOTE
Lab Results   Component Value Date    HGBA1C 7.2 (H) 10/16/2024       Recent Labs     10/23/24  1603 10/23/24  2120 10/24/24  0701 10/24/24  1112   POCGLU 163* 184* 193* 250*       Blood Sugar Average: Last 72 hrs:  (P) [id number]  Increase Lantus to 18 units QHS  Continue SSI coverage   QID glucose checks   Risk factor for primary problem   Monitor and adjust regimen as needed

## 2024-10-24 NOTE — PROGRESS NOTES
NEPHROLOGY HOSPITAL PROGRESS NOTE   Clay Marcial 68 y.o. male MRN: [id number]  Unit/Bed#: [facility] 825-01 Encounter: [id number]  Reason for Consult: Elevated creatinine on CKD  Assessment & Plan  Elevated serum creatinine  Etiology of elevated creatinine most likely due to autoregulatory failure in the setting of ACE inhibitor use, SGLT2 inhibitor use and diuretic use  Baseline creatinine appears to be around 1.3-1.4 in setting of diabetes and hypertension  Creatinine on recent admission 1.58  Peak creatinine 1.7 on 10/17  Creatinine today 1.14  He is currently auto diuresing, continue to hold diuretics  Lisinopril, furosemide and Jardiance currently on hold  Patient is scheduled for lower extremity angiogram on Friday  Give Isolyte 50 cc/h for 3 hours before and 6 hours after lower extremity angiogram on Friday  Benefits and risks of lower extremity angiogram from nephrology perspective discussed with the patient  Stage 3 chronic kidney disease (HCC)  Baseline creatinine 1.3-1.4  Needs outpatient follow-up with nephrology  Diabetic foot infection  (HCC)  Patient with nonhealing left foot wounds  Status post left second toe amputation/debridement with podiatry on 10/21 for worsening wound  Vascular surgery and IR following  Plans noted for IR lower extremity angiogram on 10/25  Diabetes mellitus, type 2 (HCC)  Management per primary team  Continue to hold metformin and Jardiance for now  CAD (coronary artery disease)  Management per primary team  Chronic obstructive pulmonary disease with acute exacerbation (HCC)  Management per primary team  HTN (hypertension)  Home Rx: Furosemide 40 mg as needed, Imdur 30 mg daily, lisinopril 20 mg daily, Toprol-XL 50 mg daily  Current Rx: Imdur 30 mg daily, Toprol-XL 50 mg daily  Blood pressure is currently acceptable, keep holding lisinopril and furosemide    Discussed with internal medicine team.  After discussion, we agreed that kidney function is stable and to keep holding  diuretics and RAAS blockers and to provide IV fluids before and after angiogram tomorrow.    SUBJECTIVE / 24H INTERVAL HISTORY:  Urine output recorded as 3650 cc.  Complains of leg swelling.  Denies dyspnea.    OBJECTIVE:  Current Weight: Weight - Scale: 126 kg (278 lb)  Vitals:    10/23/24 1500 10/23/24 1934 10/23/24 2157 10/24/24 0702   BP: 135/72  154/87 146/85   Pulse: 77   78   Resp: 18  (!) 23 16   Temp: 97.8 °F (36.6 °C)  97.8 °F (36.6 °C) 97.9 °F (36.6 °C)   SpO2: 96% 93%  94%   Weight:       Height:           Intake/Output Summary (Last 24 hours) at 10/24/2024 0714  Last data filed at 10/24/2024 0647  Gross per 24 hour   Intake 960 ml   Output 3650 ml   Net -2690 ml     Review of Systems   Constitutional:  Negative for chills and fever.   HENT:  Negative for ear pain and sore throat.    Eyes:  Negative for pain and visual disturbance.   Respiratory:  Negative for cough and shortness of breath.    Cardiovascular:  Positive for leg swelling. Negative for chest pain and palpitations.   Gastrointestinal:  Negative for abdominal pain and vomiting.   Genitourinary:  Negative for dysuria and hematuria.   Musculoskeletal:  Negative for arthralgias and back pain.   Skin:  Negative for color change and rash.   Neurological:  Negative for seizures and syncope.   All other systems reviewed and are negative.    Physical Exam  Vitals and nursing note reviewed.   Constitutional:       General: He is not in acute distress.     Appearance: He is well-developed.   HENT:      Head: Normocephalic and atraumatic.   Eyes:      Conjunctiva/sclera: Conjunctivae normal.   Cardiovascular:      Rate and Rhythm: Normal rate and regular rhythm.      Pulses: Normal pulses.      Heart sounds: Normal heart sounds. No murmur heard.  Pulmonary:      Effort: Pulmonary effort is normal. No respiratory distress.      Breath sounds: Normal breath sounds.   Abdominal:      Palpations: Abdomen is soft.      Tenderness: There is no abdominal  tenderness.   Musculoskeletal:         General: No swelling.      Cervical back: Neck supple.      Right lower leg: Edema present.      Left lower leg: Edema present.   Skin:     General: Skin is warm and dry.      Capillary Refill: Capillary refill takes less than 2 seconds.   Neurological:      Mental Status: He is alert.   Psychiatric:         Mood and Affect: Mood normal.       Medications:    Current Facility-Administered Medications:     acetaminophen (TYLENOL) tablet 650 mg, 650 mg, Oral, Q6H PRN, Stefan Ordonez MD    albuterol inhalation solution 2.5 mg, 2.5 mg, Nebulization, Q6H PRN, Robert Villegas MD    aspirin chewable tablet 81 mg, 81 mg, Oral, Daily, Stefan Ordonez MD, 81 mg at 10/23/24 0828    atorvastatin (LIPITOR) tablet 20 mg, 20 mg, Oral, Daily, Stefan Ordonez MD, 20 mg at 10/23/24 0827    budesonide (PULMICORT) inhalation solution 0.5 mg, 0.5 mg, Nebulization, Q12H, Stefan Ordonez MD, 0.5 mg at 10/23/24 1934    Cholecalciferol (VITAMIN D3) tablet 1,000 Units, 1,000 Units, Oral, Daily, Stefan Ordonez MD, 1,000 Units at 10/23/24 0827    cyanocobalamin (VITAMIN B-12) tablet 500 mcg, 500 mcg, Oral, Daily, Stefan Ordonez MD, 500 mcg at 10/23/24 0828    docusate sodium (COLACE) capsule 100 mg, 100 mg, Oral, BID, Stefan Ordonez MD, 100 mg at 10/23/24 1710    DULoxetine (CYMBALTA) delayed release capsule 20 mg, 20 mg, Oral, Daily, Stefan Ordonez MD, 20 mg at 10/23/24 0828    fluticasone (FLONASE) 50 mcg/act nasal spray 2 spray, 2 spray, Nasal, Daily, Stefan Ordonez MD    heparin (porcine) 25,000 units in 0.45% NaCl 250 mL infusion (premix), 3-30 Units/kg/hr (Order-Specific), Intravenous, Titrated, Stefan Ordonez MD, Last Rate: 18 mL/hr at 10/24/24 0634, 15 Units/kg/hr at 10/24/24 0634    heparin (porcine) injection 4,800 Units, 4,800 Units, Intravenous, Q6H PRN, Stefan Ordonez MD, 4,800 Units at 10/23/24 1709    heparin (porcine) injection 9,600 Units, 9,600 Units, Intravenous, Q6H PRN, Stefan Ordonez MD     insulin glargine (LANTUS) subcutaneous injection 15 Units 0.15 mL, 15 Units, Subcutaneous, HS, Stefan Ordonez MD, 15 Units at 10/23/24 2152    insulin lispro (HumALOG/ADMELOG) 100 units/mL subcutaneous injection 10 Units, 10 Units, Subcutaneous, TID With Meals, Stefan Ordonez MD, 10 Units at 10/23/24 1712    insulin lispro (HumALOG/ADMELOG) 100 units/mL subcutaneous injection 2-12 Units, 2-12 Units, Subcutaneous, TID AC, 2 Units at 10/23/24 1710 **AND** Fingerstick Glucose (POCT), , , TID AC, Stefan Ordonez MD    isosorbide mononitrate (IMDUR) 24 hr tablet 30 mg, 30 mg, Oral, Daily, Stefan Ordonez MD, 30 mg at 10/23/24 0827    loratadine (CLARITIN) tablet 10 mg, 10 mg, Oral, Daily, Stefan Ordonez MD, 10 mg at 10/23/24 0828    metoprolol succinate (TOPROL-XL) 24 hr tablet 50 mg, 50 mg, Oral, Daily, Stefan Ordonez MD, 50 mg at 10/23/24 0828    morphine injection 2 mg, 2 mg, Intravenous, Q4H PRN, Mansi Lou PA-C    nicotine (NICODERM CQ) 14 mg/24hr TD 24 hr patch 1 patch, 1 patch, Transdermal, Daily, Stefan Ordonez MD, 1 patch at 10/23/24 0826    umeclidinium 62.5 mcg/actuation inhaler AEPB 1 puff, 1 puff, Inhalation, Daily **AND** olodaterol HCl (STRIVERDI RESPIMAT) inhaler 2 puff, 2 puff, Inhalation, Daily, Stefan Ordonez MD    ondansetron (ZOFRAN) injection 4 mg, 4 mg, Intravenous, Q4H PRN, Stefan Ordonez MD    oxyCODONE (ROXICODONE) IR tablet 5 mg, 5 mg, Oral, Q4H PRN, Mansi Lou PA-C    oxyCODONE (ROXICODONE) split tablet 2.5 mg, 2.5 mg, Oral, Q4H PRN, Mansi Lou PA-C    pantoprazole (PROTONIX) EC tablet 40 mg, 40 mg, Oral, BID AC, Stefan Ordonez MD, 40 mg at 10/24/24 0635    piperacillin-tazobactam (ZOSYN) 4.5 g in sodium chloride 0.9 % 100 mL IVPB (EXTENDED INFUSION), 4.5 g, Intravenous, Q8H, Stefan Ordonez MD, Last Rate: 25 mL/hr at 10/24/24 0635, 4.5 g at 10/24/24 0635    polyethylene glycol (MIRALAX) packet 17 g, 17 g, Oral, Daily, Stefan Ordonez MD, 17 g at 10/23/24 0826    pregabalin (LYRICA) capsule 100 mg,  "100 mg, Oral, BID, Stefan Ordonez MD, 100 mg at 10/23/24 1711    sodium chloride (OCEAN) 0.65 % nasal spray 2 spray, 2 spray, Each Nare, BID, Stefan Ordonez MD    traZODone (DESYREL) tablet 25 mg, 25 mg, Oral, HS, Stefan Ordonez MD, 25 mg at 10/23/24 2152    vancomycin (VANCOCIN) IVPB (premix in dextrose) 1,000 mg 200 mL, 1,000 mg, Intravenous, Q12H, Stefan Ordonez MD, Last Rate: 200 mL/hr at 10/23/24 2330, 1,000 mg at 10/23/24 2330    Laboratory Results:  Results from last 7 days   Lab Units 10/24/24  0643 10/23/24  0613 10/22/24  0739 10/21/24  0452 10/20/24  0251 10/19/24  0440 10/18/24  0538   WBC Thousand/uL 5.34  --  7.28 7.64  --  8.36  --    HEMOGLOBIN g/dL 11.7*  --  11.3* 11.4*  --  11.4*  --    HEMATOCRIT % 35.2*  --  33.6* 33.9*  --  33.3*  --    PLATELETS Thousands/uL 359  --  290 244  --  167  --    POTASSIUM mmol/L  --  4.2 4.1 4.0 5.0 4.0 3.8   CHLORIDE mmol/L  --  101 100 98 97 98 98   CO2 mmol/L  --  30 28 25 27 28 27   BUN mg/dL  --  17 17 20 21 20 21   CREATININE mg/dL  --  1.30 1.22 1.21 1.21 1.29 1.49*   CALCIUM mg/dL  --  8.3* 8.3* 8.3* 8.3* 8.0* 7.7*   MAGNESIUM mg/dL  --  1.9  --   --   --   --   --        Portions of the record may have been created with voice recognition software. Occasional wrong word or \"sound a like\" substitutions may have occurred due to the inherent limitations of voice recognition software. Read the chart carefully and recognize, using context, where substitutions have occurred.If you have any questions, please contact the dictating provider.    "

## 2024-10-24 NOTE — PROGRESS NOTES
[facility] Podiatry - Progress Note  Patient: Clay Marcial 68 y.o. male   MRN: [id number]  PCP: No primary care provider on file.  Unit/Bed#: [facility] 825-01 Encounter: [id number]  Date Of Visit: 10/24/24    ASSESSMENT:    Clay Marcial is a 68 y.o. male with:    Diabetic foot infection s/p left second toe amputation and wound debridement, packed open  Type 2 diabetes mellitus with most recent A1c 7.2% collected 10/16/2024  Coronary artery disease  Stage III chronic kidney disease  Chronic obstructive pulmonary disease  Hypertension    PLAN:    Patient was seen and evaluated at bedside with all questions and concerns addressed.  Of note, surgical site exhibited moderate-severe drainage as packing and outer dressing were wet from inside to out with odorous drainage. No cellulitis is appreciated. Probe to bone positive  Plan to take to OR for podiatric surgical intervention following Agram tentatively tomorrow. Plan is for partial 2nd ray amputation vs TMA. Explained surgical options to patient and he would like time to consider options at this time.  Final result of cultures show 2+ growth of bacteroides and finegoldia magna. Continue Abx per primary team  Reviewed LEADs and prior imaging. MRI shows bone marrow edema in 3rd metatarsal head, could have been early osteomyelitis considering clinical picture. Will consider another MRI.  Dressing was changed. The current dressing is plain packing DSD with Betadine paint the macerated wound edge. Next dressing change by podiatry is planned on 10/25/2024 after possible angiogram with IR.  Patient is afebrile with no leukocytosis  Continue IV vancomycin and Zosyn pre recommendation from ID  Podiatry to perform dressing change at this point.  Elevation and offloading on green foam wedges or pillows when non-ambulatory.  Rest of care per primary team.  Will discuss this plan with my attending and update as needed.     Weight bearing status: NWB RLE      SUBJECTIVE:     The  "patient was seen, evaluated, and assessed at bedside today. The patient was awake, alert, and in no acute distress. No acute events overnight. The patient reports no pain to left foot, had dressing reinforced yesterday by nursing. Patient denies N/V/F/chills/SOB/CP.      OBJECTIVE:     Vitals:   /85   Pulse 78   Temp 97.9 °F (36.6 °C)   Resp 16   Ht 5' 11\" (1.803 m)   Wt 126 kg (278 lb)   SpO2 94%   BMI 38.77 kg/m²     Temp (24hrs), Av.8 °F (36.6 °C), Min:97.8 °F (36.6 °C), Max:97.9 °F (36.6 °C)      Physical Exam:     General:  Alert, cooperative, and in no distress.  Lower extremity exam:  Cardiovascular status at baseline.  Neurological status at baseline.  Musculoskeletal status at baseline. No calf tenderness noted.     Lower extremity wound(s) as noted below:    Wound #: 1  Location: Left second toe amputation site connected to submet 2 wound debridement site  Length 8 cm: Width 3 cm: Depth 3 cm:   Deepest Tissue Noted in Base: Bone (second metatarsal head)  Probe to Bone: Yes  Peripheral Skin Description: Attached and macerated  Granulation: 50% Fibrotic Tissue: 50% Necrotic Tissue: 0%   Drainage Amount: copious, seropurulent dressing  Signs of Infection: Yes. positive  probe to bone, negative crepitus, fluctuance, negative periwound skin erythema, negative proximal tracking erythema    Clinical Images 10/24/24:                Additional Data:     Labs:    Results from last 7 days   Lab Units 10/24/24  0643 10/22/24  0739 10/21/24  0452   WBC Thousand/uL 5.34 7.28 7.64   HEMOGLOBIN g/dL 11.7* 11.3* 11.4*   HEMATOCRIT % 35.2* 33.6* 33.9*   PLATELETS Thousands/uL 359 290 244   SEGS PCT %  --   --  72   LYMPHO PCT %  --  21 15   MONO PCT %  --  8 9   EOS PCT %  --  1 2     Results from last 7 days   Lab Units 10/24/24  0643   POTASSIUM mmol/L 4.2   CHLORIDE mmol/L 101   CO2 mmol/L 29   BUN mg/dL 13   CREATININE mg/dL 1.14   CALCIUM mg/dL 8.7     Results from last 7 days   Lab Units " "10/19/24  1434   INR  1.18       * I Have Reviewed All Lab Data Listed Above.    Recent Cultures (last 7 days):     Results from last 7 days   Lab Units 10/21/24  1428   GRAM STAIN RESULT  1+ Gram positive cocci in clusters*  No polys seen*   WOUND CULTURE  Culture results to follow.     Results from last 7 days   Lab Units 10/21/24  1428   ANAEROBIC CULTURE  2+ Growth of - Bacteroides pyogenes Bacteroides species*  2+ Growth of Finegoldia magna*           ** Please Note: Portions of the record may have been created with voice recognition software. Occasional wrong word or \"sound a like\" substitutions may have occurred due to the inherent limitations of voice recognition software. Read the chart carefully and recognize, using context, where substitutions have occurred. **   "

## 2024-10-24 NOTE — PLAN OF CARE
Problem: PAIN - ADULT  Goal: Verbalizes/displays adequate comfort level or baseline comfort level  Description: Interventions:  - Encourage patient to monitor pain and request assistance  - Assess pain using appropriate pain scale  - Administer analgesics based on type and severity of pain and evaluate response  - Implement non-pharmacological measures as appropriate and evaluate response  - Consider cultural and social influences on pain and pain management  - Notify physician/advanced practitioner if interventions unsuccessful or patient reports new pain  Outcome: Progressing     Problem: INFECTION - ADULT  Goal: Absence or prevention of progression during hospitalization  Description: INTERVENTIONS:  - Assess and monitor for signs and symptoms of infection  - Monitor lab/diagnostic results  - Monitor all insertion sites, i.e. indwelling lines, tubes, and drains  - Monitor endotracheal if appropriate and nasal secretions for changes in amount and color  - [place] appropriate cooling/warming therapies per order  - Administer medications as ordered  - Instruct and encourage patient and family to use good hand hygiene technique  - Identify and instruct in appropriate isolation precautions for identified infection/condition  Outcome: Progressing  Goal: Absence of fever/infection during neutropenic period  Description: INTERVENTIONS:  - Monitor WBC    Outcome: Progressing     Problem: SAFETY ADULT  Goal: Patient will remain free of falls  Description: INTERVENTIONS:  - Educate patient/family on patient safety including physical limitations  - Instruct patient to call for assistance with activity   - Consult OT/PT to assist with strengthening/mobility   - Keep Call bell within reach  - Keep bed low and locked with side rails adjusted as appropriate  - Keep care items and personal belongings within reach  - Initiate and maintain comfort rounds  - Make Fall Risk Sign visible to staff  - Offer Toileting every  Hours,  in advance of need  - Initiate/Maintain alarm  - Obtain necessary fall risk management equipment:   - Apply yellow socks and bracelet for high fall risk patients  - Consider moving patient to room near nurses station  Outcome: Progressing  Goal: Maintain or return to baseline ADL function  Description: INTERVENTIONS:  -  Assess patient's ability to carry out ADLs; assess patient's baseline for ADL function and identify physical deficits which impact ability to perform ADLs (bathing, care of mouth/teeth, toileting, grooming, dressing, etc.)  - Assess/evaluate cause of self-care deficits   - Assess range of motion  - Assess patient's mobility; develop plan if impaired  - Assess patient's need for assistive devices and provide as appropriate  - Encourage maximum independence but intervene and supervise when necessary  - Involve family in performance of ADLs  - Assess for home care needs following discharge   - Consider OT consult to assist with ADL evaluation and planning for discharge  - Provide patient education as appropriate  Outcome: Progressing  Goal: Maintains/Returns to pre admission functional level  Description: INTERVENTIONS:  - Perform AM-PAC 6 Click Basic Mobility/ Daily Activity assessment daily.  - Set and communicate daily mobility goal to care team and patient/family/caregiver.   - Collaborate with rehabilitation services on mobility goals if consulted  - Perform Range of Motion  times a day.  - Reposition patient every  hours.  - Dangle patient  times a day  - Stand patient  times a day  - Ambulate patient  times a day  - Out of bed to chair  times a day   - Out of bed for meals  times a day  - Out of bed for toileting  - Record patient progress and toleration of activity level   Outcome: Progressing

## 2024-10-24 NOTE — ASSESSMENT & PLAN NOTE
Initially at [facility] hospital with left lower extremity cellulitis with open wound.    Evaluated by podiatry, ID, vascular at [facility] who are also following here,  S/p I&D, L 2nd toe amputation with podiatry on 10/21 given concern for acutely worsening wound/infection   Transferred to [facility] [place] for vascular/IR evaluations given concern also for embolic process/ischemia   Plan for agram tomorrow with plan for partial 2nd ray amputation vs. TMA intervention afterwards with podiatry   Continue IV heparin gtt   Wound with purulent drainage and probing to bone on 10/24  ID following,  Continue IV zosyn and vancomycin for now, pt without improvement on Ancef  Wound culture from OR on 10/21 with bacteroides, multiple organisms   Monitor temps, WBC

## 2024-10-24 NOTE — PROGRESS NOTES
Progress Note - Hospitalist   Name: Clay Marcial 68 y.o. male I MRN: [id number]  Unit/Bed#: [facility] 825-01 I Date of Admission: 10/22/2024   Date of Service: 10/24/2024 I Hospital Day: 2    Assessment & Plan  Diabetic foot infection  (HCC)  Initially at [facility] hospital with left lower extremity cellulitis with open wound.    Evaluated by podiatry, ID, vascular at [facility] who are also following here,  S/p I&D, L 2nd toe amputation with podiatry on 10/21 given concern for acutely worsening wound/infection   Transferred to [facility] [place] [facility] for vascular/IR evaluations given concern also for embolic process/ischemia   Plan for agram tomorrow with plan for partial 2nd ray amputation vs. TMA intervention afterwards with podiatry   Continue IV heparin gtt   Wound with purulent drainage and probing to bone on 10/24  ID following,  Continue IV zosyn and vancomycin for now, pt without improvement on Ancef  Wound culture from OR on 10/21 with bacteroides, multiple organisms   Monitor temps, WBC  Diabetes mellitus, type 2 (HCC)  Lab Results   Component Value Date    HGBA1C 7.2 (H) 10/16/2024       Recent Labs     10/23/24  1603 10/23/24  2120 10/24/24  0701 10/24/24  1112   POCGLU 163* 184* 193* 250*       Blood Sugar Average: Last 72 hrs:  (P) [id number]  Increase Lantus to 18 units QHS  Continue SSI coverage   QID glucose checks   Risk factor for primary problem   Monitor and adjust regimen as needed  Stage 3 chronic kidney disease (HCC)  Lab Results   Component Value Date    EGFR 64 10/24/2024    EGFR 55 10/23/2024    EGFR 59 10/22/2024    CREATININE 1.14 10/24/2024    CREATININE 1.30 10/23/2024    CREATININE 1.22 10/22/2024   Creatinine currently at baseline, was noted to have elevated serum creatinine earlier on in this hospitalization with peak of 1.7  Nephrology following,  Holding diuretics and lisinopril   Start IV isolyte 50 cc/hr for 3 hours prior and 6 hours after lower extremity angiogram tomorrow   Continue to monitor with  BMP  HTN (hypertension)  BP stable on review   Continue Imdur 30 mg daily, Toprol XL 50 mg daily   ACEI and diuretics on hold per nephrology  Monitor   CAD (coronary artery disease)  Continue aspirin, Lipitor, isosorbide dinitrate, BB  Chronic obstructive pulmonary disease with acute exacerbation (HCC)  Has since resolved  Continue Pulmicort and Xopenex  Saturating well on RA    VTE Pharmacologic Prophylaxis:   High Risk (Score >/= 5) - Pharmacological DVT Prophylaxis Ordered: heparin drip. Sequential Compression Devices Ordered.    Mobility:   Basic Mobility Inpatient Raw Score: 19  JH-HLM Goal: 6: Walk 10 steps or more  JH-HLM Achieved: 6: Walk 10 steps or more  JH-HLM Goal achieved. Continue to encourage appropriate mobility.    Patient Centered Rounds: I evaluated the patient without nursing staff present due to speaking to nurse outside patient's room     Discussions with Specialists or Other Care Team Provider: Discussed with RN, CM, ID and reviewed previous notes     Education and Discussions with Family / Patient: Patient declined call to .     Current Length of Stay: 2 day(s)  Current Patient Status: Inpatient   Certification Statement: The patient will continue to require additional inpatient hospital stay due to angiogram tomorrow followed by podiatry intervention, IV abx  Discharge Plan: Anticipate discharge in >72 hrs to discharge location to be determined pending rehab evaluations.    Code Status: Level 1 - Full Code    Subjective   Pt reports that he feels okay today. Denies any significant pain in the legs. Denies any chest pain or shortness of breath. Nursing reported patient was requesting more for bowel regimen, already on colace and miralax with add prn suppository which patient is in agreement with.     Objective :  Temp:  [97.8 °F (36.6 °C)-97.9 °F (36.6 °C)] 97.9 °F (36.6 °C)  HR:  [77-78] 78  BP: (135-154)/(72-87) 146/85  Resp:  [16-23] 16  SpO2:  [93 %-96 %] 94 %  O2 Device:  None (Room air)    Body mass index is 38.77 kg/m².     Input and Output Summary (last 24 hours):     Intake/Output Summary (Last 24 hours) at 10/24/2024 1350  Last data filed at 10/24/2024 0647  Gross per 24 hour   Intake 480 ml   Output 2950 ml   Net -2470 ml       Physical Exam  Vitals reviewed.   Constitutional:       Comments: Pt is in no acute distress lying in his hospital bed resting comfortably    Cardiovascular:      Rate and Rhythm: Normal rate and regular rhythm.   Pulmonary:      Effort: No respiratory distress.      Breath sounds: Normal breath sounds. No wheezing.   Abdominal:      General: Bowel sounds are normal.      Palpations: Abdomen is soft.   Musculoskeletal:      Right lower leg: Edema present.      Left lower leg: Edema (with dressing in place, c/d/i) present.   Skin:     General: Skin is warm and dry.   Neurological:      Mental Status: He is alert.   Psychiatric:         Mood and Affect: Mood normal.           Lines/Drains:              Lab Results: I have reviewed the following results:   Results from last 7 days   Lab Units 10/24/24  0643 10/22/24  0739 10/21/24  0452   WBC Thousand/uL 5.34 7.28 7.64   HEMOGLOBIN g/dL 11.7* 11.3* 11.4*   HEMATOCRIT % 35.2* 33.6* 33.9*   PLATELETS Thousands/uL 359 290 244   SEGS PCT %  --   --  72   LYMPHO PCT %  --  21 15   MONO PCT %  --  8 9   EOS PCT %  --  1 2     Results from last 7 days   Lab Units 10/24/24  0643   SODIUM mmol/L 137   POTASSIUM mmol/L 4.2   CHLORIDE mmol/L 101   CO2 mmol/L 29   BUN mg/dL 13   CREATININE mg/dL 1.14   ANION GAP mmol/L 7   CALCIUM mg/dL 8.7   GLUCOSE RANDOM mg/dL 209*     Results from last 7 days   Lab Units 10/19/24  1434   INR  1.18     Results from last 7 days   Lab Units 10/24/24  1112 10/24/24  0701 10/23/24  2120 10/23/24  1603 10/23/24  1037 10/23/24  0734 10/22/24  2152 10/22/24  1603 10/22/24  1059 10/22/24  0743 10/21/24  2050 10/21/24  1547   POC GLUCOSE mg/dl 250* 193* 184* 163* 166* 188* 226* 183* 207*  193* 197* 143*               Recent Cultures (last 7 days):   Results from last 7 days   Lab Units 10/21/24  1428   GRAM STAIN RESULT  1+ Gram positive cocci in clusters*  No polys seen*   WOUND CULTURE  1+ Growth of Actinomyces species*  Few Colonies of Diphtheroids  Few Colonies of - Globicatella Species Globicatella*  1 colony Staphylococcus coagulase negative*       Imaging Results Review: No pertinent imaging studies reviewed.  Other Study Results Review: No additional pertinent studies reviewed.    Last 24 Hours Medication List:     Current Facility-Administered Medications:     acetaminophen (TYLENOL) tablet 650 mg, Q6H PRN    albuterol inhalation solution 2.5 mg, Q6H PRN    aspirin chewable tablet 81 mg, Daily    atorvastatin (LIPITOR) tablet 20 mg, Daily    bisacodyl (DULCOLAX) rectal suppository 10 mg, Daily PRN    budesonide (PULMICORT) inhalation solution 0.5 mg, Q12H    Cholecalciferol (VITAMIN D3) tablet 1,000 Units, Daily    cyanocobalamin (VITAMIN B-12) tablet 500 mcg, Daily    docusate sodium (COLACE) capsule 100 mg, BID    DULoxetine (CYMBALTA) delayed release capsule 20 mg, Daily    fluticasone (FLONASE) 50 mcg/act nasal spray 2 spray, Daily    heparin (porcine) 25,000 units in 0.45% NaCl 250 mL infusion (premix), Titrated, Last Rate: 15 Units/kg/hr (10/24/24 0634)    heparin (porcine) injection 4,800 Units, Q6H PRN    heparin (porcine) injection 9,600 Units, Q6H PRN    insulin glargine (LANTUS) subcutaneous injection 15 Units 0.15 mL, HS    insulin lispro (HumALOG/ADMELOG) 100 units/mL subcutaneous injection 10 Units, TID With Meals    insulin lispro (HumALOG/ADMELOG) 100 units/mL subcutaneous injection 2-12 Units, TID AC **AND** Fingerstick Glucose (POCT), TID AC    isosorbide mononitrate (IMDUR) 24 hr tablet 30 mg, Daily    loratadine (CLARITIN) tablet 10 mg, Daily    metoprolol succinate (TOPROL-XL) 24 hr tablet 50 mg, Daily    morphine injection 2 mg, Q4H PRN    [START ON 10/25/2024]  multi-electrolyte (PLASMALYTE-A/ISOLYTE-S PH 7.4) IV solution, Continuous    nicotine (NICODERM CQ) 14 mg/24hr TD 24 hr patch 1 patch, Daily    umeclidinium 62.5 mcg/actuation inhaler AEPB 1 puff, Daily **AND** olodaterol HCl (STRIVERDI RESPIMAT) inhaler 2 puff, Daily    ondansetron (ZOFRAN) injection 4 mg, Q4H PRN    oxyCODONE (ROXICODONE) IR tablet 5 mg, Q4H PRN    oxyCODONE (ROXICODONE) split tablet 2.5 mg, Q4H PRN    pantoprazole (PROTONIX) EC tablet 40 mg, BID AC    piperacillin-tazobactam (ZOSYN) 4.5 g in sodium chloride 0.9 % 100 mL IVPB (EXTENDED INFUSION), Q8H, Last Rate: 4.5 g (10/24/24 0635)    polyethylene glycol (MIRALAX) packet 17 g, Daily    pregabalin (LYRICA) capsule 100 mg, BID    sodium chloride (OCEAN) 0.65 % nasal spray 2 spray, BID    traZODone (DESYREL) tablet 25 mg, HS    vancomycin (VANCOCIN) IVPB (premix in dextrose) 1,000 mg 200 mL, Q12H, Last Rate: 1,000 mg (10/24/24 1158)    Administrative Statements   Today, Patient Was Seen By: Tanya Lacey PA-C    **Please Note: This note may have been constructed using a voice recognition system.**

## 2024-10-24 NOTE — ASSESSMENT & PLAN NOTE
BP stable on review   Continue Imdur 30 mg daily, Toprol XL 50 mg daily   ACEI and diuretics on hold per nephrology  Monitor

## 2024-10-24 NOTE — ASSESSMENT & PLAN NOTE
Etiology of elevated creatinine most likely due to autoregulatory failure in the setting of ACE inhibitor use, SGLT2 inhibitor use and diuretic use  Baseline creatinine appears to be around 1.3-1.4 in setting of diabetes and hypertension  Creatinine on recent admission 1.58  Peak creatinine 1.7 on 10/17  Creatinine today 1.14  He is currently auto diuresing, continue to hold diuretics  Lisinopril, furosemide and Jardiance currently on hold  Patient is scheduled for lower extremity angiogram on Friday  Give Isolyte 50 cc/h for 3 hours before and 6 hours after lower extremity angiogram on Friday  Benefits and risks of lower extremity angiogram from nephrology perspective discussed with the patient

## 2024-10-24 NOTE — ASSESSMENT & PLAN NOTE
Lab Results   Component Value Date    EGFR 64 10/24/2024    EGFR 55 10/23/2024    EGFR 59 10/22/2024    CREATININE 1.14 10/24/2024    CREATININE 1.30 10/23/2024    CREATININE 1.22 10/22/2024   Creatinine currently at baseline, was noted to have elevated serum creatinine earlier on in this hospitalization with peak of 1.7  Nephrology following,  Holding diuretics and lisinopril   Start IV isolyte 50 cc/hr for 3 hours prior and 6 hours after lower extremity angiogram tomorrow   Continue to monitor with BMP

## 2024-10-25 ENCOUNTER — APPOINTMENT (INPATIENT)
Dept: RADIOLOGY | Facility: HOSPITAL | Age: 69
DRG: 240 | End: 2024-10-25
Payer: COMMERCIAL

## 2024-10-25 LAB
ANION GAP SERPL CALCULATED.3IONS-SCNC: 7 MMOL/L (ref 4–13)
APTT PPP: 70 SECONDS (ref 23–34)
BUN SERPL-MCNC: 14 MG/DL (ref 5–25)
CALCIUM SERPL-MCNC: 8.3 MG/DL (ref 8.4–10.2)
CHLORIDE SERPL-SCNC: 102 MMOL/L (ref 96–108)
CO2 SERPL-SCNC: 27 MMOL/L (ref 21–32)
CREAT SERPL-MCNC: 1.13 MG/DL (ref 0.6–1.3)
ERYTHROCYTE [DISTWIDTH] IN BLOOD BY AUTOMATED COUNT: 12.1 % (ref 11.6–15.1)
GFR SERPL CREATININE-BSD FRML MDRD: 65 ML/MIN/1.73SQ M
GLUCOSE SERPL-MCNC: 159 MG/DL (ref 65–140)
GLUCOSE SERPL-MCNC: 160 MG/DL (ref 65–140)
GLUCOSE SERPL-MCNC: 183 MG/DL (ref 65–140)
GLUCOSE SERPL-MCNC: 273 MG/DL (ref 65–140)
GLUCOSE SERPL-MCNC: 279 MG/DL (ref 65–140)
HCT VFR BLD AUTO: 33.9 % (ref 36.5–49.3)
HGB BLD-MCNC: 11.3 G/DL (ref 12–17)
KCT BLD-ACNC: 179 SEC (ref 89–137)
MCH RBC QN AUTO: 32.2 PG (ref 26.8–34.3)
MCHC RBC AUTO-ENTMCNC: 33.3 G/DL (ref 31.4–37.4)
MCV RBC AUTO: 97 FL (ref 82–98)
MRSA NOSE QL CULT: NORMAL
PLATELET # BLD AUTO: 376 THOUSANDS/UL (ref 149–390)
PMV BLD AUTO: 9.6 FL (ref 8.9–12.7)
POTASSIUM SERPL-SCNC: 4.1 MMOL/L (ref 3.5–5.3)
RBC # BLD AUTO: 3.51 MILLION/UL (ref 3.88–5.62)
SODIUM SERPL-SCNC: 136 MMOL/L (ref 135–147)
SPECIMEN SOURCE: ABNORMAL
WBC # BLD AUTO: 5.6 THOUSAND/UL (ref 4.31–10.16)

## 2024-10-25 PROCEDURE — B41CYZZ FLUOROSCOPY OF PELVIC ARTERIES USING OTHER CONTRAST: ICD-10-PCS | Performed by: RADIOLOGY

## 2024-10-25 PROCEDURE — C1887 CATHETER, GUIDING: HCPCS

## 2024-10-25 PROCEDURE — 85027 COMPLETE CBC AUTOMATED: CPT | Performed by: PHYSICIAN ASSISTANT

## 2024-10-25 PROCEDURE — 80048 BASIC METABOLIC PNL TOTAL CA: CPT | Performed by: INTERNAL MEDICINE

## 2024-10-25 PROCEDURE — 99232 SBSQ HOSP IP/OBS MODERATE 35: CPT | Performed by: INTERNAL MEDICINE

## 2024-10-25 PROCEDURE — 99232 SBSQ HOSP IP/OBS MODERATE 35: CPT | Performed by: SURGERY

## 2024-10-25 PROCEDURE — 99152 MOD SED SAME PHYS/QHP 5/>YRS: CPT | Performed by: RADIOLOGY

## 2024-10-25 PROCEDURE — C1894 INTRO/SHEATH, NON-LASER: HCPCS

## 2024-10-25 PROCEDURE — 82948 REAGENT STRIP/BLOOD GLUCOSE: CPT

## 2024-10-25 PROCEDURE — B41GYZZ FLUOROSCOPY OF LEFT LOWER EXTREMITY ARTERIES USING OTHER CONTRAST: ICD-10-PCS | Performed by: RADIOLOGY

## 2024-10-25 PROCEDURE — 99232 SBSQ HOSP IP/OBS MODERATE 35: CPT | Performed by: FAMILY MEDICINE

## 2024-10-25 PROCEDURE — 75710 ARTERY X-RAYS ARM/LEG: CPT | Performed by: RADIOLOGY

## 2024-10-25 PROCEDURE — 94664 DEMO&/EVAL PT USE INHALER: CPT

## 2024-10-25 PROCEDURE — 76937 US GUIDE VASCULAR ACCESS: CPT

## 2024-10-25 PROCEDURE — 99153 MOD SED SAME PHYS/QHP EA: CPT

## 2024-10-25 PROCEDURE — B410YZZ FLUOROSCOPY OF ABDOMINAL AORTA USING OTHER CONTRAST: ICD-10-PCS | Performed by: RADIOLOGY

## 2024-10-25 PROCEDURE — 75630 X-RAY AORTA LEG ARTERIES: CPT

## 2024-10-25 PROCEDURE — 75625 CONTRAST EXAM ABDOMINL AORTA: CPT | Performed by: RADIOLOGY

## 2024-10-25 PROCEDURE — 94640 AIRWAY INHALATION TREATMENT: CPT

## 2024-10-25 PROCEDURE — NC001 PR NO CHARGE: Performed by: PODIATRIST

## 2024-10-25 PROCEDURE — C1725 CATH, TRANSLUMIN NON-LASER: HCPCS

## 2024-10-25 PROCEDURE — 94760 N-INVAS EAR/PLS OXIMETRY 1: CPT

## 2024-10-25 PROCEDURE — C1769 GUIDE WIRE: HCPCS

## 2024-10-25 PROCEDURE — 37228 HB TIB/PER REVASC W/TLA: CPT

## 2024-10-25 PROCEDURE — 99152 MOD SED SAME PHYS/QHP 5/>YRS: CPT

## 2024-10-25 PROCEDURE — 76937 US GUIDE VASCULAR ACCESS: CPT | Performed by: RADIOLOGY

## 2024-10-25 PROCEDURE — 85730 THROMBOPLASTIN TIME PARTIAL: CPT | Performed by: FAMILY MEDICINE

## 2024-10-25 PROCEDURE — 85347 COAGULATION TIME ACTIVATED: CPT

## 2024-10-25 PROCEDURE — 37228 PR REVSC OPN/PRQ TIB/PERO W/ANGIOPLASTY UNI: CPT | Performed by: RADIOLOGY

## 2024-10-25 PROCEDURE — C1760 CLOSURE DEV, VASC: HCPCS

## 2024-10-25 PROCEDURE — 047Q3ZZ DILATION OF LEFT ANTERIOR TIBIAL ARTERY, PERCUTANEOUS APPROACH: ICD-10-PCS | Performed by: RADIOLOGY

## 2024-10-25 RX ORDER — LIDOCAINE WITH 8.4% SOD BICARB 0.9%(10ML)
SYRINGE (ML) INJECTION AS NEEDED
Status: COMPLETED | OUTPATIENT
Start: 2024-10-25 | End: 2024-10-25

## 2024-10-25 RX ORDER — INSULIN GLARGINE 100 [IU]/ML
25 INJECTION, SOLUTION SUBCUTANEOUS
Status: DISCONTINUED | OUTPATIENT
Start: 2024-10-25 | End: 2024-10-29

## 2024-10-25 RX ORDER — FENTANYL CITRATE 50 UG/ML
INJECTION, SOLUTION INTRAMUSCULAR; INTRAVENOUS AS NEEDED
Status: COMPLETED | OUTPATIENT
Start: 2024-10-25 | End: 2024-10-25

## 2024-10-25 RX ORDER — IODIXANOL 320 MG/ML
200 INJECTION, SOLUTION INTRAVASCULAR
Status: COMPLETED | OUTPATIENT
Start: 2024-10-25 | End: 2024-10-25

## 2024-10-25 RX ORDER — HEPARIN SODIUM 1000 [USP'U]/ML
INJECTION, SOLUTION INTRAVENOUS; SUBCUTANEOUS AS NEEDED
Status: COMPLETED | OUTPATIENT
Start: 2024-10-25 | End: 2024-10-25

## 2024-10-25 RX ORDER — MIDAZOLAM HYDROCHLORIDE 2 MG/2ML
INJECTION, SOLUTION INTRAMUSCULAR; INTRAVENOUS AS NEEDED
Status: COMPLETED | OUTPATIENT
Start: 2024-10-25 | End: 2024-10-25

## 2024-10-25 RX ADMIN — VANCOMYCIN HYDROCHLORIDE 1000 MG: 1 INJECTION, SOLUTION INTRAVENOUS at 11:35

## 2024-10-25 RX ADMIN — BUDESONIDE 0.5 MG: 0.5 INHALANT RESPIRATORY (INHALATION) at 07:45

## 2024-10-25 RX ADMIN — FENTANYL CITRATE 25 MCG: 50 INJECTION INTRAMUSCULAR; INTRAVENOUS at 14:02

## 2024-10-25 RX ADMIN — INSULIN LISPRO 10 UNITS: 100 INJECTION, SOLUTION INTRAVENOUS; SUBCUTANEOUS at 18:22

## 2024-10-25 RX ADMIN — INSULIN LISPRO 2 UNITS: 100 INJECTION, SOLUTION INTRAVENOUS; SUBCUTANEOUS at 18:22

## 2024-10-25 RX ADMIN — ISOSORBIDE MONONITRATE 30 MG: 30 TABLET, EXTENDED RELEASE ORAL at 08:31

## 2024-10-25 RX ADMIN — METOPROLOL SUCCINATE 50 MG: 50 TABLET, EXTENDED RELEASE ORAL at 08:31

## 2024-10-25 RX ADMIN — SODIUM CHLORIDE, SODIUM GLUCONATE, SODIUM ACETATE, POTASSIUM CHLORIDE, MAGNESIUM CHLORIDE, SODIUM PHOSPHATE, DIBASIC, AND POTASSIUM PHOSPHATE 50 ML/HR: .53; .5; .37; .037; .03; .012; .00082 INJECTION, SOLUTION INTRAVENOUS at 10:14

## 2024-10-25 RX ADMIN — CYANOCOBALAMIN TAB 500 MCG 500 MCG: 500 TAB at 08:31

## 2024-10-25 RX ADMIN — PANTOPRAZOLE SODIUM 40 MG: 40 TABLET, DELAYED RELEASE ORAL at 18:21

## 2024-10-25 RX ADMIN — PIPERACILLIN SODIUM AND TAZOBACTAM SODIUM 4.5 G: 36; 4.5 INJECTION, POWDER, LYOPHILIZED, FOR SOLUTION INTRAVENOUS at 21:59

## 2024-10-25 RX ADMIN — IODIXANOL 75 ML: 320 INJECTION, SOLUTION INTRAVASCULAR at 15:28

## 2024-10-25 RX ADMIN — DULOXETINE HYDROCHLORIDE 20 MG: 20 CAPSULE, DELAYED RELEASE ORAL at 08:31

## 2024-10-25 RX ADMIN — MIDAZOLAM 1 MG: 1 INJECTION INTRAMUSCULAR; INTRAVENOUS at 14:14

## 2024-10-25 RX ADMIN — HEPARIN SODIUM 7000 UNITS: 1000 INJECTION INTRAVENOUS; SUBCUTANEOUS at 15:04

## 2024-10-25 RX ADMIN — MIDAZOLAM 0.5 MG: 1 INJECTION INTRAMUSCULAR; INTRAVENOUS at 14:51

## 2024-10-25 RX ADMIN — INSULIN LISPRO 10 UNITS: 100 INJECTION, SOLUTION INTRAVENOUS; SUBCUTANEOUS at 08:33

## 2024-10-25 RX ADMIN — PIPERACILLIN SODIUM AND TAZOBACTAM SODIUM 4.5 G: 36; 4.5 INJECTION, POWDER, LYOPHILIZED, FOR SOLUTION INTRAVENOUS at 16:07

## 2024-10-25 RX ADMIN — FENTANYL CITRATE 50 MCG: 50 INJECTION INTRAMUSCULAR; INTRAVENOUS at 14:14

## 2024-10-25 RX ADMIN — FENTANYL CITRATE 50 MCG: 50 INJECTION INTRAMUSCULAR; INTRAVENOUS at 14:51

## 2024-10-25 RX ADMIN — FENTANYL CITRATE 25 MCG: 50 INJECTION INTRAMUSCULAR; INTRAVENOUS at 14:30

## 2024-10-25 RX ADMIN — HEPARIN SODIUM 15 UNITS/KG/HR: 10000 INJECTION, SOLUTION INTRAVENOUS at 23:09

## 2024-10-25 RX ADMIN — INSULIN LISPRO 6 UNITS: 100 INJECTION, SOLUTION INTRAVENOUS; SUBCUTANEOUS at 08:33

## 2024-10-25 RX ADMIN — FENTANYL CITRATE 25 MCG: 50 INJECTION INTRAMUSCULAR; INTRAVENOUS at 15:11

## 2024-10-25 RX ADMIN — INSULIN GLARGINE 25 UNITS: 100 INJECTION, SOLUTION SUBCUTANEOUS at 21:59

## 2024-10-25 RX ADMIN — HEPARIN SODIUM 15 UNITS/KG/HR: 10000 INJECTION, SOLUTION INTRAVENOUS at 10:13

## 2024-10-25 RX ADMIN — Medication 1000 UNITS: at 08:31

## 2024-10-25 RX ADMIN — DOCUSATE SODIUM 100 MG: 100 CAPSULE, LIQUID FILLED ORAL at 08:31

## 2024-10-25 RX ADMIN — FLUTICASONE PROPIONATE 2 SPRAY: 50 SPRAY, METERED NASAL at 08:33

## 2024-10-25 RX ADMIN — ASPIRIN 81 MG CHEWABLE TABLET 81 MG: 81 TABLET CHEWABLE at 08:31

## 2024-10-25 RX ADMIN — DOCUSATE SODIUM 100 MG: 100 CAPSULE, LIQUID FILLED ORAL at 18:21

## 2024-10-25 RX ADMIN — PREGABALIN 100 MG: 100 CAPSULE ORAL at 18:21

## 2024-10-25 RX ADMIN — TRAZODONE HYDROCHLORIDE 25 MG: 50 TABLET ORAL at 21:59

## 2024-10-25 RX ADMIN — MIDAZOLAM 0.5 MG: 1 INJECTION INTRAMUSCULAR; INTRAVENOUS at 14:02

## 2024-10-25 RX ADMIN — PANTOPRAZOLE SODIUM 40 MG: 40 TABLET, DELAYED RELEASE ORAL at 06:00

## 2024-10-25 RX ADMIN — NICOTINE 1 PATCH: 14 PATCH, EXTENDED RELEASE TRANSDERMAL at 08:33

## 2024-10-25 RX ADMIN — INSULIN LISPRO 2 UNITS: 100 INJECTION, SOLUTION INTRAVENOUS; SUBCUTANEOUS at 11:35

## 2024-10-25 RX ADMIN — Medication 10 ML: at 14:24

## 2024-10-25 RX ADMIN — PREGABALIN 100 MG: 100 CAPSULE ORAL at 08:31

## 2024-10-25 RX ADMIN — MIDAZOLAM 0.5 MG: 1 INJECTION INTRAMUSCULAR; INTRAVENOUS at 14:30

## 2024-10-25 RX ADMIN — ATORVASTATIN CALCIUM 20 MG: 20 TABLET, FILM COATED ORAL at 08:31

## 2024-10-25 RX ADMIN — LORATADINE 10 MG: 10 TABLET ORAL at 08:31

## 2024-10-25 RX ADMIN — PIPERACILLIN SODIUM AND TAZOBACTAM SODIUM 4.5 G: 36; 4.5 INJECTION, POWDER, LYOPHILIZED, FOR SOLUTION INTRAVENOUS at 05:41

## 2024-10-25 RX ADMIN — BUDESONIDE 0.5 MG: 0.5 INHALANT RESPIRATORY (INHALATION) at 20:26

## 2024-10-25 NOTE — DISCHARGE SUMMARY
Discharge Summary - Hospitalist   Name: Clay Marcial 68 y.o. male I MRN: [id number]  Unit/Bed#: [facility] I Date of Admission: 10/16/2024   Date of Service: 10/25/2024 I Hospital Day: 6     Name: Clay Marcial 68 y.o. male I MRN: [id number]  Unit/Bed#: -[facility] I Date of Admission: 10/16/2024   Date of Service: 10/22/2024 I Hospital Day: 6    Assessment & Plan  Diabetic foot infection  (HCC)  Lab Results   Component Value Date    HGBA1C 7.2 (H) 10/16/2024       Recent Labs     10/21/24  1547 10/21/24  2050 10/22/24  0743 10/22/24  1059   POCGLU 143* 197* 193* 207*       Blood Sugar Average: Last 72 hrs:  (P) 171.8  Left lower extremity cellulitis with an open wound on plantar side.  Rule out osteomyelitis ER ready discussed with podiatry left lower extremity CT without contrast ordered as secondary to GFR in the 40s negative for gas ,Baker's cyst will order venous duplex- no dvt but has bakers cyst    MRI is negative for any osteo discussed with podiatry will do bedside debridement also left toe is ABIs as below potential healing awaiting   Had LEADS -left There is evidence of >75% stenosis noted in the proximal anterior tibial  artery.  Antibiotics over the weekend has been changed to Zosyn.  Based on wound culture growing gram-positive cocci in clusters, vancomycin was started after discussion with infectious disease.  Follow-up on wound culture results.    Patient is postoperative day #1 status post left foot I&D, wound debridement, 2nd toe amputation, high risk for TMA however podiatry awaiting revascularization prior to further procedures.  He will need IR angio Vascular is involved.  Discussed with interventional radiology and because of concern for possible embolic event angiography will be a high risk procedure which will be scheduled at [facility].  IR recommending transfer to [facility].  Discussed with Dr. Morrissey.  Transfer initiated.  Continue with heparin GTT in the interim.  Diabetes mellitus, type 2  ([facility])  Lab Results   Component Value Date    HGBA1C 7.2 (H) 10/16/2024       Recent Labs     10/21/24  1547 10/21/24  2050 10/22/24  0743 10/22/24  1059   POCGLU 143* 197* 193* 207*       Blood Sugar Average: Last 72 hrs:  (P) 171.8  Hga1c is actually 7.2 hyperglycemia secondary to infection with insulin adjustment his sugars have improved   Continue with current basal bolus insulin regimen.  CAD (coronary artery disease)  Asa/ lipitor and isosorbide     Chronic diastolic congestive heart failure (HCC)  Wt Readings from Last 3 Encounters:   10/17/24 126 kg (277 lb)   08/27/24 127 kg (279 lb 12.2 oz)   05/18/24 129 kg (285 lb 7.9 oz)     2D echo obtained EF of 60% which is normal   hold Lasix as per nephrology right now not in exacerbation        Stage 3 chronic kidney disease ([facility])  Lab Results   Component Value Date    EGFR 59 10/22/2024    EGFR 60 10/21/2024    EGFR 60 10/20/2024    CREATININE 1.22 10/22/2024    CREATININE 1.21 10/21/2024    CREATININE 1.21 10/20/2024   Baseline creatinine is 1.2-1.3 cr down to 1.2 nephrology following especially plan for angio prior to or started on fluids prior to OR  Chronic obstructive pulmonary disease with acute exacerbation ([facility])  No more wheezing   continue Pulmicort and Xopenex  ROMEL (obstructive sleep apnea)  Cpap at home   PAD (peripheral artery disease) ([facility])  Had LEADS -left There is evidence of >75% stenosis noted in the proximal anterior tibial  artery.  As discussed with podiatry and vascular surgeon patient needs angio which is good to be done by IR   Over the weekend as discussed with vascular surgery concern for for embolism and secondary to high risk of kidney failure without improvement of CTA heparin drip has been started discussed with vascular today continue heparin drip till angiogram done by IR,   Await transfer to \[facility]\"" for angiography.  Acute hyponatremia  Sodium improved to 135  Continue to follow BMP closely  HTN (hypertension)  Currently on Imdur and  Toprol-XL.  Blood pressure acceptable.  Continue to hold ACE inhibitor and diuretics for now.  Elevated serum creatinine  Likely secondary to ACE inhibitor use, SGLT 2 inhibitor use and diuretic use.  Admission creatinine 1.58 with peak of 1.7.  Creatinine back to baseline now.  Continue to hold nephrotoxic medications for now and avoid hypotension IV dye if possible.  Nephrology input appreciated.  Smoking      VTE Pharmacologic Prophylaxis: VTE Score: 3 High Risk (Score >/= 5) - Pharmacological DVT Prophylaxis Ordered: heparin drip. Sequential Compression Devices Ordered.  Medical Problems       Resolved Problems  Date Reviewed: 10/21/2024            Resolved    Lactic acidosis 10/18/2024     Resolved by  Zee Newman MD        Discharging Physician / Practitioner: Tiarra Spence MD  PCP: No primary care provider on file.  Admission Date:   Admission Orders (From admission, onward)       Ordered        10/16/24 1323  INPATIENT ADMISSION  Once                          Discharge Date: 10/25/24    Consultations During Hospital Stay:  Podiatry  Infectious disease  Vascular surgery    Procedures Performed:   MRI foot  1.  There is focal marrow edema in the third metatarsal head and base of the third toe, proximal phalanx. However, this is only seen on T2 signal, favoring reactive changes over osteomyelitis.  2.  Diffuse subcutaneous edema.  3.  Degenerative changes, as described.       Reason for Admission: Left foot wound    Hospital Course:   Clay Marcial is a 68 y.o. male patient who originally presented to the hospital on 10/16/2024 due to left lower extremity cellulitis with open wound..  Underwent I&D of the wound with second toe amputation.  High risk for TMA however has underlying severe vascular disease with lead showing evidence of greater than 75% stenosis in the proximal anterior tibial artery.  Patient was recommended to get transferred to [facility] [place] [facility] for interventional radiology management and high risk  angiography.  In the interim was maintained on heparin GTT.  Maintained on broad-spectrum IV antibiotics per ID evaluation.  Preliminary wound cultures growing gram-positive cocci in clusters.  Maintained on IV vancomycin and Zosyn.  Transferred in a stable condition.        Please see above list of diagnoses and related plan for additional information.     Condition at Discharge: stable    Discharge Day Visit / Exam:   * Please refer to separate progress note for these details *    Discussion with Family: Updated  (friend) at bedside.    Discharge instructions/Information to patient and family:   See after visit summary for information provided to patient and family.      Provisions for Follow-Up Care:  See after visit summary for information related to follow-up care and any pertinent home health orders.      Mobility at time of Discharge:   Basic Mobility Inpatient Raw Score: 20  JH-HLM Goal: 6: Walk 10 steps or more  JH-HLM Achieved: 6: Walk 10 steps or more  HLM Goal achieved. Continue to encourage appropriate mobility.     Disposition:   Acute Care Hospital Transfer to [facility] [place]    Planned Readmission: No    Discharge Medications:  See after visit summary for reconciled discharge medications provided to patient and/or family.      Administrative Statements   Discharge Statement:  I have spent a total time of 30 minutes in caring for this patient on the day of the visit/encounter. .    **Please Note: This note may have been constructed using a voice recognition system**

## 2024-10-25 NOTE — PLAN OF CARE
Problem: PAIN - ADULT  Goal: Verbalizes/displays adequate comfort level or baseline comfort level  Description: Interventions:  - Encourage patient to monitor pain and request assistance  - Assess pain using appropriate pain scale  - Administer analgesics based on type and severity of pain and evaluate response  - Implement non-pharmacological measures as appropriate and evaluate response  - Consider cultural and social influences on pain and pain management  - Notify physician/advanced practitioner if interventions unsuccessful or patient reports new pain  Outcome: Progressing     Problem: INFECTION - ADULT  Goal: Absence or prevention of progression during hospitalization  Description: INTERVENTIONS:  - Assess and monitor for signs and symptoms of infection  - Monitor lab/diagnostic results  - Monitor all insertion sites, i.e. indwelling lines, tubes, and drains  - Monitor endotracheal if appropriate and nasal secretions for changes in amount and color  - [place] appropriate cooling/warming therapies per order  - Administer medications as ordered  - Instruct and encourage patient and family to use good hand hygiene technique  - Identify and instruct in appropriate isolation precautions for identified infection/condition  Outcome: Progressing  Goal: Absence of fever/infection during neutropenic period  Description: INTERVENTIONS:  - Monitor WBC    Outcome: Progressing     Problem: DISCHARGE PLANNING  Goal: Discharge to home or other facility with appropriate resources  Description: INTERVENTIONS:  - Identify barriers to discharge w/patient and caregiver  - Arrange for needed discharge resources and transportation as appropriate  - Identify discharge learning needs (meds, wound care, etc.)  - Arrange for interpretive services to assist at discharge as needed  - Refer to Case Management Department for coordinating discharge planning if the patient needs post-hospital services based on physician/advanced  practitioner order or complex needs related to functional status, cognitive ability, or social support system  Outcome: Progressing     Problem: Knowledge Deficit  Goal: Patient/family/caregiver demonstrates understanding of disease process, treatment plan, medications, and discharge instructions  Description: Complete learning assessment and assess knowledge base.  Interventions:  - Provide teaching at level of understanding  - Provide teaching via preferred learning methods  Outcome: Progressing     Problem: Nutrition/Hydration-ADULT  Goal: Nutrient/Hydration intake appropriate for improving, restoring or maintaining nutritional needs  Description: Monitor and assess patient's nutrition/hydration status for malnutrition. Collaborate with interdisciplinary team and initiate plan and interventions as ordered.  Monitor patient's weight and dietary intake as ordered or per policy. Utilize nutrition screening tool and intervene as necessary. Determine patient's food preferences and provide high-protein, high-caloric foods as appropriate.     INTERVENTIONS:  - Monitor oral intake, urinary output, labs, and treatment plans  - Assess nutrition and hydration status and recommend course of action  - Evaluate amount of meals eaten  - Assist patient with eating if necessary   - Allow adequate time for meals  - Recommend/ encourage appropriate diets, oral nutritional supplements, and vitamin/mineral supplements  - Order, calculate, and assess calorie counts as needed  - Recommend, monitor, and adjust tube feedings and TPN/PPN based on assessed needs  - Assess need for intravenous fluids  - Provide specific nutrition/hydration education as appropriate  - Include patient/family/caregiver in decisions related to nutrition  Outcome: Progressing

## 2024-10-25 NOTE — ASSESSMENT & PLAN NOTE
Initially at [facility] hospital with left lower extremity cellulitis with open wound.    Evaluated by podiatry, ID, vascular at [facility] who are also following here,  S/p I&D, L 2nd toe amputation with podiatry on 10/21 given concern for acutely worsening wound/infection   Transferred to [facility] for vascular/IR evaluations given concern also for embolic process/ischemia   Plan for agram 10/25 with plan for partial 2nd ray amputation vs. TMA intervention afterwards with podiatry   Continue IV heparin gtt   Wound with purulent drainage and probing to bone on 10/24  ID following,  Continue IV zosyn and vancomycin for now, pt without improvement on Ancef  Wound culture from OR on 10/21 with bacteroides, multiple organisms   Monitor temps, WBC

## 2024-10-25 NOTE — ASSESSMENT & PLAN NOTE
Lab Results   Component Value Date    HGBA1C 7.2 (H) 10/16/2024       Recent Labs     10/24/24  1604 10/24/24  2118 10/25/24  0727 10/25/24  1119   POCGLU 209* 238* 273* 183*       Blood Sugar Average: Last 72 hrs:  (P) 206.3005198580342138  Increase Lantus to 25 units QHS  Continue SSI coverage   QID glucose checks   Risk factor for primary problem   Monitor and adjust regimen as needed

## 2024-10-25 NOTE — ASSESSMENT & PLAN NOTE
Patient with nonhealing left foot wounds  Status post left second toe amputation/debridement with podiatry on 10/21 for worsening wound  Vascular surgery and IR following  Plans noted for IR lower extremity angiogram later today  Currently on IV antibiotics per infectious disease

## 2024-10-25 NOTE — ASSESSMENT & PLAN NOTE
Assessment:  68yoM with tobacco use, CAD, CHF, DM (A1C 7.2), CKD3, T2DM, CKD 3, COPD, chronic low back pain, who presented with LLE pain, with diabetic foot ulcer x 5months, cellul[facility], during admission noted to have ischemic changes to L 2nd toe during concerning for embolic phenomenon. With nonhealing wound, toe pressure below healing potential for a diabetic, patient transferred to [facility] for angiogram with possible intervention, notably requiring L 2nd toe amp, debridement with podiatry on 10/21 for worsening wound.     10/18/24 LEADs  Right: JOSE JUAN 1.04//  Left >75% stenosis in proximal AT JOSE JUAN 1.03/MTP deferred/GTP 43    10/21 OR Wound Cx 1+ Gram pos cocci in clusters  10/16 Wound culture Gram pos cocci in pairs. Gram variable rods  10/16 Blood Cultures NG x 5 days    WBC 7.28  Hgb 11.3  sCr 1.3    Recommendations:  -IR today for angiogram  -NPO for procedure  -Remainder of care per primary team

## 2024-10-25 NOTE — ASSESSMENT & PLAN NOTE
Etiology of elevated creatinine most likely due to autoregulatory failure in the setting of ACE inhibitor use, SGLT2 inhibitor use and diuretic use  Baseline creatinine appears to be around 1.3-1.4 in setting of diabetes and hypertension  Creatinine on recent admission 1.58  Peak creatinine 1.7 on 10/17  Creatinine today 1.13 below baseline  Lisinopril, furosemide and Jardiance currently on hold in anticipation for lower extremity angiogram which is scheduled for later today  Give Isolyte 50 cc/h for 3 hours before and 6 hours after lower extremity angiogram today  Benefits and risks of lower extremity angiogram from nephrology perspective discussed with the patient

## 2024-10-25 NOTE — PROGRESS NOTES
Podiatry - Progress Note  Patient: Clay Marcial 68 y.o. male   MRN: [id number]  PCP: No primary care provider on file.  Unit/Bed#: [facility] 825-01 Encounter: [id number]  Date Of Visit: 10/25/24    ASSESSMENT:    Clay Marcial is a 68 y.o. male with:    Diabetic foot infection s/p left second toe amputation and wound debridement, packed open  Type 2 diabetes mellitus with most recent A1c 7.2% collected 10/16/2024  Coronary artery disease  Stage III chronic kidney disease  Chronic obstructive pulmonary disease  Hypertension      PLAN:    Patient was seen and evaluated at bedside with all questions and concerns addressed.  Patient will receive angiogram in the afternoon.  Will discuss podiatric procedures after the angiogram depending the revascularization.  Dressing was changed.  The previous dressing was kicked off by the patient and only packing was left exposed to air.  Patient relates he has restless legs and frequently kicked of dressing.  He asked for Ace bandage.  However, it was denied because of the poor circulation.  The current dressing consists of packing DSD with a second layer of Concepción to reinforce.  Will see the patient tomorrow to change dressings and discuss podiatric surgery.  Left foot wound is similar to the last dressing change. Please refer to physical examination for details.  Of note the third toe skin is dusky.  Patients' lab and vital signs were reviewed. Patient is afebrile with no leukocytosis. Patient does not  meet the SIRS criteria. Will keep monitoring.  Continue IV vancomycin and Zosyn pre recommendation from ID  Follow-up with SLIM, vascular surgery, ID, PT, OT, CM recommendations  Wound culture showed actinomyces species, Globicatella Species Globicatella, staphylococcal coagulase-negative, Bacteroides pyrogens, and Finegoldia magna.  Appreciate infectious disease recommendations for antibiotic choice.  Elevation and offloading on green foam wedges or pillows when  "non-ambulatory.  Rest of care per primary team.     Weightbearing status: Non-weightbearing left foot    SUBJECTIVE:     The patient was seen, evaluated, and assessed at bedside today. The patient was awake, alert, and in no acute distress. No acute events overnight. The patient reports no pain or discomfort to the left foot.  Today patient is going to receive angiogram. Patient denies N/V/F/chills/SOB/CP.      OBJECTIVE:     Vitals:   /85   Pulse 80   Temp 97.9 °F (36.6 °C)   Resp 18   Ht 5' 11\" (1.803 m)   Wt 126 kg (278 lb)   SpO2 95%   BMI 38.77 kg/m²     Temp (24hrs), Av °F (36.7 °C), Min:97.9 °F (36.6 °C), Max:98.1 °F (36.7 °C)      Physical Exam:     Lungs: Non labored breathing  Abdomen: Soft, non-tender.  Lower Extremity:  Cardiovascular status at baseline from admission.  Neurological status at baseline from admission.  Musculoskeletal status post left foot second toe amputation, plantar wound debridement packed open. No calf tenderness noted.         Wound #: 1  Location: Left second toe amputation site connected to submet 2 wound debridement site  Length 8cm: Width 3cm: Depth 3cm:   Deepest Tissue Noted in Base: Bone (second metatarsal head)  Probe to Bone: Yes  Peripheral Skin Description: Attached and macerated  Granulation: 20% Fibrotic Tissue: 80% Necrotic Tissue: 0%   Drainage Amount: copious, serous, purulent  Signs of Infection: Yes. positive  probe to bone, negative crepitus, fluctuance, positive  purulence, negative periwound skin erythema, edema, negative proximal tracking erythema        Clinical Images 10/25/24:                Additional Data:     Labs:    Results from last 7 days   Lab Units 10/25/24  0544 10/24/24  0643 10/22/24  0739 10/21/24  0452   WBC Thousand/uL 5.60   < > 7.28 7.64   HEMOGLOBIN g/dL 11.3*   < > 11.3* 11.4*   HEMATOCRIT % 33.9*   < > 33.6* 33.9*   PLATELETS Thousands/uL 376   < > 290 244   SEGS PCT %  --   --   --  72   LYMPHO PCT %  --   --   15 " "  MONO PCT %  --   --  8 9   EOS PCT %  --   --  1 2    < > = values in this interval not displayed.     Results from last 7 days   Lab Units 10/25/24  0544   POTASSIUM mmol/L 4.1   CHLORIDE mmol/L 102   CO2 mmol/L 27   BUN mg/dL 14   CREATININE mg/dL 1.13   CALCIUM mg/dL 8.3*     Results from last 7 days   Lab Units 10/19/24  1434   INR  1.18       * I Have Reviewed All Lab Data Listed Above.    Recent Cultures (last 7 days):     Results from last 7 days   Lab Units 10/21/24  1428   GRAM STAIN RESULT  1+ Gram positive cocci in clusters*  No polys seen*   WOUND CULTURE  1+ Growth of Actinomyces species*  Few Colonies of Diphtheroids  Few Colonies of - Globicatella Species Globicatella*  1 colony Staphylococcus coagulase negative*     Results from last 7 days   Lab Units 10/21/24  1428   ANAEROBIC CULTURE  2+ Growth of - Bacteroides pyogenes Bacteroides species*  2+ Growth of Finegoldia magna*       Imaging: I have personally reviewed pertinent films in PACS  EKG, Pathology, and Other Studies: I have personally reviewed pertinent reports.    ** Please Note: Portions of the record may have been created with voice recognition software. Occasional wrong word or \"sound a like\" substitutions may have occurred due to the inherent limitations of voice recognition software. Read the chart carefully and recognize, using context, where substitutions have occurred. **      "

## 2024-10-25 NOTE — PROGRESS NOTES
NEPHROLOGY HOSPITAL PROGRESS NOTE   Clay Marcial 68 y.o. male MRN: [id number]  Unit/Bed#: [facility] 825-01 Encounter: [id number]  Reason for Consult: Elevated creatinine on CKD  Assessment & Plan  Elevated serum creatinine  Etiology of elevated creatinine most likely due to autoregulatory failure in the setting of ACE inhibitor use, SGLT2 inhibitor use and diuretic use  Baseline creatinine appears to be around 1.3-1.4 in setting of diabetes and hypertension  Creatinine on recent admission 1.58  Peak creatinine 1.7 on 10/17  Creatinine today 1.13 below baseline  Lisinopril, furosemide and Jardiance currently on hold in anticipation for lower extremity angiogram which is scheduled for later today  Give Isolyte 50 cc/h for 3 hours before and 6 hours after lower extremity angiogram today  Benefits and risks of lower extremity angiogram from nephrology perspective discussed with the patient  Stage 3 chronic kidney disease (HCC)  Baseline creatinine 1.3-1.4  Needs outpatient follow-up with nephrology  Diabetic foot infection  (HCC)  Patient with nonhealing left foot wounds  Status post left second toe amputation/debridement with podiatry on 10/21 for worsening wound  Vascular surgery and IR following  Plans noted for IR lower extremity angiogram later today  Currently on IV antibiotics per infectious disease  Diabetes mellitus, type 2 (HCC)  Management per primary team  Continue to hold metformin and Jardiance for now  CAD (coronary artery disease)  Management per primary team  Chronic obstructive pulmonary disease with acute exacerbation (HCC)  Management per primary team  HTN (hypertension)  Home Rx: Furosemide 40 mg as needed, Imdur 30 mg daily, lisinopril 20 mg daily, Toprol-XL 50 mg daily  Current Rx: Imdur 30 mg daily, Toprol-XL 50 mg daily  Blood pressure is currently acceptable, keep holding lisinopril and furosemide  Plan to resume furosemide starting 10/26 if creatinine remains stable    SUBJECTIVE / 24H INTERVAL  HISTORY:  Urine output recorded as 950 cc.  Denies dyspnea.  Has chronic appearing leg swelling.    OBJECTIVE:  Current Weight: Weight - Scale: 126 kg (278 lb)  Vitals:    10/24/24 1542 10/24/24 1903 10/24/24 2219 10/25/24 0654   BP: 144/84  144/84 147/85   BP Location:   Left arm    Pulse: 82  81 80   Resp: 20  18 18   Temp: 98.1 °F (36.7 °C)  97.9 °F (36.6 °C) 97.9 °F (36.6 °C)   TempSrc:   Oral    SpO2: 95% 95% 95% 95%   Weight:       Height:           Intake/Output Summary (Last 24 hours) at 10/25/2024 0716  Last data filed at 10/25/2024 0654  Gross per 24 hour   Intake 200 ml   Output 950 ml   Net -750 ml     Review of Systems   Constitutional:  Negative for chills and fever.   HENT:  Negative for ear pain and sore throat.    Eyes:  Negative for pain and visual disturbance.   Respiratory:  Negative for cough and shortness of breath.    Cardiovascular:  Negative for chest pain and palpitations.   Gastrointestinal:  Negative for abdominal pain and vomiting.   Genitourinary:  Negative for dysuria and hematuria.   Musculoskeletal:  Negative for arthralgias and back pain.   Skin:  Negative for color change and rash.   Neurological:  Negative for seizures and syncope.   All other systems reviewed and are negative.    Physical Exam  Vitals and nursing note reviewed.   Constitutional:       General: He is not in acute distress.     Appearance: He is well-developed.   HENT:      Head: Normocephalic and atraumatic.   Eyes:      Conjunctiva/sclera: Conjunctivae normal.   Cardiovascular:      Rate and Rhythm: Normal rate and regular rhythm.      Pulses: Normal pulses.      Heart sounds: Normal heart sounds. No murmur heard.  Pulmonary:      Effort: Pulmonary effort is normal. No respiratory distress.      Breath sounds: Normal breath sounds.   Abdominal:      Palpations: Abdomen is soft.      Tenderness: There is no abdominal tenderness.   Musculoskeletal:         General: No swelling.      Cervical back: Neck supple.       Right lower leg: Edema present.      Left lower leg: Edema present.   Skin:     General: Skin is warm and dry.      Capillary Refill: Capillary refill takes less than 2 seconds.   Neurological:      Mental Status: He is alert.   Psychiatric:         Mood and Affect: Mood normal.       Medications:    Current Facility-Administered Medications:     acetaminophen (TYLENOL) tablet 650 mg, 650 mg, Oral, Q6H PRN, Stefan Ordonez MD    albuterol inhalation solution 2.5 mg, 2.5 mg, Nebulization, Q6H PRN, Robert Villegas MD    aspirin chewable tablet 81 mg, 81 mg, Oral, Daily, Stefan Ordonez MD, 81 mg at 10/24/24 0856    atorvastatin (LIPITOR) tablet 20 mg, 20 mg, Oral, Daily, Stefan Ordonez MD, 20 mg at 10/24/24 0856    bisacodyl (DULCOLAX) rectal suppository 10 mg, 10 mg, Rectal, Daily PRN, Tanya Lacey PA-C    budesonide (PULMICORT) inhalation solution 0.5 mg, 0.5 mg, Nebulization, Q12H, Stefan Ordonez MD, 0.5 mg at 10/24/24 1903    Cholecalciferol (VITAMIN D3) tablet 1,000 Units, 1,000 Units, Oral, Daily, Stefan Ordonez MD, 1,000 Units at 10/24/24 0856    cyanocobalamin (VITAMIN B-12) tablet 500 mcg, 500 mcg, Oral, Daily, Stefan Ordonez MD, 500 mcg at 10/24/24 0856    docusate sodium (COLACE) capsule 100 mg, 100 mg, Oral, BID, Stefan Ordonez MD, 100 mg at 10/24/24 1733    DULoxetine (CYMBALTA) delayed release capsule 20 mg, 20 mg, Oral, Daily, Stefan Ordonez MD, 20 mg at 10/24/24 0856    fluticasone (FLONASE) 50 mcg/act nasal spray 2 spray, 2 spray, Nasal, Daily, Stefan Ordonez MD    heparin (porcine) 25,000 units in 0.45% NaCl 250 mL infusion (premix), 3-30 Units/kg/hr (Order-Specific), Intravenous, Titrated, Stefan Ordonez MD, Last Rate: 18 mL/hr at 10/24/24 1942, 15 Units/kg/hr at 10/24/24 1942    heparin (porcine) injection 4,800 Units, 4,800 Units, Intravenous, Q6H PRN, Stefan Ordonez MD, 4,800 Units at 10/23/24 1709    heparin (porcine) injection 9,600 Units, 9,600 Units, Intravenous, Q6H PRN, Stefan Ordonez MD    insulin  glargine (LANTUS) subcutaneous injection 18 Units 0.18 mL, 18 Units, Subcutaneous, HS, Tanya Lacey PA-C, 18 Units at 10/24/24 2128    insulin lispro (HumALOG/ADMELOG) 100 units/mL subcutaneous injection 10 Units, 10 Units, Subcutaneous, TID With Meals, Stefan Ordonez MD, 10 Units at 10/24/24 1735    insulin lispro (HumALOG/ADMELOG) 100 units/mL subcutaneous injection 2-12 Units, 2-12 Units, Subcutaneous, TID AC, 4 Units at 10/24/24 1733 **AND** Fingerstick Glucose (POCT), , , TID AC, Stefan Ordonez MD    isosorbide mononitrate (IMDUR) 24 hr tablet 30 mg, 30 mg, Oral, Daily, Stefan Ordonez MD, 30 mg at 10/24/24 0856    loratadine (CLARITIN) tablet 10 mg, 10 mg, Oral, Daily, Stefan Ordonez MD, 10 mg at 10/24/24 0856    metoprolol succinate (TOPROL-XL) 24 hr tablet 50 mg, 50 mg, Oral, Daily, Stefan Ordonez MD, 50 mg at 10/24/24 0856    morphine injection 2 mg, 2 mg, Intravenous, Q4H PRN, Mansi Lou PA-C    multi-electrolyte (PLASMALYTE-A/ISOLYTE-S PH 7.4) IV solution, 50 mL/hr, Intravenous, Continuous, Vikki Newberry PA-C    nicotine (NICODERM CQ) 14 mg/24hr TD 24 hr patch 1 patch, 1 patch, Transdermal, Daily, Stefan Ordonez MD, 1 patch at 10/24/24 0856    umeclidinium 62.5 mcg/actuation inhaler AEPB 1 puff, 1 puff, Inhalation, Daily **AND** olodaterol HCl (STRIVERDI RESPIMAT) inhaler 2 puff, 2 puff, Inhalation, Daily, Stefan Ordonez MD    ondansetron (ZOFRAN) injection 4 mg, 4 mg, Intravenous, Q4H PRN, Stefan Ordonez MD    oxyCODONE (ROXICODONE) IR tablet 5 mg, 5 mg, Oral, Q4H PRN, Mansi Lou PA-C    oxyCODONE (ROXICODONE) split tablet 2.5 mg, 2.5 mg, Oral, Q4H PRN, Mansi Lou PA-C    pantoprazole (PROTONIX) EC tablet 40 mg, 40 mg, Oral, BID AC, Stefan Ordonez MD, 40 mg at 10/25/24 0600    piperacillin-tazobactam (ZOSYN) 4.5 g in sodium chloride 0.9 % 100 mL IVPB (EXTENDED INFUSION), 4.5 g, Intravenous, Q8H, Stefan Ordonez MD, Last Rate: 25 mL/hr at 10/25/24 0541, 4.5 g at 10/25/24 0541    polyethylene glycol  "(MIRALAX) packet 17 g, 17 g, Oral, Daily, Stefan Ordonez MD, 17 g at 10/23/24 0826    pregabalin (LYRICA) capsule 100 mg, 100 mg, Oral, BID, Stefan Ordonez MD, 100 mg at 10/24/24 1733    sodium chloride (OCEAN) 0.65 % nasal spray 2 spray, 2 spray, Each Nare, BID, Stefan Ordonez MD    traZODone (DESYREL) tablet 25 mg, 25 mg, Oral, HS, Stefan Ordonez MD, 25 mg at 10/24/24 2127    vancomycin (VANCOCIN) IVPB (premix in dextrose) 1,000 mg 200 mL, 1,000 mg, Intravenous, Q12H, Stefan Ordonez MD, Last Rate: 200 mL/hr at 10/24/24 2346, 1,000 mg at 10/24/24 2346    Laboratory Results:  Results from last 7 days   Lab Units 10/25/24  0544 10/24/24  0643 10/23/24  0613 10/22/24  0739 10/21/24  0452 10/20/24  0251 10/19/24  0440   WBC Thousand/uL 5.60 5.34  --  7.28 7.64  --  8.36   HEMOGLOBIN g/dL 11.3* 11.7*  --  11.3* 11.4*  --  11.4*   HEMATOCRIT % 33.9* 35.2*  --  33.6* 33.9*  --  33.3*   PLATELETS Thousands/uL 376 359  --  290 244  --  167   POTASSIUM mmol/L 4.1 4.2 4.2 4.1 4.0 5.0 4.0   CHLORIDE mmol/L 102 101 101 100 98 97 98   CO2 mmol/L 27 29 30 28 25 27 28   BUN mg/dL 14 13 17 17 20 21 20   CREATININE mg/dL 1.13 1.14 1.30 1.22 1.21 1.21 1.29   CALCIUM mg/dL 8.3* 8.7 8.3* 8.3* 8.3* 8.3* 8.0*   MAGNESIUM mg/dL  --   --  1.9  --   --   --   --        Portions of the record may have been created with voice recognition software. Occasional wrong word or \"sound a like\" substitutions may have occurred due to the inherent limitations of voice recognition software. Read the chart carefully and recognize, using context, where substitutions have occurred.If you have any questions, please contact the dictating provider.    "

## 2024-10-25 NOTE — ASSESSMENT & PLAN NOTE
Lab Results   Component Value Date    EGFR 65 10/25/2024    EGFR 64 10/24/2024    EGFR 55 10/23/2024    CREATININE 1.13 10/25/2024    CREATININE 1.14 10/24/2024    CREATININE 1.30 10/23/2024   Creatinine currently at baseline, was noted to have elevated serum creatinine earlier on in this hospitalization with peak of 1.7  Nephrology following,  Holding diuretics and lisinopril   Continue to monitor with BMP

## 2024-10-25 NOTE — ASSESSMENT & PLAN NOTE
Home Rx: Furosemide 40 mg as needed, Imdur 30 mg daily, lisinopril 20 mg daily, Toprol-XL 50 mg daily  Current Rx: Imdur 30 mg daily, Toprol-XL 50 mg daily  Blood pressure is currently acceptable, keep holding lisinopril and furosemide  Plan to resume furosemide starting 10/26 if creatinine remains stable

## 2024-10-25 NOTE — QUICK NOTE
"Post-Op Check - Vascular Surgery   Clay Marcial 68 y.o. male MRN: [id number]  Unit/Bed#: [facility] 825-01 Encounter: [id number]    Assessment:  68yoM s/p LLE angiogram with AT PTA, R femoral access, vascade closure    Plan:  - Incentive spirometry  - Diet as tolerated  - ASA/Statin    Subjective: Patient feels well without acute complaints, no new paresthesias, weakness    Vitals:  /90   Pulse 88   Temp 98.1 °F (36.7 °C)   Resp 18   Ht 5' 11\" (1.803 m)   Wt 126 kg (278 lb)   SpO2 92%   BMI 38.77 kg/m²     Physical Exam:  General appearance: alert and oriented, in no acute distress  Neurologic: Grossly neurologically intact  Neck: supple, symmetrical, trachea midline  Lungs:  Normal work of breathing, no accessory muscle use  Heart:  Regular rate  Abdomen:  Soft, rounded abdomen  R groin with c/d/I dressing in place without strikethrough. Soft, without noted hematoma  Extremities:  Bilateral LE warm and dry. L foot with clean and dry dressing in place    Pulse exam:  Femoral: Right: 2+ Left: 2+  Popliteal:  Left: 2+  AT: Left: 2+  DP: Right: 2+  PT: Right: 2+ Left: 2+    I/Os:  I/O last 3 completed shifts:  In: -   Out: 1720 [Urine:1720]  No intake/output data recorded.    Invasive Lines/Tubes:  Invasive Devices       Peripheral Intravenous Line  Duration             Peripheral IV 10/22/24 Dorsal (posterior);Left Hand 3 days    Peripheral IV 10/24/24 Left;Proximal;Ventral (anterior) Forearm 1 day                    VTE Prophylaxis: RX contraindicated due to: hep gtt    Debbie Campbell PA-C  10/25/2024    "

## 2024-10-25 NOTE — PROGRESS NOTES
Progress Note - Vascular Surgery   Name: Clay Marcial 68 y.o. male I MRN: [id number]  Unit/Bed#: [facility] 825-01 I Date of Admission: 10/22/2024   Date of Service: 10/25/2024 I Hospital Day: 3   Assessment & Plan  Diabetic foot infection  (HCC)  Assessment:  68yoM with tobacco use, CAD, CHF, DM (A1C 7.2), CKD3, T2DM, CKD 3, COPD, chronic low back pain, who presented with LLE pain, with diabetic foot ulcer x 5months, cellulitis presented to [facility], during admission noted to have ischemic changes to L 2nd toe during concerning for embolic phenomenon. With nonhealing wound, toe pressure below healing potential for a diabetic, patient transferred to hospitals for angiogram with possible intervention, notably requiring L 2nd toe amp, debridement with podiatry on 10/21 for worsening wound.     10/18/24 LEADs  Right: JOSE JUAN 1.04//  Left >75% stenosis in proximal AT JOSE JUAN 1.03/MTP deferred/GTP 43    10/21 OR Wound Cx 1+ Gram pos cocci in clusters  10/16 Wound culture Gram pos cocci in pairs. Gram variable rods  10/16 Blood Cultures NG x 5 days    WBC 7.28  Hgb 11.3  sCr 1.3    Recommendations:  -IR today for angiogram  -NPO for procedure  -Remainder of care per primary team      Subjective : No acute events overnight. No new complaints this AM. Still agreeable to OR.    Objective :  Temp:  [97.9 °F (36.6 °C)-98.1 °F (36.7 °C)] 97.9 °F (36.6 °C)  HR:  [80-82] 80  BP: (144-147)/(84-85) 147/85  Resp:  [18-20] 18  SpO2:  [95 %] 95 %  O2 Device: None (Room air)     I/O         10/23 0701  10/24 0700 10/24 0701  10/25 0700 10/25 0701  10/26 0700    P.O. 960 200     Total Intake(mL/kg) 960 (7.6) 200 (1.6)     Urine (mL/kg/hr) 3650 (1.2) 950 (0.3)     Stool 0 0     Total Output 3650 950     Net -2690 -750            Unmeasured Stool Occurrence 0 x 1 x             Physical Exam  Vitals reviewed.   Constitutional:       General: He is not in acute distress.  HENT:      Right Ear: Tympanic membrane normal.      Left Ear:  "Tympanic membrane normal.      Nose: Nose normal.      Mouth/Throat:      Mouth: Mucous membranes are moist.   Eyes:      Extraocular Movements: Extraocular movements intact.   Cardiovascular:      Rate and Rhythm: Normal rate.      Comments: Palpable R DP/PT pulses, palpable bilateral femoral pulses  Pulmonary:      Effort: Pulmonary effort is normal.   Abdominal:      General: There is no distension.      Palpations: Abdomen is soft.   Musculoskeletal:      Comments: R foot/toe wounds with dressings in place   Neurological:      Mental Status: He is alert. Mental status is at baseline.   Psychiatric:         Mood and Affect: Mood normal.           Lab Results: I have reviewed the following results:  CBC with diff:   Lab Results   Component Value Date    WBC 5.60 10/25/2024    HGB 11.3 (L) 10/25/2024    HCT 33.9 (L) 10/25/2024    MCV 97 10/25/2024     10/25/2024    RBC 3.51 (L) 10/25/2024    MCH 32.2 10/25/2024    MCHC 33.3 10/25/2024    RDW 12.1 10/25/2024    MPV 9.6 10/25/2024    NRBC 0 10/21/2024   ,   BMP/CMP:  Lab Results   Component Value Date    SODIUM 136 10/25/2024    K 4.1 10/25/2024     10/25/2024    CO2 27 10/25/2024    BUN 14 10/25/2024    CREATININE 1.13 10/25/2024    CALCIUM 8.3 (L) 10/25/2024    AST 17 08/27/2024    ALT 23 08/27/2024    ALKPHOS 55 08/27/2024    EGFR 65 10/25/2024   ,   Lipid Panel: No results found for: \"CHOL\",   Coags:   Lab Results   Component Value Date    PTT 70 (H) 10/25/2024    INR 1.18 10/19/2024   ,   Blood Culture:   Lab Results   Component Value Date    BLOODCX No Growth After 5 Days. 10/16/2024   ,   Urinalysis:   Lab Results   Component Value Date    COLORU Dk Yellow 10/17/2024    CLARITYU Clear 10/17/2024    SPECGRAV 1.020 10/17/2024    PHUR 5.5 10/17/2024    LEUKOCYTESUR (A) 10/17/2024     Elevated glucose may cause decreased leukocyte values. See urine microscopic for UWBC result    NITRITE Negative 10/17/2024    GLUCOSEU >=1000 (1%) (A) 10/17/2024    " "KETONESU Negative 10/17/2024    BILIRUBINUR Negative 10/17/2024    BLOODU Trace-lysed (A) 10/17/2024   ,   Urine Culture: No results found for: \"URINECX\",   Wound Culure:   Lab Results   Component Value Date    WOUNDCULT 1+ Growth of Actinomyces species (A) 10/21/2024    WOUNDCULT Few Colonies of Diphtheroids 10/21/2024    WOUNDCULT (A) 10/21/2024     Few Colonies of - Globicatella Species Globicatella    WOUNDCULT 1 colony Staphylococcus coagulase negative (A) 10/21/2024     "

## 2024-10-25 NOTE — PLAN OF CARE
Problem: PAIN - ADULT  Goal: Verbalizes/displays adequate comfort level or baseline comfort level  Description: Interventions:  - Encourage patient to monitor pain and request assistance  - Assess pain using appropriate pain scale  - Administer analgesics based on type and severity of pain and evaluate response  - Implement non-pharmacological measures as appropriate and evaluate response  - Consider cultural and social influences on pain and pain management  - Notify physician/advanced practitioner if interventions unsuccessful or patient reports new pain  Outcome: Progressing     Problem: INFECTION - ADULT  Goal: Absence or prevention of progression during hospitalization  Description: INTERVENTIONS:  - Assess and monitor for signs and symptoms of infection  - Monitor lab/diagnostic results  - Monitor all insertion sites, i.e. indwelling lines, tubes, and drains  - Monitor endotracheal if appropriate and nasal secretions for changes in amount and color  - [place] appropriate cooling/warming therapies per order  - Administer medications as ordered  - Instruct and encourage patient and family to use good hand hygiene technique  - Identify and instruct in appropriate isolation precautions for identified infection/condition  Outcome: Progressing  Goal: Absence of fever/infection during neutropenic period  Description: INTERVENTIONS:  - Monitor WBC    Outcome: Progressing     Problem: SAFETY ADULT  Goal: Patient will remain free of falls  Description: INTERVENTIONS:  - Educate patient/family on patient safety including physical limitations  - Instruct patient to call for assistance with activity   - Consult OT/PT to assist with strengthening/mobility   - Keep Call bell within reach  - Keep bed low and locked with side rails adjusted as appropriate  - Keep care items and personal belongings within reach  - Initiate and maintain comfort rounds  - Make Fall Risk Sign visible to staff  - Offer Toileting every  Hours,  in advance of need  - Initiate/Maintain alarm  - Obtain necessary fall risk management equipment:   - Apply yellow socks and bracelet for high fall risk patients  - Consider moving patient to room near nurses station  Outcome: Progressing  Goal: Maintain or return to baseline ADL function  Description: INTERVENTIONS:  -  Assess patient's ability to carry out ADLs; assess patient's baseline for ADL function and identify physical deficits which impact ability to perform ADLs (bathing, care of mouth/teeth, toileting, grooming, dressing, etc.)  - Assess/evaluate cause of self-care deficits   - Assess range of motion  - Assess patient's mobility; develop plan if impaired  - Assess patient's need for assistive devices and provide as appropriate  - Encourage maximum independence but intervene and supervise when necessary  - Involve family in performance of ADLs  - Assess for home care needs following discharge   - Consider OT consult to assist with ADL evaluation and planning for discharge  - Provide patient education as appropriate  Outcome: Progressing  Goal: Maintains/Returns to pre admission functional level  Description: INTERVENTIONS:  - Perform AM-PAC 6 Click Basic Mobility/ Daily Activity assessment daily.  - Set and communicate daily mobility goal to care team and patient/family/caregiver.   - Collaborate with rehabilitation services on mobility goals if consulted  - Perform Range of Motion  times a day.  - Reposition patient every  hours.  - Dangle patient  times a day  - Stand patient  times a day  - Ambulate patient  times a day  - Out of bed to chair  times a day   - Out of bed for meals  times a day  - Out of bed for toileting  - Record patient progress and toleration of activity level   Outcome: Progressing     Problem: DISCHARGE PLANNING  Goal: Discharge to home or other facility with appropriate resources  Description: INTERVENTIONS:  - Identify barriers to discharge w/patient and caregiver  - Arrange for  needed discharge resources and transportation as appropriate  - Identify discharge learning needs (meds, wound care, etc.)  - Arrange for interpretive services to assist at discharge as needed  - Refer to Case Management Department for coordinating discharge planning if the patient needs post-hospital services based on physician/advanced practitioner order or complex needs related to functional status, cognitive ability, or social support system  Outcome: Progressing     Problem: Knowledge Deficit  Goal: Patient/family/caregiver demonstrates understanding of disease process, treatment plan, medications, and discharge instructions  Description: Complete learning assessment and assess knowledge base.  Interventions:  - Provide teaching at level of understanding  - Provide teaching via preferred learning methods  Outcome: Progressing     Problem: Nutrition/Hydration-ADULT  Goal: Nutrient/Hydration intake appropriate for improving, restoring or maintaining nutritional needs  Description: Monitor and assess patient's nutrition/hydration status for malnutrition. Collaborate with interdisciplinary team and initiate plan and interventions as ordered.  Monitor patient's weight and dietary intake as ordered or per policy. Utilize nutrition screening tool and intervene as necessary. Determine patient's food preferences and provide high-protein, high-caloric foods as appropriate.     INTERVENTIONS:  - Monitor oral intake, urinary output, labs, and treatment plans  - Assess nutrition and hydration status and recommend course of action  - Evaluate amount of meals eaten  - Assist patient with eating if necessary   - Allow adequate time for meals  - Recommend/ encourage appropriate diets, oral nutritional supplements, and vitamin/mineral supplements  - Order, calculate, and assess calorie counts as needed  - Recommend, monitor, and adjust tube feedings and TPN/PPN based on assessed needs  - Assess need for intravenous fluids  -  Provide specific nutrition/hydration education as appropriate  - Include patient/family/caregiver in decisions related to nutrition  Outcome: Progressing     Problem: Prexisting or High Potential for Compromised Skin Integrity  Goal: Skin integrity is maintained or improved  Description: INTERVENTIONS:  - Identify patients at risk for skin breakdown  - Assess and monitor skin integrity  - Assess and monitor nutrition and hydration status  - Monitor labs   - Assess for incontinence   - Turn and reposition patient  - Assist with mobility/ambulation  - Relieve pressure over bony prominences  - Avoid friction and shearing  - Provide appropriate hygiene as needed including keeping skin clean and dry  - Evaluate need for skin moisturizer/barrier cream  - Collaborate with interdisciplinary team   - Patient/family teaching  - Consider wound care consult   Outcome: Progressing

## 2024-10-25 NOTE — DISCHARGE INSTR - AVS FIRST PAGE
DISCHARGE INSTRUCTIONS  ARTERIOGRAM/ANGIOPLASTY/STENT    ACTIVITY: On the evening following the procedure, you should be mostly resting.  Someone should remain with you during the evening and overnight following the procedure.     On the day after your procedure, limit your activity to walking.  Avoid heavy lifting (no more than 15 lbs) for the first three days. Walking up steps and normal activities may be resumed as you feel ready.   You should not drive a car for at least two days following discharge from the hospital. You may ride in a car.   If you have any questions regarding a particular activity, please discuss with your doctor or nurse before you are discharged.    DIET:  Resume your normal diet.  Drink more water than usual for the next 24 hours.    PROCEDURE SITE: You may have a procedure site in your groin, arm, or foot.  You may have surgical glue at your procedure site.  The glue is used to cover the procedure site, assist in closure, and prevent contamination. This adhesive will darken and peel away on its own within one to two weeks. Do not pick at it.    You should shower daily.  Wash incision daily with soap and water, but do not rub or scrub the incision; rinse thoroughly and pat dry.  Do not bathe in a tub or swim for the first 2 week following your procedure or if you have any open wounds.  It is normal to have some bruising, swelling or discoloration around the procedure site.  IF increasing redness, pain, or a bulge develops, call our office immediately.    If present, you may remove the band-aid or “steri-strips” over your procedure site after two days.   If you notice any active bleeding at the site, apply pressure to the site and call our office ([phone]) or [phone].    FOLLOW UP STUDIES:  Your doctor will discuss whether further treatments or follow-up studies are necessary at your first post procedure visit.    FOLLOW UP APPOINTMENTS:  Making and keeping follow up appointments and  ultrasound tests are important to your recovery.  If you have difficulty making it to or keeping your follow up appointments, call the office.    If you have increased pain, fever >101.5, increased drainage, redness or a bad smell at your surgery site, new coldness/numbness of your arm or leg, please call us immediately and GO directly to the ER.      Appt w/ Dr. Alejandro: 11/8/2024 at 10:30am, [facility]    PLEASE CALL THE OFFICE IF YOU HAVE ANY QUESTIONS  [phone]  [phone] [address], [address], [place] [address]  [address] [place] [address]  [address] [place] [address]  [address] [facility], [place] [address]  [address] [facility], [address], [place] [address]  [address] [facility], [address], [place] [address]  [address] [facility] [address], 2nd Floor, [place] [address]  [address] [facility], 1st Floor, [address], [place] [address]  [address] [facility] [address]  [address] [place] [address] [address], [place] [address]     Discharge Instructions - Podiatry    Weight Bearing Status: Non-weight bearing                    Pain: Continue analgesics as needed     Follow-up appointment instructions: Please make an appointment within one week of discharge with Dr. Page. Contact sooner if any increase in pain, or signs of infection occur    Wound Care: Leave dressings clean, dry, and intact between professional dressing changes    Nursing Instructions: Please apply Betadine soaked adaptic. Then cover with 4x4 dry gauze and secure with rolled gauze and ACEand tape. Please change dressing every Monday, Wednesday, and Friday .

## 2024-10-25 NOTE — CASE MANAGEMENT
Case Management Discharge Planning Note    Patient name Clay Marcial  Location [facility] 825/[facility] 825-01 MRN [id number]  : 1955 Date 10/25/2024       Current Admission Date: 10/22/2024  Current Admission Diagnosis:Diabetic foot infection  (HCC)   Patient Active Problem List    Diagnosis Date Noted Date Diagnosed    HTN (hypertension) 10/21/2024     Elevated serum creatinine 10/21/2024     Smoking 10/21/2024     PAD (peripheral artery disease) (HCC) 10/20/2024     Acute hyponatremia 10/20/2024     Chronic obstructive pulmonary disease with acute exacerbation (HCC) 10/17/2024     ROMEL (obstructive sleep apnea) 10/17/2024     Diabetic foot infection  (HCC) 10/16/2024     Stage 3 chronic kidney disease ([facility]) 10/16/2024     Diabetes mellitus, type 2 ([facility]) 2024     CAD (coronary artery disease) 2024     Chronic diastolic congestive heart failure ([facility]) 2024     Acute on chronic kidney failure  ([facility]) 2024     Helicobacter pylori infection 2024       LOS (days): 3  Geometric Mean LOS (GMLOS) (days): 3.1  Days to GMLOS:0.3     OBJECTIVE:  Risk of Unplanned Readmission Score: 34.42         Current admission status: Inpatient   Preferred Pharmacy:   [facility]/pharmacy #[id number] - [place] - [address] [place] [address]  Phone: [phone] Fax: [phone]    [facility] - [place] - [address]  [address]  [facility] [address]  Phone: [phone] Fax: [phone]    Primary Care Provider: No primary care provider on file.    Primary Insurance: [employer] [facility]  Secondary Insurance:     DISCHARGE DETAILS:           CM following- pt will be here through weekend. A gram completed today, plan for 2nd ray amputation vs TMA intervention with podiatry  PT/OT recs to follow

## 2024-10-25 NOTE — PROGRESS NOTES
Progress Note - Infectious Disease   Clay Marcial 68 y.o. male MRN: [id number]  Unit/Bed#: [facility] 825-01 Encounter: [id number]      Impression:  1.  Diabetic left foot infection with probable osteomyelitis s/p left second toe amputation with wound debridement and packing.  2.  Type II DM with PAD, history of chronic left foot plantar ulcer  3.  History of chronic tobacco abuse with COPD and possible ROMEL  4.  CAD    Recommendations:  Patient is afebrile with normal WBC count.  Patient seen and therapy discussed with primary hospitalist service  1.  Description and podiatry picture 10/25 noted with purulent drainage and positive probing to bone.  2.  Wound cultures are showing a variety of organisms including anaerobic growth of Bacteroides pyogenous and Finegoldia magna and aerobic growth of actinomyces species as well as Globicatella.  3.  Had angiogram today with left percutaneous angioplasty done by IR  4.  Nares MRSA culture results were negative  5.  Will discontinue vancomycin  IV and continue piperacillin/tazobactam 4.5 g every 8 hours IV by extended infusion.  6.  Surgical plans per podiatry    Antibiotics:  1.  Piperacillin/tazobactam 4.5 g every 8 hours IV by extended infusion, day 6Rx    Subjective:  The patient has no complaints.  Denies fevers, chills, or sweats.  Denies nausea, vomiting, or diarrhea.      Objective:  Vitals:  Temp:  [97.2 °F (36.2 °C)-97.9 °F (36.6 °C)] 97.2 °F (36.2 °C)  HR:  [69-81] 76  Resp:  [16-18] 16  BP: (137-159)/(76-87) 138/84  SpO2:  [92 %-98 %] 96 %  Temp (24hrs), Av.6 °F (36.4 °C), Min:97.2 °F (36.2 °C), Max:97.9 °F (36.6 °C)  Current: Temperature: (!) 97.2 °F (36.2 °C)    Physical Exam:     General Appearance:  Alert, appropriately responsive, chronically ill-appearing nontoxic, no acute distress.   Throat: Oropharynx moist without lesions.  Lips, mucosa, and tongue normal, edentulous   Neck: Supple, symmetrical, trachea midline, no adenopathy,  no  tenderness/mass/nodules   Lungs:   Increased AP diameter with deep creased respiratory expansion   Heart:  Regular rate and rhythm, S1, S2 normal, no murmur, rub or gallop   Abdomen:   Soft, non-tender, non-distended, positive bowel sounds.  No masses, no organomegaly    No CVA tenderness   Extremities: LLE with +2/4 edema and erythema dry surgical dressing in place, s/p left second toe amputation wound description as per picture 10/25 with purulent odiferous discharge.  Peripheral pulses decreased   Skin: As above.         Invasive Devices       Peripheral Intravenous Line  Duration             Peripheral IV 10/22/24 Dorsal (posterior);Left Hand 3 days    Peripheral IV 10/24/24 Left;Proximal;Ventral (anterior) Forearm 1 day                    Labs, Imaging, & Other studies:   All pertinent labs were personally reviewed  Results from last 7 days   Lab Units 10/25/24  0544 10/24/24  0643 10/22/24  0739   WBC Thousand/uL 5.60 5.34 7.28   HEMOGLOBIN g/dL 11.3* 11.7* 11.3*   PLATELETS Thousands/uL 376 359 290     Results from last 7 days   Lab Units 10/25/24  0544 10/24/24  0643 10/23/24  0613   SODIUM mmol/L 136 137 137   POTASSIUM mmol/L 4.1 4.2 4.2   CHLORIDE mmol/L 102 101 101   CO2 mmol/L 27 29 30   BUN mg/dL 14 13 17   CREATININE mg/dL 1.13 1.14 1.30   EGFR ml/min/1.73sq m 65 64 55   CALCIUM mg/dL 8.3* 8.7 8.3*     Results from last 7 days   Lab Units 10/23/24  1852 10/21/24  1428   GRAM STAIN RESULT   --  1+ Gram positive cocci in clusters*  No polys seen*   WOUND CULTURE   --  1+ Growth of Actinomyces species*  Few Colonies of Diphtheroids  Few Colonies of - Globicatella Species Globicatella*  1 colony Staphylococcus coagulase negative*   MRSA CULTURE ONLY  No Methicillin Resistant Staphlyococcus aureus (MRSA) isolated  --

## 2024-10-25 NOTE — PROGRESS NOTES
Clay Marcial is a 68 y.o. male who is currently ordered Vancomycin IV with management by the Pharmacy Consult service.  Relevant clinical data and objective / subjective history reviewed.  Vancomycin Assessment:  Indication and Goal AUC/Trough: Soft tissue (goal -600, trough >10); Bone/joint infection (goal -600, trough >10), -600, trough >10  Clinical Status: stable  Micro:     Renal Function:  SCr: 1.13 mg/dL  CrCl: 82.3 mL/min  Renal replacement: Not on dialysis  Days of Therapy: 4  Current Dose:  1000mg iv q12hrs  Vancomycin Plan:  New Dosinmg iv q12h  Estimated AUC: 466 mcg*hr/mL  Estimated Trough: 14.9 mcg/mL  Next Level: 10/31 0600  Renal Function Monitoring: Daily BMP and UOP  Pharmacy will continue to follow closely for s/sx of nephrotoxicity, infusion reactions and appropriateness of therapy.  BMP and CBC will be ordered per protocol. We will continue to follow the patient’s culture results and clinical progress daily.    Mckenna Garza, Pharmacist

## 2024-10-25 NOTE — DISCHARGE INSTRUCTIONS
ARTERIOGRAM    WHAT YOU SHOULD KNOW:   An angiogram is a procedure to look at arteries in your body. Arteries are the blood vessels that carry blood from your heart to your body.     AFTER YOU LEAVE:     Self-care:   Limit activity: Rest for the remainder of the day of your procedure.Have some one with you until the next morning. Keep your arm or leg straight as much as possible.Rest as much as possible, sitting lying or reclining. Walk only to go to the bathroom, to bed or to eat. If the angiogram catheter was put in your leg, use the stairs as little as possible. No driving for 24-48 hours. No heavy lifting, >10 lbs. Or strenuous activity for 48 hours.      Keep your wound clean and dry. Remove band aid/ dressing tomorrow. You may shower 24 hours after your procedure. Shower and wash groin area or wrist area gently with soap and water: beginning tomorrow. Rinse and pat Dry. Apply new water seal band aid. Repeat this process for 5 days.  If there is any drainage from the puncture site, you should put on a clean bandage. No Powders, creams, lotions or antibiotic ointments for 5 days.  No tub baths, hot tubs or swimming for 5 days.    Watch for bleeding and bruising: It is normal to have a bruise and soreness where the angiogram catheter went in.  Medication: If your angiogram was performed to treat blockages in your leg arteries, it is strongly recommended that you take both an antiplatelet medication (like aspirin or Plavix) to prevent clotting AND a statin drug (like Lipitor or Crestor), even if you have normal cholesterol. If these drugs are not ordered for you please contact either your Vascular Surgery office or the Interventional Radiology Dept during normal daytime working hours. See Interventional Radiology telephone numbers below.  You Should Have Follow up with the vascular surgeon   call [phone] with questions  Diet:   You may resume your regular diet, Sips of flat soda will help with mild  nausea.  Drink more liquids than usual for the next 24 hours      IMMEDIATELY Contact Interventional Radiology at [phone]  if any of the following occur:  If your bruise gets larger or if you notice any active bleeding. APPLY DIRECT PRESSURE TO THE BLEEDING SITE.   If you notice increased swelling or have increased pain at the puncture site   If you have any numbness or pain in the extremity of the puncture site   If that extremity seems cold or pale.    You have fever greater than 101  Persistent nausea or vomitting    Follow up with your primary healthcare provider  as directed: Write down your questions so you remember to ask them during your visits.

## 2024-10-25 NOTE — BRIEF OP NOTE (RAD/CATH)
INTERVENTIONAL RADIOLOGY PROCEDURE NOTE    Date: 10/25/2024    Procedure: IR LOWER EXTREMITY ANGIOGRAM     Preoperative diagnosis:   1. Diabetic foot infection  (HCC)    2. Amputation of second toe, left, traumatic (HCC)    3. Elevated serum creatinine    4. Stage 3 chronic kidney disease, unspecified whether stage 3a or 3b CKD (HCC)         Postoperative diagnosis: Same.    Surgeon: Crow Cruz MD     Assistant: None. No qualified resident was available.    Blood loss: minimal    Specimens: none    Findings:     Left AT stenosis, treated with 3 mm PTA.    Complications: None immediate.    Anesthesia: conscious sedation      Vascular Quality Initiative - Peripheral Vascular Intervention     Urgency: Urgent    Functional Status:  Restricted in physically strenuous activity but ambulatory and able to carry out work of a light or sedentary nature.   Ambulation: Amb = independently ambulatory    Leg Symptoms    Right: Asymptomatic:  documented peripheral arterial disease without symptoms of claudication or ischemic pain      Treatment of Native Artery to Maintain Bypass Patency?:  No  Left: Ulcer/necrosis (gangrene): de crow tissue loss due to peripheral arterial disease, not due to non-healing prior amputation       Tissue Loss Severity: Grade 2, Deep = deeper full thickness ulcer or necrosis (gangrene) on distal leg or foot with exposed bone, joint, or tendon, or shallow heel ulcer without involvement of the calcaneus (ie, major tissue loss: salvageable with 3 digital amputations or standard transmetatarsal amputation [TMA] plus skin coverage).     Infection: Grade 0, None = No symptoms or signs of infection.    COVID Information  COVID Symptoms Pre-Procedure: Asymptomatic    Treatment Delayed by Pandemic: None    Access   Number of Sites: 1     Access Site 1:     Side 1: Right    Site 1: Femoral Retro to Antegrade    Access Guidance 1:U/S    Largest Sheath Size 1: 6 Fr.    Closure Device 1: Vascade      Number of  Closure Devices: 1     Closure Device Outcome: Closure device successful         Procedure  Fluoro Time: 16.8 minutes  Contrast Volume: Visipaque 75 ml  CO2: no  Anticoagulant: Heparin  Protamine: No  If Creatinine is > 1.2 or missing, NATALIYA Prophylaxis none     Treatment Details  Indication: Occlusive Disease,    Completion Assessment  Artery 1 treated: Ant Tibial   Left                    Was this Site previously treated?: No          TASC Grade: A          Total Treated Length: 22 cm          Total Occluded Length: 0 cm          Calcification: None (no calcification visible on fluoroscopic, CT or IVUS imaging)          Number of Treatment types (Devices):   1           Device 1          Treatment Type: Plain Balloon          Concomitant: None          Technical result: Successful (stenosis <=30%)      None     Post Procedure  Patient currently taking: Statin, Yes      Antiplatelet Medication, Yes    Procedure Complications: No

## 2024-10-25 NOTE — PROGRESS NOTES
Progress Note - Hospitalist   Name: Clay Marcial 68 y.o. male I MRN: [id number]  Unit/Bed#: [facility] 825-01 I Date of Admission: 10/22/2024   Date of Service: 10/25/2024 I Hospital Day: 3    Assessment & Plan  Diabetic foot infection  (HCC)  Initially at [facility] hospital with left lower extremity cellulitis with open wound.    Evaluated by podiatry, ID, vascular at [facility] who are also following here,  S/p I&D, L 2nd toe amputation with podiatry on 10/21 given concern for acutely worsening wound/infection   Transferred to [facility] for vascular/IR evaluations given concern also for embolic process/ischemia   Plan for agram 10/25 with plan for partial 2nd ray amputation vs. TMA intervention afterwards with podiatry   Continue IV heparin gtt   Wound with purulent drainage and probing to bone on 10/24  ID following,  Continue IV zosyn and vancomycin for now, pt without improvement on Ancef  Wound culture from OR on 10/21 with bacteroides, multiple organisms   Monitor temps, WBC  Diabetes mellitus, type 2 (HCC)  Lab Results   Component Value Date    HGBA1C 7.2 (H) 10/16/2024       Recent Labs     10/24/24  1604 10/24/24  2118 10/25/24  0727 10/25/24  1119   POCGLU 209* 238* 273* 183*       Blood Sugar Average: Last 72 hrs:  (P) [id number]  Increase Lantus to 25 units QHS  Continue SSI coverage   QID glucose checks   Risk factor for primary problem   Monitor and adjust regimen as needed  Stage 3 chronic kidney disease (HCC)  Lab Results   Component Value Date    EGFR 65 10/25/2024    EGFR 64 10/24/2024    EGFR 55 10/23/2024    CREATININE 1.13 10/25/2024    CREATININE 1.14 10/24/2024    CREATININE 1.30 10/23/2024   Creatinine currently at baseline, was noted to have elevated serum creatinine earlier on in this hospitalization with peak of 1.7  Nephrology following,  Holding diuretics and lisinopril   Continue to monitor with BMP  HTN (hypertension)  BP stable on review   Continue Imdur 30 mg daily, Toprol XL 50 mg daily    ACEI and diuretics on hold per nephrology  Monitor   CAD (coronary artery disease)  Continue aspirin, Lipitor, isosorbide dinitrate, BB  Chronic obstructive pulmonary disease with acute exacerbation (HCC)  Has since resolved  Continue Pulmicort and Xopenex  Saturating well on RA    VTE Pharmacologic Prophylaxis:   Moderate Risk (Score 3-4) - Pharmacological DVT Prophylaxis Contraindicated. Sequential Compression Devices Ordered.    Mobility:   Basic Mobility Inpatient Raw Score: 19  JH-HLM Goal: 6: Walk 10 steps or more  JH-HLM Achieved: 5: Stand (1 or more minutes)  JH-HLM Goal NOT achieved. Continue with multidisciplinary rounding and encourage appropriate mobility to improve upon JH-HLM goals.    Patient Centered Rounds: I performed bedside rounds with nursing staff today.   Discussions with Specialists or Other Care Team Provider: Vascular surgery    Education and Discussions with Family / Patient: Patient declined call to .     Current Length of Stay: 3 day(s)  Current Patient Status: Inpatient   Certification Statement: The patient will continue to require additional inpatient hospital stay due to nonhealing diabetic ulcer  Discharge Plan:  Pending clinical improvement    Code Status: Level 1 - Full Code    Subjective   This is a very pleasant 68-year-old gentleman who was seen and evaluated today at bedside.  Patient denies any acute complaints at this time.  Patient is agreeable to plan.  Patient will have angiogram done today.    Objective :  Temp:  [97.9 °F (36.6 °C)-98.1 °F (36.7 °C)] 97.9 °F (36.6 °C)  HR:  [70-82] 73  BP: (142-159)/(76-87) 142/81  Resp:  [16-20] 17  SpO2:  [92 %-98 %] 96 %  O2 Device: Nasal cannula  Nasal Cannula O2 Flow Rate (L/min):  [2 L/min-4 L/min] 2 L/min  FiO2 (%):  [21] 21    Body mass index is 38.77 kg/m².     Input and Output Summary (last 24 hours):     Intake/Output Summary (Last 24 hours) at 10/25/2024 1511  Last data filed at 10/25/2024 1230  Gross per 24  hour   Intake --   Output 1720 ml   Net -1720 ml       Physical Exam  Vitals reviewed.   HENT:      Head: Normocephalic.      Nose: No congestion.      Mouth/Throat:      Pharynx: No oropharyngeal exudate or posterior oropharyngeal erythema.   Eyes:      Conjunctiva/sclera: Conjunctivae normal.   Cardiovascular:      Rate and Rhythm: Normal rate and regular rhythm.   Pulmonary:      Effort: Pulmonary effort is normal.   Abdominal:      General: Abdomen is flat.      Palpations: Abdomen is soft.   Musculoskeletal:         General: Signs of injury present.      Right lower leg: Edema present.      Left lower leg: Edema present.   Skin:     General: Skin is warm and dry.      Findings: Lesion present.   Neurological:      Mental Status: He is alert and oriented to person, place, and time. Mental status is at baseline.           Lines/Drains:              Lab Results: I have reviewed the following results:   Results from last 7 days   Lab Units 10/25/24  0544 10/24/24  0643 10/22/24  0739 10/21/24  0452   WBC Thousand/uL 5.60   < > 7.28 7.64   HEMOGLOBIN g/dL 11.3*   < > 11.3* 11.4*   HEMATOCRIT % 33.9*   < > 33.6* 33.9*   PLATELETS Thousands/uL 376   < > 290 244   SEGS PCT %  --   --   --  72   LYMPHO PCT %  --   --  21 15   MONO PCT %  --   --  8 9   EOS PCT %  --   --  1 2    < > = values in this interval not displayed.     Results from last 7 days   Lab Units 10/25/24  0544   SODIUM mmol/L 136   POTASSIUM mmol/L 4.1   CHLORIDE mmol/L 102   CO2 mmol/L 27   BUN mg/dL 14   CREATININE mg/dL 1.13   ANION GAP mmol/L 7   CALCIUM mg/dL 8.3*   GLUCOSE RANDOM mg/dL 279*     Results from last 7 days   Lab Units 10/19/24  1434   INR  1.18     Results from last 7 days   Lab Units 10/25/24  1119 10/25/24  0727 10/24/24  2118 10/24/24  1604 10/24/24  1112 10/24/24  0701 10/23/24  2120 10/23/24  1603 10/23/24  1037 10/23/24  0734 10/22/24  2152 10/22/24  1603   POC GLUCOSE mg/dl 183* 273* 238* 209* 250* 193* 184* 163* 166* 188*  226* 183*               Recent Cultures (last 7 days):   Results from last 7 days   Lab Units 10/21/24  1428   GRAM STAIN RESULT  1+ Gram positive cocci in clusters*  No polys seen*   WOUND CULTURE  1+ Growth of Actinomyces species*  Few Colonies of Diphtheroids  Few Colonies of - Globicatella Species Globicatella*  1 colony Staphylococcus coagulase negative*       Imaging Results Review: I reviewed radiology reports from this admission including: xray(s) and procedure reports.  Other Study Results Review: No additional pertinent studies reviewed.    Last 24 Hours Medication List:     Current Facility-Administered Medications:     acetaminophen (TYLENOL) tablet 650 mg, Q6H PRN    albuterol inhalation solution 2.5 mg, Q6H PRN    aspirin chewable tablet 81 mg, Daily    atorvastatin (LIPITOR) tablet 20 mg, Daily    bisacodyl (DULCOLAX) rectal suppository 10 mg, Daily PRN    budesonide (PULMICORT) inhalation solution 0.5 mg, Q12H    Cholecalciferol (VITAMIN D3) tablet 1,000 Units, Daily    cyanocobalamin (VITAMIN B-12) tablet 500 mcg, Daily    docusate sodium (COLACE) capsule 100 mg, BID    DULoxetine (CYMBALTA) delayed release capsule 20 mg, Daily    fentaNYL injection, PRN    fluticasone (FLONASE) 50 mcg/act nasal spray 2 spray, Daily    heparin (porcine) 25,000 units in 0.45% NaCl 250 mL infusion (premix), Titrated, Last Rate: 15 Units/kg/hr (10/25/24 1013)    heparin (porcine) injection 4,800 Units, Q6H PRN    heparin (porcine) injection 9,600 Units, Q6H PRN    heparin (porcine) injection, PRN    insulin glargine (LANTUS) subcutaneous injection 25 Units 0.25 mL, HS    insulin lispro (HumALOG/ADMELOG) 100 units/mL subcutaneous injection 10 Units, TID With Meals    insulin lispro (HumALOG/ADMELOG) 100 units/mL subcutaneous injection 2-12 Units, TID AC **AND** Fingerstick Glucose (POCT), TID AC    isosorbide mononitrate (IMDUR) 24 hr tablet 30 mg, Daily    lidocaine 1% buffered, PRN    loratadine (CLARITIN) tablet  10 mg, Daily    metoprolol succinate (TOPROL-XL) 24 hr tablet 50 mg, Daily    midazolam (VERSED) injection, PRN    morphine injection 2 mg, Q4H PRN    multi-electrolyte (PLASMALYTE-A/ISOLYTE-S PH 7.4) IV solution, Continuous, Last Rate: 50 mL/hr (10/25/24 1014)    nicotine (NICODERM CQ) 14 mg/24hr TD 24 hr patch 1 patch, Daily    umeclidinium 62.5 mcg/actuation inhaler AEPB 1 puff, Daily **AND** olodaterol HCl (STRIVERDI RESPIMAT) inhaler 2 puff, Daily    ondansetron (ZOFRAN) injection 4 mg, Q4H PRN    oxyCODONE (ROXICODONE) IR tablet 5 mg, Q4H PRN    oxyCODONE (ROXICODONE) split tablet 2.5 mg, Q4H PRN    pantoprazole (PROTONIX) EC tablet 40 mg, BID AC    piperacillin-tazobactam (ZOSYN) 4.5 g in sodium chloride 0.9 % 100 mL IVPB (EXTENDED INFUSION), Q8H, Last Rate: 4.5 g (10/25/24 0541)    polyethylene glycol (MIRALAX) packet 17 g, Daily    pregabalin (LYRICA) capsule 100 mg, BID    sodium chloride (OCEAN) 0.65 % nasal spray 2 spray, BID    traZODone (DESYREL) tablet 25 mg, HS    vancomycin (VANCOCIN) IVPB (premix in dextrose) 1,000 mg 200 mL, Q12H, Last Rate: 1,000 mg (10/25/24 1135)    Administrative Statements   Today, Patient Was Seen By: Perico Villa MD  I have spent a total time of 40 minutes in caring for this patient on the day of the visit/encounter including Diagnostic results, Prognosis, Risks and benefits of tx options, Documenting in the medical record, Obtaining or reviewing history  , and Communicating with other healthcare professionals .    **Please Note: This note may have been constructed using a voice recognition system.**

## 2024-10-25 NOTE — SEDATION DOCUMENTATION
Pt in IR for Left lower extremity angiogram performed by Dr. Cruz. Patient tolerated procedure well with conscious sedation. IR 4hr Bedrest start time 15:15.  R groin site closed with vascade. Report given to Primary RN.

## 2024-10-26 LAB
ALBUMIN SERPL BCG-MCNC: 3 G/DL (ref 3.5–5)
ALP SERPL-CCNC: 52 U/L (ref 34–104)
ALT SERPL W P-5'-P-CCNC: 24 U/L (ref 7–52)
ANION GAP SERPL CALCULATED.3IONS-SCNC: 7 MMOL/L (ref 4–13)
APTT PPP: 63 SECONDS (ref 23–34)
AST SERPL W P-5'-P-CCNC: 29 U/L (ref 13–39)
BASOPHILS # BLD AUTO: 0.04 THOUSANDS/ΜL (ref 0–0.1)
BASOPHILS NFR BLD AUTO: 1 % (ref 0–1)
BILIRUB SERPL-MCNC: 0.42 MG/DL (ref 0.2–1)
BUN SERPL-MCNC: 14 MG/DL (ref 5–25)
CALCIUM ALBUM COR SERPL-MCNC: 9.3 MG/DL (ref 8.3–10.1)
CALCIUM SERPL-MCNC: 8.5 MG/DL (ref 8.4–10.2)
CHLORIDE SERPL-SCNC: 103 MMOL/L (ref 96–108)
CO2 SERPL-SCNC: 27 MMOL/L (ref 21–32)
CREAT SERPL-MCNC: 1.09 MG/DL (ref 0.6–1.3)
EOSINOPHIL # BLD AUTO: 0.24 THOUSAND/ΜL (ref 0–0.61)
EOSINOPHIL NFR BLD AUTO: 4 % (ref 0–6)
ERYTHROCYTE [DISTWIDTH] IN BLOOD BY AUTOMATED COUNT: 12.1 % (ref 11.6–15.1)
GFR SERPL CREATININE-BSD FRML MDRD: 68 ML/MIN/1.73SQ M
GLUCOSE SERPL-MCNC: 242 MG/DL (ref 65–140)
GLUCOSE SERPL-MCNC: 242 MG/DL (ref 65–140)
GLUCOSE SERPL-MCNC: 252 MG/DL (ref 65–140)
GLUCOSE SERPL-MCNC: 254 MG/DL (ref 65–140)
GLUCOSE SERPL-MCNC: 255 MG/DL (ref 65–140)
HCT VFR BLD AUTO: 34.6 % (ref 36.5–49.3)
HGB BLD-MCNC: 11.3 G/DL (ref 12–17)
IMM GRANULOCYTES # BLD AUTO: 0.11 THOUSAND/UL (ref 0–0.2)
IMM GRANULOCYTES NFR BLD AUTO: 2 % (ref 0–2)
LYMPHOCYTES # BLD AUTO: 1.34 THOUSANDS/ΜL (ref 0.6–4.47)
LYMPHOCYTES NFR BLD AUTO: 21 % (ref 14–44)
MAGNESIUM SERPL-MCNC: 1.6 MG/DL (ref 1.9–2.7)
MCH RBC QN AUTO: 31 PG (ref 26.8–34.3)
MCHC RBC AUTO-ENTMCNC: 32.7 G/DL (ref 31.4–37.4)
MCV RBC AUTO: 95 FL (ref 82–98)
MONOCYTES # BLD AUTO: 0.36 THOUSAND/ΜL (ref 0.17–1.22)
MONOCYTES NFR BLD AUTO: 6 % (ref 4–12)
NEUTROPHILS # BLD AUTO: 4.46 THOUSANDS/ΜL (ref 1.85–7.62)
NEUTS SEG NFR BLD AUTO: 66 % (ref 43–75)
NRBC BLD AUTO-RTO: 0 /100 WBCS
PLATELET # BLD AUTO: 404 THOUSANDS/UL (ref 149–390)
PMV BLD AUTO: 9.6 FL (ref 8.9–12.7)
POTASSIUM SERPL-SCNC: 4.2 MMOL/L (ref 3.5–5.3)
PROT SERPL-MCNC: 6.6 G/DL (ref 6.4–8.4)
RBC # BLD AUTO: 3.64 MILLION/UL (ref 3.88–5.62)
SODIUM SERPL-SCNC: 137 MMOL/L (ref 135–147)
WBC # BLD AUTO: 6.55 THOUSAND/UL (ref 4.31–10.16)

## 2024-10-26 PROCEDURE — 82948 REAGENT STRIP/BLOOD GLUCOSE: CPT

## 2024-10-26 PROCEDURE — 99232 SBSQ HOSP IP/OBS MODERATE 35: CPT | Performed by: FAMILY MEDICINE

## 2024-10-26 PROCEDURE — 94664 DEMO&/EVAL PT USE INHALER: CPT

## 2024-10-26 PROCEDURE — 94640 AIRWAY INHALATION TREATMENT: CPT

## 2024-10-26 PROCEDURE — 99232 SBSQ HOSP IP/OBS MODERATE 35: CPT | Performed by: INTERNAL MEDICINE

## 2024-10-26 PROCEDURE — 94760 N-INVAS EAR/PLS OXIMETRY 1: CPT

## 2024-10-26 PROCEDURE — 99232 SBSQ HOSP IP/OBS MODERATE 35: CPT | Performed by: SURGERY

## 2024-10-26 PROCEDURE — 83735 ASSAY OF MAGNESIUM: CPT | Performed by: FAMILY MEDICINE

## 2024-10-26 PROCEDURE — 85025 COMPLETE CBC W/AUTO DIFF WBC: CPT | Performed by: FAMILY MEDICINE

## 2024-10-26 PROCEDURE — 99233 SBSQ HOSP IP/OBS HIGH 50: CPT | Performed by: PODIATRIST

## 2024-10-26 PROCEDURE — 80053 COMPREHEN METABOLIC PANEL: CPT | Performed by: FAMILY MEDICINE

## 2024-10-26 PROCEDURE — 85730 THROMBOPLASTIN TIME PARTIAL: CPT | Performed by: FAMILY MEDICINE

## 2024-10-26 RX ORDER — INSULIN LISPRO 100 [IU]/ML
2-12 INJECTION, SOLUTION INTRAVENOUS; SUBCUTANEOUS
Status: DISCONTINUED | OUTPATIENT
Start: 2024-10-26 | End: 2024-11-05 | Stop reason: HOSPADM

## 2024-10-26 RX ORDER — MAGNESIUM SULFATE HEPTAHYDRATE 40 MG/ML
2 INJECTION, SOLUTION INTRAVENOUS ONCE
Status: COMPLETED | OUTPATIENT
Start: 2024-10-26 | End: 2024-10-26

## 2024-10-26 RX ADMIN — DOCUSATE SODIUM 100 MG: 100 CAPSULE, LIQUID FILLED ORAL at 17:27

## 2024-10-26 RX ADMIN — ISOSORBIDE MONONITRATE 30 MG: 30 TABLET, EXTENDED RELEASE ORAL at 08:58

## 2024-10-26 RX ADMIN — PANTOPRAZOLE SODIUM 40 MG: 40 TABLET, DELAYED RELEASE ORAL at 06:54

## 2024-10-26 RX ADMIN — INSULIN LISPRO 10 UNITS: 100 INJECTION, SOLUTION INTRAVENOUS; SUBCUTANEOUS at 11:54

## 2024-10-26 RX ADMIN — DOCUSATE SODIUM 100 MG: 100 CAPSULE, LIQUID FILLED ORAL at 08:58

## 2024-10-26 RX ADMIN — ATORVASTATIN CALCIUM 20 MG: 20 TABLET, FILM COATED ORAL at 08:58

## 2024-10-26 RX ADMIN — INSULIN GLARGINE 25 UNITS: 100 INJECTION, SOLUTION SUBCUTANEOUS at 21:42

## 2024-10-26 RX ADMIN — Medication 1000 UNITS: at 08:58

## 2024-10-26 RX ADMIN — INSULIN LISPRO 4 UNITS: 100 INJECTION, SOLUTION INTRAVENOUS; SUBCUTANEOUS at 11:54

## 2024-10-26 RX ADMIN — INSULIN LISPRO 6 UNITS: 100 INJECTION, SOLUTION INTRAVENOUS; SUBCUTANEOUS at 17:27

## 2024-10-26 RX ADMIN — POLYETHYLENE GLYCOL 3350 17 G: 17 POWDER, FOR SOLUTION ORAL at 08:58

## 2024-10-26 RX ADMIN — INSULIN LISPRO 4 UNITS: 100 INJECTION, SOLUTION INTRAVENOUS; SUBCUTANEOUS at 08:57

## 2024-10-26 RX ADMIN — CYANOCOBALAMIN TAB 500 MCG 500 MCG: 500 TAB at 08:58

## 2024-10-26 RX ADMIN — BUDESONIDE 0.5 MG: 0.5 INHALANT RESPIRATORY (INHALATION) at 07:29

## 2024-10-26 RX ADMIN — BUDESONIDE 0.5 MG: 0.5 INHALANT RESPIRATORY (INHALATION) at 19:39

## 2024-10-26 RX ADMIN — METOPROLOL SUCCINATE 50 MG: 50 TABLET, EXTENDED RELEASE ORAL at 08:58

## 2024-10-26 RX ADMIN — PANTOPRAZOLE SODIUM 40 MG: 40 TABLET, DELAYED RELEASE ORAL at 17:27

## 2024-10-26 RX ADMIN — ASPIRIN 81 MG CHEWABLE TABLET 81 MG: 81 TABLET CHEWABLE at 08:58

## 2024-10-26 RX ADMIN — LORATADINE 10 MG: 10 TABLET ORAL at 08:58

## 2024-10-26 RX ADMIN — DULOXETINE HYDROCHLORIDE 20 MG: 20 CAPSULE, DELAYED RELEASE ORAL at 08:58

## 2024-10-26 RX ADMIN — PIPERACILLIN SODIUM AND TAZOBACTAM SODIUM 4.5 G: 36; 4.5 INJECTION, POWDER, LYOPHILIZED, FOR SOLUTION INTRAVENOUS at 14:23

## 2024-10-26 RX ADMIN — INSULIN LISPRO 6 UNITS: 100 INJECTION, SOLUTION INTRAVENOUS; SUBCUTANEOUS at 21:43

## 2024-10-26 RX ADMIN — NICOTINE 1 PATCH: 14 PATCH, EXTENDED RELEASE TRANSDERMAL at 08:59

## 2024-10-26 RX ADMIN — MAGNESIUM SULFATE HEPTAHYDRATE 2 G: 40 INJECTION, SOLUTION INTRAVENOUS at 11:55

## 2024-10-26 RX ADMIN — INSULIN LISPRO 10 UNITS: 100 INJECTION, SOLUTION INTRAVENOUS; SUBCUTANEOUS at 08:57

## 2024-10-26 RX ADMIN — PIPERACILLIN SODIUM AND TAZOBACTAM SODIUM 4.5 G: 36; 4.5 INJECTION, POWDER, LYOPHILIZED, FOR SOLUTION INTRAVENOUS at 06:55

## 2024-10-26 RX ADMIN — PREGABALIN 100 MG: 100 CAPSULE ORAL at 17:27

## 2024-10-26 RX ADMIN — INSULIN LISPRO 10 UNITS: 100 INJECTION, SOLUTION INTRAVENOUS; SUBCUTANEOUS at 17:28

## 2024-10-26 RX ADMIN — TRAZODONE HYDROCHLORIDE 25 MG: 50 TABLET ORAL at 21:42

## 2024-10-26 RX ADMIN — PREGABALIN 100 MG: 100 CAPSULE ORAL at 08:58

## 2024-10-26 RX ADMIN — PIPERACILLIN SODIUM AND TAZOBACTAM SODIUM 4.5 G: 36; 4.5 INJECTION, POWDER, LYOPHILIZED, FOR SOLUTION INTRAVENOUS at 21:42

## 2024-10-26 RX ADMIN — HEPARIN SODIUM 15 UNITS/KG/HR: 10000 INJECTION, SOLUTION INTRAVENOUS at 12:00

## 2024-10-26 NOTE — PROGRESS NOTES
Progress Note - Nephrology   Name: Clay Marcial 68 y.o. male I MRN: [id number]  [facility] Date of Admission: 10/22/2024   Date of Service: 10/26/2024 I Hospital Day: 4    Assessment & Plan  Elevated serum creatinine  Etiology of elevated creatinine most likely due to autoregulatory failure in the setting of ACE inhibitor use, SGLT2 inhibitor use and diuretic use  Baseline creatinine appears to be around 1.3-1.4 in setting of diabetes and hypertension  Creatinine on recent admission 1.58  Peak creatinine 1.7 on 10/17  Creatinine today 1.13 below baseline  His lisinopril furosemide and Jardiance are on hold his creatinine is stable after angiogram  Stage 3 chronic kidney disease (HCC)  Baseline creatinine 1.3-1.4  Needs outpatient follow-up with nephrology  Diabetic foot infection  (HCC)  Patient with nonhealing left foot wounds  Status post left second toe amputation/debridement with podiatry on 10/21 for worsening wound  Vascular surgery and IR following  Plans noted for IR lower extremity angiogram later today  Currently on IV antibiotics per infectious disease  Diabetes mellitus, type 2 (HCC)  Management per primary team  Continue to hold metformin and Jardiance for now  CAD (coronary artery disease)  Management per primary team  Chronic obstructive pulmonary disease with acute exacerbation (HCC)  Management per primary team  HTN (hypertension)  Home Rx: Furosemide 40 mg as needed, Imdur 30 mg daily, lisinopril 20 mg daily, Toprol-XL 50 mg daily  Current Rx: Imdur 30 mg daily, Toprol-XL 50 mg daily  Blood pressure is currently acceptable, keep holding lisinopril and furosemide  Will hold off on adding furosemide for now await further intervention by podiatry    Please contact the SecureChat role for the Nephrology service with any questions/concerns.    Subjective     The patient was seen today.  Overall no acute complaints, no chest pain or shortness of breath fevers or chills  Objective  :  Temp:  [97.2 °F (36.2 °C)-98.5 °F (36.9 °C)] 98.5 °F (36.9 °C)  HR:  [69-94] 87  BP: (137-159)/(76-90) 151/87  Resp:  [16-20] 18  SpO2:  [92 %-98 %] 95 %  O2 Device: None (Room air)  Nasal Cannula O2 Flow Rate (L/min):  [2 L/min-4 L/min] 2 L/min  FiO2 (%):  [21] 21    Current Weight: Weight - Scale: 126 kg (278 lb)  First Weight: Weight - Scale: 126 kg (278 lb)  I/O         10/24 0701  10/25 0700 10/25 0701  10/26 0700 10/26 0701  10/27 0700    P.O.   180    I.V. (mL/kg)  450.8 (3.6)     Total Intake(mL/kg)  450.8 (3.6) 180 (1.4)    Urine (mL/kg/hr) 1150 (0.4) 1670 (0.6) 300 (0.4)    Stool 0 0     Total Output 1150 1670 300    Net -1150 -1219.2 -120           Unmeasured Stool Occurrence 1 x 1 x           Physical Exam  Constitutional:       Appearance: He is well-developed.   HENT:      Head: Normocephalic and atraumatic.      Nose: Nose normal.   Eyes:      Conjunctiva/sclera: Conjunctivae normal.   Cardiovascular:      Rate and Rhythm: Normal rate and regular rhythm.      Heart sounds: Normal heart sounds.   Pulmonary:      Effort: Pulmonary effort is normal.      Breath sounds: Normal breath sounds.   Abdominal:      General: Bowel sounds are normal.      Palpations: Abdomen is soft.   Musculoskeletal:         General: Normal range of motion.      Cervical back: Normal range of motion.   Skin:     General: Skin is warm and dry.   Neurological:      Mental Status: He is alert and oriented to person, place, and time.   Psychiatric:         Behavior: Behavior normal.         Thought Content: Thought content normal.         Judgment: Judgment normal.       Medications:    Current Facility-Administered Medications:     acetaminophen (TYLENOL) tablet 650 mg, 650 mg, Oral, Q6H PRN, Stefan Ordonez MD    albuterol inhalation solution 2.5 mg, 2.5 mg, Nebulization, Q6H PRN, Robert Villegas MD    aspirin chewable tablet 81 mg, 81 mg, Oral, Daily, Stefan Ordonez MD, 81 mg at 10/26/24 0860    atorvastatin (LIPITOR)  tablet 20 mg, 20 mg, Oral, Daily, Stefan Ordonez MD, 20 mg at 10/26/24 0858    bisacodyl (DULCOLAX) rectal suppository 10 mg, 10 mg, Rectal, Daily PRN, Tanya Lacey PA-C    budesonide (PULMICORT) inhalation solution 0.5 mg, 0.5 mg, Nebulization, Q12H, Stefan Ordonez MD, 0.5 mg at 10/26/24 0729    Cholecalciferol (VITAMIN D3) tablet 1,000 Units, 1,000 Units, Oral, Daily, Stefan Ordonez MD, 1,000 Units at 10/26/24 0858    cyanocobalamin (VITAMIN B-12) tablet 500 mcg, 500 mcg, Oral, Daily, Stefan Ordonez MD, 500 mcg at 10/26/24 0858    docusate sodium (COLACE) capsule 100 mg, 100 mg, Oral, BID, Stefan Ordonez MD, 100 mg at 10/26/24 0858    DULoxetine (CYMBALTA) delayed release capsule 20 mg, 20 mg, Oral, Daily, Stefan Ordonez MD, 20 mg at 10/26/24 0858    fluticasone (FLONASE) 50 mcg/act nasal spray 2 spray, 2 spray, Nasal, Daily, Stefan Ordonez MD, 2 spray at 10/25/24 0833    heparin (porcine) 25,000 units in 0.45% NaCl 250 mL infusion (premix), 3-30 Units/kg/hr (Order-Specific), Intravenous, Titrated, Stefan Ordonez MD, Last Rate: 18 mL/hr at 10/26/24 1200, 15 Units/kg/hr at 10/26/24 1200    heparin (porcine) injection 4,800 Units, 4,800 Units, Intravenous, Q6H PRN, Stefan Ordonez MD, 4,800 Units at 10/23/24 1709    heparin (porcine) injection 9,600 Units, 9,600 Units, Intravenous, Q6H PRN, Stefan Ordonez MD    insulin glargine (LANTUS) subcutaneous injection 25 Units 0.25 mL, 25 Units, Subcutaneous, HS, Perico Villa MD, 25 Units at 10/25/24 2159    insulin lispro (HumALOG/ADMELOG) 100 units/mL subcutaneous injection 10 Units, 10 Units, Subcutaneous, TID With Meals, Stefan Ordonez MD, 10 Units at 10/26/24 1154    insulin lispro (HumALOG/ADMELOG) 100 units/mL subcutaneous injection 2-12 Units, 2-12 Units, Subcutaneous, TID AC, 4 Units at 10/26/24 1154 **AND** Fingerstick Glucose (POCT), , , TID AC, Stefan Ordonez MD    isosorbide mononitrate (IMDUR) 24 hr tablet 30 mg, 30 mg, Oral, Daily, Stefan Ordonez MD, 30 mg at 10/26/24  0858    loratadine (CLARITIN) tablet 10 mg, 10 mg, Oral, Daily, Stefan Ordonez MD, 10 mg at 10/26/24 0858    magnesium sulfate 2 g/50 mL IVPB (premix) 2 g, 2 g, Intravenous, Once, Perico Villa MD, Last Rate: 25 mL/hr at 10/26/24 1155, 2 g at 10/26/24 1155    metoprolol succinate (TOPROL-XL) 24 hr tablet 50 mg, 50 mg, Oral, Daily, Stefan Ordonez MD, 50 mg at 10/26/24 0858    morphine injection 2 mg, 2 mg, Intravenous, Q4H PRN, Mansi Luo PA-C    multi-electrolyte (PLASMALYTE-A/ISOLYTE-S PH 7.4) IV solution, 50 mL/hr, Intravenous, Continuous, Vikki Newberry PA-C, Stopped at 10/25/24 1915    nicotine (NICODERM CQ) 14 mg/24hr TD 24 hr patch 1 patch, 1 patch, Transdermal, Daily, Stefan Ordonez MD, 1 patch at 10/26/24 0859    umeclidinium 62.5 mcg/actuation inhaler AEPB 1 puff, 1 puff, Inhalation, Daily **AND** olodaterol HCl (STRIVERDI RESPIMAT) inhaler 2 puff, 2 puff, Inhalation, Daily, Stefan Ordonez MD    ondansetron (ZOFRAN) injection 4 mg, 4 mg, Intravenous, Q4H PRN, Stefan Ordonez MD    oxyCODONE (ROXICODONE) IR tablet 5 mg, 5 mg, Oral, Q4H PRN, Mansi Lou PA-C    oxyCODONE (ROXICODONE) split tablet 2.5 mg, 2.5 mg, Oral, Q4H PRN, Mansi Lou PA-C    pantoprazole (PROTONIX) EC tablet 40 mg, 40 mg, Oral, BID AC, Stefan Ordonez MD, 40 mg at 10/26/24 0654    piperacillin-tazobactam (ZOSYN) 4.5 g in sodium chloride 0.9 % 100 mL IVPB (EXTENDED INFUSION), 4.5 g, Intravenous, Q8H, Stefan Ordonez MD, Last Rate: 25 mL/hr at 10/26/24 0655, 4.5 g at 10/26/24 0655    polyethylene glycol (MIRALAX) packet 17 g, 17 g, Oral, Daily, Stefan Ordonez MD, 17 g at 10/26/24 0858    pregabalin (LYRICA) capsule 100 mg, 100 mg, Oral, BID, Stefan Ordonez MD, 100 mg at 10/26/24 0858    sodium chloride (OCEAN) 0.65 % nasal spray 2 spray, 2 spray, Each Nare, BID, Stefan Ordonez MD    traZODone (DESYREL) tablet 25 mg, 25 mg, Oral, HS, Stefan Ordonez MD, 25 mg at 10/25/24 8404      Lab Results: I have reviewed the following  "results:  Results from last 7 days   Lab Units 10/26/24  0620 10/25/24  0544 10/24/24  0643 10/23/24  0613 10/22/24  0739 10/21/24  0452 10/20/24  0251   WBC Thousand/uL 6.55 5.60 5.34  --  7.28 7.64  --    HEMOGLOBIN g/dL 11.3* 11.3* 11.7*  --  11.3* 11.4*  --    HEMATOCRIT % 34.6* 33.9* 35.2*  --  33.6* 33.9*  --    PLATELETS Thousands/uL 404* 376 359  --  290 244  --    POTASSIUM mmol/L 4.2 4.1 4.2 4.2 4.1 4.0 5.0   CHLORIDE mmol/L 103 102 101 101 100 98 97   CO2 mmol/L 27 27 29 30 28 25 27   BUN mg/dL 14 14 13 17 17 20 21   CREATININE mg/dL 1.09 1.13 1.14 1.30 1.22 1.21 1.21   CALCIUM mg/dL 8.5 8.3* 8.7 8.3* 8.3* 8.3* 8.3*   MAGNESIUM mg/dL 1.6*  --   --  1.9  --   --   --    ALBUMIN g/dL 3.0*  --   --   --   --   --   --        Administrative Statements     Portions of the record may have been created with voice recognition software. Occasional wrong word or \"sound a like\" substitutions may have occurred due to the inherent limitations of voice recognition software. Read the chart carefully and recognize, using context, where substitutions have occurred.If you have any questions, please contact the dictating provider.  "

## 2024-10-26 NOTE — PROGRESS NOTES
Progress Note - Vascular Surgery   Name: Clay Marcial 68 y.o. male I MRN: [id number]  Unit/Bed#: [facility] 825-01 I Date of Admission: 10/22/2024   Date of Service: 10/26/2024 I Hospital Day: 4     Assessment & Plan  Diabetic foot infection  (HCC)  Assessment:  68yoM with tobacco use, CAD, CHF, DM (A1C 7.2), CKD3, T2DM, CKD 3, COPD, chronic low back pain, who presented with LLE pain, with diabetic foot ulcer x 5months, cellulitis presented to [facility], during admission noted to have ischemic changes to L 2nd toe during concerning for embolic phenomenon. With nonhealing wound, toe pressure below healing potential for a diabetic, patient transferred to [facility] for angiogram with possible intervention, notably requiring L 2nd toe amp, debridement with podiatry on 10/21 for worsening wound.     10/18/24 LEADs  Right: JOSE JUAN 1.04//  Left >75% stenosis in proximal AT JOSE JUAN 1.03/MTP deferred/GTP 43    10/21 OR Wound Cx Bacteroides, Finegoldia, Actinomeyces, Staph aureus  10/16 Blood Cultures NG x 5 days    Recommendations:  S/p IR LLE angiogram with right groin access site  R groin access site c/d/I without hematoma  Palpable L DP/PT  Okay for podiatry intervention from vascular surgery standpoint  Continue ASA/ Lipitor  Gtt heparin per primary - ongoing embolic w/u    Diabetes mellitus, type 2 (HCC)  Lab Results   Component Value Date    HGBA1C 7.2 (H) 10/16/2024       Recent Labs     10/25/24  1119 10/25/24  1610 10/25/24  2122 10/26/24  0748   POCGLU 183* 159* 160* 242*       Blood Sugar Average: Last 72 hrs:  (P) 200.7255564104747141    Tight glucose control to assist with wound healing, goal 140-180  CAD (coronary artery disease)  Management per primary team  Stage 3 chronic kidney disease (HCC)  Lab Results   Component Value Date    EGFR 68 10/26/2024    EGFR 65 10/25/2024    EGFR 64 10/24/2024    CREATININE 1.09 10/26/2024    CREATININE 1.13 10/25/2024    CREATININE 1.14 10/24/2024   Lisinopril, Lasix,  Jardiance on hold, Cr back to baseline; appreciate nephrology recs      Subjective : patient seen and examined at bedside, in no acute distress. Denies pain.     Objective :  Temp:  [97.2 °F (36.2 °C)-98.5 °F (36.9 °C)] 98.5 °F (36.9 °C)  HR:  [69-94] 87  BP: (137-159)/(76-90) 151/87  Resp:  [16-20] 18  SpO2:  [92 %-98 %] 95 %  O2 Device: None (Room air)  Nasal Cannula O2 Flow Rate (L/min):  [2 L/min-4 L/min] 2 L/min  FiO2 (%):  [21] 21     I/O         10/24 0701  10/25 0700 10/25 0701  10/26 0700 10/26 0701  10/27 0700    P.O.   180    I.V. (mL/kg)  450.8 (3.6)     Total Intake(mL/kg)  450.8 (3.6) 180 (1.4)    Urine (mL/kg/hr) 1150 (0.4) 1670 (0.6) 300 (1)    Stool 0 0     Total Output 1150 1670 300    Net -1150 -1219.2 -120           Unmeasured Stool Occurrence 1 x 1 x             Physical Exam  Vitals and nursing note reviewed.   Constitutional:       Appearance: Normal appearance. He is not ill-appearing.   HENT:      Head: Normocephalic and atraumatic.      Mouth/Throat:      Mouth: Mucous membranes are moist.      Pharynx: Oropharynx is clear.   Eyes:      Extraocular Movements: Extraocular movements intact.   Cardiovascular:      Rate and Rhythm: Normal rate and regular rhythm.   Pulmonary:      Effort: Pulmonary effort is normal.   Abdominal:      General: Abdomen is flat. There is no distension.      Palpations: Abdomen is soft.      Tenderness: There is no abdominal tenderness. There is no guarding or rebound.   Musculoskeletal:      Cervical back: Neck supple.      Comments: L foot dressed cdi  Palpable L DP/PT pulses  Neuro intact BLE  Warm extremities BLE   Skin:     General: Skin is warm.      Coloration: Skin is not jaundiced.   Neurological:      General: No focal deficit present.      Mental Status: He is alert and oriented to person, place, and time. Mental status is at baseline.   Psychiatric:         Mood and Affect: Mood normal.         Behavior: Behavior normal.         Thought Content: Thought  "content normal.         Judgment: Judgment normal.       Pulse exam:  DP: Left: palpable  PT: Left: palpable      Lab Results: I have reviewed the following results:  CBC with diff:   Lab Results   Component Value Date    WBC 6.55 10/26/2024    HGB 11.3 (L) 10/26/2024    HCT 34.6 (L) 10/26/2024    MCV 95 10/26/2024     (H) 10/26/2024    RBC 3.64 (L) 10/26/2024    MCH 31.0 10/26/2024    MCHC 32.7 10/26/2024    RDW 12.1 10/26/2024    MPV 9.6 10/26/2024    NRBC 0 10/26/2024   ,   BMP/CMP:  Lab Results   Component Value Date    SODIUM 137 10/26/2024    K 4.2 10/26/2024     10/26/2024    CO2 27 10/26/2024    BUN 14 10/26/2024    CREATININE 1.09 10/26/2024    CALCIUM 8.5 10/26/2024    AST 29 10/26/2024    ALT 24 10/26/2024    ALKPHOS 52 10/26/2024    EGFR 68 10/26/2024   ,   Lipid Panel: No results found for: \"CHOL\",   Coags:   Lab Results   Component Value Date    PTT 63 (H) 10/26/2024    INR 1.18 10/19/2024   ,   Blood Culture:   Lab Results   Component Value Date    BLOODCX No Growth After 5 Days. 10/16/2024   ,   Urinalysis:   Lab Results   Component Value Date    COLORU Dk Yellow 10/17/2024    CLARITYU Clear 10/17/2024    SPECGRAV 1.020 10/17/2024    PHUR 5.5 10/17/2024    LEUKOCYTESUR (A) 10/17/2024     Elevated glucose may cause decreased leukocyte values. See urine microscopic for UWBC result    NITRITE Negative 10/17/2024    GLUCOSEU >=1000 (1%) (A) 10/17/2024    KETONESU Negative 10/17/2024    BILIRUBINUR Negative 10/17/2024    BLOODU Trace-lysed (A) 10/17/2024         VTE Prophylaxis: Heparin  "

## 2024-10-26 NOTE — PROGRESS NOTES
Progress Note - Infectious Disease   Clay Marcial 68 y.o. male MRN: [id number]  Unit/Bed#: [facility] 825-01 Encounter: [id number]      Impression:  1.  Diabetic left foot infection with probable osteomyelitis s/p left second toe amputation with wound debridement and packing.  2.  Type II DM with PAD, history of chronic left foot plantar ulcer  3.  History of chronic tobacco abuse with COPD and possible ROMEL  4.  CAD    Recommendations:  Patient is afebrile with normal WBC count.  Patient seen and therapy to be discussed with primary hospitalist service  1.  Description and podiatry picture 10/26 noted with purulent drainage and positive probing to bone.  2.  Wound cultures are showing a variety of organisms including anaerobic growth of Bacteroides pyogenous and Finegoldia magna and aerobic growth of actinomyces species as well as Globicatella.  3.  Had angiogram 10/25 with left percutaneous angioplasty done by IR  4.  Nares MRSA culture results were negative  5.  Will  continue piperacillin/tazobactam 4.5 g every 8 hours IV by extended infusion.  6.  Surgical plans per podiatry.  Patient is told me that he consents to a TMA    Antibiotics:  1.  Piperacillin/tazobactam 4.5 g every 8 hours IV by extended infusion, day 7 Rx    Subjective:  The patient has no complaints.  Denies fevers, chills, or sweats.  Denies nausea, vomiting, or diarrhea.      Objective:  Vitals:  Temp:  [97.9 °F (36.6 °C)-98.5 °F (36.9 °C)] 98.2 °F (36.8 °C)  HR:  [77-94] 77  Resp:  [18-20] 20  BP: (143-151)/(84-90) 148/85  SpO2:  [92 %-97 %] 97 %  Temp (24hrs), Av.1 °F (36.7 °C), Min:97.9 °F (36.6 °C), Max:98.5 °F (36.9 °C)  Current: Temperature: 98.2 °F (36.8 °C)    Physical Exam:     General Appearance:  Alert, appropriately responsive, chronically ill-appearing nontoxic, no acute distress.   Throat: Oropharynx moist without lesions.  Lips, mucosa, and tongue normal, edentulous   Neck: Supple, symmetrical, trachea midline, no adenopathy,   no tenderness/mass/nodules   Lungs:   Increased AP diameter with deep creased respiratory expansion   Heart:  Regular rate and rhythm, S1, S2 normal, no murmur, rub or gallop   Abdomen:   Soft, non-tender, non-distended, positive bowel sounds.  No masses, no organomegaly    No CVA tenderness   Extremities: LLE with +2/4 edema and erythema dry surgical dressing in place, s/p left second toe amputation wound description as per picture 10/26 with purulent odiferous discharge.  Peripheral pulses decreased   Skin: As above.         Invasive Devices       Peripheral Intravenous Line  Duration             Peripheral IV 10/22/24 Dorsal (posterior);Left Hand 4 days    Peripheral IV 10/24/24 Left;Proximal;Ventral (anterior) Forearm 2 days                    Labs, Imaging, & Other studies:   All pertinent labs were personally reviewed  Results from last 7 days   Lab Units 10/26/24  0620 10/25/24  0544 10/24/24  0643   WBC Thousand/uL 6.55 5.60 5.34   HEMOGLOBIN g/dL 11.3* 11.3* 11.7*   PLATELETS Thousands/uL 404* 376 359     Results from last 7 days   Lab Units 10/26/24  0620 10/25/24  0544 10/24/24  0643   SODIUM mmol/L 137 136 137   POTASSIUM mmol/L 4.2 4.1 4.2   CHLORIDE mmol/L 103 102 101   CO2 mmol/L 27 27 29   BUN mg/dL 14 14 13   CREATININE mg/dL 1.09 1.13 1.14   EGFR ml/min/1.73sq m 68 65 64   CALCIUM mg/dL 8.5 8.3* 8.7   AST U/L 29  --   --    ALT U/L 24  --   --    ALK PHOS U/L 52  --   --      Results from last 7 days   Lab Units 10/23/24  1852 10/21/24  1428   GRAM STAIN RESULT   --  1+ Gram positive cocci in clusters*  No polys seen*   WOUND CULTURE   --  1+ Growth of Actinomyces species*  Few Colonies of Diphtheroids  Few Colonies of - Globicatella Species Globicatella*  1 colony Staphylococcus coagulase negative*   MRSA CULTURE ONLY  No Methicillin Resistant Staphlyococcus aureus (MRSA) isolated  --

## 2024-10-26 NOTE — ASSESSMENT & PLAN NOTE
Lab Results   Component Value Date    HGBA1C 7.2 (H) 10/16/2024       Recent Labs     10/25/24  1119 10/25/24  1610 10/25/24  2122 10/26/24  0748   POCGLU 183* 159* 160* 242*       Blood Sugar Average: Last 72 hrs:  (P) 200.9375588323508018    Tight glucose control to assist with wound healing, goal 140-180

## 2024-10-26 NOTE — ASSESSMENT & PLAN NOTE
Initially at [facility] hospital with left lower extremity cellulitis with open wound.    Evaluated by podiatry, ID, vascular at [facility] who are also following here,  S/p I&D, L 2nd toe amputation with podiatry on 10/21 given concern for acutely worsening wound/infection   Transferred to [facility] for vascular/IR evaluations given concern also for embolic process/ischemia   Agram 10/25 with plan for partial 2nd ray amputation vs. TMA intervention afterwards with podiatry -patient considering options and wants to speak with family before making a decision  Continue IV heparin gtt   ID following,  Continue IV zosyn and vancomycin for now, pt without improvement on Ancef  Wound culture from OR on 10/21 with bacteroides, multiple organisms   Monitor temps, WBC

## 2024-10-26 NOTE — ASSESSMENT & PLAN NOTE
Assessment:  68yoM with tobacco use, CAD, CHF, DM (A1C 7.2), CKD3, T2DM, CKD 3, COPD, chronic low back pain, who presented with LLE pain, with diabetic foot ulcer x 5months, cellulitis [facility], during admission noted to have ischemic changes to L 2nd toe during concerning for embolic phenomenon. With nonhealing wound, toe pressure below healing potential for a diabetic, patient transferred to [facility] for angiogram with possible intervention, notably requiring L 2nd toe amp, debridement with podiatry on 10/21 for worsening wound.     10/18/24 LEADs  Right: JOSE JUAN 1.04//  Left >75% stenosis in proximal AT JOSE JUAN 1.03/MTP deferred/GTP 43    10/21 OR Wound Cx Bacteroides, Finegoldia, Actinomeyces, Staph aureus  10/16 Blood Cultures NG x 5 days    Recommendations:  S/p IR LLE angiogram with right groin access site  R groin access site c/d/I without hematoma  Palpable L DP/PT  Okay for podiatry intervention from vascular surgery standpoint  Continue ASA/ Lipitor  Gtt heparin per primary - ongoing embolic w/u

## 2024-10-26 NOTE — ASSESSMENT & PLAN NOTE
Lab Results   Component Value Date    EGFR 68 10/26/2024    EGFR 65 10/25/2024    EGFR 64 10/24/2024    CREATININE 1.09 10/26/2024    CREATININE 1.13 10/25/2024    CREATININE 1.14 10/24/2024   Lisinopril, Lasix, Jardiance on hold, Cr back to baseline; appreciate nephrology recs

## 2024-10-26 NOTE — ASSESSMENT & PLAN NOTE
Lab Results   Component Value Date    HGBA1C 7.2 (H) 10/16/2024       Recent Labs     10/25/24  1610 10/25/24  2122 10/26/24  0748 10/26/24  1123   POCGLU 159* 160* 242* 242*       Blood Sugar Average: Last 72 hrs:  (P) 203.1407175856737382  Increase Lantus to 25 units QHS  Continue SSI coverage   QID glucose checks   Risk factor for primary problem   Monitor and adjust regimen as needed

## 2024-10-26 NOTE — ASSESSMENT & PLAN NOTE
Lab Results   Component Value Date    EGFR 68 10/26/2024    EGFR 65 10/25/2024    EGFR 64 10/24/2024    CREATININE 1.09 10/26/2024    CREATININE 1.13 10/25/2024    CREATININE 1.14 10/24/2024   Creatinine currently at baseline, was noted to have elevated serum creatinine earlier on in this hospitalization with peak of 1.7  Nephrology following  Continue to monitor with BMP

## 2024-10-26 NOTE — PROGRESS NOTES
Progress Note - Hospitalist   Name: Clay Marcial 68 y.o. male I MRN: [id number]  Unit/Bed#: [facility] 825-01 I Date of Admission: 10/22/2024   Date of Service: 10/26/2024 I Hospital Day: 4    Assessment & Plan  Diabetic foot infection  (HCC)  Initially at [facility] hospital with left lower extremity cellulitis with open wound.    Evaluated by podiatry, ID, vascular at [facility] who are also following here,  S/p I&D, L 2nd toe amputation with podiatry on 10/21 given concern for acutely worsening wound/infection   Transfer[facility] [place] [facility] vascular/IR evaluations given concern also for embolic process/ischemia   Agram 10/25 with plan for partial 2nd ray amputation vs. TMA intervention afterwards with podiatry -patient considering options and wants to speak with family before making a decision  Continue IV heparin gtt   ID following,  Continue IV zosyn and vancomycin for now, pt without improvement on Ancef  Wound culture from OR on 10/21 with bacteroides, multiple organisms   Monitor temps, WBC  Diabetes mellitus, type 2 (HCC)  Lab Results   Component Value Date    HGBA1C 7.2 (H) 10/16/2024       Recent Labs     10/25/24  1610 10/25/24  2122 10/26/24  0748 10/26/24  1123   POCGLU 159* 160* 242* 242*       Blood Sugar Average: Last 72 hrs:  (P) 203.4267221702558601  Increase Lantus to 25 units QHS  Continue SSI coverage   QID glucose checks   Risk factor for primary problem   Monitor and adjust regimen as needed  Stage 3 chronic kidney disease (HCC)  Lab Results   Component Value Date    EGFR 68 10/26/2024    EGFR 65 10/25/2024    EGFR 64 10/24/2024    CREATININE 1.09 10/26/2024    CREATININE 1.13 10/25/2024    CREATININE 1.14 10/24/2024   Creatinine currently at baseline, was noted to have elevated serum creatinine earlier on in this hospitalization with peak of 1.7  Nephrology following  Continue to monitor with BMP  HTN (hypertension)  BP stable on review   Continue Imdur 30 mg daily, Toprol XL 50 mg daily   ACEI and diuretics on  hold per nephrology  Monitor   CAD (coronary artery disease)  Continue aspirin, Lipitor, isosorbide dinitrate, BB  Chronic obstructive pulmonary disease with acute exacerbation (HCC)  Has since resolved  Continue Pulmicort and Xopenex  Saturating well on RA    VTE Pharmacologic Prophylaxis:   High Risk (Score >/= 5) - Pharmacological DVT Prophylaxis Ordered: heparin drip. Sequential Compression Devices Ordered.    Mobility:   Basic Mobility Inpatient Raw Score: 19  JH-HLM Goal: 6: Walk 10 steps or more  JH-HLM Achieved: 6: Walk 10 steps or more  JH-HLM Goal achieved. Continue to encourage appropriate mobility.    Patient Centered Rounds: I performed bedside rounds with nursing staff today.   Discussions with Specialists or Other Care Team Provider: Podiatry     Education and Discussions with Family / Patient: Patient declined call to .     Current Length of Stay: 4 day(s)  Current Patient Status: Inpatient   Certification Statement: The patient will continue to require additional inpatient hospital stay due to DM foot infection   Discharge Plan:  Pending clinical progression    Code Status: Level 1 - Full Code    Subjective   Is a very pleasant 68-year-old gentleman who was seen evaluated today at bedside.  Patient denies any acute complaints at this time.  Patient spoke with podiatry who gave him multiple options.  Patient would like to have some time to think of the options and speak with family before committing to a decision.    Objective :  Temp:  [97.2 °F (36.2 °C)-98.5 °F (36.9 °C)] 98.2 °F (36.8 °C)  HR:  [69-94] 77  BP: (138-151)/(83-90) 148/85  Resp:  [16-20] 20  SpO2:  [92 %-97 %] 97 %  O2 Device: None (Room air)  FiO2 (%):  [21] 21    Body mass index is 38.77 kg/m².     Input and Output Summary (last 24 hours):     Intake/Output Summary (Last 24 hours) at 10/26/2024 1515  Last data filed at 10/26/2024 1200  Gross per 24 hour   Intake 1110.83 ml   Output 1400 ml   Net -289.17 ml        Physical Exam  Vitals reviewed.   HENT:      Head: Normocephalic.      Nose: No congestion.      Mouth/Throat:      Pharynx: No oropharyngeal exudate or posterior oropharyngeal erythema.   Eyes:      Conjunctiva/sclera: Conjunctivae normal.   Cardiovascular:      Rate and Rhythm: Normal rate and regular rhythm.   Pulmonary:      Effort: Pulmonary effort is normal.   Abdominal:      General: Abdomen is flat.      Palpations: Abdomen is soft.   Musculoskeletal:         General: Signs of injury present.      Right lower leg: Edema present.      Left lower leg: Edema present.   Skin:     General: Skin is warm and dry.      Findings: Lesion present.   Neurological:      Mental Status: He is alert and oriented to person, place, and time. Mental status is at baseline.           Lines/Drains:              Lab Results: I have reviewed the following results:   Results from last 7 days   Lab Units 10/26/24  0620   WBC Thousand/uL 6.55   HEMOGLOBIN g/dL 11.3*   HEMATOCRIT % 34.6*   PLATELETS Thousands/uL 404*   SEGS PCT % 66   LYMPHO PCT % 21   MONO PCT % 6   EOS PCT % 4     Results from last 7 days   Lab Units 10/26/24  0620   SODIUM mmol/L 137   POTASSIUM mmol/L 4.2   CHLORIDE mmol/L 103   CO2 mmol/L 27   BUN mg/dL 14   CREATININE mg/dL 1.09   ANION GAP mmol/L 7   CALCIUM mg/dL 8.5   ALBUMIN g/dL 3.0*   TOTAL BILIRUBIN mg/dL 0.42   ALK PHOS U/L 52   ALT U/L 24   AST U/L 29   GLUCOSE RANDOM mg/dL 252*         Results from last 7 days   Lab Units 10/26/24  1123 10/26/24  0748 10/25/24  2122 10/25/24  1610 10/25/24  1119 10/25/24  0727 10/24/24  2118 10/24/24  1604 10/24/24  1112 10/24/24  0701 10/23/24  2120 10/23/24  1603   POC GLUCOSE mg/dl 242* 242* 160* 159* 183* 273* 238* 209* 250* 193* 184* 163*               Recent Cultures (last 7 days):   Results from last 7 days   Lab Units 10/21/24  1428   GRAM STAIN RESULT  1+ Gram positive cocci in clusters*  No polys seen*   WOUND CULTURE  1+ Growth of Actinomyces species*   Few Colonies of Diphtheroids  Few Colonies of - Globicatella Species Globicatella*  1 colony Staphylococcus coagulase negative*       Imaging Results Review: No pertinent imaging studies reviewed.  Other Study Results Review: No additional pertinent studies reviewed.    Last 24 Hours Medication List:     Current Facility-Administered Medications:     acetaminophen (TYLENOL) tablet 650 mg, Q6H PRN    albuterol inhalation solution 2.5 mg, Q6H PRN    aspirin chewable tablet 81 mg, Daily    atorvastatin (LIPITOR) tablet 20 mg, Daily    bisacodyl (DULCOLAX) rectal suppository 10 mg, Daily PRN    budesonide (PULMICORT) inhalation solution 0.5 mg, Q12H    Cholecalciferol (VITAMIN D3) tablet 1,000 Units, Daily    cyanocobalamin (VITAMIN B-12) tablet 500 mcg, Daily    docusate sodium (COLACE) capsule 100 mg, BID    DULoxetine (CYMBALTA) delayed release capsule 20 mg, Daily    fluticasone (FLONASE) 50 mcg/act nasal spray 2 spray, Daily    heparin (porcine) 25,000 units in 0.45% NaCl 250 mL infusion (premix), Titrated, Last Rate: 15 Units/kg/hr (10/26/24 1200)    heparin (porcine) injection 4,800 Units, Q6H PRN    heparin (porcine) injection 9,600 Units, Q6H PRN    insulin glargine (LANTUS) subcutaneous injection 25 Units 0.25 mL, HS    insulin lispro (HumALOG/ADMELOG) 100 units/mL subcutaneous injection 10 Units, TID With Meals    insulin lispro (HumALOG/ADMELOG) 100 units/mL subcutaneous injection 2-12 Units, TID AC **AND** Fingerstick Glucose (POCT), TID AC    isosorbide mononitrate (IMDUR) 24 hr tablet 30 mg, Daily    loratadine (CLARITIN) tablet 10 mg, Daily    metoprolol succinate (TOPROL-XL) 24 hr tablet 50 mg, Daily    morphine injection 2 mg, Q4H PRN    multi-electrolyte (PLASMALYTE-A/ISOLYTE-S PH 7.4) IV solution, Continuous, Last Rate: Stopped (10/25/24 1915)    nicotine (NICODERM CQ) 14 mg/24hr TD 24 hr patch 1 patch, Daily    umeclidinium 62.5 mcg/actuation inhaler AEPB 1 puff, Daily **AND** olodaterol HCl  (STRIVERDI RESPIMAT) inhaler 2 puff, Daily    ondansetron (ZOFRAN) injection 4 mg, Q4H PRN    oxyCODONE (ROXICODONE) IR tablet 5 mg, Q4H PRN    oxyCODONE (ROXICODONE) split tablet 2.5 mg, Q4H PRN    pantoprazole (PROTONIX) EC tablet 40 mg, BID AC    piperacillin-tazobactam (ZOSYN) 4.5 g in sodium chloride 0.9 % 100 mL IVPB (EXTENDED INFUSION), Q8H, Last Rate: 4.5 g (10/26/24 1423)    polyethylene glycol (MIRALAX) packet 17 g, Daily    pregabalin (LYRICA) capsule 100 mg, BID    sodium chloride (OCEAN) 0.65 % nasal spray 2 spray, BID    traZODone (DESYREL) tablet 25 mg, HS    Administrative Statements   Today, Patient Was Seen By: Perico Villa MD  I have spent a total time of 45 minutes in caring for this patient on the day of the visit/encounter including Diagnostic results, Prognosis, Instructions for management, Impressions, Documenting in the medical record, Reviewing / ordering tests, medicine, procedures  , Obtaining or reviewing history  , and Communicating with other healthcare professionals .    **Please Note: This note may have been constructed using a voice recognition system.**

## 2024-10-26 NOTE — PROGRESS NOTES
[facility] Podiatry - Progress Note  Patient: Clay Marcial 68 y.o. male   MRN: [id number]  PCP: No primary care provider on file.  Unit/Bed#: [facility] 825-01 Encounter: [id number]  Date Of Visit: 10/26/24    ASSESSMENT:    Clay Marcial is a 68 y.o. male with:    Diabetic foot infection s/p left second toe amputation and wound debridement, packed open  Type 2 diabetes mellitus with most recent A1c 7.2% collected 10/16/2024  Coronary artery disease  Stage III chronic kidney disease  Chronic obstructive pulmonary disease  Hypertension      PLAN:    Patient was seen and evaluated at bedside with all questions and concerns addressed.  Dressing was changed. Of note surgical wound of 2nd toe amputation is packed open and looks identical to prior visits. Still probe to bone with drainage and odor. Left toe is exhibiting undermining.  Reviewed angiogram from 10/25. Of note left anterior tibial vessel was successfully treated with a 3mm PTA and is now patent. Appreciate vascular clearance for podiatric surgery.  Plan to take patient to OR for partial 2nd ray amputation with wound VAC therapy vs TMA with heel cord lengthening pending surgeon and OR availabilities. Patient is still considering his options and would like a day to discuss with loved ones.  Continue local wound care packing changes for time being.  Patients' lab and vital signs were reviewed. Patient is afebrile with no leukocytosis. Patient does not  meet the SIRS criteria.   Continue IV Zosyn. Abx per ID  Wound culture showed actinomyces species, Globicatella Species Globicatella, staphylococcal coagulase-negative, Bacteroides pyrogens, and Finegoldia magna.  Appreciate infectious disease recommendations for antibiotic choice.  Elevation and offloading on green foam wedges or pillows when non-ambulatory.  Rest of care per primary team.    Weight bearing status: NWB left foot      SUBJECTIVE:     The patient was seen, evaluated, and assessed at bedside today.  "The patient was awake, alert, and in no acute distress. No acute events overnight. The patient reports no pain to the foot at this time. Patient mentions he is still considering his options and would like to talk to his friend who is visiting today about if he should do conservative bone resection versus a TMA. Patient denies N/V/F/chills/SOB/CP.      OBJECTIVE:     Vitals:   /84   Pulse 94   Temp 97.9 °F (36.6 °C)   Resp 20   Ht 5' 11\" (1.803 m)   Wt 126 kg (278 lb)   SpO2 94%   BMI 38.77 kg/m²     Temp (24hrs), Av.6 °F (36.4 °C), Min:97.2 °F (36.2 °C), Max:98.1 °F (36.7 °C)      Physical Exam:     General:  Alert, cooperative, and in no distress.  Lower extremity exam:  Cardiovascular status at baseline.  Neurological status at baseline.  Musculoskeletal status at baseline. No calf tenderness noted.     Lower extremity wound(s) as noted below:  Wound #: 1  Location: Left second toe amputation site connected to submet 2 wound debridement site  Length 8cm: Width 3cm: Depth 3cm:   Deepest Tissue Noted in Base: Bone (second metatarsal head)  Probe to Bone: Yes  Peripheral Skin Description: Attached and macerated  Granulation: 20% Fibrotic Tissue: 80% Necrotic Tissue: 0%   Drainage Amount: copious, serous, purulent  Signs of Infection: Yes. positive  probe to bone, negative crepitus, fluctuance, positive  purulence, negative periwound skin erythema, edema, negative proximal tracking erythema    Clinical Images 10/26/24:            Additional Data:     Labs:    Results from last 7 days   Lab Units 10/25/24  0544 10/24/24  0643 10/22/24  0739 10/21/24  0452   WBC Thousand/uL 5.60   < > 7.28 7.64   HEMOGLOBIN g/dL 11.3*   < > 11.3* 11.4*   HEMATOCRIT % 33.9*   < > 33.6* 33.9*   PLATELETS Thousands/uL 376   < > 290 244   SEGS PCT %  --   --   --  72   LYMPHO PCT %  --   --  21 15   MONO PCT %  --   --  8 9   EOS PCT %  --   --  1 2    < > = values in this interval not displayed.     Results from last  " "days   Lab Units 10/25/24  0544   POTASSIUM mmol/L 4.1   CHLORIDE mmol/L 102   CO2 mmol/L 27   BUN mg/dL 14   CREATININE mg/dL 1.13   CALCIUM mg/dL 8.3*     Results from last 7 days   Lab Units 10/19/24  1434   INR  1.18       * I Have Reviewed All Lab Data Listed Above.    Recent Cultures (last 7 days):     Results from last 7 days   Lab Units 10/21/24  1428   GRAM STAIN RESULT  1+ Gram positive cocci in clusters*  No polys seen*   WOUND CULTURE  1+ Growth of Actinomyces species*  Few Colonies of Diphtheroids  Few Colonies of - Globicatella Species Globicatella*  1 colony Staphylococcus coagulase negative*     Results from last 7 days   Lab Units 10/21/24  1428   ANAEROBIC CULTURE  2+ Growth of - Bacteroides pyogenes Bacteroides species*  2+ Growth of Finegoldia magna*           ** Please Note: Portions of the record may have been created with voice recognition software. Occasional wrong word or \"sound a like\" substitutions may have occurred due to the inherent limitations of voice recognition software. Read the chart carefully and recognize, using context, where substitutions have occurred. **   "

## 2024-10-26 NOTE — ASSESSMENT & PLAN NOTE
Home Rx: Furosemide 40 mg as needed, Imdur 30 mg daily, lisinopril 20 mg daily, Toprol-XL 50 mg daily  Current Rx: Imdur 30 mg daily, Toprol-XL 50 mg daily  Blood pressure is currently acceptable, keep holding lisinopril and furosemide  Will hold off on adding furosemide for now await further intervention by podiatry

## 2024-10-26 NOTE — PLAN OF CARE
Problem: PAIN - ADULT  Goal: Verbalizes/displays adequate comfort level or baseline comfort level  Description: Interventions:  - Encourage patient to monitor pain and request assistance  - Assess pain using appropriate pain scale  - Administer analgesics based on type and severity of pain and evaluate response  - Implement non-pharmacological measures as appropriate and evaluate response  - Consider cultural and social influences on pain and pain management  - Notify physician/advanced practitioner if interventions unsuccessful or patient reports new pain  Outcome: Progressing     Problem: INFECTION - ADULT  Goal: Absence or prevention of progression during hospitalization  Description: INTERVENTIONS:  - Assess and monitor for signs and symptoms of infection  - Monitor lab/diagnostic results  - Monitor all insertion sites, i.e. indwelling lines, tubes, and drains  - Monitor endotracheal if appropriate and nasal secretions for changes in amount and color  - [place] appropriate cooling/warming therapies per order  - Administer medications as ordered  - Instruct and encourage patient and family to use good hand hygiene technique  - Identify and instruct in appropriate isolation precautions for identified infection/condition  Outcome: Progressing  Goal: Absence of fever/infection during neutropenic period  Description: INTERVENTIONS:  - Monitor WBC    Outcome: Progressing     Problem: SAFETY ADULT  Goal: Patient will remain free of falls  Description: INTERVENTIONS:  - Educate patient/family on patient safety including physical limitations  - Instruct patient to call for assistance with activity   - Consult OT/PT to assist with strengthening/mobility   - Keep Call bell within reach  - Keep bed low and locked with side rails adjusted as appropriate  - Keep care items and personal belongings within reach  - Initiate and maintain comfort rounds  - Make Fall Risk Sign visible to staff  - Offer Toileting every  Hours,  in advance of need  - Initiate/Maintain alarm  - Obtain necessary fall risk management equipment:   - Apply yellow socks and bracelet for high fall risk patients  - Consider moving patient to room near nurses station  Outcome: Progressing  Goal: Maintain or return to baseline ADL function  Description: INTERVENTIONS:  -  Assess patient's ability to carry out ADLs; assess patient's baseline for ADL function and identify physical deficits which impact ability to perform ADLs (bathing, care of mouth/teeth, toileting, grooming, dressing, etc.)  - Assess/evaluate cause of self-care deficits   - Assess range of motion  - Assess patient's mobility; develop plan if impaired  - Assess patient's need for assistive devices and provide as appropriate  - Encourage maximum independence but intervene and supervise when necessary  - Involve family in performance of ADLs  - Assess for home care needs following discharge   - Consider OT consult to assist with ADL evaluation and planning for discharge  - Provide patient education as appropriate  Outcome: Progressing  Goal: Maintains/Returns to pre admission functional level  Description: INTERVENTIONS:  - Perform AM-PAC 6 Click Basic Mobility/ Daily Activity assessment daily.  - Set and communicate daily mobility goal to care team and patient/family/caregiver.   - Collaborate with rehabilitation services on mobility goals if consulted  - Perform Range of Motion  times a day.  - Reposition patient every  hours.  - Dangle patient  times a day  - Stand patient  times a day  - Ambulate patient  times a day  - Out of bed to chair  times a day   - Out of bed for meals  times a day  - Out of bed for toileting  - Record patient progress and toleration of activity level   Outcome: Progressing     Problem: DISCHARGE PLANNING  Goal: Discharge to home or other facility with appropriate resources  Description: INTERVENTIONS:  - Identify barriers to discharge w/patient and caregiver  - Arrange for  needed discharge resources and transportation as appropriate  - Identify discharge learning needs (meds, wound care, etc.)  - Arrange for interpretive services to assist at discharge as needed  - Refer to Case Management Department for coordinating discharge planning if the patient needs post-hospital services based on physician/advanced practitioner order or complex needs related to functional status, cognitive ability, or social support system  Outcome: Progressing     Problem: Knowledge Deficit  Goal: Patient/family/caregiver demonstrates understanding of disease process, treatment plan, medications, and discharge instructions  Description: Complete learning assessment and assess knowledge base.  Interventions:  - Provide teaching at level of understanding  - Provide teaching via preferred learning methods  Outcome: Progressing     Problem: Nutrition/Hydration-ADULT  Goal: Nutrient/Hydration intake appropriate for improving, restoring or maintaining nutritional needs  Description: Monitor and assess patient's nutrition/hydration status for malnutrition. Collaborate with interdisciplinary team and initiate plan and interventions as ordered.  Monitor patient's weight and dietary intake as ordered or per policy. Utilize nutrition screening tool and intervene as necessary. Determine patient's food preferences and provide high-protein, high-caloric foods as appropriate.     INTERVENTIONS:  - Monitor oral intake, urinary output, labs, and treatment plans  - Assess nutrition and hydration status and recommend course of action  - Evaluate amount of meals eaten  - Assist patient with eating if necessary   - Allow adequate time for meals  - Recommend/ encourage appropriate diets, oral nutritional supplements, and vitamin/mineral supplements  - Order, calculate, and assess calorie counts as needed  - Recommend, monitor, and adjust tube feedings and TPN/PPN based on assessed needs  - Assess need for intravenous fluids  -  Provide specific nutrition/hydration education as appropriate  - Include patient/family/caregiver in decisions related to nutrition  Outcome: Progressing     Problem: Prexisting or High Potential for Compromised Skin Integrity  Goal: Skin integrity is maintained or improved  Description: INTERVENTIONS:  - Identify patients at risk for skin breakdown  - Assess and monitor skin integrity  - Assess and monitor nutrition and hydration status  - Monitor labs   - Assess for incontinence   - Turn and reposition patient  - Assist with mobility/ambulation  - Relieve pressure over bony prominences  - Avoid friction and shearing  - Provide appropriate hygiene as needed including keeping skin clean and dry  - Evaluate need for skin moisturizer/barrier cream  - Collaborate with interdisciplinary team   - Patient/family teaching  - Consider wound care consult   Outcome: Progressing

## 2024-10-26 NOTE — ASSESSMENT & PLAN NOTE
Etiology of elevated creatinine most likely due to autoregulatory failure in the setting of ACE inhibitor use, SGLT2 inhibitor use and diuretic use  Baseline creatinine appears to be around 1.3-1.4 in setting of diabetes and hypertension  Creatinine on recent admission 1.58  Peak creatinine 1.7 on 10/17  Creatinine today 1.13 below baseline  His lisinopril furosemide and Jardiance are on hold his creatinine is stable after angiogram

## 2024-10-27 LAB
ALBUMIN SERPL BCG-MCNC: 3 G/DL (ref 3.5–5)
ALP SERPL-CCNC: 49 U/L (ref 34–104)
ALT SERPL W P-5'-P-CCNC: 18 U/L (ref 7–52)
ANION GAP SERPL CALCULATED.3IONS-SCNC: 8 MMOL/L (ref 4–13)
APTT PPP: 64 SECONDS (ref 23–34)
AST SERPL W P-5'-P-CCNC: 15 U/L (ref 13–39)
BASOPHILS # BLD MANUAL: 0 THOUSAND/UL (ref 0–0.1)
BASOPHILS NFR MAR MANUAL: 0 % (ref 0–1)
BILIRUB SERPL-MCNC: 0.41 MG/DL (ref 0.2–1)
BUN SERPL-MCNC: 12 MG/DL (ref 5–25)
CALCIUM ALBUM COR SERPL-MCNC: 9.5 MG/DL (ref 8.3–10.1)
CALCIUM SERPL-MCNC: 8.7 MG/DL (ref 8.4–10.2)
CHLORIDE SERPL-SCNC: 103 MMOL/L (ref 96–108)
CO2 SERPL-SCNC: 27 MMOL/L (ref 21–32)
CREAT SERPL-MCNC: 1 MG/DL (ref 0.6–1.3)
EOSINOPHIL # BLD MANUAL: 0.12 THOUSAND/UL (ref 0–0.4)
EOSINOPHIL NFR BLD MANUAL: 2 % (ref 0–6)
ERYTHROCYTE [DISTWIDTH] IN BLOOD BY AUTOMATED COUNT: 12 % (ref 11.6–15.1)
GFR SERPL CREATININE-BSD FRML MDRD: 75 ML/MIN/1.73SQ M
GLUCOSE SERPL-MCNC: 235 MG/DL (ref 65–140)
GLUCOSE SERPL-MCNC: 242 MG/DL (ref 65–140)
GLUCOSE SERPL-MCNC: 255 MG/DL (ref 65–140)
GLUCOSE SERPL-MCNC: 295 MG/DL (ref 65–140)
GLUCOSE SERPL-MCNC: 305 MG/DL (ref 65–140)
HCT VFR BLD AUTO: 33.3 % (ref 36.5–49.3)
HGB BLD-MCNC: 10.9 G/DL (ref 12–17)
LYMPHOCYTES # BLD AUTO: 1.73 THOUSAND/UL (ref 0.6–4.47)
LYMPHOCYTES # BLD AUTO: 23 % (ref 14–44)
MAGNESIUM SERPL-MCNC: 1.7 MG/DL (ref 1.9–2.7)
MCH RBC QN AUTO: 30.8 PG (ref 26.8–34.3)
MCHC RBC AUTO-ENTMCNC: 32.7 G/DL (ref 31.4–37.4)
MCV RBC AUTO: 94 FL (ref 82–98)
METAMYELOCYTE ABSOLUTE CT: 0.06 THOUSAND/UL (ref 0–0.1)
METAMYELOCYTES NFR BLD MANUAL: 1 % (ref 0–1)
MONOCYTES # BLD AUTO: 0 THOUSAND/UL (ref 0–1.22)
MONOCYTES NFR BLD: 0 % (ref 4–12)
NEUTROPHILS # BLD MANUAL: 3.87 THOUSAND/UL (ref 1.85–7.62)
NEUTS SEG NFR BLD AUTO: 67 % (ref 43–75)
PLATELET # BLD AUTO: 370 THOUSANDS/UL (ref 149–390)
PLATELET BLD QL SMEAR: ADEQUATE
PMV BLD AUTO: 9.1 FL (ref 8.9–12.7)
POTASSIUM SERPL-SCNC: 4 MMOL/L (ref 3.5–5.3)
PROT SERPL-MCNC: 6.5 G/DL (ref 6.4–8.4)
RBC # BLD AUTO: 3.54 MILLION/UL (ref 3.88–5.62)
RBC MORPH BLD: NORMAL
SODIUM SERPL-SCNC: 138 MMOL/L (ref 135–147)
VARIANT LYMPHS # BLD AUTO: 7 %
WBC # BLD AUTO: 5.78 THOUSAND/UL (ref 4.31–10.16)

## 2024-10-27 PROCEDURE — 80053 COMPREHEN METABOLIC PANEL: CPT | Performed by: FAMILY MEDICINE

## 2024-10-27 PROCEDURE — 99232 SBSQ HOSP IP/OBS MODERATE 35: CPT | Performed by: INTERNAL MEDICINE

## 2024-10-27 PROCEDURE — 83735 ASSAY OF MAGNESIUM: CPT | Performed by: FAMILY MEDICINE

## 2024-10-27 PROCEDURE — 94664 DEMO&/EVAL PT USE INHALER: CPT

## 2024-10-27 PROCEDURE — 99233 SBSQ HOSP IP/OBS HIGH 50: CPT | Performed by: PODIATRIST

## 2024-10-27 PROCEDURE — 85027 COMPLETE CBC AUTOMATED: CPT | Performed by: FAMILY MEDICINE

## 2024-10-27 PROCEDURE — 94640 AIRWAY INHALATION TREATMENT: CPT

## 2024-10-27 PROCEDURE — 85730 THROMBOPLASTIN TIME PARTIAL: CPT | Performed by: INTERNAL MEDICINE

## 2024-10-27 PROCEDURE — 85007 BL SMEAR W/DIFF WBC COUNT: CPT | Performed by: FAMILY MEDICINE

## 2024-10-27 PROCEDURE — 82948 REAGENT STRIP/BLOOD GLUCOSE: CPT

## 2024-10-27 PROCEDURE — 99232 SBSQ HOSP IP/OBS MODERATE 35: CPT | Performed by: FAMILY MEDICINE

## 2024-10-27 PROCEDURE — 94760 N-INVAS EAR/PLS OXIMETRY 1: CPT

## 2024-10-27 RX ORDER — LANOLIN ALCOHOL/MO/W.PET/CERES
400 CREAM (GRAM) TOPICAL 2 TIMES DAILY
Status: COMPLETED | OUTPATIENT
Start: 2024-10-27 | End: 2024-10-27

## 2024-10-27 RX ADMIN — PREGABALIN 100 MG: 100 CAPSULE ORAL at 17:12

## 2024-10-27 RX ADMIN — PIPERACILLIN SODIUM AND TAZOBACTAM SODIUM 4.5 G: 36; 4.5 INJECTION, POWDER, LYOPHILIZED, FOR SOLUTION INTRAVENOUS at 13:53

## 2024-10-27 RX ADMIN — DOCUSATE SODIUM 100 MG: 100 CAPSULE, LIQUID FILLED ORAL at 17:12

## 2024-10-27 RX ADMIN — TRAZODONE HYDROCHLORIDE 25 MG: 50 TABLET ORAL at 21:55

## 2024-10-27 RX ADMIN — Medication 1000 UNITS: at 08:00

## 2024-10-27 RX ADMIN — MAGNESIUM OXIDE TAB 400 MG (241.3 MG ELEMENTAL MG) 400 MG: 400 (241.3 MG) TAB at 12:01

## 2024-10-27 RX ADMIN — INSULIN LISPRO 4 UNITS: 100 INJECTION, SOLUTION INTRAVENOUS; SUBCUTANEOUS at 07:55

## 2024-10-27 RX ADMIN — HEPARIN SODIUM 15 UNITS/KG/HR: 10000 INJECTION, SOLUTION INTRAVENOUS at 13:51

## 2024-10-27 RX ADMIN — POLYETHYLENE GLYCOL 3350 17 G: 17 POWDER, FOR SOLUTION ORAL at 08:03

## 2024-10-27 RX ADMIN — INSULIN LISPRO 8 UNITS: 100 INJECTION, SOLUTION INTRAVENOUS; SUBCUTANEOUS at 17:12

## 2024-10-27 RX ADMIN — PREGABALIN 100 MG: 100 CAPSULE ORAL at 08:00

## 2024-10-27 RX ADMIN — PANTOPRAZOLE SODIUM 40 MG: 40 TABLET, DELAYED RELEASE ORAL at 06:19

## 2024-10-27 RX ADMIN — LORATADINE 10 MG: 10 TABLET ORAL at 08:00

## 2024-10-27 RX ADMIN — CYANOCOBALAMIN TAB 500 MCG 500 MCG: 500 TAB at 08:00

## 2024-10-27 RX ADMIN — DULOXETINE HYDROCHLORIDE 20 MG: 20 CAPSULE, DELAYED RELEASE ORAL at 08:00

## 2024-10-27 RX ADMIN — ATORVASTATIN CALCIUM 20 MG: 20 TABLET, FILM COATED ORAL at 08:00

## 2024-10-27 RX ADMIN — INSULIN LISPRO 6 UNITS: 100 INJECTION, SOLUTION INTRAVENOUS; SUBCUTANEOUS at 12:01

## 2024-10-27 RX ADMIN — BUDESONIDE 0.5 MG: 0.5 INHALANT RESPIRATORY (INHALATION) at 08:12

## 2024-10-27 RX ADMIN — INSULIN LISPRO 10 UNITS: 100 INJECTION, SOLUTION INTRAVENOUS; SUBCUTANEOUS at 12:01

## 2024-10-27 RX ADMIN — HEPARIN SODIUM 15 UNITS/KG/HR: 10000 INJECTION, SOLUTION INTRAVENOUS at 00:21

## 2024-10-27 RX ADMIN — PIPERACILLIN SODIUM AND TAZOBACTAM SODIUM 4.5 G: 36; 4.5 INJECTION, POWDER, LYOPHILIZED, FOR SOLUTION INTRAVENOUS at 21:55

## 2024-10-27 RX ADMIN — MAGNESIUM OXIDE TAB 400 MG (241.3 MG ELEMENTAL MG) 400 MG: 400 (241.3 MG) TAB at 17:12

## 2024-10-27 RX ADMIN — INSULIN GLARGINE 25 UNITS: 100 INJECTION, SOLUTION SUBCUTANEOUS at 21:55

## 2024-10-27 RX ADMIN — METOPROLOL SUCCINATE 50 MG: 50 TABLET, EXTENDED RELEASE ORAL at 08:00

## 2024-10-27 RX ADMIN — INSULIN LISPRO 10 UNITS: 100 INJECTION, SOLUTION INTRAVENOUS; SUBCUTANEOUS at 17:13

## 2024-10-27 RX ADMIN — NICOTINE 1 PATCH: 14 PATCH, EXTENDED RELEASE TRANSDERMAL at 08:01

## 2024-10-27 RX ADMIN — PANTOPRAZOLE SODIUM 40 MG: 40 TABLET, DELAYED RELEASE ORAL at 17:12

## 2024-10-27 RX ADMIN — INSULIN LISPRO 6 UNITS: 100 INJECTION, SOLUTION INTRAVENOUS; SUBCUTANEOUS at 21:54

## 2024-10-27 RX ADMIN — DOCUSATE SODIUM 100 MG: 100 CAPSULE, LIQUID FILLED ORAL at 08:00

## 2024-10-27 RX ADMIN — PIPERACILLIN SODIUM AND TAZOBACTAM SODIUM 4.5 G: 36; 4.5 INJECTION, POWDER, LYOPHILIZED, FOR SOLUTION INTRAVENOUS at 06:19

## 2024-10-27 RX ADMIN — ASPIRIN 81 MG CHEWABLE TABLET 81 MG: 81 TABLET CHEWABLE at 08:00

## 2024-10-27 RX ADMIN — INSULIN LISPRO 10 UNITS: 100 INJECTION, SOLUTION INTRAVENOUS; SUBCUTANEOUS at 07:56

## 2024-10-27 RX ADMIN — ISOSORBIDE MONONITRATE 30 MG: 30 TABLET, EXTENDED RELEASE ORAL at 08:00

## 2024-10-27 NOTE — ASSESSMENT & PLAN NOTE
Lab Results   Component Value Date    EGFR 75 10/27/2024    EGFR 68 10/26/2024    EGFR 65 10/25/2024    CREATININE 1.00 10/27/2024    CREATININE 1.09 10/26/2024    CREATININE 1.13 10/25/2024   Creatinine currently at baseline, was noted to have elevated serum creatinine earlier on in this hospitalization with peak of 1.7  Nephrology following  Continue to monitor with BMP

## 2024-10-27 NOTE — ASSESSMENT & PLAN NOTE
Lab Results   Component Value Date    HGBA1C 7.2 (H) 10/16/2024       Recent Labs     10/26/24  1606 10/26/24  2112 10/27/24  0718 10/27/24  1116   POCGLU 254* 255* 242* 295*       Blood Sugar Average: Last 72 hrs:  (P) [id number]  Continue Lantus 25 units units QHS,   Continue SSI coverage   QID glucose checks   Risk factor for primary problem   Monitor and adjust regimen as needed

## 2024-10-27 NOTE — ASSESSMENT & PLAN NOTE
Etiology of elevated creatinine most likely due to autoregulatory failure in the setting of ACE inhibitor use, SGLT2 inhibitor use and diuretic use  Baseline creatinine appears to be around 1.3-1.4 in setting of diabetes and hypertension  Creatinine on recent admission 1.58  Peak creatinine 1.7 on 10/17  Creatinine remained stable  His lisinopril furosemide and Jardiance are on hold his creatinine is stable after angiogram.  We will continue to hold furosemide and Jardiance as the patient is planning on a TMA

## 2024-10-27 NOTE — PROGRESS NOTES
Progress Note - Infectious Disease   Clay Marcial 68 y.o. male MRN: [id number]  Unit/Bed#: [facility] 825-01 Encounter: [id number]      Impression:  1.  Diabetic left foot infection with probable osteomyelitis s/p left second toe amputation with wound debridement and packing.  2.  Type II DM with PAD, history of chronic left foot plantar ulcer  3.  History of chronic tobacco abuse with COPD and possible ROMEL  4.  CAD    Recommendations:  Patient is afebrile with normal WBC count.  Patient seen and therapy to be discussed with primary hospitalist service  1.  Description and podiatry picture 10/27 noted with purulent drainage and positive probing to bone.  2.  Wound cultures are showing a variety of organisms including anaerobic growth of Bacteroides pyogenous and Finegoldia magna and aerobic growth of actinomyces species as well as Globicatella.  3.  Had angiogram 10/25 with left percutaneous angioplasty done by IR  4.  Nares MRSA culture results were negative  5.  Will  continue piperacillin/tazobactam 4.5 g every 8 hours IV by extended infusion through surgery.  6.  Surgical plans per podiatry.  Patient is tentatively scheduled 10/28 for left TMA  Antibiotics:  1.  Piperacillin/tazobactam 4.5 g every 8 hours IV by extended infusion, day 8 Rx    Subjective:  The patient has no complaints.  Denies fevers, chills, or sweats.  Denies nausea, vomiting, or diarrhea.      Objective:  Vitals:  Temp:  [98.5 °F (36.9 °C)-99.8 °F (37.7 °C)] 98.7 °F (37.1 °C)  HR:  [83-85] 84  Resp:  [16-20] 20  BP: (153-154)/(82-86) 154/82  SpO2:  [93 %-94 %] 94 %  Temp (24hrs), Av °F (37.2 °C), Min:98.5 °F (36.9 °C), Max:99.8 °F (37.7 °C)  Current: Temperature: 98.7 °F (37.1 °C)    Physical Exam:     General Appearance:  Alert, appropriately responsive, chronically ill-appearing nontoxic, no acute distress.   Throat: Oropharynx moist without lesions.  Lips, mucosa, and tongue normal, edentulous   Neck: Supple, symmetrical, trachea  midline, no adenopathy,  no tenderness/mass/nodules   Lungs:   Increased AP diameter with deep creased respiratory expansion   Heart:  Regular rate and rhythm, S1, S2 normal, no murmur, rub or gallop   Abdomen:   Soft, non-tender, non-distended, positive bowel sounds.  No masses, no organomegaly    No CVA tenderness   Extremities: LLE with +2/4 edema and erythema dry surgical dressing in place, s/p left second toe amputation wound description as per picture 10/27 with purulent odiferous discharge.  Peripheral pulses decreased   Skin: As above.         Invasive Devices       Peripheral Intravenous Line  Duration             Peripheral IV 10/24/24 Left;Proximal;Ventral (anterior) Forearm 3 days    Peripheral IV 10/27/24 Dorsal (posterior);Right Forearm <1 day                    Labs, Imaging, & Other studies:   All pertinent labs were personally reviewed  Results from last 7 days   Lab Units 10/27/24  0511 10/26/24  0620 10/25/24  0544   WBC Thousand/uL 5.78 6.55 5.60   HEMOGLOBIN g/dL 10.9* 11.3* 11.3*   PLATELETS Thousands/uL 370 404* 376     Results from last 7 days   Lab Units 10/27/24  0511 10/26/24  0620 10/25/24  0544 10/25/24  0544   SODIUM mmol/L 138 137  --  136   POTASSIUM mmol/L 4.0 4.2  --  4.1   CHLORIDE mmol/L 103 103  --  102   CO2 mmol/L 27 27  --  27   BUN mg/dL 12 14  --  14   CREATININE mg/dL 1.00 1.09  --  1.13   EGFR ml/min/1.73sq m 75 68  --  65   CALCIUM mg/dL 8.7 8.5  --  8.3*   AST U/L 15 29  --   --    ALT U/L 18 24   < >  --    ALK PHOS U/L 49 52   < >  --     < > = values in this interval not displayed.     Results from last 7 days   Lab Units 10/23/24  1852 10/21/24  1428   GRAM STAIN RESULT   --  1+ Gram positive cocci in clusters*  No polys seen*   WOUND CULTURE   --  1+ Growth of Actinomyces species*  Few Colonies of Diphtheroids  Few Colonies of - Globicatella Species Globicatella*  1 colony Staphylococcus coagulase negative*   MRSA CULTURE ONLY  No Methicillin Resistant  Staphlyococcus aureus (MRSA) isolated  --

## 2024-10-27 NOTE — ASSESSMENT & PLAN NOTE
Home Rx: Furosemide 40 mg as needed, Imdur 30 mg daily, lisinopril 20 mg daily, Toprol-XL 50 mg daily  Current Rx: Imdur 30 mg daily, Toprol-XL 50 mg daily  Blood pressure is currently acceptable, keep holding lisinopril and furosemide  Will evaluate and restart diuretics after surgery no overt signs of volume overload

## 2024-10-27 NOTE — PROGRESS NOTES
[facility] Podiatry - Progress Note  Patient: Clay Marcial 68 y.o. male   MRN: [id number]  PCP: No primary care provider on file.  Unit/Bed#: [facility] 825-01 Encounter: [id number]  Date Of Visit: 10/27/24    ASSESSMENT:    Clay Marcial is a 68 y.o. male with:    Diabetic foot infection s/p left second toe amputation and wound debridement, packed open  Type 2 diabetes mellitus with most recent A1c 7.2% collected 10/16/2024  Coronary artery disease  Stage III chronic kidney disease  Chronic obstructive pulmonary disease  Hypertension      PLAN:    Patient was seen and evaluated at bedside with all questions and concerns addressed.  Dressing was changed. Of note surgical wound of 2nd toe amputation is packed open and looks identical to prior visits. Still probe to bone with drainage and odor. Left toe is exhibiting undermining.  Plan to take patient to OR for TMA with possible heel cord lengthening on 10/28 pending surgeon and OR availabilities. Patient was consented during today's visit  Appreciated vascular and medicine clearance.  Continue local wound care packing changed  Patients' lab and vital signs were reviewed. Patient is afebrile with no leukocytosis.  Continue IV Zosyn. Abx per ID  Wound culture showed actinomyces species, Globicatella Species Globicatella, staphylococcal coagulase-negative, Bacteroides pyrogens, and Finegoldia magna.  Appreciate infectious disease recommendations for antibiotic choice.  Elevation and offloading on green foam wedges or pillows when non-ambulatory.  Rest of care per primary team.    Weight bearing status: NWB left foot      SUBJECTIVE:     The patient was seen, evaluated, and assessed at bedside today. The patient was awake, alert, and in no acute distress. No acute events overnight. The patient reports no pain at this time and has decided to go through with getting a TMA as he wants a more definitive treatment. Patient denies N/V/F/chills/SOB/CP.      OBJECTIVE:  "    Vitals:   /82   Pulse 84   Temp 98.7 °F (37.1 °C)   Resp 20   Ht 5' 11\" (1.803 m)   Wt 126 kg (278 lb)   SpO2 94%   BMI 38.77 kg/m²     Temp (24hrs), Av °F (37.2 °C), Min:98.5 °F (36.9 °C), Max:99.8 °F (37.7 °C)      Physical Exam:     General:  Alert, cooperative, and in no distress.  Lower extremity exam:  Cardiovascular status at baseline.  Neurological status at baseline.  Musculoskeletal status at baseline. No calf tenderness noted.     Lower extremity wound(s) as noted below:  Wound #: 1  Location: Left second toe amputation site connected to submet 2 wound debridement site  Length 8cm: Width 3cm: Depth 3cm:   Deepest Tissue Noted in Base: Bone (second metatarsal head)  Probe to Bone: Yes  Peripheral Skin Description: Attached and macerated  Granulation: 20% Fibrotic Tissue: 80% Necrotic Tissue: 0%   Drainage Amount: copious, serous, purulent  Signs of Infection: Yes. positive  probe to bone, negative crepitus, fluctuance, positive  purulence, negative periwound skin erythema, edema, negative proximal tracking erythema    Clinical Images 10/27/24:            Additional Data:     Labs:    Results from last 7 days   Lab Units 10/27/24  0511 10/26/24  0620   WBC Thousand/uL 5.78 6.55   HEMOGLOBIN g/dL 10.9* 11.3*   HEMATOCRIT % 33.3* 34.6*   PLATELETS Thousands/uL 370 404*   SEGS PCT %  --  66   LYMPHO PCT % 23 21   MONO PCT % 0* 6   EOS PCT % 2 4     Results from last 7 days   Lab Units 10/27/24  0511   POTASSIUM mmol/L 4.0   CHLORIDE mmol/L 103   CO2 mmol/L 27   BUN mg/dL 12   CREATININE mg/dL 1.00   CALCIUM mg/dL 8.7   ALK PHOS U/L 49   ALT U/L 18   AST U/L 15           * I Have Reviewed All Lab Data Listed Above.    Recent Cultures (last 7 days):     Results from last 7 days   Lab Units 10/21/24  1428   GRAM STAIN RESULT  1+ Gram positive cocci in clusters*  No polys seen*   WOUND CULTURE  1+ Growth of Actinomyces species*  Few Colonies of Diphtheroids  Few Colonies of - Globicatella " "Species Globicatella*  1 colony Staphylococcus coagulase negative*     Results from last 7 days   Lab Units 10/21/24  1428   ANAEROBIC CULTURE  2+ Growth of - Bacteroides pyogenes Bacteroides species*  2+ Growth of Finegoldia magna*           ** Please Note: Portions of the record may have been created with voice recognition software. Occasional wrong word or \"sound a like\" substitutions may have occurred due to the inherent limitations of voice recognition software. Read the chart carefully and recognize, using context, where substitutions have occurred. **   "

## 2024-10-27 NOTE — PROGRESS NOTES
Progress Note - Hospitalist   Name: Clay Marcial 68 y.o. male I MRN: [id number]  Unit/Bed#: [facility] 825-01 I Date of Admission: 10/22/2024   Date of Service: 10/27/2024 I Hospital Day: 5    Assessment & Plan  Diabetic foot infection  (HCC)  Initially at [facility] hospital with left lower extremity cellulitis with open wound.    Evaluated by podiatry, ID, vascular at [facility] who are also following here,  S/p I&D, L 2nd toe amputation with podiatry on 10/21 given concern for acutely worsening wound/infection   Transferred to [facility] for vascular/IR evaluations given concern also for embolic process/ischemia   Agram 10/25   Patient agreeable to move forward with surgery  Continue IV heparin gtt   ID following,  Continue IV zosyn and vancomycin for now  Wound culture from OR on 10/21 with bacteroides, multiple organisms   Monitor temps, WBC  Diabetes mellitus, type 2 (HCC)  Lab Results   Component Value Date    HGBA1C 7.2 (H) 10/16/2024       Recent Labs     10/26/24  1606 10/26/24  2112 10/27/24  0718 10/27/24  1116   POCGLU 254* 255* 242* 295*       Blood Sugar Average: Last 72 hrs:  (P) [id number]  Continue Lantus 25 units units QHS,   Continue SSI coverage   QID glucose checks   Risk factor for primary problem   Monitor and adjust regimen as needed  Stage 3 chronic kidney disease (HCC)  Lab Results   Component Value Date    EGFR 75 10/27/2024    EGFR 68 10/26/2024    EGFR 65 10/25/2024    CREATININE 1.00 10/27/2024    CREATININE 1.09 10/26/2024    CREATININE 1.13 10/25/2024   Creatinine currently at baseline, was noted to have elevated serum creatinine earlier on in this hospitalization with peak of 1.7  Nephrology following  Continue to monitor with BMP  HTN (hypertension)  BP stable on review   Continue Imdur 30 mg daily, Toprol XL 50 mg daily   ACEI and diuretics on hold per nephrology  Monitor   CAD (coronary artery disease)  Continue aspirin, Lipitor, isosorbide dinitrate, BB  Chronic obstructive pulmonary disease  with acute exacerbation (HCC)  Has since resolved  Continue Pulmicort and Xopenex  Saturating well on RA    VTE Pharmacologic Prophylaxis:   Moderate Risk (Score 3-4) - Pharmacological DVT Prophylaxis Ordered: heparin drip.    Mobility:   Basic Mobility Inpatient Raw Score: 19  JH-HLM Goal: 6: Walk 10 steps or more  JH-HLM Achieved: 6: Walk 10 steps or more  JH-HLM Goal achieved. Continue to encourage appropriate mobility.    Patient Centered Rounds: I performed bedside rounds with nursing staff today.   Discussions with Specialists or Other Care Team Provider: Podiatry     Education and Discussions with Family / Patient: Patient declined call to .     Current Length of Stay: 5 day(s)  Current Patient Status: Inpatient   Certification Statement: The patient will continue to require additional inpatient hospital stay due to DM foot infection   Discharge Plan:  Pending clinical improvement    Code Status: Level 1 - Full Code    Subjective   This is a very pleasant 68-year-old gentleman who was seen evaluated today at bedside.  Patient is agreeable to surgical intervention.  Patient has no bankruptcy acute complaints this time.    Objective :  Temp:  [98.5 °F (36.9 °C)-99.8 °F (37.7 °C)] 98.7 °F (37.1 °C)  HR:  [83-85] 84  BP: (153-154)/(82-86) 154/82  Resp:  [16-20] 20  SpO2:  [93 %-94 %] 94 %    Body mass index is 38.77 kg/m².     Input and Output Summary (last 24 hours):     Intake/Output Summary (Last 24 hours) at 10/27/2024 1533  Last data filed at 10/27/2024 1207  Gross per 24 hour   Intake 826 ml   Output 2575 ml   Net -1749 ml       Physical Exam  Vitals reviewed.   HENT:      Head: Normocephalic.      Nose: No congestion.      Mouth/Throat:      Pharynx: No oropharyngeal exudate or posterior oropharyngeal erythema.   Eyes:      Conjunctiva/sclera: Conjunctivae normal.   Cardiovascular:      Rate and Rhythm: Normal rate and regular rhythm.   Pulmonary:      Effort: Pulmonary effort is normal.    Abdominal:      General: Abdomen is flat.      Palpations: Abdomen is soft.   Musculoskeletal:         General: Signs of injury present.      Right lower leg: Edema present.      Left lower leg: Edema present.   Skin:     General: Skin is warm and dry.      Findings: Lesion present.   Neurological:      Mental Status: He is alert and oriented to person, place, and time. Mental status is at baseline.           Lines/Drains:              Lab Results: I have reviewed the following results:   Results from last 7 days   Lab Units 10/27/24  0511 10/26/24  0620   WBC Thousand/uL 5.78 6.55   HEMOGLOBIN g/dL 10.9* 11.3*   HEMATOCRIT % 33.3* 34.6*   PLATELETS Thousands/uL 370 404*   SEGS PCT %  --  66   LYMPHO PCT % 23 21   MONO PCT % 0* 6   EOS PCT % 2 4     Results from last 7 days   Lab Units 10/27/24  0511   SODIUM mmol/L 138   POTASSIUM mmol/L 4.0   CHLORIDE mmol/L 103   CO2 mmol/L 27   BUN mg/dL 12   CREATININE mg/dL 1.00   ANION GAP mmol/L 8   CALCIUM mg/dL 8.7   ALBUMIN g/dL 3.0*   TOTAL BILIRUBIN mg/dL 0.41   ALK PHOS U/L 49   ALT U/L 18   AST U/L 15   GLUCOSE RANDOM mg/dL 235*         Results from last 7 days   Lab Units 10/27/24  1116 10/27/24  0718 10/26/24  2112 10/26/24  1606 10/26/24  1123 10/26/24  0748 10/25/24  2122 10/25/24  1610 10/25/24  1119 10/25/24  0727 10/24/24  2118 10/24/24  1604   POC GLUCOSE mg/dl 295* 242* 255* 254* 242* 242* 160* 159* 183* 273* 238* 209*               Recent Cultures (last 7 days):   Results from last 7 days   Lab Units 10/21/24  1428   GRAM STAIN RESULT  1+ Gram positive cocci in clusters*  No polys seen*   WOUND CULTURE  1+ Growth of Actinomyces species*  Few Colonies of Diphtheroids  Few Colonies of - Globicatella Species Globicatella*  1 colony Staphylococcus coagulase negative*       Imaging Results Review: No pertinent imaging studies reviewed.  Other Study Results Review: No additional pertinent studies reviewed.    Last 24 Hours Medication List:     Current  Facility-Administered Medications:     acetaminophen (TYLENOL) tablet 650 mg, Q6H PRN    albuterol inhalation solution 2.5 mg, Q6H PRN    aspirin chewable tablet 81 mg, Daily    atorvastatin (LIPITOR) tablet 20 mg, Daily    bisacodyl (DULCOLAX) rectal suppository 10 mg, Daily PRN    budesonide (PULMICORT) inhalation solution 0.5 mg, Q12H    Cholecalciferol (VITAMIN D3) tablet 1,000 Units, Daily    cyanocobalamin (VITAMIN B-12) tablet 500 mcg, Daily    docusate sodium (COLACE) capsule 100 mg, BID    DULoxetine (CYMBALTA) delayed release capsule 20 mg, Daily    fluticasone (FLONASE) 50 mcg/act nasal spray 2 spray, Daily    heparin (porcine) 25,000 units in 0.45% NaCl 250 mL infusion (premix), Titrated, Last Rate: 15 Units/kg/hr (10/27/24 1351)    heparin (porcine) injection 4,800 Units, Q6H PRN    heparin (porcine) injection 9,600 Units, Q6H PRN    insulin glargine (LANTUS) subcutaneous injection 25 Units 0.25 mL, HS    insulin lispro (HumALOG/ADMELOG) 100 units/mL subcutaneous injection 10 Units, TID With Meals    insulin lispro (HumALOG/ADMELOG) 100 units/mL subcutaneous injection 2-12 Units, 4x Daily (AC & HS) **AND** Fingerstick Glucose (POCT), 4x Daily AC and at bedtime    isosorbide mononitrate (IMDUR) 24 hr tablet 30 mg, Daily    loratadine (CLARITIN) tablet 10 mg, Daily    magnesium Oxide (MAG-OX) tablet 400 mg, BID    metoprolol succinate (TOPROL-XL) 24 hr tablet 50 mg, Daily    morphine injection 2 mg, Q4H PRN    multi-electrolyte (PLASMALYTE-A/ISOLYTE-S PH 7.4) IV solution, Continuous, Last Rate: Stopped (10/25/24 1915)    nicotine (NICODERM CQ) 14 mg/24hr TD 24 hr patch 1 patch, Daily    umeclidinium 62.5 mcg/actuation inhaler AEPB 1 puff, Daily **AND** olodaterol HCl (STRIVERDI RESPIMAT) inhaler 2 puff, Daily    ondansetron (ZOFRAN) injection 4 mg, Q4H PRN    oxyCODONE (ROXICODONE) IR tablet 5 mg, Q4H PRN    oxyCODONE (ROXICODONE) split tablet 2.5 mg, Q4H PRN    pantoprazole (PROTONIX) EC tablet 40 mg,  BID AC    piperacillin-tazobactam (ZOSYN) 4.5 g in sodium chloride 0.9 % 100 mL IVPB (EXTENDED INFUSION), Q8H, Last Rate: 4.5 g (10/27/24 1353)    polyethylene glycol (MIRALAX) packet 17 g, Daily    pregabalin (LYRICA) capsule 100 mg, BID    sodium chloride (OCEAN) 0.65 % nasal spray 2 spray, BID    traZODone (DESYREL) tablet 25 mg, HS    Administrative Statements   Today, Patient Was Seen By: Perico Villa MD  I have spent a total time of 45 minutes in caring for this patient on the day of the visit/encounter including Diagnostic results, Prognosis, Importance of tx compliance, Counseling / Coordination of care, Reviewing / ordering tests, medicine, procedures  , Obtaining or reviewing history  , and Communicating with other healthcare professionals .    **Please Note: This note may have been constructed using a voice recognition system.**

## 2024-10-27 NOTE — PLAN OF CARE
Problem: PAIN - ADULT  Goal: Verbalizes/displays adequate comfort level or baseline comfort level  Description: Interventions:  - Encourage patient to monitor pain and request assistance  - Assess pain using appropriate pain scale  - Administer analgesics based on type and severity of pain and evaluate response  - Implement non-pharmacological measures as appropriate and evaluate response  - Consider cultural and social influences on pain and pain management  - Notify physician/advanced practitioner if interventions unsuccessful or patient reports new pain  Outcome: Progressing     Problem: INFECTION - ADULT  Goal: Absence or prevention of progression during hospitalization  Description: INTERVENTIONS:  - Assess and monitor for signs and symptoms of infection  - Monitor lab/diagnostic results  - Monitor all insertion sites, i.e. indwelling lines, tubes, and drains  - Monitor endotracheal if appropriate and nasal secretions for changes in amount and color  - [place] appropriate cooling/warming therapies per order  - Administer medications as ordered  - Instruct and encourage patient and family to use good hand hygiene technique  - Identify and instruct in appropriate isolation precautions for identified infection/condition  Outcome: Progressing

## 2024-10-27 NOTE — PLAN OF CARE
Problem: PAIN - ADULT  Goal: Verbalizes/displays adequate comfort level or baseline comfort level  Description: Interventions:  - Encourage patient to monitor pain and request assistance  - Assess pain using appropriate pain scale  - Administer analgesics based on type and severity of pain and evaluate response  - Implement non-pharmacological measures as appropriate and evaluate response  - Consider cultural and social influences on pain and pain management  - Notify physician/advanced practitioner if interventions unsuccessful or patient reports new pain  Outcome: Progressing     Problem: INFECTION - ADULT  Goal: Absence or prevention of progression during hospitalization  Description: INTERVENTIONS:  - Assess and monitor for signs and symptoms of infection  - Monitor lab/diagnostic results  - Monitor all insertion sites, i.e. indwelling lines, tubes, and drains  - Monitor endotracheal if appropriate and nasal secretions for changes in amount and color  - [place] appropriate cooling/warming therapies per order  - Administer medications as ordered  - Instruct and encourage patient and family to use good hand hygiene technique  - Identify and instruct in appropriate isolation precautions for identified infection/condition  Outcome: Progressing  Goal: Absence of fever/infection during neutropenic period  Description: INTERVENTIONS:  - Monitor WBC    Outcome: Progressing

## 2024-10-27 NOTE — ASSESSMENT & PLAN NOTE
Initially at [facility] hospital with left lower extremity cellulitis with open wound.    Evaluated by podiatry, ID, vascular at [facility] who are also following here,  S/p I&D, L 2nd toe amputation with podiatry on 10/21 given concern for acutely worsening wound/infection   Transferred to [facility] for vascular/IR evaluations given concern also for embolic process/ischemia   Agram 10/25   Patient agreeable to move forward with surgery  Continue IV heparin gtt   ID following,  Continue IV zosyn and vancomycin for now  Wound culture from OR on 10/21 with bacteroides, multiple organisms   Monitor temps, WBC

## 2024-10-27 NOTE — PROGRESS NOTES
Progress Note - Nephrology   Name: Clay Marcial 68 y.o. male I MRN: [id number]  [facility] Date of Admission: 10/22/2024   Date of Service: 10/27/2024 I Hospital Day: 5    Assessment & Plan  Elevated serum creatinine  Etiology of elevated creatinine most likely due to autoregulatory failure in the setting of ACE inhibitor use, SGLT2 inhibitor use and diuretic use  Baseline creatinine appears to be around 1.3-1.4 in setting of diabetes and hypertension  Creatinine on recent admission 1.58  Peak creatinine 1.7 on 10/17  Creatinine remained stable  His lisinopril furosemide and Jardiance are on hold his creatinine is stable after angiogram.  We will continue to hold furosemide and Jardiance as the patient is planning on a TMA  Stage 3 chronic kidney disease (HCC)  Baseline creatinine 1.3-1.4  Needs outpatient follow-up with nephrology  Diabetic foot infection  (HCC)  Patient is planning on a TMA, currently on IV antibiotics  Diabetes mellitus, type 2 (HCC)  Management per primary team  Continue to hold metformin and Jardiance for now  CAD (coronary artery disease)  Management per primary team  Chronic obstructive pulmonary disease with acute exacerbation (HCC)  Management per primary team  HTN (hypertension)  Home Rx: Furosemide 40 mg as needed, Imdur 30 mg daily, lisinopril 20 mg daily, Toprol-XL 50 mg daily  Current Rx: Imdur 30 mg daily, Toprol-XL 50 mg daily  Blood pressure is currently acceptable, keep holding lisinopril and furosemide  Will evaluate and restart diuretics after surgery no overt signs of volume overload     Please contact the SecureChat role for the Nephrology service with any questions/concerns.    Subjective     Patient was seen today.  He is sitting up resting comfortably he has no acute chest pain or shortness of breath    Objective :  Temp:  [98.2 °F (36.8 °C)-99.8 °F (37.7 °C)] 99.8 °F (37.7 °C)  HR:  [77-85] 85  BP: (148-153)/(85-86) 153/85  Resp:  [16-20] 16  SpO2:  [93  %-97 %] 94 %  O2 Device: None (Room air)    Current Weight: Weight - Scale: 126 kg (278 lb)  First Weight: Weight - Scale: 126 kg (278 lb)  I/O         10/25 0701  10/26 0700 10/26 0701  10/27 0700 10/27 0701  10/28 0700    P.O.  660 826    I.V. (mL/kg) 450.8 (3.6)      Total Intake(mL/kg) 450.8 (3.6) 660 (5.2) 826 (6.6)    Urine (mL/kg/hr) 1670 (0.6) 2625 (0.9) 600 (0.8)    Stool 0  0    Total Output 1670 2625 600    Net -1219.2 -1965 +226           Unmeasured Stool Occurrence 1 x  1 x          Physical Exam  Vitals and nursing note reviewed.   Constitutional:       General: He is not in acute distress.     Appearance: He is well-developed.   HENT:      Head: Normocephalic and atraumatic.   Eyes:      Conjunctiva/sclera: Conjunctivae normal.   Cardiovascular:      Rate and Rhythm: Normal rate and regular rhythm.      Heart sounds: No murmur heard.  Pulmonary:      Effort: Pulmonary effort is normal. No respiratory distress.      Breath sounds: Normal breath sounds.   Abdominal:      Palpations: Abdomen is soft.      Tenderness: There is no abdominal tenderness.   Musculoskeletal:         General: No swelling.      Cervical back: Neck supple.   Skin:     General: Skin is warm and dry.      Capillary Refill: Capillary refill takes less than 2 seconds.   Neurological:      Mental Status: He is alert.   Psychiatric:         Mood and Affect: Mood normal.       Medications:    Current Facility-Administered Medications:     acetaminophen (TYLENOL) tablet 650 mg, 650 mg, Oral, Q6H PRN, Stefan Ordonez MD    albuterol inhalation solution 2.5 mg, 2.5 mg, Nebulization, Q6H PRN, Robert Villegas MD    aspirin chewable tablet 81 mg, 81 mg, Oral, Daily, Stefan Ordonez MD, 81 mg at 10/27/24 0800    atorvastatin (LIPITOR) tablet 20 mg, 20 mg, Oral, Daily, Stefan Ordonez MD, 20 mg at 10/27/24 0800    bisacodyl (DULCOLAX) rectal suppository 10 mg, 10 mg, Rectal, Daily PRN, Tanya Lacey PA-C    budesonide (PULMICORT) inhalation  solution 0.5 mg, 0.5 mg, Nebulization, Q12H, Stefan Ordonez MD, 0.5 mg at 10/27/24 0812    Cholecalciferol (VITAMIN D3) tablet 1,000 Units, 1,000 Units, Oral, Daily, Stefan Ordonez MD, 1,000 Units at 10/27/24 0800    cyanocobalamin (VITAMIN B-12) tablet 500 mcg, 500 mcg, Oral, Daily, Stefan Ordonez MD, 500 mcg at 10/27/24 0800    docusate sodium (COLACE) capsule 100 mg, 100 mg, Oral, BID, Stefan Ordonez MD, 100 mg at 10/27/24 0800    DULoxetine (CYMBALTA) delayed release capsule 20 mg, 20 mg, Oral, Daily, Stefan Ordonez MD, 20 mg at 10/27/24 0800    fluticasone (FLONASE) 50 mcg/act nasal spray 2 spray, 2 spray, Nasal, Daily, Stefan Ordonez MD, 2 spray at 10/25/24 0833    heparin (porcine) 25,000 units in 0.45% NaCl 250 mL infusion (premix), 3-30 Units/kg/hr (Order-Specific), Intravenous, Titrated, Stefan Ordonez MD, Last Rate: 18 mL/hr at 10/27/24 0021, 15 Units/kg/hr at 10/27/24 0021    heparin (porcine) injection 4,800 Units, 4,800 Units, Intravenous, Q6H PRN, Stefan Ordonez MD, 4,800 Units at 10/23/24 1709    heparin (porcine) injection 9,600 Units, 9,600 Units, Intravenous, Q6H PRN, Stefan Ordonez MD    insulin glargine (LANTUS) subcutaneous injection 25 Units 0.25 mL, 25 Units, Subcutaneous, HS, Perico Villa MD, 25 Units at 10/26/24 2142    insulin lispro (HumALOG/ADMELOG) 100 units/mL subcutaneous injection 10 Units, 10 Units, Subcutaneous, TID With Meals, Stefan Ordonez MD, 10 Units at 10/27/24 1201    insulin lispro (HumALOG/ADMELOG) 100 units/mL subcutaneous injection 2-12 Units, 2-12 Units, Subcutaneous, 4x Daily (AC & HS), 6 Units at 10/27/24 1201 **AND** Fingerstick Glucose (POCT), , , 4x Daily AC and at bedtime, Courtney Benitez PA-C    isosorbide mononitrate (IMDUR) 24 hr tablet 30 mg, 30 mg, Oral, Daily, Stefan Ordonez MD, 30 mg at 10/27/24 0800    loratadine (CLARITIN) tablet 10 mg, 10 mg, Oral, Daily, Stefan Ordonez MD, 10 mg at 10/27/24 0800    magnesium Oxide (MAG-OX) tablet 400 mg, 400 mg, Oral, BID,  Perico Villa MD, 400 mg at 10/27/24 1201    metoprolol succinate (TOPROL-XL) 24 hr tablet 50 mg, 50 mg, Oral, Daily, Stefan Ordonez MD, 50 mg at 10/27/24 0800    morphine injection 2 mg, 2 mg, Intravenous, Q4H PRN, Mansi Lou PA-C    multi-electrolyte (PLASMALYTE-A/ISOLYTE-S PH 7.4) IV solution, 50 mL/hr, Intravenous, Continuous, Vikki Newberry PA-C, Stopped at 10/25/24 1915    nicotine (NICODERM CQ) 14 mg/24hr TD 24 hr patch 1 patch, 1 patch, Transdermal, Daily, Stefan Ordonez MD, 1 patch at 10/27/24 0801    umeclidinium 62.5 mcg/actuation inhaler AEPB 1 puff, 1 puff, Inhalation, Daily **AND** olodaterol HCl (STRIVERDI RESPIMAT) inhaler 2 puff, 2 puff, Inhalation, Daily, Stefan Ordonez MD    ondansetron (ZOFRAN) injection 4 mg, 4 mg, Intravenous, Q4H PRN, Stefan Ordonez MD    oxyCODONE (ROXICODONE) IR tablet 5 mg, 5 mg, Oral, Q4H PRN, Mansi Lou PA-C    oxyCODONE (ROXICODONE) split tablet 2.5 mg, 2.5 mg, Oral, Q4H PRN, Mansi Lou PA-C    pantoprazole (PROTONIX) EC tablet 40 mg, 40 mg, Oral, BID AC, Stefan Ordonez MD, 40 mg at 10/27/24 0619    piperacillin-tazobactam (ZOSYN) 4.5 g in sodium chloride 0.9 % 100 mL IVPB (EXTENDED INFUSION), 4.5 g, Intravenous, Q8H, Stefan Ordonez MD, Last Rate: 25 mL/hr at 10/27/24 0619, 4.5 g at 10/27/24 0619    polyethylene glycol (MIRALAX) packet 17 g, 17 g, Oral, Daily, Stefan Ordonez MD, 17 g at 10/27/24 0803    pregabalin (LYRICA) capsule 100 mg, 100 mg, Oral, BID, Stefan Ordonez MD, 100 mg at 10/27/24 0800    sodium chloride (OCEAN) 0.65 % nasal spray 2 spray, 2 spray, Each Nare, BID, Stefan Ordonez MD    traZODone (DESYREL) tablet 25 mg, 25 mg, Oral, HS, Stefan Ordonez MD, 25 mg at 10/26/24 2142      Lab Results: I have reviewed the following results:  Results from last 7 days   Lab Units 10/27/24  0511 10/26/24  0620 10/25/24  0544 10/24/24  0643 10/23/24  0613 10/22/24  0739 10/21/24  0452   WBC Thousand/uL 5.78 6.55 5.60 5.34  --  7.28 7.64   HEMOGLOBIN g/dL 10.9*  "11.3* 11.3* 11.7*  --  11.3* 11.4*   HEMATOCRIT % 33.3* 34.6* 33.9* 35.2*  --  33.6* 33.9*   PLATELETS Thousands/uL 370 404* 376 359  --  290 244   POTASSIUM mmol/L 4.0 4.2 4.1 4.2 4.2 4.1 4.0   CHLORIDE mmol/L 103 103 102 101 101 100 98   CO2 mmol/L 27 27 27 29 30 28 25   BUN mg/dL 12 14 14 13 17 17 20   CREATININE mg/dL 1.00 1.09 1.13 1.14 1.30 1.22 1.21   CALCIUM mg/dL 8.7 8.5 8.3* 8.7 8.3* 8.3* 8.3*   MAGNESIUM mg/dL 1.7* 1.6*  --   --  1.9  --   --    ALBUMIN g/dL 3.0* 3.0*  --   --   --   --   --        Administrative Statements     Portions of the record may have been created with voice recognition software. Occasional wrong word or \"sound a like\" substitutions may have occurred due to the inherent limitations of voice recognition software. Read the chart carefully and recognize, using context, where substitutions have occurred.If you have any questions, please contact the dictating provider.  "

## 2024-10-28 ENCOUNTER — ANESTHESIA EVENT (INPATIENT)
Dept: PERIOP | Facility: HOSPITAL | Age: 69
DRG: 240 | End: 2024-10-28
Payer: COMMERCIAL

## 2024-10-28 ENCOUNTER — ANESTHESIA (INPATIENT)
Dept: PERIOP | Facility: HOSPITAL | Age: 69
DRG: 240 | End: 2024-10-28
Payer: COMMERCIAL

## 2024-10-28 ENCOUNTER — APPOINTMENT (INPATIENT)
Dept: RADIOLOGY | Facility: HOSPITAL | Age: 69
DRG: 240 | End: 2024-10-28
Payer: COMMERCIAL

## 2024-10-28 PROBLEM — E83.42 HYPOMAGNESEMIA: Status: ACTIVE | Noted: 2024-10-28

## 2024-10-28 LAB
ALBUMIN SERPL BCG-MCNC: 3.2 G/DL (ref 3.5–5)
ALP SERPL-CCNC: 45 U/L (ref 34–104)
ALT SERPL W P-5'-P-CCNC: 16 U/L (ref 7–52)
ANION GAP SERPL CALCULATED.3IONS-SCNC: 7 MMOL/L (ref 4–13)
APTT PPP: 57 SECONDS (ref 23–34)
AST SERPL W P-5'-P-CCNC: 13 U/L (ref 13–39)
BASOPHILS # BLD AUTO: 0.05 THOUSANDS/ΜL (ref 0–0.1)
BASOPHILS NFR BLD AUTO: 1 % (ref 0–1)
BILIRUB SERPL-MCNC: 0.43 MG/DL (ref 0.2–1)
BUN SERPL-MCNC: 15 MG/DL (ref 5–25)
CALCIUM ALBUM COR SERPL-MCNC: 9.7 MG/DL (ref 8.3–10.1)
CALCIUM SERPL-MCNC: 9.1 MG/DL (ref 8.4–10.2)
CHLORIDE SERPL-SCNC: 102 MMOL/L (ref 96–108)
CO2 SERPL-SCNC: 28 MMOL/L (ref 21–32)
CREAT SERPL-MCNC: 1.09 MG/DL (ref 0.6–1.3)
EOSINOPHIL # BLD AUTO: 0.19 THOUSAND/ΜL (ref 0–0.61)
EOSINOPHIL NFR BLD AUTO: 3 % (ref 0–6)
ERYTHROCYTE [DISTWIDTH] IN BLOOD BY AUTOMATED COUNT: 12.2 % (ref 11.6–15.1)
GFR SERPL CREATININE-BSD FRML MDRD: 68 ML/MIN/1.73SQ M
GLUCOSE SERPL-MCNC: 192 MG/DL (ref 65–140)
GLUCOSE SERPL-MCNC: 212 MG/DL (ref 65–140)
GLUCOSE SERPL-MCNC: 227 MG/DL (ref 65–140)
GLUCOSE SERPL-MCNC: 231 MG/DL (ref 65–140)
GLUCOSE SERPL-MCNC: 293 MG/DL (ref 65–140)
HCT VFR BLD AUTO: 34.5 % (ref 36.5–49.3)
HGB BLD-MCNC: 11.4 G/DL (ref 12–17)
IMM GRANULOCYTES # BLD AUTO: 0.1 THOUSAND/UL (ref 0–0.2)
IMM GRANULOCYTES NFR BLD AUTO: 2 % (ref 0–2)
LYMPHOCYTES # BLD AUTO: 1.46 THOUSANDS/ΜL (ref 0.6–4.47)
LYMPHOCYTES NFR BLD AUTO: 26 % (ref 14–44)
MAGNESIUM SERPL-MCNC: 1.6 MG/DL (ref 1.9–2.7)
MCH RBC QN AUTO: 31.2 PG (ref 26.8–34.3)
MCHC RBC AUTO-ENTMCNC: 33 G/DL (ref 31.4–37.4)
MCV RBC AUTO: 95 FL (ref 82–98)
MONOCYTES # BLD AUTO: 0.37 THOUSAND/ΜL (ref 0.17–1.22)
MONOCYTES NFR BLD AUTO: 7 % (ref 4–12)
NEUTROPHILS # BLD AUTO: 3.38 THOUSANDS/ΜL (ref 1.85–7.62)
NEUTS SEG NFR BLD AUTO: 61 % (ref 43–75)
NRBC BLD AUTO-RTO: 0 /100 WBCS
PLATELET # BLD AUTO: 331 THOUSANDS/UL (ref 149–390)
PMV BLD AUTO: 9.1 FL (ref 8.9–12.7)
POTASSIUM SERPL-SCNC: 4.3 MMOL/L (ref 3.5–5.3)
PROT SERPL-MCNC: 6.5 G/DL (ref 6.4–8.4)
RBC # BLD AUTO: 3.65 MILLION/UL (ref 3.88–5.62)
SODIUM SERPL-SCNC: 137 MMOL/L (ref 135–147)
WBC # BLD AUTO: 5.55 THOUSAND/UL (ref 4.31–10.16)

## 2024-10-28 PROCEDURE — 85025 COMPLETE CBC W/AUTO DIFF WBC: CPT | Performed by: FAMILY MEDICINE

## 2024-10-28 PROCEDURE — 94760 N-INVAS EAR/PLS OXIMETRY 1: CPT

## 2024-10-28 PROCEDURE — NC001 PR NO CHARGE: Performed by: PODIATRIST

## 2024-10-28 PROCEDURE — 0Y6N0ZF DETACHMENT AT LEFT FOOT, PARTIAL 5TH RAY, OPEN APPROACH: ICD-10-PCS | Performed by: PODIATRIST

## 2024-10-28 PROCEDURE — 83735 ASSAY OF MAGNESIUM: CPT | Performed by: FAMILY MEDICINE

## 2024-10-28 PROCEDURE — 0Y6N0Z9 DETACHMENT AT LEFT FOOT, PARTIAL 1ST RAY, OPEN APPROACH: ICD-10-PCS | Performed by: PODIATRIST

## 2024-10-28 PROCEDURE — 99232 SBSQ HOSP IP/OBS MODERATE 35: CPT | Performed by: FAMILY MEDICINE

## 2024-10-28 PROCEDURE — 88307 TISSUE EXAM BY PATHOLOGIST: CPT | Performed by: STUDENT IN AN ORGANIZED HEALTH CARE EDUCATION/TRAINING PROGRAM

## 2024-10-28 PROCEDURE — 0Y6N0ZB DETACHMENT AT LEFT FOOT, PARTIAL 2ND RAY, OPEN APPROACH: ICD-10-PCS | Performed by: PODIATRIST

## 2024-10-28 PROCEDURE — 80053 COMPREHEN METABOLIC PANEL: CPT | Performed by: FAMILY MEDICINE

## 2024-10-28 PROCEDURE — 28805 AMPUTATION THRU METATARSAL: CPT | Performed by: PODIATRIST

## 2024-10-28 PROCEDURE — 85730 THROMBOPLASTIN TIME PARTIAL: CPT | Performed by: FAMILY MEDICINE

## 2024-10-28 PROCEDURE — 99232 SBSQ HOSP IP/OBS MODERATE 35: CPT | Performed by: INTERNAL MEDICINE

## 2024-10-28 PROCEDURE — 94640 AIRWAY INHALATION TREATMENT: CPT

## 2024-10-28 PROCEDURE — 0Y6N0ZD DETACHMENT AT LEFT FOOT, PARTIAL 4TH RAY, OPEN APPROACH: ICD-10-PCS | Performed by: PODIATRIST

## 2024-10-28 PROCEDURE — 73630 X-RAY EXAM OF FOOT: CPT

## 2024-10-28 PROCEDURE — 88311 DECALCIFY TISSUE: CPT | Performed by: STUDENT IN AN ORGANIZED HEALTH CARE EDUCATION/TRAINING PROGRAM

## 2024-10-28 PROCEDURE — 0Y6N0ZC DETACHMENT AT LEFT FOOT, PARTIAL 3RD RAY, OPEN APPROACH: ICD-10-PCS | Performed by: PODIATRIST

## 2024-10-28 PROCEDURE — 82948 REAGENT STRIP/BLOOD GLUCOSE: CPT

## 2024-10-28 PROCEDURE — 94664 DEMO&/EVAL PT USE INHALER: CPT

## 2024-10-28 RX ORDER — FENTANYL CITRATE 50 UG/ML
INJECTION, SOLUTION INTRAMUSCULAR; INTRAVENOUS AS NEEDED
Status: DISCONTINUED | OUTPATIENT
Start: 2024-10-28 | End: 2024-10-28

## 2024-10-28 RX ORDER — HYDROMORPHONE HCL IN WATER/PF 6 MG/30 ML
0.2 PATIENT CONTROLLED ANALGESIA SYRINGE INTRAVENOUS
Status: DISCONTINUED | OUTPATIENT
Start: 2024-10-28 | End: 2024-10-28 | Stop reason: HOSPADM

## 2024-10-28 RX ORDER — EPHEDRINE SULFATE 50 MG/ML
INJECTION INTRAVENOUS AS NEEDED
Status: DISCONTINUED | OUTPATIENT
Start: 2024-10-28 | End: 2024-10-28

## 2024-10-28 RX ORDER — PROPOFOL 10 MG/ML
INJECTION, EMULSION INTRAVENOUS AS NEEDED
Status: DISCONTINUED | OUTPATIENT
Start: 2024-10-28 | End: 2024-10-28

## 2024-10-28 RX ORDER — ONDANSETRON 2 MG/ML
INJECTION INTRAMUSCULAR; INTRAVENOUS AS NEEDED
Status: DISCONTINUED | OUTPATIENT
Start: 2024-10-28 | End: 2024-10-28

## 2024-10-28 RX ORDER — MAGNESIUM SULFATE HEPTAHYDRATE 40 MG/ML
2 INJECTION, SOLUTION INTRAVENOUS ONCE
Status: COMPLETED | OUTPATIENT
Start: 2024-10-28 | End: 2024-10-29

## 2024-10-28 RX ORDER — SODIUM CHLORIDE, SODIUM LACTATE, POTASSIUM CHLORIDE, CALCIUM CHLORIDE 600; 310; 30; 20 MG/100ML; MG/100ML; MG/100ML; MG/100ML
INJECTION, SOLUTION INTRAVENOUS CONTINUOUS PRN
Status: DISCONTINUED | OUTPATIENT
Start: 2024-10-28 | End: 2024-10-28

## 2024-10-28 RX ORDER — FENTANYL CITRATE/PF 50 MCG/ML
25 SYRINGE (ML) INJECTION
Status: DISCONTINUED | OUTPATIENT
Start: 2024-10-28 | End: 2024-10-28 | Stop reason: HOSPADM

## 2024-10-28 RX ORDER — LIDOCAINE HYDROCHLORIDE 20 MG/ML
INJECTION, SOLUTION EPIDURAL; INFILTRATION; INTRACAUDAL; PERINEURAL AS NEEDED
Status: DISCONTINUED | OUTPATIENT
Start: 2024-10-28 | End: 2024-10-28

## 2024-10-28 RX ADMIN — HEPARIN SODIUM 15 UNITS/KG/HR: 10000 INJECTION, SOLUTION INTRAVENOUS at 02:52

## 2024-10-28 RX ADMIN — PREGABALIN 100 MG: 100 CAPSULE ORAL at 08:12

## 2024-10-28 RX ADMIN — INSULIN LISPRO 4 UNITS: 100 INJECTION, SOLUTION INTRAVENOUS; SUBCUTANEOUS at 08:09

## 2024-10-28 RX ADMIN — ASPIRIN 81 MG CHEWABLE TABLET 81 MG: 81 TABLET CHEWABLE at 08:12

## 2024-10-28 RX ADMIN — MAGNESIUM SULFATE HEPTAHYDRATE 2 G: 40 INJECTION, SOLUTION INTRAVENOUS at 09:28

## 2024-10-28 RX ADMIN — FENTANYL CITRATE 25 MCG: 50 INJECTION INTRAMUSCULAR; INTRAVENOUS at 13:54

## 2024-10-28 RX ADMIN — PIPERACILLIN SODIUM AND TAZOBACTAM SODIUM 4.5 G: 36; 4.5 INJECTION, POWDER, LYOPHILIZED, FOR SOLUTION INTRAVENOUS at 21:11

## 2024-10-28 RX ADMIN — BUDESONIDE 0.5 MG: 0.5 INHALANT RESPIRATORY (INHALATION) at 20:54

## 2024-10-28 RX ADMIN — INSULIN GLARGINE 25 UNITS: 100 INJECTION, SOLUTION SUBCUTANEOUS at 21:09

## 2024-10-28 RX ADMIN — NICOTINE 1 PATCH: 14 PATCH, EXTENDED RELEASE TRANSDERMAL at 08:14

## 2024-10-28 RX ADMIN — PREGABALIN 100 MG: 100 CAPSULE ORAL at 17:37

## 2024-10-28 RX ADMIN — EPHEDRINE SULFATE 5 MG: 50 INJECTION, SOLUTION INTRAVENOUS at 13:59

## 2024-10-28 RX ADMIN — INSULIN LISPRO 4 UNITS: 100 INJECTION, SOLUTION INTRAVENOUS; SUBCUTANEOUS at 11:10

## 2024-10-28 RX ADMIN — EPHEDRINE SULFATE 10 MG: 50 INJECTION, SOLUTION INTRAVENOUS at 13:43

## 2024-10-28 RX ADMIN — METOPROLOL SUCCINATE 50 MG: 50 TABLET, EXTENDED RELEASE ORAL at 08:12

## 2024-10-28 RX ADMIN — LIDOCAINE HYDROCHLORIDE 100 MG: 20 INJECTION, SOLUTION EPIDURAL; INFILTRATION; INTRACAUDAL at 12:58

## 2024-10-28 RX ADMIN — ATORVASTATIN CALCIUM 20 MG: 20 TABLET, FILM COATED ORAL at 08:12

## 2024-10-28 RX ADMIN — ONDANSETRON 4 MG: 2 INJECTION INTRAMUSCULAR; INTRAVENOUS at 12:58

## 2024-10-28 RX ADMIN — ISOSORBIDE MONONITRATE 30 MG: 30 TABLET, EXTENDED RELEASE ORAL at 08:12

## 2024-10-28 RX ADMIN — PIPERACILLIN SODIUM AND TAZOBACTAM SODIUM 4.5 G: 36; 4.5 INJECTION, POWDER, LYOPHILIZED, FOR SOLUTION INTRAVENOUS at 13:39

## 2024-10-28 RX ADMIN — PANTOPRAZOLE SODIUM 40 MG: 40 TABLET, DELAYED RELEASE ORAL at 04:59

## 2024-10-28 RX ADMIN — INSULIN LISPRO 6 UNITS: 100 INJECTION, SOLUTION INTRAVENOUS; SUBCUTANEOUS at 21:09

## 2024-10-28 RX ADMIN — SODIUM CHLORIDE, SODIUM LACTATE, POTASSIUM CHLORIDE, AND CALCIUM CHLORIDE: .6; .31; .03; .02 INJECTION, SOLUTION INTRAVENOUS at 12:51

## 2024-10-28 RX ADMIN — PROPOFOL 130 MG: 10 INJECTION, EMULSION INTRAVENOUS at 12:58

## 2024-10-28 RX ADMIN — FENTANYL CITRATE 25 MCG: 50 INJECTION INTRAMUSCULAR; INTRAVENOUS at 13:42

## 2024-10-28 RX ADMIN — DOCUSATE SODIUM 100 MG: 100 CAPSULE, LIQUID FILLED ORAL at 08:12

## 2024-10-28 RX ADMIN — PIPERACILLIN SODIUM AND TAZOBACTAM SODIUM 4.5 G: 36; 4.5 INJECTION, POWDER, LYOPHILIZED, FOR SOLUTION INTRAVENOUS at 05:31

## 2024-10-28 RX ADMIN — INSULIN LISPRO 10 UNITS: 100 INJECTION, SOLUTION INTRAVENOUS; SUBCUTANEOUS at 17:37

## 2024-10-28 RX ADMIN — BUDESONIDE 0.5 MG: 0.5 INHALANT RESPIRATORY (INHALATION) at 07:52

## 2024-10-28 RX ADMIN — CYANOCOBALAMIN TAB 500 MCG 500 MCG: 500 TAB at 08:12

## 2024-10-28 RX ADMIN — TRAZODONE HYDROCHLORIDE 25 MG: 50 TABLET ORAL at 21:08

## 2024-10-28 RX ADMIN — DOCUSATE SODIUM 100 MG: 100 CAPSULE, LIQUID FILLED ORAL at 17:37

## 2024-10-28 RX ADMIN — Medication 1000 UNITS: at 08:12

## 2024-10-28 RX ADMIN — DULOXETINE HYDROCHLORIDE 20 MG: 20 CAPSULE, DELAYED RELEASE ORAL at 08:12

## 2024-10-28 RX ADMIN — LORATADINE 10 MG: 10 TABLET ORAL at 08:12

## 2024-10-28 RX ADMIN — FENTANYL CITRATE 50 MCG: 50 INJECTION INTRAMUSCULAR; INTRAVENOUS at 12:58

## 2024-10-28 NOTE — PROGRESS NOTES
Progress Note - Hospitalist   Name: Clay Marcial 68 y.o. male I MRN: [id number]  Unit/Bed#: OR POOL I Date of Admission: 10/22/2024   Date of Service: 10/28/2024 I Hospital Day: 6    Assessment & Plan  Diabetic foot infection  (HCC)  Initially at [facility] hospital with left lower extremity cellulitis with open wound.    Evaluated by podiatry, ID, vascular at [facility] who are also following here,  S/p I&D, L 2nd toe amputation with podiatry on 10/21 given concern for acutely worsening wound/infection   Transferred to [facility] for vascular/IR evaluations given concern also for embolic process/ischemia   Agram 10/25   Patient will have TMA done October 28, 2024  Continue IV heparin gtt   ID following,  Continue IV zosyn and vancomycin for now  Wound culture from OR on 10/21 with bacteroides, multiple organisms   Monitor temps, WBC  Diabetes mellitus, type 2 (HCC)  Lab Results   Component Value Date    HGBA1C 7.2 (H) 10/16/2024       Recent Labs     10/27/24  1611 10/27/24  2133 10/28/24  0736 10/28/24  1108   POCGLU 305* 255* 231* 212*       Blood Sugar Average: Last 72 hrs:  (P) [id number]  Continue Lantus 25 units units QHS, blood glucose higher than goal most likely given the acute infection.  Will not go up on Lantus at this time as patient was n.p.o. and will hopefully obtain surgical cure of infection may need to be titrated up  Continue SSI coverage   QID glucose checks   Risk factor for primary problem   Monitor and adjust regimen as needed  Stage 3 chronic kidney disease (HCC)  Lab Results   Component Value Date    EGFR 68 10/28/2024    EGFR 75 10/27/2024    EGFR 68 10/26/2024    CREATININE 1.09 10/28/2024    CREATININE 1.00 10/27/2024    CREATININE 1.09 10/26/2024   Creatinine currently at baseline, was noted to have elevated serum creatinine earlier on in this hospitalization with peak of 1.7  Nephrology following  Continue to monitor with BMP  HTN (hypertension)  BP stable on review   Continue Imdur 30 mg  daily, Toprol XL 50 mg daily   ACEI and diuretics on hold per nephrology  Monitor   CAD (coronary artery disease)  Continue aspirin, Lipitor, isosorbide dinitrate, BB  Chronic obstructive pulmonary disease with acute exacerbation (HCC)  Has since resolved  Continue Pulmicort and Xopenex  Saturating well on RA  Hypomagnesemia      VTE Pharmacologic Prophylaxis:   High Risk (Score >/= 5) - Pharmacological DVT Prophylaxis Ordered: heparin drip. Sequential Compression Devices Ordered.    Mobility:   Basic Mobility Inpatient Raw Score: 19  JH-HLM Goal: 6: Walk 10 steps or more  JH-HLM Achieved: 6: Walk 10 steps or more  JH-HLM Goal achieved. Continue to encourage appropriate mobility.    Patient Centered Rounds: I performed bedside rounds with nursing staff today.   Discussions with Specialists or Other Care Team Provider: Podiatry    Education and Discussions with Family / Patient: Patient declined call to .     Current Length of Stay: 6 day(s)  Current Patient Status: Inpatient   Certification Statement: The patient will continue to require additional inpatient hospital stay due to currently in OR for TMA  Discharge Plan:  Pending PT/OT evaluation after surgery    Code Status: Level 1 - Full Code    Subjective   This is a very pleasant 68-year-old gentleman who was seen and evaluated today at bedside.  Patient is aware that he had surgery scheduled today.  Patient is agreeable to plan.  Patient has no acute complaints at this time.    Objective :  Temp:  [98.2 °F (36.8 °C)-98.7 °F (37.1 °C)] 98.2 °F (36.8 °C)  HR:  [59-88] 59  BP: (147-160)/(72-85) 160/72  Resp:  [16-20] 16  SpO2:  [94 %-95 %] 95 %  O2 Device: None (Room air)    Body mass index is 38.77 kg/m².     Input and Output Summary (last 24 hours):     Intake/Output Summary (Last 24 hours) at 10/28/2024 1339  Last data filed at 10/28/2024 0718  Gross per 24 hour   Intake 480 ml   Output 3300 ml   Net -2820 ml       Physical Exam  Vitals reviewed.    HENT:      Head: Normocephalic.      Nose: No congestion.      Mouth/Throat:      Pharynx: No oropharyngeal exudate or posterior oropharyngeal erythema.   Eyes:      Conjunctiva/sclera: Conjunctivae normal.   Cardiovascular:      Rate and Rhythm: Normal rate and regular rhythm.   Pulmonary:      Effort: Pulmonary effort is normal.   Abdominal:      General: Abdomen is flat.      Palpations: Abdomen is soft.   Musculoskeletal:         General: Signs of injury present.      Right lower leg: Edema present.      Left lower leg: Edema present.   Skin:     General: Skin is warm and dry.      Findings: Lesion present.   Neurological:      Mental Status: He is alert and oriented to person, place, and time. Mental status is at baseline.           Lines/Drains:              Lab Results: I have reviewed the following results:   Results from last 7 days   Lab Units 10/28/24  0459   WBC Thousand/uL 5.55   HEMOGLOBIN g/dL 11.4*   HEMATOCRIT % 34.5*   PLATELETS Thousands/uL 331   SEGS PCT % 61   LYMPHO PCT % 26   MONO PCT % 7   EOS PCT % 3     Results from last 7 days   Lab Units 10/28/24  0459   SODIUM mmol/L 137   POTASSIUM mmol/L 4.3   CHLORIDE mmol/L 102   CO2 mmol/L 28   BUN mg/dL 15   CREATININE mg/dL 1.09   ANION GAP mmol/L 7   CALCIUM mg/dL 9.1   ALBUMIN g/dL 3.2*   TOTAL BILIRUBIN mg/dL 0.43   ALK PHOS U/L 45   ALT U/L 16   AST U/L 13   GLUCOSE RANDOM mg/dL 227*         Results from last 7 days   Lab Units 10/28/24  1108 10/28/24  0736 10/27/24  2133 10/27/24  1611 10/27/24  1116 10/27/24  0718 10/26/24  2112 10/26/24  1606 10/26/24  1123 10/26/24  0748 10/25/24  2122 10/25/24  1610   POC GLUCOSE mg/dl 212* 231* 255* 305* 295* 242* 255* 254* 242* 242* 160* 159*               Recent Cultures (last 7 days):   Results from last 7 days   Lab Units 10/21/24  1428   GRAM STAIN RESULT  1+ Gram positive cocci in clusters*  No polys seen*   WOUND CULTURE  1+ Growth of Actinomyces species*  Few Colonies of Diphtheroids  Few  Colonies of - Globicatella Species Globicatella*  1 colony Staphylococcus coagulase negative*       Imaging Results Review: No pertinent imaging studies reviewed.  Other Study Results Review: No additional pertinent studies reviewed.    Last 24 Hours Medication List:     Current Facility-Administered Medications:     [Transfer Hold] acetaminophen (TYLENOL) tablet 650 mg, Q6H PRN    [Transfer Hold] albuterol inhalation solution 2.5 mg, Q6H PRN    [Transfer Hold] aspirin chewable tablet 81 mg, Daily    [Transfer Hold] atorvastatin (LIPITOR) tablet 20 mg, Daily    [Transfer Hold] bisacodyl (DULCOLAX) rectal suppository 10 mg, Daily PRN    [Transfer Hold] budesonide (PULMICORT) inhalation solution 0.5 mg, Q12H    [Transfer Hold] Cholecalciferol (VITAMIN D3) tablet 1,000 Units, Daily    [Transfer Hold] cyanocobalamin (VITAMIN B-12) tablet 500 mcg, Daily    [Transfer Hold] docusate sodium (COLACE) capsule 100 mg, BID    [Transfer Hold] DULoxetine (CYMBALTA) delayed release capsule 20 mg, Daily    [Transfer Hold] fluticasone (FLONASE) 50 mcg/act nasal spray 2 spray, Daily    heparin (porcine) 25,000 units in 0.45% NaCl 250 mL infusion (premix), Titrated, Last Rate: Stopped (10/28/24 0500)    [Transfer Hold] heparin (porcine) injection 4,800 Units, Q6H PRN    [Transfer Hold] heparin (porcine) injection 9,600 Units, Q6H PRN    [Transfer Hold] insulin glargine (LANTUS) subcutaneous injection 25 Units 0.25 mL, HS    [Transfer Hold] insulin lispro (HumALOG/ADMELOG) 100 units/mL subcutaneous injection 10 Units, TID With Meals    [Transfer Hold] insulin lispro (HumALOG/ADMELOG) 100 units/mL subcutaneous injection 2-12 Units, 4x Daily (AC & HS) **AND** Fingerstick Glucose (POCT), 4x Daily AC and at bedtime    [Transfer Hold] isosorbide mononitrate (IMDUR) 24 hr tablet 30 mg, Daily    [Transfer Hold] loratadine (CLARITIN) tablet 10 mg, Daily    [Transfer Hold] metoprolol succinate (TOPROL-XL) 24 hr tablet 50 mg, Daily    [Transfer  Hold] morphine injection 2 mg, Q4H PRN    multi-electrolyte (PLASMALYTE-A/ISOLYTE-S PH 7.4) IV solution, Continuous, Last Rate: Stopped (10/25/24 1915)    [Transfer Hold] nicotine (NICODERM CQ) 14 mg/24hr TD 24 hr patch 1 patch, Daily    [Transfer Hold] umeclidinium 62.5 mcg/actuation inhaler AEPB 1 puff, Daily **AND** [Transfer Hold] olodaterol HCl (STRIVERDI RESPIMAT) inhaler 2 puff, Daily    [Transfer Hold] ondansetron (ZOFRAN) injection 4 mg, Q4H PRN    [Transfer Hold] oxyCODONE (ROXICODONE) IR tablet 5 mg, Q4H PRN    [Transfer Hold] oxyCODONE (ROXICODONE) split tablet 2.5 mg, Q4H PRN    [Transfer Hold] pantoprazole (PROTONIX) EC tablet 40 mg, BID AC    [Transfer Hold] piperacillin-tazobactam (ZOSYN) 4.5 g in sodium chloride 0.9 % 100 mL IVPB (EXTENDED INFUSION), Q8H, Last Rate: 4.5 g (10/28/24 0531)    [Transfer Hold] polyethylene glycol (MIRALAX) packet 17 g, Daily    [Transfer Hold] pregabalin (LYRICA) capsule 100 mg, BID    [Transfer Hold] sodium chloride (OCEAN) 0.65 % nasal spray 2 spray, BID    [Transfer Hold] traZODone (DESYREL) tablet 25 mg, HS    Facility-Administered Medications Ordered in Other Encounters:     fentaNYL injection, PRN    lactated ringers infusion, Continuous PRN    lidocaine (PF) (XYLOCAINE-MPF) 2 % injection, PRN    ondansetron (ZOFRAN) injection, PRN    phenylephrine bolus from bag, PRN    propofol (DIPRIVAN) 200 MG/20ML bolus injection, PRN    Administrative Statements   Today, Patient Was Seen By: Perico Villa MD  I have spent a total time of 40 minutes in caring for this patient on the day of the visit/encounter including Diagnostic results, Risks and benefits of tx options, Patient and family education, Documenting in the medical record, Obtaining or reviewing history  , and Communicating with other healthcare professionals .    **Please Note: This note may have been constructed using a voice recognition system.**

## 2024-10-28 NOTE — ASSESSMENT & PLAN NOTE
Home Rx: Furosemide 40 mg as needed, Imdur 30 mg daily, lisinopril 20 mg daily, Toprol-XL 50 mg daily  Current Rx: Imdur 30 mg daily, Toprol-XL 50 mg daily  Blood pressure is slightly elevated, continue to hold lisinopril and diuretics at this time in the setting of recent SUSHILA and plan for surgery today.  If blood pressure remains elevated may consider calcium channel blocker

## 2024-10-28 NOTE — NURSING NOTE
Pt returned from OR. ESC sent to Dr. Hernandes in regards to heparin gtt being restarted tonight or not.  Per  gtt to be held overnight , foot elevated and restarted in am 10/29.

## 2024-10-28 NOTE — OP NOTE
OPERATIVE REPORT - Podiatry  PATIENT NAME: Clay Marcial    :  1955  MRN: [id number]  Pt Location: [facility] OR ROOM 05    SURGERY DATE: 10/28/2024    Surgeons and Role:     * Robb Page DPM - Primary     * Real Hernandes DPM - Assisting    Pre-op Diagnosis:  Diabetic foot infection  (HCC) [E11.628, L08.9]  Amputation of second toe, left, traumatic (HCC) [S98.132A]    Post-Op Diagnosis Codes:     * Diabetic foot infection  (HCC) [E11.628, L08.9]     * Amputation of second toe, left, traumatic (HCC) [S98.132A]    Procedure(s) (LRB):  AMPUTATION TRANSMETATARSAL (TMA) (Left)    Specimen(s):  ID Type Source Tests Collected by Time Destination   1 : left forefoot Tissue Foot, Left TISSUE EXAM Robb Page DPM 10/28/2024 1338        Estimated Blood Loss:   Minimal    Drains:  * No LDAs found *    Anesthesia Type:   Choice with 20 ml of 1% Lidocaine and 0.5% Bupivacaine in a 1:1 mixture    Hemostasis:  Direct compression and electrocautery    Materials:  * No implants in log *  Surgicel    Operative Findings:  1.  Adequate bleeding and perfusion from bone and soft tissue  2.  Substantial amount of necrotic and ecchymotic subcutaneous soft tissue with noted purulent drainage particular to the lateral aspect of the plantar flap.  Decision was made to leave amputation site open with plans for negative pressure wound VAC therapy and delayed primary closure due to amount of soft tissue infection  3.  Hard, healthy, viable, bleeding bone noted to all 5 metatarsal stumps    Complications:   None    Procedure and Technique:     Under mild sedation, the patient was brought into the operating room and placed on the operating room table in the supine position. IV sedation was achieved by anesthesia team and a universal timeout was performed where all parties are in agreement of correct patient, correct procedure and correct site. A pneumatic tourniquet was then placed over the patient's left lower extremity with ample padding. A  ankle block was performed consisting of 20 ml of 1% Lidocaine and 0.5% Bupivacaine in a 1:1 mixture. The foot was then prepped and draped in the usual aseptic manner.  A pneumatic tourniquet was not placed on the left lower extremity for this procedure.    A fishmouth type incision was drawn out over the forefoot. Utilizing a #15 blade a full thickness incision was made down to bone. The forefoot was then sharply dissected out to isolate the metatarsal shafts. Utilizing a key elevator soft tissues were freed from each of the metatarsals. A sagittal saw was then used to make the transmetatarsal amputation osteotomies. Each metatarsal was cut in a dorsal distal to plantar proximal fashion with care taken to maintain the metatarsal parabola. The entire forefoot was then removed from the table and passed off.  At this time, it was noted that there was a small amount of purulent drainage from the lateral aspect of the forefoot as well as necrotic and ecchymotic soft tissue to the medial and lateral plantar flap.    Any residual prominences were removed utilizing a rongeur. The remaining wound bed was then inspected. No remaining purulent sinus tracts were visualized. Any necrotic or nonviable soft tissues were sharply excised from the wound utilizing 15 blade and rongeur. Any residual exposed tendons were excised proximally to prevent any infection from tracking proximally. Bones proximal to the amputation site was noted to be of hard viable quality. The remaining surgical wound was red granular with adequate bleeding noted.    The wound was then flushed with copious amounts of normal sterile saline.  At this time, it was decided to leave the surgical site open due to the level of soft tissue infection prior to amputation and excision which left the plantar flap thin less likely to heal with primary closure.  Negative pressure wound VAC will be applied on the floor tomorrow to promote growth of granulation tissue prior  "to delayed primary closure.  The surgical site was packed with Surgicel, 4 x 4 gauze and then dressed with 4 x 4 gauze, ABD pads, Kerlix, Webril, and Ace wrap.    The patient tolerated the procedure and anesthesia well without immediate complications and transferred to PACU with vital signs stable.     As with many limb salvage procedures, we contemplate the possibility of performing further stages to this procedure. Procedures may include debridements, delayed closure, plastic surgery techniques, or more proximal amputations. This procedure may be considered part of a multi-staged limb salvage treatment plan.     Dr. Page was present during the entire procedure and participated in all key aspects.    SIGNATURE: Real Hernandes DPM  DATE: October 28, 2024  TIME: 2:17 PM      Portions of the record may have been created with voice recognition software. Occasional wrong word or \"sound a like\" substitutions may have occurred due to the inherent limitations of voice recognition software. Read the chart carefully and recognize, using context, where substitutions have occurred.'            "

## 2024-10-28 NOTE — ASSESSMENT & PLAN NOTE
Initially at [facility] hospital with left lower extremity cellulitis with open wound.    Evaluated by podiatry, ID, vascular at [facility] who are also following here,  S/p I&D, L 2nd toe amputation with podiatry on 10/21 given concern for acutely worsening wound/infection   Transferred to [facility] for vascular/IR evaluations given concern also for embolic process/ischemia   Agram 10/25   Patient will have TMA done October 28, 2024  Continue IV heparin gtt   ID following,  Continue IV zosyn and vancomycin for now  Wound culture from OR on 10/21 with bacteroides, multiple organisms   Monitor temps, WBC

## 2024-10-28 NOTE — PLAN OF CARE
Problem: PAIN - ADULT  Goal: Verbalizes/displays adequate comfort level or baseline comfort level  Description: Interventions:  - Encourage patient to monitor pain and request assistance  - Assess pain using appropriate pain scale  - Administer analgesics based on type and severity of pain and evaluate response  - Implement non-pharmacological measures as appropriate and evaluate response  - Consider cultural and social influences on pain and pain management  - Notify physician/advanced practitioner if interventions unsuccessful or patient reports new pain  Outcome: Progressing     Problem: INFECTION - ADULT  Goal: Absence or prevention of progression during hospitalization  Description: INTERVENTIONS:  - Assess and monitor for signs and symptoms of infection  - Monitor lab/diagnostic results  - Monitor all insertion sites, i.e. indwelling lines, tubes, and drains  - Monitor endotracheal if appropriate and nasal secretions for changes in amount and color  - [place] appropriate cooling/warming therapies per order  - Administer medications as ordered  - Instruct and encourage patient and family to use good hand hygiene technique  - Identify and instruct in appropriate isolation precautions for identified infection/condition  Outcome: Progressing

## 2024-10-28 NOTE — PROGRESS NOTES
NEPHROLOGY PROGRESS NOTE   Clay Marcial 68 y.o. male MRN: [id number]  Unit/Bed#: OR POOL Encounter: [id number]    ASSESSMENT & PLAN:  Assessment & Plan  Elevated serum creatinine  Etiology of elevated creatinine most likely due to autoregulatory failure in the setting of ACE inhibitor use, SGLT2 inhibitor use and diuretic use  Baseline creatinine appears to be around 1.3-1.4 in setting of diabetes and hypertension  Creatinine on recent admission 1.58  Peak creatinine 1.7 on 10/17  Status post lower extremity angiogram on 10/25.  No evidence of contrast nephropathy  Renal function has improved to creatinine 1.09 mg/dL.  Continue to monitor.  Continue to hold lisinopril, Lasix and Jardiance.  Patient is planned for left TMA today  Continue to monitor renal function  Stage 3 chronic kidney disease (HCC)  Baseline creatinine 1.3-1.4  Needs outpatient follow-up with nephrology  HTN (hypertension)  Home Rx: Furosemide 40 mg as needed, Imdur 30 mg daily, lisinopril 20 mg daily, Toprol-XL 50 mg daily  Current Rx: Imdur 30 mg daily, Toprol-XL 50 mg daily  Blood pressure is slightly elevated, continue to hold lisinopril and diuretics at this time in the setting of recent SUSHILA and plan for surgery today.  If blood pressure remains elevated may consider calcium channel blocker  Hypomagnesemia  Magnesium level is low at 1.6-replaced with IV magnesium sulfate 2 g  Diabetic foot infection  (HCC)    planning on a TMA today, currently on IV antibiotics  Diabetes mellitus, type 2 (HCC)  Management per primary team  Continue to hold metformin and Jardiance for now  Chronic obstructive pulmonary disease with acute exacerbation (HCC)  Management per primary team     Discussed with primary team that renal function is stable would continue current treatment and primary team agreed with the plan    SUBJECTIVE:  No new complaints.  No chest pain or shortness of breath, no nausea vomiting    OBJECTIVE:  Current Weight: Weight - Scale: 126  kg (278 lb)  Vitals:    10/28/24 0731   BP: 160/72   Pulse: 59   Resp: 16   Temp: 98.2 °F (36.8 °C)   SpO2: 95%       Intake/Output Summary (Last 24 hours) at 10/28/2024 1259  Last data filed at 10/28/2024 0718  Gross per 24 hour   Intake 480 ml   Output 3300 ml   Net -2820 ml       Physical Exam  General:  Ill looking, awake.  Eyes: Conjunctivae pink,  Sclera anicteric  ENT: lips and mucous membranes moist  Neck: supple   Chest: Clear to Auscultation both lungs,  no crackles, ronchus or wheezing.  CVS: S1 & S2 present, normal rate, regular rhythm, no murmur.  Abdomen: soft, non-tender, non-distended, Bowel sounds normoactive  Extremities:   Dressing over left leg and trace edema left leg  Skin: no rash  Neuro: awake, alert, oriented x 3   Psych: Mood and affect appropriate      Medications:    Current Facility-Administered Medications:     [Transfer Hold] acetaminophen (TYLENOL) tablet 650 mg, 650 mg, Oral, Q6H PRN, Stefan Ordonez MD    [Transfer Hold] albuterol inhalation solution 2.5 mg, 2.5 mg, Nebulization, Q6H PRN, Robert Villegas MD    [Transfer Hold] aspirin chewable tablet 81 mg, 81 mg, Oral, Daily, Stefan Ordonez MD, 81 mg at 10/28/24 0812    [Transfer Hold] atorvastatin (LIPITOR) tablet 20 mg, 20 mg, Oral, Daily, Stefan Ordonez MD, 20 mg at 10/28/24 0812    [Transfer Hold] bisacodyl (DULCOLAX) rectal suppository 10 mg, 10 mg, Rectal, Daily PRN, Tanya Lacey PA-C    [Transfer Hold] budesonide (PULMICORT) inhalation solution 0.5 mg, 0.5 mg, Nebulization, Q12H, Stefan Ordonez MD, 0.5 mg at 10/28/24 0752    [Transfer Hold] Cholecalciferol (VITAMIN D3) tablet 1,000 Units, 1,000 Units, Oral, Daily, Stefan Ordonez MD, 1,000 Units at 10/28/24 0812    [Transfer Hold] cyanocobalamin (VITAMIN B-12) tablet 500 mcg, 500 mcg, Oral, Daily, Stefan Ordonez MD, 500 mcg at 10/28/24 0812    [Transfer Hold] docusate sodium (COLACE) capsule 100 mg, 100 mg, Oral, BID, Stefan Ordonez MD, 100 mg at 10/28/24 0812    [Transfer Hold]  DULoxetine (CYMBALTA) delayed release capsule 20 mg, 20 mg, Oral, Daily, Stefan Ordonez MD, 20 mg at 10/28/24 0812    [Transfer Hold] fluticasone (FLONASE) 50 mcg/act nasal spray 2 spray, 2 spray, Nasal, Daily, Stefan Ordonez MD, 2 spray at 10/25/24 0833    heparin (porcine) 25,000 units in 0.45% NaCl 250 mL infusion (premix), 3-30 Units/kg/hr (Order-Specific), Intravenous, Titrated, Stefan Ordonez MD, Stopped at 10/28/24 0500    [Transfer Hold] heparin (porcine) injection 4,800 Units, 4,800 Units, Intravenous, Q6H PRN, Stefan Ordonez MD, 4,800 Units at 10/23/24 1709    [Transfer Hold] heparin (porcine) injection 9,600 Units, 9,600 Units, Intravenous, Q6H PRN, Stefan Ordonez MD    [Transfer Hold] insulin glargine (LANTUS) subcutaneous injection 25 Units 0.25 mL, 25 Units, Subcutaneous, HS, Perico Villa MD, 25 Units at 10/27/24 2155    [Transfer Hold] insulin lispro (HumALOG/ADMELOG) 100 units/mL subcutaneous injection 10 Units, 10 Units, Subcutaneous, TID With Meals, Stefan Ordonez MD, 10 Units at 10/27/24 1713    [Transfer Hold] insulin lispro (HumALOG/ADMELOG) 100 units/mL subcutaneous injection 2-12 Units, 2-12 Units, Subcutaneous, 4x Daily (AC & HS), 4 Units at 10/28/24 1110 **AND** Fingerstick Glucose (POCT), , , 4x Daily AC and at bedtime, Courtney Benitez PA-C    [Transfer Hold] isosorbide mononitrate (IMDUR) 24 hr tablet 30 mg, 30 mg, Oral, Daily, Stefan Ordonez MD, 30 mg at 10/28/24 0812    [Transfer Hold] loratadine (CLARITIN) tablet 10 mg, 10 mg, Oral, Daily, Stefan Ordonez MD, 10 mg at 10/28/24 0812    [Transfer Hold] metoprolol succinate (TOPROL-XL) 24 hr tablet 50 mg, 50 mg, Oral, Daily, Stefan Ordonez MD, 50 mg at 10/28/24 0812    [Transfer Hold] morphine injection 2 mg, 2 mg, Intravenous, Q4H PRN, Mansi Lou PA-C    multi-electrolyte (PLASMALYTE-A/ISOLYTE-S PH 7.4) IV solution, 50 mL/hr, Intravenous, Continuous, Vikki Newberry PA-C, Stopped at 10/25/24 1915    [Transfer Hold] nicotine (NICODERM  CQ) 14 mg/24hr TD 24 hr patch 1 patch, 1 patch, Transdermal, Daily, Stefan Ordonez MD, 1 patch at 10/28/24 0814    [Transfer Hold] umeclidinium 62.5 mcg/actuation inhaler AEPB 1 puff, 1 puff, Inhalation, Daily **AND** [Transfer Hold] olodaterol HCl (STRIVERDI RESPIMAT) inhaler 2 puff, 2 puff, Inhalation, Daily, Stefan Ordonez MD    [Transfer Hold] ondansetron (ZOFRAN) injection 4 mg, 4 mg, Intravenous, Q4H PRN, Stefan Ordonez MD    [Transfer Hold] oxyCODONE (ROXICODONE) IR tablet 5 mg, 5 mg, Oral, Q4H PRN, Mansi Lou PA-C    [Transfer Hold] oxyCODONE (ROXICODONE) split tablet 2.5 mg, 2.5 mg, Oral, Q4H PRN, Mansi Lou PA-C    [Transfer Hold] pantoprazole (PROTONIX) EC tablet 40 mg, 40 mg, Oral, BID AC, Stefan Ordonez MD, 40 mg at 10/28/24 0459    [Transfer Hold] piperacillin-tazobactam (ZOSYN) 4.5 g in sodium chloride 0.9 % 100 mL IVPB (EXTENDED INFUSION), 4.5 g, Intravenous, Q8H, Stefan Ordonez MD, Last Rate: 25 mL/hr at 10/28/24 0531, 4.5 g at 10/28/24 0531    [Transfer Hold] polyethylene glycol (MIRALAX) packet 17 g, 17 g, Oral, Daily, Stefan Ordonez MD, 17 g at 10/27/24 0803    [Transfer Hold] pregabalin (LYRICA) capsule 100 mg, 100 mg, Oral, BID, Stefan Ordonez MD, 100 mg at 10/28/24 0812    [Transfer Hold] sodium chloride (OCEAN) 0.65 % nasal spray 2 spray, 2 spray, Each Nare, BID, Stefan Ordonez MD    [Transfer Hold] traZODone (DESYREL) tablet 25 mg, 25 mg, Oral, HS, Stefan Ordonez MD, 25 mg at 10/27/24 6295    Invasive Devices:        Lab Results:   Results from last 7 days   Lab Units 10/28/24  0459 10/27/24  0511 10/26/24  0620   WBC Thousand/uL 5.55 5.78 6.55   HEMOGLOBIN g/dL 11.4* 10.9* 11.3*   HEMATOCRIT % 34.5* 33.3* 34.6*   PLATELETS Thousands/uL 331 370 404*   POTASSIUM mmol/L 4.3 4.0 4.2   CHLORIDE mmol/L 102 103 103   CO2 mmol/L 28 27 27   BUN mg/dL 15 12 14   CREATININE mg/dL 1.09 1.00 1.09   CALCIUM mg/dL 9.1 8.7 8.5   MAGNESIUM mg/dL 1.6* 1.7* 1.6*   ALK PHOS U/L 45 49 52   ALT U/L 16 18 24   AST  "U/L 13 15 29       Previous work up:         Portions of the record may have been created with voice recognition software. Occasional wrong word or \"sound a like\" substitutions may have occurred due to the inherent limitations of voice recognition software. Read the chart carefully and recognize, using context, where substitutions have occurred.If you have any questions, please contact the dictating provider.    "

## 2024-10-28 NOTE — ANESTHESIA POSTPROCEDURE EVALUATION
Post-Op Assessment Note    CV Status:  Stable    Pain management: adequate       Mental Status:  Alert and awake   Hydration Status:  Euvolemic   PONV Controlled:  Controlled   Airway Patency:  Patent     Post Op Vitals Reviewed: Yes    No anethesia notable event occurred.    Staff: Anesthesiologist, CRNA           Last Filed PACU Vitals:  Vitals Value Taken Time   Temp     Pulse 87 10/28/24 1427   /57    Resp 20 10/28/24 1427   SpO2 93    Vitals shown include unfiled device data.    Modified Nolvia:  No data recorded

## 2024-10-28 NOTE — PROGRESS NOTES
Progress Note - Infectious Disease   Clay Marcial 68 y.o. male MRN: [id number]  Unit/Bed#: [facility] 825-01 Encounter: [id number]      Impression:  1.  Diabetic left foot infection with osteomyelitis s/p left second toe amputation with wound debridement and packing S/P left transmetatarsal amputation 10/28  2.  Type II DM with PAD, history of chronic left foot plantar ulcer  3.  History of chronic tobacco abuse with COPD and possible ROMEL  4.  CAD    Recommendations:  Patient is afebrile with normal WBC count.  Patient seen postoperatively after left transmetatarsal amputation  1.  Operative description noted.  Wound was left open for delayed primary closure and the future.  VAC to be applied tomorrow.  2.  Will  continue piperacillin/tazobactam 4.5 g every 8 hours IV by extended infusion for at least additional 48 hours.  Antibiotics:  1.  Piperacillin/tazobactam 4.5 g every 8 hours IV by extended infusion, day 9 Rx    Subjective:  The patient has no complaints.  Denies fevers, chills, or sweats.  Denies nausea, vomiting, or diarrhea.      Objective:  Vitals:  Temp:  [97.1 °F (36.2 °C)-98.7 °F (37.1 °C)] 97.6 °F (36.4 °C)  HR:  [59-92] 92  Resp:  [13-21] 20  BP: (104-160)/(53-91) 130/91  SpO2:  [90 %-99 %] 95 %  Temp (24hrs), Av.8 °F (36.6 °C), Min:97.1 °F (36.2 °C), Max:98.7 °F (37.1 °C)  Current: Temperature: 97.6 °F (36.4 °C)    Physical Exam:     General Appearance:  Alert, appropriately responsive, chronically ill-appearing nontoxic, no acute distress.   Throat: Oropharynx moist without lesions.  Lips, mucosa, and tongue normal, edentulous   Neck: Supple, symmetrical, trachea midline, no adenopathy,  no tenderness/mass/nodules   Lungs:   Increased AP diameter with deep creased respiratory expansion   Heart:  Regular rate and rhythm, S1, S2 normal, no murmur, rub or gallop   Abdomen:   Soft, non-tender, non-distended, positive bowel sounds.  No masses, no organomegaly    No CVA tenderness   Extremities:  LLE with +2/4 edema and erythema.  Fresh dry surgical dressing in place, .  Peripheral pulses decreased   Skin: As above.         Invasive Devices       Peripheral Intravenous Line  Duration             Peripheral IV 10/27/24 Dorsal (posterior);Right Forearm 1 day                    Labs, Imaging, & Other studies:   All pertinent labs were personally reviewed  Results from last 7 days   Lab Units 10/28/24  0459 10/27/24  0511 10/26/24  0620   WBC Thousand/uL 5.55 5.78 6.55   HEMOGLOBIN g/dL 11.4* 10.9* 11.3*   PLATELETS Thousands/uL 331 370 404*     Results from last 7 days   Lab Units 10/28/24  0459 10/27/24  0511 10/26/24  0620   SODIUM mmol/L 137 138 137   POTASSIUM mmol/L 4.3 4.0 4.2   CHLORIDE mmol/L 102 103 103   CO2 mmol/L 28 27 27   BUN mg/dL 15 12 14   CREATININE mg/dL 1.09 1.00 1.09   EGFR ml/min/1.73sq m 68 75 68   CALCIUM mg/dL 9.1 8.7 8.5   AST U/L 13 15 29   ALT U/L 16 18 24   ALK PHOS U/L 45 49 52     Results from last 7 days   Lab Units 10/23/24  1852   MRSA CULTURE ONLY  No Methicillin Resistant Staphlyococcus aureus (MRSA) isolated

## 2024-10-28 NOTE — ASSESSMENT & PLAN NOTE
Lab Results   Component Value Date    HGBA1C 7.2 (H) 10/16/2024       Recent Labs     10/27/24  1611 10/27/24  2133 10/28/24  0736 10/28/24  1108   POCGLU 305* 255* 231* 212*       Blood Sugar Average: Last 72 hrs:  (P) [id number]  Continue Lantus 25 units units QHS, blood glucose higher than goal most likely given the acute infection.  Will not go up on Lantus at this time as patient was n.p.o. and will hopefully obtain surgical cure of infection may need to be titrated up  Continue SSI coverage   QID glucose checks   Risk factor for primary problem   Monitor and adjust regimen as needed

## 2024-10-28 NOTE — ASSESSMENT & PLAN NOTE
Etiology of elevated creatinine most likely due to autoregulatory failure in the setting of ACE inhibitor use, SGLT2 inhibitor use and diuretic use  Baseline creatinine appears to be around 1.3-1.4 in setting of diabetes and hypertension  Creatinine on recent admission 1.58  Peak creatinine 1.7 on 10/17  Status post lower extremity angiogram on 10/25.  No evidence of contrast nephropathy  Renal function has improved to creatinine 1.09 mg/dL.  Continue to monitor.  Continue to hold lisinopril, Lasix and Jardiance.  Patient is planned for left TMA today  Continue to monitor renal function

## 2024-10-28 NOTE — ANESTHESIA PREPROCEDURE EVALUATION
Procedure:  AMPUTATION TRANSMETATARSAL (TMA) (Left: Foot)    Relevant Problems   ANESTHESIA (within normal limits)      CARDIO   (+) CAD (coronary artery disease)   (+) Chronic diastolic congestive heart failure (HCC)   (+) HTN (hypertension)      ENDO   (+) Diabetes mellitus, type 2 (HCC)      GI/HEPATIC (within normal limits)      /RENAL   (+) Acute on chronic kidney failure  (HCC)   (+) Stage 3 chronic kidney disease (HCC)      GYN (within normal limits)      HEMATOLOGY (within normal limits)      MUSCULOSKELETAL (within normal limits)      NEURO/PSYCH (within normal limits)      PULMONARY   (+) Chronic obstructive pulmonary disease with acute exacerbation (HCC)   (+) ROMEL (obstructive sleep apnea)   (+) Smoking        bG 227 in preop  NPO > 8 hours  Quit smoking on 10/16/24      TTE (10/1/24):    Left Ventricle: Left ventricular cavity size is normal. There is moderate concentric hypertrophy. The left ventricular ejection fraction is 60%. Wall motion is normal.    Mitral Valve: There is mild regurgitation.    Tricuspid Valve: There is mild regurgitation. The right ventricular systolic pressure is normal.    Pericardium: There is no pericardial effusion.      Physical Exam    Airway    Mallampati score: II  TM Distance: >3 FB  Neck ROM: full     Dental        Cardiovascular  Rhythm: regular, Rate: normal, Cardiovascular exam normal    Pulmonary  Pulmonary exam normal Breath sounds clear to auscultation    Other Findings        Anesthesia Plan  ASA Score- 3     Anesthesia Type- general with ASA Monitors.         Additional Monitors:     Airway Plan: LMA.           Plan Factors-Exercise tolerance (METS): >4 METS.    Chart reviewed.  Imaging results reviewed. Existing labs reviewed. Patient summary reviewed.                  Induction- intravenous.    Postoperative Plan- Plan for postoperative opioid use. Planned trial extubation    Perioperative Resuscitation Plan - Level 1 - Full Code.       Informed Consent-  Anesthetic plan and risks discussed with patient.  I personally reviewed this patient with the CRNA. Discussed and agreed on the Anesthesia Plan with the CRNA..

## 2024-10-28 NOTE — PROGRESS NOTES
"Podiatry - Progress Note  Patient: Clay Marcial 68 y.o. male   MRN: [id number]  PCP: No primary care provider on file.  Unit/Bed#: [facility] 825-01 Encounter: [id number]  Date Of Visit: 10/28/24    ASSESSMENT:    Clay Marcial is a 68 y.o. male with:    Diabetic foot infection s/p left second toe amputation and wound debridement, packed open  Type 2 diabetes mellitus   A1c 7.2% (10/16/2024)  Coronary artery disease  Stage III chronic kidney disease  Chronic obstructive pulmonary disease  Hypertension      PLAN:    Patient to go to OR today,10/28/24, for left transmetatarsal amputation with Dr. Page.  Consent to be signed with surgeon prior to procedure.  Confirmed NPO status.  H&P, vitals, and current labs reviewed.  No acute changes noted.  Alternatives, risks, and complications discussed with patient.  All questions answered.  No guarantees given of outcome.  Rest of medical care per primary team.       SUBJECTIVE:     The patient was seen, evaluated, and assessed at bedside today. The patient was awake, alert, and in no acute distress. Patient confirmed NPO status. All questions and concerns regarding the surgical procedure addressed. Patient understands risks vs benefits of procedure and remains amenable with plan for surgery today. Patient denies N/V/F/chills/SOB/CP.      OBJECTIVE:     Vitals:   /74   Pulse 88   Temp 98.7 °F (37.1 °C)   Resp 20   Ht 5' 11\" (1.803 m)   Wt 126 kg (278 lb)   SpO2 94%   BMI 38.77 kg/m²     Temp (24hrs), Av.6 °F (37 °C), Min:98.3 °F (36.8 °C), Max:98.7 °F (37.1 °C)      Physical Exam:     General:  Alert, cooperative, and in no distress.  Lower extremity exam:  Cardiovascular status at baseline.  Neurological status at baseline.  Musculoskeletal status at baseline. No calf tenderness noted bilaterally. Dressing left intact to the Operating Room.     Additional Data:     Labs:    Results from last 7 days   Lab Units 10/28/24  0459   WBC Thousand/uL 5.55   HEMOGLOBIN " "g/dL 11.4*   HEMATOCRIT % 34.5*   PLATELETS Thousands/uL 331   SEGS PCT % 61   LYMPHO PCT % 26   MONO PCT % 7   EOS PCT % 3     Results from last 7 days   Lab Units 10/28/24  0459   POTASSIUM mmol/L 4.3   CHLORIDE mmol/L 102   CO2 mmol/L 28   BUN mg/dL 15   CREATININE mg/dL 1.09   CALCIUM mg/dL 9.1   ALK PHOS U/L 45   ALT U/L 16   AST U/L 13           * I Have Reviewed All Lab Data Listed Above.    Recent Cultures (last 7 days):     Results from last 7 days   Lab Units 10/21/24  1428   GRAM STAIN RESULT  1+ Gram positive cocci in clusters*  No polys seen*   WOUND CULTURE  1+ Growth of Actinomyces species*  Few Colonies of Diphtheroids  Few Colonies of - Globicatella Species Globicatella*  1 colony Staphylococcus coagulase negative*     Results from last 7 days   Lab Units 10/21/24  1428   ANAEROBIC CULTURE  2+ Growth of - Bacteroides pyogenes Bacteroides species*  2+ Growth of Finegoldia magna*       Imaging: I have personally reviewed pertinent films in PACS  EKG, Pathology, and Other Studies: I have personally reviewed pertinent reports.    ** Please Note: Portions of the record may have been created with voice recognition software. Occasional wrong word or \"sound a like\" substitutions may have occurred due to the inherent limitations of voice recognition software. Read the chart carefully and recognize, using context, where substitutions have occurred. **      "

## 2024-10-28 NOTE — ASSESSMENT & PLAN NOTE
Lab Results   Component Value Date    EGFR 68 10/28/2024    EGFR 75 10/27/2024    EGFR 68 10/26/2024    CREATININE 1.09 10/28/2024    CREATININE 1.00 10/27/2024    CREATININE 1.09 10/26/2024   Creatinine currently at baseline, was noted to have elevated serum creatinine earlier on in this hospitalization with peak of 1.7  Nephrology following  Continue to monitor with BMP

## 2024-10-29 PROBLEM — Z79.4 TYPE 2 DIABETES MELLITUS WITH DIABETIC POLYNEUROPATHY, WITH LONG-TERM CURRENT USE OF INSULIN (HCC): Status: ACTIVE | Noted: 2024-04-16

## 2024-10-29 PROBLEM — E11.42 TYPE 2 DIABETES MELLITUS WITH DIABETIC POLYNEUROPATHY, WITH LONG-TERM CURRENT USE OF INSULIN (HCC): Status: ACTIVE | Noted: 2024-04-16

## 2024-10-29 PROBLEM — D64.9 ANEMIA: Status: ACTIVE | Noted: 2024-10-29

## 2024-10-29 LAB
ALBUMIN SERPL BCG-MCNC: 3.1 G/DL (ref 3.5–5)
ALP SERPL-CCNC: 39 U/L (ref 34–104)
ALT SERPL W P-5'-P-CCNC: 16 U/L (ref 7–52)
ANION GAP SERPL CALCULATED.3IONS-SCNC: 6 MMOL/L (ref 4–13)
APTT PPP: 55 SECONDS (ref 23–34)
AST SERPL W P-5'-P-CCNC: 14 U/L (ref 13–39)
BASOPHILS # BLD AUTO: 0.04 THOUSANDS/ΜL (ref 0–0.1)
BASOPHILS NFR BLD AUTO: 1 % (ref 0–1)
BILIRUB SERPL-MCNC: 0.44 MG/DL (ref 0.2–1)
BUN SERPL-MCNC: 21 MG/DL (ref 5–25)
CALCIUM ALBUM COR SERPL-MCNC: 9.2 MG/DL (ref 8.3–10.1)
CALCIUM SERPL-MCNC: 8.5 MG/DL (ref 8.4–10.2)
CHLORIDE SERPL-SCNC: 100 MMOL/L (ref 96–108)
CO2 SERPL-SCNC: 30 MMOL/L (ref 21–32)
CREAT SERPL-MCNC: 1.32 MG/DL (ref 0.6–1.3)
EOSINOPHIL # BLD AUTO: 0.17 THOUSAND/ΜL (ref 0–0.61)
EOSINOPHIL NFR BLD AUTO: 2 % (ref 0–6)
ERYTHROCYTE [DISTWIDTH] IN BLOOD BY AUTOMATED COUNT: 12.7 % (ref 11.6–15.1)
GFR SERPL CREATININE-BSD FRML MDRD: 54 ML/MIN/1.73SQ M
GLUCOSE SERPL-MCNC: 232 MG/DL (ref 65–140)
GLUCOSE SERPL-MCNC: 263 MG/DL (ref 65–140)
GLUCOSE SERPL-MCNC: 315 MG/DL (ref 65–140)
GLUCOSE SERPL-MCNC: 321 MG/DL (ref 65–140)
GLUCOSE SERPL-MCNC: 338 MG/DL (ref 65–140)
HCT VFR BLD AUTO: 31.7 % (ref 36.5–49.3)
HGB BLD-MCNC: 10.1 G/DL (ref 12–17)
IMM GRANULOCYTES # BLD AUTO: 0.09 THOUSAND/UL (ref 0–0.2)
IMM GRANULOCYTES NFR BLD AUTO: 1 % (ref 0–2)
LYMPHOCYTES # BLD AUTO: 1.26 THOUSANDS/ΜL (ref 0.6–4.47)
LYMPHOCYTES NFR BLD AUTO: 17 % (ref 14–44)
MAGNESIUM SERPL-MCNC: 1.7 MG/DL (ref 1.9–2.7)
MCH RBC QN AUTO: 31.5 PG (ref 26.8–34.3)
MCHC RBC AUTO-ENTMCNC: 31.9 G/DL (ref 31.4–37.4)
MCV RBC AUTO: 99 FL (ref 82–98)
MONOCYTES # BLD AUTO: 0.44 THOUSAND/ΜL (ref 0.17–1.22)
MONOCYTES NFR BLD AUTO: 6 % (ref 4–12)
NEUTROPHILS # BLD AUTO: 5.23 THOUSANDS/ΜL (ref 1.85–7.62)
NEUTS SEG NFR BLD AUTO: 73 % (ref 43–75)
NRBC BLD AUTO-RTO: 0 /100 WBCS
PLATELET # BLD AUTO: 339 THOUSANDS/UL (ref 149–390)
PMV BLD AUTO: 9.3 FL (ref 8.9–12.7)
POTASSIUM SERPL-SCNC: 4.5 MMOL/L (ref 3.5–5.3)
PROT SERPL-MCNC: 6.3 G/DL (ref 6.4–8.4)
RBC # BLD AUTO: 3.21 MILLION/UL (ref 3.88–5.62)
SODIUM SERPL-SCNC: 136 MMOL/L (ref 135–147)
WBC # BLD AUTO: 7.23 THOUSAND/UL (ref 4.31–10.16)

## 2024-10-29 PROCEDURE — 97167 OT EVAL HIGH COMPLEX 60 MIN: CPT

## 2024-10-29 PROCEDURE — 85730 THROMBOPLASTIN TIME PARTIAL: CPT | Performed by: INTERNAL MEDICINE

## 2024-10-29 PROCEDURE — 88305 TISSUE EXAM BY PATHOLOGIST: CPT | Performed by: PATHOLOGY

## 2024-10-29 PROCEDURE — 99233 SBSQ HOSP IP/OBS HIGH 50: CPT | Performed by: INTERNAL MEDICINE

## 2024-10-29 PROCEDURE — 85025 COMPLETE CBC W/AUTO DIFF WBC: CPT

## 2024-10-29 PROCEDURE — 99024 POSTOP FOLLOW-UP VISIT: CPT | Performed by: PODIATRIST

## 2024-10-29 PROCEDURE — 94760 N-INVAS EAR/PLS OXIMETRY 1: CPT

## 2024-10-29 PROCEDURE — 88311 DECALCIFY TISSUE: CPT | Performed by: PATHOLOGY

## 2024-10-29 PROCEDURE — 97163 PT EVAL HIGH COMPLEX 45 MIN: CPT

## 2024-10-29 PROCEDURE — 99232 SBSQ HOSP IP/OBS MODERATE 35: CPT | Performed by: INTERNAL MEDICINE

## 2024-10-29 PROCEDURE — 82948 REAGENT STRIP/BLOOD GLUCOSE: CPT

## 2024-10-29 PROCEDURE — 94664 DEMO&/EVAL PT USE INHALER: CPT

## 2024-10-29 PROCEDURE — 80053 COMPREHEN METABOLIC PANEL: CPT

## 2024-10-29 PROCEDURE — 94640 AIRWAY INHALATION TREATMENT: CPT

## 2024-10-29 PROCEDURE — 83735 ASSAY OF MAGNESIUM: CPT

## 2024-10-29 RX ORDER — HEPARIN SODIUM 10000 [USP'U]/100ML
3-30 INJECTION, SOLUTION INTRAVENOUS
Status: DISCONTINUED | OUTPATIENT
Start: 2024-10-29 | End: 2024-10-29

## 2024-10-29 RX ORDER — HEPARIN SODIUM 10000 [USP'U]/100ML
3-30 INJECTION, SOLUTION INTRAVENOUS
Status: DISCONTINUED | OUTPATIENT
Start: 2024-10-29 | End: 2024-11-05 | Stop reason: HOSPADM

## 2024-10-29 RX ORDER — HEPARIN SODIUM 1000 [USP'U]/ML
4800 INJECTION, SOLUTION INTRAVENOUS; SUBCUTANEOUS EVERY 6 HOURS PRN
Status: DISCONTINUED | OUTPATIENT
Start: 2024-10-29 | End: 2024-11-05 | Stop reason: HOSPADM

## 2024-10-29 RX ORDER — INSULIN GLARGINE 100 [IU]/ML
33 INJECTION, SOLUTION SUBCUTANEOUS
Status: DISCONTINUED | OUTPATIENT
Start: 2024-10-29 | End: 2024-10-30

## 2024-10-29 RX ORDER — MAGNESIUM SULFATE HEPTAHYDRATE 40 MG/ML
2 INJECTION, SOLUTION INTRAVENOUS ONCE
Status: COMPLETED | OUTPATIENT
Start: 2024-10-29 | End: 2024-10-29

## 2024-10-29 RX ORDER — SODIUM CHLORIDE, SODIUM GLUCONATE, SODIUM ACETATE, POTASSIUM CHLORIDE, MAGNESIUM CHLORIDE, SODIUM PHOSPHATE, DIBASIC, AND POTASSIUM PHOSPHATE .53; .5; .37; .037; .03; .012; .00082 G/100ML; G/100ML; G/100ML; G/100ML; G/100ML; G/100ML; G/100ML
50 INJECTION, SOLUTION INTRAVENOUS CONTINUOUS
Status: DISCONTINUED | OUTPATIENT
Start: 2024-10-29 | End: 2024-10-31

## 2024-10-29 RX ORDER — HEPARIN SODIUM 1000 [USP'U]/ML
9600 INJECTION, SOLUTION INTRAVENOUS; SUBCUTANEOUS EVERY 6 HOURS PRN
Status: DISCONTINUED | OUTPATIENT
Start: 2024-10-29 | End: 2024-11-05 | Stop reason: HOSPADM

## 2024-10-29 RX ORDER — INSULIN LISPRO 100 [IU]/ML
13 INJECTION, SOLUTION INTRAVENOUS; SUBCUTANEOUS
Status: DISCONTINUED | OUTPATIENT
Start: 2024-10-29 | End: 2024-10-30

## 2024-10-29 RX ADMIN — INSULIN GLARGINE 33 UNITS: 100 INJECTION, SOLUTION SUBCUTANEOUS at 22:12

## 2024-10-29 RX ADMIN — INSULIN LISPRO 13 UNITS: 100 INJECTION, SOLUTION INTRAVENOUS; SUBCUTANEOUS at 18:06

## 2024-10-29 RX ADMIN — BUDESONIDE 0.5 MG: 0.5 INHALANT RESPIRATORY (INHALATION) at 20:14

## 2024-10-29 RX ADMIN — PANTOPRAZOLE SODIUM 40 MG: 40 TABLET, DELAYED RELEASE ORAL at 05:18

## 2024-10-29 RX ADMIN — HEPARIN SODIUM 4800 UNITS: 1000 INJECTION INTRAVENOUS; SUBCUTANEOUS at 22:15

## 2024-10-29 RX ADMIN — METOPROLOL SUCCINATE 50 MG: 50 TABLET, EXTENDED RELEASE ORAL at 08:41

## 2024-10-29 RX ADMIN — HEPARIN SODIUM 18 UNITS/KG/HR: 10000 INJECTION, SOLUTION INTRAVENOUS at 22:15

## 2024-10-29 RX ADMIN — PREGABALIN 100 MG: 100 CAPSULE ORAL at 08:41

## 2024-10-29 RX ADMIN — Medication 1000 UNITS: at 08:41

## 2024-10-29 RX ADMIN — UMECLIDINIUM 1 PUFF: 62.5 AEROSOL, POWDER ORAL at 08:20

## 2024-10-29 RX ADMIN — PIPERACILLIN SODIUM AND TAZOBACTAM SODIUM 4.5 G: 36; 4.5 INJECTION, POWDER, LYOPHILIZED, FOR SOLUTION INTRAVENOUS at 05:13

## 2024-10-29 RX ADMIN — SODIUM CHLORIDE, SODIUM GLUCONATE, SODIUM ACETATE, POTASSIUM CHLORIDE, MAGNESIUM CHLORIDE, SODIUM PHOSPHATE, DIBASIC, AND POTASSIUM PHOSPHATE 50 ML/HR: .53; .5; .37; .037; .03; .012; .00082 INJECTION, SOLUTION INTRAVENOUS at 12:15

## 2024-10-29 RX ADMIN — INSULIN LISPRO 8 UNITS: 100 INJECTION, SOLUTION INTRAVENOUS; SUBCUTANEOUS at 22:13

## 2024-10-29 RX ADMIN — INSULIN LISPRO 10 UNITS: 100 INJECTION, SOLUTION INTRAVENOUS; SUBCUTANEOUS at 08:17

## 2024-10-29 RX ADMIN — OLODATEROL RESPIMAT INHALATION SPRAY 2 PUFF: 2.5 SPRAY, METERED RESPIRATORY (INHALATION) at 08:20

## 2024-10-29 RX ADMIN — ATORVASTATIN CALCIUM 20 MG: 20 TABLET, FILM COATED ORAL at 08:41

## 2024-10-29 RX ADMIN — PANTOPRAZOLE SODIUM 40 MG: 40 TABLET, DELAYED RELEASE ORAL at 18:04

## 2024-10-29 RX ADMIN — MAGNESIUM SULFATE HEPTAHYDRATE 2 G: 40 INJECTION, SOLUTION INTRAVENOUS at 11:07

## 2024-10-29 RX ADMIN — DULOXETINE HYDROCHLORIDE 20 MG: 20 CAPSULE, DELAYED RELEASE ORAL at 08:41

## 2024-10-29 RX ADMIN — ASPIRIN 81 MG CHEWABLE TABLET 81 MG: 81 TABLET CHEWABLE at 08:41

## 2024-10-29 RX ADMIN — Medication 2 SPRAY: at 08:19

## 2024-10-29 RX ADMIN — INSULIN LISPRO 8 UNITS: 100 INJECTION, SOLUTION INTRAVENOUS; SUBCUTANEOUS at 18:06

## 2024-10-29 RX ADMIN — BUDESONIDE 0.5 MG: 0.5 INHALANT RESPIRATORY (INHALATION) at 07:10

## 2024-10-29 RX ADMIN — PIPERACILLIN SODIUM AND TAZOBACTAM SODIUM 4.5 G: 36; 4.5 INJECTION, POWDER, LYOPHILIZED, FOR SOLUTION INTRAVENOUS at 12:20

## 2024-10-29 RX ADMIN — HEPARIN SODIUM 18 UNITS/KG/HR: 10000 INJECTION, SOLUTION INTRAVENOUS at 14:10

## 2024-10-29 RX ADMIN — DOCUSATE SODIUM 100 MG: 100 CAPSULE, LIQUID FILLED ORAL at 08:41

## 2024-10-29 RX ADMIN — DOCUSATE SODIUM 100 MG: 100 CAPSULE, LIQUID FILLED ORAL at 18:04

## 2024-10-29 RX ADMIN — TRAZODONE HYDROCHLORIDE 25 MG: 50 TABLET ORAL at 22:14

## 2024-10-29 RX ADMIN — INSULIN LISPRO 4 UNITS: 100 INJECTION, SOLUTION INTRAVENOUS; SUBCUTANEOUS at 08:16

## 2024-10-29 RX ADMIN — ISOSORBIDE MONONITRATE 30 MG: 30 TABLET, EXTENDED RELEASE ORAL at 08:41

## 2024-10-29 RX ADMIN — CYANOCOBALAMIN TAB 500 MCG 500 MCG: 500 TAB at 08:41

## 2024-10-29 RX ADMIN — PREGABALIN 100 MG: 100 CAPSULE ORAL at 18:04

## 2024-10-29 RX ADMIN — INSULIN LISPRO 10 UNITS: 100 INJECTION, SOLUTION INTRAVENOUS; SUBCUTANEOUS at 11:12

## 2024-10-29 RX ADMIN — PIPERACILLIN SODIUM AND TAZOBACTAM SODIUM 4.5 G: 36; 4.5 INJECTION, POWDER, LYOPHILIZED, FOR SOLUTION INTRAVENOUS at 22:12

## 2024-10-29 RX ADMIN — NICOTINE 1 PATCH: 14 PATCH, EXTENDED RELEASE TRANSDERMAL at 08:42

## 2024-10-29 RX ADMIN — INSULIN LISPRO 8 UNITS: 100 INJECTION, SOLUTION INTRAVENOUS; SUBCUTANEOUS at 11:13

## 2024-10-29 NOTE — PHYSICAL THERAPY NOTE
Physical Therapy Evaluation     Patient's Name: Clay Marcial    Admitting Diagnosis  Diabetic foot infection  (HCC) [E11.628, L08.9]    Problem List  Patient Active Problem List   Diagnosis    Diabetes mellitus, type 2 (HCC)    CAD (coronary artery disease)    Chronic diastolic congestive heart failure (HCC)    Acute on chronic kidney failure  (HCC)    Helicobacter pylori infection    Diabetic foot infection  (HCC)    Stage 3 chronic kidney disease (HCC)    Chronic obstructive pulmonary disease with acute exacerbation (HCC)    ROMEL (obstructive sleep apnea)    PAD (peripheral artery disease) (HCC)    Acute hyponatremia    HTN (hypertension)    Elevated serum creatinine    Smoking    Hypomagnesemia    Anemia       Past Medical History  Past Medical History:   Diagnosis Date    COPD (chronic obstructive pulmonary disease) (HCC)     Diabetes mellitus (HCC) 04/16/2024    Sleep apnea     UTI (urinary tract infection)        Past Surgical History  Past Surgical History:   Procedure Laterality Date    BACK SURGERY      IR LOWER EXTREMITY ANGIOGRAM  10/25/2024    IA AMPUTATION FOOT TRANSMETARSAL Left 10/28/2024    Procedure: AMPUTATION TRANSMETATARSAL (TMA);  Surgeon: Robb Page DPM;  Location:  MAIN OR;  Service: Podiatry    TONSILLECTOMY      WOUND DEBRIDEMENT Left 10/21/2024    Procedure: LEFT FOOT I&D, left second toe amputation with packing;  Surgeon: Emma Burr DPM;  Location:  MAIN OR;  Service: Podiatry        10/29/24 0928   PT Last Visit   PT Visit Date 10/29/24   Note Type   Note type Evaluation   Pain Assessment   Pain Assessment Tool 0-10   Pain Score No Pain   Restrictions/Precautions   Weight Bearing Precautions Per Order Yes   LLE Weight Bearing Per Order (S)  NWB  (Pt is NWB on his LLE s/p L TMA on 10/28)   Other Precautions Fall Risk;Multiple lines   Home Living   Type of Home House   Home Layout Two level;Bed/bath upstairs;Stairs to enter with rails  (Pt lives in a 2ST with 7STE with handrails  "on both sides. Bed/bath is upstairs.)   Bathroom Shower/Tub Tub/shower unit   Bathroom Toilet Raised   Bathroom Equipment Grab bars in shower;Shower chair;Commode   Bathroom Accessibility Accessible   Home Equipment Walker;Cane;Wheelchair-manual;Electric scooter  (Pt ambulates with his walker, but has WC at home in case needed post d/c.)   Additional Comments Pt lives in a 2S with 7STE with handrails on both sides and bed/bath located upstairs. Pt notes that he was ambulating with his walker pta and noted no difficulty with the stairs in his home. He notes that he has a WC and electric scooter in case needed post d/c.   Prior Function   Level of [place] Independent with ADLs;Independent with functional mobility;Independent with IADLS   Lives With Friend(s)  (Pt notes that he is living with his \"lady friend\")   Receives Help From Friend(s)   IADLs Independent with driving;Independent with meal prep;Independent with medication management   Falls in the last 6 months 1 to 4  (Pt notes that he had 3 falls on the morning of his admission, but notes no other falls in the last 6 months.)   Vocational Retired   Comments Pt currently lives with his \"lady friend\" who he notes can help him around the house as needed. Pt was previously independent with all ADLs and IADLs utilizing his walker.   General   Family/Caregiver Present No   Cognition   Overall Cognitive Status WFL   Arousal/Participation Alert   Orientation Level Oriented X4   Following Commands Follows one step commands with increased time or repetition   Comments Pt was pleasant and cooperative during therapy session. Pt was motivated to stand up and move to the chair.   Subjective   Subjective \"I am feeling pretty good today.\"   RLE Assessment   RLE Assessment WFL   LLE Assessment   LLE Assessment (S)  X  (Pt is currently NWBing on his LLE s/p L TMA 10/28, limited assessment.)   Bed Mobility   Supine to Sit 5  Supervision   Additional items HOB " elevated;Bedrails;Increased time required   Sit to Supine Unable to assess   Additional Comments Pt was able to sit at EOB and move around in bed with Sup A, with verbal cues to help maintain NWB orders on LLE. Pt was positioned in bedside recliner at conclusion of therapy session with all needs within reach. Pt declines chair alarm, has signed waiver. RN updated on pt status.   Transfers   Sit to Stand 4  Minimal assistance   Additional items Assist x 1;Increased time required;Verbal cues   Stand to Sit 4  Minimal assistance   Additional items Assist x 1;Increased time required;Armrests;Verbal cues   Additional Comments Pt stood in stance with RW and was cued to maintain NWB on LLE throughout with MinAx1.   Ambulation/Elevation   Gait pattern Short stride;Excessively slow;Antalgic;Foward flexed  (Pt is currently NWB on LLE, utilizing small hops and RW to help advance to the chair. Pt required Sariah x1 for ambulation to chair, with verbal cues throughout.)   Gait Assistance 4  Minimal assist   Additional items Assist x 1;Verbal cues   Assistive Device Rolling walker   Distance 2 ft   Ambulation/Elevation Additional Comments Pt ambulated from EOB to bedside recliner with MinAx1 and use of RW to help maintain NWB on LLE. Pt ulitized small hops and pushed through his UE's to help advance.   Balance   Static Sitting Fair +   Dynamic Sitting Fair   Static Standing Fair   Dynamic Standing Fair -   Ambulatory Poor +   Endurance Deficit   Endurance Deficit Yes   Endurance Deficit Description Due to limited mobility and fatigue   Activity Tolerance   Activity Tolerance Patient tolerated treatment well   Medical Staff Made Aware Co-treat with OT due to medical complexity and safety concerns. \   Nurse Made Aware RN cleared pt for therapy   Assessment   Prognosis Good   Problem List Decreased strength;Decreased endurance;Impaired balance;Decreased mobility;Orthopedic restrictions  (NWB LLE)   Assessment Pt is a 68 y.o. male  seen for PT evaluation s/p admit to [facility] on 10/22/2024. Pt was admitted with a primary dx of: diabetic foot infection (HCC). Pt is now s/p L [facility] on 10/28. PT now consulted for assessment of mobility and d/c needs. Pt with Up and OOB as tolerated  orders.  Pts current comorbidities effecting treatment include:  has a past medical history of COPD (chronic obstructive pulmonary disease) ([facility]), Diabetes mellitus (HCC) (04/16/2024), Sleep apnea, and UTI (urinary tract infection). . Pts current clinical presentation is Unstable/ Unpredictable (high complexity) due to Ongoing medical management for primary dx, Increased reliance on more restrictive AD compared to baseline, Decreased activity tolerance compared to baseline, Fall risk, Increased assistance needed from caregiver at current time, Current WBS, s/p surgical intervention. Prior to admission, pt was independent with all ADLs and IADLs, noting that he used a walker for ambulation. Pt noted no difficulty with ambulation in his home and in the community, and no difficulty with completing the stairs within his home. Upon evaluation, pt currently is requiring Sup for bed mobility; Sariah x1 for transfers; and Sariah x1 for ambulation 2 ft w/ RW. Pt ambulation limited 2' to WB status. Pt presents at PT eval functioning below baseline and currently w/ overall mobility deficits 2* to: impaired balance, decreased endurance, gait deviations, pain, decreased activity tolerance compared to baseline, decreased functional mobility tolerance compared to baseline, fall risk, orthopedic restrictions. Pt currently at a fall risk 2* to impairments listed above.  Pt will continue to benefit from skilled acute PT interventions to address stated impairments; to maximize functional mobility; for ongoing pt/ family training; and DME needs. At conclusion of PT session pt returned back in chair, all needs in reach, and RN notified of session findings/recommendations with phone  and call bell within reach. Pt denies any further questions at this time. The patient's AM-PAC Basic Mobility Inpatient Short Form Raw Score is 15. A Raw score of less than or equal to 16 suggests the patient may benefit from discharge to post-acute rehabilitation services. Please also refer to the recommendation of the Physical Therapist for safe discharge planning. Recommend Level 2 upon hospital D/C due to inaccessible home environment with 7STE and bed/bath upstairs and decreased functional mobility compared to baseline.   Barriers to Discharge Inaccessible home environment   Barriers to Discharge Comments Pt has 7STE his home and his bed/bath is located on the second floor of his 2STH.   Goals   Patient Goals To get back home.   STG Expiration Date 11/12/24   Short Term Goal #1 STG 1. Pt will be able to perform bed mobility tasks with Mod I in order to improve overall functional mobility and assist in safe d/c. STG 2. Pt with sit EOB for at least 25 minutes at Mod I level in order to strengthen abdominal musculature and assist in future transfers/ ambulation. STG 3. Pt will be able to perform functional transfer with Sup in order to improve overall functional mobility and assist in safe d/c. STG 4. Pt will be able to ambulate at least 50 feet with least restrictive device with Sup A in order to improve overall functional mobility and assist in safe d/c. STG 5. Pt will improve sitting/standing static/dynamic balance 1/2 grade in order to improve functional mobility and assist in safe d/c. STG 6. Pt will be able to negotiate at least 7 stairs with least restrictive device with Min A in order to improve overall functional mobility and assist in safe d/c.   PT Treatment Day 0   Plan   Treatment/Interventions Functional transfer training;Elevations;Therapeutic exercise;Endurance training;Gait training;Spoke to nursing;Spoke to case management;OT   PT Frequency 3-5x/wk   Discharge Recommendation   Rehab Resource  Intensity Level, PT II (Moderate Resource Intensity)   Equipment Recommended Walker  (Pt currently ambulating with RW due to NWB on LLE.)   AM-PAC Basic Mobility Inpatient   Turning in Flat Bed Without Bedrails 3   Lying on Back to Sitting on Edge of Flat Bed Without Bedrails 3   Moving Bed to Chair 3   Standing Up From Chair Using Arms 3   Walk in Room 2   Climb 3-5 Stairs With Railing 1   Basic Mobility Inpatient Raw Score 15   Basic Mobility Standardized Score 36.97   [facility] Highest Level Of Mobility   -HL Goal 4: Move to chair/commode   -HL Achieved 4: Move to chair/commode   Modified [place] Scale   Modified [place] Scale 4   End of Consult   Patient Position at End of Consult Bedside chair;All needs within reach   End of Consult Comments Pt positioned in bedside recliner at conclusion of therapy session with all needs within reach. Pt declines use of chair alarm, signed waiver. RN updated at conclusion of therapy session.       Oly Wyatt, SPT

## 2024-10-29 NOTE — ASSESSMENT & PLAN NOTE
Baseline creatinine of approximately 1.2-1.4 -> currently stable at 1.3 to  Monitor renal function and urine output  Limit/avoid nephrotoxins and hypotension as possible -> holding diuretic/ACEI  Nephrology following

## 2024-10-29 NOTE — NURSING NOTE
Upon reassessment of left foot wound moderate amt bloody/ serosang drainage noted. Dr. Lamb messaged via ESC and made aware, foot remains elevated and drsg reinforced per drs orders.

## 2024-10-29 NOTE — PLAN OF CARE
"  Problem: OCCUPATIONAL THERAPY ADULT  Goal: Performs self-care activities at highest level of function for planned discharge setting.  See evaluation for individualized goals.  Description: Treatment Interventions: ADL retraining, Functional transfer training, UE strengthening/ROM, Endurance training, Patient/family training, Cognitive reorientation, Equipment evaluation/education, Fine motor coordination activities, Compensatory technique education, Continued evaluation, Energy conservation, Activityengagement          See flowsheet documentation for full assessment, interventions and recommendations.   Note: Limitation: Decreased ADL status, Decreased endurance, Decreased self-care trans, Decreased high-level ADLs  Prognosis: Good  Assessment: 68 year old pt seen today for an OT evaluation following admission to [facility] 2/2 diabetic foot infection, s/p L TMA 10/28 with present symptoms significant for pain, fatigue, weakness, decreased ADL status, decreased functional mobility. Pt  has a past medical history of COPD (chronic obstructive pulmonary disease) ([facility]), Diabetes mellitus ([facility]), Sleep apnea, and UTI (urinary tract infection). Pt reported living with \"lady friend\" in a 2SH, 7STE, with bed/bath upstairs. Pt reports being IND with all ADLs/IADLs. Uses quad cane PRN. +. Pt very pleasant and cooperative t/o session. Pt completed functional STS txfs and functional mobility with min A, RW. Pt was  SUP for UB ADLs and Min A for LB ADLs. The patient's raw score on the AM-PAC Daily Activity Inpatient Short Form is 20. A raw score of greater than or equal to 19 suggests the patient may benefit from discharge to home. Please refer to the recommendation of the Occupational Therapist for safe discharge planning. Pt is functioning below baseline level of function and will continue to benefit from skilled acute OT to promote increased independence and return to PLOF. At this time, current OT " recommendation is Level 2 resources pending progress. Will continue to follow and assess.     Rehab Resource Intensity Level, OT: II (Moderate Resource Intensity) (pending progress)

## 2024-10-29 NOTE — CASE MANAGEMENT
Case Management Discharge Planning Note    Patient name Clay Marcial  Location [facility] 825/[facility] 825-01 MRN [id number]  : 1955 Date 10/29/2024       Current Admission Date: 10/22/2024  Current Admission Diagnosis:Diabetic foot infection  (HCC)   Patient Active Problem List    Diagnosis Date Noted Date Diagnosed    Anemia 10/29/2024     Hypomagnesemia 10/28/2024     HTN (hypertension) 10/21/2024     Elevated serum creatinine 10/21/2024     Smoking 10/21/2024     PAD (peripheral artery disease) (HCC) 10/20/2024     Acute hyponatremia 10/20/2024     Chronic obstructive pulmonary disease with acute exacerbation ([facility]) 10/17/2024     ROMEL (obstructive sleep apnea) 10/17/2024     Diabetic foot infection  (HCC) 10/16/2024     Stage 3 chronic kidney disease ([facility]) 10/16/2024     Diabetes mellitus, type 2 ([facility]) 2024     CAD (coronary artery disease) 2024     Chronic diastolic congestive heart failure (HCC) 2024     Acute on chronic kidney failure  (HCC) 2024     Helicobacter pylori infection 2024       LOS (days): 7  Geometric Mean LOS (GMLOS) (days): 7.3  Days to GMLOS:0.5     OBJECTIVE:  Risk of Unplanned Readmission Score: 35.36         Current admission status: Inpatient   Preferred Pharmacy:   [facility]/pharmacy #[id number] [place], [place] - [address] [place] [id number]  Phone: [phone] Fax: [phone]    [facility] [place] - [address]  [address] [facility] [address]  Phone: [phone] Fax: [phone]    Primary Care Provider: No primary care provider on file.    Primary Insurance: [employer] [facility]  Secondary Insurance:     DISCHARGE DETAILS:              CM continues to follow.  Will await PT/OT recs  Possible VAC placement

## 2024-10-29 NOTE — PLAN OF CARE
Problem: PHYSICAL THERAPY ADULT  Goal: Performs mobility at highest level of function for planned discharge setting.  See evaluation for individualized goals.  Description: Treatment/Interventions: Functional transfer training, Elevations, Therapeutic exercise, Endurance training, Gait training, Spoke to nursing, Spoke to case management, OT  Equipment Recommended: Walker (Pt currently ambulating with RW due to NWB on LLE.)       See flowsheet documentation for full assessment, interventions and recommendations.  Note: Prognosis: Good  Problem List: Decreased strength, Decreased endurance, Impaired balance, Decreased mobility, Orthopedic restrictions (NWB LLE)  Assessment: Pt is a 68 y.o. male seen for PT evaluation s/p admit to [facility] on 10/22/2024. Pt was admitted with a primary dx of: diabetic foot infection (HCC). Pt is now s/p L TMA on 10/28. PT now consulted for assessment of mobility and d/c needs. Pt with Up and OOB as tolerated  orders.  Pts current comorbidities effecting treatment include:  has a past medical history of COPD (chronic obstructive pulmonary disease) ([facility]), Diabetes mellitus (HCC) (04/16/2024), Sleep apnea, and UTI (urinary tract infection). . Pts current clinical presentation is Unstable/ Unpredictable (high complexity) due to Ongoing medical management for primary dx, Increased reliance on more restrictive AD compared to baseline, Decreased activity tolerance compared to baseline, Fall risk, Increased assistance needed from caregiver at current time, Current WBS, s/p surgical intervention. Prior to admission, pt was independent with all ADLs and IADLs, noting that he used a walker for ambulation. Pt noted no difficulty with ambulation in his home and in the community, and no difficulty with completing the stairs within his home. Upon evaluation, pt currently is requiring Sup for bed mobility; Sariah x1 for transfers; and Sariah x1 for ambulation 2 ft w/ RW. Pt ambulation  limited 2' to WB status. Pt presents at PT eval functioning below baseline and currently w/ overall mobility deficits 2* to: impaired balance, decreased endurance, gait deviations, pain, decreased activity tolerance compared to baseline, decreased functional mobility tolerance compared to baseline, fall risk, orthopedic restrictions. Pt currently at a fall risk 2* to impairments listed above.  Pt will continue to benefit from skilled acute PT interventions to address stated impairments; to maximize functional mobility; for ongoing pt/ family training; and DME needs. At conclusion of PT session pt returned back in chair, all needs in reach, and RN notified of session findings/recommendations with phone and call bell within reach. Pt denies any further questions at this time. The patient's AM-PAC Basic Mobility Inpatient Short Form Raw Score is 15. A Raw score of less than or equal to 16 suggests the patient may benefit from discharge to post-acute rehabilitation services. Please also refer to the recommendation of the Physical Therapist for safe discharge planning. Recommend Level 2 upon hospital D/C due to inaccessible home environment with 7STE and bed/bath upstairs and decreased functional mobility compared to baseline.  Barriers to Discharge: Inaccessible home environment  Barriers to Discharge Comments: Pt has 7STE his home and his bed/bath is located on the second floor of his 2STH.  Rehab Resource Intensity Level, PT: II (Moderate Resource Intensity)    See flowsheet documentation for full assessment.     Oly Wyatt, SPT

## 2024-10-29 NOTE — ASSESSMENT & PLAN NOTE
Home Rx: Furosemide 40 mg as needed, Imdur 30 mg daily, lisinopril 20 mg daily, Toprol-XL 50 mg daily  Current Rx: Imdur 30 mg daily, Toprol-XL 50 mg daily  Blood pressure stable and is at goal.  Continue to hold lisinopril and diuretics at this time in the setting of recent SUSHILA.  Avoid hypotension

## 2024-10-29 NOTE — ASSESSMENT & PLAN NOTE
Initially presented at the [facility] with left lower extremity cellulitis with open wound  S/p I&D, L 2nd toe amputation with podiatry on 10/21 given concern for acutely worsening wound/infection   Transferred to the [facility] for vascular surgery/IR evaluations given concern also for embolic process/ischemia   Agram 10/25   Status post left foot TMA on 10/28  Continue IV heparin gtt   Appreciate infectious disease input  Continue IV Zosyn  Wound culture from OR on 10/21 with polymicrobial growth including Bacteroides pyogenes, Finegoldia magna, Actinomyces species, coagulase-negative Staphylococcus, and Globicatella species  Initial plan for wound VAC placement today by podiatry postpone as wound site noted to still be bleeding, stabilized with compression and cautery

## 2024-10-29 NOTE — ASSESSMENT & PLAN NOTE
Etiology of elevated creatinine most likely due to autoregulatory failure in the setting of ACE inhibitor use, SGLT2 inhibitor use and diuretic use  Baseline creatinine appears to be around 1.3-1.4 in setting of diabetes and hypertension  Creatinine on  admission 1.58 mg/dl   Peak creatinine 1.7 on 10/17  Status post lower extremity angiogram on 10/25.  No evidence of contrast nephropathy  Underwent left TMA on 10/28  Renal function slightly worsened to creatinine 1.32 mg/dL today post op But still within baseline, will continue to monitor.  Patient is on IV fluid.  Continue Isolyte at 50 mL/h and monitor renal function.  If renal function continue to worsen would consider possibility of AIN as well from use of antibiotics  Avoid nephrotoxins

## 2024-10-29 NOTE — PROGRESS NOTES
Progress Note - Infectious Disease   Clay Marcial 68 y.o. male MRN: [id number]  Unit/Bed#: [facility] 825-01 Encounter: [id number]      Impression:  1.  Diabetic left foot infection with osteomyelitis s/p left second toe amputation with wound debridement and packing S/P left transmetatarsal amputation 10/28  2.  Type II DM with PAD, history of chronic left foot plantar ulcer  3.  History of chronic tobacco abuse with COPD and possible ROMEL  4.  CAD    Recommendations:  Patient is afebrile with normal WBC count.  Patient seen with podiatry at bedside and therapy discussed.  Wound was observed during dressing change  1.  Wound area observed with podiatry.  Picture to be placed on chart  2.  Will  continue piperacillin/tazobactam 4.5 g every 8 hours IV by extended infusion pending decision for delayed closure  Antibiotics:  1.  Piperacillin/tazobactam 4.5 g every 8 hours IV by extended infusion, day 10 Rx    Subjective:  The patient has no complaints.  Denies fevers, chills, or sweats.  Denies nausea, vomiting, or diarrhea.      Objective:  Vitals:  Temp:  [97.1 °F (36.2 °C)-98 °F (36.7 °C)] 97.6 °F (36.4 °C)  HR:  [77-92] 80  Resp:  [13-21] 16  BP: (104-137)/(53-91) 137/79  SpO2:  [89 %-99 %] 97 %  Temp (24hrs), Av.5 °F (36.4 °C), Min:97.1 °F (36.2 °C), Max:98 °F (36.7 °C)  Current: Temperature: 97.6 °F (36.4 °C)    Physical Exam:     General Appearance:  Alert, appropriately responsive, chronically ill-appearing nontoxic, no acute distress.   Throat: Oropharynx moist without lesions.  Lips, mucosa, and tongue normal, edentulous   Neck: Supple, symmetrical, trachea midline, no adenopathy,  no tenderness/mass/nodules   Lungs:   Increased AP diameter with deep creased respiratory expansion   Heart:  Regular rate and rhythm, S1, S2 normal, no murmur, rub or gallop   Abdomen:   Soft, non-tender, non-distended, positive bowel sounds.  No masses, no organomegaly    No CVA tenderness   Extremities: LLE with +2/4 edema  and erythema.  Wound is open with some serosanguineous drainage, packing placed by podiatry.  Peripheral pulses decreased   Skin: As above.         Invasive Devices       Peripheral Intravenous Line  Duration             Peripheral IV 10/27/24 Dorsal (posterior);Right Forearm 1 day                    Labs, Imaging, & Other studies:   All pertinent labs were personally reviewed  Results from last 7 days   Lab Units 10/29/24  0515 10/28/24  0459 10/27/24  0511   WBC Thousand/uL 7.23 5.55 5.78   HEMOGLOBIN g/dL 10.1* 11.4* 10.9*   PLATELETS Thousands/uL 339 331 370     Results from last 7 days   Lab Units 10/29/24  0515 10/28/24  0459 10/27/24  0511   SODIUM mmol/L 136 137 138   POTASSIUM mmol/L 4.5 4.3 4.0   CHLORIDE mmol/L 100 102 103   CO2 mmol/L 30 28 27   BUN mg/dL 21 15 12   CREATININE mg/dL 1.32* 1.09 1.00   EGFR ml/min/1.73sq m 54 68 75   CALCIUM mg/dL 8.5 9.1 8.7   AST U/L 14 13 15   ALT U/L 16 16 18   ALK PHOS U/L 39 45 49     Results from last 7 days   Lab Units 10/23/24  1852   MRSA CULTURE ONLY  No Methicillin Resistant Staphlyococcus aureus (MRSA) isolated

## 2024-10-29 NOTE — PLAN OF CARE
Problem: PAIN - ADULT  Goal: Verbalizes/displays adequate comfort level or baseline comfort level  Description: Interventions:  - Encourage patient to monitor pain and request assistance  - Assess pain using appropriate pain scale  - Administer analgesics based on type and severity of pain and evaluate response  - Implement non-pharmacological measures as appropriate and evaluate response  - Consider cultural and social influences on pain and pain management  - Notify physician/advanced practitioner if interventions unsuccessful or patient reports new pain  10/29/2024 0720 by Johanna Daniel RN  Outcome: Progressing  10/29/2024 0720 by Johanna Daniel RN  Outcome: Progressing

## 2024-10-29 NOTE — ASSESSMENT & PLAN NOTE
Status post left foot TMA on 10/28.  Continue antibiotics per primary team and management per primary team-currently on Zosyn

## 2024-10-29 NOTE — ASSESSMENT & PLAN NOTE
Lab Results   Component Value Date    HGBA1C 7.2 (H) 10/16/2024     Continue basal/prandial insulin with additional SSI coverage per Accu-Cheks  Hypoglycemia protocol  Carbohydrate restriction  On Lyrica for neuropathy

## 2024-10-29 NOTE — OCCUPATIONAL THERAPY NOTE
Occupational Therapy Evaluation     Patient Name: Clay Marcial  Today's Date: 10/29/2024  Problem List  Principal Problem:    Diabetic foot infection  (HCC)  Active Problems:    Diabetes mellitus, type 2 (HCC)    CAD (coronary artery disease)    Stage 3 chronic kidney disease (HCC)    Chronic obstructive pulmonary disease with acute exacerbation (HCC)    HTN (hypertension)    Elevated serum creatinine    Hypomagnesemia    Anemia    Past Medical History  Past Medical History:   Diagnosis Date    COPD (chronic obstructive pulmonary disease) (HCC)     Diabetes mellitus (HCC) 04/16/2024    Sleep apnea     UTI (urinary tract infection)      Past Surgical History  Past Surgical History:   Procedure Laterality Date    BACK SURGERY      IR LOWER EXTREMITY ANGIOGRAM  10/25/2024    LA AMPUTATION FOOT TRANSMETARSAL Left 10/28/2024    Procedure: AMPUTATION TRANSMETATARSAL (TMA);  Surgeon: Robb Page DPM;  Location:  MAIN OR;  Service: Podiatry    TONSILLECTOMY      WOUND DEBRIDEMENT Left 10/21/2024    Procedure: LEFT FOOT I&D, left second toe amputation with packing;  Surgeon: Emma Burr DPM;  Location:  MAIN OR;  Service: Podiatry           10/29/24 0941   OT Last Visit   OT Visit Date 10/29/24   Note Type   Note type Evaluation   Pain Assessment   Pain Assessment Tool 0-10   Pain Score No Pain   Restrictions/Precautions   Weight Bearing Precautions Per Order Yes   LLE Weight Bearing Per Order (S)  NWB  (Per Podiatry)   Other Precautions Cognitive;Chair Alarm;Bed Alarm;WBS;Multiple lines;Fall Risk   Home Living   Type of Home House   Home Layout Two level;Stairs to enter with rails;Bed/bath upstairs  (7STE)   Bathroom Shower/Tub Tub/shower unit   Bathroom Toilet Raised   Bathroom Equipment Grab bars in shower;Shower chair   Home Equipment Walker;Cane;Wheelchair-manual;Electric scooter  (Quad cane used PRN PTA, reports he has all the equipment)   Additional Comments 2SH, 7 ADA. Bed/bath upstairs. Using RW PRN, but  "has w/c or electric scooter PRN   Prior Function   Level of [place] Independent with ADLs;Independent with functional mobility;Independent with IADLS   Lives With Friend(s)  (reports living with \"lady friend\")   Receives Help From Friend(s)   IADLs Independent with driving;Independent with meal prep;Independent with medication management   Falls in the last 6 months 1 to 4   Vocational Retired   Comments Pt reports \"lady friend\" can assist PRN upon d/c.   Lifestyle   Autonomy IND PTA with all ADLs/IADLs. RW used PRN. +.   Reciprocal Relationships Lives with \"lady friend\"   Service to Others Retired   Intrinsic Gratification Travelling with his brother in [facility].   Subjective   Subjective \"I'm not blaming anyone but myself\"   ADL   Where Assessed Chair   Eating Assistance 5  Supervision/Setup   Grooming Assistance 5  Supervision/Setup   UB Bathing Assistance 5  Supervision/Setup   LB Bathing Assistance 4  Minimal Assistance   UB Dressing Assistance 5  Supervision/Setup   LB Dressing Assistance 4  Minimal Assistance   Toileting Assistance  4  Minimal Assistance   Functional Assistance 4  Minimal Assistance   Bed Mobility   Supine to Sit Unable to assess   Sit to Supine Unable to assess   Additional Comments Pt seated EOB with PT upon arrival. Pt OOB in recliner post session, all needs met, call bell within reach. cleared for no chair alarm per RN   Transfers   Sit to Stand 4  Minimal assistance   Additional items Assist x 1;Increased time required;Verbal cues   Stand to Sit 4  Minimal assistance   Additional items Assist x 1;Increased time required;Verbal cues   Additional Comments RW in stance. VCs for hand placement and to maintain NWB LLE   Functional Mobility   Functional Mobility 4  Minimal assistance   Additional Comments Ax1, steps from bed>chair, RW   Additional items Rolling walker   Balance   Static Sitting Fair +   Dynamic Sitting Fair   Static Standing Fair -   Dynamic Standing Poor + " "  Ambulatory Poor +   Activity Tolerance   Activity Tolerance Patient limited by fatigue   Medical Staff Made Aware Co-eval w PT 2/2 increased medical complexity and comorbidities.   Nurse Made Aware RN cleared for therapy   RUE Assessment   RUE Assessment WFL   LUE Assessment   LUE Assessment WFL   Hand Function   Gross Motor Coordination Functional   Fine Motor Coordination Functional   Cognition   Overall Cognitive Status WFL   Arousal/Participation Alert;Cooperative   Attention Attends with cues to redirect   Orientation Level Oriented X4   Memory Decreased recall of precautions   Following Commands Follows one step commands without difficulty   Comments Pt very pleasant and cooperative. Overall good adherence to WBS. Requires some VCs for safety awareness   Assessment   Limitation Decreased ADL status;Decreased endurance;Decreased self-care trans;Decreased high-level ADLs   Prognosis Good   Assessment 68 year old pt seen today for an OT evaluation following admission to [facility] 2/2 diabetic foot infection, s/p L TMA 10/28 with present symptoms significant for pain, fatigue, weakness, decreased ADL status, decreased functional mobility. Pt  has a past medical history of COPD (chronic obstructive pulmonary disease) ([facility]), Diabetes mellitus ([facility]), Sleep apnea, and UTI (urinary tract infection). Pt reported living with \"lady friend\" in a 2SH, 7STE, with bed/bath upstairs. Pt reports being IND with all ADLs/IADLs. Uses quad cane PRN. +. Pt very pleasant and cooperative t/o session. Pt completed functional STS txfs and functional mobility with min A, RW. Pt was  SUP for UB ADLs and Min A for LB ADLs. The patient's raw score on the AM-PAC Daily Activity Inpatient Short Form is 20. A raw score of greater than or equal to 19 suggests the patient may benefit from discharge to home. Please refer to the recommendation of the Occupational Therapist for safe discharge planning. Pt is functioning " below baseline level of function and will continue to benefit from skilled acute OT to promote increased independence and return to PLOF. At this time, current OT recommendation is Level 2 resources pending progress. Will continue to follow and assess.   Goals   Patient Goals to go home   LTG Time Frame 10-14   Long Term Goal #1 See below for goals   Plan   Treatment Interventions ADL retraining;Functional transfer training;UE strengthening/ROM;Endurance training;Patient/family training;Cognitive reorientation;Equipment evaluation/education;Fine motor coordination activities;Compensatory technique education;Continued evaluation;Energy conservation;Activityengagement   Goal Expiration Date 11/12/24   OT Frequency 2-3x/wk   Discharge Recommendation   Rehab Resource Intensity Level, OT II (Moderate Resource Intensity)  (pending progress)   AM-PAC Daily Activity Inpatient   Lower Body Dressing 3   Bathing 3   Toileting 3   Upper Body Dressing 3   Grooming 4   Eating 4   Daily Activity Raw Score 20   Daily Activity Standardized Score (Calc for Raw Score >=11) 42.03   AM-PAC Applied Cognition Inpatient   Following a Speech/Presentation 3   Understanding Ordinary Conversation 4   Taking Medications 4   Remembering Where Things Are Placed or Put Away 4   Remembering List of 4-5 Errands 4   Taking Care of Complicated Tasks 3   Applied Cognition Raw Score 22   Applied Cognition Standardized Score 47.83   End of Consult   Education Provided Yes   Patient Position at End of Consult Bedside chair;All needs within reach;Other (comment)  (Per RN, pt signed paper for chair alarm)   Nurse Communication Nurse aware of consult         Occupational Therapy Goals    Pt will be Mod I with bed mobility with good sitting balance/tolerance to engage in self care tasks within this plan of care.      Pt will be Mod I for UB dressing and bathing with use of assistive devices PRN within this plan of care.     Pt will be Mod I for LB dressing  and bathing with use of assistive devices PRN within this plan of care.     Pt will demonstrate standing tolerance of 2-3 minutes increase independence for toileting ADLs/tasks with use of assistive devices PRN within this plan of care.     Pt will complete functional transfers with mod I, with use of appropriate assistive devices within this plan of care.     Pt will demonstrate good carryover of safety awareness with use of appropriate assistive devices during functional tasks within this plan of care.     Pt will demonstrated increased activity tolerance to 30 mins for participation in ADLs and functional tasks within this plan of care.     Pt will complete further functional cognitive assessment with good attention/participation to assist with safe d/c planning recommendations.        Tez Maria MS, OTR/L     M[facility] CERTIFIED RATER ID [id number]

## 2024-10-29 NOTE — PROGRESS NOTES
NEPHROLOGY PROGRESS NOTE   Clay Marcial 68 y.o. male MRN: [id number]  Unit/Bed#: [facility] 825-01 Encounter: [id number]    ASSESSMENT & PLAN:  Assessment & Plan  Elevated serum creatinine  Etiology of elevated creatinine most likely due to autoregulatory failure in the setting of ACE inhibitor use, SGLT2 inhibitor use and diuretic use  Baseline creatinine appears to be around 1.3-1.4 in setting of diabetes and hypertension  Creatinine on  admission 1.58 mg/dl   Peak creatinine 1.7 on 10/17  Status post lower extremity angiogram on 10/25.  No evidence of contrast nephropathy  Underwent left TMA on 10/28  Renal function slightly worsened to creatinine 1.32 mg/dL today post op But still within baseline, will continue to monitor.  Patient is on IV fluid.  Continue Isolyte at 50 mL/h and monitor renal function.  If renal function continue to worsen would consider possibility of AIN as well from use of antibiotics  Avoid nephrotoxins  Stage 3 chronic kidney disease (HCC)  Baseline creatinine 1.3-1.4  Needs outpatient follow-up with nephrology  HTN (hypertension)  Home Rx: Furosemide 40 mg as needed, Imdur 30 mg daily, lisinopril 20 mg daily, Toprol-XL 50 mg daily  Current Rx: Imdur 30 mg daily, Toprol-XL 50 mg daily  Blood pressure stable and is at goal.  Continue to hold lisinopril and diuretics at this time in the setting of recent SUSHILA.  Avoid hypotension  Hypomagnesemia  Magnesium level is low at 1.7-replaced magnesium  Diabetic foot infection  (HCC)     Status post left foot TMA on 10/28.  Continue antibiotics per primary team and management per primary team-currently on Zosyn  Diabetes mellitus, type 2 (HCC)  Management per primary team  Continue to hold metformin and Jardiance for now  Chronic obstructive pulmonary disease with acute exacerbation (HCC)  Management per primary team  Anemia  -Hemoglobin trended down to 10.1 g/dL.  Continue to monitor     Discussed with primary team regarding renal function slightly  worsening discussed about plan to continue IV fluid at 50 mL/h and agreed with the plan    SUBJECTIVE:  No new complaints.  No chest pain or shortness of breath, no nausea vomiting    OBJECTIVE:  Current Weight: Weight - Scale: 126 kg (278 lb)  Vitals:    10/29/24 0712   BP: 137/79   Pulse: 80   Resp: 16   Temp: 97.6 °F (36.4 °C)   SpO2: 97%       Intake/Output Summary (Last 24 hours) at 10/29/2024 1143  Last data filed at 10/29/2024 0900  Gross per 24 hour   Intake 1512 ml   Output 400 ml   Net 1112 ml       Physical Exam  General:  Ill looking, awake.  Eyes: Conjunctivae pink,  Sclera anicteric  ENT: lips and mucous membranes moist  Neck: supple   Chest: Clear to Auscultation both lungs,  no crackles, ronchus or wheezing.  CVS: S1 & S2 present, normal rate, regular rhythm, no murmur.  Abdomen: soft, non-tender, non-distended, Bowel sounds normoactive  Extremities: no edema of  legs.  Dressing over left foot  Skin: no rash  Neuro: awake, alert, oriented x 3   Psych: Mood and affect appropriate      Medications:    Current Facility-Administered Medications:     acetaminophen (TYLENOL) tablet 650 mg, 650 mg, Oral, Q6H PRN, Real Hernandes, DPM    albuterol inhalation solution 2.5 mg, 2.5 mg, Nebulization, Q6H PRN, Real Hernandes, DPM    aspirin chewable tablet 81 mg, 81 mg, Oral, Daily, Real Hernandes, DPM, 81 mg at 10/29/24 0841    atorvastatin (LIPITOR) tablet 20 mg, 20 mg, Oral, Daily, Real Hernandes, DPM, 20 mg at 10/29/24 0841    bisacodyl (DULCOLAX) rectal suppository 10 mg, 10 mg, Rectal, Daily PRN, Real Hernandes, DPM    budesonide (PULMICORT) inhalation solution 0.5 mg, 0.5 mg, Nebulization, Q12H, Real Hernandes, DPM, 0.5 mg at 10/29/24 0710    Cholecalciferol (VITAMIN D3) tablet 1,000 Units, 1,000 Units, Oral, Daily, Real Hernandes, DPM, 1,000 Units at 10/29/24 0841    cyanocobalamin (VITAMIN B-12) tablet 500 mcg, 500 mcg, Oral, Daily, Real Hernandes, DPOTILIA, 500 mcg at 10/29/24 0841    docusate sodium (COLACE)  capsule 100 mg, 100 mg, Oral, BID, Real Hernandes DPM, 100 mg at 10/29/24 0841    DULoxetine (CYMBALTA) delayed release capsule 20 mg, 20 mg, Oral, Daily, Real Hernandes DPM, 20 mg at 10/29/24 0841    fluticasone (FLONASE) 50 mcg/act nasal spray 2 spray, 2 spray, Nasal, Daily, Real Hernandes DPM, 2 spray at 10/25/24 0833    heparin (porcine) 25,000 units in 0.45% NaCl 250 mL infusion (premix), 3-30 Units/kg/hr (Order-Specific), Intravenous, Titrated, Real Hernandes DPM, Stopped at 10/28/24 0500    heparin (porcine) injection 4,800 Units, 4,800 Units, Intravenous, Q6H PRN, Real Hernandes DPM, 4,800 Units at 10/23/24 1709    heparin (porcine) injection 9,600 Units, 9,600 Units, Intravenous, Q6H PRN, Real Hernandes DPM    insulin glargine (LANTUS) subcutaneous injection 25 Units 0.25 mL, 25 Units, Subcutaneous, HS, Rela Hernandes DPM, 25 Units at 10/28/24 2109    insulin lispro (HumALOG/ADMELOG) 100 units/mL subcutaneous injection 10 Units, 10 Units, Subcutaneous, TID With Meals, Real Hernandes DPM, 10 Units at 10/29/24 1112    insulin lispro (HumALOG/ADMELOG) 100 units/mL subcutaneous injection 2-12 Units, 2-12 Units, Subcutaneous, 4x Daily (AC & HS), 8 Units at 10/29/24 1113 **AND** Fingerstick Glucose (POCT), , , 4x Daily AC and at bedtime, Real Hernandes DPM    isosorbide mononitrate (IMDUR) 24 hr tablet 30 mg, 30 mg, Oral, Daily, Real Hernandes DPM, 30 mg at 10/29/24 0841    loratadine (CLARITIN) tablet 10 mg, 10 mg, Oral, Daily, Real Hernandes DPM, 10 mg at 10/28/24 0812    magnesium sulfate 2 g/50 mL IVPB (premix) 2 g, 2 g, Intravenous, Once, Rosy Bhatt MD, Last Rate: 25 mL/hr at 10/29/24 1107, 2 g at 10/29/24 1107    metoprolol succinate (TOPROL-XL) 24 hr tablet 50 mg, 50 mg, Oral, Daily, Real Hernandes DPM, 50 mg at 10/29/24 0841    morphine injection 2 mg, 2 mg, Intravenous, Q4H PRN, Real Hernandes DPM    multi-electrolyte (PLASMALYTE-A/ISOLYTE-S PH 7.4) IV solution, 50 mL/hr, Intravenous,  Continuous, Real Hernandes DPM, Stopped at 10/25/24 1915    nicotine (NICODERM CQ) 14 mg/24hr TD 24 hr patch 1 patch, 1 patch, Transdermal, Daily, Real Hernandes DPM, 1 patch at 10/29/24 0842    umeclidinium 62.5 mcg/actuation inhaler AEPB 1 puff, 1 puff, Inhalation, Daily, 1 puff at 10/29/24 0820 **AND** olodaterol HCl (STRIVERDI RESPIMAT) inhaler 2 puff, 2 puff, Inhalation, Daily, Real Hernandes DPM, 2 puff at 10/29/24 0820    ondansetron (ZOFRAN) injection 4 mg, 4 mg, Intravenous, Q4H PRN, Real Hernandes DPM    oxyCODONE (ROXICODONE) IR tablet 5 mg, 5 mg, Oral, Q4H PRN, Real Hernandes DPM    oxyCODONE (ROXICODONE) split tablet 2.5 mg, 2.5 mg, Oral, Q4H PRN, Real Hernandes DPM    pantoprazole (PROTONIX) EC tablet 40 mg, 40 mg, Oral, BID AC, Real Hernandes DPM, 40 mg at 10/29/24 0518    piperacillin-tazobactam (ZOSYN) 4.5 g in sodium chloride 0.9 % 100 mL IVPB (EXTENDED INFUSION), 4.5 g, Intravenous, Q8H, Real Hernandes DPM, Last Rate: 25 mL/hr at 10/29/24 0513, 4.5 g at 10/29/24 0513    polyethylene glycol (MIRALAX) packet 17 g, 17 g, Oral, Daily, Real Hernandes DPM, 17 g at 10/27/24 0803    pregabalin (LYRICA) capsule 100 mg, 100 mg, Oral, BID, Real Hernandes DPM, 100 mg at 10/29/24 0841    sodium chloride (OCEAN) 0.65 % nasal spray 2 spray, 2 spray, Each Nare, BID, Real Hernandes DPM, 2 spray at 10/29/24 0819    traZODone (DESYREL) tablet 25 mg, 25 mg, Oral, HS, Real Hernandes, DPM, 25 mg at 10/28/24 2107    Invasive Devices:        Lab Results:   Results from last 7 days   Lab Units 10/29/24  0515 10/28/24  0459 10/27/24  0511   WBC Thousand/uL 7.23 5.55 5.78   HEMOGLOBIN g/dL 10.1* 11.4* 10.9*   HEMATOCRIT % 31.7* 34.5* 33.3*   PLATELETS Thousands/uL 339 331 370   POTASSIUM mmol/L 4.5 4.3 4.0   CHLORIDE mmol/L 100 102 103   CO2 mmol/L 30 28 27   BUN mg/dL 21 15 12   CREATININE mg/dL 1.32* 1.09 1.00   CALCIUM mg/dL 8.5 9.1 8.7   MAGNESIUM mg/dL 1.7* 1.6* 1.7*   ALK PHOS U/L 39 45 49   ALT U/L 16 16  "18   AST U/L 14 13 15       Previous work up:         Portions of the record may have been created with voice recognition software. Occasional wrong word or \"sound a like\" substitutions may have occurred due to the inherent limitations of voice recognition software. Read the chart carefully and recognize, using context, where substitutions have occurred.If you have any questions, please contact the dictating provider.    "

## 2024-10-29 NOTE — PROGRESS NOTES
"[facility] Podiatry - Progress Note  Patient: Clay Marcial 68 y.o. male   MRN: [id number]  PCP: No primary care provider on file.  Unit/Bed#: [facility] 825-01 Encounter: [id number]  Date Of Visit: 10/29/24    ASSESSMENT:    Clay Marcial is a 68 y.o. male with:    Diabetic foot infection s/p left second toe amputation and wound debridement, packed open  -s/p Left TMA packed open (DOS: 10/28/2024)   Type 2 diabetes mellitus   A1c 7.2% (10/16/2024)  Coronary artery disease  Stage III chronic kidney disease  Chronic obstructive pulmonary disease  Hypertension      PLAN:    Patient POD1 s/p Left TMA packed open due to a substantial amount of necrotic tissue and purulent drainage noted intra-op.   Active bleeding noted on dressing change. Hemostasis achieved utilizing manual compression and electrocautery.   Due to surgical site bleeding, wound vac intervention was not initiated at this time, plan for first application tomorrow, 10/30 pending clinical exam of surgical site.   Patient may need additional podiatric surgical intervention including delayed primary closure pending improvement of remaining soft tissue infection. No definitive OR plans at this time.   Local wound care of wet to dry bolster dressing applied at bedside.   Nursing to reinforce dressing as needed.   Elevation on green foam wedges or pillows when non-ambulatory.  Rest of care per primary team.    Weight bearing status: Non weightbearing to left foot       SUBJECTIVE:     The patient was seen, evaluated, and assessed at bedside today. The patient was awake, alert, and in no acute distress. No acute events overnight. The patient reports he is doing well and his pain is well controlled. Patient denies N/V/F/chills/SOB/CP.      OBJECTIVE:     Vitals:   /79   Pulse 80   Temp 97.6 °F (36.4 °C)   Resp 16   Ht 5' 11\" (1.803 m)   Wt 126 kg (278 lb)   SpO2 97%   BMI 38.77 kg/m²     Temp (24hrs), Av.6 °F (36.4 °C), Min:97.2 °F (36.2 °C), " "Max:98 °F (36.7 °C)      Physical Exam:     General:  Alert, cooperative, and in no distress.  Lower extremity exam:  Cardiovascular status at baseline.  Neurological status at baseline. S/p Left TMA. No calf tenderness noted.     Lower extremity wound(s) as noted below:  Wound #: 1  Location: Left TMA site   Length 12 cm: Width 5 cm: Depth 4 cm:   Deepest Tissue Noted in Base: Surgical bone   Probe to Bone: Yes - surgical   Peripheral Skin Description: Attached  Granulation: 50% Fibrotic Tissue: 50% Necrotic Tissue: 0%   Drainage Amount: moderate, serosanguinous  Signs of Infection: Yes - Localized edema and erythema     Additional Data:     Labs:    Results from last 7 days   Lab Units 10/29/24  0515   WBC Thousand/uL 7.23   HEMOGLOBIN g/dL 10.1*   HEMATOCRIT % 31.7*   PLATELETS Thousands/uL 339   SEGS PCT % 73   LYMPHO PCT % 17   MONO PCT % 6   EOS PCT % 2     Results from last 7 days   Lab Units 10/29/24  0515   POTASSIUM mmol/L 4.5   CHLORIDE mmol/L 100   CO2 mmol/L 30   BUN mg/dL 21   CREATININE mg/dL 1.32*   CALCIUM mg/dL 8.5   ALK PHOS U/L 39   ALT U/L 16   AST U/L 14           * I Have Reviewed All Lab Data Listed Above.    Recent Cultures (last 7 days):               Imaging: I have personally reviewed pertinent films in PACS  EKG, Pathology, and Other Studies: I have personally reviewed pertinent reports.      ** Please Note: Portions of the record may have been created with voice recognition software. Occasional wrong word or \"sound a like\" substitutions may have occurred due to the inherent limitations of voice recognition software. Read the chart carefully and recognize, using context, where substitutions have occurred. **      "

## 2024-10-29 NOTE — PROGRESS NOTES
Progress Note - Hospitalist   Name: Clay Marcial 68 y.o. male I MRN: [id number]  [facility] I Date of Admission: 10/22/2024   Date of Service: 10/29/2024 I Hospital Day: 7    Assessment & Plan  Diabetic foot infection  (HCC)  Initially presented at the [facility] with left lower extremity cellulitis with open wound  S/p I&D, L 2nd toe amputation with podiatry on 10/21 given concern for acutely worsening wound/infection   Transferred to the [facility] for vascular surgery/IR evaluations given concern also for embolic process/ischemia   Agram 10/25   Status post left foot TMA on 10/28  Continue IV heparin gtt   Appreciate infectious disease input  Continue IV Zosyn  Wound culture from OR on 10/21 with polymicrobial growth including Bacteroides pyogenes, Finegoldia magna, Actinomyces species, coagulase-negative Staphylococcus, and Globicatella species  Initial plan for wound VAC placement today by podiatry postpone as wound site noted to still be bleeding, stabilized with compression and cautery  Type 2 diabetes mellitus with diabetic polyneuropathy (HCC)  Lab Results   Component Value Date    HGBA1C 7.2 (H) 10/16/2024     Continue basal/prandial insulin with additional SSI coverage per Accu-Cheks  Hypoglycemia protocol  Carbohydrate restriction  On Lyrica for neuropathy  Stage 3 chronic kidney disease (HCC)  Baseline creatinine of approximately 1.2-1.4 -> currently stable at 1.3 to  Monitor renal function and urine output  Limit/avoid nephrotoxins and hypotension as possible -> holding diuretic/ACEI  Nephrology following  HTN (hypertension)  Continue Imdur/Toprol-XL  Lasix/ACEI held per nephrology -> resume when able  CAD (coronary artery disease)  Continue ASA/statin/Toprol-XL/Imdur  Chronic obstructive pulmonary disease with acute exacerbation (HCC)  Continue Pulmicort/Olodaterol/Umeclidinium - PRN Albuterol on board  Hypomagnesemia  Monitor/replete serum magnesium and potassium as  necessary      VTE Pharmacologic Prophylaxis: VTE Score: 5 High Risk (Score >/= 5) - Pharmacological DVT Prophylaxis Ordered: Heparin Drip. Sequential Compression Devices Ordered.    AM-PAC Basic Mobility:  Basic Mobility Inpatient Raw Score: 15  JH-HLM Goal: 4: Move to chair/commode  JH-HLM Achieved: 4: Move to chair/commode  JH-HLM Goal achieved. Continue to encourage appropriate mobility.    Patient Centered Rounds:  I have performed bedside rounds and discussed plan of care with nursing today.  Discussions with Specialists or Other Care Team Provider:  see above assessments if applicable    Education and Discussions with Family / Patient: At bedside    Time Spent for Care:  35 minutes. More than 50% of total time spent on counseling and coordination of care, on one or more of the following: performing physical exam; counseling and coordination of care, obtaining or reviewing history, documenting in the medical record, reviewing/ordering tests/medications/procedures, and communicating with other healthcare professionals.    Current Length of Stay: 7 day(s)  Current Patient Status: Inpatient   Certification Statement:  Patient will continue to require additional hospital stay due to assessments as noted above.    Code Status: Level 1 - Full Code      Subjective     Encountered earlier in the day.  Denies uncontrolled pain at this time.      Objective     Vitals:   Temp (24hrs), Av.8 °F (36.6 °C), Min:97.5 °F (36.4 °C), Max:98.1 °F (36.7 °C)    Temp:  [97.5 °F (36.4 °C)-98.1 °F (36.7 °C)] 98.1 °F (36.7 °C)  HR:  [77-95] 95  Resp:  [16-20] 20  BP: (110-137)/(57-91) 126/66  SpO2:  [89 %-97 %] 95 %  Body mass index is 38.77 kg/m².     Input and Output Summary (last 24 hours):       Intake/Output Summary (Last 24 hours) at 10/29/2024 1542  Last data filed at 10/29/2024 1528  Gross per 24 hour   Intake 712 ml   Output 1300 ml   Net -588 ml       Physical Exam:     GENERAL No immediate distress at rest   HEAD    Normocephalic - atraumatic   EYES Nonicteric   MOUTH   Mucosa moist   NECK   Supple - full range of motion   CARDIAC Rate controlled   PULMONARY Fairly clear to auscultation   ABDOMEN Nontender/nondistended   MUSCULOSKELETAL   Motor strength/range of motion deconditioned   NEUROLOGIC   Alert/oriented at baseline   SKIN   Chronic wrinkles/blemishes - s/p left TMA with site dressed/wrapped   PSYCHIATRIC   Mood/affect stable         Labs & Recent Cultures:  Results from last 7 days   Lab Units 10/29/24  0515   WBC Thousand/uL 7.23   HEMOGLOBIN g/dL 10.1*   HEMATOCRIT % 31.7*   PLATELETS Thousands/uL 339   SEGS PCT % 73   LYMPHO PCT % 17   MONO PCT % 6   EOS PCT % 2     Results from last 7 days   Lab Units 10/29/24  0515   POTASSIUM mmol/L 4.5   CHLORIDE mmol/L 100   CO2 mmol/L 30   BUN mg/dL 21   CREATININE mg/dL 1.32*   CALCIUM mg/dL 8.5   ALK PHOS U/L 39   ALT U/L 16   AST U/L 14         Results from last 7 days   Lab Units 10/29/24  1110 10/29/24  0711 10/28/24  2105 10/28/24  1428 10/28/24  1108 10/28/24  0736 10/27/24  2133 10/27/24  1611 10/27/24  1116 10/27/24  0718 10/26/24  2112 10/26/24  1606   POC GLUCOSE mg/dl 315* 232* 293* 192* 212* 231* 255* 305* 295* 242* 255* 254*                         Lines/Drains/Telemetry:  Invasive Devices       Peripheral Intravenous Line  Duration             Peripheral IV 10/27/24 Dorsal (posterior);Right Forearm 1 day                    Last 24 Hours Medication List:   Current Facility-Administered Medications   Medication Dose Route Frequency Provider Last Rate    acetaminophen  650 mg Oral Q6H PRN Real Hernandes DPM      albuterol  2.5 mg Nebulization Q6H PRN Real Hernandes DPM      aspirin  81 mg Oral Daily Real Hernandes DPM      atorvastatin  20 mg Oral Daily Real Hernandes DPM      bisacodyl  10 mg Rectal Daily PRN Real Hernandes DPM      budesonide  0.5 mg Nebulization Q12H Real Hernandes DPM      Cholecalciferol  1,000 Units Oral Daily Real Hernandes DPM       cyanocobalamin  500 mcg Oral Daily Real Hernandes DPM      docusate sodium  100 mg Oral BID Real Hernandes DPM      DULoxetine  20 mg Oral Daily Real Hernandes DPM      fluticasone  2 spray Nasal Daily Real Hernandes DPM      heparin (porcine)  3-30 Units/kg/hr (Order-Specific) Intravenous Titrated Rosy Bhatt MD 18 Units/kg/hr (10/29/24 1410)    insulin glargine  25 Units Subcutaneous HS Real Hernandes DPM      insulin lispro  10 Units Subcutaneous TID With Meals Real Hernandes DPM      insulin lispro  2-12 Units Subcutaneous 4x Daily (AC & HS) Real Hernandes DPM      isosorbide mononitrate  30 mg Oral Daily Real Hernandes DPM      loratadine  10 mg Oral Daily Real Hernandes DPM      metoprolol succinate  50 mg Oral Daily Real Hernandes DPM      morphine injection  2 mg Intravenous Q4H PRN Real Hernandes DPM      multi-electrolyte  50 mL/hr Intravenous Continuous Sebas Dover MD 50 mL/hr (10/29/24 1215)    nicotine  1 patch Transdermal Daily Real Hernandes DPM      umeclidinium  1 puff Inhalation Daily Real Hernandes DPM      And    olodaterol HCl  2 puff Inhalation Daily Real Hernandes DPM      ondansetron  4 mg Intravenous Q4H PRN Real Hernandes DPM      oxyCODONE  5 mg Oral Q4H PRN Real Hernandes DPM      oxyCODONE  2.5 mg Oral Q4H PRN Real Hernandes DPM      pantoprazole  40 mg Oral BID AC Real Hernandes DPM      piperacillin-tazobactam  4.5 g Intravenous Q8H Real Hernandes DPM 4.5 g (10/29/24 1220)    polyethylene glycol  17 g Oral Daily Real Hernandes DPM      pregabalin  100 mg Oral BID Real Hernandes DPM      sodium chloride  2 spray Each Nare BID Real Hernandes DPM      traZODone  25 mg Oral HS SHIN Schneider MD   Hospitalist - [facility] Internal Medicine        ** Please Note:  Documentation is constructed using a voice recognition dictation system.  An occasional wrong word/phrase or “sound-a-like” substitution may have been picked up by  dictation device due to the inherent limitations of voice recognition software.  Read the chart carefully and recognize, using reasonable context, where substitutions may have occurred.**

## 2024-10-30 PROBLEM — E66.9 OBESITY: Status: ACTIVE | Noted: 2024-10-30

## 2024-10-30 LAB
ANION GAP SERPL CALCULATED.3IONS-SCNC: 5 MMOL/L (ref 4–13)
APTT PPP: 100 SECONDS (ref 23–34)
APTT PPP: 43 SECONDS (ref 23–34)
APTT PPP: 68 SECONDS (ref 23–34)
BASOPHILS # BLD AUTO: 0.04 THOUSANDS/ΜL (ref 0–0.1)
BASOPHILS NFR BLD AUTO: 1 % (ref 0–1)
BUN SERPL-MCNC: 26 MG/DL (ref 5–25)
CALCIUM SERPL-MCNC: 8.4 MG/DL (ref 8.4–10.2)
CHLORIDE SERPL-SCNC: 101 MMOL/L (ref 96–108)
CO2 SERPL-SCNC: 29 MMOL/L (ref 21–32)
CREAT SERPL-MCNC: 1.39 MG/DL (ref 0.6–1.3)
EOSINOPHIL # BLD AUTO: 0.16 THOUSAND/ΜL (ref 0–0.61)
EOSINOPHIL NFR BLD AUTO: 3 % (ref 0–6)
ERYTHROCYTE [DISTWIDTH] IN BLOOD BY AUTOMATED COUNT: 12.8 % (ref 11.6–15.1)
GFR SERPL CREATININE-BSD FRML MDRD: 50 ML/MIN/1.73SQ M
GLUCOSE SERPL-MCNC: 266 MG/DL (ref 65–140)
GLUCOSE SERPL-MCNC: 286 MG/DL (ref 65–140)
GLUCOSE SERPL-MCNC: 317 MG/DL (ref 65–140)
GLUCOSE SERPL-MCNC: 325 MG/DL (ref 65–140)
GLUCOSE SERPL-MCNC: 342 MG/DL (ref 65–140)
HCT VFR BLD AUTO: 30.4 % (ref 36.5–49.3)
HGB BLD-MCNC: 9.9 G/DL (ref 12–17)
IMM GRANULOCYTES # BLD AUTO: 0.08 THOUSAND/UL (ref 0–0.2)
IMM GRANULOCYTES NFR BLD AUTO: 1 % (ref 0–2)
LYMPHOCYTES # BLD AUTO: 1.38 THOUSANDS/ΜL (ref 0.6–4.47)
LYMPHOCYTES NFR BLD AUTO: 25 % (ref 14–44)
MAGNESIUM SERPL-MCNC: 1.9 MG/DL (ref 1.9–2.7)
MCH RBC QN AUTO: 31.5 PG (ref 26.8–34.3)
MCHC RBC AUTO-ENTMCNC: 32.6 G/DL (ref 31.4–37.4)
MCV RBC AUTO: 97 FL (ref 82–98)
MONOCYTES # BLD AUTO: 0.41 THOUSAND/ΜL (ref 0.17–1.22)
MONOCYTES NFR BLD AUTO: 7 % (ref 4–12)
NEUTROPHILS # BLD AUTO: 3.49 THOUSANDS/ΜL (ref 1.85–7.62)
NEUTS SEG NFR BLD AUTO: 63 % (ref 43–75)
NRBC BLD AUTO-RTO: 0 /100 WBCS
PLATELET # BLD AUTO: 311 THOUSANDS/UL (ref 149–390)
PMV BLD AUTO: 9.2 FL (ref 8.9–12.7)
POTASSIUM SERPL-SCNC: 5 MMOL/L (ref 3.5–5.3)
RBC # BLD AUTO: 3.14 MILLION/UL (ref 3.88–5.62)
SODIUM SERPL-SCNC: 135 MMOL/L (ref 135–147)
WBC # BLD AUTO: 5.56 THOUSAND/UL (ref 4.31–10.16)

## 2024-10-30 PROCEDURE — 83735 ASSAY OF MAGNESIUM: CPT | Performed by: INTERNAL MEDICINE

## 2024-10-30 PROCEDURE — 97606 NEG PRS WND THER DME>50 SQCM: CPT | Performed by: PODIATRIST

## 2024-10-30 PROCEDURE — 99232 SBSQ HOSP IP/OBS MODERATE 35: CPT | Performed by: INTERNAL MEDICINE

## 2024-10-30 PROCEDURE — 94640 AIRWAY INHALATION TREATMENT: CPT

## 2024-10-30 PROCEDURE — 80048 BASIC METABOLIC PNL TOTAL CA: CPT | Performed by: INTERNAL MEDICINE

## 2024-10-30 PROCEDURE — 85730 THROMBOPLASTIN TIME PARTIAL: CPT | Performed by: INTERNAL MEDICINE

## 2024-10-30 PROCEDURE — 94664 DEMO&/EVAL PT USE INHALER: CPT

## 2024-10-30 PROCEDURE — 99024 POSTOP FOLLOW-UP VISIT: CPT | Performed by: PODIATRIST

## 2024-10-30 PROCEDURE — 85025 COMPLETE CBC W/AUTO DIFF WBC: CPT | Performed by: INTERNAL MEDICINE

## 2024-10-30 PROCEDURE — 97530 THERAPEUTIC ACTIVITIES: CPT

## 2024-10-30 PROCEDURE — 94760 N-INVAS EAR/PLS OXIMETRY 1: CPT

## 2024-10-30 PROCEDURE — 82948 REAGENT STRIP/BLOOD GLUCOSE: CPT

## 2024-10-30 RX ORDER — INSULIN LISPRO 100 [IU]/ML
16 INJECTION, SOLUTION INTRAVENOUS; SUBCUTANEOUS
Status: DISCONTINUED | OUTPATIENT
Start: 2024-10-31 | End: 2024-10-31

## 2024-10-30 RX ORDER — INSULIN GLARGINE 100 [IU]/ML
38 INJECTION, SOLUTION SUBCUTANEOUS
Status: DISCONTINUED | OUTPATIENT
Start: 2024-10-30 | End: 2024-10-31

## 2024-10-30 RX ADMIN — INSULIN LISPRO 13 UNITS: 100 INJECTION, SOLUTION INTRAVENOUS; SUBCUTANEOUS at 17:18

## 2024-10-30 RX ADMIN — PREGABALIN 100 MG: 100 CAPSULE ORAL at 10:13

## 2024-10-30 RX ADMIN — PREGABALIN 100 MG: 100 CAPSULE ORAL at 17:17

## 2024-10-30 RX ADMIN — TRAZODONE HYDROCHLORIDE 25 MG: 50 TABLET ORAL at 21:15

## 2024-10-30 RX ADMIN — PANTOPRAZOLE SODIUM 40 MG: 40 TABLET, DELAYED RELEASE ORAL at 15:27

## 2024-10-30 RX ADMIN — DOCUSATE SODIUM 100 MG: 100 CAPSULE, LIQUID FILLED ORAL at 17:17

## 2024-10-30 RX ADMIN — METOPROLOL SUCCINATE 50 MG: 50 TABLET, EXTENDED RELEASE ORAL at 10:13

## 2024-10-30 RX ADMIN — HEPARIN SODIUM 20 UNITS/KG/HR: 10000 INJECTION, SOLUTION INTRAVENOUS at 23:47

## 2024-10-30 RX ADMIN — CYANOCOBALAMIN TAB 500 MCG 500 MCG: 500 TAB at 10:13

## 2024-10-30 RX ADMIN — HEPARIN SODIUM 4800 UNITS: 1000 INJECTION INTRAVENOUS; SUBCUTANEOUS at 20:16

## 2024-10-30 RX ADMIN — DOCUSATE SODIUM 100 MG: 100 CAPSULE, LIQUID FILLED ORAL at 10:13

## 2024-10-30 RX ADMIN — NICOTINE 1 PATCH: 14 PATCH, EXTENDED RELEASE TRANSDERMAL at 10:13

## 2024-10-30 RX ADMIN — PIPERACILLIN SODIUM AND TAZOBACTAM SODIUM 4.5 G: 36; 4.5 INJECTION, POWDER, LYOPHILIZED, FOR SOLUTION INTRAVENOUS at 15:27

## 2024-10-30 RX ADMIN — INSULIN LISPRO 13 UNITS: 100 INJECTION, SOLUTION INTRAVENOUS; SUBCUTANEOUS at 12:52

## 2024-10-30 RX ADMIN — INSULIN LISPRO 6 UNITS: 100 INJECTION, SOLUTION INTRAVENOUS; SUBCUTANEOUS at 17:17

## 2024-10-30 RX ADMIN — BUDESONIDE 0.5 MG: 0.5 INHALANT RESPIRATORY (INHALATION) at 07:38

## 2024-10-30 RX ADMIN — INSULIN LISPRO 8 UNITS: 100 INJECTION, SOLUTION INTRAVENOUS; SUBCUTANEOUS at 12:52

## 2024-10-30 RX ADMIN — ISOSORBIDE MONONITRATE 30 MG: 30 TABLET, EXTENDED RELEASE ORAL at 10:13

## 2024-10-30 RX ADMIN — BUDESONIDE 0.5 MG: 0.5 INHALANT RESPIRATORY (INHALATION) at 21:28

## 2024-10-30 RX ADMIN — INSULIN GLARGINE 38 UNITS: 100 INJECTION, SOLUTION SUBCUTANEOUS at 21:14

## 2024-10-30 RX ADMIN — SODIUM CHLORIDE, SODIUM GLUCONATE, SODIUM ACETATE, POTASSIUM CHLORIDE, MAGNESIUM CHLORIDE, SODIUM PHOSPHATE, DIBASIC, AND POTASSIUM PHOSPHATE 50 ML/HR: .53; .5; .37; .037; .03; .012; .00082 INJECTION, SOLUTION INTRAVENOUS at 05:50

## 2024-10-30 RX ADMIN — ATORVASTATIN CALCIUM 20 MG: 20 TABLET, FILM COATED ORAL at 10:13

## 2024-10-30 RX ADMIN — PIPERACILLIN SODIUM AND TAZOBACTAM SODIUM 4.5 G: 36; 4.5 INJECTION, POWDER, LYOPHILIZED, FOR SOLUTION INTRAVENOUS at 23:00

## 2024-10-30 RX ADMIN — DULOXETINE HYDROCHLORIDE 20 MG: 20 CAPSULE, DELAYED RELEASE ORAL at 10:13

## 2024-10-30 RX ADMIN — INSULIN LISPRO 13 UNITS: 100 INJECTION, SOLUTION INTRAVENOUS; SUBCUTANEOUS at 08:00

## 2024-10-30 RX ADMIN — INSULIN LISPRO 8 UNITS: 100 INJECTION, SOLUTION INTRAVENOUS; SUBCUTANEOUS at 21:14

## 2024-10-30 RX ADMIN — PANTOPRAZOLE SODIUM 40 MG: 40 TABLET, DELAYED RELEASE ORAL at 06:26

## 2024-10-30 RX ADMIN — HEPARIN SODIUM 20 UNITS/KG/HR: 10000 INJECTION, SOLUTION INTRAVENOUS at 01:36

## 2024-10-30 RX ADMIN — HEPARIN SODIUM 18 UNITS/KG/HR: 10000 INJECTION, SOLUTION INTRAVENOUS at 12:35

## 2024-10-30 RX ADMIN — PIPERACILLIN SODIUM AND TAZOBACTAM SODIUM 4.5 G: 36; 4.5 INJECTION, POWDER, LYOPHILIZED, FOR SOLUTION INTRAVENOUS at 05:50

## 2024-10-30 RX ADMIN — INSULIN LISPRO 8 UNITS: 100 INJECTION, SOLUTION INTRAVENOUS; SUBCUTANEOUS at 06:26

## 2024-10-30 RX ADMIN — Medication 1000 UNITS: at 10:13

## 2024-10-30 RX ADMIN — ASPIRIN 81 MG CHEWABLE TABLET 81 MG: 81 TABLET CHEWABLE at 10:13

## 2024-10-30 NOTE — PLAN OF CARE
Problem: PAIN - ADULT  Goal: Verbalizes/displays adequate comfort level or baseline comfort level  Description: Interventions:  - Encourage patient to monitor pain and request assistance  - Assess pain using appropriate pain scale  - Administer analgesics based on type and severity of pain and evaluate response  - Implement non-pharmacological measures as appropriate and evaluate response  - Consider cultural and social influences on pain and pain management  - Notify physician/advanced practitioner if interventions unsuccessful or patient reports new pain  Outcome: Progressing     Problem: INFECTION - ADULT  Goal: Absence or prevention of progression during hospitalization  Description: INTERVENTIONS:  - Assess and monitor for signs and symptoms of infection  - Monitor lab/diagnostic results  - Monitor all insertion sites, i.e. indwelling lines, tubes, and drains  - Monitor endotracheal if appropriate and nasal secretions for changes in amount and color  - [place] appropriate cooling/warming therapies per order  - Administer medications as ordered  - Instruct and encourage patient and family to use good hand hygiene technique  - Identify and instruct in appropriate isolation precautions for identified infection/condition  Outcome: Progressing

## 2024-10-30 NOTE — PROGRESS NOTES
Progress Note - Infectious Disease   Clay Marcial 68 y.o. male MRN: [id number]  Unit/Bed#: [facility] 825-01 Encounter: [id number]      Impression:  1.  Diabetic left foot infection with osteomyelitis s/p left second toe amputation with wound debridement and packing S/P left transmetatarsal amputation 10/28  2.  Type II DM with PAD, history of chronic left foot plantar ulcer  3.  History of chronic tobacco abuse with COPD and possible ROMEL  4.  CAD    Recommendations:  Patient is afebrile with normal WBC count.  Patient had wound VAC placed on left foot today.  1.  Left TMA wound is open and appears clean as per podiatry picture 10/30  2.  Will  continue piperacillin/tazobactam 4.5 g every 8 hours IV by extended infusion pending decision for delayed closure  3.  Creatinine and BUN are mildly elevated.  Will follow serially  Antibiotics:  1.  Piperacillin/tazobactam 4.5 g every 8 hours IV by extended infusion, day 10 Rx    Subjective:  The patient has no complaints.  Denies fevers, chills, or sweats.  Denies nausea, vomiting, or diarrhea.      Objective:  Vitals:  Temp:  [98 °F (36.7 °C)-98.4 °F (36.9 °C)] 98.3 °F (36.8 °C)  HR:  [89-98] 98  Resp:  [14-18] 16  BP: (126-138)/(67-77) 128/77  SpO2:  [94 %-96 %] 95 %  Temp (24hrs), Av.2 °F (36.8 °C), Min:98 °F (36.7 °C), Max:98.4 °F (36.9 °C)  Current: Temperature: 98.3 °F (36.8 °C)    Physical Exam:     General Appearance:  Alert, appropriately responsive, chronically ill-appearing nontoxic, no acute distress.   Throat: Oropharynx moist without lesions.  Lips, mucosa, and tongue normal, edentulous   Neck: Supple, symmetrical, trachea midline, no adenopathy,  no tenderness/mass/nodules   Lungs:   Increased AP diameter with deep creased respiratory expansion   Heart:  Regular rate and rhythm, S1, S2 normal, no murmur, rub or gallop   Abdomen:   Soft, non-tender, non-distended, positive bowel sounds.  No masses, no organomegaly    No CVA tenderness   Extremities: LLE  with +2/4 edema and erythema.  Wound is open with some serosanguineous drainage, wound VAC now in y.  Peripheral pulses decreased   Skin: As above.         Invasive Devices       Peripheral Intravenous Line  Duration             Peripheral IV 10/27/24 Dorsal (posterior);Right Forearm 2 days    Peripheral IV 10/29/24 Left;Ventral (anterior) Forearm 1 day                    Labs, Imaging, & Other studies:   All pertinent labs were personally reviewed  Results from last 7 days   Lab Units 10/30/24  0546 10/29/24  0515 10/28/24  0459   WBC Thousand/uL 5.56 7.23 5.55   HEMOGLOBIN g/dL 9.9* 10.1* 11.4*   PLATELETS Thousands/uL 311 339 331     Results from last 7 days   Lab Units 10/30/24  0546 10/29/24  0515 10/28/24  0459 10/27/24  0511   SODIUM mmol/L 135 136 137 138   POTASSIUM mmol/L 5.0 4.5 4.3 4.0   CHLORIDE mmol/L 101 100 102 103   CO2 mmol/L 29 30 28 27   BUN mg/dL 26* 21 15 12   CREATININE mg/dL 1.39* 1.32* 1.09 1.00   EGFR ml/min/1.73sq m 50 54 68 75   CALCIUM mg/dL 8.4 8.5 9.1 8.7   AST U/L  --  14 13 15   ALT U/L  --  16 16 18   ALK PHOS U/L  --  39 45 49     Results from last 7 days   Lab Units 10/23/24  1852   MRSA CULTURE ONLY  No Methicillin Resistant Staphlyococcus aureus (MRSA) isolated

## 2024-10-30 NOTE — ASSESSMENT & PLAN NOTE
Home Rx: Furosemide 40 mg as needed, Imdur 30 mg daily, lisinopril 20 mg daily, Toprol-XL 50 mg daily  Current Rx: Imdur 30 mg daily, Toprol-XL 50 mg daily  Blood pressure Stable and at goal. Continue to hold lisinopril and diuretics at this time in the setting of recent SUSHILA.  Avoid hypotension

## 2024-10-30 NOTE — ASSESSMENT & PLAN NOTE
Lab Results   Component Value Date    HGBA1C 7.2 (H) 10/16/2024     Continue basal/prandial insulin with additional SSI coverage per Accu-Cheks - optimize dosing daily as necessary  Hypoglycemia protocol  Carbohydrate restriction  On Lyrica for neuropathy

## 2024-10-30 NOTE — PROGRESS NOTES
Podiatry - Progress Note  Patient: Clay Marcial 68 y.o. male   MRN: [id number]  PCP: No primary care provider on file.  Unit/Bed#: [facility] 825-01 Encounter: [id number]  Date Of Visit: 10/30/24    ASSESSMENT:    Clay Marcial is a 68 y.o. male with:    Diabetic foot infection   s/p left second toe amputation and wound debridement (10/21 /2024)  S/p TMA (10/28/2024)  Type 2 diabetes mellitus   A1c 7.2% (10/16/2024)  Coronary artery disease  Stage III chronic kidney disease  Chronic obstructive pulmonary disease  Hypertension      PLAN:    Patient was seen and evaluated at bedside with all questions and concerns addressed.  Dressing was changed.  Wound VAC was applied to the foot.  A thin piece of black sponge was inserted to the wound site with taping to approximate the plantar flap to the dorsal aspect.  The plantar wound the debridement site, medial and the lateral corners were further applied with Maxorb to reduce the chance of maceration.  Pinpoint bleeding was found and the plantar wound debridement site and the Surgifoam was applied to this location.  Please see procedure note for details.  Next dressing change by podiatry is planned on 11/1/2024.  Right TMA site wound wound shows granulation tissue to the wound bed with exposure of metatarsals.  Pinpoint bleeding was found.  No acute signs of infection to soft tissue was found.  Please refer to physical examination for details.   Patients' lab and vital signs were reviewed. Patient is afebrile with no leukocytosis. Patient does not  meet the SIRS criteria. Will keep monitoring.  Continue IV Zosyn pre recommendation from ID  Podiatry to perform dressing change at this point  Follow-up with  SLIM, vascular surgery, ID, PT, OT, CM recommendations  Elevation and offloading on green foam wedges or pillows when non-ambulatory.  Rest of care per primary team.     Weightbearing status: Non-weightbearing left  foot    Wound VAC application  1. Number of sponges:  "3  2. Pressure settin continuous  3. Size of wound: 12 x 5 x 4 cm  4. Description of wound: Wound located and the TMA site of left foot.  Wound bed shows granulation tissue with minimal bleeding.  Metatarsal stumps was found in the wound bed.  No purulence or other drainage was found.  Negative malodor.  Periwound skin showed minimum maceration of the medial and lateral corners.  The periwound skin showed brisk capillary refill with no edema, erythema or other signs of infection.    A Verbal time out was performed confirming correct patient and extremity.     Windowing technique was used to apply the wound vac. Dorsal and plantar flaps were protected with tapes.  The medial and lateral corners were further applied with Maxorb.  The plantar wound debridement site showed minimal bleeding, so Surgifoam and Maxorb was also applied.  A thin piece of black sponge was applied to the wound and the plantar flap was folded dorsally to approximate the dorsal flap with tape.  Bridging was applied to the dorsal lateral foot for section application.    SUBJECTIVE:     The patient was seen, evaluated, and assessed at bedside today. The patient was awake, alert, and in no acute distress. No acute events overnight. The patient reports no pain or discomfort from the left foot. Patient denies N/V/F/chills/SOB/CP.      OBJECTIVE:     Vitals:   /76   Pulse 89   Temp 98 °F (36.7 °C)   Resp 14   Ht 5' 11\" (1.803 m)   Wt 126 kg (278 lb)   SpO2 95%   BMI 38.77 kg/m²     Temp (24hrs), Av.2 °F (36.8 °C), Min:98 °F (36.7 °C), Max:98.4 °F (36.9 °C)      Physical Exam:     Lungs: Non labored breathing  Abdomen: Soft, non-tender.  Lower Extremity:  Cardiovascular status at baseline from admission.  Neurological status at baseline from admission.  Musculoskeletal status post left foot TMA. No calf tenderness noted.     Wound #: 1  Location: Left TMA  Length 12cm: Width 5cm: Depth 4cm:   Deepest Tissue Noted in Base: " "Bone  Probe to Bone: Yes -surgical  Peripheral Skin Description: Attached  Granulation: 80% Fibrotic Tissue: 20% Necrotic Tissue: 0%   Drainage Amount: minimal, bloody  Signs of Infection: Yes. positive  probe to bone, negative crepitus, fluctuance, negative purulence, negative periwound skin erythema, edema, negative proximal tracking erythema    Clinical Images 10/30/24:                Additional Data:     Labs:    Results from last 7 days   Lab Units 10/30/24  0546   WBC Thousand/uL 5.56   HEMOGLOBIN g/dL 9.9*   HEMATOCRIT % 30.4*   PLATELETS Thousands/uL 311   SEGS PCT % 63   LYMPHO PCT % 25   MONO PCT % 7   EOS PCT % 3     Results from last 7 days   Lab Units 10/30/24  0546 10/29/24  0515   POTASSIUM mmol/L 5.0 4.5   CHLORIDE mmol/L 101 100   CO2 mmol/L 29 30   BUN mg/dL 26* 21   CREATININE mg/dL 1.39* 1.32*   CALCIUM mg/dL 8.4 8.5   ALK PHOS U/L  --  39   ALT U/L  --  16   AST U/L  --  14           * I Have Reviewed All Lab Data Listed Above.    Recent Cultures (last 7 days):               Imaging: I have personally reviewed pertinent films in PACS  EKG, Pathology, and Other Studies: I have personally reviewed pertinent reports.    ** Please Note: Portions of the record may have been created with voice recognition software. Occasional wrong word or \"sound a like\" substitutions may have occurred due to the inherent limitations of voice recognition software. Read the chart carefully and recognize, using context, where substitutions have occurred. **      "

## 2024-10-30 NOTE — PLAN OF CARE
Problem: PAIN - ADULT  Goal: Verbalizes/displays adequate comfort level or baseline comfort level  Description: Interventions:  - Encourage patient to monitor pain and request assistance  - Assess pain using appropriate pain scale  - Administer analgesics based on type and severity of pain and evaluate response  - Implement non-pharmacological measures as appropriate and evaluate response  - Consider cultural and social influences on pain and pain management  - Notify physician/advanced practitioner if interventions unsuccessful or patient reports new pain  Outcome: Progressing     Problem: INFECTION - ADULT  Goal: Absence or prevention of progression during hospitalization  Description: INTERVENTIONS:  - Assess and monitor for signs and symptoms of infection  - Monitor lab/diagnostic results  - Monitor all insertion sites, i.e. indwelling lines, tubes, and drains  - Monitor endotracheal if appropriate and nasal secretions for changes in amount and color  - Wray appropriate cooling/warming therapies per order  - Administer medications as ordered  - Instruct and encourage patient and family to use good hand hygiene technique  - Identify and instruct in appropriate isolation precautions for identified infection/condition  Outcome: Progressing  Goal: Absence of fever/infection during neutropenic period  Description: INTERVENTIONS:  - Monitor WBC    Outcome: Progressing     Problem: SAFETY ADULT  Goal: Patient will remain free of falls  Description: INTERVENTIONS:  - Educate patient/family on patient safety including physical limitations  - Instruct patient to call for assistance with activity   - Consult OT/PT to assist with strengthening/mobility   - Keep Call bell within reach  - Keep bed low and locked with side rails adjusted as appropriate  - Keep care items and personal belongings within reach  - Initiate and maintain comfort rounds  - Make Fall Risk Sign visible to staff  - Offer Toileting every  Hours,  in advance of need  - Initiate/Maintain alarm  - Obtain necessary fall risk management equipment:   - Apply yellow socks and bracelet for high fall risk patients  - Consider moving patient to room near nurses station  Outcome: Progressing  Goal: Maintain or return to baseline ADL function  Description: INTERVENTIONS:  -  Assess patient's ability to carry out ADLs; assess patient's baseline for ADL function and identify physical deficits which impact ability to perform ADLs (bathing, care of mouth/teeth, toileting, grooming, dressing, etc.)  - Assess/evaluate cause of self-care deficits   - Assess range of motion  - Assess patient's mobility; develop plan if impaired  - Assess patient's need for assistive devices and provide as appropriate  - Encourage maximum independence but intervene and supervise when necessary  - Involve family in performance of ADLs  - Assess for home care needs following discharge   - Consider OT consult to assist with ADL evaluation and planning for discharge  - Provide patient education as appropriate  Outcome: Progressing  Goal: Maintains/Returns to pre admission functional level  Description: INTERVENTIONS:  - Perform AM-PAC 6 Click Basic Mobility/ Daily Activity assessment daily.  - Set and communicate daily mobility goal to care team and patient/family/caregiver.   - Collaborate with rehabilitation services on mobility goals if consulted  - Perform Range of Motion  times a day.  - Reposition patient every  hours.  - Dangle patient  times a day  - Stand patient  times a day  - Ambulate patient  times a day  - Out of bed to chair  times a day   - Out of bed for meals  times a day  - Out of bed for toileting  - Record patient progress and toleration of activity level   Outcome: Progressing     Problem: DISCHARGE PLANNING  Goal: Discharge to home or other facility with appropriate resources  Description: INTERVENTIONS:  - Identify barriers to discharge w/patient and caregiver  - Arrange for  needed discharge resources and transportation as appropriate  - Identify discharge learning needs (meds, wound care, etc.)  - Arrange for interpretive services to assist at discharge as needed  - Refer to Case Management Department for coordinating discharge planning if the patient needs post-hospital services based on physician/advanced practitioner order or complex needs related to functional status, cognitive ability, or social support system  Outcome: Progressing     Problem: Knowledge Deficit  Goal: Patient/family/caregiver demonstrates understanding of disease process, treatment plan, medications, and discharge instructions  Description: Complete learning assessment and assess knowledge base.  Interventions:  - Provide teaching at level of understanding  - Provide teaching via preferred learning methods  Outcome: Progressing     Problem: Nutrition/Hydration-ADULT  Goal: Nutrient/Hydration intake appropriate for improving, restoring or maintaining nutritional needs  Description: Monitor and assess patient's nutrition/hydration status for malnutrition. Collaborate with interdisciplinary team and initiate plan and interventions as ordered.  Monitor patient's weight and dietary intake as ordered or per policy. Utilize nutrition screening tool and intervene as necessary. Determine patient's food preferences and provide high-protein, high-caloric foods as appropriate.     INTERVENTIONS:  - Monitor oral intake, urinary output, labs, and treatment plans  - Assess nutrition and hydration status and recommend course of action  - Evaluate amount of meals eaten  - Assist patient with eating if necessary   - Allow adequate time for meals  - Recommend/ encourage appropriate diets, oral nutritional supplements, and vitamin/mineral supplements  - Order, calculate, and assess calorie counts as needed  - Recommend, monitor, and adjust tube feedings and TPN/PPN based on assessed needs  - Assess need for intravenous fluids  -  Provide specific nutrition/hydration education as appropriate  - Include patient/family/caregiver in decisions related to nutrition  Outcome: Progressing     Problem: Prexisting or High Potential for Compromised Skin Integrity  Goal: Skin integrity is maintained or improved  Description: INTERVENTIONS:  - Identify patients at risk for skin breakdown  - Assess and monitor skin integrity  - Assess and monitor nutrition and hydration status  - Monitor labs   - Assess for incontinence   - Turn and reposition patient  - Assist with mobility/ambulation  - Relieve pressure over bony prominences  - Avoid friction and shearing  - Provide appropriate hygiene as needed including keeping skin clean and dry  - Evaluate need for skin moisturizer/barrier cream  - Collaborate with interdisciplinary team   - Patient/family teaching  - Consider wound care consult   Outcome: Progressing

## 2024-10-30 NOTE — PROGRESS NOTES
Progress Note - Hospitalist   Name: Clay Marcial 68 y.o. male I MRN: [id number]  [facility] Date of Admission: 10/22/2024   Date of Service: 10/30/2024 I Hospital Day: 8    Assessment & Plan  Diabetic foot infection  (HCC)  Initially presented at the [facility] with left lower extremity cellulitis with open wound  S/p I&D, L 2nd toe amputation with podiatry on 10/21 given concern for acutely worsening wound/infection   Transferred to the [facility] for vascular surgery/IR evaluations given concern also for embolic process/ischemia   Agram 10/25   Status post left foot TMA on 10/28  Continue IV heparin gtt   Appreciate infectious disease input  Continue IV Zosyn until decision made on plan/timing of delayed closure  Wound culture from OR on 10/21 with polymicrobial growth including Bacteroides pyogenes, Finegoldia magna, Actinomyces species, coagulase-negative Staphylococcus, and Globicatella species  Initial plan for wound VAC placement yesterday by podiatry postponed as wound site noted to still be bleeding, stabilized with compression and cautery -> underwent wound VAC placement today with plan for subsequent dressing change on 11/1  Type 2 diabetes mellitus with diabetic polyneuropathy (HCC)  Lab Results   Component Value Date    HGBA1C 7.2 (H) 10/16/2024     Continue basal/prandial insulin with additional SSI coverage per Accu-Cheks - optimize dosing daily as necessary  Hypoglycemia protocol  Carbohydrate restriction  On Lyrica for neuropathy  Stage 3 chronic kidney disease (HCC)  Baseline creatinine of approximately 1.2-1.4 -> currently stable at 1.39  Monitor renal function and urine output  Limit/avoid nephrotoxins and hypotension as possible -> holding diuretic/ACEI  Nephrology following  HTN (hypertension)  Continue Imdur/Toprol-XL  Lasix/ACEI held per nephrology -> resume when able  CAD (coronary artery disease)  Continue ASA/statin/Toprol-XL/Imdur  Chronic obstructive  pulmonary disease with acute exacerbation (HCC)  Continue Pulmicort/Olodaterol/Umeclidinium - PRN Albuterol on board  Hypomagnesemia  Monitor/replete serum magnesium and potassium as necessary  Obesity  BMI of 38.77  Lifestyle/diet modifications      VTE Pharmacologic Prophylaxis: VTE Score: 5 High Risk (Score >/= 5) - Pharmacological DVT Prophylaxis Ordered: Heparin Drip. Sequential Compression Devices Ordered.    AM-PAC Basic Mobility:  Basic Mobility Inpatient Raw Score: 15  JH-HLM Goal: 4: Move to chair/commode  JH-HLM Achieved: 6: Walk 10 steps or more  JH-HLM Goal achieved. Continue to encourage appropriate mobility.    Patient Centered Rounds:  I have performed bedside rounds and discussed plan of care with nursing today.  Discussions with Specialists or Other Care Team Provider:  see above assessments if applicable    Education and Discussions with Family / Patient:  Patient at bedside, who denied need to update other contacts currently    Time Spent for Care:  35 minutes. More than 50% of total time spent on counseling and coordination of care, on one or more of the following: performing physical exam; counseling and coordination of care, obtaining or reviewing history, documenting in the medical record, reviewing/ordering tests/medications/procedures, and communicating with other healthcare professionals.    Current Length of Stay: 8 day(s)  Current Patient Status: Inpatient   Certification Statement:  Patient will continue to require additional hospital stay due to assessments as noted above.    Code Status: Level 1 - Full Code      Subjective     Encountered earlier in the day.  Denies uncontrolled pain at this time.      Objective     Vitals:   Temp (24hrs), Av.2 °F (36.8 °C), Min:98 °F (36.7 °C), Max:98.4 °F (36.9 °C)    Temp:  [98 °F (36.7 °C)-98.4 °F (36.9 °C)] 98.3 °F (36.8 °C)  HR:  [89-98] 98  Resp:  [14-18] 16  BP: (126-138)/(67-77) 128/77  SpO2:  [94 %-96 %] 95 %  Body mass index is 38.77  kg/m².     Input and Output Summary (last 24 hours):       Intake/Output Summary (Last 24 hours) at 10/30/2024 1814  Last data filed at 10/30/2024 1813  Gross per 24 hour   Intake 4711.09 ml   Output 3280 ml   Net 1431.09 ml       Physical Exam:     GENERAL No acute distress at rest   HEAD   Normocephalic - atraumatic   EYES Nonicteric   MOUTH   Mucosa moist   NECK   Supple - full range of motion   CARDIAC Rate controlled   PULMONARY Clear bilateral breath sounds   ABDOMEN Nontender/nondistended   MUSCULOSKELETAL   Motor strength/range of motion remains deconditioned   NEUROLOGIC   Alert/oriented at baseline   SKIN   Chronic wrinkles/blemishes - s/p left TMA with site dressed/wrapped currently with wound VAC in place   PSYCHIATRIC   Mood/affect stable         Labs & Recent Cultures:  Results from last 7 days   Lab Units 10/30/24  0546   WBC Thousand/uL 5.56   HEMOGLOBIN g/dL 9.9*   HEMATOCRIT % 30.4*   PLATELETS Thousands/uL 311   SEGS PCT % 63   LYMPHO PCT % 25   MONO PCT % 7   EOS PCT % 3     Results from last 7 days   Lab Units 10/30/24  0546 10/29/24  0515   POTASSIUM mmol/L 5.0 4.5   CHLORIDE mmol/L 101 100   CO2 mmol/L 29 30   BUN mg/dL 26* 21   CREATININE mg/dL 1.39* 1.32*   CALCIUM mg/dL 8.4 8.5   ALK PHOS U/L  --  39   ALT U/L  --  16   AST U/L  --  14         Results from last 7 days   Lab Units 10/30/24  1656 10/30/24  1126 10/30/24  0625 10/29/24  2058 10/29/24  1602 10/29/24  1110 10/29/24  0711 10/28/24  2105 10/28/24  1428 10/28/24  1108 10/28/24  0736 10/27/24  2133   POC GLUCOSE mg/dl 266* 325* 342* 321* 338* 315* 232* 293* 192* 212* 231* 255*                         Lines/Drains/Telemetry:  Invasive Devices       Peripheral Intravenous Line  Duration             Peripheral IV 10/27/24 Dorsal (posterior);Right Forearm 2 days    Peripheral IV 10/29/24 Left;Ventral (anterior) Forearm 1 day                    Last 24 Hours Medication List:   Current Facility-Administered Medications   Medication Dose  Route Frequency Provider Last Rate    acetaminophen  650 mg Oral Q6H PRN Real Hernandes DPM      albuterol  2.5 mg Nebulization Q6H PRN Real Hernandes DPM      aspirin  81 mg Oral Daily Real Hernandes DPM      atorvastatin  20 mg Oral Daily Real Hernandes DPM      bisacodyl  10 mg Rectal Daily PRN Real Hernandes DPM      budesonide  0.5 mg Nebulization Q12H Real Hernandes DPM      Cholecalciferol  1,000 Units Oral Daily Real Hernandes DPM      cyanocobalamin  500 mcg Oral Daily Real Hernandes DPM      docusate sodium  100 mg Oral BID Real Hernandes DPM      DULoxetine  20 mg Oral Daily Real Hernandes DPM      fluticasone  2 spray Nasal Daily Real Hernandes DPM      heparin (porcine)  3-30 Units/kg/hr (Order-Specific) Intravenous Titrated Courtney DAVID Benitez PA-C 18 Units/kg/hr (10/30/24 1235)    heparin (porcine)  4,800 Units Intravenous Q6H PRN Courtney DAVID Benitez PA-C      heparin (porcine)  9,600 Units Intravenous Q6H PRN Courtney Benitez PA-C      insulin glargine  33 Units Subcutaneous HS Rosy Bhatt MD      insulin lispro  13 Units Subcutaneous TID With Meals Rosy Bhatt MD      insulin lispro  2-12 Units Subcutaneous 4x Daily (AC & HS) Real Hernandes DPM      isosorbide mononitrate  30 mg Oral Daily Real Hernandes DPM      loratadine  10 mg Oral Daily Real Hernandes DPM      metoprolol succinate  50 mg Oral Daily Real Hernandes DPM      morphine injection  2 mg Intravenous Q4H PRN Real Hernandes DPM      multi-electrolyte  50 mL/hr Intravenous Continuous Sebas Dover MD 50 mL/hr (10/30/24 0550)    nicotine  1 patch Transdermal Daily Real Hernandes DPM      umeclidinium  1 puff Inhalation Daily Real Hernandes DPM      And    olodaterol HCl  2 puff Inhalation Daily Rela Hernandes DPM      ondansetron  4 mg Intravenous Q4H PRN Real J Cecilio, DPM      oxyCODONE  5 mg Oral Q4H PRN Real Hernandes, SHIN      oxyCODONE  2.5 mg Oral Q4H PRN Real Hernandes, SHIN      pantoprazole  40 mg Oral BID AC  Real Hernandes DPM      piperacillin-tazobactam  4.5 g Intravenous Q8H Real Hernandes DPM 4.5 g (10/30/24 1527)    polyethylene glycol  17 g Oral Daily Real Hernandes DPM      pregabalin  100 mg Oral BID Real Hernandes DPM      sodium chloride  2 spray Each Nare BID Real Hernandes DPM      traZODone  25 mg Oral HS SHIN Schneider MD   Hospitalist - [facility]        ** Please Note:  Documentation is constructed using a voice recognition dictation system.  An occasional wrong word/phrase or “sound-a-like” substitution may have been picked up by dictation device due to the inherent limitations of voice recognition software.  Read the chart carefully and recognize, using reasonable context, where substitutions may have occurred.**

## 2024-10-30 NOTE — OCCUPATIONAL THERAPY NOTE
"  Occupational Therapy Progress Note     Patient Name: Clay Marcial  Today's Date: 10/30/2024  Problem List  Principal Problem:    Diabetic foot infection  (HCC)  Active Problems:    Type 2 diabetes mellitus with diabetic polyneuropathy (HCC)    CAD (coronary artery disease)    Stage 3 chronic kidney disease (HCC)    Chronic obstructive pulmonary disease with acute exacerbation (HCC)    HTN (hypertension)    Elevated serum creatinine    Hypomagnesemia          10/30/24 1342   OT Last Visit   OT Visit Date 10/30/24   Note Type   Note Type Treatment   Pain Assessment   Pain Assessment Tool 0-10   Pain Score No Pain   Hospital Pain Intervention(s) Repositioned;Ambulation/increased activity;Emotional support   Restrictions/Precautions   Weight Bearing Precautions Per Order Yes   LLE Weight Bearing Per Order (S)  NWB   Other Precautions WBS;Multiple lines;Fall Risk  (wound vac)   Lifestyle   Autonomy IND PTA with all ADLs/IADLs. RW used PRN. +.   Reciprocal Relationships Lives with \"lady friend\"   Service to Others Retired   Intrinsic Gratification Traveling with his brother in [facility].   ADL   Where Assessed Edge of bed   Eating Assistance 5  Supervision/Setup   Eating Deficit Setup;Beverage management   Bed Mobility   Supine to Sit 5  Supervision   Additional items Increased time required;Verbal cues   Sit to Supine 5  Supervision   Additional items Increased time required;Verbal cues   Additional Comments At end of session, pt requesting to return to supine position, left with all functional needs in reach   Transfers   Sit to Stand 4  Minimal assistance   Additional items Assist x 1;Increased time required;Verbal cues   Stand to Sit 4  Minimal assistance   Additional items Assist x 1;Increased time required;Verbal cues   Stand pivot 4  Minimal assistance   Additional items Assist x 1;Increased time required;Verbal cues   Additional Comments Simuated SPT from EOB <> BSC with Min A x1 w/ RW for safety and " "support with good carryover of NWB in LLE   Functional Mobility   Additional Comments Stand pivot from EOB <> simulated BSC with Min A x1 w/ RW   Additional items Rolling walker   Subjective   Subjective \"I have to do what I have to do.\"   Cognition   Overall Cognitive Status WFL   Arousal/Participation Alert;Responsive;Arousable;Cooperative   Attention Within functional limits   Orientation Level Oriented X4   Following Commands Follows one step commands without difficulty   Comments Pt pleasant and cooperative; good carryover of NWB in LLE   Activity Tolerance   Activity Tolerance Patient tolerated treatment well   Medical Staff Made Aware RN cleared   Assessment   Assessment Pt is a 69 yo male who actively participated in skilled OT session on 10/30/2024. Pt is NWB in LLE. Treatment focused to improve functional transfers with fall prevention strategies, static/dynamic balance, postural/trunk control, proper body mechanics, functional use of b/l UE's, safety awareness, and overall increased activity tolerance in ADL/IADL/leisure tasks. Upon arrival, pt found lying supine in bed and was agreeable to OT session. Pt performed supine <> sit @ supervision level, completed STS with Min A x 1 w/ RW, and completed simulated BSC transfer with Min A x1 w/ RW. Pt demonstrating good carryover of NWB in LLE. At the end of the session, pt was left lying supine in bed with all functional needs in reach. Pt demonstrates gradual functional improvements towards OT goals however continues to be functioning below occupational baseline and is still limited by the following limitations/impairments which were addressed through skilled instruction: NWB in LLE, generalized weakness, balance, endurance/activity tolerance, postural/trunk control, strength, pain, and safety awareness. At this time, recommend discharge w/ Level 2 resources, pending continued functional progress, when medically stable. The patient's raw score on the AM-PAC " Daily Activity Inpatient Short Form is 20. A raw score of greater than or equal to 19 suggests the patient may benefit from discharge to home. Please refer to the recommendation of the Occupational Therapist for safe discharge planning.  Established OT goals will be continued 2-3x/wk to address acute care needs and underlying performance skills to maximize occupational performance and safety to return to OF.   Plan   Treatment Interventions ADL retraining;Functional transfer training;UE strengthening/ROM;Endurance training;Patient/family training;Equipment evaluation/education;Compensatory technique education;Continued evaluation;Activityengagement;Energy conservation   Goal Expiration Date 11/12/24   OT Treatment Day 1   OT Frequency 2-3x/wk   Discharge Recommendation   Rehab Resource Intensity Level, OT II (Moderate Resource Intensity)  (Pending continued functional progress)   AM-PAC Daily Activity Inpatient   Lower Body Dressing 3   Bathing 3   Toileting 3   Upper Body Dressing 3   Grooming 4   Eating 4   Daily Activity Raw Score 20   Daily Activity Standardized Score (Calc for Raw Score >=11) 42.03   AM-PAC Applied Cognition Inpatient   Following a Speech/Presentation 4   Understanding Ordinary Conversation 4   Taking Medications 4   Remembering Where Things Are Placed or Put Away 4   Remembering List of 4-5 Errands 4   Taking Care of Complicated Tasks 4   Applied Cognition Raw Score 24   Applied Cognition Standardized Score 62.21   End of Consult   Education Provided Yes   Patient Position at End of Consult Supine;All needs within reach   Nurse Communication Nurse aware of consult       Stacie Scott MS, OTR/L

## 2024-10-30 NOTE — ASSESSMENT & PLAN NOTE
Baseline creatinine of approximately 1.2-1.4 -> currently stable at 1.39  Monitor renal function and urine output  Limit/avoid nephrotoxins and hypotension as possible -> holding diuretic/ACEI  Nephrology following

## 2024-10-30 NOTE — PROGRESS NOTES
NEPHROLOGY PROGRESS NOTE   Clay Marcial 68 y.o. male MRN: [id number]  Unit/Bed#: [facility] 825-01 Encounter: [id number]    ASSESSMENT & PLAN:  Assessment & Plan  Elevated serum creatinine  Etiology of elevated creatinine most likely due to autoregulatory failure in the setting of ACE inhibitor use, SGLT2 inhibitor use and diuretic use  Baseline creatinine appears to be around 1.3-1.4 in setting of diabetes and hypertension  Creatinine on  admission 1.58 mg/dl   Peak creatinine 1.7 on 10/17  Status post lower extremity angiogram on 10/25.  No evidence of contrast nephropathy  Underwent left TMA on 10/28  Renal function worsened postop to creatinine 1.32 mg/dL on 10/29 but stable today at creatinine 1.39 mg/dL.  Blood pressure has been stable, continue current IV fluid Isolyte at 50 mL/h for another 24 hours.  Continue to avoid nephrotoxins.  If renal function continue to worsen would consider possibility of AIN as well from use of antibiotics    Stage 3 chronic kidney disease (HCC)  Baseline creatinine 1.3-1.4  Needs outpatient follow-up with nephrology  HTN (hypertension)  Home Rx: Furosemide 40 mg as needed, Imdur 30 mg daily, lisinopril 20 mg daily, Toprol-XL 50 mg daily  Current Rx: Imdur 30 mg daily, Toprol-XL 50 mg daily  Blood pressure Stable and at goal. Continue to hold lisinopril and diuretics at this time in the setting of recent SUSHILA.  Avoid hypotension  Hypomagnesemia  Magnesium level improved to 1.9 status post replacement, continue to monitor  Diabetic foot infection  (HCC)     Status post left foot TMA on 10/28.  Continue antibiotics per primary team and management per primary team-currently on Zosyn  Type 2 diabetes mellitus with diabetic polyneuropathy (HCC)  Management per primary team  Continue to hold metformin and Jardiance for now  Chronic obstructive pulmonary disease with acute exacerbation (HCC)  Management per primary team  CAD (coronary artery disease)  Management per primary team    "  Discussed with primary team that renal function is stable and will continue IV fluid for another 24 hours and primary team agreed with the plan    SUBJECTIVE:  Patient denies any chest pain or shortness of breath, no nausea vomiting    OBJECTIVE:  Current Weight: Weight - Scale: 126 kg (278 lb)  Vitals:    10/30/24 0743   BP:    Pulse:    Resp:    Temp:    SpO2: 96%   /76   Pulse 89   Temp 98 °F (36.7 °C)   Resp 14   Ht 5' 11\" (1.803 m)   Wt 126 kg (278 lb)   SpO2 96%   BMI 38.77 kg/m²       Intake/Output Summary (Last 24 hours) at 10/30/2024 1158  Last data filed at 10/30/2024 1039  Gross per 24 hour   Intake 4471.09 ml   Output 4280 ml   Net 191.09 ml       Physical Exam  General:  Ill looking, awake.  Eyes: Conjunctivae pink,  Sclera anicteric  ENT: lips and mucous membranes moist  Neck: supple   Chest: Clear to Auscultation both lungs,  no crackles, ronchus or wheezing.  CVS: S1 & S2 present, normal rate, regular rhythm, no murmur.  Abdomen: soft, non-tender, non-distended, Bowel sounds normoactive  Extremities: no edema of  legs  Skin: no rash  Neuro: awake, alert, oriented x 3   Psych: Mood and affect appropriate    Medications:    Current Facility-Administered Medications:     acetaminophen (TYLENOL) tablet 650 mg, 650 mg, Oral, Q6H PRN, Real Hernandes, DPM    albuterol inhalation solution 2.5 mg, 2.5 mg, Nebulization, Q6H PRN, Real Hernandes, DPM    aspirin chewable tablet 81 mg, 81 mg, Oral, Daily, Real J Cecilio, DPM, 81 mg at 10/30/24 1013    atorvastatin (LIPITOR) tablet 20 mg, 20 mg, Oral, Daily, Real CAMPOS Cecilio, DPM, 20 mg at 10/30/24 1013    bisacodyl (DULCOLAX) rectal suppository 10 mg, 10 mg, Rectal, Daily PRN, Real Hernandes, DPM    budesonide (PULMICORT) inhalation solution 0.5 mg, 0.5 mg, Nebulization, Q12H, Real Hernandes, DPM, 0.5 mg at 10/30/24 0738    Cholecalciferol (VITAMIN D3) tablet 1,000 Units, 1,000 Units, Oral, Daily, Real Hernandes DPM, 1,000 Units at 10/30/24 1013    " cyanocobalamin (VITAMIN B-12) tablet 500 mcg, 500 mcg, Oral, Daily, Real Hernandes DPM, 500 mcg at 10/30/24 1013    docusate sodium (COLACE) capsule 100 mg, 100 mg, Oral, BID, GORDO SchneiderM, 100 mg at 10/30/24 1013    DULoxetine (CYMBALTA) delayed release capsule 20 mg, 20 mg, Oral, Daily, Real Hernandes DPM, 20 mg at 10/30/24 1013    fluticasone (FLONASE) 50 mcg/act nasal spray 2 spray, 2 spray, Nasal, Daily, Real Hernandes DPM, 2 spray at 10/25/24 0833    heparin (porcine) 25,000 units in 0.45% NaCl 250 mL infusion (premix), 3-30 Units/kg/hr (Order-Specific), Intravenous, Titrated, Courtney Benitez PA-C, Last Rate: 24 mL/hr at 10/30/24 0136, 20 Units/kg/hr at 10/30/24 0136    heparin (porcine) injection 4,800 Units, 4,800 Units, Intravenous, Q6H PRN, Courtney Benitez PA-C, 4,800 Units at 10/29/24 2215    heparin (porcine) injection 9,600 Units, 9,600 Units, Intravenous, Q6H PRN, Courtney Benitez PA-C    insulin glargine (LANTUS) subcutaneous injection 33 Units 0.33 mL, 33 Units, Subcutaneous, HS, Rosy Bhatt MD, 33 Units at 10/29/24 2212    insulin lispro (HumALOG/ADMELOG) 100 units/mL subcutaneous injection 13 Units, 13 Units, Subcutaneous, TID With Meals, Rosy Bhatt MD, 13 Units at 10/30/24 0800    insulin lispro (HumALOG/ADMELOG) 100 units/mL subcutaneous injection 2-12 Units, 2-12 Units, Subcutaneous, 4x Daily (AC & HS), 8 Units at 10/30/24 0626 **AND** Fingerstick Glucose (POCT), , , 4x Daily AC and at bedtime, Real Hernandes DPM    isosorbide mononitrate (IMDUR) 24 hr tablet 30 mg, 30 mg, Oral, Daily, Real Hernandes DPM, 30 mg at 10/30/24 1013    loratadine (CLARITIN) tablet 10 mg, 10 mg, Oral, Daily, Real Hernandes DPM, 10 mg at 10/28/24 0812    metoprolol succinate (TOPROL-XL) 24 hr tablet 50 mg, 50 mg, Oral, Daily, Real Hernandes DPM, 50 mg at 10/30/24 1013    morphine injection 2 mg, 2 mg, Intravenous, Q4H PRN, Real Hernandes DPM    multi-electrolyte (PLASMALYTE-A/ISOLYTE-S PH 7.4)  IV solution, 50 mL/hr, Intravenous, Continuous, Sebas Dover MD, Last Rate: 50 mL/hr at 10/30/24 0550, 50 mL/hr at 10/30/24 0550    nicotine (NICODERM CQ) 14 mg/24hr TD 24 hr patch 1 patch, 1 patch, Transdermal, Daily, Real Hernandes DPM, 1 patch at 10/30/24 1013    umeclidinium 62.5 mcg/actuation inhaler AEPB 1 puff, 1 puff, Inhalation, Daily, 1 puff at 10/29/24 0820 **AND** olodaterol HCl (STRIVERDI RESPIMAT) inhaler 2 puff, 2 puff, Inhalation, Daily, Real Hernandes DPM, 2 puff at 10/29/24 0820    ondansetron (ZOFRAN) injection 4 mg, 4 mg, Intravenous, Q4H PRN, Real Hernandes DPM    oxyCODONE (ROXICODONE) IR tablet 5 mg, 5 mg, Oral, Q4H PRN, Real Hernandes DPM    oxyCODONE (ROXICODONE) split tablet 2.5 mg, 2.5 mg, Oral, Q4H PRN, Real Hernandes DPM    pantoprazole (PROTONIX) EC tablet 40 mg, 40 mg, Oral, BID AC, Real Hernandes DPM, 40 mg at 10/30/24 0626    piperacillin-tazobactam (ZOSYN) 4.5 g in sodium chloride 0.9 % 100 mL IVPB (EXTENDED INFUSION), 4.5 g, Intravenous, Q8H, Real Hernandes DPM, Last Rate: 25 mL/hr at 10/30/24 0550, 4.5 g at 10/30/24 0550    polyethylene glycol (MIRALAX) packet 17 g, 17 g, Oral, Daily, Real Hernandes DPM, 17 g at 10/27/24 0803    pregabalin (LYRICA) capsule 100 mg, 100 mg, Oral, BID, Real Hernandes DPM, 100 mg at 10/30/24 1013    sodium chloride (OCEAN) 0.65 % nasal spray 2 spray, 2 spray, Each Nare, BID, Real Hernandes DPM, 2 spray at 10/29/24 0819    traZODone (DESYREL) tablet 25 mg, 25 mg, Oral, HS, Real Hernandes DPM, 25 mg at 10/29/24 2214    Invasive Devices:        Lab Results:   Results from last 7 days   Lab Units 10/30/24  0546 10/29/24  0515 10/28/24  0459 10/27/24  0511   WBC Thousand/uL 5.56 7.23 5.55 5.78   HEMOGLOBIN g/dL 9.9* 10.1* 11.4* 10.9*   HEMATOCRIT % 30.4* 31.7* 34.5* 33.3*   PLATELETS Thousands/uL 311 339 331 370   POTASSIUM mmol/L 5.0 4.5 4.3 4.0   CHLORIDE mmol/L 101 100 102 103   CO2 mmol/L 29 30 28 27   BUN mg/dL 26* 21 15 12   CREATININE  "mg/dL 1.39* 1.32* 1.09 1.00   CALCIUM mg/dL 8.4 8.5 9.1 8.7   MAGNESIUM mg/dL 1.9 1.7* 1.6* 1.7*   ALK PHOS U/L  --  39 45 49   ALT U/L  --  16 16 18   AST U/L  --  14 13 15       Previous work up:         Portions of the record may have been created with voice recognition software. Occasional wrong word or \"sound a like\" substitutions may have occurred due to the inherent limitations of voice recognition software. Read the chart carefully and recognize, using context, where substitutions have occurred.If you have any questions, please contact the dictating provider.    "

## 2024-10-30 NOTE — ANESTHESIA POSTPROCEDURE EVALUATION
Post-Op Assessment Note    CV Status:  Stable    Pain management: adequate       Mental Status:  Alert and awake   Hydration Status:  Euvolemic   PONV Controlled:  Controlled   Airway Patency:  Patent     Post Op Vitals Reviewed: Yes    No anethesia notable event occurred.    Staff: Anesthesiologist, CRNA           Last Filed PACU Vitals:  Vitals Value Taken Time   Temp 97.2 °F (36.2 °C) 10/28/24 1522   Pulse 80 10/28/24 1602   /60 10/28/24 1600   Resp 19 10/28/24 1602   SpO2 92 % 10/28/24 1602   Vitals shown include unfiled device data.    Modified [place]via:  Activity: 2 (10/28/2024  3:22 PM)  Respiration: 2 (10/28/2024  3:22 PM)  Circulation: 2 (10/28/2024  3:22 PM)  Consciousness: 2 (10/28/2024  3:22 PM)  Oxygen Saturation: 2 (10/28/2024  3:22 PM)  Modified Nolvia Score: 10 (10/28/2024  3:22 PM)

## 2024-10-30 NOTE — ASSESSMENT & PLAN NOTE
Etiology of elevated creatinine most likely due to autoregulatory failure in the setting of ACE inhibitor use, SGLT2 inhibitor use and diuretic use  Baseline creatinine appears to be around 1.3-1.4 in setting of diabetes and hypertension  Creatinine on  admission 1.58 mg/dl   Peak creatinine 1.7 on 10/17  Status post lower extremity angiogram on 10/25.  No evidence of contrast nephropathy  Underwent left TMA on 10/28  Renal function worsened postop to creatinine 1.32 mg/dL on 10/29 but stable today at creatinine 1.39 mg/dL.  Blood pressure has been stable, continue current IV fluid Isolyte at 50 mL/h for another 24 hours.  Continue to avoid nephrotoxins.  If renal function continue to worsen would consider possibility of AIN as well from use of antibiotics

## 2024-10-30 NOTE — CASE MANAGEMENT
Case Management Discharge Planning Note    Patient name Clay Marcial  Location [facility] 825/[facility] 825-01 MRN [id number]  : 1955 Date 10/30/2024       Current Admission Date: 10/22/2024  Current Admission Diagnosis:Diabetic foot infection  (HCC)   Patient Active Problem List    Diagnosis Date Noted Date Diagnosed    Hypomagnesemia 10/28/2024     HTN (hypertension) 10/21/2024     Elevated serum creatinine 10/21/2024     Smoking 10/21/2024     PAD (peripheral artery disease) (HCC) 10/20/2024     Acute hyponatremia 10/20/2024     Chronic obstructive pulmonary disease with acute exacerbation ([facility]) 10/17/2024     ROMEL (obstructive sleep apnea) 10/17/2024     Diabetic foot infection  (HCC) 10/16/2024     Stage 3 chronic kidney disease ([facility]) 10/16/2024     Type 2 diabetes mellitus with diabetic polyneuropathy ([facility]) 2024     CAD (coronary artery disease) 2024     Chronic diastolic congestive heart failure ([facility]) 2024     Acute on chronic kidney failure  ([facility]) 2024     Helicobacter pylori infection 2024       LOS (days): 8  Geometric Mean LOS (GMLOS) (days): 7.3  Days to GMLOS:-0.3     OBJECTIVE:  Risk of Unplanned Readmission Score: 37.12         Current admission status: Inpatient   Preferred Pharmacy:   [facility]/pharmacy #[id number] - [place], [place] - [address] [place] [id number]  Phone: [phone] Fax: [phone]    [facility] [place], [place] - [address]  [address] [facility] [address]  Phone: [phone] Fax: [phone]    Primary Care Provider: No primary care provider on file.    Primary Insurance: [facility] [employer] [facility]  Secondary Insurance:     DISCHARGE DETAILS:           Met with patient at bedside to discuss STR rec. Patient refuses, states he would like to resume with [facility] for PT/OT and SN   Patient requesting knee scooter     TC placed to [facility] ( [phone] ext: [phone]). She  reports she will order for him.   Discussed that pt likely will d/c home with wound vac, pending additional surgical intervention as well.     CM to follow.

## 2024-10-30 NOTE — PLAN OF CARE
Problem: OCCUPATIONAL THERAPY ADULT  Goal: Performs self-care activities at highest level of function for planned discharge setting.  See evaluation for individualized goals.  Description: Treatment Interventions: ADL retraining, Functional transfer training, UE strengthening/ROM, Endurance training, Patient/family training, Cognitive reorientation, Equipment evaluation/education, Fine motor coordination activities, Compensatory technique education, Continued evaluation, Energy conservation, Activityengagement          See flowsheet documentation for full assessment, interventions and recommendations.   Note: Limitation: Decreased ADL status, Decreased endurance, Decreased self-care trans, Decreased high-level ADLs  Prognosis: Good  Assessment: Pt is a 69 yo male who actively participated in skilled OT session on 10/30/2024. Pt is NWB in LLE. Treatment focused to improve functional transfers with fall prevention strategies, static/dynamic balance, postural/trunk control, proper body mechanics, functional use of b/l UE's, safety awareness, and overall increased activity tolerance in ADL/IADL/leisure tasks. Upon arrival, pt found lying supine in bed and was agreeable to OT session. Pt performed supine <> sit @ supervision level, completed STS with Min A x 1 w/ RW, and completed simulated BSC transfer with Min A x1 w/ RW. Pt demonstrating good carryover of NWB in LLE. At the end of the session, pt was left lying supine in bed with all functional needs in reach. Pt demonstrates gradual functional improvements towards OT goals however continues to be functioning below occupational baseline and is still limited by the following limitations/impairments which were addressed through skilled instruction: NWB in LLE, generalized weakness, balance, endurance/activity tolerance, postural/trunk control, strength, pain, and safety awareness. At this time, recommend discharge w/ Level 2 resources, pending continued functional  progress, when medically stable. The patient's raw score on the AM-PAC Daily Activity Inpatient Short Form is 20. A raw score of greater than or equal to 19 suggests the patient may benefit from discharge to home. Please refer to the recommendation of the Occupational Therapist for safe discharge planning.  Established OT goals will be continued 2-3x/wk to address acute care needs and underlying performance skills to maximize occupational performance and safety to return to OF.     Rehab Resource Intensity Level, OT: II (Moderate Resource Intensity) (Pending continued functional progress)

## 2024-10-31 LAB
ANION GAP SERPL CALCULATED.3IONS-SCNC: 6 MMOL/L (ref 4–13)
APTT PPP: 74 SECONDS (ref 23–34)
APTT PPP: 76 SECONDS (ref 23–34)
BASOPHILS # BLD AUTO: 0.05 THOUSANDS/ΜL (ref 0–0.1)
BASOPHILS NFR BLD AUTO: 1 % (ref 0–1)
BUN SERPL-MCNC: 23 MG/DL (ref 5–25)
CALCIUM SERPL-MCNC: 9 MG/DL (ref 8.4–10.2)
CHLORIDE SERPL-SCNC: 101 MMOL/L (ref 96–108)
CO2 SERPL-SCNC: 29 MMOL/L (ref 21–32)
CREAT SERPL-MCNC: 1.32 MG/DL (ref 0.6–1.3)
EOSINOPHIL # BLD AUTO: 0.13 THOUSAND/ΜL (ref 0–0.61)
EOSINOPHIL NFR BLD AUTO: 2 % (ref 0–6)
ERYTHROCYTE [DISTWIDTH] IN BLOOD BY AUTOMATED COUNT: 12.8 % (ref 11.6–15.1)
GFR SERPL CREATININE-BSD FRML MDRD: 54 ML/MIN/1.73SQ M
GLUCOSE SERPL-MCNC: 256 MG/DL (ref 65–140)
GLUCOSE SERPL-MCNC: 260 MG/DL (ref 65–140)
GLUCOSE SERPL-MCNC: 273 MG/DL (ref 65–140)
GLUCOSE SERPL-MCNC: 312 MG/DL (ref 65–140)
GLUCOSE SERPL-MCNC: 325 MG/DL (ref 65–140)
HCT VFR BLD AUTO: 31.7 % (ref 36.5–49.3)
HGB BLD-MCNC: 10.1 G/DL (ref 12–17)
IMM GRANULOCYTES # BLD AUTO: 0.05 THOUSAND/UL (ref 0–0.2)
IMM GRANULOCYTES NFR BLD AUTO: 1 % (ref 0–2)
LYMPHOCYTES # BLD AUTO: 1.5 THOUSANDS/ΜL (ref 0.6–4.47)
LYMPHOCYTES NFR BLD AUTO: 26 % (ref 14–44)
MAGNESIUM SERPL-MCNC: 1.6 MG/DL (ref 1.9–2.7)
MCH RBC QN AUTO: 31.1 PG (ref 26.8–34.3)
MCHC RBC AUTO-ENTMCNC: 31.9 G/DL (ref 31.4–37.4)
MCV RBC AUTO: 98 FL (ref 82–98)
MONOCYTES # BLD AUTO: 0.35 THOUSAND/ΜL (ref 0.17–1.22)
MONOCYTES NFR BLD AUTO: 6 % (ref 4–12)
NEUTROPHILS # BLD AUTO: 3.71 THOUSANDS/ΜL (ref 1.85–7.62)
NEUTS SEG NFR BLD AUTO: 64 % (ref 43–75)
NRBC BLD AUTO-RTO: 0 /100 WBCS
PLATELET # BLD AUTO: 301 THOUSANDS/UL (ref 149–390)
PMV BLD AUTO: 9.6 FL (ref 8.9–12.7)
POTASSIUM SERPL-SCNC: 5 MMOL/L (ref 3.5–5.3)
RBC # BLD AUTO: 3.25 MILLION/UL (ref 3.88–5.62)
SODIUM SERPL-SCNC: 136 MMOL/L (ref 135–147)
WBC # BLD AUTO: 5.79 THOUSAND/UL (ref 4.31–10.16)

## 2024-10-31 PROCEDURE — 94760 N-INVAS EAR/PLS OXIMETRY 1: CPT

## 2024-10-31 PROCEDURE — 97116 GAIT TRAINING THERAPY: CPT

## 2024-10-31 PROCEDURE — 85730 THROMBOPLASTIN TIME PARTIAL: CPT | Performed by: INTERNAL MEDICINE

## 2024-10-31 PROCEDURE — 94640 AIRWAY INHALATION TREATMENT: CPT

## 2024-10-31 PROCEDURE — 85025 COMPLETE CBC W/AUTO DIFF WBC: CPT | Performed by: INTERNAL MEDICINE

## 2024-10-31 PROCEDURE — 82948 REAGENT STRIP/BLOOD GLUCOSE: CPT

## 2024-10-31 PROCEDURE — 94664 DEMO&/EVAL PT USE INHALER: CPT

## 2024-10-31 PROCEDURE — 80048 BASIC METABOLIC PNL TOTAL CA: CPT | Performed by: INTERNAL MEDICINE

## 2024-10-31 PROCEDURE — 99232 SBSQ HOSP IP/OBS MODERATE 35: CPT | Performed by: INTERNAL MEDICINE

## 2024-10-31 PROCEDURE — 99024 POSTOP FOLLOW-UP VISIT: CPT | Performed by: PODIATRIST

## 2024-10-31 PROCEDURE — 83735 ASSAY OF MAGNESIUM: CPT | Performed by: INTERNAL MEDICINE

## 2024-10-31 PROCEDURE — 97110 THERAPEUTIC EXERCISES: CPT

## 2024-10-31 RX ORDER — MAGNESIUM SULFATE HEPTAHYDRATE 40 MG/ML
2 INJECTION, SOLUTION INTRAVENOUS ONCE
Status: COMPLETED | OUTPATIENT
Start: 2024-10-31 | End: 2024-10-31

## 2024-10-31 RX ORDER — INSULIN GLARGINE 100 [IU]/ML
40 INJECTION, SOLUTION SUBCUTANEOUS
Status: DISCONTINUED | OUTPATIENT
Start: 2024-10-31 | End: 2024-11-05 | Stop reason: HOSPADM

## 2024-10-31 RX ORDER — INSULIN LISPRO 100 [IU]/ML
18 INJECTION, SOLUTION INTRAVENOUS; SUBCUTANEOUS
Status: DISCONTINUED | OUTPATIENT
Start: 2024-10-31 | End: 2024-11-05 | Stop reason: HOSPADM

## 2024-10-31 RX ADMIN — INSULIN GLARGINE 40 UNITS: 100 INJECTION, SOLUTION SUBCUTANEOUS at 21:14

## 2024-10-31 RX ADMIN — TRAZODONE HYDROCHLORIDE 25 MG: 50 TABLET ORAL at 21:13

## 2024-10-31 RX ADMIN — ISOSORBIDE MONONITRATE 30 MG: 30 TABLET, EXTENDED RELEASE ORAL at 08:23

## 2024-10-31 RX ADMIN — HEPARIN SODIUM 20 UNITS/KG/HR: 10000 INJECTION, SOLUTION INTRAVENOUS at 10:03

## 2024-10-31 RX ADMIN — PIPERACILLIN SODIUM AND TAZOBACTAM SODIUM 4.5 G: 36; 4.5 INJECTION, POWDER, LYOPHILIZED, FOR SOLUTION INTRAVENOUS at 22:40

## 2024-10-31 RX ADMIN — BUDESONIDE 0.5 MG: 0.5 INHALANT RESPIRATORY (INHALATION) at 07:13

## 2024-10-31 RX ADMIN — PIPERACILLIN SODIUM AND TAZOBACTAM SODIUM 4.5 G: 36; 4.5 INJECTION, POWDER, LYOPHILIZED, FOR SOLUTION INTRAVENOUS at 06:19

## 2024-10-31 RX ADMIN — INSULIN LISPRO 6 UNITS: 100 INJECTION, SOLUTION INTRAVENOUS; SUBCUTANEOUS at 12:34

## 2024-10-31 RX ADMIN — DOCUSATE SODIUM 100 MG: 100 CAPSULE, LIQUID FILLED ORAL at 08:23

## 2024-10-31 RX ADMIN — METOPROLOL SUCCINATE 50 MG: 50 TABLET, EXTENDED RELEASE ORAL at 08:23

## 2024-10-31 RX ADMIN — PREGABALIN 100 MG: 100 CAPSULE ORAL at 17:07

## 2024-10-31 RX ADMIN — PANTOPRAZOLE SODIUM 40 MG: 40 TABLET, DELAYED RELEASE ORAL at 17:07

## 2024-10-31 RX ADMIN — LORATADINE 10 MG: 10 TABLET ORAL at 08:23

## 2024-10-31 RX ADMIN — NICOTINE 1 PATCH: 14 PATCH, EXTENDED RELEASE TRANSDERMAL at 08:23

## 2024-10-31 RX ADMIN — ASPIRIN 81 MG CHEWABLE TABLET 81 MG: 81 TABLET CHEWABLE at 08:23

## 2024-10-31 RX ADMIN — INSULIN LISPRO 8 UNITS: 100 INJECTION, SOLUTION INTRAVENOUS; SUBCUTANEOUS at 17:07

## 2024-10-31 RX ADMIN — MAGNESIUM SULFATE HEPTAHYDRATE 2 G: 40 INJECTION, SOLUTION INTRAVENOUS at 10:05

## 2024-10-31 RX ADMIN — INSULIN LISPRO 18 UNITS: 100 INJECTION, SOLUTION INTRAVENOUS; SUBCUTANEOUS at 17:07

## 2024-10-31 RX ADMIN — HEPARIN SODIUM 20 UNITS/KG/HR: 10000 INJECTION, SOLUTION INTRAVENOUS at 20:20

## 2024-10-31 RX ADMIN — CYANOCOBALAMIN TAB 500 MCG 500 MCG: 500 TAB at 08:23

## 2024-10-31 RX ADMIN — PREGABALIN 100 MG: 100 CAPSULE ORAL at 08:23

## 2024-10-31 RX ADMIN — ATORVASTATIN CALCIUM 20 MG: 20 TABLET, FILM COATED ORAL at 08:23

## 2024-10-31 RX ADMIN — INSULIN LISPRO 6 UNITS: 100 INJECTION, SOLUTION INTRAVENOUS; SUBCUTANEOUS at 08:28

## 2024-10-31 RX ADMIN — INSULIN LISPRO 8 UNITS: 100 INJECTION, SOLUTION INTRAVENOUS; SUBCUTANEOUS at 21:14

## 2024-10-31 RX ADMIN — INSULIN LISPRO 16 UNITS: 100 INJECTION, SOLUTION INTRAVENOUS; SUBCUTANEOUS at 08:28

## 2024-10-31 RX ADMIN — Medication 1000 UNITS: at 08:23

## 2024-10-31 RX ADMIN — DULOXETINE HYDROCHLORIDE 20 MG: 20 CAPSULE, DELAYED RELEASE ORAL at 08:23

## 2024-10-31 RX ADMIN — DOCUSATE SODIUM 100 MG: 100 CAPSULE, LIQUID FILLED ORAL at 17:07

## 2024-10-31 RX ADMIN — INSULIN LISPRO 16 UNITS: 100 INJECTION, SOLUTION INTRAVENOUS; SUBCUTANEOUS at 12:34

## 2024-10-31 RX ADMIN — PIPERACILLIN SODIUM AND TAZOBACTAM SODIUM 4.5 G: 36; 4.5 INJECTION, POWDER, LYOPHILIZED, FOR SOLUTION INTRAVENOUS at 13:40

## 2024-10-31 RX ADMIN — BUDESONIDE 0.5 MG: 0.5 INHALANT RESPIRATORY (INHALATION) at 20:18

## 2024-10-31 RX ADMIN — PANTOPRAZOLE SODIUM 40 MG: 40 TABLET, DELAYED RELEASE ORAL at 06:22

## 2024-10-31 NOTE — PLAN OF CARE
Problem: PAIN - ADULT  Goal: Verbalizes/displays adequate comfort level or baseline comfort level  Description: Interventions:  - Encourage patient to monitor pain and request assistance  - Assess pain using appropriate pain scale  - Administer analgesics based on type and severity of pain and evaluate response  - Implement non-pharmacological measures as appropriate and evaluate response  - Consider cultural and social influences on pain and pain management  - Notify physician/advanced practitioner if interventions unsuccessful or patient reports new pain  Outcome: Progressing     Problem: INFECTION - ADULT  Goal: Absence or prevention of progression during hospitalization  Description: INTERVENTIONS:  - Assess and monitor for signs and symptoms of infection  - Monitor lab/diagnostic results  - Monitor all insertion sites, i.e. indwelling lines, tubes, and drains  - Monitor endotracheal if appropriate and nasal secretions for changes in amount and color  - [place] appropriate cooling/warming therapies per order  - Administer medications as ordered  - Instruct and encourage patient and family to use good hand hygiene technique  - Identify and instruct in appropriate isolation precautions for identified infection/condition  Outcome: Progressing  Goal: Absence of fever/infection during neutropenic period  Description: INTERVENTIONS:  - Monitor WBC    Outcome: Progressing     Problem: SAFETY ADULT  Goal: Patient will remain free of falls  Description: INTERVENTIONS:  - Educate patient/family on patient safety including physical limitations  - Instruct patient to call for assistance with activity   - Consult OT/PT to assist with strengthening/mobility   - Keep Call bell within reach  - Keep bed low and locked with side rails adjusted as appropriate  - Keep care items and personal belongings within reach  - Initiate and maintain comfort rounds  - Make Fall Risk Sign visible to staff  - Offer Toileting every  Hours,  in advance of need  - Initiate/Maintain alarm  - Obtain necessary fall risk management equipment:   - Apply yellow socks and bracelet for high fall risk patients  - Consider moving patient to room near nurses station  Outcome: Progressing  Goal: Maintain or return to baseline ADL function  Description: INTERVENTIONS:  -  Assess patient's ability to carry out ADLs; assess patient's baseline for ADL function and identify physical deficits which impact ability to perform ADLs (bathing, care of mouth/teeth, toileting, grooming, dressing, etc.)  - Assess/evaluate cause of self-care deficits   - Assess range of motion  - Assess patient's mobility; develop plan if impaired  - Assess patient's need for assistive devices and provide as appropriate  - Encourage maximum independence but intervene and supervise when necessary  - Involve family in performance of ADLs  - Assess for home care needs following discharge   - Consider OT consult to assist with ADL evaluation and planning for discharge  - Provide patient education as appropriate  Outcome: Progressing  Goal: Maintains/Returns to pre admission functional level  Description: INTERVENTIONS:  - Perform AM-PAC 6 Click Basic Mobility/ Daily Activity assessment daily.  - Set and communicate daily mobility goal to care team and patient/family/caregiver.   - Collaborate with rehabilitation services on mobility goals if consulted  - Perform Range of Motion  times a day.  - Reposition patient every  hours.  - Dangle patient  times a day  - Stand patient  times a day  - Ambulate patient  times a day  - Out of bed to chair  times a day   - Out of bed for meals  times a day  - Out of bed for toileting  - Record patient progress and toleration of activity level   Outcome: Progressing     Problem: DISCHARGE PLANNING  Goal: Discharge to home or other facility with appropriate resources  Description: INTERVENTIONS:  - Identify barriers to discharge w/patient and caregiver  - Arrange for  needed discharge resources and transportation as appropriate  - Identify discharge learning needs (meds, wound care, etc.)  - Arrange for interpretive services to assist at discharge as needed  - Refer to Case Management Department for coordinating discharge planning if the patient needs post-hospital services based on physician/advanced practitioner order or complex needs related to functional status, cognitive ability, or social support system  Outcome: Progressing     Problem: Knowledge Deficit  Goal: Patient/family/caregiver demonstrates understanding of disease process, treatment plan, medications, and discharge instructions  Description: Complete learning assessment and assess knowledge base.  Interventions:  - Provide teaching at level of understanding  - Provide teaching via preferred learning methods  Outcome: Progressing     Problem: Nutrition/Hydration-ADULT  Goal: Nutrient/Hydration intake appropriate for improving, restoring or maintaining nutritional needs  Description: Monitor and assess patient's nutrition/hydration status for malnutrition. Collaborate with interdisciplinary team and initiate plan and interventions as ordered.  Monitor patient's weight and dietary intake as ordered or per policy. Utilize nutrition screening tool and intervene as necessary. Determine patient's food preferences and provide high-protein, high-caloric foods as appropriate.     INTERVENTIONS:  - Monitor oral intake, urinary output, labs, and treatment plans  - Assess nutrition and hydration status and recommend course of action  - Evaluate amount of meals eaten  - Assist patient with eating if necessary   - Allow adequate time for meals  - Recommend/ encourage appropriate diets, oral nutritional supplements, and vitamin/mineral supplements  - Order, calculate, and assess calorie counts as needed  - Recommend, monitor, and adjust tube feedings and TPN/PPN based on assessed needs  - Assess need for intravenous fluids  -  Provide specific nutrition/hydration education as appropriate  - Include patient/family/caregiver in decisions related to nutrition  Outcome: Progressing     Problem: Prexisting or High Potential for Compromised Skin Integrity  Goal: Skin integrity is maintained or improved  Description: INTERVENTIONS:  - Identify patients at risk for skin breakdown  - Assess and monitor skin integrity  - Assess and monitor nutrition and hydration status  - Monitor labs   - Assess for incontinence   - Turn and reposition patient  - Assist with mobility/ambulation  - Relieve pressure over bony prominences  - Avoid friction and shearing  - Provide appropriate hygiene as needed including keeping skin clean and dry  - Evaluate need for skin moisturizer/barrier cream  - Collaborate with interdisciplinary team   - Patient/family teaching  - Consider wound care consult   Outcome: Progressing

## 2024-10-31 NOTE — PROGRESS NOTES
NEPHROLOGY PROGRESS NOTE   Clay Marcial 68 y.o. male MRN: [id number]  Unit/Bed#: [facility] 825-01 Encounter: [id number]    ASSESSMENT & PLAN:  Assessment & Plan  Elevated serum creatinine  Etiology of elevated creatinine most likely due to autoregulatory failure in the setting of ACE inhibitor use, SGLT2 inhibitor use and diuretic use  Baseline creatinine appears to be around 1.3-1.4 in setting of diabetes and hypertension  Creatinine on  admission 1.58 mg/dl   Peak creatinine 1.7 on 10/17  Status post lower extremity angiogram on 10/25.  No evidence of contrast nephropathy  Underwent left TMA on 10/28  Renal function improved to creatinine 1.32 mg/dL on 10/31, electrolytes are stable.  Continue to monitor.  Will stop further IV fluids as renal function is stable for last few days and is within recent baseline.  Continue to monitor renal function continue to avoid nephrotoxins  Overall stable for discharge from nephrology side and will repeat labs as outpatient.  Discussed with primary team attending who agreed with the plan for stopping IV fluid    Stage 3 chronic kidney disease (HCC)  Baseline creatinine 1.3-1.4  Needs outpatient follow-up with nephrology  HTN (hypertension)  Home Rx: Furosemide 40 mg as needed, Imdur 30 mg daily, lisinopril 20 mg daily, Toprol-XL 50 mg daily  Current Rx: Imdur 30 mg daily, Toprol-XL 50 mg daily  Blood pressure slightly on the higher side.  Continue to hold lisinopril and diuretics at this time in the setting of recent SUSHILA.  Avoid hypotension.  If blood pressure remains elevated may consider starting on calcium channel blocker  Hypomagnesemia  Magnesium level dropped to 1.6-replaced with IV magnesium  Diabetic foot infection  (HCC)     Status post left foot TMA on 10/28.  Continue antibiotics per primary team and management per primary team-currently on Zosyn  Type 2 diabetes mellitus with diabetic polyneuropathy (HCC)  Management per primary team  Continue to hold metformin and  "Jardiance for now  Chronic obstructive pulmonary disease with acute exacerbation (HCC)  Management per primary team  CAD (coronary artery disease)  Management per primary team     Discussed with primary team that renal function is stable  AND WILL STOP  further IV fluids and agreed with the plan    SUBJECTIVE:  Patient denies any new complaints.  No chest pain or shortness of breath, no nausea vomiting    OBJECTIVE:  Current Weight: Weight - Scale: 126 kg (278 lb)  Vitals:    10/31/24 0717   BP: 156/88   Pulse: 89   Resp: 19   Temp: 97.6 °F (36.4 °C)   SpO2: 98%   /88   Pulse 89   Temp 97.6 °F (36.4 °C) (Axillary)   Resp 19   Ht 5' 11\" (1.803 m)   Wt 126 kg (278 lb)   SpO2 98%   BMI 38.77 kg/m²       Intake/Output Summary (Last 24 hours) at 10/31/2024 1046  Last data filed at 10/31/2024 0514  Gross per 24 hour   Intake 1046.2 ml   Output 2985 ml   Net -1938.8 ml       Physical Exam  General:  Ill looking, awake.  Eyes: Conjunctivae pink,  Sclera anicteric  ENT: lips and mucous membranes moist  Neck: supple   Chest: Clear to Auscultation both lungs,  no crackles, ronchus or wheezing.  CVS: S1 & S2 present, normal rate, regular rhythm, no murmur.  Abdomen: soft, non-tender, non-distended, Bowel sounds normoactive  Extremities: no edema of  legs  Skin: no rash  Neuro: awake, alert, oriented X 3   Psych: Mood and affect appropriate    Medications:    Current Facility-Administered Medications:     acetaminophen (TYLENOL) tablet 650 mg, 650 mg, Oral, Q6H PRN, Real Hernandes, DPM    albuterol inhalation solution 2.5 mg, 2.5 mg, Nebulization, Q6H PRN, Real Hernandes, DPM    aspirin chewable tablet 81 mg, 81 mg, Oral, Daily, Real Hernandes, DPM, 81 mg at 10/31/24 0823    atorvastatin (LIPITOR) tablet 20 mg, 20 mg, Oral, Daily, Real Hernandes, DPM, 20 mg at 10/31/24 0823    bisacodyl (DULCOLAX) rectal suppository 10 mg, 10 mg, Rectal, Daily PRN, Real Hernandes, DPM    budesonide (PULMICORT) inhalation solution " 0.5 mg, 0.5 mg, Nebulization, Q12H, Real Hernandes, SHIN, 0.5 mg at 10/31/24 0713    Cholecalciferol (VITAMIN D3) tablet 1,000 Units, 1,000 Units, Oral, Daily, Real Hernandes DPM, 1,000 Units at 10/31/24 0823    cyanocobalamin (VITAMIN B-12) tablet 500 mcg, 500 mcg, Oral, Daily, Real Hernandes DPM, 500 mcg at 10/31/24 0823    docusate sodium (COLACE) capsule 100 mg, 100 mg, Oral, BID, Real Hernandes DPM, 100 mg at 10/31/24 0823    DULoxetine (CYMBALTA) delayed release capsule 20 mg, 20 mg, Oral, Daily, Real Hernandes DPM, 20 mg at 10/31/24 0823    fluticasone (FLONASE) 50 mcg/act nasal spray 2 spray, 2 spray, Nasal, Daily, Real Hernandes DPM, 2 spray at 10/25/24 0833    heparin (porcine) 25,000 units in 0.45% NaCl 250 mL infusion (premix), 3-30 Units/kg/hr (Order-Specific), Intravenous, Titrated, Courtney Benitez PA-C, Last Rate: 24 mL/hr at 10/31/24 1003, 20 Units/kg/hr at 10/31/24 1003    heparin (porcine) injection 4,800 Units, 4,800 Units, Intravenous, Q6H PRN, Courtney Benitez PA-C, 4,800 Units at 10/30/24 2016    heparin (porcine) injection 9,600 Units, 9,600 Units, Intravenous, Q6H PRN, Courtney Benitez PA-C    insulin glargine (LANTUS) subcutaneous injection 38 Units 0.38 mL, 38 Units, Subcutaneous, HS, Rosy Bhatt MD, 38 Units at 10/30/24 2114    insulin lispro (HumALOG/ADMELOG) 100 units/mL subcutaneous injection 16 Units, 16 Units, Subcutaneous, TID With Meals, Rosy Bhatt MD, 16 Units at 10/31/24 0828    insulin lispro (HumALOG/ADMELOG) 100 units/mL subcutaneous injection 2-12 Units, 2-12 Units, Subcutaneous, 4x Daily (AC & HS), 6 Units at 10/31/24 0828 **AND** Fingerstick Glucose (POCT), , , 4x Daily AC and at bedtime, Real Hernandes DPM    isosorbide mononitrate (IMDUR) 24 hr tablet 30 mg, 30 mg, Oral, Daily, Real Hernandes DPM, 30 mg at 10/31/24 0823    loratadine (CLARITIN) tablet 10 mg, 10 mg, Oral, Daily, Real Hernandes DPM, 10 mg at 10/31/24 0823    magnesium sulfate 2 g/50 mL IVPB  (premix) 2 g, 2 g, Intravenous, Once, Sebas Dover MD, Last Rate: 25 mL/hr at 10/31/24 1005, 2 g at 10/31/24 1005    metoprolol succinate (TOPROL-XL) 24 hr tablet 50 mg, 50 mg, Oral, Daily, Real Hernandes DPM, 50 mg at 10/31/24 0823    morphine injection 2 mg, 2 mg, Intravenous, Q4H PRN, Real Hernandes DPM    nicotine (NICODERM CQ) 14 mg/24hr TD 24 hr patch 1 patch, 1 patch, Transdermal, Daily, Real Hernandes DPM, 1 patch at 10/31/24 0823    umeclidinium 62.5 mcg/actuation inhaler AEPB 1 puff, 1 puff, Inhalation, Daily, 1 puff at 10/29/24 0820 **AND** olodaterol HCl (STRIVERDI RESPIMAT) inhaler 2 puff, 2 puff, Inhalation, Daily, Real Hernandes DPM, 2 puff at 10/29/24 0820    ondansetron (ZOFRAN) injection 4 mg, 4 mg, Intravenous, Q4H PRN, Real Hernandes DPM    oxyCODONE (ROXICODONE) IR tablet 5 mg, 5 mg, Oral, Q4H PRN, Real Hernandes DPM    oxyCODONE (ROXICODONE) split tablet 2.5 mg, 2.5 mg, Oral, Q4H PRN, Real Hernandes DPM    pantoprazole (PROTONIX) EC tablet 40 mg, 40 mg, Oral, BID AC, Real Hernandes DPM, 40 mg at 10/31/24 0622    piperacillin-tazobactam (ZOSYN) 4.5 g in sodium chloride 0.9 % 100 mL IVPB (EXTENDED INFUSION), 4.5 g, Intravenous, Q8H, Real Hernandes DPM, Last Rate: 25 mL/hr at 10/31/24 0619, 4.5 g at 10/31/24 0619    polyethylene glycol (MIRALAX) packet 17 g, 17 g, Oral, Daily, Real Hernandes DPM, 17 g at 10/27/24 0803    pregabalin (LYRICA) capsule 100 mg, 100 mg, Oral, BID, Real Hernandes, GORDOM, 100 mg at 10/31/24 0823    sodium chloride (OCEAN) 0.65 % nasal spray 2 spray, 2 spray, Each Nare, BID, Real Hernandes, SHIN, 2 spray at 10/29/24 0819    traZODone (DESYREL) tablet 25 mg, 25 mg, Oral, HS, Real Hernandes, DPM, 25 mg at 10/30/24 2115    Invasive Devices:        Lab Results:   Results from last 7 days   Lab Units 10/31/24  0627 10/30/24  0546 10/29/24  0515 10/28/24  0459 10/27/24  0511   WBC Thousand/uL 5.79 5.56 7.23 5.55 5.78   HEMOGLOBIN g/dL 10.1* 9.9* 10.1* 11.4* 10.9*  "  HEMATOCRIT % 31.7* 30.4* 31.7* 34.5* 33.3*   PLATELETS Thousands/uL 301 311 339 331 370   POTASSIUM mmol/L 5.0 5.0 4.5 4.3 4.0   CHLORIDE mmol/L 101 101 100 102 103   CO2 mmol/L 29 29 30 28 27   BUN mg/dL 23 26* 21 15 12   CREATININE mg/dL 1.32* 1.39* 1.32* 1.09 1.00   CALCIUM mg/dL 9.0 8.4 8.5 9.1 8.7   MAGNESIUM mg/dL 1.6* 1.9 1.7* 1.6* 1.7*   ALK PHOS U/L  --   --  39 45 49   ALT U/L  --   --  16 16 18   AST U/L  --   --  14 13 15       Previous work up:         Portions of the record may have been created with voice recognition software. Occasional wrong word or \"sound a like\" substitutions may have occurred due to the inherent limitations of voice recognition software. Read the chart carefully and recognize, using context, where substitutions have occurred.If you have any questions, please contact the dictating provider.    "

## 2024-10-31 NOTE — PROGRESS NOTES
Podiatry - Progress Note  Patient: Clay Marcial 68 y.o. male   MRN: [id number]  PCP: No primary care provider on file.  Unit/Bed#: [facility] 825-01 Encounter: [id number]  Date Of Visit: 10/31/24    ASSESSMENT:    Clay Marcial is a 68 y.o. male with:    Diabetic foot infection   - S/p left second toe amputation and wound debridement (DOS: 10/21/2024)  - S/p TMA (DOS: 10/28/2024)  Type 2 diabetes mellitus   - A1c 7.2% (10/16/2024)  Coronary artery disease  Stage III chronic kidney disease  Chronic obstructive pulmonary disease  Hypertension      PLAN:    Patient was seen and evaluated at bedside with all questions and concerns addressed.  Wound vac was kept intact and noted to be functioning without error. No ascending edema or erythema noted from dressing.   Patient to go to the OR tentatively, tomorrow 11/1 pending OR/attending availability for delayed primary closure and heel cord lengthening of the left lower extremity.  SLIM made aware.   Informed consent obtained at bedside.    Patient to remain NPO at midnight tonight in anticipation of surgery.   CAM boot ordered for post operative immobilization.   Patients' lab and vital signs were reviewed. Patient is afebrile with no leukocytosis. Patient does not  meet the SIRS criteria. Will keep monitoring.  Continue IV Zosyn pre recommendation from ID  Podiatry to perform all dressing change at this point.   Follow-up with  SLIM, vascular surgery, ID, PT, OT, CM recommendations  Elevation and offloading on green foam wedges or pillows when non-ambulatory.  Rest of care per primary team.     Weightbearing status: Non-weightbearing left  foot    SUBJECTIVE:     The patient was seen, evaluated, and assessed at bedside today. The patient was awake, alert, and in no acute distress. No acute events overnight. The patient reports he is ready for the next surgery. Patient denies N/V/F/chills/SOB/CP.      OBJECTIVE:     Vitals:   /88   Pulse 89   Temp 97.6 °F (36.4  "°C) (Axillary)   Resp 19   Ht 5' 11\" (1.803 m)   Wt 126 kg (278 lb)   SpO2 98%   BMI 38.77 kg/m²     Temp (24hrs), Av °F (37.2 °C), Min:97.6 °F (36.4 °C), Max:100.4 °F (38 °C)      Physical Exam:     Lungs: Non labored breathing  Abdomen: Soft, non-tender.  Lower Extremity:  Cardiovascular status at baseline from admission.  Neurological status at baseline from admission.  Musculoskeletal status post left TMA. No calf tenderness noted.     Left lower extremity: Dressings remain clean, dry and intact. No ascending erythema, edema or strike through noted.     Additional Data:     Labs:    Results from last 7 days   Lab Units 10/31/24  0627   WBC Thousand/uL 5.79   HEMOGLOBIN g/dL 10.1*   HEMATOCRIT % 31.7*   PLATELETS Thousands/uL 301   SEGS PCT % 64   LYMPHO PCT % 26   MONO PCT % 6   EOS PCT % 2     Results from last 7 days   Lab Units 10/31/24  0627 10/30/24  0546 10/29/24  0515   POTASSIUM mmol/L 5.0   < > 4.5   CHLORIDE mmol/L 101   < > 100   CO2 mmol/L 29   < > 30   BUN mg/dL 23   < > 21   CREATININE mg/dL 1.32*   < > 1.32*   CALCIUM mg/dL 9.0   < > 8.5   ALK PHOS U/L  --   --  39   ALT U/L  --   --  16   AST U/L  --   --  14    < > = values in this interval not displayed.           * I Have Reviewed All Lab Data Listed Above.    Recent Cultures (last 7 days):               Imaging: I have personally reviewed pertinent films in PACS  EKG, Pathology, and Other Studies: I have personally reviewed pertinent reports.    ** Please Note: Portions of the record may have been created with voice recognition software. Occasional wrong word or \"sound a like\" substitutions may have occurred due to the inherent limitations of voice recognition software. Read the chart carefully and recognize, using context, where substitutions have occurred. **      "

## 2024-10-31 NOTE — PROGRESS NOTES
Progress Note - Infectious Disease   Clay Marcial 68 y.o. male MRN: [id number]  Unit/Bed#: [facility] 825-01 Encounter: [id number]      Impression:  1.  Diabetic left foot infection with osteomyelitis s/p left second toe amputation with wound debridement and packing S/P left transmetatarsal amputation 10/28  2.  Type II DM with PAD, history of chronic left foot plantar ulcer  3.  History of chronic tobacco abuse with COPD and possible ROMEL  4.  CAD    Recommendations:  Patient is afebrile with normal WBC count.  Patient had wound VAC placed on left foot 10/30.  1.  Left TMA wound is open and appears clean as per podiatry picture 10/30  2.  Will  continue piperacillin/tazobactam 4.5 g every 8 hours IV by extended infusion pending decision for delayed closure.  Wound to be reassessed on   3.  Creatinine and BUN are mildly elevated but improved since yesterday.  Nephrology following  Antibiotics:  1.  Piperacillin/tazobactam 4.5 g every 8 hours IV by extended infusion, day 11 Rx    Subjective:  The patient has no complaints.  Denies fevers, chills, or sweats.  Denies nausea, vomiting, or diarrhea.      Objective:  Vitals:  Temp:  [97.6 °F (36.4 °C)-100.4 °F (38 °C)] 97.6 °F (36.4 °C)  HR:  [89-96] 89  Resp:  [19-20] 19  BP: (128-156)/(74-88) 156/88  SpO2:  [93 %-98 %] 98 %  Temp (24hrs), Av °F (37.2 °C), Min:97.6 °F (36.4 °C), Max:100.4 °F (38 °C)  Current: Temperature: 97.6 °F (36.4 °C)    Physical Exam:     General Appearance:  Alert, appropriately responsive, chronically ill-appearing nontoxic, no acute distress.   Throat: Oropharynx moist without lesions.  Lips, mucosa, and tongue normal, edentulous   Neck: Supple, symmetrical, trachea midline, no adenopathy,  no tenderness/mass/nodules   Lungs:   Increased AP diameter with deep creased respiratory expansion   Heart:  Regular rate and rhythm, S1, S2 normal, no murmur, rub or gallop   Abdomen:   Soft, non-tender, non-distended, positive bowel sounds.  No  masses, no organomegaly    No CVA tenderness   Extremities: LLE with +2/4 edema and erythema.  Wound is open with some serosanguineous drainage previously, wound VAC now in place. Peripheral pulses decreased   Skin: As above.         Invasive Devices       Peripheral Intravenous Line  Duration             Peripheral IV 10/27/24 Dorsal (posterior);Right Forearm 3 days    Peripheral IV 10/29/24 Left;Ventral (anterior) Forearm 1 day                    Labs, Imaging, & Other studies:   All pertinent labs were personally reviewed  Results from last 7 days   Lab Units 10/31/24  0627 10/30/24  0546 10/29/24  0515   WBC Thousand/uL 5.79 5.56 7.23   HEMOGLOBIN g/dL 10.1* 9.9* 10.1*   PLATELETS Thousands/uL 301 311 339     Results from last 7 days   Lab Units 10/31/24  0627 10/30/24  0546 10/29/24  0515 10/28/24  0459 10/27/24  0511   SODIUM mmol/L 136 135 136 137 138   POTASSIUM mmol/L 5.0 5.0 4.5 4.3 4.0   CHLORIDE mmol/L 101 101 100 102 103   CO2 mmol/L 29 29 30 28 27   BUN mg/dL 23 26* 21 15 12   CREATININE mg/dL 1.32* 1.39* 1.32* 1.09 1.00   EGFR ml/min/1.73sq m 54 50 54 68 75   CALCIUM mg/dL 9.0 8.4 8.5 9.1 8.7   AST U/L  --   --  14 13 15   ALT U/L  --   --  16 16 18   ALK PHOS U/L  --   --  39 45 49

## 2024-10-31 NOTE — CASE MANAGEMENT
Case Management Discharge Planning Note    Patient name Clay Marcial  Location [facility] 825/[facility] 825-01 MRN [id number]  : 1955 Date 10/31/2024       Current Admission Date: 10/22/2024  Current Admission Diagnosis:Diabetic foot infection  (HCC)   Patient Active Problem List    Diagnosis Date Noted Date Diagnosed    Obesity 10/30/2024     Hypomagnesemia 10/28/2024     HTN (hypertension) 10/21/2024     Elevated serum creatinine 10/21/2024     Smoking 10/21/2024     PAD (peripheral artery disease) (HCC) 10/20/2024     Acute hyponatremia 10/20/2024     Chronic obstructive pulmonary disease with acute exacerbation ([facility]) 10/17/2024     ROMEL (obstructive sleep apnea) 10/17/2024     Diabetic foot infection  (HCC) 10/16/2024     Stage 3 chronic kidney disease ([facility]) 10/16/2024     Type 2 diabetes mellitus with diabetic polyneuropathy ([facility]) 2024     CAD (coronary artery disease) 2024     Chronic diastolic congestive heart failure (HCC) 2024     Acute on chronic kidney failure  (HCC) 2024     Helicobacter pylori infection 2024       LOS (days): 9  Geometric Mean LOS (GMLOS) (days): 7.3  Days to GMLOS:-1.3     OBJECTIVE:  Risk of Unplanned Readmission Score: 33.67         Current admission status: Inpatient   Preferred Pharmacy:   [facility]/pharmacy #[id number] [place] - [address] [place] [id number]  Phone: [phone] Fax: [phone]    [facility] [place] - [address]  [address] [facility] [address]  Phone: [phone] Fax: [phone]    Primary Care Provider: No primary care provider on file.    Primary Insurance: [employer] [facility]  Secondary Insurance:     DISCHARGE DETAILS:              Patient reports he is now agreeable to rehab, and would like for  to submit referral to [facility].  Aware that he will need to go to a facility that is contracted with [facility] referrals submitted    CM to follow

## 2024-10-31 NOTE — PLAN OF CARE
Problem: PAIN - ADULT  Goal: Verbalizes/displays adequate comfort level or baseline comfort level  Description: Interventions:  - Encourage patient to monitor pain and request assistance  - Assess pain using appropriate pain scale  - Administer analgesics based on type and severity of pain and evaluate response  - Implement non-pharmacological measures as appropriate and evaluate response  - Consider cultural and social influences on pain and pain management  - Notify physician/advanced practitioner if interventions unsuccessful or patient reports new pain  Outcome: Progressing     Problem: INFECTION - ADULT  Goal: Absence or prevention of progression during hospitalization  Description: INTERVENTIONS:  - Assess and monitor for signs and symptoms of infection  - Monitor lab/diagnostic results  - Monitor all insertion sites, i.e. indwelling lines, tubes, and drains  - Monitor endotracheal if appropriate and nasal secretions for changes in amount and color  - Burk[place] appropriate cooling/warming therapies per order  - Administer medications as ordered  - Instruct and encourage patient and family to use good hand hygiene technique  - Identify and instruct in appropriate isolation precautions for identified infection/condition  Outcome: Progressing  Goal: Absence of fever/infection during neutropenic period  Description: INTERVENTIONS:  - Monitor WBC    Outcome: Progressing     Problem: SAFETY ADULT  Goal: Patient will remain free of falls  Description: INTERVENTIONS:  - Educate patient/family on patient safety including physical limitations  - Instruct patient to call for assistance with activity   - Consult OT/PT to assist with strengthening/mobility   - Keep Call bell within reach  - Keep bed low and locked with side rails adjusted as appropriate  - Keep care items and personal belongings within reach  - Initiate and maintain comfort rounds  - Make Fall Risk Sign visible to staff  - Offer Toileting every  Hours,  in advance of need  - Initiate/Maintain alarm  - Obtain necessary fall risk management equipment:   - Apply yellow socks and bracelet for high fall risk patients  - Consider moving patient to room near nurses station  Outcome: Progressing  Goal: Maintain or return to baseline ADL function  Description: INTERVENTIONS:  -  Assess patient's ability to carry out ADLs; assess patient's baseline for ADL function and identify physical deficits which impact ability to perform ADLs (bathing, care of mouth/teeth, toileting, grooming, dressing, etc.)  - Assess/evaluate cause of self-care deficits   - Assess range of motion  - Assess patient's mobility; develop plan if impaired  - Assess patient's need for assistive devices and provide as appropriate  - Encourage maximum independence but intervene and supervise when necessary  - Involve family in performance of ADLs  - Assess for home care needs following discharge   - Consider OT consult to assist with ADL evaluation and planning for discharge  - Provide patient education as appropriate  Outcome: Progressing  Goal: Maintains/Returns to pre admission functional level  Description: INTERVENTIONS:  - Perform AM-PAC 6 Click Basic Mobility/ Daily Activity assessment daily.  - Set and communicate daily mobility goal to care team and patient/family/caregiver.   - Collaborate with rehabilitation services on mobility goals if consulted  - Perform Range of Motion  times a day.  - Reposition patient every  hours.  - Dangle patient  times a day  - Stand patient  times a day  - Ambulate patient  times a day  - Out of bed to chair  times a day   - Out of bed for meals  times a day  - Out of bed for toileting  - Record patient progress and toleration of activity level   Outcome: Progressing     Problem: DISCHARGE PLANNING  Goal: Discharge to home or other facility with appropriate resources  Description: INTERVENTIONS:  - Identify barriers to discharge w/patient and caregiver  - Arrange for  needed discharge resources and transportation as appropriate  - Identify discharge learning needs (meds, wound care, etc.)  - Arrange for interpretive services to assist at discharge as needed  - Refer to Case Management Department for coordinating discharge planning if the patient needs post-hospital services based on physician/advanced practitioner order or complex needs related to functional status, cognitive ability, or social support system  Outcome: Progressing     Problem: Knowledge Deficit  Goal: Patient/family/caregiver demonstrates understanding of disease process, treatment plan, medications, and discharge instructions  Description: Complete learning assessment and assess knowledge base.  Interventions:  - Provide teaching at level of understanding  - Provide teaching via preferred learning methods  Outcome: Progressing     Problem: Nutrition/Hydration-ADULT  Goal: Nutrient/Hydration intake appropriate for improving, restoring or maintaining nutritional needs  Description: Monitor and assess patient's nutrition/hydration status for malnutrition. Collaborate with interdisciplinary team and initiate plan and interventions as ordered.  Monitor patient's weight and dietary intake as ordered or per policy. Utilize nutrition screening tool and intervene as necessary. Determine patient's food preferences and provide high-protein, high-caloric foods as appropriate.     INTERVENTIONS:  - Monitor oral intake, urinary output, labs, and treatment plans  - Assess nutrition and hydration status and recommend course of action  - Evaluate amount of meals eaten  - Assist patient with eating if necessary   - Allow adequate time for meals  - Recommend/ encourage appropriate diets, oral nutritional supplements, and vitamin/mineral supplements  - Order, calculate, and assess calorie counts as needed  - Recommend, monitor, and adjust tube feedings and TPN/PPN based on assessed needs  - Assess need for intravenous fluids  -  Provide specific nutrition/hydration education as appropriate  - Include patient/family/caregiver in decisions related to nutrition  Outcome: Progressing     Problem: Prexisting or High Potential for Compromised Skin Integrity  Goal: Skin integrity is maintained or improved  Description: INTERVENTIONS:  - Identify patients at risk for skin breakdown  - Assess and monitor skin integrity  - Assess and monitor nutrition and hydration status  - Monitor labs   - Assess for incontinence   - Turn and reposition patient  - Assist with mobility/ambulation  - Relieve pressure over bony prominences  - Avoid friction and shearing  - Provide appropriate hygiene as needed including keeping skin clean and dry  - Evaluate need for skin moisturizer/barrier cream  - Collaborate with interdisciplinary team   - Patient/family teaching  - Consider wound care consult   Outcome: Progressing

## 2024-10-31 NOTE — PROGRESS NOTES
Progress Note - Hospitalist   Name: Clay Marcial 68 y.o. male I MRN: [id number]  [facility] Date of Admission: 10/22/2024   Date of Service: 10/31/2024 I Hospital Day: 9    Assessment & Plan  Diabetic foot infection  (HCC)  Initially presented at the [facility] with left lower extremity cellulitis with open wound  S/p I&D, L 2nd toe amputation with podiatry on 10/21 given concern for acutely worsening wound/infection   Transferred to the [facility] for vascular surgery/IR evaluations given concern also for embolic process/ischemia   Agram 10/25   Status post left foot TMA on 10/28  Continue IV heparin gtt   Appreciate infectious disease input  Continue IV Zosyn until decision made on plan/timing of delayed closure  Wound culture from OR on 10/21 with polymicrobial growth including Bacteroides pyogenes, Finegoldia magna, Actinomyces species, coagulase-negative Staphylococcus, and Globicatella species  Initial plan for wound VAC placement yesterday by podiatry postponed as wound site noted to still be bleeding, stabilized with compression and cautery -> underwent wound VAC placement yesterday with plan for return to the OR for delayed closure tomorrow  Type 2 diabetes mellitus with diabetic polyneuropathy (HCC)  Lab Results   Component Value Date    HGBA1C 7.2 (H) 10/16/2024     Continue basal/prandial insulin with additional SSI coverage per Accu-Cheks - optimize dosing daily as necessary  Hypoglycemia protocol  Carbohydrate restriction  On Lyrica for neuropathy  Stage 3 chronic kidney disease (HCC)  Baseline creatinine of approximately 1.2-1.4 -> currently stable at 1.32  Monitor renal function and urine output  Limit/avoid nephrotoxins and hypotension as possible -> holding diuretic/ACEI  Nephrology following  HTN (hypertension)  Continue Imdur/Toprol-XL  Lasix/ACEI remain held per nephrology  CAD (coronary artery disease)  Continue ASA/statin/Toprol-XL/Imdur  Chronic obstructive  pulmonary disease with acute exacerbation (HCC)  Continue Pulmicort/Olodaterol/Umeclidinium - PRN Albuterol on board  Hypomagnesemia  Monitor/replete serum magnesium and potassium as necessary  Obesity  BMI of 38.77  Lifestyle/diet modifications      VTE Pharmacologic Prophylaxis: VTE Score: 5 High Risk (Score >/= 5) - Pharmacological DVT Prophylaxis Ordered: Heparin Drip. Sequential Compression Devices Ordered.    AM-PAC Basic Mobility:  Basic Mobility Inpatient Raw Score: 14  JH-HLM Goal: 4: Move to chair/commode  JH-HLM Achieved: 4: Move to chair/commode  JH-HLM Goal achieved. Continue to encourage appropriate mobility.    Patient Centered Rounds:  I have performed bedside rounds and discussed plan of care with nursing today.  Discussions with Specialists or Other Care Team Provider:  see above assessments if applicable    Education and Discussions with Family / Patient:  Patient at bedside, who denied need to update other contacts currently    Time Spent for Care:  35 minutes. More than 50% of total time spent on counseling and coordination of care, on one or more of the following: performing physical exam; counseling and coordination of care, obtaining or reviewing history, documenting in the medical record, reviewing/ordering tests/medications/procedures, and communicating with other healthcare professionals.    Current Length of Stay: 9 day(s)  Current Patient Status: Inpatient   Certification Statement:  Patient will continue to require additional hospital stay due to assessments as noted above.    Code Status: Level 1 - Full Code      Subjective     Seen and examined earlier today.  States pain is fairly controlled at this time.  Denies fever/chills, or other constitutional complaints at this time.  Patient states that he is now agreeable to skilled rehab, if necessary, at time of discharge.      Objective     Vitals:   Temp (24hrs), Av.8 °F (37.1 °C), Min:97.6 °F (36.4 °C), Max:100.4 °F (38  °C)    Temp:  [97.6 °F (36.4 °C)-100.4 °F (38 °C)] 98.5 °F (36.9 °C)  HR:  [89-96] 96  Resp:  [18-20] 18  BP: (128-156)/(72-88) 148/72  SpO2:  [93 %-98 %] 96 %  Body mass index is 38.77 kg/m².     Input and Output Summary (last 24 hours):       Intake/Output Summary (Last 24 hours) at 10/31/2024 1545  Last data filed at 10/31/2024 1525  Gross per 24 hour   Intake 1106.2 ml   Output 3860 ml   Net -2753.8 ml       Physical Exam:     GENERAL No immediate distress at rest - obese   HEAD   Normocephalic - atraumatic   EYES Nonicteric   MOUTH   Mucosa moist   NECK   Supple - full range of motion   CARDIAC Rate controlled   PULMONARY Fairly clear to auscultation without labored respirations   ABDOMEN Nontender/nondistended   MUSCULOSKELETAL   Motor strength/range of motion deconditioned   NEUROLOGIC   Alert/oriented at baseline   SKIN   Chronic wrinkles/blemishes - s/p left TMA with site dressed/wrapped currently with wound VAC in place   PSYCHIATRIC   Mood/affect stable         Labs & Recent Cultures:  Results from last 7 days   Lab Units 10/31/24  0627   WBC Thousand/uL 5.79   HEMOGLOBIN g/dL 10.1*   HEMATOCRIT % 31.7*   PLATELETS Thousands/uL 301   SEGS PCT % 64   LYMPHO PCT % 26   MONO PCT % 6   EOS PCT % 2     Results from last 7 days   Lab Units 10/31/24  0627 10/30/24  0546 10/29/24  0515   POTASSIUM mmol/L 5.0   < > 4.5   CHLORIDE mmol/L 101   < > 100   CO2 mmol/L 29   < > 30   BUN mg/dL 23   < > 21   CREATININE mg/dL 1.32*   < > 1.32*   CALCIUM mg/dL 9.0   < > 8.5   ALK PHOS U/L  --   --  39   ALT U/L  --   --  16   AST U/L  --   --  14    < > = values in this interval not displayed.         Results from last 7 days   Lab Units 10/31/24  1108 10/31/24  0714 10/30/24  2025 10/30/24  1656 10/30/24  1126 10/30/24  0625 10/29/24  2058 10/29/24  1602 10/29/24  1110 10/29/24  0711 10/28/24  2105 10/28/24  1428   POC GLUCOSE mg/dl 273* 256* 317* 266* 325* 342* 321* 338* 315* 232* 293* 192*                          Lines/Drains/Telemetry:  Invasive Devices       Peripheral Intravenous Line  Duration             Peripheral IV 10/27/24 Dorsal (posterior);Right Forearm 3 days    Peripheral IV 10/29/24 Left;Ventral (anterior) Forearm 1 day                    Last 24 Hours Medication List:   Current Facility-Administered Medications   Medication Dose Route Frequency Provider Last Rate    acetaminophen  650 mg Oral Q6H PRN Real Hernandes DPM      albuterol  2.5 mg Nebulization Q6H PRN Real Hernandes DPM      aspirin  81 mg Oral Daily Real Hernandes DPM      atorvastatin  20 mg Oral Daily Real Hernandes DPM      bisacodyl  10 mg Rectal Daily PRN Real Hernandes DPM      budesonide  0.5 mg Nebulization Q12H Real Hernandes DPM      Cholecalciferol  1,000 Units Oral Daily Real Hernandes DPM      cyanocobalamin  500 mcg Oral Daily Real Hernandes DPM      docusate sodium  100 mg Oral BID Real Hernandes DPM      DULoxetine  20 mg Oral Daily Real Hernandes DPM      fluticasone  2 spray Nasal Daily Real Hernandes DPM      heparin (porcine)  3-30 Units/kg/hr (Order-Specific) Intravenous Titrated Courtney BANDAR Hayden-C 20 Units/kg/hr (10/31/24 1003)    heparin (porcine)  4,800 Units Intravenous Q6H PRN Courtney A Emmanuel PA-C      heparin (porcine)  9,600 Units Intravenous Q6H PRN Courtney DAVID Benitez PA-C      insulin glargine  38 Units Subcutaneous HS Rosy Bhatt MD      insulin lispro  16 Units Subcutaneous TID With Meals Rosy Bhatt MD      insulin lispro  2-12 Units Subcutaneous 4x Daily (AC & HS) Real Hernandes DPM      isosorbide mononitrate  30 mg Oral Daily Real Hernandes DPM      loratadine  10 mg Oral Daily Real Hernandes DPM      metoprolol succinate  50 mg Oral Daily Real Hernandes DPM      morphine injection  2 mg Intravenous Q4H PRN Real Hernandes DPM      nicotine  1 patch Transdermal Daily Real Hernandes DPM      umeclidinium  1 puff Inhalation Daily Real Hernandes DPM      And    olodaterol HCl  2 puff  Inhalation Daily Real Hernandes DPM      ondansetron  4 mg Intravenous Q4H PRN Real Hernandes DPM      oxyCODONE  5 mg Oral Q4H PRN Real Hernandes DPM      oxyCODONE  2.5 mg Oral Q4H PRN Real Hernandes, SHIN      pantoprazole  40 mg Oral BID AC Real Hernandes DPM      piperacillin-tazobactam  4.5 g Intravenous Q8H Real Hernandes DPM 4.5 g (10/31/24 1340)    polyethylene glycol  17 g Oral Daily Real Hernandes DPM      pregabalin  100 mg Oral BID Real Hernandes DPM      sodium chloride  2 spray Each Nare BID Real Hernandes DPM      traZODone  25 mg Oral HS SHIN Schneider MD   Hospitalist - [facility]        ** Please Note:  Documentation is constructed using a voice recognition dictation system.  An occasional wrong word/phrase or “sound-a-like” substitution may have been picked up by dictation device due to the inherent limitations of voice recognition software.  Read the chart carefully and recognize, using reasonable context, where substitutions may have occurred.**

## 2024-10-31 NOTE — PHYSICAL THERAPY NOTE
PHYSICAL THERAPY NOTE          Patient Name: Clay Marcial  Today's Date: 10/31/2024     10/31/24 1144   PT Last Visit   PT Visit Date 10/31/24   Note Type   Note Type Treatment   Pain Assessment   Pain Assessment Tool 0-10   Pain Score No Pain   Restrictions/Precautions   Weight Bearing Precautions Per Order Yes   LLE Weight Bearing Per Order (S)  NWB  (TMA)   Braces or Orthoses Other (Comment)  (Wound vac)   Other Precautions Bed Alarm;Chair Alarm;WBS;Multiple lines;Fall Risk   General   Chart Reviewed Yes   Cognition   Overall Cognitive Status WFL   Arousal/Participation Alert;Responsive   Attention Within functional limits   Orientation Level Oriented X4   Following Commands Follows one step commands without difficulty   Comments Pt cooperative and pleasant during session   Subjective   Subjective Agreeable to mobilize   Bed Mobility   Supine to Sit 5  Supervision   Additional items Increased time required;Verbal cues   Sit to Supine 5  Supervision   Additional items Increased time required;Verbal cues   Additional Comments Pt in bed upon arrival. Left back in bed per pt request, pt states recliner is not comfortable   Transfers   Sit to Stand 4  Minimal assistance   Additional items Assist x 1;Increased time required;Verbal cues   Stand to Sit 4  Minimal assistance   Additional items Assist x 1;Increased time required;Verbal cues   Stand pivot 4  Minimal assistance   Additional items Assist x 1;Increased time required;Verbal cues   Additional Comments w/RW- NWB L LE- completes 5 STS   Ambulation/Elevation   Gait pattern Excessively slow;Short stride   Gait Assistance 4  Minimal assist   Additional items Assist x 1;Verbal cues;Tactile cues   Assistive Device Rolling walker   Distance 2ft + 2 ft   Ambulation/Elevation Additional Comments Completes ambulation with small hops on R LE , uses RW .   Balance   Static Sitting Good    Dynamic Sitting Fair +   Static Standing Fair   Dynamic Standing Fair -   Ambulatory Poor +   Endurance Deficit   Endurance Deficit Yes   Activity Tolerance   Activity Tolerance Patient limited by fatigue   Medical Staff Made Aware Oly ENGLAND   Nurse Made Aware RN cleared   Exercises   Hip Flexion Sitting;20 reps;Bilateral   Knee AROM Long Arc Quad Sitting;20 reps;Bilateral   Balance training  STS X 5   Assessment   Prognosis Good   Problem List Decreased strength;Decreased endurance;Decreased mobility;Impaired balance;Decreased skin integrity   Assessment Pt seen for PT treatment session this date. Therapy session focused on bed mobility, functional transfers, and ambulation in order to improve overall mobility and independence. Pt requires Min Ax 1 with RW for transfers and ambulation, requires seated rest breaks between ambulation attempts and STS practices.Pt particpates in  therapeutic exercises seated in chair. Pt has good WB adherence for L LE through session.  Pt making steady progress toward goals. Pt was left back in bed at the end of PT session with all needs in reach. Pt would benefit from continued PT services while in hospital to address remaining limitations.  The patient's AM-PAC Basic Mobility Inpatient Short Form Raw Score is 14. A Raw score of less than or equal to 16 suggests the patient may benefit from discharge to post-acute rehabilitation services. Please also refer to the recommendation of the Physical Therapist for safe discharge planning. Post dc recommendation is Level 2 at this time.   Barriers to Discharge Inaccessible home environment   Goals   Patient Goals to get better   STG Expiration Date 11/12/24   PT Treatment Day 1   Plan   Treatment/Interventions Functional transfer training;Therapeutic exercise;Bed mobility;Gait training;Spoke to nursing;Spoke to case management;OT   Progress Progressing toward goals   PT Frequency 3-5x/wk   Discharge Recommendation   Rehab Resource  Intensity Level, PT II (Moderate Resource Intensity)   Equipment Recommended Walker;Wheelchair   AM-PAC Basic Mobility Inpatient   Turning in Flat Bed Without Bedrails 3   Lying on Back to Sitting on Edge of Flat Bed Without Bedrails 3   Moving Bed to Chair 3   Standing Up From Chair Using Arms 3   Walk in Room 1   Climb 3-5 Stairs With Railing 1   Basic Mobility Inpatient Raw Score 14   Basic Mobility Standardized Score 35.55   [facility] Highest Level Of Mobility   -[facility] Goal 4: Move to chair/commode   -[facility] Achieved 4: Move to chair/commode   Education   Education Provided Assistive device;Mobility training   Patient Demonstrates verbal understanding   End of Consult   Patient Position at End of Consult All needs within reach;Bed/Chair alarm activated;Bedside chair   Kaleigh Allen PT DPT

## 2024-10-31 NOTE — ASSESSMENT & PLAN NOTE
Initially presented at the [facility] with left lower extremity cellulitis with open wound  S/p I&D, L 2nd toe amputation with podiatry on 10/21 given concern for acutely worsening wound/infection   Transferred to the [facility] for vascular surgery/IR evaluations given concern also for embolic process/ischemia   Agram 10/25   Status post left foot TMA on 10/28  Continue IV heparin gtt   Appreciate infectious disease input  Continue IV Zosyn until decision made on plan/timing of delayed closure  Wound culture from OR on 10/21 with polymicrobial growth including Bacteroides pyogenes, Finegoldia magna, Actinomyces species, coagulase-negative Staphylococcus, and Globicatella species  Initial plan for wound VAC placement yesterday by podiatry postponed as wound site noted to still be bleeding, stabilized with compression and cautery -> underwent wound VAC placement yesterday with plan for return to the OR for delayed closure tomorrow

## 2024-10-31 NOTE — ASSESSMENT & PLAN NOTE
Home Rx: Furosemide 40 mg as needed, Imdur 30 mg daily, lisinopril 20 mg daily, Toprol-XL 50 mg daily  Current Rx: Imdur 30 mg daily, Toprol-XL 50 mg daily  Blood pressure slightly on the higher side.  Continue to hold lisinopril and diuretics at this time in the setting of recent SUSHILA.  Avoid hypotension.  If blood pressure remains elevated may consider starting on calcium channel blocker

## 2024-10-31 NOTE — ASSESSMENT & PLAN NOTE
Etiology of elevated creatinine most likely due to autoregulatory failure in the setting of ACE inhibitor use, SGLT2 inhibitor use and diuretic use  Baseline creatinine appears to be around 1.3-1.4 in setting of diabetes and hypertension  Creatinine on  admission 1.58 mg/dl   Peak creatinine 1.7 on 10/17  Status post lower extremity angiogram on 10/25.  No evidence of contrast nephropathy  Underwent left TMA on 10/28  Renal function improved to creatinine 1.32 mg/dL on 10/31, electrolytes are stable.  Continue to monitor.  Will stop further IV fluids as renal function is stable for last few days and is within recent baseline.  Continue to monitor renal function continue to avoid nephrotoxins  Overall stable for discharge from nephrology side and will repeat labs as outpatient.  Discussed with primary team attending who agreed with the plan for stopping IV fluid

## 2024-10-31 NOTE — ASSESSMENT & PLAN NOTE
Baseline creatinine of approximately 1.2-1.4 -> currently stable at 1.32  Monitor renal function and urine output  Limit/avoid nephrotoxins and hypotension as possible -> holding diuretic/ACEI  Nephrology following

## 2024-11-01 LAB
ANION GAP SERPL CALCULATED.3IONS-SCNC: 6 MMOL/L (ref 4–13)
ANION GAP SERPL CALCULATED.3IONS-SCNC: 7 MMOL/L (ref 4–13)
APTT PPP: 55 SECONDS (ref 23–34)
APTT PPP: 57 SECONDS (ref 23–34)
BASOPHILS # BLD AUTO: 0.05 THOUSANDS/ΜL (ref 0–0.1)
BASOPHILS NFR BLD AUTO: 1 % (ref 0–1)
BUN SERPL-MCNC: 21 MG/DL (ref 5–25)
BUN SERPL-MCNC: 22 MG/DL (ref 5–25)
CALCIUM SERPL-MCNC: 9.1 MG/DL (ref 8.4–10.2)
CALCIUM SERPL-MCNC: 9.3 MG/DL (ref 8.4–10.2)
CHLORIDE SERPL-SCNC: 101 MMOL/L (ref 96–108)
CHLORIDE SERPL-SCNC: 102 MMOL/L (ref 96–108)
CO2 SERPL-SCNC: 28 MMOL/L (ref 21–32)
CO2 SERPL-SCNC: 29 MMOL/L (ref 21–32)
CREAT SERPL-MCNC: 1.19 MG/DL (ref 0.6–1.3)
CREAT SERPL-MCNC: 1.27 MG/DL (ref 0.6–1.3)
EOSINOPHIL # BLD AUTO: 0.19 THOUSAND/ΜL (ref 0–0.61)
EOSINOPHIL NFR BLD AUTO: 4 % (ref 0–6)
ERYTHROCYTE [DISTWIDTH] IN BLOOD BY AUTOMATED COUNT: 12.7 % (ref 11.6–15.1)
GFR SERPL CREATININE-BSD FRML MDRD: 56 ML/MIN/1.73SQ M
GFR SERPL CREATININE-BSD FRML MDRD: 61 ML/MIN/1.73SQ M
GLUCOSE SERPL-MCNC: 154 MG/DL (ref 65–140)
GLUCOSE SERPL-MCNC: 167 MG/DL (ref 65–140)
GLUCOSE SERPL-MCNC: 188 MG/DL (ref 65–140)
GLUCOSE SERPL-MCNC: 240 MG/DL (ref 65–140)
GLUCOSE SERPL-MCNC: 255 MG/DL (ref 65–140)
GLUCOSE SERPL-MCNC: 303 MG/DL (ref 65–140)
HCT VFR BLD AUTO: 33.2 % (ref 36.5–49.3)
HGB BLD-MCNC: 10.7 G/DL (ref 12–17)
IMM GRANULOCYTES # BLD AUTO: 0.07 THOUSAND/UL (ref 0–0.2)
IMM GRANULOCYTES NFR BLD AUTO: 1 % (ref 0–2)
LYMPHOCYTES # BLD AUTO: 1.45 THOUSANDS/ΜL (ref 0.6–4.47)
LYMPHOCYTES NFR BLD AUTO: 28 % (ref 14–44)
MAGNESIUM SERPL-MCNC: 1.7 MG/DL (ref 1.9–2.7)
MCH RBC QN AUTO: 31 PG (ref 26.8–34.3)
MCHC RBC AUTO-ENTMCNC: 32.2 G/DL (ref 31.4–37.4)
MCV RBC AUTO: 96 FL (ref 82–98)
MONOCYTES # BLD AUTO: 0.29 THOUSAND/ΜL (ref 0.17–1.22)
MONOCYTES NFR BLD AUTO: 6 % (ref 4–12)
NEUTROPHILS # BLD AUTO: 3.2 THOUSANDS/ΜL (ref 1.85–7.62)
NEUTS SEG NFR BLD AUTO: 60 % (ref 43–75)
NRBC BLD AUTO-RTO: 0 /100 WBCS
PLATELET # BLD AUTO: 304 THOUSANDS/UL (ref 149–390)
PMV BLD AUTO: 9.5 FL (ref 8.9–12.7)
POTASSIUM SERPL-SCNC: 5.2 MMOL/L (ref 3.5–5.3)
POTASSIUM SERPL-SCNC: 5.4 MMOL/L (ref 3.5–5.3)
POTASSIUM SERPL-SCNC: 5.7 MMOL/L (ref 3.5–5.3)
RBC # BLD AUTO: 3.45 MILLION/UL (ref 3.88–5.62)
SODIUM SERPL-SCNC: 136 MMOL/L (ref 135–147)
SODIUM SERPL-SCNC: 137 MMOL/L (ref 135–147)
WBC # BLD AUTO: 5.25 THOUSAND/UL (ref 4.31–10.16)

## 2024-11-01 PROCEDURE — 82948 REAGENT STRIP/BLOOD GLUCOSE: CPT

## 2024-11-01 PROCEDURE — NC001 PR NO CHARGE: Performed by: PODIATRIST

## 2024-11-01 PROCEDURE — 85025 COMPLETE CBC W/AUTO DIFF WBC: CPT | Performed by: INTERNAL MEDICINE

## 2024-11-01 PROCEDURE — 80048 BASIC METABOLIC PNL TOTAL CA: CPT | Performed by: INTERNAL MEDICINE

## 2024-11-01 PROCEDURE — 84132 ASSAY OF SERUM POTASSIUM: CPT | Performed by: INTERNAL MEDICINE

## 2024-11-01 PROCEDURE — 99232 SBSQ HOSP IP/OBS MODERATE 35: CPT | Performed by: INTERNAL MEDICINE

## 2024-11-01 PROCEDURE — 83735 ASSAY OF MAGNESIUM: CPT | Performed by: INTERNAL MEDICINE

## 2024-11-01 PROCEDURE — 85730 THROMBOPLASTIN TIME PARTIAL: CPT | Performed by: INTERNAL MEDICINE

## 2024-11-01 PROCEDURE — 97535 SELF CARE MNGMENT TRAINING: CPT

## 2024-11-01 PROCEDURE — 94664 DEMO&/EVAL PT USE INHALER: CPT

## 2024-11-01 PROCEDURE — 97530 THERAPEUTIC ACTIVITIES: CPT

## 2024-11-01 PROCEDURE — 94760 N-INVAS EAR/PLS OXIMETRY 1: CPT

## 2024-11-01 PROCEDURE — 94640 AIRWAY INHALATION TREATMENT: CPT

## 2024-11-01 RX ORDER — SODIUM POLYSTYRENE SULFONATE 4.1 MEQ/G
15 POWDER, FOR SUSPENSION ORAL; RECTAL ONCE
Status: COMPLETED | OUTPATIENT
Start: 2024-11-01 | End: 2024-11-01

## 2024-11-01 RX ORDER — MAGNESIUM SULFATE HEPTAHYDRATE 40 MG/ML
2 INJECTION, SOLUTION INTRAVENOUS ONCE
Status: COMPLETED | OUTPATIENT
Start: 2024-11-01 | End: 2024-11-01

## 2024-11-01 RX ORDER — FUROSEMIDE 10 MG/ML
20 INJECTION INTRAMUSCULAR; INTRAVENOUS ONCE
Status: COMPLETED | OUTPATIENT
Start: 2024-11-01 | End: 2024-11-01

## 2024-11-01 RX ORDER — DEXTROSE MONOHYDRATE 25 G/50ML
25 INJECTION, SOLUTION INTRAVENOUS ONCE
Status: COMPLETED | OUTPATIENT
Start: 2024-11-01 | End: 2024-11-01

## 2024-11-01 RX ADMIN — BUDESONIDE 0.5 MG: 0.5 INHALANT RESPIRATORY (INHALATION) at 19:23

## 2024-11-01 RX ADMIN — PANTOPRAZOLE SODIUM 40 MG: 40 TABLET, DELAYED RELEASE ORAL at 06:14

## 2024-11-01 RX ADMIN — INSULIN LISPRO 2 UNITS: 100 INJECTION, SOLUTION INTRAVENOUS; SUBCUTANEOUS at 17:14

## 2024-11-01 RX ADMIN — PANTOPRAZOLE SODIUM 40 MG: 40 TABLET, DELAYED RELEASE ORAL at 17:18

## 2024-11-01 RX ADMIN — DULOXETINE HYDROCHLORIDE 20 MG: 20 CAPSULE, DELAYED RELEASE ORAL at 08:47

## 2024-11-01 RX ADMIN — INSULIN LISPRO 8 UNITS: 100 INJECTION, SOLUTION INTRAVENOUS; SUBCUTANEOUS at 21:45

## 2024-11-01 RX ADMIN — PIPERACILLIN SODIUM AND TAZOBACTAM SODIUM 4.5 G: 36; 4.5 INJECTION, POWDER, LYOPHILIZED, FOR SOLUTION INTRAVENOUS at 21:47

## 2024-11-01 RX ADMIN — PREGABALIN 100 MG: 100 CAPSULE ORAL at 17:13

## 2024-11-01 RX ADMIN — METOPROLOL SUCCINATE 50 MG: 50 TABLET, EXTENDED RELEASE ORAL at 08:47

## 2024-11-01 RX ADMIN — HEPARIN SODIUM 4800 UNITS: 1000 INJECTION INTRAVENOUS; SUBCUTANEOUS at 23:29

## 2024-11-01 RX ADMIN — BUDESONIDE 0.5 MG: 0.5 INHALANT RESPIRATORY (INHALATION) at 08:22

## 2024-11-01 RX ADMIN — OXYCODONE HYDROCHLORIDE 5 MG: 5 TABLET ORAL at 08:49

## 2024-11-01 RX ADMIN — FUROSEMIDE 20 MG: 10 INJECTION, SOLUTION INTRAMUSCULAR; INTRAVENOUS at 10:59

## 2024-11-01 RX ADMIN — SODIUM POLYSTYRENE SULFONATE 15 G: 4.1 POWDER, FOR SUSPENSION ORAL; RECTAL at 10:59

## 2024-11-01 RX ADMIN — ASPIRIN 81 MG CHEWABLE TABLET 81 MG: 81 TABLET CHEWABLE at 08:47

## 2024-11-01 RX ADMIN — PREGABALIN 100 MG: 100 CAPSULE ORAL at 08:47

## 2024-11-01 RX ADMIN — INSULIN LISPRO 4 UNITS: 100 INJECTION, SOLUTION INTRAVENOUS; SUBCUTANEOUS at 08:49

## 2024-11-01 RX ADMIN — SODIUM ZIRCONIUM CYCLOSILICATE 10 G: 10 POWDER, FOR SUSPENSION ORAL at 12:00

## 2024-11-01 RX ADMIN — DEXTROSE MONOHYDRATE 25 ML: 25 INJECTION, SOLUTION INTRAVENOUS at 10:59

## 2024-11-01 RX ADMIN — ATORVASTATIN CALCIUM 20 MG: 20 TABLET, FILM COATED ORAL at 08:47

## 2024-11-01 RX ADMIN — DOCUSATE SODIUM 100 MG: 100 CAPSULE, LIQUID FILLED ORAL at 08:47

## 2024-11-01 RX ADMIN — TRAZODONE HYDROCHLORIDE 25 MG: 50 TABLET ORAL at 21:47

## 2024-11-01 RX ADMIN — DOCUSATE SODIUM 100 MG: 100 CAPSULE, LIQUID FILLED ORAL at 17:13

## 2024-11-01 RX ADMIN — INSULIN HUMAN 10 UNITS: 100 INJECTION, SOLUTION PARENTERAL at 11:07

## 2024-11-01 RX ADMIN — MAGNESIUM SULFATE HEPTAHYDRATE 2 G: 40 INJECTION, SOLUTION INTRAVENOUS at 09:54

## 2024-11-01 RX ADMIN — ISOSORBIDE MONONITRATE 30 MG: 30 TABLET, EXTENDED RELEASE ORAL at 08:47

## 2024-11-01 RX ADMIN — INSULIN GLARGINE 40 UNITS: 100 INJECTION, SOLUTION SUBCUTANEOUS at 21:47

## 2024-11-01 RX ADMIN — Medication 1000 UNITS: at 08:47

## 2024-11-01 RX ADMIN — INSULIN LISPRO 18 UNITS: 100 INJECTION, SOLUTION INTRAVENOUS; SUBCUTANEOUS at 17:14

## 2024-11-01 RX ADMIN — NICOTINE 1 PATCH: 14 PATCH, EXTENDED RELEASE TRANSDERMAL at 08:45

## 2024-11-01 RX ADMIN — HEPARIN SODIUM 20 UNITS/KG/HR: 10000 INJECTION, SOLUTION INTRAVENOUS at 06:14

## 2024-11-01 RX ADMIN — PIPERACILLIN SODIUM AND TAZOBACTAM SODIUM 4.5 G: 36; 4.5 INJECTION, POWDER, LYOPHILIZED, FOR SOLUTION INTRAVENOUS at 06:14

## 2024-11-01 RX ADMIN — INSULIN LISPRO 2 UNITS: 100 INJECTION, SOLUTION INTRAVENOUS; SUBCUTANEOUS at 11:30

## 2024-11-01 RX ADMIN — PIPERACILLIN SODIUM AND TAZOBACTAM SODIUM 4.5 G: 36; 4.5 INJECTION, POWDER, LYOPHILIZED, FOR SOLUTION INTRAVENOUS at 14:29

## 2024-11-01 RX ADMIN — CYANOCOBALAMIN TAB 500 MCG 500 MCG: 500 TAB at 08:51

## 2024-11-01 NOTE — ASSESSMENT & PLAN NOTE
Etiology of elevated creatinine most likely due to autoregulatory failure in the setting of ACE inhibitor use, SGLT2 inhibitor use and diuretic use  Baseline creatinine appears to be around 1.3-1.4 in setting of diabetes and hypertension  Creatinine on  admission 1.58 mg/dl   Peak creatinine 1.7 on 10/17  Status post lower extremity angiogram on 10/25.  No evidence of contrast nephropathy  Underwent left TMA on 10/28  Renal function improved to creatinine 1.19 mg/dL today creatinine of 1.3 mg/dL.  Last potassium level elevated at 5.7 and ordered for medical treatment.  Recommend low potassium diet, repeat BMP at 3 PM with call parameters  Continue to avoid nephrotoxins  Continue to monitor renal function

## 2024-11-01 NOTE — OCCUPATIONAL THERAPY NOTE
"  Occupational Therapy Progress Note     Patient Name: Clay Marcial  Today's Date: 11/1/2024  Problem List  Principal Problem:    Diabetic foot infection  (HCC)  Active Problems:    Type 2 diabetes mellitus with diabetic polyneuropathy (HCC)    CAD (coronary artery disease)    Hyperkalemia    Stage 3 chronic kidney disease (HCC)    Chronic obstructive pulmonary disease with acute exacerbation (HCC)    HTN (hypertension)    Elevated serum creatinine    Hypomagnesemia    Obesity          11/01/24 1152   OT Last Visit   OT Visit Date 11/01/24   Note Type   Note Type Treatment   Pain Assessment   Pain Assessment Tool 0-10   Pain Score No Pain   Restrictions/Precautions   Weight Bearing Precautions Per Order Yes   LLE Weight Bearing Per Order (S)  NWB   Other Precautions Chair Alarm;Bed Alarm;Multiple lines;Fall Risk;Pain  (VAC)   Lifestyle   Autonomy IND PTA with all ADLs/IADLs. RW used PRN. +.   Reciprocal Relationships Lives with \"lady friend\"   Service to Others Retired   Intrinsic Gratification Traveling with his brother in [facility] ([facility]   ADL   Where Assessed Edge of bed   Grooming Assistance 5  Supervision/Setup   Grooming Deficit Wash/dry face;Brushing hair  (+ washing hair with shampoo cap)   UB Bathing Assistance 5  Supervision/Setup   UB Bathing Deficit Right arm;Chest;Left arm;Abdomen   LB Bathing Assistance 4  Minimal Assistance   LB Bathing Deficit Buttocks   UB Dressing Assistance 5  Supervision/Setup   UB Dressing Deficit Thread RUE;Thread LUE   LB Dressing Assistance 5  Supervision/Setup   LB Dressing Deficit Don/doff R sock   Bed Mobility   Supine to Sit Unable to assess   Sit to Supine 4  Minimal assistance   Additional items Assist x 1;HOB elevated;Bedrails;Increased time required;LE management   Additional Comments Pt seated EOB upon arrival   Transfers   Sit to Stand 4  Minimal assistance   Additional items Assist x 1;Increased time required   Stand to Sit 4  Minimal assistance   Additional " "items Assist x 1;Increased time required   Additional Comments transfers with RW   Functional Mobility   Functional Mobility 4  Minimal assistance   Additional Comments bed <> chair, hop-to, maintains WBS   Additional items Rolling walker   Subjective   Subjective \"I'm determined\"   Cognition   Overall Cognitive Status WFL   Arousal/Participation Responsive;Cooperative   Attention Within functional limits   Orientation Level Oriented X4   Memory Within functional limits   Following Commands Follows one step commands without difficulty   Comments Pt pleasant and cooperative t/o session   Activity Tolerance   Activity Tolerance Patient tolerated treatment well   Medical Staff Made Aware RN clearance for session   Assessment   Assessment Patient participated in Skilled OT session this date with interventions consisting of ADL re training with the use of correct body mechnaics, Energy Conservation techniques, safety awareness and fall prevention techniques,  therapeutic activities to: increase activity tolerance, increase dynamic sit/ stand balance during functional activity , and increase OOB/ sitting tolerance .Upon arrival patient was found seated at edge of bed. Pt demonstrated the following tasks: MIN A STS, fnxl mobility with RW, and sit to sup transfer. Pt performed grooming and UB ADLs with S. MIN A for LB bathing and S to doff/don R sock. Patient continues to be functioning below baseline level, occupational performance remains limited secondary to factors listed above and increased risk for falls and injury.   From OT standpoint, recommendation at time of d/c would be STR vs home with skilled therapy - pending progress.  Patient to benefit from continued Occupational Therapy treatment while in the hospital to address deficits as defined above and maximize level of functional independence with ADLs and functional mobility. Pt was left after session with all current needs met. The patient's raw score on the " AM-PAC Daily Activity Inpatient Short Form is 19. A raw score of greater than or equal to 19 suggests the patient may benefit from discharge to home. Please refer to the recommendation of the Occupational Therapist for safe discharge planning.   Plan   Treatment Interventions ADL retraining;Functional transfer training;Endurance training;Patient/family training;Equipment evaluation/education;Compensatory technique education;Continued evaluation;Activityengagement;Energy conservation   Goal Expiration Date 11/12/24   OT Treatment Day 2   OT Frequency 2-3x/wk   Discharge Recommendation   Rehab Resource Intensity Level, OT II (Moderate Resource Intensity)  (vs home with skilled therapy - pending progress)   AM-PAC Daily Activity Inpatient   Lower Body Dressing 3   Bathing 3   Toileting 3   Upper Body Dressing 3   Grooming 3   Eating 4   Daily Activity Raw Score 19   Daily Activity Standardized Score (Calc for Raw Score >=11) 40.22   AM-PAC Applied Cognition Inpatient   Following a Speech/Presentation 4   Understanding Ordinary Conversation 4   Taking Medications 4   Remembering Where Things Are Placed or Put Away 4   Remembering List of 4-5 Errands 4   Taking Care of Complicated Tasks 4   Applied Cognition Raw Score 24   Applied Cognition Standardized Score 62.21     Emma Naqvi MS, OTR/L

## 2024-11-01 NOTE — PROGRESS NOTES
"Podiatry - Progress Note  Patient: Clay Marcial 68 y.o. male   MRN: [id number]  PCP: No primary care provider on file.  Unit/Bed#: [facility] 825-01 Encounter: [id number]  Date Of Visit: 24    ASSESSMENT:    Clay Marcial is a 68 y.o. male with:    Diabetic foot infection   - S/p left second toe amputation and wound debridement (DOS: 10/21/2024)  - S/p TMA (DOS: 10/28/2024)  Type 2 diabetes mellitus   - A1c 7.2% (10/16/2024)  Coronary artery disease  Stage III chronic kidney disease  Chronic obstructive pulmonary disease  Hypertension      PLAN:    Patient to go to OR today,24, for  left TMA surgical site debridement and primary closure with Dr. Grey.  Consent signed and placed to OR.   Confirmed NPO status.  Last dose of heparin was given 6 AM.  Hemoglobin is 10.7 g/dL  Potassium is 5.4 mmol/L.  Repeat potassium is 5.7 mmol/L.  The potassium situation is made aware to the primary team and nephrology. Clearance to surgery was further confirmed.  H&P, vitals, and current labs reviewed.  No acute changes noted.  Alternatives, risks, and complications discussed with patient.  All questions answered.  No guarantees given to outcome of procedure.  Wound VAC the left foot was intact with continuous running at 125 mmHg.  The wound VAC and dressing was kept intact for OR.  Rest of medical care per primary team.       SUBJECTIVE:     The patient was seen, evaluated, and assessed at bedside today. The patient was awake, alert, and in no acute distress. Patient confirmed NPO status. All questions and concerns regarding the surgical procedure addressed. Patient understands risks vs benefits of procedure and remains amenable with plan for surgery today. Patient denies N/V/F/chills/SOB/CP.      OBJECTIVE:     Vitals:   /74   Pulse 85   Temp 98.3 °F (36.8 °C)   Resp 16   Ht 5' 11\" (1.803 m)   Wt 126 kg (278 lb)   SpO2 96%   BMI 38.77 kg/m²     Temp (24hrs), Av.4 °F (36.9 °C), Min:98.3 °F (36.8 °C), " "Max:98.5 °F (36.9 °C)      Physical Exam:     General:  Alert, cooperative, and in no distress.  Lower extremity exam:  Cardiovascular status at baseline.  Neurological status at baseline.  Musculoskeletal status at baseline. No calf tenderness noted bilaterally.  Wound VAC and dressing left intact to the Operating Room.           Additional Data:     Labs:    Results from last 7 days   Lab Units 11/01/24  0641   WBC Thousand/uL 5.25   HEMOGLOBIN g/dL 10.7*   HEMATOCRIT % 33.2*   PLATELETS Thousands/uL 304   SEGS PCT % 60   LYMPHO PCT % 28   MONO PCT % 6   EOS PCT % 4     Results from last 7 days   Lab Units 11/01/24  0641 10/30/24  0546 10/29/24  0515   POTASSIUM mmol/L 5.4*   < > 4.5   CHLORIDE mmol/L 101   < > 100   CO2 mmol/L 28   < > 30   BUN mg/dL 22   < > 21   CREATININE mg/dL 1.19   < > 1.32*   CALCIUM mg/dL 9.3   < > 8.5   ALK PHOS U/L  --   --  39   ALT U/L  --   --  16   AST U/L  --   --  14    < > = values in this interval not displayed.           * I Have Reviewed All Lab Data Listed Above.    Recent Cultures (last 7 days):               Imaging: I have personally reviewed pertinent films in PACS  EKG, Pathology, and Other Studies: I have personally reviewed pertinent reports.    ** Please Note: Portions of the record may have been created with voice recognition software. Occasional wrong word or \"sound a like\" substitutions may have occurred due to the inherent limitations of voice recognition software. Read the chart carefully and recognize, using context, where substitutions have occurred. **        "

## 2024-11-01 NOTE — ASSESSMENT & PLAN NOTE
-Potassium level trended up to 5.7.  Recommended low potassium diet ordered for IV Lasix 20 mg, Lokelma 10 g, insulin and dextrose repeat BMP at 3 PM  -Patient is on heparin drip which could be contributing to hyperkalemia as well.  Recommend low potassium diet once diet resumed

## 2024-11-01 NOTE — PLAN OF CARE
Problem: OCCUPATIONAL THERAPY ADULT  Goal: Performs self-care activities at highest level of function for planned discharge setting.  See evaluation for individualized goals.  Description: Treatment Interventions: ADL retraining, Functional transfer training, UE strengthening/ROM, Endurance training, Patient/family training, Cognitive reorientation, Equipment evaluation/education, Fine motor coordination activities, Compensatory technique education, Continued evaluation, Energy conservation, Activityengagement          See flowsheet documentation for full assessment, interventions and recommendations.   Outcome: Progressing  Note: Limitation: Decreased ADL status, Decreased endurance, Decreased self-care trans, Decreased high-level ADLs  Prognosis: Good  Assessment: Patient participated in Skilled OT session this date with interventions consisting of ADL re training with the use of correct body mechnaics, Energy Conservation techniques, safety awareness and fall prevention techniques,  therapeutic activities to: increase activity tolerance, increase dynamic sit/ stand balance during functional activity , and increase OOB/ sitting tolerance .Upon arrival patient was found seated at edge of bed. Pt demonstrated the following tasks: MIN A STS, fnxl mobility with RW, and sit to sup transfer. Pt performed grooming and UB ADLs with S. MIN A for LB bathing and S to doff/don R sock. Patient continues to be functioning below baseline level, occupational performance remains limited secondary to factors listed above and increased risk for falls and injury.   From OT standpoint, recommendation at time of d/c would be STR vs home with skilled therapy - pending progress.  Patient to benefit from continued Occupational Therapy treatment while in the hospital to address deficits as defined above and maximize level of functional independence with ADLs and functional mobility. Pt was left after session with all current needs met.  The patient's raw score on the AM-PAC Daily Activity Inpatient Short Form is 19. A raw score of greater than or equal to 19 suggests the patient may benefit from discharge to home. Please refer to the recommendation of the Occupational Therapist for safe discharge planning.     Rehab Resource Intensity Level, OT: II (Moderate Resource Intensity) (vs home with skilled therapy - pending progress)

## 2024-11-01 NOTE — ASSESSMENT & PLAN NOTE
Home Rx: Furosemide 40 mg as needed, Imdur 30 mg daily, lisinopril 20 mg daily, Toprol-XL 50 mg daily  Current Rx: Imdur 30 mg daily, Toprol-XL 50 mg daily  Blood pressure stable and is at goal.  Continue to hold lisinopril and diuretics at this time in the setting of recent SUSHILA.  Avoid hypotension.  If blood pressure remains elevated may consider starting on calcium channel blocker

## 2024-11-01 NOTE — PROGRESS NOTES
NEPHROLOGY PROGRESS NOTE   Clay Marcial 68 y.o. male MRN: [id number]  Unit/Bed#: [facility] 825-01 Encounter: [id number]    ASSESSMENT & PLAN:  Assessment & Plan  Elevated serum creatinine  Etiology of elevated creatinine most likely due to autoregulatory failure in the setting of ACE inhibitor use, SGLT2 inhibitor use and diuretic use  Baseline creatinine appears to be around 1.3-1.4 in setting of diabetes and hypertension  Creatinine on  admission 1.58 mg/dl   Peak creatinine 1.7 on 10/17  Status post lower extremity angiogram on 10/25.  No evidence of contrast nephropathy  Underwent left TMA on 10/28  Renal function improved to creatinine 1.19 mg/dL today creatinine of 1.3 mg/dL.  Last potassium level elevated at 5.7 and ordered for medical treatment.  Recommend low potassium diet, repeat BMP at 3 PM with call parameters  Continue to avoid nephrotoxins  Continue to monitor renal function    Stage 3 chronic kidney disease (HCC)  Baseline creatinine 1.3-1.4  Needs outpatient follow-up with nephrology  HTN (hypertension)  Home Rx: Furosemide 40 mg as needed, Imdur 30 mg daily, lisinopril 20 mg daily, Toprol-XL 50 mg daily  Current Rx: Imdur 30 mg daily, Toprol-XL 50 mg daily  Blood pressure stable and is at goal.  Continue to hold lisinopril and diuretics at this time in the setting of recent SUSHILA.  Avoid hypotension.  If blood pressure remains elevated may consider starting on calcium channel blocker  Hypomagnesemia  Magnesium level still low at 1.7-replaced with IV magnesium sulfate 2 g  Diabetic foot infection  (HCC)     Status post left foot TMA on 10/28.  Continue antibiotics per primary team and management per primary team-currently on Zosyn  Hyperkalemia  -Potassium level trended up to 5.7.  Recommended low potassium diet ordered for IV Lasix 20 mg, Lokelma 10 g, insulin and dextrose repeat BMP at 3 PM  -Patient is on heparin drip which could be contributing to hyperkalemia as well.  Recommend low potassium diet  "once diet resumed  Type 2 diabetes mellitus with diabetic polyneuropathy (HCC)  Management per primary team  Continue to hold metformin and Jardiance for now  Chronic obstructive pulmonary disease with acute exacerbation (HCC)  Management per primary team  CAD (coronary artery disease)  Management per primary team     Discussed with primary team regarding renal function being stable but potassium level trended up and ordered for hyperkalemia management and primary team agreed with the plan    SUBJECTIVE:  Patient doing well, denies any chest pain or shortness of breath    OBJECTIVE:  Current Weight: Weight - Scale: 126 kg (278 lb)  Vitals:    11/01/24 0825   BP:    Pulse:    Resp:    Temp:    SpO2: 97%   /74   Pulse 85   Temp 98.3 °F (36.8 °C)   Resp 16   Ht 5' 11\" (1.803 m)   Wt 126 kg (278 lb)   SpO2 97%   BMI 38.77 kg/m²       Intake/Output Summary (Last 24 hours) at 11/1/2024 1015  Last data filed at 11/1/2024 0900  Gross per 24 hour   Intake --   Output 3585 ml   Net -3585 ml       Physical Exam  General:  Ill looking, awake.  Eyes: Conjunctivae pink,  Sclera anicteric  ENT: lips and mucous membranes moist  Neck: supple   Chest: Clear to Auscultation both lungs,  no crackles, ronchus or wheezing.  CVS: S1 & S2 present, normal rate, regular rhythm, no murmur.  Abdomen: soft, non-tender, non-distended, Bowel sounds normoactive  Extremities: no edema of  legsDressing over left foot  Skin: no rash  Neuro: awake, alert, oriented  Psych: Mood and affect appropriate    Medications:    Current Facility-Administered Medications:     acetaminophen (TYLENOL) tablet 650 mg, 650 mg, Oral, Q6H PRN, Real Hernandes DPM    albuterol inhalation solution 2.5 mg, 2.5 mg, Nebulization, Q6H PRN, Real Hernandes DPM    aspirin chewable tablet 81 mg, 81 mg, Oral, Daily, Real Hernandes DPM, 81 mg at 11/01/24 0847    atorvastatin (LIPITOR) tablet 20 mg, 20 mg, Oral, Daily, Real Hernandes DPM, 20 mg at 11/01/24 0847    " bisacodyl (DULCOLAX) rectal suppository 10 mg, 10 mg, Rectal, Daily PRN, Real Hernandes, DPM    budesonide (PULMICORT) inhalation solution 0.5 mg, 0.5 mg, Nebulization, Q12H, Real Hernandes, DPM, 0.5 mg at 11/01/24 0822    Cholecalciferol (VITAMIN D3) tablet 1,000 Units, 1,000 Units, Oral, Daily, Real Hernandes DPM, 1,000 Units at 11/01/24 0847    cyanocobalamin (VITAMIN B-12) tablet 500 mcg, 500 mcg, Oral, Daily, Real Hernandes, DPM, 500 mcg at 11/01/24 0851    docusate sodium (COLACE) capsule 100 mg, 100 mg, Oral, BID, Real Hernandes, DPM, 100 mg at 11/01/24 0847    DULoxetine (CYMBALTA) delayed release capsule 20 mg, 20 mg, Oral, Daily, Real Hernandes, DPM, 20 mg at 11/01/24 0847    fluticasone (FLONASE) 50 mcg/act nasal spray 2 spray, 2 spray, Nasal, Daily, Real Hernandes DPM, 2 spray at 10/25/24 0833    heparin (porcine) 25,000 units in 0.45% NaCl 250 mL infusion (premix), 3-30 Units/kg/hr (Order-Specific), Intravenous, Titrated, Courtney Benitez PA-C, Stopped at 11/01/24 0832    heparin (porcine) injection 4,800 Units, 4,800 Units, Intravenous, Q6H PRN, Courtney Benitez PA-C, 4,800 Units at 10/30/24 2016    heparin (porcine) injection 9,600 Units, 9,600 Units, Intravenous, Q6H PRN, Courtney Benitez PA-C    insulin glargine (LANTUS) subcutaneous injection 40 Units 0.4 mL, 40 Units, Subcutaneous, HS, Rosy Bhatt MD, 40 Units at 10/31/24 2114    insulin lispro (HumALOG/ADMELOG) 100 units/mL subcutaneous injection 18 Units, 18 Units, Subcutaneous, TID With Meals, Rosy Bhatt MD, 18 Units at 10/31/24 1707    insulin lispro (HumALOG/ADMELOG) 100 units/mL subcutaneous injection 2-12 Units, 2-12 Units, Subcutaneous, 4x Daily (AC & HS), 4 Units at 11/01/24 0849 **AND** Fingerstick Glucose (POCT), , , 4x Daily AC and at bedtime, Real Hernandes DPM    isosorbide mononitrate (IMDUR) 24 hr tablet 30 mg, 30 mg, Oral, Daily, Real Hernandes DPM, 30 mg at 11/01/24 0847    loratadine (CLARITIN) tablet 10 mg, 10 mg,  Oral, Daily, Real Hernandes DPM, 10 mg at 10/31/24 0823    magnesium sulfate 2 g/50 mL IVPB (premix) 2 g, 2 g, Intravenous, Once, Rosy Bhatt MD, Last Rate: 25 mL/hr at 11/01/24 0954, 2 g at 11/01/24 0954    metoprolol succinate (TOPROL-XL) 24 hr tablet 50 mg, 50 mg, Oral, Daily, Real Hernandes DPM, 50 mg at 11/01/24 0847    morphine injection 2 mg, 2 mg, Intravenous, Q4H PRN, Real Hernandes DPM    nicotine (NICODERM CQ) 14 mg/24hr TD 24 hr patch 1 patch, 1 patch, Transdermal, Daily, Real Hernandes DPM, 1 patch at 11/01/24 0845    umeclidinium 62.5 mcg/actuation inhaler AEPB 1 puff, 1 puff, Inhalation, Daily, 1 puff at 10/29/24 0820 **AND** olodaterol HCl (STRIVERDI RESPIMAT) inhaler 2 puff, 2 puff, Inhalation, Daily, Real Hernandes DPM, 2 puff at 10/29/24 0820    ondansetron (ZOFRAN) injection 4 mg, 4 mg, Intravenous, Q4H PRN, Real Hernandes DPM    oxyCODONE (ROXICODONE) IR tablet 5 mg, 5 mg, Oral, Q4H PRN, Real Hernandes DPM, 5 mg at 11/01/24 0849    oxyCODONE (ROXICODONE) split tablet 2.5 mg, 2.5 mg, Oral, Q4H PRN, Real Hernandes DPM    pantoprazole (PROTONIX) EC tablet 40 mg, 40 mg, Oral, BID AC, Real Hernandes DPM, 40 mg at 11/01/24 0614    piperacillin-tazobactam (ZOSYN) 4.5 g in sodium chloride 0.9 % 100 mL IVPB (EXTENDED INFUSION), 4.5 g, Intravenous, Q8H, Real Hernandes DPM, Last Rate: 25 mL/hr at 11/01/24 0614, 4.5 g at 11/01/24 0614    polyethylene glycol (MIRALAX) packet 17 g, 17 g, Oral, Daily, Real Hernandes DPM, 17 g at 10/27/24 0803    pregabalin (LYRICA) capsule 100 mg, 100 mg, Oral, BID, Real Hernandes DPM, 100 mg at 11/01/24 0847    sodium chloride (OCEAN) 0.65 % nasal spray 2 spray, 2 spray, Each Nare, BID, Real Hernandes DPM, 2 spray at 10/29/24 0819    sodium polystyrene (KAYEXALATE) powder 15 g, 15 g, Oral, Once, Rosy Bhatt MD    traZODone (DESYREL) tablet 25 mg, 25 mg, Oral, HS, Real Hernandes DPM, 25 mg at 10/31/24 2119    Invasive Devices:        Lab Results:   Results  "from last 7 days   Lab Units 11/01/24  0900 11/01/24  0641 10/31/24  0627 10/30/24  0546 10/29/24  0515 10/28/24  0459 10/27/24  0511   WBC Thousand/uL  --  5.25 5.79 5.56 7.23 5.55 5.78   HEMOGLOBIN g/dL  --  10.7* 10.1* 9.9* 10.1* 11.4* 10.9*   HEMATOCRIT %  --  33.2* 31.7* 30.4* 31.7* 34.5* 33.3*   PLATELETS Thousands/uL  --  304 301 311 339 331 370   POTASSIUM mmol/L 5.7* 5.4* 5.0 5.0 4.5 4.3 4.0   CHLORIDE mmol/L  --  101 101 101 100 102 103   CO2 mmol/L  --  28 29 29 30 28 27   BUN mg/dL  --  22 23 26* 21 15 12   CREATININE mg/dL  --  1.19 1.32* 1.39* 1.32* 1.09 1.00   CALCIUM mg/dL  --  9.3 9.0 8.4 8.5 9.1 8.7   MAGNESIUM mg/dL  --  1.7* 1.6* 1.9 1.7* 1.6* 1.7*   ALK PHOS U/L  --   --   --   --  39 45 49   ALT U/L  --   --   --   --  16 16 18   AST U/L  --   --   --   --  14 13 15       Previous work up:         Portions of the record may have been created with voice recognition software. Occasional wrong word or \"sound a like\" substitutions may have occurred due to the inherent limitations of voice recognition software. Read the chart carefully and recognize, using context, where substitutions have occurred.If you have any questions, please contact the dictating provider.    "

## 2024-11-01 NOTE — PLAN OF CARE
Problem: PAIN - ADULT  Goal: Verbalizes/displays adequate comfort level or baseline comfort level  Description: Interventions:  - Encourage patient to monitor pain and request assistance  - Assess pain using appropriate pain scale  - Administer analgesics based on type and severity of pain and evaluate response  - Implement non-pharmacological measures as appropriate and evaluate response  - Consider cultural and social influences on pain and pain management  - Notify physician/advanced practitioner if interventions unsuccessful or patient reports new pain  Outcome: Progressing     Problem: INFECTION - ADULT  Goal: Absence or prevention of progression during hospitalization  Description: INTERVENTIONS:  - Assess and monitor for signs and symptoms of infection  - Monitor lab/diagnostic results  - Monitor all insertion sites, i.e. indwelling lines, tubes, and drains  - Monitor endotracheal if appropriate and nasal secretions for changes in amount and color  - [place] appropriate cooling/warming therapies per order  - Administer medications as ordered  - Instruct and encourage patient and family to use good hand hygiene technique  - Identify and instruct in appropriate isolation precautions for identified infection/condition  Outcome: Progressing  Goal: Absence of fever/infection during neutropenic period  Description: INTERVENTIONS:  - Monitor WBC    Outcome: Progressing     Problem: SAFETY ADULT  Goal: Patient will remain free of falls  Description: INTERVENTIONS:  - Educate patient/family on patient safety including physical limitations  - Instruct patient to call for assistance with activity   - Consult OT/PT to assist with strengthening/mobility   - Keep Call bell within reach  - Keep bed low and locked with side rails adjusted as appropriate  - Keep care items and personal belongings within reach  - Initiate and maintain comfort rounds  - Make Fall Risk Sign visible to staff  - Offer Toileting every  Hours,  in advance of need  - Initiate/Maintain alarm  - Obtain necessary fall risk management equipment:   - Apply yellow socks and bracelet for high fall risk patients  - Consider moving patient to room near nurses station  Outcome: Progressing  Goal: Maintain or return to baseline ADL function  Description: INTERVENTIONS:  -  Assess patient's ability to carry out ADLs; assess patient's baseline for ADL function and identify physical deficits which impact ability to perform ADLs (bathing, care of mouth/teeth, toileting, grooming, dressing, etc.)  - Assess/evaluate cause of self-care deficits   - Assess range of motion  - Assess patient's mobility; develop plan if impaired  - Assess patient's need for assistive devices and provide as appropriate  - Encourage maximum independence but intervene and supervise when necessary  - Involve family in performance of ADLs  - Assess for home care needs following discharge   - Consider OT consult to assist with ADL evaluation and planning for discharge  - Provide patient education as appropriate  Outcome: Progressing  Goal: Maintains/Returns to pre admission functional level  Description: INTERVENTIONS:  - Perform AM-PAC 6 Click Basic Mobility/ Daily Activity assessment daily.  - Set and communicate daily mobility goal to care team and patient/family/caregiver.   - Collaborate with rehabilitation services on mobility goals if consulted  - Perform Range of Motion  times a day.  - Reposition patient every  hours.  - Dangle patient  times a day  - Stand patient  times a day  - Ambulate patient  times a day  - Out of bed to chair  times a day   - Out of bed for meals  times a day  - Out of bed for toileting  - Record patient progress and toleration of activity level   Outcome: Progressing     Problem: DISCHARGE PLANNING  Goal: Discharge to home or other facility with appropriate resources  Description: INTERVENTIONS:  - Identify barriers to discharge w/patient and caregiver  - Arrange for  needed discharge resources and transportation as appropriate  - Identify discharge learning needs (meds, wound care, etc.)  - Arrange for interpretive services to assist at discharge as needed  - Refer to Case Management Department for coordinating discharge planning if the patient needs post-hospital services based on physician/advanced practitioner order or complex needs related to functional status, cognitive ability, or social support system  Outcome: Progressing     Problem: Knowledge Deficit  Goal: Patient/family/caregiver demonstrates understanding of disease process, treatment plan, medications, and discharge instructions  Description: Complete learning assessment and assess knowledge base.  Interventions:  - Provide teaching at level of understanding  - Provide teaching via preferred learning methods  Outcome: Progressing     Problem: Nutrition/Hydration-ADULT  Goal: Nutrient/Hydration intake appropriate for improving, restoring or maintaining nutritional needs  Description: Monitor and assess patient's nutrition/hydration status for malnutrition. Collaborate with interdisciplinary team and initiate plan and interventions as ordered.  Monitor patient's weight and dietary intake as ordered or per policy. Utilize nutrition screening tool and intervene as necessary. Determine patient's food preferences and provide high-protein, high-caloric foods as appropriate.     INTERVENTIONS:  - Monitor oral intake, urinary output, labs, and treatment plans  - Assess nutrition and hydration status and recommend course of action  - Evaluate amount of meals eaten  - Assist patient with eating if necessary   - Allow adequate time for meals  - Recommend/ encourage appropriate diets, oral nutritional supplements, and vitamin/mineral supplements  - Order, calculate, and assess calorie counts as needed  - Recommend, monitor, and adjust tube feedings and TPN/PPN based on assessed needs  - Assess need for intravenous fluids  -  Provide specific nutrition/hydration education as appropriate  - Include patient/family/caregiver in decisions related to nutrition  Outcome: Progressing     Problem: Prexisting or High Potential for Compromised Skin Integrity  Goal: Skin integrity is maintained or improved  Description: INTERVENTIONS:  - Identify patients at risk for skin breakdown  - Assess and monitor skin integrity  - Assess and monitor nutrition and hydration status  - Monitor labs   - Assess for incontinence   - Turn and reposition patient  - Assist with mobility/ambulation  - Relieve pressure over bony prominences  - Avoid friction and shearing  - Provide appropriate hygiene as needed including keeping skin clean and dry  - Evaluate need for skin moisturizer/barrier cream  - Collaborate with interdisciplinary team   - Patient/family teaching  - Consider wound care consult   Outcome: Progressing

## 2024-11-01 NOTE — ASSESSMENT & PLAN NOTE
Baseline creatinine of approximately 1.2-1.4 -> currently stable at 1.19  Monitor renal function and urine output  Limit/avoid nephrotoxins and hypotension as possible -> holding standing diuretic/ACEI  Nephrology following

## 2024-11-01 NOTE — RESTORATIVE TECHNICIAN NOTE
Restorative Technician Note      Patient Name: Clay Marcial     Restorative Tech Visit Date: 11/01/24  Note Type: Bracing, Initial consult  Patient Position Upon Consult: Supine  Brace Applied: Cam Walker Boot Standard (LLE; large)  Education Provided: Yes  Patient Position at End of Consult: Supine; All needs within reach; Bed/Chair alarm activated  Nurse Communication: Nurse aware of consult, application of brace    Brace left by window to be used post op    Please contact [facility] PT Restorative tech on Epic Secure Chat  in regards to bracing instruction and/or adjustment.    Laura Levy  DPT, Restorative Technician

## 2024-11-01 NOTE — ASSESSMENT & PLAN NOTE
Continue Imdur/Toprol-XL  ACEI and standing diuretic (Lasix) remains held per nephrology - given a one-time IV Lasix dose today

## 2024-11-01 NOTE — PROGRESS NOTES
Podiatry - Progress Note  Patient: Clay Marcial 68 y.o. male   MRN: [id number]  PCP: No primary care provider on file.  Unit/Bed#: [facility] 825-01 Encounter: [id number]  Date Of Visit: 24    ASSESSMENT:    Clay Marcial is a 68 y.o. male with:    Diabetic foot infection   - S/p left second toe amputation and wound debridement (DOS: 10/21/2024)  - S/p TMA (DOS: 10/28/2024)  Type 2 diabetes mellitus   - A1c 7.2% (10/16/2024)  Coronary artery disease  Stage III chronic kidney disease  Chronic obstructive pulmonary disease  Hypertension      PLAN:    Patient was seen and evaluated at bedside with all questions and concerns addressed.  Patient's left foot delayed closure surgery was rescheduled to 2 days later 11/3/24 because the OR is fully occupied with emergency surgeries. Patient understands and is amenable for the change. Diet and heparin were resumed. Primary team updated.   Wound vac was changed to dorsiflex and approximate the plantar flap to the dorsal flap. Because of bleeding and mild maceration on the medial and lateral corners of the incision site, Maxorb was applied on these part  TMA site wound is similar to the last dressing change. Some Maxorb fiber was found in the wound bed. But granulation tissue was found after the Maxorb was peeled off. Please refer to physical examination for details.   Patients' lab and vital signs were reviewed. Patient is afebrile with no leukocytosis. Patient does not  meet the SIRS criteria. Will keep monitoring.  Continue IV Zosyn pre recommendation from ID  Podiatry to perform dressing change at this point.  Follow-up with SLIM, vascular surgery, ID, PT, OT, CM recommendations  Elevation and offloading on green foam wedges or pillows when non-ambulatory.  Rest of care per primary team.     Weightbearing status: Non-weightbearing left  foot    Wound VAC application  1. Number of sponges: 2  2. Pressure settin continuous  3. Size of wound: 12cm x 5cm x 4cm  4.  "Description of wound: distal left foot TMA site with 80% granulation tissue and 20% fibrous tissue. No purulence found. Minimum bleeding in wound bed. Distal stumps of metatarsals found in the wound bed. Periwound skin is healthy with no signs of infection.       SUBJECTIVE:     The patient was seen, evaluated, and assessed at bedside today. The patient was awake, alert, and in no acute distress. No acute events overnight. The patient reports no pain or discomfort to the left foot. Patient denies N/V/F/chills/SOB/CP.      OBJECTIVE:     Vitals:   /71   Pulse 90   Temp 98.6 °F (37 °C) (Oral)   Resp 18   Ht 5' 11\" (1.803 m)   Wt 126 kg (278 lb)   SpO2 96%   BMI 38.77 kg/m²     Temp (24hrs), Av.9 °F (36.6 °C), Min:96.2 °F (35.7 °C), Max:98.6 °F (37 °C)      Physical Exam:     Lungs: Non labored breathing  Abdomen: Soft, non-tender.  Lower Extremity:  Cardiovascular status at baseline from admission.  Neurological status at baseline from admission.  Musculoskeletal status post left foot TMA. No calf tenderness noted.     Left lower limb shows open surgical site of TMA    Wound #: 1  Location: distal foot TMA surgical site   Length 12cm: Width 5cm: Depth 4cm:   Deepest Tissue Noted in Base: bone  Probe to Bone: No  Peripheral Skin Description: Attached  Granulation: 80% Fibrotic Tissue: 20% Necrotic Tissue: 0%   Drainage Amount: minimal, bloody  Signs of Infection: Yes. positive  probe to bone, negative crepitus, fluctuance, negative purulence, negative periwound skin erythema, edema, negative proximal tracking erythema    Clinical Images 24:            Additional Data:     Labs:    Results from last 7 days   Lab Units 24  0641   WBC Thousand/uL 5.25   HEMOGLOBIN g/dL 10.7*   HEMATOCRIT % 33.2*   PLATELETS Thousands/uL 304   SEGS PCT % 60   LYMPHO PCT % 28   MONO PCT % 6   EOS PCT % 4     Results from last 7 days   Lab Units 24  1551 10/30/24  0546 10/29/24  0515   POTASSIUM mmol/L 5.2  " " < > 4.5   CHLORIDE mmol/L 102   < > 100   CO2 mmol/L 29   < > 30   BUN mg/dL 21   < > 21   CREATININE mg/dL 1.27   < > 1.32*   CALCIUM mg/dL 9.1   < > 8.5   ALK PHOS U/L  --   --  39   ALT U/L  --   --  16   AST U/L  --   --  14    < > = values in this interval not displayed.           * I Have Reviewed All Lab Data Listed Above.    Recent Cultures (last 7 days):               Imaging: I have personally reviewed pertinent films in PACS  EKG, Pathology, and Other Studies: I have personally reviewed pertinent reports.    ** Please Note: Portions of the record may have been created with voice recognition software. Occasional wrong word or \"sound a like\" substitutions may have occurred due to the inherent limitations of voice recognition software. Read the chart carefully and recognize, using context, where substitutions have occurred. **      "

## 2024-11-01 NOTE — ASSESSMENT & PLAN NOTE
Initially presented at the [facility] with left lower extremity cellulitis with open wound  S/p I&D, L 2nd toe amputation with podiatry on 10/21 given concern for acutely worsening wound/infection   Transferred to the [facility] for vascular surgery/IR evaluations given concern also for embolic process/ischemia   Agram 10/25   Status post left foot TMA on 10/28  Continue IV heparin gtt   Appreciate infectious disease input  Continue IV Zosyn until decision made on plan/timing of delayed closure  Wound culture from OR on 10/21 with polymicrobial growth including Bacteroides pyogenes, Finegoldia magna, Actinomyces species, coagulase-negative Staphylococcus, and Globicatella species  Initial plan for wound VAC placement yesterday by podiatry postponed as wound site noted to still be bleeding, stabilized with compression and cautery -> underwent wound VAC placement on 11/30 with plan for return to the OR for delayed closure, now postponed until Sunday, 11/3

## 2024-11-01 NOTE — PROGRESS NOTES
Progress Note - Hospitalist   Name: Clay Marcial 68 y.o. male I MRN: [id number]  Unit/Bed#: [facility] 825-01 I Date of Admission: 10/22/2024   Date of Service: 11/1/2024 I Hospital Day: 10    Assessment & Plan  Diabetic foot infection  (HCC)  Initially presented at the [facility] with left lower extremity cellulitis with open wound  S/p I&D, L 2nd toe amputation with podiatry on 10/21 given concern for acutely worsening wound/infection   Transferred to the [facility] for vascular surgery/IR evaluations given concern also for embolic process/ischemia   Agram 10/25   Status post left foot TMA on 10/28  Continue IV heparin gtt   Appreciate infectious disease input  Continue IV Zosyn until decision made on plan/timing of delayed closure  Wound culture from OR on 10/21 with polymicrobial growth including Bacteroides pyogenes, Finegoldia magna, Actinomyces species, coagulase-negative Staphylococcus, and Globicatella species  Initial plan for wound VAC placement yesterday by podiatry postponed as wound site noted to still be bleeding, stabilized with compression and cautery -> underwent wound VAC placement on 11/30 with plan for return to the OR for delayed closure, now postponed until Sunday, 11/3  Type 2 diabetes mellitus with diabetic polyneuropathy (HCC)  Lab Results   Component Value Date    HGBA1C 7.2 (H) 10/16/2024     Continue basal/prandial insulin with additional SSI coverage per Accu-Cheks - optimize dosing daily as necessary  Hypoglycemia protocol  Carbohydrate restriction  On Lyrica for neuropathy  Stage 3 chronic kidney disease (HCC)  Baseline creatinine of approximately 1.2-1.4 -> currently stable at 1.19  Monitor renal function and urine output  Limit/avoid nephrotoxins and hypotension as possible -> holding standing diuretic/ACEI  Nephrology following  HTN (hypertension)  Continue Imdur/Toprol-XL  ACEI and standing diuretic (Lasix) remains held per nephrology - given a one-time IV Lasix dose  "today  CAD (coronary artery disease)  Continue ASA/statin/Toprol-XL/Imdur  Chronic obstructive pulmonary disease with acute exacerbation (HCC)  Continue Pulmicort/Olodaterol/Umeclidinium - PRN Albuterol on board  Hypomagnesemia  Monitor/replete serum magnesium and potassium as necessary  Obesity  BMI of 38.77  Lifestyle/diet modifications  Hyperkalemia  Status post \"cocktail\" administered by nephrology today  Monitor serum potassium      VTE Pharmacologic Prophylaxis: VTE Score: 5 High Risk (Score >/= 5) - Pharmacological DVT Prophylaxis Ordered: Heparin Drip. Sequential Compression Devices Ordered.    AM-PAC Basic Mobility:  Basic Mobility Inpatient Raw Score: 14  JH-HLM Goal: 4: Move to chair/commode  JH-HLM Achieved: 4: Move to chair/commode  JH-HLM Goal achieved. Continue to encourage appropriate mobility.    Patient Centered Rounds:  I have performed bedside rounds and discussed plan of care with nursing today.  Discussions with Specialists or Other Care Team Provider:  see above assessments if applicable    Education and Discussions with Family / Patient:  Patient at bedside, who denied need to update other contacts currently    Time Spent for Care:  35 minutes. More than 50% of total time spent on counseling and coordination of care, on one or more of the following: performing physical exam; counseling and coordination of care, obtaining or reviewing history, documenting in the medical record, reviewing/ordering tests/medications/procedures, and communicating with other healthcare professionals.    Current Length of Stay: 10 day(s)  Current Patient Status: Inpatient   Certification Statement:  Patient will continue to require additional hospital stay due to assessments as noted above.    Code Status: Level 1 - Full Code      Subjective     Encountered earlier today.  No new complaints this time.  Pain is relatively controlled.      Objective     Vitals:   Temp (24hrs), Av.4 °F (36.9 °C), Min:98.3 °F (36.8 " °C), Max:98.5 °F (36.9 °C)    Temp:  [98.3 °F (36.8 °C)-98.5 °F (36.9 °C)] 98.3 °F (36.8 °C)  HR:  [85-96] 85  Resp:  [16-18] 16  BP: (142-148)/(72-74) 143/74  SpO2:  [94 %-97 %] 97 %  Body mass index is 38.77 kg/m².     Input and Output Summary (last 24 hours):       Intake/Output Summary (Last 24 hours) at 11/1/2024 1524  Last data filed at 11/1/2024 1401  Gross per 24 hour   Intake --   Output 4135 ml   Net -4135 ml       Physical Exam:     GENERAL No acute distress at rest - obese   HEAD   Normocephalic - atraumatic   EYES Nonicteric   MOUTH   Mucosa moist   NECK   Supple - full range of motion   CARDIAC Rate controlled   PULMONARY Clear bilateral breath sounds   ABDOMEN Nontender/nondistended   MUSCULOSKELETAL   Motor strength/range of motion remains deconditioned   NEUROLOGIC   Alert/oriented at baseline   SKIN   Chronic wrinkles/blemishes - s/p left TMA with site dressed/wrapped currently with wound VAC in place   PSYCHIATRIC   Mood/affect stable         Labs & Recent Cultures:  Results from last 7 days   Lab Units 11/01/24  0641   WBC Thousand/uL 5.25   HEMOGLOBIN g/dL 10.7*   HEMATOCRIT % 33.2*   PLATELETS Thousands/uL 304   SEGS PCT % 60   LYMPHO PCT % 28   MONO PCT % 6   EOS PCT % 4     Results from last 7 days   Lab Units 11/01/24  0900 11/01/24  0641 10/30/24  0546 10/29/24  0515   POTASSIUM mmol/L 5.7* 5.4*   < > 4.5   CHLORIDE mmol/L  --  101   < > 100   CO2 mmol/L  --  28   < > 30   BUN mg/dL  --  22   < > 21   CREATININE mg/dL  --  1.19   < > 1.32*   CALCIUM mg/dL  --  9.3   < > 8.5   ALK PHOS U/L  --   --   --  39   ALT U/L  --   --   --  16   AST U/L  --   --   --  14    < > = values in this interval not displayed.         Results from last 7 days   Lab Units 11/01/24  1046 11/01/24  0742 10/31/24  2102 10/31/24  1616 10/31/24  1108 10/31/24  0714 10/30/24  2025 10/30/24  1656 10/30/24  1126 10/30/24  0625 10/29/24  2058 10/29/24  1602   POC GLUCOSE mg/dl 188* 240* 312* 325* 273* 256* 317* 266*  325* 342* 321* 338*                         Lines/Drains/Telemetry:  Invasive Devices       Peripheral Intravenous Line  Duration             Peripheral IV 10/27/24 Dorsal (posterior);Right Forearm 4 days    Peripheral IV 10/29/24 Left;Ventral (anterior) Forearm 2 days                    Last 24 Hours Medication List:   Current Facility-Administered Medications   Medication Dose Route Frequency Provider Last Rate    acetaminophen  650 mg Oral Q6H PRN Real Hernandes DPM      albuterol  2.5 mg Nebulization Q6H PRN Real Hernandes DPM      aspirin  81 mg Oral Daily Real Hernandes DPM      atorvastatin  20 mg Oral Daily Real Hernandes DPM      bisacodyl  10 mg Rectal Daily PRN Real Hernandes DPM      budesonide  0.5 mg Nebulization Q12H Real Hernandes DPM      Cholecalciferol  1,000 Units Oral Daily Real Hernandes DPM      cyanocobalamin  500 mcg Oral Daily Real Hernandes DPM      docusate sodium  100 mg Oral BID Real Hernandes DPM      DULoxetine  20 mg Oral Daily Real Hernandes DPM      fluticasone  2 spray Nasal Daily Real Hernandes DPM      heparin (porcine)  3-30 Units/kg/hr (Order-Specific) Intravenous Titrated Courtney Benitez PA-C Stopped (11/01/24 0832)    heparin (porcine)  4,800 Units Intravenous Q6H PRN Courtney Benitez PA-C      heparin (porcine)  9,600 Units Intravenous Q6H PRN Courtney Benitez PA-C      insulin glargine  40 Units Subcutaneous HS Rosy Bhatt MD      insulin lispro  18 Units Subcutaneous TID With Meals Rosy Bhatt MD      insulin lispro  2-12 Units Subcutaneous 4x Daily (AC & HS) Real Hernandes DPM      isosorbide mononitrate  30 mg Oral Daily Real Hernandes DPM      loratadine  10 mg Oral Daily Real Hernandes DPM      metoprolol succinate  50 mg Oral Daily Real Hernandes DPM      morphine injection  2 mg Intravenous Q4H PRN Real Hernandes DPM      nicotine  1 patch Transdermal Daily Real Hernandes DPM      umeclidinium  1 puff Inhalation Daily Real Hernandes DPM       And    olodaterol HCl  2 puff Inhalation Daily Real Hernandes DPM      ondansetron  4 mg Intravenous Q4H PRN Real Hernandes DPM      oxyCODONE  5 mg Oral Q4H PRN Real Hernandes DPM      oxyCODONE  2.5 mg Oral Q4H PRN Real Hernandes DPM      pantoprazole  40 mg Oral BID AC Real Hernandes DPM      piperacillin-tazobactam  4.5 g Intravenous Q8H Real Hernandes DPM 4.5 g (11/01/24 1429)    polyethylene glycol  17 g Oral Daily Real Hernandes DPM      pregabalin  100 mg Oral BID Real Hernandes DPM      sodium chloride  2 spray Each Nare BID Real Hernandes DPM      Sodium Zirconium Cyclosilicate  10 g Oral Daily Sebas Dover MD      traZODone  25 mg Oral HS SHIN Schneider MD   Hospitalist - [facility] Internal Medicine        ** Please Note:  Documentation is constructed using a voice recognition dictation system.  An occasional wrong word/phrase or “sound-a-like” substitution may have been picked up by dictation device due to the inherent limitations of voice recognition software.  Read the chart carefully and recognize, using reasonable context, where substitutions may have occurred.**

## 2024-11-01 NOTE — CASE MANAGEMENT
Case Management Discharge Planning Note    Patient name Clay Marcial  Location [facility] 825/[facility] 825-01 MRN [id number]  : 1955 Date 2024       Current Admission Date: 10/22/2024  Current Admission Diagnosis:Diabetic foot infection  (HCC)   Patient Active Problem List    Diagnosis Date Noted Date Diagnosed    Obesity 10/30/2024     Hypomagnesemia 10/28/2024     HTN (hypertension) 10/21/2024     Elevated serum creatinine 10/21/2024     Smoking 10/21/2024     PAD (peripheral artery disease) (HCC) 10/20/2024     Acute hyponatremia 10/20/2024     Chronic obstructive pulmonary disease with acute exacerbation ([facility]) 10/17/2024     ROMEL (obstructive sleep apnea) 10/17/2024     Diabetic foot infection  (HCC) 10/16/2024     Stage 3 chronic kidney disease ([facility]) 10/16/2024     Type 2 diabetes mellitus with diabetic polyneuropathy ([facility]) 2024     CAD (coronary artery disease) 2024     Chronic diastolic congestive heart failure ([facility]) 2024     Acute on chronic kidney failure  (HCC) 2024     Hyperkalemia 2024     Helicobacter pylori infection 2024       LOS (days): 10  Geometric Mean LOS (GMLOS) (days): 7.3  Days to GMLOS:-2.3     OBJECTIVE:  Risk of Unplanned Readmission Score: 32.33         Current admission status: Inpatient   Preferred Pharmacy:   [facility]/pharmacy #[id number] [place], [place] - [address] [place] [id number]  Phone: [phone] Fax: [phone]    [facility] - [place], [place] - [address]  [address] [facility] [place] [address]  Phone: [phone] Fax: [phone]    Primary Care Provider: No primary care provider on file.    Primary Insurance: [facility]  Secondary Insurance: MEDICARE    DISCHARGE DETAILS:              Met with patient at bedside to review STR choice list  [facility]- will NEED AUTH-  Pt has Medicare Part A in addition to [place]. Patricia able to take under his  Medicare coverage.  Pt to go to OR today for left TMA debridement and closure

## 2024-11-01 NOTE — PROGRESS NOTES
Progress Note - Infectious Disease   Clay Marcial 68 y.o. male MRN: [id number]  Unit/Bed#: [facility] 825-01 Encounter: [id number]      Impression:  1.  Diabetic left foot infection with osteomyelitis s/p left second toe amputation with wound debridement and packing S/P left transmetatarsal amputation 10/28  2.  Type II DM with PAD, history of chronic left foot plantar ulcer  3.  History of chronic tobacco abuse with COPD and possible ROMEL  4.  CAD    Recommendations:  Patient is afebrile with normal WBC count.  Wound picture and description  by podiatry noted   1.  Left TMA wound is open and appears clean as per podiatry picture   2.  Will  continue piperacillin/tazobactam 4.5 g every 8 hours IV by extended infusion pending  delayed closure currently tentatively planned for 11/3.    3.  Creatinine and BUN are have returned to tomas.l   Antibiotics:  1.  Piperacillin/tazobactam 4.5 g every 8 hours IV by extended infusion, day 12 Rx    Subjective:  The patient has no complaints.  Denies fevers, chills, or sweats.  Denies nausea, vomiting, or diarrhea.      Objective:  Vitals:  Temp:  [96.2 °F (35.7 °C)-98.6 °F (37 °C)] 98.6 °F (37 °C)  HR:  [85-91] 90  Resp:  [16-18] 18  BP: (136-143)/(71-74) 136/71  SpO2:  [94 %-97 %] 96 %  Temp (24hrs), Av.9 °F (36.6 °C), Min:96.2 °F (35.7 °C), Max:98.6 °F (37 °C)  Current: Temperature: 98.6 °F (37 °C)    Physical Exam:     General Appearance:  Alert, appropriately responsive, chronically ill-appearing nontoxic, no acute distress.   Throat: Oropharynx moist without lesions.  Lips, mucosa, and tongue normal, edentulous   Neck: Supple, symmetrical, trachea midline, no adenopathy,  no tenderness/mass/nodules   Lungs:   Increased AP diameter with deep creased respiratory expansion   Heart:  Regular rate and rhythm, S1, S2 normal, no murmur, rub or gallop   Abdomen:   Soft, non-tender, non-distended, positive bowel sounds.  No masses, no organomegaly    No CVA tenderness    Extremities: LLE with +2/4 edema and erythema.  Wound is open with some serosanguineous drainage as per picture 11/1.   Peripheral pulses decreased   Skin: As above.         Invasive Devices       Peripheral Intravenous Line  Duration             Peripheral IV 10/29/24 Left;Ventral (anterior) Forearm 3 days                    Labs, Imaging, & Other studies:   All pertinent labs were personally reviewed  Results from last 7 days   Lab Units 11/01/24  0641 10/31/24  0627 10/30/24  0546   WBC Thousand/uL 5.25 5.79 5.56   HEMOGLOBIN g/dL 10.7* 10.1* 9.9*   PLATELETS Thousands/uL 304 301 311     Results from last 7 days   Lab Units 11/01/24  1551 11/01/24  0900 11/01/24  0641 10/31/24  0627 10/30/24  0546 10/29/24  0515 10/28/24  0459 10/27/24  0511   SODIUM mmol/L 137  --  136 136   < > 136 137 138   POTASSIUM mmol/L 5.2   < > 5.4* 5.0   < > 4.5 4.3 4.0   CHLORIDE mmol/L 102  --  101 101   < > 100 102 103   CO2 mmol/L 29  --  28 29   < > 30 28 27   BUN mg/dL 21  --  22 23   < > 21 15 12   CREATININE mg/dL 1.27  --  1.19 1.32*   < > 1.32* 1.09 1.00   EGFR ml/min/1.73sq m 56  --  61 54   < > 54 68 75   CALCIUM mg/dL 9.1  --  9.3 9.0   < > 8.5 9.1 8.7   AST U/L  --   --   --   --   --  14 13 15   ALT U/L  --   --   --   --   --  16 16 18   ALK PHOS U/L  --   --   --   --   --  39 45 49    < > = values in this interval not displayed.

## 2024-11-02 PROBLEM — N18.32 ANEMIA DUE TO STAGE 3B CHRONIC KIDNEY DISEASE  (HCC): Status: ACTIVE | Noted: 2024-11-02

## 2024-11-02 PROBLEM — D63.1 ANEMIA DUE TO STAGE 3B CHRONIC KIDNEY DISEASE  (HCC): Status: ACTIVE | Noted: 2024-11-02

## 2024-11-02 PROBLEM — R79.89 ELEVATED SERUM CREATININE: Status: RESOLVED | Noted: 2024-10-21 | Resolved: 2024-11-02

## 2024-11-02 LAB
ANION GAP SERPL CALCULATED.3IONS-SCNC: 6 MMOL/L (ref 4–13)
APTT PPP: 107 SECONDS (ref 23–34)
APTT PPP: 59 SECONDS (ref 23–34)
APTT PPP: 76 SECONDS (ref 23–34)
BASOPHILS # BLD AUTO: 0.05 THOUSANDS/ΜL (ref 0–0.1)
BASOPHILS NFR BLD AUTO: 1 % (ref 0–1)
BUN SERPL-MCNC: 25 MG/DL (ref 5–25)
CALCIUM SERPL-MCNC: 9.2 MG/DL (ref 8.4–10.2)
CHLORIDE SERPL-SCNC: 102 MMOL/L (ref 96–108)
CK SERPL-CCNC: 13 U/L (ref 39–308)
CO2 SERPL-SCNC: 27 MMOL/L (ref 21–32)
CREAT SERPL-MCNC: 1.35 MG/DL (ref 0.6–1.3)
EOSINOPHIL # BLD AUTO: 0.19 THOUSAND/ΜL (ref 0–0.61)
EOSINOPHIL NFR BLD AUTO: 3 % (ref 0–6)
ERYTHROCYTE [DISTWIDTH] IN BLOOD BY AUTOMATED COUNT: 13 % (ref 11.6–15.1)
GFR SERPL CREATININE-BSD FRML MDRD: 52 ML/MIN/1.73SQ M
GLUCOSE SERPL-MCNC: 164 MG/DL (ref 65–140)
GLUCOSE SERPL-MCNC: 187 MG/DL (ref 65–140)
GLUCOSE SERPL-MCNC: 197 MG/DL (ref 65–140)
GLUCOSE SERPL-MCNC: 236 MG/DL (ref 65–140)
GLUCOSE SERPL-MCNC: 248 MG/DL (ref 65–140)
HCT VFR BLD AUTO: 31.5 % (ref 36.5–49.3)
HGB BLD-MCNC: 10.2 G/DL (ref 12–17)
IMM GRANULOCYTES # BLD AUTO: 0.05 THOUSAND/UL (ref 0–0.2)
IMM GRANULOCYTES NFR BLD AUTO: 1 % (ref 0–2)
LYMPHOCYTES # BLD AUTO: 1.46 THOUSANDS/ΜL (ref 0.6–4.47)
LYMPHOCYTES NFR BLD AUTO: 26 % (ref 14–44)
MAGNESIUM SERPL-MCNC: 1.6 MG/DL (ref 1.9–2.7)
MCH RBC QN AUTO: 31.2 PG (ref 26.8–34.3)
MCHC RBC AUTO-ENTMCNC: 32.4 G/DL (ref 31.4–37.4)
MCV RBC AUTO: 96 FL (ref 82–98)
MONOCYTES # BLD AUTO: 0.34 THOUSAND/ΜL (ref 0.17–1.22)
MONOCYTES NFR BLD AUTO: 6 % (ref 4–12)
NEUTROPHILS # BLD AUTO: 3.54 THOUSANDS/ΜL (ref 1.85–7.62)
NEUTS SEG NFR BLD AUTO: 63 % (ref 43–75)
NRBC BLD AUTO-RTO: 0 /100 WBCS
PLATELET # BLD AUTO: 258 THOUSANDS/UL (ref 149–390)
PMV BLD AUTO: 9.4 FL (ref 8.9–12.7)
POTASSIUM SERPL-SCNC: 5.1 MMOL/L (ref 3.5–5.3)
RBC # BLD AUTO: 3.27 MILLION/UL (ref 3.88–5.62)
SODIUM SERPL-SCNC: 135 MMOL/L (ref 135–147)
WBC # BLD AUTO: 5.63 THOUSAND/UL (ref 4.31–10.16)

## 2024-11-02 PROCEDURE — 85025 COMPLETE CBC W/AUTO DIFF WBC: CPT | Performed by: INTERNAL MEDICINE

## 2024-11-02 PROCEDURE — 94640 AIRWAY INHALATION TREATMENT: CPT

## 2024-11-02 PROCEDURE — 80048 BASIC METABOLIC PNL TOTAL CA: CPT | Performed by: INTERNAL MEDICINE

## 2024-11-02 PROCEDURE — 94664 DEMO&/EVAL PT USE INHALER: CPT

## 2024-11-02 PROCEDURE — 85730 THROMBOPLASTIN TIME PARTIAL: CPT | Performed by: INTERNAL MEDICINE

## 2024-11-02 PROCEDURE — 99232 SBSQ HOSP IP/OBS MODERATE 35: CPT | Performed by: INTERNAL MEDICINE

## 2024-11-02 PROCEDURE — 94760 N-INVAS EAR/PLS OXIMETRY 1: CPT

## 2024-11-02 PROCEDURE — 82550 ASSAY OF CK (CPK): CPT | Performed by: INTERNAL MEDICINE

## 2024-11-02 PROCEDURE — 83735 ASSAY OF MAGNESIUM: CPT | Performed by: INTERNAL MEDICINE

## 2024-11-02 PROCEDURE — 82948 REAGENT STRIP/BLOOD GLUCOSE: CPT

## 2024-11-02 RX ORDER — MAGNESIUM SULFATE HEPTAHYDRATE 40 MG/ML
2 INJECTION, SOLUTION INTRAVENOUS ONCE
Status: COMPLETED | OUTPATIENT
Start: 2024-11-02 | End: 2024-11-02

## 2024-11-02 RX ADMIN — ASPIRIN 81 MG CHEWABLE TABLET 81 MG: 81 TABLET CHEWABLE at 08:11

## 2024-11-02 RX ADMIN — PIPERACILLIN SODIUM AND TAZOBACTAM SODIUM 4.5 G: 36; 4.5 INJECTION, POWDER, LYOPHILIZED, FOR SOLUTION INTRAVENOUS at 05:32

## 2024-11-02 RX ADMIN — DOCUSATE SODIUM 100 MG: 100 CAPSULE, LIQUID FILLED ORAL at 08:11

## 2024-11-02 RX ADMIN — INSULIN LISPRO 18 UNITS: 100 INJECTION, SOLUTION INTRAVENOUS; SUBCUTANEOUS at 17:05

## 2024-11-02 RX ADMIN — DULOXETINE HYDROCHLORIDE 20 MG: 20 CAPSULE, DELAYED RELEASE ORAL at 08:11

## 2024-11-02 RX ADMIN — PREGABALIN 100 MG: 100 CAPSULE ORAL at 17:04

## 2024-11-02 RX ADMIN — CYANOCOBALAMIN TAB 500 MCG 500 MCG: 500 TAB at 08:11

## 2024-11-02 RX ADMIN — SODIUM ZIRCONIUM CYCLOSILICATE 10 G: 10 POWDER, FOR SUSPENSION ORAL at 08:13

## 2024-11-02 RX ADMIN — HEPARIN SODIUM 22 UNITS/KG/HR: 10000 INJECTION, SOLUTION INTRAVENOUS at 22:33

## 2024-11-02 RX ADMIN — INSULIN LISPRO 4 UNITS: 100 INJECTION, SOLUTION INTRAVENOUS; SUBCUTANEOUS at 21:08

## 2024-11-02 RX ADMIN — INSULIN LISPRO 18 UNITS: 100 INJECTION, SOLUTION INTRAVENOUS; SUBCUTANEOUS at 08:13

## 2024-11-02 RX ADMIN — PANTOPRAZOLE SODIUM 40 MG: 40 TABLET, DELAYED RELEASE ORAL at 05:33

## 2024-11-02 RX ADMIN — NICOTINE 1 PATCH: 14 PATCH, EXTENDED RELEASE TRANSDERMAL at 08:12

## 2024-11-02 RX ADMIN — HEPARIN SODIUM 22 UNITS/KG/HR: 10000 INJECTION, SOLUTION INTRAVENOUS at 01:22

## 2024-11-02 RX ADMIN — INSULIN LISPRO 2 UNITS: 100 INJECTION, SOLUTION INTRAVENOUS; SUBCUTANEOUS at 17:05

## 2024-11-02 RX ADMIN — Medication 1000 UNITS: at 08:12

## 2024-11-02 RX ADMIN — ISOSORBIDE MONONITRATE 30 MG: 30 TABLET, EXTENDED RELEASE ORAL at 08:12

## 2024-11-02 RX ADMIN — HEPARIN SODIUM 20 UNITS/KG/HR: 10000 INJECTION, SOLUTION INTRAVENOUS at 10:59

## 2024-11-02 RX ADMIN — BUDESONIDE 0.5 MG: 0.5 INHALANT RESPIRATORY (INHALATION) at 08:11

## 2024-11-02 RX ADMIN — PREGABALIN 100 MG: 100 CAPSULE ORAL at 08:11

## 2024-11-02 RX ADMIN — TRAZODONE HYDROCHLORIDE 25 MG: 50 TABLET ORAL at 21:08

## 2024-11-02 RX ADMIN — PIPERACILLIN SODIUM AND TAZOBACTAM SODIUM 4.5 G: 36; 4.5 INJECTION, POWDER, LYOPHILIZED, FOR SOLUTION INTRAVENOUS at 22:28

## 2024-11-02 RX ADMIN — METOPROLOL SUCCINATE 50 MG: 50 TABLET, EXTENDED RELEASE ORAL at 08:11

## 2024-11-02 RX ADMIN — INSULIN GLARGINE 40 UNITS: 100 INJECTION, SOLUTION SUBCUTANEOUS at 21:08

## 2024-11-02 RX ADMIN — ATORVASTATIN CALCIUM 20 MG: 20 TABLET, FILM COATED ORAL at 08:12

## 2024-11-02 RX ADMIN — DOCUSATE SODIUM 100 MG: 100 CAPSULE, LIQUID FILLED ORAL at 17:04

## 2024-11-02 RX ADMIN — INSULIN LISPRO 2 UNITS: 100 INJECTION, SOLUTION INTRAVENOUS; SUBCUTANEOUS at 08:13

## 2024-11-02 RX ADMIN — PANTOPRAZOLE SODIUM 40 MG: 40 TABLET, DELAYED RELEASE ORAL at 17:04

## 2024-11-02 RX ADMIN — HEPARIN SODIUM 4800 UNITS: 1000 INJECTION INTRAVENOUS; SUBCUTANEOUS at 20:55

## 2024-11-02 RX ADMIN — BUDESONIDE 0.5 MG: 0.5 INHALANT RESPIRATORY (INHALATION) at 20:52

## 2024-11-02 RX ADMIN — INSULIN LISPRO 18 UNITS: 100 INJECTION, SOLUTION INTRAVENOUS; SUBCUTANEOUS at 11:53

## 2024-11-02 RX ADMIN — INSULIN LISPRO 4 UNITS: 100 INJECTION, SOLUTION INTRAVENOUS; SUBCUTANEOUS at 11:54

## 2024-11-02 RX ADMIN — LORATADINE 10 MG: 10 TABLET ORAL at 08:12

## 2024-11-02 RX ADMIN — MAGNESIUM SULFATE HEPTAHYDRATE 2 G: 40 INJECTION, SOLUTION INTRAVENOUS at 11:53

## 2024-11-02 RX ADMIN — PIPERACILLIN SODIUM AND TAZOBACTAM SODIUM 4.5 G: 36; 4.5 INJECTION, POWDER, LYOPHILIZED, FOR SOLUTION INTRAVENOUS at 14:40

## 2024-11-02 NOTE — RESPIRATORY THERAPY NOTE
Resp Care   11/02/24 0812   Inhalation Therapy Tx   $ Inhalation Therapy Performed Yes   $ Pulse Oximetry Spot Check Charge Completed   Pre-Treatment Pulse 95   Pre-Treatment Respirations 20   Duration 10   Breath Sounds Pre-Treatment Bilateral Diminished   Breath Sounds Post-Treatment Bilateral Diminished   Post-Treatment Pulse 83   Post-Treatment Respirations 20   Delivery Source UDN   Position Semi Zarate's   Treatment Tolerance Tolerated well   Resp Comments pt found on ra, spo2 is 95%, udn tx given, will cont to monitor per resp protocol.

## 2024-11-02 NOTE — PROGRESS NOTES
Progress Note - Hospitalist   Name: Clay Marcial 68 y.o. male I MRN: [id number]  [facility] I Date of Admission: 10/22/2024   Date of Service: 11/2/2024 I Hospital Day: 11    Assessment & Plan  Diabetic foot infection  (HCC)  Initially presented at the [facility] with left lower extremity cellulitis with open wound  S/p I&D, L 2nd toe amputation with podiatry on 10/21 given concern for acutely worsening wound/infection   Transferred to the [facility] for vascular surgery/IR evaluations given concern also for embolic process/ischemia   Agram 10/25   Status post left foot TMA on 10/28  Continue IV heparin gtt   Appreciate infectious disease input  Continue IV Zosyn until decision made on plan/timing of delayed closure  Wound culture from OR on 10/21 with polymicrobial growth including Bacteroides pyogenes, Finegoldia magna, Actinomyces species, coagulase-negative Staphylococcus, and Globicatella species  Initial plan for wound VAC placement yesterday by podiatry postponed as wound site noted to still be bleeding, stabilized with compression and cautery -> underwent wound VAC placement on 11/30 with plan for return to the OR for delayed closure, now postponed until tomorrow  Type 2 diabetes mellitus with diabetic polyneuropathy (HCC)  Lab Results   Component Value Date    HGBA1C 7.2 (H) 10/16/2024     Continue basal/prandial insulin with additional SSI coverage per Accu-Cheks - optimize dosing daily as necessary  Hypoglycemia protocol  Carbohydrate restriction  On Lyrica for neuropathy  Stage 3 chronic kidney disease (HCC)  Baseline creatinine of approximately 1.2-1.4 -> currently stable at 1.35  Monitor renal function and urine output  Limit/avoid nephrotoxins and hypotension as possible -> holding standing diuretic/ACEI  Nephrology following  HTN (hypertension)  Continue Imdur/Toprol-XL  ACEI and standing diuretic (Lasix) remains held per nephrology - given a one-time IV Lasix dose  "today  CAD (coronary artery disease)  Continue ASA/statin/Toprol-XL/Imdur  Chronic obstructive pulmonary disease with acute exacerbation (HCC)  Continue Pulmicort/Olodaterol/Umeclidinium - PRN Albuterol on board  Hypomagnesemia  Monitor/replete serum magnesium and potassium as necessary  Obesity  BMI of 38.77  Lifestyle/diet modifications  Hyperkalemia  Status post \"cocktail\" and daily Lokelma initiation administered by nephrology yesterday with subsequent normalization  Monitor serum potassium      VTE Pharmacologic Prophylaxis: VTE Score: 5 High Risk (Score >/= 5) - Pharmacological DVT Prophylaxis Ordered: Heparin Drip. Sequential Compression Devices Ordered.    AM-PAC Basic Mobility:  Basic Mobility Inpatient Raw Score: 15  -HLM Goal: 4: Move to chair/commode  JH-HLM Achieved: 4: Move to chair/commode  JH-HLM Goal achieved. Continue to encourage appropriate mobility.    Patient Centered Rounds:  I have performed bedside rounds and discussed plan of care with nursing today.  Discussions with Specialists or Other Care Team Provider:  see above assessments if applicable    Education and Discussions with Family / Patient:  Patient at bedside, who denied need to update other contacts currently    Time Spent for Care:  35 minutes. More than 50% of total time spent on counseling and coordination of care, on one or more of the following: performing physical exam; counseling and coordination of care, obtaining or reviewing history, documenting in the medical record, reviewing/ordering tests/medications/procedures, and communicating with other healthcare professionals.    Current Length of Stay: 11 day(s)  Current Patient Status: Inpatient   Certification Statement:  Patient will continue to require additional hospital stay due to assessments as noted above.    Code Status: Level 1 - Full Code      Subjective     Seen and examined earlier in the morning.  No new complaints this time.  Remains in pleasant spirits, " overall.      Objective     Vitals:   Temp (24hrs), Av.7 °F (36.5 °C), Min:96.2 °F (35.7 °C), Max:98.6 °F (37 °C)    Temp:  [96.2 °F (35.7 °C)-98.6 °F (37 °C)] 98.2 °F (36.8 °C)  HR:  [87-90] 87  Resp:  [18] 18  BP: (127-136)/(71-81) 127/74  SpO2:  [95 %-96 %] 95 %  Body mass index is 38.77 kg/m².     Input and Output Summary (last 24 hours):       Intake/Output Summary (Last 24 hours) at 2024 1434  Last data filed at 2024 1001  Gross per 24 hour   Intake 716 ml   Output 1875 ml   Net -1159 ml       Physical Exam:     GENERAL No immediate distress at rest - obese   HEAD   Normocephalic - atraumatic   EYES Nonicteric   MOUTH   Mucosa moist   NECK   Supple - full range of motion   CARDIAC Rate controlled   PULMONARY Fairly clear to auscultation   ABDOMEN Nontender/nondistended   MUSCULOSKELETAL   Motor strength/range of motion deconditioned   NEUROLOGIC   Alert/oriented at baseline   SKIN   Chronic wrinkles/blemishes - s/p left TMA with site dressed/wrapped currently with wound VAC in place   PSYCHIATRIC   Mood/affect stable         Labs & Recent Cultures:  Results from last 7 days   Lab Units 24  0538   WBC Thousand/uL 5.63   HEMOGLOBIN g/dL 10.2*   HEMATOCRIT % 31.5*   PLATELETS Thousands/uL 258   SEGS PCT % 63   LYMPHO PCT % 26   MONO PCT % 6   EOS PCT % 3     Results from last 7 days   Lab Units 24  0538 10/30/24  0546 10/29/24  0515   POTASSIUM mmol/L 5.1   < > 4.5   CHLORIDE mmol/L 102   < > 100   CO2 mmol/L 27   < > 30   BUN mg/dL 25   < > 21   CREATININE mg/dL 1.35*   < > 1.32*   CALCIUM mg/dL 9.2   < > 8.5   ALK PHOS U/L  --   --  39   ALT U/L  --   --  16   AST U/L  --   --  14    < > = values in this interval not displayed.         Results from last 7 days   Lab Units 24  1111 24  0718 24  2049 24  1610 24  1046 24  0742 10/31/24  2102 10/31/24  1616 10/31/24  1108 10/31/24  0714 10/30/24  2025 10/30/24  1656   POC GLUCOSE mg/dl 248* 197* 303*  167* 188* 240* 312* 325* 273* 256* 317* 266*                         Lines/Drains/Telemetry:  Invasive Devices       Peripheral Intravenous Line  Duration             Peripheral IV 10/29/24 Left;Ventral (anterior) Forearm 3 days                    Last 24 Hours Medication List:   Current Facility-Administered Medications   Medication Dose Route Frequency Provider Last Rate    acetaminophen  650 mg Oral Q6H PRN Real Hernandes DPM      albuterol  2.5 mg Nebulization Q6H PRN Real Hernandes DPM      aspirin  81 mg Oral Daily Real Hernandes DPM      atorvastatin  20 mg Oral Daily Real Hernandes DPM      bisacodyl  10 mg Rectal Daily PRN Real Hernandes DPM      budesonide  0.5 mg Nebulization Q12H Real Hernandes DPM      Cholecalciferol  1,000 Units Oral Daily Real Hernandes DPM      cyanocobalamin  500 mcg Oral Daily Real Hernandes DPM      docusate sodium  100 mg Oral BID Real Hernandes DPM      DULoxetine  20 mg Oral Daily Real Hernandes DPM      fluticasone  2 spray Nasal Daily Real Hernandes DPM      heparin (porcine)  3-30 Units/kg/hr (Order-Specific) Intravenous Titrated Courtney BANDAR Hayden-C 20 Units/kg/hr (11/02/24 1059)    heparin (porcine)  4,800 Units Intravenous Q6H PRN Courtney A Emmanuel PA-C      heparin (porcine)  9,600 Units Intravenous Q6H PRN Courtney BANDAR Hayden-C      insulin glargine  40 Units Subcutaneous HS Rosy Bhatt MD      insulin lispro  18 Units Subcutaneous TID With Meals Rosy Bhatt MD      insulin lispro  2-12 Units Subcutaneous 4x Daily (AC & HS) Real Hernandes DPM      isosorbide mononitrate  30 mg Oral Daily Real Hernandes DPM      loratadine  10 mg Oral Daily Real Hernandes DPM      metoprolol succinate  50 mg Oral Daily Real Hernandes DPM      morphine injection  2 mg Intravenous Q4H PRN Real Hernandes DPM      nicotine  1 patch Transdermal Daily Real Hernandes DPM      umeclidinium  1 puff Inhalation Daily Real Hernandes DPM      And    olodaterol HCl  2 puff  Inhalation Daily Real Hernandes DPM      ondansetron  4 mg Intravenous Q4H PRN Real Hernandes DPM      oxyCODONE  5 mg Oral Q4H PRN Real Hernandes DPM      oxyCODONE  2.5 mg Oral Q4H PRN Real Hernandes, SHIN      pantoprazole  40 mg Oral BID AC Real Hernandes DPM      piperacillin-tazobactam  4.5 g Intravenous Q8H Real Hernandes DPM 4.5 g (11/02/24 0532)    polyethylene glycol  17 g Oral Daily Real Hernandes DPM      pregabalin  100 mg Oral BID Real Hernandes DPM      sodium chloride  2 spray Each Nare BID Real Hernandes DPM      Sodium Zirconium Cyclosilicate  10 g Oral Daily Sebas Dover MD      traZODone  25 mg Oral HS SHIN Schneider MD   Hospitalist - [facility]        ** Please Note:  Documentation is constructed using a voice recognition dictation system.  An occasional wrong word/phrase or “sound-a-like” substitution may have been picked up by dictation device due to the inherent limitations of voice recognition software.  Read the chart carefully and recognize, using reasonable context, where substitutions may have occurred.**

## 2024-11-02 NOTE — PLAN OF CARE
Problem: PAIN - ADULT  Goal: Verbalizes/displays adequate comfort level or baseline comfort level  Description: Interventions:  - Encourage patient to monitor pain and request assistance  - Assess pain using appropriate pain scale  - Administer analgesics based on type and severity of pain and evaluate response  - Implement non-pharmacological measures as appropriate and evaluate response  - Consider cultural and social influences on pain and pain management  - Notify physician/advanced practitioner if interventions unsuccessful or patient reports new pain  11/2/2024 0939 by Alivia Nguyen  Outcome: Progressing  11/2/2024 0939 by Alivia Nguyen  Outcome: Progressing     Problem: INFECTION - ADULT  Goal: Absence or prevention of progression during hospitalization  Description: INTERVENTIONS:  - Assess and monitor for signs and symptoms of infection  - Monitor lab/diagnostic results  - Monitor all insertion sites, i.e. indwelling lines, tubes, and drains  - Monitor endotracheal if appropriate and nasal secretions for changes in amount and color  - [place] appropriate cooling/warming therapies per order  - Administer medications as ordered  - Instruct and encourage patient and family to use good hand hygiene technique  - Identify and instruct in appropriate isolation precautions for identified infection/condition  11/2/2024 0939 by Alivia Nguyen  Outcome: Progressing  11/2/2024 0939 by Alivia Nguyen  Outcome: Progressing

## 2024-11-02 NOTE — ASSESSMENT & PLAN NOTE
Etiology of elevated creatinine most likely due to autoregulatory failure in the setting of ACE inhibitor use, SGLT2 inhibitor use and diuretic use  Resolved  Jardiance remains on hold

## 2024-11-02 NOTE — PLAN OF CARE
Problem: PAIN - ADULT  Goal: Verbalizes/displays adequate comfort level or baseline comfort level  Description: Interventions:  - Encourage patient to monitor pain and request assistance  - Assess pain using appropriate pain scale  - Administer analgesics based on type and severity of pain and evaluate response  - Implement non-pharmacological measures as appropriate and evaluate response  - Consider cultural and social influences on pain and pain management  - Notify physician/advanced practitioner if interventions unsuccessful or patient reports new pain  Outcome: Progressing     Problem: INFECTION - ADULT  Goal: Absence or prevention of progression during hospitalization  Description: INTERVENTIONS:  - Assess and monitor for signs and symptoms of infection  - Monitor lab/diagnostic results  - Monitor all insertion sites, i.e. indwelling lines, tubes, and drains  - Monitor endotracheal if appropriate and nasal secretions for changes in amount and color  - [place] appropriate cooling/warming therapies per order  - Administer medications as ordered  - Instruct and encourage patient and family to use good hand hygiene technique  - Identify and instruct in appropriate isolation precautions for identified infection/condition  Outcome: Progressing  Goal: Absence of fever/infection during neutropenic period  Description: INTERVENTIONS:  - Monitor WBC    Outcome: Progressing     Problem: SAFETY ADULT  Goal: Patient will remain free of falls  Description: INTERVENTIONS:  - Educate patient/family on patient safety including physical limitations  - Instruct patient to call for assistance with activity   - Consult OT/PT to assist with strengthening/mobility   - Keep Call bell within reach  - Keep bed low and locked with side rails adjusted as appropriate  - Keep care items and personal belongings within reach  - Initiate and maintain comfort rounds  - Make Fall Risk Sign visible to staff  - Offer Toileting every  Hours,  in advance of need  - Initiate/Maintain alarm  - Obtain necessary fall risk management equipment:   - Apply yellow socks and bracelet for high fall risk patients  - Consider moving patient to room near nurses station  Outcome: Progressing  Goal: Maintain or return to baseline ADL function  Description: INTERVENTIONS:  -  Assess patient's ability to carry out ADLs; assess patient's baseline for ADL function and identify physical deficits which impact ability to perform ADLs (bathing, care of mouth/teeth, toileting, grooming, dressing, etc.)  - Assess/evaluate cause of self-care deficits   - Assess range of motion  - Assess patient's mobility; develop plan if impaired  - Assess patient's need for assistive devices and provide as appropriate  - Encourage maximum independence but intervene and supervise when necessary  - Involve family in performance of ADLs  - Assess for home care needs following discharge   - Consider OT consult to assist with ADL evaluation and planning for discharge  - Provide patient education as appropriate  Outcome: Progressing  Goal: Maintains/Returns to pre admission functional level  Description: INTERVENTIONS:  - Perform AM-PAC 6 Click Basic Mobility/ Daily Activity assessment daily.  - Set and communicate daily mobility goal to care team and patient/family/caregiver.   - Collaborate with rehabilitation services on mobility goals if consulted  - Perform Range of Motion  times a day.  - Reposition patient every  hours.  - Dangle patient  times a day  - Stand patient  times a day  - Ambulate patient  times a day  - Out of bed to chair  times a day   - Out of bed for meals  times a day  - Out of bed for toileting  - Record patient progress and toleration of activity level   Outcome: Progressing     Problem: DISCHARGE PLANNING  Goal: Discharge to home or other facility with appropriate resources  Description: INTERVENTIONS:  - Identify barriers to discharge w/patient and caregiver  - Arrange for  needed discharge resources and transportation as appropriate  - Identify discharge learning needs (meds, wound care, etc.)  - Arrange for interpretive services to assist at discharge as needed  - Refer to Case Management Department for coordinating discharge planning if the patient needs post-hospital services based on physician/advanced practitioner order or complex needs related to functional status, cognitive ability, or social support system  Outcome: Progressing

## 2024-11-02 NOTE — ASSESSMENT & PLAN NOTE
Baseline creatinine of approximately 1.2-1.4 -> currently stable at 1.35  Monitor renal function and urine output  Limit/avoid nephrotoxins and hypotension as possible -> holding standing diuretic/ACEI  Nephrology following

## 2024-11-02 NOTE — PROGRESS NOTES
Progress Note - Infectious Disease   Clay Marcial 68 y.o. male MRN: [id number]  Unit/Bed#: [facility] 825-01 Encounter: [id number]      Impression:  1.  Diabetic left foot infection with osteomyelitis s/p left second toe amputation with wound debridement and packing S/P left transmetatarsal amputation 10/28  2.  Type II DM with PAD, history of chronic left foot plantar ulcer  3.  History of chronic tobacco abuse with COPD and possible ROMEL  4.  CAD    Recommendations:  Patient is afebrile with normal WBC count.  Wound picture and description  by podiatry noted.  Discussed therapy with primary medical service  1.  Left TMA wound is open and appears clean as per podiatry picture   2.  Will  continue piperacillin/tazobactam 4.5 g every 8 hours IV by extended infusion pending  delayed closure currently tentatively planned for tomorrow 11/3 at 08:00.    3.  Creatinine and BUN are have returned to normal  Antibiotics:  1.  Piperacillin/tazobactam 4.5 g every 8 hours IV by extended infusion, day 13 Rx    Subjective:  The patient has no complaints.  Denies fevers, chills, or sweats.  Denies nausea, vomiting, or diarrhea.      Objective:  Vitals:  Temp:  [98.2 °F (36.8 °C)-98.3 °F (36.8 °C)] 98.3 °F (36.8 °C)  HR:  [80-89] 80  Resp:  [18-20] 20  BP: (127-129)/(74-81) 129/74  SpO2:  [95 %-96 %] 95 %  Temp (24hrs), Av.3 °F (36.8 °C), Min:98.2 °F (36.8 °C), Max:98.3 °F (36.8 °C)  Current: Temperature: 98.3 °F (36.8 °C)    Physical Exam:     General Appearance:  Alert, appropriately responsive, chronically ill-appearing nontoxic, no acute distress.   Throat: Oropharynx moist without lesions.  Lips, mucosa, and tongue normal, edentulous   Neck: Supple, symmetrical, trachea midline, no adenopathy,  no tenderness/mass/nodules   Lungs:   Increased AP diameter with deep creased respiratory expansion   Heart:  Regular rate and rhythm, S1, S2 normal, no murmur, rub or gallop   Abdomen:   Soft, non-tender, non-distended,  positive bowel sounds.  No masses, no organomegaly    No CVA tenderness   Extremities: LLE with +2/4 edema and erythema.  Wound is open with some serosanguineous drainage as per picture 11/1.   Peripheral pulses decreased   Skin: As above.         Invasive Devices       Peripheral Intravenous Line  Duration             Peripheral IV 10/29/24 Left;Ventral (anterior) Forearm 4 days                    Labs, Imaging, & Other studies:   All pertinent labs were personally reviewed  Results from last 7 days   Lab Units 11/02/24  0538 11/01/24  0641 10/31/24  0627   WBC Thousand/uL 5.63 5.25 5.79   HEMOGLOBIN g/dL 10.2* 10.7* 10.1*   PLATELETS Thousands/uL 258 304 301     Results from last 7 days   Lab Units 11/02/24  0538 11/01/24  1551 11/01/24  0900 11/01/24  0641 10/30/24  0546 10/29/24  0515 10/28/24  0459 10/27/24  0511   SODIUM mmol/L 135 137  --  136   < > 136 137 138   POTASSIUM mmol/L 5.1 5.2   < > 5.4*   < > 4.5 4.3 4.0   CHLORIDE mmol/L 102 102  --  101   < > 100 102 103   CO2 mmol/L 27 29  --  28   < > 30 28 27   BUN mg/dL 25 21  --  22   < > 21 15 12   CREATININE mg/dL 1.35* 1.27  --  1.19   < > 1.32* 1.09 1.00   EGFR ml/min/1.73sq m 52 56  --  61   < > 54 68 75   CALCIUM mg/dL 9.2 9.1  --  9.3   < > 8.5 9.1 8.7   AST U/L  --   --   --   --   --  14 13 15   ALT U/L  --   --   --   --   --  16 16 18   ALK PHOS U/L  --   --   --   --   --  39 45 49    < > = values in this interval not displayed.

## 2024-11-02 NOTE — ASSESSMENT & PLAN NOTE
Baseline serum creatinine 1.3-1.4, remains at baseline, would benefit from outpatient nephrology follow-up

## 2024-11-02 NOTE — ASSESSMENT & PLAN NOTE
Potassium 5.7 yesterday morning, resolved status post medical management  Heparin drip could be contributing to hyperkalemia  Remains on daily Lokelma

## 2024-11-02 NOTE — ASSESSMENT & PLAN NOTE
Initially presented at the [facility] with left lower extremity cellulitis with open wound  S/p I&D, L 2nd toe amputation with podiatry on 10/21 given concern for acutely worsening wound/infection   Transferred to the [facility] for vascular surgery/IR evaluations given concern also for embolic process/ischemia   Agram 10/25   Status post left foot TMA on 10/28  Continue IV heparin gtt   Appreciate infectious disease input  Continue IV Zosyn until decision made on plan/timing of delayed closure  Wound culture from OR on 10/21 with polymicrobial growth including Bacteroides pyogenes, Finegoldia magna, Actinomyces species, coagulase-negative Staphylococcus, and Globicatella species  Initial plan for wound VAC placement yesterday by podiatry postponed as wound site noted to still be bleeding, stabilized with compression and cautery -> underwent wound VAC placement on 11/30 with plan for return to the OR for delayed closure, now postponed until tomorrow

## 2024-11-02 NOTE — ASSESSMENT & PLAN NOTE
"Status post \"cocktail\" and daily Lokelma initiation administered by nephrology yesterday with subsequent normalization  Monitor serum potassium  "

## 2024-11-02 NOTE — ASSESSMENT & PLAN NOTE
Home Rx: Furosemide 40 mg as needed, Imdur 30 mg daily, lisinopril 20 mg daily, Toprol-XL 50 mg daily  Current Rx: Imdur 30 mg daily, Toprol-XL 50 mg daily  As blood pressure at goal, continue holding lisinopril and diuretics at this time  If blood pressure rises, could consider addition of nifedipine

## 2024-11-02 NOTE — PROGRESS NOTES
"    Progress Note - Nephrology   Clay Marcial 68 y.o. male MRN: [id number]  Unit/Bed#: [facility] 825-01 Encounter: [id number]      Assessment / Plan:  Assessment & Plan  Elevated serum creatinine (Resolved: 2024)  Etiology of elevated creatinine most likely due to autoregulatory failure in the setting of ACE inhibitor use, SGLT2 inhibitor use and diuretic use  Resolved  Jardiance remains on hold    Stage 3 chronic kidney disease (HCC)  Baseline serum creatinine 1.3-1.4, remains at baseline, would benefit from outpatient nephrology follow-up  HTN (hypertension)  Home Rx: Furosemide 40 mg as needed, Imdur 30 mg daily, lisinopril 20 mg daily, Toprol-XL 50 mg daily  Current Rx: Imdur 30 mg daily, Toprol-XL 50 mg daily  As blood pressure at goal, continue holding lisinopril and diuretics at this time  If blood pressure rises, could consider addition of nifedipine  Hypomagnesemia  Magnesium 1.6 today, monitor status post repletion  Diabetic foot infection  (HCC)     Status post left foot TMA on 10/28.  Continue antibiotics per primary team and management per primary team-currently on Zosyn  Hyperkalemia  Potassium 5.7 yesterday morning, resolved status post medical management  Heparin drip could be contributing to hyperkalemia  Remains on daily Lokelma  Anemia due to stage 3b chronic kidney disease  (HCC)  Hemoglobin 10.2, monitor CBC          Subjective:   He denies difficulty with urination, chest pain, shortness of breath or pain.      Objective:     Vitals: Blood pressure 129/74, pulse 80, temperature 98.3 °F (36.8 °C), resp. rate 20, height 5' 11\" (1.803 m), weight 126 kg (278 lb), SpO2 95%.,Body mass index is 38.77 kg/m².Temp (24hrs), Av.3 °F (36.8 °C), Min:98.2 °F (36.8 °C), Max:98.3 °F (36.8 °C)      Weight (last 2 days)       None              Intake/Output Summary (Last 24 hours) at 2024 1620  Last data filed at 2024 1608  Gross per 24 hour   Intake 956 ml   Output 3075 ml   Net -2119 ml "     I/O last 24 hours:  In: 956 [P.O.:956]  Out: 4425 [Urine:4425]        Physical Exam:   Physical Exam  Vitals reviewed.   Constitutional:       General: He is not in acute distress.     Appearance: He is well-developed. He is not diaphoretic.   HENT:      Head: Normocephalic and atraumatic.      Nose: Nose normal.      Mouth/Throat:      Mouth: Mucous membranes are moist.      Pharynx: No oropharyngeal exudate.   Eyes:      General: No scleral icterus.        Right eye: No discharge.         Left eye: No discharge.   Neck:      Thyroid: No thyromegaly.   Cardiovascular:      Rate and Rhythm: Normal rate and regular rhythm.      Heart sounds: Normal heart sounds.   Pulmonary:      Effort: Pulmonary effort is normal.      Breath sounds: Normal breath sounds. No wheezing or rales.   Abdominal:      General: Bowel sounds are normal. There is no distension.      Palpations: Abdomen is soft.      Tenderness: There is no abdominal tenderness.   Musculoskeletal:         General: No swelling.      Cervical back: Neck supple.      Comments: S/p Left TMA   Lymphadenopathy:      Cervical: No cervical adenopathy.   Skin:     General: Skin is warm and dry.      Coloration: Skin is not jaundiced.      Findings: No rash.   Neurological:      General: No focal deficit present.      Mental Status: He is alert.      Comments: awake   Psychiatric:         Mood and Affect: Mood normal.         Behavior: Behavior normal.         Invasive Devices       Peripheral Intravenous Line  Duration             Peripheral IV 10/29/24 Left;Ventral (anterior) Forearm 4 days                    Medications:    Scheduled Meds:  Current Facility-Administered Medications   Medication Dose Route Frequency Provider Last Rate    acetaminophen  650 mg Oral Q6H PRN Real Hernandes DPM      albuterol  2.5 mg Nebulization Q6H PRN Real Hernandes DPM      aspirin  81 mg Oral Daily Real Hernandes DPM      atorvastatin  20 mg Oral Daily Real Hernandes DPM       bisacodyl  10 mg Rectal Daily PRN Real Hernandes DPM      budesonide  0.5 mg Nebulization Q12H Real Hernandes DPM      Cholecalciferol  1,000 Units Oral Daily Real Hernandes DPM      cyanocobalamin  500 mcg Oral Daily Real Hernandes DPM      docusate sodium  100 mg Oral BID Real Hernandes DPM      DULoxetine  20 mg Oral Daily Real Hernandes DPM      fluticasone  2 spray Nasal Daily Real Hernandes DPM      heparin (porcine)  3-30 Units/kg/hr (Order-Specific) Intravenous Titrated Courtney A Emmanuel PA-C 20 Units/kg/hr (11/02/24 1059)    heparin (porcine)  4,800 Units Intravenous Q6H PRN Courtney A Emmanuel PA-C      heparin (porcine)  9,600 Units Intravenous Q6H PRN Courtney A Emmanuel PA-C      insulin glargine  40 Units Subcutaneous HS Rosy Bhatt MD      insulin lispro  18 Units Subcutaneous TID With Meals Rosy Bhatt MD      insulin lispro  2-12 Units Subcutaneous 4x Daily (AC & HS) Real Hernandes DPM      isosorbide mononitrate  30 mg Oral Daily Real Hernandes DPM      loratadine  10 mg Oral Daily Real Hernandes DPM      metoprolol succinate  50 mg Oral Daily Real Hernandes DPM      morphine injection  2 mg Intravenous Q4H PRN Real Hernandes DPM      nicotine  1 patch Transdermal Daily Real Hernandes DPM      umeclidinium  1 puff Inhalation Daily Real Hernandes DPM      And    olodaterol HCl  2 puff Inhalation Daily Real Hernandes DPM      ondansetron  4 mg Intravenous Q4H PRN Real Hernandes DPM      oxyCODONE  5 mg Oral Q4H PRN Real Hernandes DPM      oxyCODONE  2.5 mg Oral Q4H PRN Real Hernandes DPM      pantoprazole  40 mg Oral BID AC Real Hernandes DPM      piperacillin-tazobactam  4.5 g Intravenous Q8H Real Hernandes DPM 4.5 g (11/02/24 1440)    polyethylene glycol  17 g Oral Daily Real Hernandes DPM      pregabalin  100 mg Oral BID Real Hernandes DPM      sodium chloride  2 spray Each Nare BID Real Hernandes DPM      Sodium Zirconium Cyclosilicate  10 g Oral Daily Sebas Dover MD       "traZODone  25 mg Oral HS Real Hernandes, SHIN         PRN Meds:.  acetaminophen    albuterol    bisacodyl    heparin (porcine)    heparin (porcine)    morphine injection    ondansetron    oxyCODONE    oxyCODONE    Continuous Infusions:heparin (porcine), 3-30 Units/kg/hr (Order-Specific), Last Rate: 20 Units/kg/hr (11/02/24 1059)            LAB RESULTS:      Results from last 7 days   Lab Units 11/02/24  0538 11/01/24  1551 11/01/24  0900 11/01/24  0641 10/31/24  0627 10/30/24  0546 10/29/24  0515 10/28/24  0459 10/27/24  0511   WBC Thousand/uL 5.63  --   --  5.25 5.79 5.56 7.23 5.55 5.78   HEMOGLOBIN g/dL 10.2*  --   --  10.7* 10.1* 9.9* 10.1* 11.4* 10.9*   HEMATOCRIT % 31.5*  --   --  33.2* 31.7* 30.4* 31.7* 34.5* 33.3*   PLATELETS Thousands/uL 258  --   --  304 301 311 339 331 370   SEGS PCT % 63  --   --  60 64 63 73 61  --    LYMPHO PCT % 26  --   --  28 26 25 17 26 23   MONO PCT % 6  --   --  6 6 7 6 7 0*   EOS PCT % 3  --   --  4 2 3 2 3 2   POTASSIUM mmol/L 5.1 5.2 5.7* 5.4* 5.0 5.0 4.5 4.3 4.0   CHLORIDE mmol/L 102 102  --  101 101 101 100 102 103   CO2 mmol/L 27 29  --  28 29 29 30 28 27   BUN mg/dL 25 21  --  22 23 26* 21 15 12   CREATININE mg/dL 1.35* 1.27  --  1.19 1.32* 1.39* 1.32* 1.09 1.00   CALCIUM mg/dL 9.2 9.1  --  9.3 9.0 8.4 8.5 9.1 8.7   ALK PHOS U/L  --   --   --   --   --   --  39 45 49   ALT U/L  --   --   --   --   --   --  16 16 18   AST U/L  --   --   --   --   --   --  14 13 15   MAGNESIUM mg/dL 1.6*  --   --  1.7* 1.6* 1.9 1.7* 1.6* 1.7*       CUTURES:  Lab Results   Component Value Date    BLOODCX No Growth After 5 Days. 10/16/2024    BLOODCX No Growth After 5 Days. 10/16/2024                 Portions of the record may have been created with voice recognition software. Occasional wrong word or \"sound a like\" substitutions may have occurred due to the inherent limitations of voice recognition software. Read the chart carefully and recognize, using context, where substitutions have " occurred.If you have any questions, please contact the dictating provider.

## 2024-11-03 ENCOUNTER — ANESTHESIA (INPATIENT)
Dept: PERIOP | Facility: HOSPITAL | Age: 69
DRG: 240 | End: 2024-11-03
Payer: COMMERCIAL

## 2024-11-03 ENCOUNTER — ANESTHESIA EVENT (INPATIENT)
Dept: PERIOP | Facility: HOSPITAL | Age: 69
DRG: 240 | End: 2024-11-03
Payer: COMMERCIAL

## 2024-11-03 PROBLEM — D64.9 ANEMIA: Status: ACTIVE | Noted: 2024-11-03

## 2024-11-03 LAB
ANION GAP SERPL CALCULATED.3IONS-SCNC: 8 MMOL/L (ref 4–13)
BASOPHILS # BLD AUTO: 0.05 THOUSANDS/ΜL (ref 0–0.1)
BASOPHILS NFR BLD AUTO: 1 % (ref 0–1)
BUN SERPL-MCNC: 28 MG/DL (ref 5–25)
CALCIUM SERPL-MCNC: 9.2 MG/DL (ref 8.4–10.2)
CHLORIDE SERPL-SCNC: 102 MMOL/L (ref 96–108)
CO2 SERPL-SCNC: 27 MMOL/L (ref 21–32)
CREAT SERPL-MCNC: 1.28 MG/DL (ref 0.6–1.3)
EOSINOPHIL # BLD AUTO: 0.18 THOUSAND/ΜL (ref 0–0.61)
EOSINOPHIL NFR BLD AUTO: 4 % (ref 0–6)
ERYTHROCYTE [DISTWIDTH] IN BLOOD BY AUTOMATED COUNT: 12.9 % (ref 11.6–15.1)
GFR SERPL CREATININE-BSD FRML MDRD: 55 ML/MIN/1.73SQ M
GLUCOSE SERPL-MCNC: 132 MG/DL (ref 65–140)
GLUCOSE SERPL-MCNC: 146 MG/DL (ref 65–140)
GLUCOSE SERPL-MCNC: 156 MG/DL (ref 65–140)
GLUCOSE SERPL-MCNC: 162 MG/DL (ref 65–140)
GLUCOSE SERPL-MCNC: 290 MG/DL (ref 65–140)
GLUCOSE SERPL-MCNC: 344 MG/DL (ref 65–140)
HCT VFR BLD AUTO: 30.9 % (ref 36.5–49.3)
HGB BLD-MCNC: 9.9 G/DL (ref 12–17)
IMM GRANULOCYTES # BLD AUTO: 0.04 THOUSAND/UL (ref 0–0.2)
IMM GRANULOCYTES NFR BLD AUTO: 1 % (ref 0–2)
LYMPHOCYTES # BLD AUTO: 1.33 THOUSANDS/ΜL (ref 0.6–4.47)
LYMPHOCYTES NFR BLD AUTO: 28 % (ref 14–44)
MAGNESIUM SERPL-MCNC: 1.8 MG/DL (ref 1.9–2.7)
MCH RBC QN AUTO: 30.7 PG (ref 26.8–34.3)
MCHC RBC AUTO-ENTMCNC: 32 G/DL (ref 31.4–37.4)
MCV RBC AUTO: 96 FL (ref 82–98)
MONOCYTES # BLD AUTO: 0.38 THOUSAND/ΜL (ref 0.17–1.22)
MONOCYTES NFR BLD AUTO: 8 % (ref 4–12)
NEUTROPHILS # BLD AUTO: 2.77 THOUSANDS/ΜL (ref 1.85–7.62)
NEUTS SEG NFR BLD AUTO: 58 % (ref 43–75)
NRBC BLD AUTO-RTO: 0 /100 WBCS
PLATELET # BLD AUTO: 242 THOUSANDS/UL (ref 149–390)
PMV BLD AUTO: 9.6 FL (ref 8.9–12.7)
POTASSIUM SERPL-SCNC: 4.7 MMOL/L (ref 3.5–5.3)
RBC # BLD AUTO: 3.23 MILLION/UL (ref 3.88–5.62)
SODIUM SERPL-SCNC: 137 MMOL/L (ref 135–147)
WBC # BLD AUTO: 4.75 THOUSAND/UL (ref 4.31–10.16)

## 2024-11-03 PROCEDURE — 94640 AIRWAY INHALATION TREATMENT: CPT

## 2024-11-03 PROCEDURE — 99232 SBSQ HOSP IP/OBS MODERATE 35: CPT | Performed by: INTERNAL MEDICINE

## 2024-11-03 PROCEDURE — 94760 N-INVAS EAR/PLS OXIMETRY 1: CPT

## 2024-11-03 PROCEDURE — 0JQR0ZZ REPAIR LEFT FOOT SUBCUTANEOUS TISSUE AND FASCIA, OPEN APPROACH: ICD-10-PCS | Performed by: PODIATRIST

## 2024-11-03 PROCEDURE — 80048 BASIC METABOLIC PNL TOTAL CA: CPT | Performed by: INTERNAL MEDICINE

## 2024-11-03 PROCEDURE — 85025 COMPLETE CBC W/AUTO DIFF WBC: CPT | Performed by: INTERNAL MEDICINE

## 2024-11-03 PROCEDURE — 83735 ASSAY OF MAGNESIUM: CPT | Performed by: INTERNAL MEDICINE

## 2024-11-03 PROCEDURE — NC001 PR NO CHARGE: Performed by: PODIATRIST

## 2024-11-03 PROCEDURE — 13160 SEC CLSR SURG WND/DEHSN XTN: CPT | Performed by: PODIATRIST

## 2024-11-03 PROCEDURE — 82948 REAGENT STRIP/BLOOD GLUCOSE: CPT

## 2024-11-03 PROCEDURE — 94664 DEMO&/EVAL PT USE INHALER: CPT

## 2024-11-03 RX ORDER — PROPOFOL 10 MG/ML
INJECTION, EMULSION INTRAVENOUS AS NEEDED
Status: DISCONTINUED | OUTPATIENT
Start: 2024-11-03 | End: 2024-11-03

## 2024-11-03 RX ORDER — SODIUM CHLORIDE 9 MG/ML
INJECTION, SOLUTION INTRAVENOUS CONTINUOUS PRN
Status: DISCONTINUED | OUTPATIENT
Start: 2024-11-03 | End: 2024-11-03

## 2024-11-03 RX ORDER — SODIUM CHLORIDE, SODIUM LACTATE, POTASSIUM CHLORIDE, CALCIUM CHLORIDE 600; 310; 30; 20 MG/100ML; MG/100ML; MG/100ML; MG/100ML
20 INJECTION, SOLUTION INTRAVENOUS CONTINUOUS
Status: DISCONTINUED | OUTPATIENT
Start: 2024-11-03 | End: 2024-11-03

## 2024-11-03 RX ORDER — ONDANSETRON 2 MG/ML
INJECTION INTRAMUSCULAR; INTRAVENOUS AS NEEDED
Status: DISCONTINUED | OUTPATIENT
Start: 2024-11-03 | End: 2024-11-03

## 2024-11-03 RX ORDER — PROPOFOL 10 MG/ML
INJECTION, EMULSION INTRAVENOUS CONTINUOUS PRN
Status: DISCONTINUED | OUTPATIENT
Start: 2024-11-03 | End: 2024-11-03

## 2024-11-03 RX ORDER — HYDROMORPHONE HCL/PF 1 MG/ML
0.5 SYRINGE (ML) INJECTION
Status: CANCELLED | OUTPATIENT
Start: 2024-11-03

## 2024-11-03 RX ORDER — LIDOCAINE HCL/PF 100 MG/5ML
SYRINGE (ML) INJECTION AS NEEDED
Status: DISCONTINUED | OUTPATIENT
Start: 2024-11-03 | End: 2024-11-03

## 2024-11-03 RX ORDER — SODIUM CHLORIDE, SODIUM LACTATE, POTASSIUM CHLORIDE, CALCIUM CHLORIDE 600; 310; 30; 20 MG/100ML; MG/100ML; MG/100ML; MG/100ML
INJECTION, SOLUTION INTRAVENOUS CONTINUOUS PRN
Status: DISCONTINUED | OUTPATIENT
Start: 2024-11-03 | End: 2024-11-03

## 2024-11-03 RX ORDER — LANOLIN ALCOHOL/MO/W.PET/CERES
3 CREAM (GRAM) TOPICAL
Status: DISCONTINUED | OUTPATIENT
Start: 2024-11-03 | End: 2024-11-05 | Stop reason: HOSPADM

## 2024-11-03 RX ORDER — MIDAZOLAM HYDROCHLORIDE 2 MG/2ML
INJECTION, SOLUTION INTRAMUSCULAR; INTRAVENOUS AS NEEDED
Status: DISCONTINUED | OUTPATIENT
Start: 2024-11-03 | End: 2024-11-03

## 2024-11-03 RX ORDER — ONDANSETRON 2 MG/ML
4 INJECTION INTRAMUSCULAR; INTRAVENOUS ONCE AS NEEDED
Status: CANCELLED | OUTPATIENT
Start: 2024-11-03

## 2024-11-03 RX ORDER — MAGNESIUM SULFATE HEPTAHYDRATE 40 MG/ML
2 INJECTION, SOLUTION INTRAVENOUS ONCE
Status: COMPLETED | OUTPATIENT
Start: 2024-11-03 | End: 2024-11-04

## 2024-11-03 RX ADMIN — PROPOFOL 20 MG: 10 INJECTION, EMULSION INTRAVENOUS at 09:03

## 2024-11-03 RX ADMIN — PROPOFOL 30 MG: 10 INJECTION, EMULSION INTRAVENOUS at 08:15

## 2024-11-03 RX ADMIN — SODIUM CHLORIDE, SODIUM LACTATE, POTASSIUM CHLORIDE, AND CALCIUM CHLORIDE 20 ML/HR: .6; .31; .03; .02 INJECTION, SOLUTION INTRAVENOUS at 10:42

## 2024-11-03 RX ADMIN — SODIUM CHLORIDE: 9 INJECTION, SOLUTION INTRAVENOUS at 08:04

## 2024-11-03 RX ADMIN — MIDAZOLAM 1 MG: 1 INJECTION INTRAMUSCULAR; INTRAVENOUS at 08:06

## 2024-11-03 RX ADMIN — PANTOPRAZOLE SODIUM 40 MG: 40 TABLET, DELAYED RELEASE ORAL at 15:53

## 2024-11-03 RX ADMIN — PREGABALIN 100 MG: 100 CAPSULE ORAL at 17:11

## 2024-11-03 RX ADMIN — PROPOFOL 20 MG: 10 INJECTION, EMULSION INTRAVENOUS at 08:17

## 2024-11-03 RX ADMIN — PANTOPRAZOLE SODIUM 40 MG: 40 TABLET, DELAYED RELEASE ORAL at 06:43

## 2024-11-03 RX ADMIN — BUDESONIDE 0.5 MG: 0.5 INHALANT RESPIRATORY (INHALATION) at 19:47

## 2024-11-03 RX ADMIN — TRAZODONE HYDROCHLORIDE 25 MG: 50 TABLET ORAL at 21:30

## 2024-11-03 RX ADMIN — INSULIN LISPRO 2 UNITS: 100 INJECTION, SOLUTION INTRAVENOUS; SUBCUTANEOUS at 11:26

## 2024-11-03 RX ADMIN — INSULIN LISPRO 18 UNITS: 100 INJECTION, SOLUTION INTRAVENOUS; SUBCUTANEOUS at 15:53

## 2024-11-03 RX ADMIN — PROPOFOL 50 MCG/KG/MIN: 10 INJECTION, EMULSION INTRAVENOUS at 08:21

## 2024-11-03 RX ADMIN — NICOTINE 1 PATCH: 14 PATCH, EXTENDED RELEASE TRANSDERMAL at 10:41

## 2024-11-03 RX ADMIN — INSULIN GLARGINE 40 UNITS: 100 INJECTION, SOLUTION SUBCUTANEOUS at 21:30

## 2024-11-03 RX ADMIN — DOCUSATE SODIUM 100 MG: 100 CAPSULE, LIQUID FILLED ORAL at 17:11

## 2024-11-03 RX ADMIN — PROPOFOL 20 MG: 10 INJECTION, EMULSION INTRAVENOUS at 09:00

## 2024-11-03 RX ADMIN — SODIUM CHLORIDE, SODIUM LACTATE, POTASSIUM CHLORIDE, AND CALCIUM CHLORIDE: .6; .31; .03; .02 INJECTION, SOLUTION INTRAVENOUS at 08:38

## 2024-11-03 RX ADMIN — HEPARIN SODIUM 22 UNITS/KG/HR: 10000 INJECTION, SOLUTION INTRAVENOUS at 18:46

## 2024-11-03 RX ADMIN — PIPERACILLIN SODIUM AND TAZOBACTAM SODIUM 4.5 G: 36; 4.5 INJECTION, POWDER, LYOPHILIZED, FOR SOLUTION INTRAVENOUS at 23:27

## 2024-11-03 RX ADMIN — MIDAZOLAM 1 MG: 1 INJECTION INTRAMUSCULAR; INTRAVENOUS at 08:13

## 2024-11-03 RX ADMIN — PIPERACILLIN SODIUM AND TAZOBACTAM SODIUM 4.5 G: 36; 4.5 INJECTION, POWDER, LYOPHILIZED, FOR SOLUTION INTRAVENOUS at 06:41

## 2024-11-03 RX ADMIN — LIDOCAINE HYDROCHLORIDE 20 MG: 20 INJECTION INTRAVENOUS at 08:15

## 2024-11-03 RX ADMIN — INSULIN LISPRO 2 UNITS: 100 INJECTION, SOLUTION INTRAVENOUS; SUBCUTANEOUS at 15:52

## 2024-11-03 RX ADMIN — PROPOFOL 30 MG: 10 INJECTION, EMULSION INTRAVENOUS at 09:14

## 2024-11-03 RX ADMIN — INSULIN LISPRO 8 UNITS: 100 INJECTION, SOLUTION INTRAVENOUS; SUBCUTANEOUS at 21:28

## 2024-11-03 RX ADMIN — INSULIN LISPRO 18 UNITS: 100 INJECTION, SOLUTION INTRAVENOUS; SUBCUTANEOUS at 11:27

## 2024-11-03 RX ADMIN — MAGNESIUM SULFATE HEPTAHYDRATE 2 G: 40 INJECTION, SOLUTION INTRAVENOUS at 15:04

## 2024-11-03 RX ADMIN — ONDANSETRON 4 MG: 2 INJECTION INTRAMUSCULAR; INTRAVENOUS at 08:51

## 2024-11-03 RX ADMIN — PIPERACILLIN SODIUM AND TAZOBACTAM SODIUM 4.5 G: 36; 4.5 INJECTION, POWDER, LYOPHILIZED, FOR SOLUTION INTRAVENOUS at 15:04

## 2024-11-03 NOTE — ASSESSMENT & PLAN NOTE
Baseline creatinine of approximately 1.2-1.4 -> currently stable at 1.28  Monitor renal function and urine output  Limit/avoid nephrotoxins and hypotension as possible -> holding standing diuretic/ACEI/Jardiance  Nephrology following

## 2024-11-03 NOTE — ASSESSMENT & PLAN NOTE
Initially presented at the [facility] with left lower extremity cellulitis with open wound  S/p I&D, L 2nd toe amputation with podiatry on 10/21 given concern for acutely worsening wound/infection   Transferred to the [facility] for vascular surgery/IR evaluations given concern also for embolic process/ischemia   Agram 10/25   Status post left foot TMA on 10/28  Continue IV heparin gtt   Appreciate infectious disease input  Continue IV Zosyn until decision made on plan/timing of delayed closure  Wound culture from OR on 10/21 with polymicrobial growth including Bacteroides pyogenes, Finegoldia magna, Actinomyces species, coagulase-negative Staphylococcus, and Globicatella species  Initial plan for wound VAC placement yesterday by podiatry postponed as wound site noted to still be bleeding, stabilized with compression and cautery -> underwent wound VAC placement on 11/30 status post return to the OR this morning for delayed primary closure by podiatry

## 2024-11-03 NOTE — PLAN OF CARE
Problem: PAIN - ADULT  Goal: Verbalizes/displays adequate comfort level or baseline comfort level  Description: Interventions:  - Encourage patient to monitor pain and request assistance  - Assess pain using appropriate pain scale  - Administer analgesics based on type and severity of pain and evaluate response  - Implement non-pharmacological measures as appropriate and evaluate response  - Consider cultural and social influences on pain and pain management  - Notify physician/advanced practitioner if interventions unsuccessful or patient reports new pain  Outcome: Progressing     Problem: INFECTION - ADULT  Goal: Absence or prevention of progression during hospitalization  Description: INTERVENTIONS:  - Assess and monitor for signs and symptoms of infection  - Monitor lab/diagnostic results  - Monitor all insertion sites, i.e. indwelling lines, tubes, and drains  - Monitor endotracheal if appropriate and nasal secretions for changes in amount and color  - [place] appropriate cooling/warming therapies per order  - Administer medications as ordered  - Instruct and encourage patient and family to use good hand hygiene technique  - Identify and instruct in appropriate isolation precautions for identified infection/condition  Outcome: Progressing  Goal: Absence of fever/infection during neutropenic period  Description: INTERVENTIONS:  - Monitor WBC    Outcome: Progressing     Problem: SAFETY ADULT  Goal: Patient will remain free of falls  Description: INTERVENTIONS:  - Educate patient/family on patient safety including physical limitations  - Instruct patient to call for assistance with activity   - Consult OT/PT to assist with strengthening/mobility   - Keep Call bell within reach  - Keep bed low and locked with side rails adjusted as appropriate  - Keep care items and personal belongings within reach  - Initiate and maintain comfort rounds  - Make Fall Risk Sign visible to staff  - Offer Toileting every  Hours,  in advance of need  - Initiate/Maintain alarm  - Obtain necessary fall risk management equipment:   - Apply yellow socks and bracelet for high fall risk patients  - Consider moving patient to room near nurses station  Outcome: Progressing  Goal: Maintain or return to baseline ADL function  Description: INTERVENTIONS:  -  Assess patient's ability to carry out ADLs; assess patient's baseline for ADL function and identify physical deficits which impact ability to perform ADLs (bathing, care of mouth/teeth, toileting, grooming, dressing, etc.)  - Assess/evaluate cause of self-care deficits   - Assess range of motion  - Assess patient's mobility; develop plan if impaired  - Assess patient's need for assistive devices and provide as appropriate  - Encourage maximum independence but intervene and supervise when necessary  - Involve family in performance of ADLs  - Assess for home care needs following discharge   - Consider OT consult to assist with ADL evaluation and planning for discharge  - Provide patient education as appropriate  Outcome: Progressing  Goal: Maintains/Returns to pre admission functional level  Description: INTERVENTIONS:  - Perform AM-PAC 6 Click Basic Mobility/ Daily Activity assessment daily.  - Set and communicate daily mobility goal to care team and patient/family/caregiver.   - Collaborate with rehabilitation services on mobility goals if consulted  - Perform Range of Motion  times a day.  - Reposition patient every  hours.  - Dangle patient  times a day  - Stand patient  times a day  - Ambulate patient  times a day  - Out of bed to chair  times a day   - Out of bed for meals  times a day  - Out of bed for toileting  - Record patient progress and toleration of activity level   Outcome: Progressing

## 2024-11-03 NOTE — PROGRESS NOTES
Progress Note - Infectious Disease   Clay Marcial 69 y.o. male MRN: [id number]  Unit/Bed#: [facility] 825-01 Encounter: [id number]      Impression:  1.  Diabetic left foot infection with osteomyelitis s/p left second toe amputation with wound debridement and packing S/P left transmetatarsal amputation 10/28, wound closure 11/3  2.  Type II DM with PAD, history of chronic left foot plantar ulcer  3.  History of chronic tobacco abuse with COPD and possible ROMEL  4.  CAD    Recommendations:  Patient is afebrile with normal WBC count.  Patient underwent wound closure of left foot earlier today.  To discuss therapy with primary medical service  1.  Will  continue piperacillin/tazobactam 4.5 g every 8 hours IV by extended infusion for approximately 48 hours postoperatively.      Antibiotics:  1.  Piperacillin/tazobactam 4.5 g every 8 hours IV by extended infusion, day 14 Rx    Subjective:  The patient has no complaints.  No postoperative pain  Denies fevers, chills, or sweats.  Denies nausea, vomiting, or diarrhea.      Objective:  Vitals:  Temp:  [97.6 °F (36.4 °C)-98.7 °F (37.1 °C)] 97.7 °F (36.5 °C)  HR:  [] 103  Resp:  [14-20] 20  BP: (100-151)/(54-75) 151/70  SpO2:  [93 %-99 %] 99 %  Temp (24hrs), Av.2 °F (36.8 °C), Min:97.6 °F (36.4 °C), Max:98.7 °F (37.1 °C)  Current: Temperature: 97.7 °F (36.5 °C)    Physical Exam:     General Appearance:  Alert, appropriately responsive, chronically ill-appearing nontoxic, no acute distress.   Throat: Oropharynx moist without lesions.  Lips, mucosa, and tongue normal, edentulous   Neck: Supple, symmetrical, trachea midline, no adenopathy,  no tenderness/mass/nodules   Lungs:   Increased AP diameter with deep creased respiratory expansion   Heart:  Regular rate and rhythm, S1, S2 normal, no murmur, rub or gallop   Abdomen:   Soft, non-tender, non-distended, positive bowel sounds.  No masses, no organomegaly    No CVA tenderness   Extremities: LLE with +2/4 edema and  erythema.  Left foot wound with fresh dry surgical dressing   Skin: As above.         Invasive Devices       Peripheral Intravenous Line  Duration             Peripheral IV 11/03/24 Left Wrist <1 day                    Labs, Imaging, & Other studies:   All pertinent labs were personally reviewed  Results from last 7 days   Lab Units 11/03/24  0456 11/02/24  0538 11/01/24  0641   WBC Thousand/uL 4.75 5.63 5.25   HEMOGLOBIN g/dL 9.9* 10.2* 10.7*   PLATELETS Thousands/uL 242 258 304     Results from last 7 days   Lab Units 11/03/24  0456 11/02/24  0538 11/01/24  1551 10/30/24  0546 10/29/24  0515 10/28/24  0459   SODIUM mmol/L 137 135 137   < > 136 137   POTASSIUM mmol/L 4.7 5.1 5.2   < > 4.5 4.3   CHLORIDE mmol/L 102 102 102   < > 100 102   CO2 mmol/L 27 27 29   < > 30 28   BUN mg/dL 28* 25 21   < > 21 15   CREATININE mg/dL 1.28 1.35* 1.27   < > 1.32* 1.09   EGFR ml/min/1.73sq m 55 52 56   < > 54 68   CALCIUM mg/dL 9.2 9.2 9.1   < > 8.5 9.1   AST U/L  --   --   --   --  14 13   ALT U/L  --   --   --   --  16 16   ALK PHOS U/L  --   --   --   --  39 45    < > = values in this interval not displayed.

## 2024-11-03 NOTE — ANESTHESIA POSTPROCEDURE EVALUATION
Post-Op Assessment Note    CV Status:  Stable    Pain management: adequate       Mental Status:  Alert and awake   Hydration Status:  Euvolemic   PONV Controlled:  Controlled   Airway Patency:  Patent  Two or more mitigation strategies used for obstructive sleep apnea   Post Op Vitals Reviewed: Yes    No anethesia notable event occurred.    Staff: CRNA           Last Filed PACU Vitals:  Vitals Value Taken Time   Temp 98.7 °F (37.1 °C) 11/03/24 0930   Pulse 78 11/03/24 0933   /65 11/03/24 0933   Resp 20 11/03/24 0933   SpO2 95 % 11/03/24 0933       Modified Nolvia:  Activity: 2 (11/3/2024  9:30 AM)  Respiration: 2 (11/3/2024  9:30 AM)  Circulation: 2 (11/3/2024  9:30 AM)  Consciousness: 2 (11/3/2024  9:30 AM)  Oxygen Saturation: 2 (11/3/2024  9:30 AM)  Modified Nolvia Score: 10 (11/3/2024  9:30 AM)

## 2024-11-03 NOTE — ANESTHESIA PREPROCEDURE EVALUATION
"Procedure:  Left foot delayed primary closure with heel cord lengthening (Left: Foot)    Relevant Problems   CARDIO   (+) CAD (coronary artery disease)   (+) Chronic diastolic congestive heart failure (HCC)   (+) HTN (hypertension)      ENDO   (+) Type 2 diabetes mellitus with diabetic polyneuropathy (HCC)      /RENAL   (+) Acute on chronic kidney failure  (HCC)   (+) Anemia due to stage 3b chronic kidney disease  (HCC)   (+) Stage 3 chronic kidney disease (HCC)      HEMATOLOGY   (+) Anemia due to stage 3b chronic kidney disease  (HCC)      NEURO/PSYCH   (+) Type 2 diabetes mellitus with diabetic polyneuropathy (HCC)      PULMONARY   (+) Chronic obstructive pulmonary disease with acute exacerbation (HCC)   (+) ROMEL (obstructive sleep apnea)   (+) Smoking     COPD, home inhalers, no steroids or hospitalization    Lab Results   Component Value Date    SODIUM 137 11/03/2024    K 4.7 11/03/2024    BUN 28 (H) 11/03/2024    CREATININE 1.28 11/03/2024    EGFR 55 11/03/2024     Lab Results   Component Value Date    HGBA1C 7.2 (H) 10/16/2024       Lab Results   Component Value Date    HGB 9.9 (L) 11/03/2024    HGB 10.2 (L) 11/02/2024    HGB 10.7 (L) 11/01/2024     11/03/2024     11/02/2024     11/01/2024     Lab Results   Component Value Date    WBC 4.75 11/03/2024       Lab Results   Component Value Date    CREATININE 1.28 11/03/2024    CREATININE 1.35 (H) 11/02/2024    CREATININE 1.27 11/01/2024       Lab Results   Component Value Date    INR 1.18 10/19/2024    INR 1.09 04/16/2024     Lab Results   Component Value Date    PTT 59 (H) 11/02/2024    PTT 76 (H) 11/02/2024     (H) 11/02/2024       No results found for: \"LACTATE\"                        Study Details    This transthoracic echocardiogram was performed at bedside. This was a routine, inpatient study. Study quality was adequate. This was a technically difficult study due to decreased penetration and restricted mobility. A complete 2D, " color flow Doppler, spectral Doppler, 2D, color flow Doppler and spectral Doppler transthoracic echocardiogram was performed.  The apical, parasternal, subcostal and suprasternal views were obtained. Technical difficulties due to patient's heart rhythm and patient's body habitus.     History    Coronary artery disease, Congestive heart failure, chronic kidney disease, type II diabetes, COPD, obstructive sleep apnea, current smoker.     Interpretation Summary         Left Ventricle: Left ventricular cavity size is normal. There is moderate concentric hypertrophy. The left ventricular ejection fraction is 60%. Wall motion is normal.    Mitral Valve: There is mild regurgitation.    Tricuspid Valve: There is mild regurgitation. The right ventricular systolic pressure is normal.    Pericardium: There is no pericardial effusion.     Findings    Left Ventricle Left ventricular cavity size is normal. There is moderate concentric hypertrophy. The left ventricular ejection fraction is 60%. Wall motion is normal.   Right Ventricle Right ventricular cavity size is normal. Systolic function is normal. Wall thickness is normal.   Left Atrium The atrium is normal in size.   Right Atrium The atrium is normal in size.   Aortic Valve The aortic valve is trileaflet. The leaflets are not thickened. The leaflets are not calcified. The leaflets exhibit normal mobility. There is no evidence of regurgitation. The aortic valve has no significant stenosis.   Mitral Valve Mitral valve structure is normal.  There is mild regurgitation. There is no evidence of stenosis.   Tricuspid Valve Tricuspid valve structure is normal. There is mild regurgitation. There is no evidence of stenosis. The right ventricular systolic pressure is normal.   Pulmonic Valve Pulmonic valve structure is normal. There is no evidence of regurgitation. There is no evidence of stenosis.   Ascending Aorta The aortic root is normal in size.   IVC/SVC The inferior vena cava  is normal in size.   Pericardium There is no pericardial effusion. The pericardium is normal in appearance.        Physical Exam    Airway    Mallampati score: III  TM Distance: >3 FB       Dental       Cardiovascular      Pulmonary      Other Findings        Anesthesia Plan  ASA Score- 3     Anesthesia Type- IV sedation with anesthesia with ASA Monitors.         Additional Monitors:     Airway Plan:     Comment: I, Enyd Alan M.D., have personally seen and evaluated the patient prior to anesthetic care.  I have reviewed the pre-anesthetic record, and other medical records if appropriate to the anesthetic care.  If a CRNA is involved in the case, I have reviewed the CRNA assessment, if present, and agree.      Reduce Fi02 during cautery    Risks/benefits and alternatives discussed with patient including possible PONV, sore throat, and possibility of rare anesthetic and surgical emergencies.  .       Plan Factors-Exercise tolerance (METS): >4 METS.    Chart reviewed. EKG reviewed. Imaging results reviewed. Existing labs reviewed. Patient summary reviewed.    Patient is a current smoker.  Patient instructed to abstain from smoking on day of procedure. Patient did not smoke on day of surgery.    Obstructive sleep apnea risk education given perioperatively.        Induction-     Postoperative Plan- Plan for postoperative opioid use. Planned trial extubation    Perioperative Resuscitation Plan - Level 1 - Full Code.       Informed Consent- Anesthetic plan and risks discussed with patient.  I personally reviewed this patient with the CRNA. Discussed and agreed on the Anesthesia Plan with the CRNA..

## 2024-11-03 NOTE — ASSESSMENT & PLAN NOTE
Lab Results   Component Value Date    HGBA1C 7.2 (H) 10/16/2024     Continue basal/prandial insulin with additional SSI coverage per Accu-Cheks - optimize dosing daily if necessary  Hypoglycemia protocol  Carbohydrate restriction  On Lyrica for neuropathy

## 2024-11-03 NOTE — QUICK NOTE
Unable to see patient is off floor for TMA.  Renal function remained stable preoperatively.  Continue holding Jardiance and diuretics as well as ACE inhibitor.  Readdress diuretic reinitiation postop.  Nephrology will evaluate tomorrow for this.  Please call or text with questions.

## 2024-11-03 NOTE — OP NOTE
OPERATIVE REPORT - Podiatry  PATIENT NAME: Clay Marcial    :  1955  MRN: [id number]  Pt Location:  OR ROOM 07    SURGERY DATE: 11/3/2024    Surgeons and Role:     * Robb Page DPM - Primary     * Fortino Freitas DPM - Assisting     * Desmond Curtis DPM - Assisting    Pre-op Diagnosis:  Diabetic foot infection  (HCC) [E11.628, L08.9]    Post-Op Diagnosis Codes:     * Diabetic foot infection  (HCC) [E11.628, L08.9]    Procedure(s) (LRB):  Left foot delayed primary closure (Left)    Specimen(s):  * No specimens in log *    Estimated Blood Loss:   5 mL    Drains:  * No LDAs found *    Anesthesia Type:   Choice with 20 ml of 1% Lidocaine and 0.5% Bupivacaine in a 1:1 mixture    Hemostasis:  Cauterization, dissection, compression    Materials:  * No implants in log *  Nylon suture    Operative Findings:  TMA surgical site skin fully closed  TMA bone stumps were decently covered by soft tissue.  The wound bed showed healthy granulation tissue with no signs of infection.  Healthy bleeding was found in the surgery.  Bleeding was controlled by cauterization, compression and dissection  Dressing of Betadine soaked Adaptic, DSD with Ace bandage was applied.  Intraoperative ankle dorsiflexion test showed decent range of motion.  HEATHER is not indicated.    Complications:   None    Procedure and Technique:     Under mild sedation, the patient was brought into the operating room and placed on his own stretcher in the supine position. IV sedation was achieved by anesthesia team and a universal timeout was performed where all parties are in agreement of correct patient, correct procedure and correct site. A ankle block was performed consisting of 20 ml of 1% Lidocaine and 0.5% Bupivacaine in a 1:1 mixture. The foot was then prepped and draped in the usual aseptic manner.     Attention was directed to the left foot TMA site.  The wound bed was examined and residue Surgicel from the wound bed the was fully removed with a bone  "curette.  Blood clot was also removed and the wound bed was visually inspected.  Healthy granulation tissue was found in the wound bed and distal stumps of the metatarsals were decently covered by soft tissue.  Pulse lavage of 3 L saline was used to irrigate the wound bed.  Plantar plate, flexor tendon and any unhealthy soft tissue was sharply removed with a 15 blade.  Healthy bleeding was found and further controlled with cauterization and compression.    Primary closure was achieved by tension suturing the plantar flap to the dorsal flap.  The plantar wound site was also debrided with a 15 blade and closed with tension suture.  Simple stitches and skin staples were used for further closing the skin flaps.  After suturing both the dorsal flap and the plantar flap showed brisk capillary refill.    Intraoperative ankle dorsiflexion test showed decent range of motion.  So HEATHER was aborted.    Dressing of Betadine soaked Adaptic, 4 x 4, ABD, Kerlix and Ace bandage in gentle compression was applied to the left foot.    The patient tolerated the procedure and anesthesia well without immediate complications and transferred to PACU with vital signs stable.     As with many limb salvage procedures, we contemplate the possibility of performing further stages to this procedure. Procedures may include debridements, delayed closure, plastic surgery techniques, or more proximal amputations. This procedure may be considered part of a multi-staged limb salvage treatment plan.     Dr. Page was present during the entire procedure and participated in all key aspects.    SIGNATURE: Fortino Freitas DPM  DATE: November 3, 2024  TIME: 7:06 PM      Portions of the record may have been created with voice recognition software. Occasional wrong word or \"sound a like\" substitutions may have occurred due to the inherent limitations of voice recognition software. Read the chart carefully and recognize, using context, where substitutions have " occurred.

## 2024-11-03 NOTE — PROGRESS NOTES
Progress Note - Hospitalist   Name: Clay Marcial 69 y.o. male I MRN: [id number]  [facility] Date of Admission: 10/22/2024   Date of Service: 11/3/2024 I Hospital Day: 12    Assessment & Plan  Diabetic foot infection  (HCC)  Initially presented at the [facility] with left lower extremity cellulitis with open wound  S/p I&D, L 2nd toe amputation with podiatry on 10/21 given concern for acutely worsening wound/infection   Transferred to the [facility] for vascular surgery/IR evaluations given concern also for embolic process/ischemia   Agram 10/25   Status post left foot TMA on 10/28  Continue IV heparin gtt   Appreciate infectious disease input  Continue IV Zosyn until decision made on plan/timing of delayed closure  Wound culture from OR on 10/21 with polymicrobial growth including Bacteroides pyogenes, Finegoldia magna, Actinomyces species, coagulase-negative Staphylococcus, and Globicatella species  Initial plan for wound VAC placement yesterday by podiatry postponed as wound site noted to still be bleeding, stabilized with compression and cautery -> underwent wound VAC placement on 11/30 status post return to the OR this morning for delayed primary closure by podiatry  Type 2 diabetes mellitus with diabetic polyneuropathy (HCC)  Lab Results   Component Value Date    HGBA1C 7.2 (H) 10/16/2024     Continue basal/prandial insulin with additional SSI coverage per Accu-Cheks - optimize dosing daily if necessary  Hypoglycemia protocol  Carbohydrate restriction  On Lyrica for neuropathy  Stage 3 chronic kidney disease (HCC)  Baseline creatinine of approximately 1.2-1.4 -> currently stable at 1.28  Monitor renal function and urine output  Limit/avoid nephrotoxins and hypotension as possible -> holding standing diuretic/ACEI/Jardiance  Nephrology following  HTN (hypertension)  Continue Imdur/Toprol-XL  ACEI and standing diuretic (Lasix) remains held per nephrology  CAD (coronary artery  "disease)  Continue ASA/statin/Toprol-XL/Imdur  Chronic obstructive pulmonary disease with acute exacerbation (HCC)  Continue Pulmicort/Olodaterol/Umeclidinium - PRN Albuterol on board  Hypomagnesemia  Monitor/replete serum magnesium and potassium as necessary  Obesity  BMI of 38.77  Lifestyle/diet modifications  Hyperkalemia  Status post \"cocktail\" and daily Lokelma initiation administered by nephrology on 11/1 with subsequent normalization  Monitor serum potassium  Anemia  Hemoglobin stable at 9.9      VTE Pharmacologic Prophylaxis: VTE Score: 5 High Risk (Score >/= 5) - Pharmacological DVT Prophylaxis Ordered: Heparin Drip. Sequential Compression Devices Ordered.    AM-PAC Basic Mobility:  Basic Mobility Inpatient Raw Score: 22  JH-HLM Goal: 7: Walk 25 feet or more  JH-HLM Achieved: 3: Sit at edge of bed  JH-HLM Goal achieved. Continue to encourage appropriate mobility.    Patient Centered Rounds:  I have performed bedside rounds and discussed plan of care with nursing today.  Discussions with Specialists or Other Care Team Provider:  see above assessments if applicable    Education and Discussions with Family / Patient:  Patient at bedside, who denied need to update other contacts currently    Time Spent for Care:  35 minutes. More than 50% of total time spent on counseling and coordination of care, on one or more of the following: performing physical exam; counseling and coordination of care, obtaining or reviewing history, documenting in the medical record, reviewing/ordering tests/medications/procedures, and communicating with other healthcare professionals.    Current Length of Stay: 12 day(s)  Current Patient Status: Inpatient   Certification Statement:  Patient will continue to require additional hospital stay due to assessments as noted above.    Code Status: Level 1 - Full Code      Subjective     Encountered later in the day after return from the OR.  Tolerated procedure well.  Denies pain at this " time.      Objective     Vitals:   Temp (24hrs), Av.2 °F (36.8 °C), Min:97.6 °F (36.4 °C), Max:98.7 °F (37.1 °C)    Temp:  [97.6 °F (36.4 °C)-98.7 °F (37.1 °C)] 97.7 °F (36.5 °C)  HR:  [] 103  Resp:  [14-20] 20  BP: (100-151)/(54-75) 151/70  SpO2:  [93 %-99 %] 99 %  Body mass index is 38.77 kg/m².     Input and Output Summary (last 24 hours):       Intake/Output Summary (Last 24 hours) at 11/3/2024 1638  Last data filed at 11/3/2024 1301  Gross per 24 hour   Intake 1140 ml   Output 1605 ml   Net -465 ml       Physical Exam:     GENERAL No acute distress at rest - obese   HEAD   Normocephalic - atraumatic   EYES Nonicteric   MOUTH   Mucosa moist   NECK   Supple - full range of motion   CARDIAC Rate controlled   PULMONARY Clear bilateral breath sounds   ABDOMEN Nontender/nondistended   MUSCULOSKELETAL Motor strength/range of motion deconditioned   NEUROLOGIC Alert/oriented at baseline   SKIN   Chronic wrinkles/blemishes - s/p left TMA with site dressed/wrapped    PSYCHIATRIC   Mood/affect stable         Labs & Recent Cultures:  Results from last 7 days   Lab Units 24  0456   WBC Thousand/uL 4.75   HEMOGLOBIN g/dL 9.9*   HEMATOCRIT % 30.9*   PLATELETS Thousands/uL 242   SEGS PCT % 58   LYMPHO PCT % 28   MONO PCT % 8   EOS PCT % 4     Results from last 7 days   Lab Units 24  0456 10/30/24  0546 10/29/24  0515   POTASSIUM mmol/L 4.7   < > 4.5   CHLORIDE mmol/L 102   < > 100   CO2 mmol/L 27   < > 30   BUN mg/dL 28*   < > 21   CREATININE mg/dL 1.28   < > 1.32*   CALCIUM mg/dL 9.2   < > 8.5   ALK PHOS U/L  --   --  39   ALT U/L  --   --  16   AST U/L  --   --  14    < > = values in this interval not displayed.         Results from last 7 days   Lab Units 24  1547 24  1120 24  0934 24  0728 24  2059 24  1606 24  1111 24  0718 24  2049 24  1610 24  1046 24  0742   POC GLUCOSE mg/dl 290* 162* 132 156* 236* 164* 248* 197* 303* 167*  188* 240*                         Lines/Drains/Telemetry:  Invasive Devices       Peripheral Intravenous Line  Duration             Peripheral IV 11/03/24 Left Wrist <1 day                    Last 24 Hours Medication List:   Current Facility-Administered Medications   Medication Dose Route Frequency Provider Last Rate    acetaminophen  650 mg Oral Q6H PRN Fortino Freitas DPM      albuterol  2.5 mg Nebulization Q6H PRN Fortino Freitas DPM      aspirin  81 mg Oral Daily Fortino Freitas, GORDOM      atorvastatin  20 mg Oral Daily Fortino Freitas, SHIN      bisacodyl  10 mg Rectal Daily PRN Fortino Freitas DPM      budesonide  0.5 mg Nebulization Q12H Fortino Freitas DPM      Cholecalciferol  1,000 Units Oral Daily Fortino Freitas, SHIN      cyanocobalamin  500 mcg Oral Daily Fortino Freitas, SHIN      docusate sodium  100 mg Oral BID Fortino Freitas DPM      DULoxetine  20 mg Oral Daily Fortino Freitas, GORDOM      fluticasone  2 spray Nasal Daily Fortino Freitas DPM      heparin (porcine)  3-30 Units/kg/hr (Order-Specific) Intravenous Titrated Fortino Freitas DPM 22 Units/kg/hr (11/03/24 1042)    heparin (porcine)  4,800 Units Intravenous Q6H PRN Fortino Freitas DPM      heparin (porcine)  9,600 Units Intravenous Q6H PRN Fortino Freitas DPM      insulin glargine  40 Units Subcutaneous HS Fortino Freitas DPM      insulin lispro  18 Units Subcutaneous TID With Meals Fortino Freitas DPM      insulin lispro  2-12 Units Subcutaneous 4x Daily (AC & HS) Fortino Freitas DPM      isosorbide mononitrate  30 mg Oral Daily Fortino Freitas DPM      lactated ringers  20 mL/hr Intravenous Continuous Endy Alan MD 20 mL/hr (11/03/24 1042)    loratadine  10 mg Oral Daily Fortino Freitas DPM      magnesium sulfate  2 g Intravenous Once Rosy Bhatt MD 2 g (11/03/24 1504)    metoprolol succinate  50 mg Oral Daily Fortino Freitas DPM      morphine injection  2 mg Intravenous Q4H PRN Fortino Freitas DPM      nicotine  1 patch Transdermal Daily Fortino Freitas DPM      umeclidinium  1 puff Inhalation Daily Fortino Freitas DPM      And    olodaterol HCl  2  puff Inhalation Daily Fortino Freitas, DPM      ondansetron  4 mg Intravenous Q4H PRN Fortino Freitas, DPM      oxyCODONE  5 mg Oral Q4H PRN Fortino Freitas, DPM      oxyCODONE  2.5 mg Oral Q4H PRN Fortino Freitas, DPM      pantoprazole  40 mg Oral BID AC Fortino Freitas, DPM      piperacillin-tazobactam  4.5 g Intravenous Q8H Fortino Freitas, DPM 4.5 g (11/03/24 6974)    polyethylene glycol  17 g Oral Daily Fortino Freitas, DPM      pregabalin  100 mg Oral BID Fortino Freitas, DPM      sodium chloride  2 spray Each Nare BID Fortino Freitas, DPM      Sodium Zirconium Cyclosilicate  10 g Oral Daily Fortino Freitas, DPM      traZODone  25 mg Oral HS Fortino Freitas, SHIN LANDEROS MD   Hospitalist - [facility]        ** Please Note:  Documentation is constructed using a voice recognition dictation system.  An occasional wrong word/phrase or “sound-a-like” substitution may have been picked up by dictation device due to the inherent limitations of voice recognition software.  Read the chart carefully and recognize, using reasonable context, where substitutions may have occurred.**

## 2024-11-03 NOTE — PLAN OF CARE
Problem: PAIN - ADULT  Goal: Verbalizes/displays adequate comfort level or baseline comfort level  Description: Interventions:  - Encourage patient to monitor pain and request assistance  - Assess pain using appropriate pain scale  - Administer analgesics based on type and severity of pain and evaluate response  - Implement non-pharmacological measures as appropriate and evaluate response  - Consider cultural and social influences on pain and pain management  - Notify physician/advanced practitioner if interventions unsuccessful or patient reports new pain  Outcome: Progressing     Problem: INFECTION - ADULT  Goal: Absence or prevention of progression during hospitalization  Description: INTERVENTIONS:  - Assess and monitor for signs and symptoms of infection  - Monitor lab/diagnostic results  - Monitor all insertion sites, i.e. indwelling lines, tubes, and drains  - Monitor endotracheal if appropriate and nasal secretions for changes in amount and color  - [place] appropriate cooling/warming therapies per order  - Administer medications as ordered  - Instruct and encourage patient and family to use good hand hygiene technique  - Identify and instruct in appropriate isolation precautions for identified infection/condition  Outcome: Progressing

## 2024-11-03 NOTE — PROGRESS NOTES
"Podiatry - Progress Note  Patient: Clay Marcial 69 y.o. male   MRN: [id number]  PCP: No primary care provider on file.  Unit/Bed#: [facility] 825-01 Encounter: [id number]  Date Of Visit: 24    ASSESSMENT:    Clay Marcial is a 69 y.o. male with:    Diabetic foot infection   - S/p left second toe amputation and wound debridement (DOS: 10/21/2024)  - S/p TMA (DOS: 10/28/2024)  Type 2 diabetes mellitus   - A1c 7.2% (10/16/2024)  Coronary artery disease  Stage III chronic kidney disease  Chronic obstructive pulmonary disease  Hypertension      PLAN:    Patient to go to OR today,24, for  left TMA site delayed primary closure with Dr. Page.  Consent placed in OR.  Confirmed NPO status.  Heparin held since midnight last night.  H&P, vitals, and current labs reviewed.  No acute changes noted.  Alternatives, risks, and complications discussed with patient.  All questions answered.  No guarantees given to outcome of procedure.  Wound vac is intact and running. Will keep intact for OR.  Hb 9.9 g/dL  Rest of medical care per primary team.       SUBJECTIVE:     The patient was seen, evaluated, and assessed at bedside today. The patient was awake, alert, and in no acute distress. Patient confirmed NPO status. All questions and concerns regarding the surgical procedure addressed. Patient understands risks vs benefits of procedure and remains amenable with plan for surgery today. Patient denies N/V/F/chills/SOB/CP.      OBJECTIVE:     Vitals:   /75   Pulse 79   Temp 98.4 °F (36.9 °C)   Resp 18   Ht 5' 11\" (1.803 m)   Wt 126 kg (278 lb)   SpO2 94%   BMI 38.77 kg/m²     Temp (24hrs), Av.3 °F (36.8 °C), Min:98.2 °F (36.8 °C), Max:98.4 °F (36.9 °C)      Physical Exam:     General:  Alert, cooperative, and in no distress.  Lower extremity exam:  Cardiovascular status at baseline.  Neurological status at baseline.  Musculoskeletal status at baseline. No calf tenderness noted bilaterally. Dressing left intact " "to the Operating Room.           Additional Data:     Labs:    Results from last 7 days   Lab Units 11/03/24  0456   WBC Thousand/uL 4.75   HEMOGLOBIN g/dL 9.9*   HEMATOCRIT % 30.9*   PLATELETS Thousands/uL 242   SEGS PCT % 58   LYMPHO PCT % 28   MONO PCT % 8   EOS PCT % 4     Results from last 7 days   Lab Units 11/03/24  0456 10/30/24  0546 10/29/24  0515   POTASSIUM mmol/L 4.7   < > 4.5   CHLORIDE mmol/L 102   < > 100   CO2 mmol/L 27   < > 30   BUN mg/dL 28*   < > 21   CREATININE mg/dL 1.28   < > 1.32*   CALCIUM mg/dL 9.2   < > 8.5   ALK PHOS U/L  --   --  39   ALT U/L  --   --  16   AST U/L  --   --  14    < > = values in this interval not displayed.           * I Have Reviewed All Lab Data Listed Above.    Recent Cultures (last 7 days):               Imaging: I have personally reviewed pertinent films in PACS  EKG, Pathology, and Other Studies: I have personally reviewed pertinent reports.    ** Please Note: Portions of the record may have been created with voice recognition software. Occasional wrong word or \"sound a like\" substitutions may have occurred due to the inherent limitations of voice recognition software. Read the chart carefully and recognize, using context, where substitutions have occurred. **      "

## 2024-11-03 NOTE — ANESTHESIA POSTPROCEDURE EVALUATION
Post-Op Assessment Note    CV Status:  Stable    Pain management: adequate    Multimodal analgesia used between 6 hours prior to anesthesia start to PACU discharge    Mental Status:  Alert and awake   Hydration Status:  Stable   PONV Controlled:  Controlled   Airway Patency:  Patent  Two or more mitigation strategies used for obstructive sleep apnea   Post Op Vitals Reviewed: Yes    No anethesia notable event occurred.    Staff: Anesthesiologist           Last Filed PACU Vitals:  Vitals Value Taken Time   Temp 98.6 °F (37 °C) 11/03/24 1000   Pulse 74 11/03/24 1004   /59 11/03/24 1000   Resp 26 11/03/24 1003   SpO2 97 % 11/03/24 1004   Vitals shown include unfiled device data.    Modified Nolvia:  Activity: 2 (11/3/2024 10:00 AM)  Respiration: 2 (11/3/2024 10:00 AM)  Circulation: 2 (11/3/2024 10:00 AM)  Consciousness: 2 (11/3/2024 10:00 AM)  Oxygen Saturation: 2 (11/3/2024 10:00 AM)  Modified Nolvia Score: 10 (11/3/2024 10:00 AM)

## 2024-11-03 NOTE — ASSESSMENT & PLAN NOTE
"Status post \"cocktail\" and daily Lokelma initiation administered by nephrology on 11/1 with subsequent normalization  Monitor serum potassium  "

## 2024-11-04 ENCOUNTER — TELEPHONE (OUTPATIENT)
Dept: NEPHROLOGY | Facility: CLINIC | Age: 69
End: 2024-11-04

## 2024-11-04 DIAGNOSIS — R79.89 ELEVATED SERUM CREATININE: ICD-10-CM

## 2024-11-04 DIAGNOSIS — N18.30 STAGE 3 CHRONIC KIDNEY DISEASE, UNSPECIFIED WHETHER STAGE 3A OR 3B CKD (HCC): Primary | ICD-10-CM

## 2024-11-04 PROBLEM — N18.31 CHRONIC KIDNEY DISEASE-MINERAL BONE DISORDER (CKD-MBD) WITH STAGE 3A CHRONIC KIDNEY DISEASE (HCC): Status: ACTIVE | Noted: 2024-11-04

## 2024-11-04 PROBLEM — E83.9 CHRONIC KIDNEY DISEASE-MINERAL BONE DISORDER (CKD-MBD) WITH STAGE 3A CHRONIC KIDNEY DISEASE (HCC): Status: ACTIVE | Noted: 2024-11-04

## 2024-11-04 PROBLEM — M89.9 CHRONIC KIDNEY DISEASE-MINERAL BONE DISORDER (CKD-MBD) WITH STAGE 3A CHRONIC KIDNEY DISEASE (HCC): Status: ACTIVE | Noted: 2024-11-04

## 2024-11-04 LAB
ANION GAP SERPL CALCULATED.3IONS-SCNC: 8 MMOL/L (ref 4–13)
APTT PPP: 90 SECONDS (ref 23–34)
BASOPHILS # BLD AUTO: 0.06 THOUSANDS/ΜL (ref 0–0.1)
BASOPHILS NFR BLD AUTO: 1 % (ref 0–1)
BUN SERPL-MCNC: 29 MG/DL (ref 5–25)
CALCIUM SERPL-MCNC: 8.9 MG/DL (ref 8.4–10.2)
CHLORIDE SERPL-SCNC: 102 MMOL/L (ref 96–108)
CO2 SERPL-SCNC: 26 MMOL/L (ref 21–32)
CREAT SERPL-MCNC: 1.53 MG/DL (ref 0.6–1.3)
EOSINOPHIL # BLD AUTO: 0.16 THOUSAND/ΜL (ref 0–0.61)
EOSINOPHIL NFR BLD AUTO: 3 % (ref 0–6)
ERYTHROCYTE [DISTWIDTH] IN BLOOD BY AUTOMATED COUNT: 13.2 % (ref 11.6–15.1)
GFR SERPL CREATININE-BSD FRML MDRD: 45 ML/MIN/1.73SQ M
GLUCOSE SERPL-MCNC: 242 MG/DL (ref 65–140)
GLUCOSE SERPL-MCNC: 282 MG/DL (ref 65–140)
GLUCOSE SERPL-MCNC: 285 MG/DL (ref 65–140)
GLUCOSE SERPL-MCNC: 343 MG/DL (ref 65–140)
GLUCOSE SERPL-MCNC: 368 MG/DL (ref 65–140)
HCT VFR BLD AUTO: 30.1 % (ref 36.5–49.3)
HGB BLD-MCNC: 9.7 G/DL (ref 12–17)
IMM GRANULOCYTES # BLD AUTO: 0.04 THOUSAND/UL (ref 0–0.2)
IMM GRANULOCYTES NFR BLD AUTO: 1 % (ref 0–2)
LYMPHOCYTES # BLD AUTO: 1.31 THOUSANDS/ΜL (ref 0.6–4.47)
LYMPHOCYTES NFR BLD AUTO: 22 % (ref 14–44)
MAGNESIUM SERPL-MCNC: 1.7 MG/DL (ref 1.9–2.7)
MCH RBC QN AUTO: 31.7 PG (ref 26.8–34.3)
MCHC RBC AUTO-ENTMCNC: 32.2 G/DL (ref 31.4–37.4)
MCV RBC AUTO: 98 FL (ref 82–98)
MONOCYTES # BLD AUTO: 0.46 THOUSAND/ΜL (ref 0.17–1.22)
MONOCYTES NFR BLD AUTO: 8 % (ref 4–12)
NEUTROPHILS # BLD AUTO: 3.83 THOUSANDS/ΜL (ref 1.85–7.62)
NEUTS SEG NFR BLD AUTO: 65 % (ref 43–75)
NRBC BLD AUTO-RTO: 0 /100 WBCS
PLATELET # BLD AUTO: 225 THOUSANDS/UL (ref 149–390)
PMV BLD AUTO: 9.9 FL (ref 8.9–12.7)
POTASSIUM SERPL-SCNC: 5 MMOL/L (ref 3.5–5.3)
RBC # BLD AUTO: 3.06 MILLION/UL (ref 3.88–5.62)
SODIUM SERPL-SCNC: 136 MMOL/L (ref 135–147)
WBC # BLD AUTO: 5.86 THOUSAND/UL (ref 4.31–10.16)

## 2024-11-04 PROCEDURE — 97110 THERAPEUTIC EXERCISES: CPT

## 2024-11-04 PROCEDURE — 97530 THERAPEUTIC ACTIVITIES: CPT

## 2024-11-04 PROCEDURE — 94640 AIRWAY INHALATION TREATMENT: CPT

## 2024-11-04 PROCEDURE — 83735 ASSAY OF MAGNESIUM: CPT

## 2024-11-04 PROCEDURE — 99232 SBSQ HOSP IP/OBS MODERATE 35: CPT | Performed by: INTERNAL MEDICINE

## 2024-11-04 PROCEDURE — 99232 SBSQ HOSP IP/OBS MODERATE 35: CPT

## 2024-11-04 PROCEDURE — 97116 GAIT TRAINING THERAPY: CPT

## 2024-11-04 PROCEDURE — 85730 THROMBOPLASTIN TIME PARTIAL: CPT | Performed by: INTERNAL MEDICINE

## 2024-11-04 PROCEDURE — NC001 PR NO CHARGE: Performed by: PODIATRIST

## 2024-11-04 PROCEDURE — 80048 BASIC METABOLIC PNL TOTAL CA: CPT

## 2024-11-04 PROCEDURE — 99232 SBSQ HOSP IP/OBS MODERATE 35: CPT | Performed by: STUDENT IN AN ORGANIZED HEALTH CARE EDUCATION/TRAINING PROGRAM

## 2024-11-04 PROCEDURE — 94760 N-INVAS EAR/PLS OXIMETRY 1: CPT

## 2024-11-04 PROCEDURE — 82948 REAGENT STRIP/BLOOD GLUCOSE: CPT

## 2024-11-04 PROCEDURE — 85025 COMPLETE CBC W/AUTO DIFF WBC: CPT

## 2024-11-04 PROCEDURE — 94664 DEMO&/EVAL PT USE INHALER: CPT

## 2024-11-04 RX ADMIN — ATORVASTATIN CALCIUM 20 MG: 20 TABLET, FILM COATED ORAL at 08:23

## 2024-11-04 RX ADMIN — HEPARIN SODIUM 22 UNITS/KG/HR: 10000 INJECTION, SOLUTION INTRAVENOUS at 04:22

## 2024-11-04 RX ADMIN — Medication 1000 UNITS: at 08:23

## 2024-11-04 RX ADMIN — BUDESONIDE 0.5 MG: 0.5 INHALANT RESPIRATORY (INHALATION) at 07:06

## 2024-11-04 RX ADMIN — BUDESONIDE 0.5 MG: 0.5 INHALANT RESPIRATORY (INHALATION) at 19:34

## 2024-11-04 RX ADMIN — INSULIN LISPRO 6 UNITS: 100 INJECTION, SOLUTION INTRAVENOUS; SUBCUTANEOUS at 21:24

## 2024-11-04 RX ADMIN — INSULIN LISPRO 8 UNITS: 100 INJECTION, SOLUTION INTRAVENOUS; SUBCUTANEOUS at 16:19

## 2024-11-04 RX ADMIN — NICOTINE 1 PATCH: 14 PATCH, EXTENDED RELEASE TRANSDERMAL at 08:23

## 2024-11-04 RX ADMIN — HEPARIN SODIUM 22 UNITS/KG/HR: 10000 INJECTION, SOLUTION INTRAVENOUS at 14:22

## 2024-11-04 RX ADMIN — PANTOPRAZOLE SODIUM 40 MG: 40 TABLET, DELAYED RELEASE ORAL at 16:18

## 2024-11-04 RX ADMIN — LORATADINE 10 MG: 10 TABLET ORAL at 08:23

## 2024-11-04 RX ADMIN — INSULIN LISPRO 18 UNITS: 100 INJECTION, SOLUTION INTRAVENOUS; SUBCUTANEOUS at 16:18

## 2024-11-04 RX ADMIN — HEPARIN SODIUM 22 UNITS/KG/HR: 10000 INJECTION, SOLUTION INTRAVENOUS at 23:55

## 2024-11-04 RX ADMIN — PREGABALIN 100 MG: 100 CAPSULE ORAL at 17:42

## 2024-11-04 RX ADMIN — PANTOPRAZOLE SODIUM 40 MG: 40 TABLET, DELAYED RELEASE ORAL at 06:14

## 2024-11-04 RX ADMIN — INSULIN LISPRO 6 UNITS: 100 INJECTION, SOLUTION INTRAVENOUS; SUBCUTANEOUS at 11:40

## 2024-11-04 RX ADMIN — INSULIN LISPRO 10 UNITS: 100 INJECTION, SOLUTION INTRAVENOUS; SUBCUTANEOUS at 07:42

## 2024-11-04 RX ADMIN — PIPERACILLIN SODIUM AND TAZOBACTAM SODIUM 4.5 G: 36; 4.5 INJECTION, POWDER, LYOPHILIZED, FOR SOLUTION INTRAVENOUS at 21:30

## 2024-11-04 RX ADMIN — DOCUSATE SODIUM 100 MG: 100 CAPSULE, LIQUID FILLED ORAL at 17:42

## 2024-11-04 RX ADMIN — PIPERACILLIN SODIUM AND TAZOBACTAM SODIUM 4.5 G: 36; 4.5 INJECTION, POWDER, LYOPHILIZED, FOR SOLUTION INTRAVENOUS at 13:50

## 2024-11-04 RX ADMIN — DOCUSATE SODIUM 100 MG: 100 CAPSULE, LIQUID FILLED ORAL at 08:27

## 2024-11-04 RX ADMIN — SODIUM ZIRCONIUM CYCLOSILICATE 10 G: 10 POWDER, FOR SUSPENSION ORAL at 08:31

## 2024-11-04 RX ADMIN — DULOXETINE HYDROCHLORIDE 20 MG: 20 CAPSULE, DELAYED RELEASE ORAL at 08:23

## 2024-11-04 RX ADMIN — ASPIRIN 81 MG CHEWABLE TABLET 81 MG: 81 TABLET CHEWABLE at 08:23

## 2024-11-04 RX ADMIN — PIPERACILLIN SODIUM AND TAZOBACTAM SODIUM 4.5 G: 36; 4.5 INJECTION, POWDER, LYOPHILIZED, FOR SOLUTION INTRAVENOUS at 06:14

## 2024-11-04 RX ADMIN — INSULIN GLARGINE 40 UNITS: 100 INJECTION, SOLUTION SUBCUTANEOUS at 21:24

## 2024-11-04 RX ADMIN — INSULIN LISPRO 18 UNITS: 100 INJECTION, SOLUTION INTRAVENOUS; SUBCUTANEOUS at 07:42

## 2024-11-04 RX ADMIN — Medication 3 MG: at 21:26

## 2024-11-04 RX ADMIN — TRAZODONE HYDROCHLORIDE 25 MG: 50 TABLET ORAL at 21:26

## 2024-11-04 RX ADMIN — PREGABALIN 100 MG: 100 CAPSULE ORAL at 08:23

## 2024-11-04 RX ADMIN — INSULIN LISPRO 18 UNITS: 100 INJECTION, SOLUTION INTRAVENOUS; SUBCUTANEOUS at 11:39

## 2024-11-04 RX ADMIN — CYANOCOBALAMIN TAB 500 MCG 500 MCG: 500 TAB at 08:23

## 2024-11-04 NOTE — PLAN OF CARE
Problem: PAIN - ADULT  Goal: Verbalizes/displays adequate comfort level or baseline comfort level  Description: Interventions:  - Encourage patient to monitor pain and request assistance  - Assess pain using appropriate pain scale  - Administer analgesics based on type and severity of pain and evaluate response  - Implement non-pharmacological measures as appropriate and evaluate response  - Consider cultural and social influences on pain and pain management  - Notify physician/advanced practitioner if interventions unsuccessful or patient reports new pain  Outcome: Progressing     Problem: INFECTION - ADULT  Goal: Absence or prevention of progression during hospitalization  Description: INTERVENTIONS:  - Assess and monitor for signs and symptoms of infection  - Monitor lab/diagnostic results  - Monitor all insertion sites, i.e. indwelling lines, tubes, and drains  - Monitor endotracheal if appropriate and nasal secretions for changes in amount and color  - [place] appropriate cooling/warming therapies per order  - Administer medications as ordered  - Instruct and encourage patient and family to use good hand hygiene technique  - Identify and instruct in appropriate isolation precautions for identified infection/condition  Outcome: Progressing  Goal: Absence of fever/infection during neutropenic period  Description: INTERVENTIONS:  - Monitor WBC    Outcome: Progressing     Problem: SAFETY ADULT  Goal: Patient will remain free of falls  Description: INTERVENTIONS:  - Educate patient/family on patient safety including physical limitations  - Instruct patient to call for assistance with activity   - Consult OT/PT to assist with strengthening/mobility   - Keep Call bell within reach  - Keep bed low and locked with side rails adjusted as appropriate  - Keep care items and personal belongings within reach  - Initiate and maintain comfort rounds  - Make Fall Risk Sign visible to staff  - Offer Toileting every  Hours,  in advance of need  - Initiate/Maintain alarm  - Obtain necessary fall risk management equipment:   - Apply yellow socks and bracelet for high fall risk patients  - Consider moving patient to room near nurses station  Outcome: Progressing  Goal: Maintain or return to baseline ADL function  Description: INTERVENTIONS:  -  Assess patient's ability to carry out ADLs; assess patient's baseline for ADL function and identify physical deficits which impact ability to perform ADLs (bathing, care of mouth/teeth, toileting, grooming, dressing, etc.)  - Assess/evaluate cause of self-care deficits   - Assess range of motion  - Assess patient's mobility; develop plan if impaired  - Assess patient's need for assistive devices and provide as appropriate  - Encourage maximum independence but intervene and supervise when necessary  - Involve family in performance of ADLs  - Assess for home care needs following discharge   - Consider OT consult to assist with ADL evaluation and planning for discharge  - Provide patient education as appropriate  Outcome: Progressing  Goal: Maintains/Returns to pre admission functional level  Description: INTERVENTIONS:  - Perform AM-PAC 6 Click Basic Mobility/ Daily Activity assessment daily.  - Set and communicate daily mobility goal to care team and patient/family/caregiver.   - Collaborate with rehabilitation services on mobility goals if consulted  - Perform Range of Motion  times a day.  - Reposition patient every  hours.  - Dangle patient  times a day  - Stand patient  times a day  - Ambulate patient  times a day  - Out of bed to chair  times a day   - Out of bed for meals  times a day  - Out of bed for toileting  - Record patient progress and toleration of activity level   Outcome: Progressing     Problem: DISCHARGE PLANNING  Goal: Discharge to home or other facility with appropriate resources  Description: INTERVENTIONS:  - Identify barriers to discharge w/patient and caregiver  - Arrange for  needed discharge resources and transportation as appropriate  - Identify discharge learning needs (meds, wound care, etc.)  - Arrange for interpretive services to assist at discharge as needed  - Refer to Case Management Department for coordinating discharge planning if the patient needs post-hospital services based on physician/advanced practitioner order or complex needs related to functional status, cognitive ability, or social support system  Outcome: Progressing     Problem: Knowledge Deficit  Goal: Patient/family/caregiver demonstrates understanding of disease process, treatment plan, medications, and discharge instructions  Description: Complete learning assessment and assess knowledge base.  Interventions:  - Provide teaching at level of understanding  - Provide teaching via preferred learning methods  Outcome: Progressing     Problem: Nutrition/Hydration-ADULT  Goal: Nutrient/Hydration intake appropriate for improving, restoring or maintaining nutritional needs  Description: Monitor and assess patient's nutrition/hydration status for malnutrition. Collaborate with interdisciplinary team and initiate plan and interventions as ordered.  Monitor patient's weight and dietary intake as ordered or per policy. Utilize nutrition screening tool and intervene as necessary. Determine patient's food preferences and provide high-protein, high-caloric foods as appropriate.     INTERVENTIONS:  - Monitor oral intake, urinary output, labs, and treatment plans  - Assess nutrition and hydration status and recommend course of action  - Evaluate amount of meals eaten  - Assist patient with eating if necessary   - Allow adequate time for meals  - Recommend/ encourage appropriate diets, oral nutritional supplements, and vitamin/mineral supplements  - Order, calculate, and assess calorie counts as needed  - Recommend, monitor, and adjust tube feedings and TPN/PPN based on assessed needs  - Assess need for intravenous fluids  -  Provide specific nutrition/hydration education as appropriate  - Include patient/family/caregiver in decisions related to nutrition  Outcome: Progressing     Problem: Prexisting or High Potential for Compromised Skin Integrity  Goal: Skin integrity is maintained or improved  Description: INTERVENTIONS:  - Identify patients at risk for skin breakdown  - Assess and monitor skin integrity  - Assess and monitor nutrition and hydration status  - Monitor labs   - Assess for incontinence   - Turn and reposition patient  - Assist with mobility/ambulation  - Relieve pressure over bony prominences  - Avoid friction and shearing  - Provide appropriate hygiene as needed including keeping skin clean and dry  - Evaluate need for skin moisturizer/barrier cream  - Collaborate with interdisciplinary team   - Patient/family teaching  - Consider wound care consult   Outcome: Progressing

## 2024-11-04 NOTE — PROGRESS NOTES
"[facility] Podiatry - Progress Note  Patient: Clay Marcial 69 y.o. male   MRN: [id number]  PCP: No primary care provider on file.  Unit/Bed#: [facility] 825-01 Encounter: [id number]  Date Of Visit: 24    ASSESSMENT:    Clay Marcial is a 69 y.o. male with:    Diabetic foot infection   - S/p left second toe amputation and wound debridement (DOS: 10/21/2024)  - S/p TMA (DOS: 10/28/2024)  Type 2 diabetes mellitus   - A1c 7.2% (10/16/2024)  Coronary artery disease  Stage III chronic kidney disease  Chronic obstructive pulmonary disease  Hypertension    PLAN:    Patient was seen at bedside today with all question concerns answered.  Dressings were left intact  Patient is postop day 1 for left TMA site delayed primary closure with Dr. Page.  Continue continue to monitor off antibiotics  Of note dressings were noted to be clean dry and intact, first post-op dressing change to be done tomorrow.  Dressings to remain intact at this time and will be changed by podiatry service.  Plan to continue local wound care at this time with nonweightbearing status to the left lower extremity.  Reviewed labs with vitals, patient is stable vitals with no leukocytosis noted.  Glucose is uncontrolled.  Appreciate primary team assistance with glucose control.  Elevation on green foam wedges or pillows when non-ambulatory.  Rest of care per primary team.    Weight bearing status: Nonweightbearing left lower extremity      SUBJECTIVE:     The patient was seen, evaluated, and assessed at bedside today. The patient was awake, alert, and in no acute distress. No acute events overnight. The patient reports no pain at this time. Patient denies N/V/F/chills/SOB/CP.      OBJECTIVE:     Vitals:   /81   Pulse 88   Temp 98.3 °F (36.8 °C) (Axillary)   Resp 17   Ht 5' 11\" (1.803 m)   Wt 126 kg (278 lb)   SpO2 96%   BMI 38.77 kg/m²     Temp (24hrs), Av.5 °F (36.9 °C), Min:97.6 °F (36.4 °C), Max:100.7 °F (38.2 °C)      Physical Exam: " "    General:  Alert, cooperative, and in no distress.  Lower extremity exam:  Dressings to left foot left clean, dry, and intact    Additional Data:     Labs:    Results from last 7 days   Lab Units 11/04/24  0619   WBC Thousand/uL 5.86   HEMOGLOBIN g/dL 9.7*   HEMATOCRIT % 30.1*   PLATELETS Thousands/uL 225   SEGS PCT % 65   LYMPHO PCT % 22   MONO PCT % 8   EOS PCT % 3     Results from last 7 days   Lab Units 11/04/24  0619 10/30/24  0546 10/29/24  0515   POTASSIUM mmol/L 5.0   < > 4.5   CHLORIDE mmol/L 102   < > 100   CO2 mmol/L 26   < > 30   BUN mg/dL 29*   < > 21   CREATININE mg/dL 1.53*   < > 1.32*   CALCIUM mg/dL 8.9   < > 8.5   ALK PHOS U/L  --   --  39   ALT U/L  --   --  16   AST U/L  --   --  14    < > = values in this interval not displayed.           * I Have Reviewed All Lab Data Listed Above.    Recent Cultures (last 7 days):                   ** Please Note: Portions of the record may have been created with voice recognition software. Occasional wrong word or \"sound a like\" substitutions may have occurred due to the inherent limitations of voice recognition software. Read the chart carefully and recognize, using context, where substitutions have occurred. **   "

## 2024-11-04 NOTE — ASSESSMENT & PLAN NOTE
Initially presented at the [facility] with left lower extremity cellulitis with open wound  S/p I&D, L 2nd toe amputation with podiatry on 10/21 given concern for acutely worsening wound/infection   Transferred to the [facility] for vascular surgery/IR evaluations given concern also for embolic process/ischemia   Agram 10/25   Status post left foot TMA on 10/28  Continue IV heparin gtt   Appreciate infectious disease input  Continue IV Zosyn until decision made on plan/timing of delayed closure. Per ID continue till 48 hours after surgery  Wound culture from OR on 10/21 with polymicrobial growth including Bacteroides pyogenes, Finegoldia magna, Actinomyces species, coagulase-negative Staphylococcus, and Globicatella species  Initial plan for wound VAC placement yesterday by podiatry postponed as wound site noted to still be bleeding, stabilized with compression and cautery -> underwent wound VAC placement on 11/30 status post return to the OR this morning for delayed primary closure by podiatry  Possibly ready for discharge tomorrow am  Discussed with CM. Awaiting Short term rehab placement

## 2024-11-04 NOTE — ASSESSMENT & PLAN NOTE
Lab Results   Component Value Date    EGFR 45 11/04/2024    EGFR 55 11/03/2024    EGFR 52 11/02/2024    CREATININE 1.53 (H) 11/04/2024    CREATININE 1.28 11/03/2024    CREATININE 1.35 (H) 11/02/2024

## 2024-11-04 NOTE — PROGRESS NOTES
Progress Note - Infectious Disease   Clay Marcial 69 y.o. male MRN: [id number]  Unit/Bed#: [facility] 825-01 Encounter: [id number]      Impression:  1.  Diabetic left foot infection with osteomyelitis s/p left second toe amputation with wound debridement and packing S/P left transmetatarsal amputation 10/28, wound closure 11/3  2.  Type II DM with PAD, history of chronic left foot plantar ulcer  3.  History of chronic tobacco abuse with COPD and possible ROMEL  4.  CAD    Recommendations:  Patient is afebrile with normal WBC count.  Patient underwent wound closure of left foot earlier 11/3.  To discuss therapy with primary medical service  1.  Will  continue piperacillin/tazobactam 4.5 g every 8 hours IV by extended infusion for additional 24 hours.  If stable during next dressing change will consider discontinuing antibiotics.    Antibiotics:  1.  Piperacillin/tazobactam 4.5 g every 8 hours IV by extended infusion, day 15 Rx    Subjective:  The patient has no complaints.  No postoperative pain  Denies fevers, chills, or sweats.  Denies nausea, vomiting, or diarrhea.      Objective:  Vitals:  Temp:  [98.3 °F (36.8 °C)-100.7 °F (38.2 °C)] 99.1 °F (37.3 °C)  HR:  [] 93  Resp:  [17-18] 18  BP: (106-121)/(53-81) 115/80  SpO2:  [91 %-96 %] 96 %  Temp (24hrs), Av.4 °F (37.4 °C), Min:98.3 °F (36.8 °C), Max:100.7 °F (38.2 °C)  Current: Temperature: 99.1 °F (37.3 °C)    Physical Exam:     General Appearance:  Alert, appropriately responsive, chronically ill-appearing nontoxic, no acute distress.   Throat: Oropharynx moist without lesions.  Lips, mucosa, and tongue normal, edentulous   Neck: Supple, symmetrical, trachea midline, no adenopathy,  no tenderness/mass/nodules   Lungs:   Increased AP diameter with deep creased respiratory expansion   Heart:  Regular rate and rhythm, S1, S2 normal, no murmur, rub or gallop   Abdomen:   Soft, non-tender, non-distended, positive bowel sounds.  No masses, no organomegaly     No CVA tenderness   Extremities: LLE with +2/4 edema and erythema.  Left foot wound with  dry surgical dressing   Skin: As above.         Invasive Devices       Peripheral Intravenous Line  Duration             Peripheral IV 11/03/24 Left Wrist 1 day                    Labs, Imaging, & Other studies:   All pertinent labs were personally reviewed  Results from last 7 days   Lab Units 11/04/24  0619 11/03/24  0456 11/02/24  0538   WBC Thousand/uL 5.86 4.75 5.63   HEMOGLOBIN g/dL 9.7* 9.9* 10.2*   PLATELETS Thousands/uL 225 242 258     Results from last 7 days   Lab Units 11/04/24  0619 11/03/24  0456 11/02/24  0538 10/30/24  0546 10/29/24  0515   SODIUM mmol/L 136 137 135   < > 136   POTASSIUM mmol/L 5.0 4.7 5.1   < > 4.5   CHLORIDE mmol/L 102 102 102   < > 100   CO2 mmol/L 26 27 27   < > 30   BUN mg/dL 29* 28* 25   < > 21   CREATININE mg/dL 1.53* 1.28 1.35*   < > 1.32*   EGFR ml/min/1.73sq m 45 55 52   < > 54   CALCIUM mg/dL 8.9 9.2 9.2   < > 8.5   AST U/L  --   --   --   --  14   ALT U/L  --   --   --   --  16   ALK PHOS U/L  --   --   --   --  39    < > = values in this interval not displayed.

## 2024-11-04 NOTE — TELEPHONE ENCOUNTER
----- Message from Joselyn Reyes Bahamonde, MD sent at 11/4/2024 11:25 AM EST -----  Hi,     This patient will be discharged from [place] in the next few days and will require follow-up appointment with BMP within 4 to 5 weeks    Can be seen by AP think

## 2024-11-04 NOTE — CASE MANAGEMENT
Case Management Discharge Planning Note    Patient name Clay Marcial  Location [facility] 825/[facility] 825-01 MRN [id number]  : 1955 Date 2024       Current Admission Date: 10/22/2024  Current Admission Diagnosis:Diabetic foot infection  (HCC)   Patient Active Problem List    Diagnosis Date Noted Date Diagnosed    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 (HCC) 2024     Anemia 2024     Anemia due to stage 3b chronic kidney disease  (HCC) 2024     Obesity 10/30/2024     Hypomagnesemia 10/28/2024     HTN (hypertension) 10/21/2024     Smoking 10/21/2024     PAD (peripheral artery disease) (HCC) 10/20/2024     Acute hyponatremia 10/20/2024     Chronic obstructive pulmonary disease with acute exacerbation ([facility]) 10/17/2024     ROMEL (obstructive sleep apnea) 10/17/2024     Diabetic foot infection  (HCC) 10/16/2024     Stage 3 chronic kidney disease (HCC) 10/16/2024     Type 2 diabetes mellitus with diabetic polyneuropathy ([facility]) 2024     CAD (coronary artery disease) 2024     Chronic diastolic congestive heart failure ([facility]) 2024     Acute on chronic kidney failure  ([facility]) 2024     Hyperkalemia 2024     Helicobacter pylori infection 2024       LOS (days): 13  Geometric Mean LOS (GMLOS) (days): 7.3  Days to GMLOS:-5.3     OBJECTIVE:  Risk of Unplanned Readmission Score: 41.18         Current admission status: Inpatient   Preferred Pharmacy:   [facility]/pharmacy #[id number] - [place] - [address] [place] [id number]  Phone: [phone] Fax: [phone]    [facility] [place] [facility] [address]  [address] [place] [address]  Phone: [phone] Fax: [phone]    Primary Care Provider: No primary care provider on file.    Primary Insurance: [facility]  Secondary Insurance: MEDICARE    DISCHARGE DETAILS:                                           Other Referral/Resources/Interventions Provided:  Referral Comments: Pt discussed during care coordination rounds. Per SLIM, pt likely ready for dc tomorrow. AIDIN messge sent to [facility] to update. DCP [facility]. Per prior notes, pt to go to STR under medicare part A.     1235 S/w pt & updated on dc plan to [facility] at [place]. Pt agreeable to plan.

## 2024-11-04 NOTE — PROGRESS NOTES
Progress Note - Hospitalist   Name: Clay Marcial 69 y.o. male I MRN: [id number]  Unit/Bed#: [facility] 825-01 I Date of Admission: 10/22/2024   Date of Service: 11/4/2024 I Hospital Day: 13    Assessment & Plan  Diabetic foot infection  (HCC)  Initially presented at the [facility] with left lower extremity cellulitis with open wound  S/p I&D, L 2nd toe amputation with podiatry on 10/21 given concern for acutely worsening wound/infection   Transferred to the [facility] for vascular surgery/IR evaluations given concern also for embolic process/ischemia   Agram 10/25   Status post left foot TMA on 10/28  Continue IV heparin gtt   Appreciate infectious disease input  Continue IV Zosyn until decision made on plan/timing of delayed closure. Per ID continue till 48 hours after surgery  Wound culture from OR on 10/21 with polymicrobial growth including Bacteroides pyogenes, Finegoldia magna, Actinomyces species, coagulase-negative Staphylococcus, and Globicatella species  Initial plan for wound VAC placement yesterday by podiatry postponed as wound site noted to still be bleeding, stabilized with compression and cautery -> underwent wound VAC placement on 11/30 status post return to the OR this morning for delayed primary closure by podiatry  Possibly ready for discharge tomorrow am  Discussed with CM. Awaiting Short term rehab placement  Type 2 diabetes mellitus with diabetic polyneuropathy (HCC)  Lab Results   Component Value Date    HGBA1C 7.2 (H) 10/16/2024     Continue basal/prandial insulin with additional SSI coverage per Accu-Cheks - optimize dosing daily if necessary  Hypoglycemia protocol  Carbohydrate restriction  On Lyrica for neuropathy  Stage 3 chronic kidney disease (HCC)  Baseline creatinine of approximately 1.2-1.4 -> currently stable at 1.28  Monitor renal function and urine output  Limit/avoid nephrotoxins and hypotension as possible -> holding standing diuretic/ACEI/Jardiance  Nephrology  "following  HTN (hypertension)  Continue Imdur/Toprol-XL  ACEI and standing diuretic (Lasix) remains held per nephrology  CAD (coronary artery disease)  Continue ASA/statin/Toprol-XL/Imdur  Chronic obstructive pulmonary disease with acute exacerbation (HCC)  Continue Pulmicort/Olodaterol/Umeclidinium - PRN Albuterol on board  Hypomagnesemia  Monitor/replete serum magnesium and potassium as necessary  Obesity  BMI of 38.77  Lifestyle/diet modifications  Hyperkalemia  Status post \"cocktail\" and daily Lokelma initiation administered by nephrology on 11/1 with subsequent normalization  Monitor serum potassium  Anemia  Hemoglobin stable at 9.9  Anemia due to stage 3b chronic kidney disease  (HCC)  Lab Results   Component Value Date    EGFR 45 11/04/2024    EGFR 55 11/03/2024    EGFR 52 11/02/2024    CREATININE 1.53 (H) 11/04/2024    CREATININE 1.28 11/03/2024    CREATININE 1.35 (H) 11/02/2024     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 (HCC)  Lab Results   Component Value Date    EGFR 45 11/04/2024    EGFR 55 11/03/2024    EGFR 52 11/02/2024    CREATININE 1.53 (H) 11/04/2024    CREATININE 1.28 11/03/2024    CREATININE 1.35 (H) 11/02/2024         VTE Pharmacologic Prophylaxis: VTE Score: 5 High Risk (Score >/= 5) - Pharmacological DVT Prophylaxis Ordered: Heparin Drip. Sequential Compression Devices Ordered.    AM-PAC Basic Mobility:  Basic Mobility Inpatient Raw Score: 16  JH-HLM Goal: 5: Stand one or more mins  JH-HLM Achieved: 7: Walk 25 feet or more  JH-HLM Goal achieved. Continue to encourage appropriate mobility.    Patient Centered Rounds:  I have performed bedside rounds and discussed plan of care with nursing today.  Discussions with Specialists or Other Care Team Provider:  see above assessments if applicable    Education and Discussions with Family / Patient:  Patient at bedside, who denied need to update other contacts currently    Time Spent for Care: 45 " minutes. More than 50% of total time spent on counseling and coordination of care, on one or more of the following: performing physical exam; counseling and coordination of care, obtaining or reviewing history, documenting in the medical record, reviewing/ordering tests/medications/procedures, and communicating with other healthcare professionals.    Current Length of Stay: 13 day(s)  Current Patient Status: Inpatient   Certification Statement:  Patient will continue to require additional hospital stay due to assessments as noted above.    Code Status: Level 1 - Full Code      Subjective     Patient does not report and headaches, shortness of breath, chest pain, abdominal pain, nausea vomiting, lower leg edema. Also reports good sleep        Objective     Vitals:   Temp (24hrs), Av.4 °F (37.4 °C), Min:98.3 °F (36.8 °C), Max:100.7 °F (38.2 °C)    Temp:  [98.3 °F (36.8 °C)-100.7 °F (38.2 °C)] 99.1 °F (37.3 °C)  HR:  [] 93  Resp:  [17-18] 18  BP: (106-121)/(53-81) 115/80  SpO2:  [91 %-99 %] 96 %  Body mass index is 38.77 kg/m².     Input and Output Summary (last 24 hours):       Intake/Output Summary (Last 24 hours) at 2024 1554  Last data filed at 2024 1401  Gross per 24 hour   Intake 998.91 ml   Output 2875 ml   Net -1876.09 ml       Physical Exam:     Physical Exam  Vitals reviewed.   Constitutional:       General: He is not in acute distress.     Appearance: He is not ill-appearing or diaphoretic.   HENT:      Nose: Nose normal.      Mouth/Throat:      Mouth: Mucous membranes are moist.   Eyes:      Pupils: Pupils are equal, round, and reactive to light.   Cardiovascular:      Rate and Rhythm: Normal rate and regular rhythm.      Pulses: Normal pulses.      Heart sounds: No murmur heard.  Pulmonary:      Effort: Pulmonary effort is normal.      Breath sounds: Normal breath sounds. No wheezing or rales.   Abdominal:      General: Abdomen is flat. Bowel sounds are normal.      Palpations: Abdomen  is soft.   Musculoskeletal:      Right lower leg: No edema.      Left lower leg: No edema.   Skin:     General: Skin is warm.   Neurological:      General: No focal deficit present.      Mental Status: He is alert and oriented to person, place, and time.          Labs & Recent Cultures:  Results from last 7 days   Lab Units 11/04/24  0619   WBC Thousand/uL 5.86   HEMOGLOBIN g/dL 9.7*   HEMATOCRIT % 30.1*   PLATELETS Thousands/uL 225   SEGS PCT % 65   LYMPHO PCT % 22   MONO PCT % 8   EOS PCT % 3     Results from last 7 days   Lab Units 11/04/24  0619 10/30/24  0546 10/29/24  0515   POTASSIUM mmol/L 5.0   < > 4.5   CHLORIDE mmol/L 102   < > 100   CO2 mmol/L 26   < > 30   BUN mg/dL 29*   < > 21   CREATININE mg/dL 1.53*   < > 1.32*   CALCIUM mg/dL 8.9   < > 8.5   ALK PHOS U/L  --   --  39   ALT U/L  --   --  16   AST U/L  --   --  14    < > = values in this interval not displayed.         Results from last 7 days   Lab Units 11/04/24  1043 11/04/24  0728 11/03/24  2100 11/03/24  1547 11/03/24  1120 11/03/24  0934 11/03/24  0728 11/02/24  2059 11/02/24  1606 11/02/24  1111 11/02/24  0718 11/01/24  2049   POC GLUCOSE mg/dl 282* 368* 344* 290* 162* 132 156* 236* 164* 248* 197* 303*                         Lines/Drains/Telemetry:  Invasive Devices       Peripheral Intravenous Line  Duration             Peripheral IV 11/03/24 Left Wrist 1 day                    Last 24 Hours Medication List:   Current Facility-Administered Medications   Medication Dose Route Frequency Provider Last Rate    acetaminophen  650 mg Oral Q6H PRN Fortino Freitas DPM      albuterol  2.5 mg Nebulization Q6H PRN Fortino Freitas DPM      aspirin  81 mg Oral Daily Fortino Freitas DPM      atorvastatin  20 mg Oral Daily Fortino Freitas DPM      bisacodyl  10 mg Rectal Daily PRN Fortino Freitas DPM      budesonide  0.5 mg Nebulization Q12H Fortino Freitas DPM      Cholecalciferol  1,000 Units Oral Daily Freitas Freitas, DPM      cyanocobalamin  500 mcg Oral Daily Fortino Freitas DPM       docusate sodium  100 mg Oral BID Fortino Freitas, DPM      DULoxetine  20 mg Oral Daily Fortino Freitas, DPM      fluticasone  2 spray Nasal Daily Fortino Freitas, DPM      heparin (porcine)  3-30 Units/kg/hr (Order-Specific) Intravenous Titrated Fortino Freitas DPM 22 Units/kg/hr (11/04/24 1422)    heparin (porcine)  4,800 Units Intravenous Q6H PRN GORDO BerryM      heparin (porcine)  9,600 Units Intravenous Q6H PRN GORDO BerryM      insulin glargine  40 Units Subcutaneous HS GORDO BerryM      insulin lispro  18 Units Subcutaneous TID With Meals Fortino Freitas, SHIN      insulin lispro  2-12 Units Subcutaneous 4x Daily (AC & HS) Fortino Freitas, DPM      isosorbide mononitrate  30 mg Oral Daily Fortino Freitas, DPM      loratadine  10 mg Oral Daily Fortino Freitas, DPM      melatonin  3 mg Oral HS ASHLEY Alejandra      metoprolol succinate  50 mg Oral Daily Fortino Freitas, DPM      morphine injection  2 mg Intravenous Q4H PRN Fortino Freitas DPM      nicotine  1 patch Transdermal Daily Fortino Freitas, DPM      umeclidinium  1 puff Inhalation Daily Fortino Freitas DPM      And    olodaterol HCl  2 puff Inhalation Daily Fortino Freitas, DPM      ondansetron  4 mg Intravenous Q4H PRN Fortino Freitas, DPM      oxyCODONE  5 mg Oral Q4H PRN Fortino Freitas, DPM      oxyCODONE  2.5 mg Oral Q4H PRN Fortino Freitas, DPM      pantoprazole  40 mg Oral BID AC Fortino Freitas, DPOTILIA      piperacillin-tazobactam  4.5 g Intravenous Q8H Fortino Freitas DPM 4.5 g (11/04/24 1350)    polyethylene glycol  17 g Oral Daily Fortino Freitas, DPM      pregabalin  100 mg Oral BID Fortino Freitas, DPM      sodium chloride  2 spray Each Nare BID Fortino Freitas, DPM      Sodium Zirconium Cyclosilicate  10 g Oral Daily Fortino Freitas, DPM      traZODone  25 mg Oral HS Fortino Freitas, SHIN Villegas MD   Hospitalist - [facility]        ** Please Note:  Documentation is constructed using a voice recognition dictation system.  An occasional wrong word/phrase or “sound-a-like” substitution may have been picked up by dictation  device due to the inherent limitations of voice recognition software.  Read the chart carefully and recognize, using reasonable context, where substitutions may have occurred.**

## 2024-11-04 NOTE — ASSESSMENT & PLAN NOTE
Current hemoglobin: 9.7 mg/dL  Treatment:  Transfuse for hemoglobin less than 7.0 per primary service

## 2024-11-04 NOTE — ASSESSMENT & PLAN NOTE
Volume: Appears slightly hypervolemic with lower extremity edema  Blood pressure: Tendency to hypotension, /81  Low sodium diet   Metoprolol 50 mg  Monitor urinary output

## 2024-11-04 NOTE — ASSESSMENT & PLAN NOTE
Serum magnesium 1.7 mg/dL, borderline low  Avoid excessive IV magnesium due to borderline hyperkalemia

## 2024-11-04 NOTE — PLAN OF CARE
Problem: PHYSICAL THERAPY ADULT  Goal: Performs mobility at highest level of function for planned discharge setting.  See evaluation for individualized goals.  Description: Treatment/Interventions: Functional transfer training, Elevations, Therapeutic exercise, Endurance training, Bed mobility, Gait training, Spoke to nursing, Spoke to case management, OT  Equipment Recommended: Walker (Pt currently ambulating with RW due to NWB on LLE.)       See flowsheet documentation for full assessment, interventions and recommendations.  Outcome: Progressing  Note: Prognosis: Good  Problem List: Decreased strength, Decreased range of motion, Decreased endurance, Impaired balance, Decreased mobility, Orthopedic restrictions (NWB LLE)  Assessment: Pt seen for PT treatment session this date. Therapy session focused on bed mobility, functional transfers, and ambulation in order to improve overall mobility and independence. Pt requires Sup A for bed mobility, Sariah x1 for transfers, and Sariah x1 for ambulation. Pt ambulated 30 ft and 15 ft in hallway with use of RW and Sariah x1, with verbal cues to take his time and maintain NWB on LLE. Pt tolerated ambulation well with chair follow, to allow for seated rest break in between bouts. Pt notes that he is feeling more fatigued today and needs to take a long nap at conclusion of therapy session. Pt tolerated STS transfers well with Sariah x1 and use of RW, requiring increased time to stand due to NWB status on LLE. Pt also tolerated seated and supine exercises well, with fatigue noted at the end. Pt making steady progress toward goals. Pt was seated at edge of bed at the end of PT session with all needs in reach and RN was updated on pt position. Pt would benefit from continued PT services while in hospital to address remaining limitations. The patient's AM-PAC Basic Mobility Inpatient Short Form Raw Score is 16. A Raw score of less than or equal to 16 suggests the patient may benefit from  discharge to post-acute rehabilitation services. Please also refer to the recommendation of the Physical Therapist for safe discharge planning. Recommend Level 2 upon pt discharge due to inaccessible home environment with second floor bed/bath and limited pt functional mobility due to NWB on LLE status.  Barriers to Discharge: Inaccessible home environment  Barriers to Discharge Comments: Pt has 7STE his home and bed/bath located on 2nd floor.  Rehab Resource Intensity Level, PT: II (Moderate Resource Intensity)    See flowsheet documentation for full assessment.      Oly Wyatt

## 2024-11-04 NOTE — PLAN OF CARE
Problem: PAIN - ADULT  Goal: Verbalizes/displays adequate comfort level or baseline comfort level  Description: Interventions:  - Encourage patient to monitor pain and request assistance  - Assess pain using appropriate pain scale  - Administer analgesics based on type and severity of pain and evaluate response  - Implement non-pharmacological measures as appropriate and evaluate response  - Consider cultural and social influences on pain and pain management  - Notify physician/advanced practitioner if interventions unsuccessful or patient reports new pain  Outcome: Progressing     Problem: INFECTION - ADULT  Goal: Absence or prevention of progression during hospitalization  Description: INTERVENTIONS:  - Assess and monitor for signs and symptoms of infection  - Monitor lab/diagnostic results  - Monitor all insertion sites, i.e. indwelling lines, tubes, and drains  - Monitor endotracheal if appropriate and nasal secretions for changes in amount and color  - [place] appropriate cooling/warming therapies per order  - Administer medications as ordered  - Instruct and encourage patient and family to use good hand hygiene technique  - Identify and instruct in appropriate isolation precautions for identified infection/condition  Outcome: Progressing  Goal: Absence of fever/infection during neutropenic period  Description: INTERVENTIONS:  - Monitor WBC    Outcome: Progressing     Problem: SAFETY ADULT  Goal: Patient will remain free of falls  Description: INTERVENTIONS:  - Educate patient/family on patient safety including physical limitations  - Instruct patient to call for assistance with activity   - Consult OT/PT to assist with strengthening/mobility   - Keep Call bell within reach  - Keep bed low and locked with side rails adjusted as appropriate  - Keep care items and personal belongings within reach  - Initiate and maintain comfort rounds  - Make Fall Risk Sign visible to staff  - Offer Toileting every  Hours,  in advance of need  - Initiate/Maintain alarm  - Obtain necessary fall risk management equipment:   - Apply yellow socks and bracelet for high fall risk patients  - Consider moving patient to room near nurses station  Outcome: Progressing  Goal: Maintain or return to baseline ADL function  Description: INTERVENTIONS:  -  Assess patient's ability to carry out ADLs; assess patient's baseline for ADL function and identify physical deficits which impact ability to perform ADLs (bathing, care of mouth/teeth, toileting, grooming, dressing, etc.)  - Assess/evaluate cause of self-care deficits   - Assess range of motion  - Assess patient's mobility; develop plan if impaired  - Assess patient's need for assistive devices and provide as appropriate  - Encourage maximum independence but intervene and supervise when necessary  - Involve family in performance of ADLs  - Assess for home care needs following discharge   - Consider OT consult to assist with ADL evaluation and planning for discharge  - Provide patient education as appropriate  Outcome: Progressing  Goal: Maintains/Returns to pre admission functional level  Description: INTERVENTIONS:  - Perform AM-PAC 6 Click Basic Mobility/ Daily Activity assessment daily.  - Set and communicate daily mobility goal to care team and patient/family/caregiver.   - Collaborate with rehabilitation services on mobility goals if consulted  - Perform Range of Motion  times a day.  - Reposition patient every  hours.  - Dangle patient  times a day  - Stand patient  times a day  - Ambulate patient  times a day  - Out of bed to chair  times a day   - Out of bed for meals  times a day  - Out of bed for toileting  - Record patient progress and toleration of activity level   Outcome: Progressing     Problem: DISCHARGE PLANNING  Goal: Discharge to home or other facility with appropriate resources  Description: INTERVENTIONS:  - Identify barriers to discharge w/patient and caregiver  - Arrange for  needed discharge resources and transportation as appropriate  - Identify discharge learning needs (meds, wound care, etc.)  - Arrange for interpretive services to assist at discharge as needed  - Refer to Case Management Department for coordinating discharge planning if the patient needs post-hospital services based on physician/advanced practitioner order or complex needs related to functional status, cognitive ability, or social support system  Outcome: Progressing     Problem: Knowledge Deficit  Goal: Patient/family/caregiver demonstrates understanding of disease process, treatment plan, medications, and discharge instructions  Description: Complete learning assessment and assess knowledge base.  Interventions:  - Provide teaching at level of understanding  - Provide teaching via preferred learning methods  Outcome: Progressing     Problem: Nutrition/Hydration-ADULT  Goal: Nutrient/Hydration intake appropriate for improving, restoring or maintaining nutritional needs  Description: Monitor and assess patient's nutrition/hydration status for malnutrition. Collaborate with interdisciplinary team and initiate plan and interventions as ordered.  Monitor patient's weight and dietary intake as ordered or per policy. Utilize nutrition screening tool and intervene as necessary. Determine patient's food preferences and provide high-protein, high-caloric foods as appropriate.     INTERVENTIONS:  - Monitor oral intake, urinary output, labs, and treatment plans  - Assess nutrition and hydration status and recommend course of action  - Evaluate amount of meals eaten  - Assist patient with eating if necessary   - Allow adequate time for meals  - Recommend/ encourage appropriate diets, oral nutritional supplements, and vitamin/mineral supplements  - Order, calculate, and assess calorie counts as needed  - Recommend, monitor, and adjust tube feedings and TPN/PPN based on assessed needs  - Assess need for intravenous fluids  -  Provide specific nutrition/hydration education as appropriate  - Include patient/family/caregiver in decisions related to nutrition  Outcome: Progressing     Problem: Prexisting or High Potential for Compromised Skin Integrity  Goal: Skin integrity is maintained or improved  Description: INTERVENTIONS:  - Identify patients at risk for skin breakdown  - Assess and monitor skin integrity  - Assess and monitor nutrition and hydration status  - Monitor labs   - Assess for incontinence   - Turn and reposition patient  - Assist with mobility/ambulation  - Relieve pressure over bony prominences  - Avoid friction and shearing  - Provide appropriate hygiene as needed including keeping skin clean and dry  - Evaluate need for skin moisturizer/barrier cream  - Collaborate with interdisciplinary team   - Patient/family teaching  - Consider wound care consult   Outcome: Progressing

## 2024-11-04 NOTE — ASSESSMENT & PLAN NOTE
#CKD G3a  Baseline creatinine: 1.3 to 1.5 mg/dL  Current creatinine: 1.53 mg/dL, at baseline  Etiology: Likely secondary to nephrosclerosis in the settings of cardiovascular disease  No hematuria  Plan:  No indication of dialysis at this time, will continue to monitor kidney function closely  Maintain mean arterial pressure above 65 mmHg to avoid hypotension/hypoperfusion  Avoid nephrotoxic agents such as NSAIDs and contrast if at all possible    Avoid opioids   Adjust medications to GFR  Renal diet   Follow-up with nephrology on discharge.  Can resume diuretics and SGLT2 inhibitors on discharge.  Recommend holding ACE inhibitors due to hyperkalemia

## 2024-11-04 NOTE — PHYSICAL THERAPY NOTE
"                                                                                  PHYSICAL THERAPY NOTE          Patient Name: Clay Marcial  Today's Date: 11/4/2024 11/04/24 1157   PT Last Visit   PT Visit Date 11/04/24   Note Type   Note Type Treatment   Education   Education Provided Yes   End of Consult   Patient Position at End of Consult All needs within reach;Seated edge of bed   Pain Assessment   Pain Assessment Tool 0-10   Pain Score No Pain   Restrictions/Precautions   Weight Bearing Precautions Per Order Yes   LLE Weight Bearing Per Order NWB  (left TMA site delayed primary closure on 11/3)   Braces or Orthoses CAM Boot  Pt has CAM boot in room, per podiatry, not required   Other Precautions Multiple lines;Fall Risk; WBS  IV, NWB LLE     General   Chart Reviewed Yes   Family/Caregiver Present No   Cognition   Overall Cognitive Status WFL   Arousal/Participation Alert;Cooperative   Attention Attends with cues to redirect   Orientation Level Oriented X4   Following Commands Follows one step commands with increased time or repetition   Comments Pt was pleasant and cooperative during therapy session. Pt was motivated to walk in the hallway, while maintaining NWB status.   Subjective   Subjective \"I am feeling a bit drousy today.\"  (Simultaneous filing. User may not have seen previous data.)   Bed Mobility   Supine to Sit 5  Supervision   Additional items HOB elevated;Bedrails;Increased time required;Verbal cues   Sit to Supine 5  Supervision   Additional items HOB elevated;Bedrails;Increased time required;Verbal cues   Additional Comments Pt in bed upon therapy arrival and returned to bed at conclusion of therapy session. Pt notes that he does not like sitting in recliner chair because it hurts his back. Pt sat up at EOB when his lunch arrived to eat with all needs within reach. RN updated on pt position at EOB at end of therapy session.   Transfers   Sit to Stand 4  Minimal assistance   Additional " items Assist x 1;Increased time required;Verbal cues   Stand to Sit 4  Minimal assistance   Additional items Assist x 1;Increased time required;Verbal cues   Additional Comments Pt utilized RW in stance, verbal cues for NWB on LLE throughout with Sariah x1 during STS transfers.   Ambulation/Elevation   Gait pattern Short stride;Excessively slow;Antalgic;Forward Flexion  (Pt currently NWB on LLE, utilizing small hops and RW to ambulate.)   Gait Assistance 4  Minimal assist   Additional items Assist x 1;Verbal cues   Assistive Device Rolling walker   Distance 30 ft, 15 ft   Ambulation/Elevation Additional Comments Pt ambulated with Sariah x1 and use of RW, with cues to maintain NWB on LLE throughout. Pt had chair follow in hallway, taking a seated rest break in between bouts of ambulation. Pt notes that he felt pretty good with walking and was happy to be out of the room.   Balance   Static Sitting Fair +   Dynamic Sitting Fair   Static Standing Fair   Dynamic Standing Fair -   Ambulatory Poor +   Endurance Deficit   Endurance Deficit Yes   Endurance Deficit Description Limited due to fatigue and NWB status on LLE.   Activity Tolerance   Activity Tolerance Patient tolerated treatment well;Patient limited by fatigue   Nurse Made Aware RN cleared pt for therapy   Exercises   Heelslides Supine;10 reps;Bilateral   Hip Flexion Supine;10 reps;Bilateral  (SLR for 10 reps B/L)   Hip Abduction Supine;10 reps;Bilateral  (Isometric hold x5 sec for 10 reps)   Hip Adduction Supine;10 reps;Bilateral  (Isometric contraction hold x5 sec for 10 reps)   Knee AROM Long Arc Quad Sitting;20 reps;Bilateral   Ankle Pumps Sitting;20 reps;Bilateral   Marching Sitting;20 reps;Bilateral   Balance training  STS x5 with use of RW in stance.   Assessment   Prognosis Good   Problem List Decreased strength;Decreased range of motion;Decreased endurance;Impaired balance;Decreased mobility;Orthopedic restrictions  (NWB LLE)   Assessment Pt seen for PT  treatment session this date. Therapy session focused on bed mobility, functional transfers, and ambulation in order to improve overall mobility and independence. Pt requires Sup A for bed mobility, Sariah x1 for transfers, and Sariah x1 for ambulation. Pt ambulated 30 ft and 15 ft in hallway with use of RW and Sariah x1, with verbal cues to take his time and maintain NWB on LLE. Pt tolerated ambulation well with chair follow, to allow for seated rest break in between bouts. Pt notes that he is feeling more fatigued today and needs to take a long nap at conclusion of therapy session. Pt tolerated STS transfers well with Sariah x1 and use of RW, requiring increased time to stand due to NWB status on LLE. Pt also tolerated seated and supine exercises well, with fatigue noted at the end. Pt making steady progress toward goals. Pt was seated at edge of bed at the end of PT session with all needs in reach and RN was updated on pt position. Pt would benefit from continued PT services while in hospital to address remaining limitations. The patient's AM-PAC Basic Mobility Inpatient Short Form Raw Score is 16. A Raw score of less than or equal to 16 suggests the patient may benefit from discharge to post-acute rehabilitation services. Please also refer to the recommendation of the Physical Therapist for safe discharge planning. Recommend Level 2 upon pt discharge due to inaccessible home environment with second floor bed/bath and limited pt functional mobility due to NWB on LLE status.   Barriers to Discharge Inaccessible home environment   Barriers to Discharge Comments Pt has 7STE his home and bed/bath located on 2nd floor.   Goals   Patient Goals To get to rehab and then get home.   STG Expiration Date 11/12/24   PT Treatment Day 2   Plan   Treatment/Interventions Functional transfer training;Elevations;Therapeutic exercise;Endurance training;Bed mobility;Gait training;Spoke to nursing;Spoke to case management;OT   Progress  Progressing toward goals   PT Frequency 3-5x/wk   Discharge Recommendation   Rehab Resource Intensity Level, PT II (Moderate Resource Intensity)   Equipment Recommended Walker  (Pt currently ambulating with RW due to NWB on LLE.)   AM-PAC Basic Mobility Inpatient   Turning in Flat Bed Without Bedrails 3   Lying on Back to Sitting on Edge of Flat Bed Without Bedrails 3   Moving Bed to Chair 3   Standing Up From Chair Using Arms 3   Walk in Room 3   Climb 3-5 Stairs With Railing 1   Basic Mobility Inpatient Raw Score 16   Basic Mobility Standardized Score 38.32   [facility] Highest Level Of Mobility   -[facility] Goal 5: Stand one or more mins   -[facility] Achieved 7: Walk 25 feet or more   Education   Education Provided Mobility training;Assistive device   Patient Demonstrates acceptance/verbal understanding   End of Consult   Patient Position at End of Consult Seated edge of bed;All needs within reach   End of Consult Comments Pt was sitting at EOB to eat lunch at conclusion of therapy session with all needs within reach. RN updated on pt position at conclusion of therapy session.     Oly Wyatt, SPT

## 2024-11-04 NOTE — PROGRESS NOTES
Progress Note - Nephrology   Name: Clay Marcial 69 y.o. male I MRN: [id number]  Unit/Bed#: [facility] 825-01 I Date of Admission: 10/22/2024   Date of Service: 11/4/2024 I Hospital Day: 13     Assessment & Plan  Stage 3 chronic kidney disease (HCC)    #CKD G3a  Baseline creatinine: 1.3 to 1.5 mg/dL  Current creatinine: 1.53 mg/dL, at baseline  Etiology: Likely secondary to nephrosclerosis in the settings of cardiovascular disease  No hematuria  Plan:  No indication of dialysis at this time, will continue to monitor kidney function closely  Maintain mean arterial pressure above 65 mmHg to avoid hypotension/hypoperfusion  Avoid nephrotoxic agents such as NSAIDs and contrast if at all possible    Avoid opioids   Adjust medications to GFR  Renal diet   Follow-up with nephrology on discharge.  Can resume diuretics and SGLT2 inhibitors on discharge.  Recommend holding ACE inhibitors due to hyperkalemia      Anemia due to stage 3b chronic kidney disease  (HCC)  Current hemoglobin: 9.7 mg/dL  Treatment:  Transfuse for hemoglobin less than 7.0 per primary service    Type 2 diabetes mellitus with diabetic polyneuropathy (HCC)    HbA1c 7.2  Advised to maintain a good DM control to prevent progression of CKD   Maintain healthy diet (vegetables, fruits, whole grains, nonfat or low fat)  Weight loss  Physical activity (5 to 10 minutes to start the increase to 30 min a day)    HTN (hypertension)  Volume: Appears slightly hypervolemic with lower extremity edema  Blood pressure: Tendency to hypotension, /81  Low sodium diet   Metoprolol 50 mg  Monitor urinary output  Hypomagnesemia  Serum magnesium 1.7 mg/dL, borderline low  Avoid excessive IV magnesium due to borderline hyperkalemia  Diabetic foot infection  (HCC)  Management as per primary team  Hyperkalemia  Serum potassium 5 mg/L  At goal with Lokelma  Low potassium diet  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 Nephrology service signing off.    Subjective   Brief History of Admission - 68 yo man with PMH of diabetes, obesity, CKD G3a p/w lower extremity swelling and redness.  Nephrology is consulted for management of CKD    Patient denies shortness of breath, no chest pain.  No issues overnight    Objective :  Temp:  [97.7 °F (36.5 °C)-100.7 °F (38.2 °C)] 98.3 °F (36.8 °C)  HR:  [] 88  BP: (106-151)/(53-81) 106/81  Resp:  [17-20] 17  SpO2:  [91 %-99 %] 96 %  O2 Device: None (Room air)    Current Weight: Weight - Scale: 126 kg (278 lb)  First Weight: Weight - Scale: 126 kg (278 lb)  I/O         11/02 0701 11/03 0700 11/03 0701  11/04 0700 11/04 0701  11/05 0700    P.O. 1200 940 240    I.V. (mL/kg)  399.7 (3.2)     IV Piggyback  79.2     Total Intake(mL/kg) 1200 (9.5) 1418.9 (11.3) 240 (1.9)    Urine (mL/kg/hr) 2975 (1) 2425 (0.8) 200 (0.4)    Drains       Blood  5     Total Output 2975 2430 200    Net -1775 -1011.1 +40           Unmeasured Urine Occurrence  1 x           Physical Exam  General:  no acute distress at this time, obese  Skin:  No acute rash  Eyes:  No scleral icterus and noninjected  ENT:  mucous membranes moist  Neck:  no carotid bruits  Chest:  Clear to auscultation percussion, good respiratory effort, no use of accessory respiratory muscles  CVS:  Regular rate and rhythm without rub   Abdomen:  soft and nontender   Extremities: lower extremity edema  Neuro:  No gross focality  Psych:  Alert , cooperative     Medications:    Current  Facility-Administered Medications:     acetaminophen (TYLENOL) tablet 650 mg, 650 mg, Oral, Q6H PRN, Fortino Freitas DPM    albuterol inhalation solution 2.5 mg, 2.5 mg, Nebulization, Q6H PRN, Fortino Freitas DPM    aspirin chewable tablet 81 mg, 81 mg, Oral, Daily, Fortino Freitas DPM, 81 mg at 11/04/24 0823    atorvastatin (LIPITOR) tablet 20 mg, 20 mg, Oral, Daily, Fortino Freitas DPM, 20 mg at 11/04/24 0823    bisacodyl (DULCOLAX) rectal suppository 10 mg, 10 mg, Rectal, Daily PRN, Fortino Freitas DPM    budesonide (PULMICORT) inhalation solution 0.5 mg, 0.5 mg, Nebulization, Q12H, Fortino Freitas DPM, 0.5 mg at 11/04/24 0706    Cholecalciferol (VITAMIN D3) tablet 1,000 Units, 1,000 Units, Oral, Daily, Fortino Freitas DPM, 1,000 Units at 11/04/24 0823    cyanocobalamin (VITAMIN B-12) tablet 500 mcg, 500 mcg, Oral, Daily, Fortino Freitas DPM, 500 mcg at 11/04/24 0823    docusate sodium (COLACE) capsule 100 mg, 100 mg, Oral, BID, Fortino Freitas DPM, 100 mg at 11/04/24 0827    DULoxetine (CYMBALTA) delayed release capsule 20 mg, 20 mg, Oral, Daily, Fortino Freitas DPM, 20 mg at 11/04/24 0823    fluticasone (FLONASE) 50 mcg/act nasal spray 2 spray, 2 spray, Nasal, Daily, Fortino Freitas DPM, 2 spray at 10/25/24 0833    heparin (porcine) 25,000 units in 0.45% NaCl 250 mL infusion (premix), 3-30 Units/kg/hr (Order-Specific), Intravenous, Titrated, Fortino Freitas DPM, Last Rate: 26.4 mL/hr at 11/04/24 0422, 22 Units/kg/hr at 11/04/24 0422    heparin (porcine) injection 4,800 Units, 4,800 Units, Intravenous, Q6H PRN, Fortino Freitas DPM, 4,800 Units at 11/02/24 2055    heparin (porcine) injection 9,600 Units, 9,600 Units, Intravenous, Q6H PRN, Fortino Freitas DPM    insulin glargine (LANTUS) subcutaneous injection 40 Units 0.4 mL, 40 Units, Subcutaneous, HS, Fortino Freitas DPM, 40 Units at 11/03/24 2130    insulin lispro (HumALOG/ADMELOG) 100 units/mL subcutaneous injection 18 Units, 18 Units, Subcutaneous, TID With Meals, Fortino Freitas DPM, 18 Units at 11/04/24 0742    insulin lispro  (HumALOG/ADMELOG) 100 units/mL subcutaneous injection 2-12 Units, 2-12 Units, Subcutaneous, 4x Daily (AC & HS), 10 Units at 11/04/24 0742 **AND** Fingerstick Glucose (POCT), , , 4x Daily AC and at bedtime, Fortino Freitas DPM    isosorbide mononitrate (IMDUR) 24 hr tablet 30 mg, 30 mg, Oral, Daily, Fortino Freitas DPM, 30 mg at 11/02/24 0812    loratadine (CLARITIN) tablet 10 mg, 10 mg, Oral, Daily, Fortino Freitas DPM, 10 mg at 11/04/24 0823    melatonin tablet 3 mg, 3 mg, Oral, HS, ASHLEY Alejandra    metoprolol succinate (TOPROL-XL) 24 hr tablet 50 mg, 50 mg, Oral, Daily, Fortino Freitas DPM, 50 mg at 11/02/24 0811    morphine injection 2 mg, 2 mg, Intravenous, Q4H PRN, Fortino Freitas DPM    nicotine (NICODERM CQ) 14 mg/24hr TD 24 hr patch 1 patch, 1 patch, Transdermal, Daily, Fortino Freitas DPM, 1 patch at 11/04/24 0823    umeclidinium 62.5 mcg/actuation inhaler AEPB 1 puff, 1 puff, Inhalation, Daily, 1 puff at 10/29/24 0820 **AND** olodaterol HCl (STRIVERDI RESPIMAT) inhaler 2 puff, 2 puff, Inhalation, Daily, Fortino Freitas DPM, 2 puff at 10/29/24 0820    ondansetron (ZOFRAN) injection 4 mg, 4 mg, Intravenous, Q4H PRN, Fortino Freitas DPM    oxyCODONE (ROXICODONE) IR tablet 5 mg, 5 mg, Oral, Q4H PRN, Fortino Freitas DPM, 5 mg at 11/01/24 0849    oxyCODONE (ROXICODONE) split tablet 2.5 mg, 2.5 mg, Oral, Q4H PRN, Fortino Freitas DPM    pantoprazole (PROTONIX) EC tablet 40 mg, 40 mg, Oral, BID AC, Fortino Freitas DPM, 40 mg at 11/04/24 0614    piperacillin-tazobactam (ZOSYN) 4.5 g in sodium chloride 0.9 % 100 mL IVPB (EXTENDED INFUSION), 4.5 g, Intravenous, Q8H, Fortino Freitas DPM, Last Rate: 25 mL/hr at 11/04/24 0614, 4.5 g at 11/04/24 0614    polyethylene glycol (MIRALAX) packet 17 g, 17 g, Oral, Daily, Fortino Freitas DPM, 17 g at 10/27/24 0803    pregabalin (LYRICA) capsule 100 mg, 100 mg, Oral, BID, Fortino Freitas DPM, 100 mg at 11/04/24 0823    sodium chloride (OCEAN) 0.65 % nasal spray 2 spray, 2 spray, Each Nare, BID, Fortino Freitas DPM, 2 spray at 10/29/24 0819     "Sodium Zirconium Cyclosilicate (Lokelma) 10 g, 10 g, Oral, Daily, GORDO BerryM, 10 g at 11/04/24 0831    traZODone (DESYREL) tablet 25 mg, 25 mg, Oral, HS, Fortino Freitas DPM, 25 mg at 11/03/24 2130      Lab Results: I have reviewed the following results:  Results from last 7 days   Lab Units 11/04/24  0619 11/03/24  0456 11/02/24  0538 11/01/24  1551 11/01/24  0900 11/01/24  0641 10/31/24  0627 10/30/24  0546 10/29/24  0515   WBC Thousand/uL 5.86 4.75 5.63  --   --  5.25 5.79 5.56 7.23   HEMOGLOBIN g/dL 9.7* 9.9* 10.2*  --   --  10.7* 10.1* 9.9* 10.1*   HEMATOCRIT % 30.1* 30.9* 31.5*  --   --  33.2* 31.7* 30.4* 31.7*   PLATELETS Thousands/uL 225 242 258  --   --  304 301 311 339   POTASSIUM mmol/L 5.0 4.7 5.1 5.2 5.7* 5.4* 5.0 5.0 4.5   CHLORIDE mmol/L 102 102 102 102  --  101 101 101 100   CO2 mmol/L 26 27 27 29  --  28 29 29 30   BUN mg/dL 29* 28* 25 21  --  22 23 26* 21   CREATININE mg/dL 1.53* 1.28 1.35* 1.27  --  1.19 1.32* 1.39* 1.32*   CALCIUM mg/dL 8.9 9.2 9.2 9.1  --  9.3 9.0 8.4 8.5   MAGNESIUM mg/dL 1.7* 1.8* 1.6*  --   --  1.7* 1.6* 1.9 1.7*   ALBUMIN g/dL  --   --   --   --   --   --   --   --  3.1*       Administrative Statements     Portions of the record may have been created with voice recognition software. Occasional wrong word or \"sound a like\" substitutions may have occurred due to the inherent limitations of voice recognition software. Read the chart carefully and recognize, using context, where substitutions have occurred.If you have any questions, please contact the dictating provider.  "

## 2024-11-04 NOTE — ASSESSMENT & PLAN NOTE
HbA1c 7.2  Advised to maintain a good DM control to prevent progression of CKD   Maintain healthy diet (vegetables, fruits, whole grains, nonfat or low fat)  Weight loss  Physical activity (5 to 10 minutes to start the increase to 30 min a day)

## 2024-11-04 NOTE — CASE MANAGEMENT
Case Management Discharge Planning Note    Patient name Clay Marcial  Location [facility] 825/[facility] 825-01 MRN [id number]  : 1955 Date 2024       Current Admission Date: 10/22/2024  Current Admission Diagnosis:Diabetic foot infection  (HCC)   Patient Active Problem List    Diagnosis Date Noted Date Diagnosed    Anemia 2024     Anemia due to stage 3b chronic kidney disease  (HCC) 2024     Obesity 10/30/2024     Hypomagnesemia 10/28/2024     HTN (hypertension) 10/21/2024     Smoking 10/21/2024     PAD (peripheral artery disease) (HCC) 10/20/2024     Acute hyponatremia 10/20/2024     Chronic obstructive pulmonary disease with acute exacerbation ([facility]) 10/17/2024     ROMEL (obstructive sleep apnea) 10/17/2024     Diabetic foot infection  (HCC) 10/16/2024     Stage 3 chronic kidney disease (HCC) 10/16/2024     Type 2 diabetes mellitus with diabetic polyneuropathy ([facility]) 2024     CAD (coronary artery disease) 2024     Chronic diastolic congestive heart failure (HCC) 2024     Acute on chronic kidney failure  (HCC) 2024     Hyperkalemia 2024     Helicobacter pylori infection 2024       LOS (days): 13  Geometric Mean LOS (GMLOS) (days): 7.3  Days to GMLOS:-5.3     OBJECTIVE:  Risk of Unplanned Readmission Score: 41.18         Current admission status: Inpatient   Preferred Pharmacy:   [facility]/pharmacy #[id number] [address] [address]  Phone: [phone] Fax: [phone]    [facility] [place] [place] - [address]  [address]  [place] [address]  Phone: [phone] Fax: [phone]    Primary Care Provider: No primary care provider on file.    [facility]  Secondary Insurance: MEDICARE    DISCHARGE DETAILS:                                          Other Referral/Resources/Interventions Provided:  Referral Comments: LOUIE Lin at the [place]  [facility] informing if pt needs a RW for dc, it is covered by the [facility] authorization department & CM can dispense equipment & bill to [facility] [employer] referral & bill as an urgent need. TC to Delia [phone] ext [phone] . She is aware pt likely will be going to [facility] upon dc.

## 2024-11-05 VITALS
TEMPERATURE: 98.6 F | HEIGHT: 71 IN | BODY MASS INDEX: 38.92 KG/M2 | RESPIRATION RATE: 17 BRPM | DIASTOLIC BLOOD PRESSURE: 84 MMHG | HEART RATE: 86 BPM | SYSTOLIC BLOOD PRESSURE: 142 MMHG | OXYGEN SATURATION: 96 % | WEIGHT: 278 LBS

## 2024-11-05 LAB
ANION GAP SERPL CALCULATED.3IONS-SCNC: 7 MMOL/L (ref 4–13)
APTT PPP: 105 SECONDS (ref 23–34)
BASOPHILS # BLD AUTO: 0.03 THOUSANDS/ΜL (ref 0–0.1)
BASOPHILS NFR BLD AUTO: 1 % (ref 0–1)
BUN SERPL-MCNC: 27 MG/DL (ref 5–25)
CALCIUM SERPL-MCNC: 8.9 MG/DL (ref 8.4–10.2)
CHLORIDE SERPL-SCNC: 103 MMOL/L (ref 96–108)
CO2 SERPL-SCNC: 27 MMOL/L (ref 21–32)
CREAT SERPL-MCNC: 1.31 MG/DL (ref 0.6–1.3)
EOSINOPHIL # BLD AUTO: 0.18 THOUSAND/ΜL (ref 0–0.61)
EOSINOPHIL NFR BLD AUTO: 4 % (ref 0–6)
ERYTHROCYTE [DISTWIDTH] IN BLOOD BY AUTOMATED COUNT: 13.1 % (ref 11.6–15.1)
GFR SERPL CREATININE-BSD FRML MDRD: 54 ML/MIN/1.73SQ M
GLUCOSE SERPL-MCNC: 233 MG/DL (ref 65–140)
GLUCOSE SERPL-MCNC: 272 MG/DL (ref 65–140)
GLUCOSE SERPL-MCNC: 272 MG/DL (ref 65–140)
HCT VFR BLD AUTO: 28.3 % (ref 36.5–49.3)
HGB BLD-MCNC: 9.3 G/DL (ref 12–17)
IMM GRANULOCYTES # BLD AUTO: 0.03 THOUSAND/UL (ref 0–0.2)
IMM GRANULOCYTES NFR BLD AUTO: 1 % (ref 0–2)
LYMPHOCYTES # BLD AUTO: 1.38 THOUSANDS/ΜL (ref 0.6–4.47)
LYMPHOCYTES NFR BLD AUTO: 28 % (ref 14–44)
MAGNESIUM SERPL-MCNC: 1.6 MG/DL (ref 1.9–2.7)
MCH RBC QN AUTO: 31.4 PG (ref 26.8–34.3)
MCHC RBC AUTO-ENTMCNC: 32.9 G/DL (ref 31.4–37.4)
MCV RBC AUTO: 96 FL (ref 82–98)
MONOCYTES # BLD AUTO: 0.38 THOUSAND/ΜL (ref 0.17–1.22)
MONOCYTES NFR BLD AUTO: 8 % (ref 4–12)
NEUTROPHILS # BLD AUTO: 2.87 THOUSANDS/ΜL (ref 1.85–7.62)
NEUTS SEG NFR BLD AUTO: 58 % (ref 43–75)
NRBC BLD AUTO-RTO: 0 /100 WBCS
PLATELET # BLD AUTO: 192 THOUSANDS/UL (ref 149–390)
PMV BLD AUTO: 9.6 FL (ref 8.9–12.7)
POTASSIUM SERPL-SCNC: 4.8 MMOL/L (ref 3.5–5.3)
RBC # BLD AUTO: 2.96 MILLION/UL (ref 3.88–5.62)
SODIUM SERPL-SCNC: 137 MMOL/L (ref 135–147)
WBC # BLD AUTO: 4.87 THOUSAND/UL (ref 4.31–10.16)

## 2024-11-05 PROCEDURE — 80048 BASIC METABOLIC PNL TOTAL CA: CPT

## 2024-11-05 PROCEDURE — 85025 COMPLETE CBC W/AUTO DIFF WBC: CPT

## 2024-11-05 PROCEDURE — 94760 N-INVAS EAR/PLS OXIMETRY 1: CPT

## 2024-11-05 PROCEDURE — 99239 HOSP IP/OBS DSCHRG MGMT >30: CPT

## 2024-11-05 PROCEDURE — 99024 POSTOP FOLLOW-UP VISIT: CPT | Performed by: PODIATRIST

## 2024-11-05 PROCEDURE — 83735 ASSAY OF MAGNESIUM: CPT

## 2024-11-05 PROCEDURE — 94640 AIRWAY INHALATION TREATMENT: CPT

## 2024-11-05 PROCEDURE — 94664 DEMO&/EVAL PT USE INHALER: CPT

## 2024-11-05 PROCEDURE — 85730 THROMBOPLASTIN TIME PARTIAL: CPT

## 2024-11-05 PROCEDURE — 82948 REAGENT STRIP/BLOOD GLUCOSE: CPT

## 2024-11-05 RX ORDER — OXYCODONE HYDROCHLORIDE 5 MG/1
5 TABLET ORAL EVERY 4 HOURS PRN
Qty: 20 TABLET | Refills: 0 | Status: SHIPPED | OUTPATIENT
Start: 2024-11-05

## 2024-11-05 RX ORDER — ACETAMINOPHEN 325 MG/1
650 TABLET ORAL EVERY 6 HOURS PRN
Start: 2024-11-05 | End: 2024-11-08

## 2024-11-05 RX ORDER — INSULIN GLARGINE 100 [IU]/ML
40 INJECTION, SOLUTION SUBCUTANEOUS
Qty: 10 ML | Refills: 0
Start: 2024-11-05

## 2024-11-05 RX ORDER — INSULIN LISPRO 100 [IU]/ML
18 INJECTION, SOLUTION INTRAVENOUS; SUBCUTANEOUS
Start: 2024-11-05 | End: 2024-12-05

## 2024-11-05 RX ADMIN — PANTOPRAZOLE SODIUM 40 MG: 40 TABLET, DELAYED RELEASE ORAL at 06:08

## 2024-11-05 RX ADMIN — BUDESONIDE 0.5 MG: 0.5 INHALANT RESPIRATORY (INHALATION) at 07:02

## 2024-11-05 RX ADMIN — ATORVASTATIN CALCIUM 20 MG: 20 TABLET, FILM COATED ORAL at 08:17

## 2024-11-05 RX ADMIN — HEPARIN SODIUM 20 UNITS/KG/HR: 10000 INJECTION, SOLUTION INTRAVENOUS at 10:18

## 2024-11-05 RX ADMIN — DOCUSATE SODIUM 100 MG: 100 CAPSULE, LIQUID FILLED ORAL at 08:18

## 2024-11-05 RX ADMIN — DULOXETINE HYDROCHLORIDE 20 MG: 20 CAPSULE, DELAYED RELEASE ORAL at 08:17

## 2024-11-05 RX ADMIN — INSULIN LISPRO 18 UNITS: 100 INJECTION, SOLUTION INTRAVENOUS; SUBCUTANEOUS at 07:50

## 2024-11-05 RX ADMIN — LORATADINE 10 MG: 10 TABLET ORAL at 08:17

## 2024-11-05 RX ADMIN — METOPROLOL SUCCINATE 50 MG: 50 TABLET, EXTENDED RELEASE ORAL at 08:17

## 2024-11-05 RX ADMIN — OXYCODONE HYDROCHLORIDE 5 MG: 5 TABLET ORAL at 12:31

## 2024-11-05 RX ADMIN — PIPERACILLIN SODIUM AND TAZOBACTAM SODIUM 4.5 G: 36; 4.5 INJECTION, POWDER, LYOPHILIZED, FOR SOLUTION INTRAVENOUS at 05:31

## 2024-11-05 RX ADMIN — NICOTINE 1 PATCH: 14 PATCH, EXTENDED RELEASE TRANSDERMAL at 08:18

## 2024-11-05 RX ADMIN — Medication 1000 UNITS: at 08:17

## 2024-11-05 RX ADMIN — INSULIN LISPRO 6 UNITS: 100 INJECTION, SOLUTION INTRAVENOUS; SUBCUTANEOUS at 07:50

## 2024-11-05 RX ADMIN — ASPIRIN 81 MG CHEWABLE TABLET 81 MG: 81 TABLET CHEWABLE at 08:17

## 2024-11-05 RX ADMIN — PREGABALIN 100 MG: 100 CAPSULE ORAL at 08:17

## 2024-11-05 RX ADMIN — SODIUM ZIRCONIUM CYCLOSILICATE 10 G: 10 POWDER, FOR SUSPENSION ORAL at 08:20

## 2024-11-05 RX ADMIN — ISOSORBIDE MONONITRATE 30 MG: 30 TABLET, EXTENDED RELEASE ORAL at 08:17

## 2024-11-05 RX ADMIN — CYANOCOBALAMIN TAB 500 MCG 500 MCG: 500 TAB at 08:17

## 2024-11-05 NOTE — CASE MANAGEMENT
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 (HCC) 2024     Anemia 2024     Anemia due to stage 3b chronic kidney disease  (HCC) 2024     Obesity 10/30/2024     Hypomagnesemia 10/28/2024     HTN (hypertension) 10/21/2024     Smoking 10/21/2024     PAD (peripheral artery disease) (HCC) 10/20/2024     Acute hyponatremia 10/20/2024     Chronic obstructive pulmonary disease with acute exacerbation ([facility]) 10/17/2024     ROMEL (obstructive sleep apnea) 10/17/2024     Diabetic foot infection  (HCC) 10/16/2024     Stage 3 chronic kidney disease (HCC) 10/16/2024     Type 2 diabetes mellitus with diabetic polyneuropathy ([facility]) 2024     CAD (coronary artery disease) 2024     Chronic diastolic congestive heart failure ([facility]) 2024     Acute on chronic kidney failure  (HCC) 2024     Hyperkalemia 2024     Helicobacter pylori infection 2024       LOS (days): 14  Geometric Mean LOS (GMLOS) (days): 7.3  Days to GMLOS:-6.2     OBJECTIVE:  Risk of Unplanned Readmission Score: 41.3         Current admission status: Inpatient   Preferred Pharmacy:   [facility]/pharmacy #[id number] - [place] - [address] [place] [address]  Phone: [phone] Fax: [phone]    [facility] [place] - [address]  [address] [place] [address]  Phone: [phone] Fax: [phone]    Primary Care Provider: No primary care provider on file.    Primary Insurance: [facility]  Secondary Insurance: MEDICARE    DISCHARGE DETAILS:                   "    Accepting Facility Name, City & State : [place]  Receiving Facility/Agency Phone Number: [phone]  Facility/Agency Fax Number: [phone]     Pt clear for d/c to Rehab today 11/5  BLS request placed for 12:30pm  IMM to be reviewed with patient by CM liaison  Pt with questions related to knee scooter- spoke to Delia at [facility] who reported it must be ordered through our DME provider and then billed to [facility] [facility] as an \" urgent need\"  Pt aware he will likely receive this post rehab             "

## 2024-11-05 NOTE — ASSESSMENT & PLAN NOTE
Lab Results   Component Value Date    EGFR 54 11/05/2024    EGFR 45 11/04/2024    EGFR 55 11/03/2024    CREATININE 1.31 (H) 11/05/2024    CREATININE 1.53 (H) 11/04/2024    CREATININE 1.28 11/03/2024       Medical Problems       Resolved Problems  Date Reviewed: 11/4/2024            Resolved    Elevated serum creatinine 11/2/2024     Resolved by  Natalie Hunter DO        Discharging Physician / Practitioner: Robert Villegas MD  PCP: No primary care provider on file.  Admission Date:   Admission Orders (From admission, onward)       Ordered        10/22/24 2108  INPATIENT ADMISSION  Once                          Discharge Date: 11/05/24    Consultations During Hospital Stay:  None    Procedures Performed:   none    Significant Findings / Test Results:   IR lower extremity angiogram    Result Date: 10/25/2024  Impression: 1.  Left lower extremity angiogram with balloon angioplasty of the anterior tibial artery. Workstation performed: [id number]       No Chest XR results available for this patient.       Incidental Findings:   \None     Test Results Pending at Discharge (will require follow up):   none     Outpatient Tests Requested:  none    Complications:  none    Reason for Admission: Foot    Hospital Course:   Clay Marcial is a 69 y.o. male patient who originally presented to the hospital on 10/22/2024 due to diabetic foot infection.S/p left second toe amputation and wound debridement (DOS: 10/21/2024)  - S/p TMA (DOS: 10/28/2024)  ID was on board and advised antibiotic after 48 hours after foot infection.  Patient tolerated the procedure fine.  Advised postacute rehab.  Patient agreeable.  Stable for discharge today.        Please see above list of diagnoses and related plan for additional information.     Condition at Discharge: good    Discharge Day Visit / Exam:   Subjective:  Patient does not report and headaches, shortness of breath, chest pain, abdominal pain, nausea vomiting, lower leg edema.  "Also reports good sleep    Vitals: Blood Pressure: 142/84 (11/05/24 0737)  Pulse: 86 (11/05/24 0737)  Temperature: 98.6 °F (37 °C) (11/04/24 2109)  Temp Source: Axillary (11/04/24 2109)  Respirations: 17 (11/05/24 0737)  Height: 5' 11\" (180.3 cm) (10/22/24 2251)  Weight - Scale: 126 kg (278 lb) (10/22/24 2251)  SpO2: 96 % (11/05/24 0737)    Physical exam unremarkable  Patient has nontraumatic head.  Status post TMA on the right.  Clear breath sounds, normal heart sounds.  No swelling of the lower extremities    Discussion with Family: Patient declined call to .     Discharge instructions/Information to patient and family:   See after visit summary for information provided to patient and family.      Provisions for Follow-Up Care:  See after visit summary for information related to follow-up care and any pertinent home health orders.      Mobility at time of Discharge:   Basic Mobility Inpatient Raw Score: 16  JH-HLM Goal: 5: Stand one or more mins  JH-HLM Achieved: 3: Sit at edge of bed  HLM Goal achieved. Continue to encourage appropriate mobility.     Disposition:   Home    Planned Readmission: none    Discharge Medications:  See after visit summary for reconciled discharge medications provided to patient and/or family.      Administrative Statements   Discharge Statement:  I have spent a total time of 40 minutes in caring for this patient on the day of the visit/encounter. >30 minutes of time was spent on: Diagnostic results, Prognosis, Risks and benefits of tx options, Instructions for management, Patient and family education, Importance of tx compliance, Risk factor reductions, Impressions, Counseling / Coordination of care, Documenting in the medical record, Reviewing / ordering tests, medicine, procedures  , and Communicating with other healthcare professionals .    **Please Note: This note may have been constructed using a voice recognition system**  "

## 2024-11-05 NOTE — ASSESSMENT & PLAN NOTE
Initially presented at the [facility] with left lower extremity cellulitis with open wound  S/p I&D, L 2nd toe amputation with podiatry on 10/21 given concern for acutely worsening wound/infection   Transferred to the [facility] for vascular surgery/IR evaluations given concern also for embolic process/ischemia   Agram 10/25   Status post left foot TMA on 10/28  Patient was on IV heparin.  No indication to continue this.  Since patient does not have any DVT or ischemic processes at the moment.  Did discuss this with podiatry and he agreed as well.  Completed 48 hours of IV antibiotics.  Patient is cleared for discharge today.

## 2024-11-05 NOTE — PROGRESS NOTES
"[facility] - Progress Note  Patient: Clay Marcial 69 y.o. male   MRN: [id number]  PCP: No primary care provider on file.  Unit/Bed#: [facility] 825-01 Encounter: [id number]  Date Of Visit: 24    ASSESSMENT:    Clay Marcial is a 69 y.o. male with:    Diabetic foot infection   - S/p left second toe amputation and wound debridement (DOS: 10/21/2024)  - S/p TMA (DOS: 10/28/2024)  Type 2 diabetes mellitus   - A1c 7.2% (10/16/2024)  Coronary artery disease  Stage III chronic kidney disease  Chronic obstructive pulmonary disease  Hypertension      PLAN:    Patient seen at bedside and dressings changed today.  Patient is POD 2 s/p left transmetatarsal amputation delayed primary closure.  Incision is stable with intact sutures and well aligned skin edges with no acute clinical signs of infection  Patient is to remain nonweightbearing to left lower extremity until sutures are removed.  Patient is stable for discharge from podiatry standpoint, podiatry will continue to follow while patient is in-house.  PT recommendation of moderate resource intensity with discharge to postacute rehab, appreciate recommendations  Continue local wound care, appreciate nursing assistance with dressing changes.  Elevation on green foam wedges or pillows when non-ambulatory.  Rest of care per primary team.    Weight bearing status: Nonweightbearing left lower extremity      SUBJECTIVE:     The patient was seen, evaluated, and assessed at bedside today. The patient was awake, alert, and in no acute distress. No acute events overnight. The patient reports no pain to the left lower extremity. Patient denies N/V/F/chills/SOB/CP.      OBJECTIVE:     Vitals:   /83   Pulse 92   Temp 98.6 °F (37 °C) (Axillary)   Resp 18   Ht 5' 11\" (1.803 m)   Wt 126 kg (278 lb)   SpO2 94%   BMI 38.77 kg/m²     Temp (24hrs), Av.7 °F (37.1 °C), Min:98.3 °F (36.8 °C), Max:99.1 °F (37.3 °C)      Physical Exam:     General:  Alert, " "cooperative, and in no distress.  Lower extremity exam:  Cardiovascular status at baseline.  Neurological status at baseline.  Musculoskeletal status at baseline. No calf tenderness noted.     Left lower extremity: S/p left delayed primary closure for transmetatarsal amputation.  Incision is stable with intact sutures and well aligned skin edges without signs of active infection: no purulence, no malodor, no ascending erythema, no crepitus, no fluctuance.    Clinical Images 11/05/24:      Additional Data:     Labs:    Results from last 7 days   Lab Units 11/05/24  0524   WBC Thousand/uL 4.87   HEMOGLOBIN g/dL 9.3*   HEMATOCRIT % 28.3*   PLATELETS Thousands/uL 192   SEGS PCT % 58   LYMPHO PCT % 28   MONO PCT % 8   EOS PCT % 4     Results from last 7 days   Lab Units 11/05/24  0524   POTASSIUM mmol/L 4.8   CHLORIDE mmol/L 103   CO2 mmol/L 27   BUN mg/dL 27*   CREATININE mg/dL 1.31*   CALCIUM mg/dL 8.9           * I Have Reviewed All Lab Data Listed Above.    Recent Cultures (last 7 days):               Imaging: I have personally reviewed pertinent films in PACS  EKG, Pathology, and Other Studies: I have personally reviewed pertinent reports.      ** Please Note: Portions of the record may have been created with voice recognition software. Occasional wrong word or \"sound a like\" substitutions may have occurred due to the inherent limitations of voice recognition software. Read the chart carefully and recognize, using context, where substitutions have occurred. **      "

## 2024-11-05 NOTE — DISCHARGE SUMMARY
"Discharge Summary - Hospitalist   Name: Clay Marcial 69 y.o. male I MRN: [id number]  Unit/Bed#: [facility] 825-01 I Date of Admission: 10/22/2024   Date of Service: 11/5/2024 I Hospital Day: 14     Assessment & Plan  Diabetic foot infection  (HCC)  Initially presented at the [facility] with left lower extremity cellulitis with open wound  S/p I&D, L 2nd toe amputation with podiatry on 10/21 given concern for acutely worsening wound/infection   Transferred to the [facility] for vascular surgery/IR evaluations given concern also for embolic process/ischemia   Agram 10/25   Status post left foot TMA on 10/28  Patient was on IV heparin.  No indication to continue this.  Since patient does not have any DVT or ischemic processes at the moment.  Did discuss this with podiatry and he agreed as well.  Completed 48 hours of IV antibiotics.  Patient is cleared for discharge today.  Type 2 diabetes mellitus with diabetic polyneuropathy (HCC)  Lab Results   Component Value Date    HGBA1C 7.2 (H) 10/16/2024     Continue glargine 40 units daily at bedtime and mealtime insulin 18 units 3 times daily, continue metformin and glipizide as well.  Stage 3 chronic kidney disease (HCC)  Baseline creatinine of approximately 1.2-1.4 -> currently stable at 1.28  Monitor renal function and urine output  Limit/avoid nephrotoxins and hypotension as possible -> holding standing diuretic/ACEI/Jardiance  Nephrology following  HTN (hypertension)  Continue Imdur/Toprol-XL  ACEI and standing diuretic (Lasix) remains held per nephrology  CAD (coronary artery disease)  Continue ASA/statin/Toprol-XL/Imdur  Chronic obstructive pulmonary disease with acute exacerbation (HCC)  Continue Pulmicort/Olodaterol/Umeclidinium - PRN Albuterol on board  Hypomagnesemia  Monitor/replete serum magnesium and potassium as necessary  Obesity  BMI of 38.77  Lifestyle/diet modifications  Hyperkalemia  Status post \"cocktail\" and daily Lokelma initiation " administered by nephrology on 11/1 with subsequent normalization  Monitor serum potassium  Anemia  Hemoglobin stable at 9.9  Anemia due to stage 3b chronic kidney disease  (HCC)  Lab Results   Component Value Date    EGFR 54 11/05/2024    EGFR 45 11/04/2024    EGFR 55 11/03/2024    CREATININE 1.31 (H) 11/05/2024    CREATININE 1.53 (H) 11/04/2024    CREATININE 1.28 11/03/2024     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 (HCC)  Lab Results   Component Value Date    EGFR 54 11/05/2024    EGFR 45 11/04/2024    EGFR 55 11/03/2024    CREATININE 1.31 (H) 11/05/2024    CREATININE 1.53 (H) 11/04/2024    CREATININE 1.28 11/03/2024       Medical Problems       Resolved Problems  Date Reviewed: 11/4/2024            Resolved    Elevated serum creatinine 11/2/2024     Resolved by  Natalie Hunter DO        Discharging Physician / Practitioner: Robert Villegas MD  PCP: No primary care provider on file.  Admission Date:   Admission Orders (From admission, onward)       Ordered        10/22/24 2108  INPATIENT ADMISSION  Once                          Discharge Date: 11/05/24    Consultations During Hospital Stay:  None    Procedures Performed:   none    Significant Findings / Test Results:   IR lower extremity angiogram    Result Date: 10/25/2024  Impression: 1.  Left lower extremity angiogram with balloon angioplasty of the anterior tibial artery. Workstation performed: [id number]       No Chest XR results available for this patient.       Incidental Findings:   \None     Test Results Pending at Discharge (will require follow up):   none     Outpatient Tests Requested:  none    Complications:  none    Reason for Admission: Foot    Hospital Course:   Clay Marcial is a 69 y.o. male patient who originally presented to the hospital on 10/22/2024 due to diabetic foot infection.S/p left second toe amputation and wound debridement (DOS: 10/21/2024)  - S/p TMA (DOS:  "10/28/2024)  ID was on board and advised antibiotic after 48 hours after foot infection.  Patient tolerated the procedure fine.  Advised postacute rehab.  Patient agreeable.  Stable for discharge today.        Please see above list of diagnoses and related plan for additional information.     Condition at Discharge: good    Discharge Day Visit / Exam:   Subjective:  Patient does not report and headaches, shortness of breath, chest pain, abdominal pain, nausea vomiting, lower leg edema. Also reports good sleep    Vitals: Blood Pressure: 142/84 (11/05/24 0737)  Pulse: 86 (11/05/24 0737)  Temperature: 98.6 °F (37 °C) (11/04/24 2109)  Temp Source: Axillary (11/04/24 2109)  Respirations: 17 (11/05/24 0737)  Height: 5' 11\" (180.3 cm) (10/22/24 2251)  Weight - Scale: 126 kg (278 lb) (10/22/24 2251)  SpO2: 96 % (11/05/24 0737)    Physical exam unremarkable  Patient has nontraumatic head.  Status post TMA on the right.  Clear breath sounds, normal heart sounds.  No swelling of the lower extremities    Discussion with Family: Patient declined call to .     Discharge instructions/Information to patient and family:   See after visit summary for information provided to patient and family.      Provisions for Follow-Up Care:  See after visit summary for information related to follow-up care and any pertinent home health orders.      Mobility at time of Discharge:   Basic Mobility Inpatient Raw Score: 16  JH-HLM Goal: 5: Stand one or more mins  JH-HLM Achieved: 3: Sit at edge of bed  HLM Goal achieved. Continue to encourage appropriate mobility.     Disposition:   Home    Planned Readmission: none    Discharge Medications:  See after visit summary for reconciled discharge medications provided to patient and/or family.      Administrative Statements   Discharge Statement:  I have spent a total time of 40 minutes in caring for this patient on the day of the visit/encounter. >30 minutes of time was spent on: Diagnostic " results, Prognosis, Risks and benefits of tx options, Instructions for management, Patient and family education, Importance of tx compliance, Risk factor reductions, Impressions, Counseling / Coordination of care, Documenting in the medical record, Reviewing / ordering tests, medicine, procedures  , and Communicating with other healthcare professionals .    **Please Note: This note may have been constructed using a voice recognition system**

## 2024-11-05 NOTE — ASSESSMENT & PLAN NOTE
Lab Results   Component Value Date    HGBA1C 7.2 (H) 10/16/2024     Continue glargine 40 units daily at bedtime and mealtime insulin 18 units 3 times daily, continue metformin and glipizide as well.

## 2024-11-05 NOTE — ASSESSMENT & PLAN NOTE
Lab Results   Component Value Date    EGFR 54 11/05/2024    EGFR 45 11/04/2024    EGFR 55 11/03/2024    CREATININE 1.31 (H) 11/05/2024    CREATININE 1.53 (H) 11/04/2024    CREATININE 1.28 11/03/2024

## 2024-11-07 NOTE — TELEPHONE ENCOUNTER
Called and spoke with patient who is in a  nursing Home for next 13 days. I was able to schedule in [place] with Dr. Diego 1/27 at 9:30 am, will call for anything sooner.

## 2024-11-08 ENCOUNTER — OFFICE VISIT (OUTPATIENT)
Age: 69
End: 2024-11-08
Payer: COMMERCIAL

## 2024-11-08 VITALS
DIASTOLIC BLOOD PRESSURE: 50 MMHG | OXYGEN SATURATION: 94 % | SYSTOLIC BLOOD PRESSURE: 102 MMHG | HEART RATE: 88 BPM | TEMPERATURE: 97.3 F

## 2024-11-08 DIAGNOSIS — I70.90 ATHEROSCLEROSIS: ICD-10-CM

## 2024-11-08 DIAGNOSIS — I73.9 PAD (PERIPHERAL ARTERY DISEASE) (HCC): Primary | ICD-10-CM

## 2024-11-08 PROCEDURE — 99214 OFFICE O/P EST MOD 30 MIN: CPT | Performed by: STUDENT IN AN ORGANIZED HEALTH CARE EDUCATION/TRAINING PROGRAM

## 2024-11-08 NOTE — PROGRESS NOTES
Vascular Surgery Return Patient Visit  Date: 11/08/24      Assessment:  Clay Marcial is a 69 y.o. male with L DFU and is s/p procedures listed below. His L TMA is healing well (saw podiatry for this on 11/5).       Plan:  -Return in 3 months with [facility] for surveillance   -Continue ASA/statin for atherosclerotic arterial disease     Diagnoses and all orders for this visit:    PAD (peripheral artery disease) (HCC)  -     VAS ARTERIAL DUPLEX- LOWER LIMB BILATERAL; Future    Atherosclerosis           Subjective:     HPI:  Clay Marcial is a 69 y.o. male with PMH of T2DM (10/16/24 A1c 7.2), tobacco use, CKD, COPD.    On 10/26/24 underwent L AT angioplasty (had inline PT/peroneal flow bilaterally pre-procedure) for L 2nd toe and L plantar foot wounds. This was followed by 2 stage TMA completed on 11/3/24.     Since discharge he has been recovering at a SNF. No issues with his TMA incision. No other wounds on either foot.     His preop duplex is outlined below. He does not have a follow up duplex to review today.    ABIs:  Date 10/18/24    Right 1.04,     Left 1.03, TP 43          Objective:    ROS:  All systems were reviewed and are negative except those mentioned in HPI and below.      Vitals: /50   Pulse 88   Temp (!) 97.3 °F (36.3 °C)   SpO2 94%      General: male appears stated age, no apparent distress, alert and oriented   HEENT: normocephalic, atraumatic   Cardiovascular: hemodynamically stable   Chest/Lungs: no increased work of breathing, chest rise equal bilaterally   Abdomen: Soft, ND, NT, no pulsatile masses   Extremities: Bilateral femoral pulses  R PT pulse   Skin: warm and dry   Neuro: no gross deficits      Medications:  Current Outpatient Medications   Medication Sig Dispense Refill    acetaminophen (TYLENOL) 500 mg tablet Take 1,000 mg by mouth as needed EVERY 6 to 8 HOURS      albuterol (PROVENTIL HFA,VENTOLIN HFA) 90 mcg/act inhaler Inhale 1 puff every 4 (four) hours as needed for  shortness of breath or wheezing      aspirin 81 mg chewable tablet Chew 81 mg daily      atorvastatin (LIPITOR) 20 mg tablet Take 20 mg by mouth daily      cetirizine (ZyrTEC) 5 MG tablet Take 5 mg by mouth daily      DULoxetine (CYMBALTA) 20 mg capsule Take 20 mg by mouth daily      fluticasone (FLONASE) 50 mcg/act nasal spray 2 sprays into each nostril daily      glipiZIDE (GLUCOTROL) 5 mg tablet Take 5 mg by mouth 2 (two) times a day before meals      insulin glargine (LANTUS) 100 units/mL subcutaneous injection Inject 40 Units under the skin daily at bedtime 10 mL 0    insulin lispro (HumALOG/ADMELOG) 100 units/mL injection Inject 18 Units under the skin 3 (three) times a day with meals      isosorbide mononitrate (IMDUR) 30 mg 24 hr tablet Take 30 mg by mouth daily      lisinopril (ZESTRIL) 20 mg tablet Take 20 mg by mouth daily      metFORMIN (GLUCOPHAGE-XR) 500 mg 24 hr tablet Take 1,000 mg by mouth 2 (two) times a day with meals      metoprolol succinate (TOPROL-XL) 50 mg 24 hr tablet Take 50 mg by mouth daily      omeprazole (PriLOSEC OTC) 20 MG tablet Take 20 mg by mouth 2 (two) times a day      oxyCODONE (ROXICODONE) 5 immediate release tablet Take 1 tablet (5 mg total) by mouth every 4 (four) hours as needed for severe pain for up to 20 doses Max Daily Amount: 30 mg 20 tablet 0    pregabalin (LYRICA) 200 MG capsule Take 200 mg by mouth 2 (two) times a day      sodium chloride (OCEAN) 0.65 % nasal spray 2 sprays into each nostril 2 (two) times a day      tiotropium-olodaterol (STIOLTO RESPIMAT) 2.5-2.5 MCG/ACT inhaler Inhale 2 puffs daily      traZODone (DESYREL) 50 mg tablet Take 25 mg by mouth daily at bedtime      Cholecalciferol 25 MCG (1000 UT) capsule Take 25 mcg by mouth daily (Patient not taking: Reported on 11/8/2024)      cyanocobalamin (VITAMIN B-12) 500 MCG tablet Take 500 mcg by mouth daily (Patient not taking: Reported on 11/8/2024)      Empagliflozin 25 MG TABS Take 12.5 mg by mouth daily  (Patient not taking: Reported on 11/8/2024)      furosemide (LASIX) 40 mg tablet Take 40 mg by mouth daily AS NEEDED (Patient not taking: Reported on 11/8/2024)      loratadine (CLARITIN) 10 mg tablet Take 10 mg by mouth daily       No current facility-administered medications for this visit.       Allergies:  No Known Allergies     PMH:  Past Medical History:   Diagnosis Date    COPD (chronic obstructive pulmonary disease) (HCC)     Diabetes mellitus (HCC) 04/16/2024    Elevated serum creatinine 10/21/2024    Sleep apnea     UTI (urinary tract infection)         PSH:  Past Surgical History:   Procedure Laterality Date    BACK SURGERY      CLOSURE DELAYED PRIMARY Left 11/3/2024    Procedure: Left foot delayed primary closure;  Surgeon: Robb Page DPM;  Location: [facility];  Service: Podiatry    IR LOWER EXTREMITY ANGIOGRAM  10/25/2024    OK AMPUTATION FOOT TRANSMETARSAL Left 10/28/2024    Procedure: AMPUTATION TRANSMETATARSAL (TMA);  Surgeon: Robb Page DPM;  Location: [facility];  Service: Podiatry    TONSILLECTOMY      WOUND DEBRIDEMENT Left 10/21/2024    Procedure: LEFT FOOT I&D, left second toe amputation with packing;  Surgeon: Emma Burr DPM;  Location:  MAIN OR;  Service: Podiatry        FHx:  History reviewed. No pertinent family history.     SHx:  Social History     Socioeconomic History    Marital status: Single     Spouse name: Not on file    Number of children: Not on file    Years of education: Not on file    Highest education level: Not on file   Occupational History    Not on file   Tobacco Use    Smoking status: Every Day     Current packs/day: 1.50     Types: Cigarettes    Smokeless tobacco: Never   Vaping Use    Vaping status: Never Used   Substance and Sexual Activity    Alcohol use: Yes     Alcohol/week: 7.0 standard drinks of alcohol     Types: 7 Shots of liquor per week    Drug use: Never    Sexual activity: Not on file   Other Topics Concern    Not on file   Social History Narrative     Not on file     Social Determinants of Health     Financial Resource Strain: Not on file   Food Insecurity: No Food Insecurity (10/22/2024)    Nursing - Inadequate Food Risk Classification     Worried About Running Out of Food in the Last Year: Never true     Ran Out of Food in the Last Year: Never true     Ran Out of Food in the Last Year: 1   Transportation Needs: No Transportation Needs (10/22/2024)    Nursing - Transportation Risk Classification     Lack of Transportation: Not on file     Lack of Transportation: 2   Physical Activity: Not on file   Stress: Not on file   Social Connections: Unknown (2024)    Received from [facility]     How often do you feel lonely or isolated from those around you? (Adult - for ages 18 years and over): Not on file   Intimate Partner Violence: Unknown (10/22/2024)    Nursing IPS     Feels Physically and Emotionally Safe: Not on file     Physically Hurt by Someone: Not on file     Humiliated or Emotionally Abused by Someone: Not on file     Physically Hurt by Someone: 2     Hurt or Threatened by Someone: 2   Housing Stability: Unknown (10/22/2024)    Nursing: Inadequate Housing Risk Classification     Has Housing: Not on file     Worried About Losing Housing: Not on file     Unable to Get Utilities: Not on file     Unable to Pay for Housing in the Last Year: 2     Has Housin

## 2024-11-13 ENCOUNTER — TELEPHONE (OUTPATIENT)
Age: 69
End: 2024-11-13

## 2024-11-13 ENCOUNTER — TELEPHONE (OUTPATIENT)
Facility: CLINIC | Age: 69
End: 2024-11-13

## 2024-11-13 NOTE — TELEPHONE ENCOUNTER
Called Ameena to discuss available times for appt. Left message for her to call back tomorrow.     I discussed it with Dr Levy and she does not have availabilty and thought he should see a foot Dr for Post Op appt.    We did not have the available time that Patricia wanted because they are transporting another pt.

## 2024-11-13 NOTE — TELEPHONE ENCOUNTER
[facility]     Doctor and/or Office: Dr Brur    #: [phone]    Escalation: Appointment Called and asked if there is anyway  her can be seen on Friday they have another pt coming in at 11:00 and they can transport both at the same time

## 2024-11-13 NOTE — TELEPHONE ENCOUNTER
Caller: Clay     Doctor and/or Office: Dr Burr/ [place]     #: [phone]    Escalation: AppointmentPatient needs a post op appt he had sx by Dr Page but lives in [place]  He gets discharged from rehab on Nov 14th.  Please advise thank you.

## 2024-11-14 ENCOUNTER — TELEPHONE (OUTPATIENT)
Age: 69
End: 2024-11-14

## 2024-11-14 NOTE — TELEPHONE ENCOUNTER
Caller: Clay Marcial    Doctor and/or Office: Dr. Page/Dr. Burr    #: [phone]    Escalation: Surgery Patient had surgery with Dr. Page. He would like to know if he can schedule his post op with Dr. Burr in [place] as that's where he lives.. Please return call and  let him know/schedule. Thank you

## 2024-11-15 ENCOUNTER — OFFICE VISIT (OUTPATIENT)
Facility: CLINIC | Age: 69
End: 2024-11-15
Payer: COMMERCIAL

## 2024-11-15 VITALS
DIASTOLIC BLOOD PRESSURE: 61 MMHG | TEMPERATURE: 96.9 F | SYSTOLIC BLOOD PRESSURE: 136 MMHG | RESPIRATION RATE: 22 BRPM | HEART RATE: 87 BPM

## 2024-11-15 DIAGNOSIS — I73.9 PAD (PERIPHERAL ARTERY DISEASE) (HCC): ICD-10-CM

## 2024-11-15 DIAGNOSIS — Z89.432 HISTORY OF TRANSMETATARSAL AMPUTATION OF LEFT FOOT (HCC): ICD-10-CM

## 2024-11-15 DIAGNOSIS — F17.200 SMOKING: ICD-10-CM

## 2024-11-15 DIAGNOSIS — E11.42 TYPE 2 DIABETES MELLITUS WITH DIABETIC POLYNEUROPATHY, WITHOUT LONG-TERM CURRENT USE OF INSULIN (HCC): Primary | ICD-10-CM

## 2024-11-15 DIAGNOSIS — T81.89XA DRAINING POSTOPERATIVE WOUND, INITIAL ENCOUNTER: ICD-10-CM

## 2024-11-15 DIAGNOSIS — T81.31XA DISRUPTION OF EXTERNAL SURGICAL WOUND, INITIAL ENCOUNTER: ICD-10-CM

## 2024-11-15 PROCEDURE — 97598 DBRDMT OPN WND ADDL 20CM/<: CPT | Performed by: STUDENT IN AN ORGANIZED HEALTH CARE EDUCATION/TRAINING PROGRAM

## 2024-11-15 PROCEDURE — 97597 DBRDMT OPN WND 1ST 20 CM/<: CPT | Performed by: STUDENT IN AN ORGANIZED HEALTH CARE EDUCATION/TRAINING PROGRAM

## 2024-11-15 NOTE — PROGRESS NOTES
Wound Procedure Treatment Left Foot    Performed by: Raegan Hurtado RN  Authorized by: Emma Burr DPM    Associated wounds:   Wound 11/03/24 Foot Left  Wound cleansed with:  NSS  Applied primary dressing:  Acticoat  Applied secondary dressing:  Gauze  Dressing secured with:  Conce[place], Tape, Size G and Elastic tubular stocking

## 2024-11-15 NOTE — PATIENT INSTRUCTIONS
Orders Placed This Encounter   Procedures    Wound Procedure Treatment Left Foot     This order was created via procedure documentation    Debridement Left Foot     This order was created via procedure documentation    Wound cleansing and dressings     Left foot wound    Steri strips applied today by Dr. Burr. Leave intact until next visit. If they fall off before next visit, leave off.    Wash your hands with soap and water.  Remove old dressing, discard into plastic bag and place in trash.    Cleanse the wound with normal saline prior to applying a clean dressing. Do not use tissue or cotton balls.   Do not scrub the wound. Pat dry using gauze.  Shower no     Apply acticoat flex 3 to the wound.  Cover with ABD  Secure with kerlix  Change dressing every 3 days    Elastic Tubular Stocking    Tubular elastic bandage: Apply from base of foot to behind the knee. Apply in AM, may remove for sleep.    Avoid prolonged standing in one place.    Elevate leg(s) above the level of the heart when sitting or as much as possible.     Remain non weight bearing until next appointment.    Follow up in 1 week     Standing Status:   Future     Expiration Date:   11/22/2024

## 2024-11-15 NOTE — PROGRESS NOTES
Patient ID: Clay Marcial is a 69 y.o. male Date of Birth 1955       Chief Complaint   Patient presents with    New Patient Visit     Left foot wound.       Allergies  Patient has no known allergies.    Diagnosis:  1. Type 2 diabetes mellitus with diabetic polyneuropathy ([facility])  2. PAD (peripheral artery disease) ([facility])  3. History of transmetatarsal amputation of left foot ([facility])  -     Debridement Left Foot  4. Draining postoperative wound, initial encounter  -     Wound cleansing and dressings; Future  -     Wound Procedure Treatment Left Foot  -     Debridement Left Foot  5. Smoking  6. Disruption of external surgical wound, initial encounter  -     Debridement Left Foot      Diagnosis ICD-10-CM Associated Orders   1. Type 2 diabetes mellitus with diabetic polyneuropathy ([facility])  E11.42       2. PAD (peripheral artery disease) ([facility])  I73.9       3. History of transmetatarsal amputation of left foot ([facility])  Z89.432 Debridement Left Foot      4. Draining postoperative wound, initial encounter  T81.89XA Wound cleansing and dressings     Wound Procedure Treatment Left Foot     Debridement Left Foot      5. Smoking  F17.200       6. Disruption of external surgical wound, initial encounter  T81.31XA Debridement Left Foot             Assessment & Plan:  See wound care orders (acticoat 3, dsd)  - Left TMA site appears with 60% healed and 40% superficial incisional dehiscence. Sutures removed to TMA site, plantar sutures remained. Steristrips applied to sites of superficial dehiscence. Patient at high risk for poor wound healing, infection and limb loss.   - strict NWB LLE  - Vascular note from 11/8/24 reviewed. Planned for post-agram duplex, patient is to schedule   - better BS control  - tobacco cessation recommended  - 3 small serous blisters to LLE are intact.  - f/u 1wk for recheck      Subjective:   Clay presents to clinic for left foot check after recent hospital admission.   He is s/p left second toe amputation  and wound debridement (DOS: 10/21/2024) and S/p TMA (DOS: 10/28/2024) due to DFI. He is s/p LLE angiogram w/ AT PTA (for stenosis) (DOS 10/25/24) due to PAD.   He had f/u with vascular surgery 11/8/24 where post-agram LEADs ordered.     PMH sig for DM w/ PN (A1c 7.2% 10/16/24), PAD, CKD, chronic tobacco abuse with COPD.           The following portions of the patient's history were reviewed and updated as appropriate:   Patient Active Problem List   Diagnosis    Type 2 diabetes mellitus with diabetic polyneuropathy (HCC)    CAD (coronary artery disease)    Chronic diastolic congestive heart failure (HCC)    Acute on chronic kidney failure  (HCC)    Hyperkalemia    Helicobacter pylori infection    Diabetic foot infection  (HCC)    Stage 3 chronic kidney disease (HCC)    Chronic obstructive pulmonary disease with acute exacerbation (HCC)    ROMEL (obstructive sleep apnea)    PAD (peripheral artery disease) (HCC)    Acute hyponatremia    HTN (hypertension)    Smoking    Hypomagnesemia    Obesity    Anemia due to stage 3b chronic kidney disease  (HCC)    Anemia    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 (HCC)    Atherosclerosis     Past Medical History:   Diagnosis Date    COPD (chronic obstructive pulmonary disease) (HCC)     Diabetes mellitus (HCC) 04/16/2024    Elevated serum creatinine 10/21/2024    Sleep apnea     UTI (urinary tract infection)      Past Surgical History:   Procedure Laterality Date    BACK SURGERY      CLOSURE DELAYED PRIMARY Left 11/3/2024    Procedure: Left foot delayed primary closure;  Surgeon: Robb Page DPM;  Location: [facility];  Service: Podiatry    IR LOWER EXTREMITY ANGIOGRAM  10/25/2024    WA AMPUTATION FOOT TRANSMETARSAL Left 10/28/2024    Procedure: AMPUTATION TRANSMETATARSAL (TMA);  Surgeon: Robb Page DPM;  Location: [facility];  Service: Podiatry    TONSILLECTOMY      WOUND DEBRIDEMENT Left 10/21/2024    Procedure: LEFT FOOT I&D, left  second toe amputation with packing;  Surgeon: Emma Burr DPM;  Location:  MAIN OR;  Service: Podiatry     Social History     Socioeconomic History    Marital status: Single     Spouse name: Not on file    Number of children: Not on file    Years of education: Not on file    Highest education level: Not on file   Occupational History    Not on file   Tobacco Use    Smoking status: Every Day     Current packs/day: 1.50     Types: Cigarettes    Smokeless tobacco: Never   Vaping Use    Vaping status: Never Used   Substance and Sexual Activity    Alcohol use: Yes     Alcohol/week: 7.0 standard drinks of alcohol     Types: 7 Shots of liquor per week    Drug use: Never    Sexual activity: Not on file   Other Topics Concern    Not on file   Social History Narrative    Not on file     Social Drivers of Health     Financial Resource Strain: Not on file   Food Insecurity: No Food Insecurity (10/22/2024)    Nursing - Inadequate Food Risk Classification     Worried About Running Out of Food in the Last Year: Never true     Ran Out of Food in the Last Year: Never true     Ran Out of Food in the Last Year: 1   Transportation Needs: No Transportation Needs (10/22/2024)    Nursing - Transportation Risk Classification     Lack of Transportation: Not on file     Lack of Transportation: 2   Physical Activity: Not on file   Stress: Not on file   Social Connections: Unknown (6/18/2024)    Received from [facility]    Social [employer]     How often do you feel lonely or isolated from those around you? (Adult - for ages 18 years and over): Not on file   Intimate Partner Violence: Unknown (10/22/2024)    Nursing IPS     Feels Physically and Emotionally Safe: Not on file     Physically Hurt by Someone: Not on file     Humiliated or Emotionally Abused by Someone: Not on file     Physically Hurt by Someone: 2     Hurt or Threatened by Someone: 2   Housing Stability: Unknown (10/22/2024)    Nursing: Inadequate Housing Risk Classification      Has Housing: Not on file     Worried About Losing Housing: Not on file     Unable to Get Utilities: Not on file     Unable to Pay for Housing in the Last Year: 2     Has Housin        Current Outpatient Medications:     acetaminophen (TYLENOL) 500 mg tablet, Take 1,000 mg by mouth as needed EVERY 6 to 8 HOURS, Disp: , Rfl:     albuterol (PROVENTIL HFA,VENTOLIN HFA) 90 mcg/act inhaler, Inhale 1 puff every 4 (four) hours as needed for shortness of breath or wheezing, Disp: , Rfl:     aspirin 81 mg chewable tablet, Chew 81 mg daily, Disp: , Rfl:     atorvastatin (LIPITOR) 20 mg tablet, Take 20 mg by mouth daily, Disp: , Rfl:     cetirizine (ZyrTEC) 5 MG tablet, Take 5 mg by mouth daily, Disp: , Rfl:     Cholecalciferol 25 MCG (1000 UT) capsule, Take 25 mcg by mouth daily, Disp: , Rfl:     cyanocobalamin (VITAMIN B-12) 500 MCG tablet, Take 500 mcg by mouth daily, Disp: , Rfl:     DULoxetine (CYMBALTA) 20 mg capsule, Take 20 mg by mouth daily, Disp: , Rfl:     Empagliflozin 25 MG TABS, Take 12.5 mg by mouth daily, Disp: , Rfl:     fluticasone (FLONASE) 50 mcg/act nasal spray, 2 sprays into each nostril daily, Disp: , Rfl:     glipiZIDE (GLUCOTROL) 5 mg tablet, Take 5 mg by mouth 2 (two) times a day before meals, Disp: , Rfl:     insulin glargine (LANTUS) 100 units/mL subcutaneous injection, Inject 40 Units under the skin daily at bedtime (Patient taking differently: Inject 45 Units under the skin daily at bedtime), Disp: 10 mL, Rfl: 0    isosorbide mononitrate (IMDUR) 30 mg 24 hr tablet, Take 30 mg by mouth daily, Disp: , Rfl:     lisinopril (ZESTRIL) 20 mg tablet, Take 20 mg by mouth daily, Disp: , Rfl:     loratadine (CLARITIN) 10 mg tablet, Take 10 mg by mouth daily, Disp: , Rfl:     metFORMIN (GLUCOPHAGE-XR) 500 mg 24 hr tablet, Take 1,000 mg by mouth 2 (two) times a day with meals, Disp: , Rfl:     metoprolol succinate (TOPROL-XL) 50 mg 24 hr tablet, Take 50 mg by mouth daily, Disp: , Rfl:     omeprazole  (PriLOSEC OTC) 20 MG tablet, Take 20 mg by mouth 2 (two) times a day, Disp: , Rfl:     oxyCODONE (ROXICODONE) 5 immediate release tablet, Take 1 tablet (5 mg total) by mouth every 4 (four) hours as needed for severe pain for up to 20 doses Max Daily Amount: 30 mg, Disp: 20 tablet, Rfl: 0    pregabalin (LYRICA) 200 MG capsule, Take 200 mg by mouth 2 (two) times a day, Disp: , Rfl:     tiotropium-olodaterol (STIOLTO RESPIMAT) 2.5-2.5 MCG/ACT inhaler, Inhale 2 puffs daily, Disp: , Rfl:     traZODone (DESYREL) 50 mg tablet, Take 25 mg by mouth daily at bedtime, Disp: , Rfl:     furosemide (LASIX) 40 mg tablet, Take 40 mg by mouth daily AS NEEDED, Disp: , Rfl:     insulin lispro (HumALOG/ADMELOG) 100 units/mL injection, Inject 18 Units under the skin 3 (three) times a day with meals (Patient not taking: Reported on 11/15/2024), Disp: , Rfl:     sodium chloride (OCEAN) 0.65 % nasal spray, 2 sprays into each nostril 2 (two) times a day, Disp: , Rfl:   No family history on file.   Review of Systems   Constitutional:  Negative for activity change, chills and fever.   HENT: Negative.     Respiratory:  Negative for cough, chest tightness and shortness of breath.    Cardiovascular:  Negative for chest pain and leg swelling.   Endocrine: Negative.    Genitourinary: Negative.    Skin:  Positive for wound.   Neurological:  Positive for numbness.   Psychiatric/Behavioral: Negative.  Negative for agitation and behavioral problems.        Objective:  /61   Pulse 87   Temp (!) 96.9 °F (36.1 °C)   Resp 22     Physical Exam  Constitutional:       General: He is not in acute distress.     Appearance: Normal appearance. He is obese. He is not ill-appearing.   Cardiovascular:      Comments: Nonpalpable B/L DP/PT pulses. Pedal hair is absent. Legs to toes warm to cool.   Pulmonary:      Effort: No respiratory distress.   Musculoskeletal:         General: Deformity (s/p left foot TMA) present. No tenderness. Normal range of motion.  "  Skin:     Capillary Refill: Capillary refill takes less than 2 seconds.      Findings: Lesion (Left TMA incision with plantar-distal foot wound.) present. No erythema.      Comments: B/L LE skin is atrophic - thin, dry and shiny in appearance.     No erythema, purulence, crepitus or fluctuance to Left TMA surgical site. There is about 60% healing noted and 40% incision superficial dehiscence. No deep probe.       Neurological:      General: No focal deficit present.      Mental Status: He is alert and oriented to person, place, and time.      Comments: + PN   Psychiatric:         Mood and Affect: Mood normal.         Behavior: Behavior normal.             Wound 10/25/24 Surgical Catheter entry/exit site Groin Anterior;Right (Active)       Wound 11/03/24 Foot Left (Active)   Wound Image   11/15/24 1426   Wound Description Other (Comment);Pink;Epithelialization 11/15/24 1425   Noni-wound Assessment Scaly;Dry;Intact 11/15/24 1425   Wound Length (cm) 5.5 cm 11/15/24 1425   Wound Width (cm) 17 cm 11/15/24 1425   Wound Depth (cm) 0.1 cm 11/15/24 1425   Wound Surface Area (cm^2) 93.5 cm^2 11/15/24 1425   Wound Volume (cm^3) 9.35 cm^3 11/15/24 1425   Calculated Wound Volume (cm^3) 9.35 cm^3 11/15/24 1425   Wound Site Closure Staples 11/15/24 1425   Drainage Amount JOSÉ MIGUEL 11/15/24 1425   Non-staged Wound Description Full thickness 11/15/24 1425     Debridement   Wound 11/03/24 Foot Left    Universal Protocol:  procedure performed by consultantConsent: Verbal consent obtained.  Risks and benefits: risks, benefits and alternatives were discussed  Consent given by: patient  Time out: Immediately prior to procedure a \"time out\" was called to verify the correct patient, procedure, equipment, support staff and site/side marked as required.  Patient understanding: patient states understanding of the procedure being performed  Patient consent: the patient's understanding of the procedure matches consent given  Patient identity " confirmed: verbally with patient    Debridement Details  Performed by: physician  Debridement type: selective      Post-debridement measurements  Length (cm): 5.5  Width (cm): 17  Depth (cm): 0.1  Percent debrided: 40%  Surface Area (cm^2): 93.5  Area Debrided (cm^2): 37.4  Volume (cm^3): 9.35    Devitalized tissue debrided: biofilm and slough  Instrument(s) utilized: blade  Technique utilized: nonexcisionalBleeding: small  Hemostasis obtained with: pressure  Procedural pain (0-10): insensate  Post-procedural pain: insensate   Response to treatment: procedure was tolerated well         Results from last 6 Months   Lab Units 10/21/24  1428   WOUND CULTURE  1+ Growth of Actinomyces species*  Few Colonies of Diphtheroids  Few Colonies of - Globicatella Species Globicatella*  1 colony Staphylococcus coagulase negative*       Wound Instructions:  Orders Placed This Encounter   Procedures    Wound cleansing and dressings     Left foot wound    Steri strips applied today by Dr. Burr. Leave intact until next visit. If they fall off before next visit, leave off.    Wash your hands with soap and water.  Remove old dressing, discard into plastic bag and place in trash.    Cleanse the wound with normal saline prior to applying a clean dressing. Do not use tissue or cotton balls.   Do not scrub the wound. Pat dry using gauze.  Shower no     Apply acticoat flex 3 to the wound.  Cover with gauze  Secure with bessie  Change dressing every 3 days    Elastic Tubular Stocking    Tubular elastic bandage: Apply from base of foot to behind the knee. Apply in AM, may remove for sleep.    Avoid prolonged standing in one place.    Elevate leg(s) above the level of the heart when sitting or as much as possible.     Remain non weight bearing until next appointment.    Follow up in 1 week     Standing Status:   Future     Expiration Date:   11/22/2024    Wound Procedure Treatment Left Foot     This order was created via procedure  "documentation    Debridement Left Foot     This order was created via procedure documentation         Emma Burr DPM    Portions of the record may have been created with voice recognition software. Occasional wrong word or \"sound a like\" substitutions may have occurred due to the inherent limitations of voice recognition software. Read the chart carefully and recognize, using context, where substitutions have occurred.    "

## 2024-11-22 ENCOUNTER — OFFICE VISIT (OUTPATIENT)
Facility: CLINIC | Age: 69
End: 2024-11-22
Payer: COMMERCIAL

## 2024-11-22 VITALS
TEMPERATURE: 96.9 F | HEART RATE: 97 BPM | DIASTOLIC BLOOD PRESSURE: 68 MMHG | RESPIRATION RATE: 18 BRPM | SYSTOLIC BLOOD PRESSURE: 140 MMHG

## 2024-11-22 DIAGNOSIS — T81.31XA DISRUPTION OF EXTERNAL SURGICAL WOUND, INITIAL ENCOUNTER: ICD-10-CM

## 2024-11-22 DIAGNOSIS — I73.9 PAD (PERIPHERAL ARTERY DISEASE) (HCC): ICD-10-CM

## 2024-11-22 DIAGNOSIS — E11.42 TYPE 2 DIABETES MELLITUS WITH DIABETIC POLYNEUROPATHY, WITHOUT LONG-TERM CURRENT USE OF INSULIN (HCC): Primary | ICD-10-CM

## 2024-11-22 DIAGNOSIS — F17.200 SMOKING: ICD-10-CM

## 2024-11-22 DIAGNOSIS — Z89.432 HISTORY OF TRANSMETATARSAL AMPUTATION OF LEFT FOOT (HCC): ICD-10-CM

## 2024-11-22 DIAGNOSIS — Z91.199 NONCOMPLIANCE: ICD-10-CM

## 2024-11-22 PROCEDURE — 11045 DBRDMT SUBQ TISS EACH ADDL: CPT | Performed by: STUDENT IN AN ORGANIZED HEALTH CARE EDUCATION/TRAINING PROGRAM

## 2024-11-22 PROCEDURE — 11042 DBRDMT SUBQ TIS 1ST 20SQCM/<: CPT | Performed by: STUDENT IN AN ORGANIZED HEALTH CARE EDUCATION/TRAINING PROGRAM

## 2024-11-22 NOTE — PATIENT INSTRUCTIONS
Orders Placed This Encounter   Procedures    Debridement Left Foot     This order was created via procedure documentation    Wound cleansing and dressings Left Foot     Left foot wound:       Wash your hands with soap and water.  Remove old dressing, discard into plastic bag and place in trash.    Cleanse the wound with normal saline prior to applying a clean dressing. Do not use tissue or cotton balls.   Do not scrub the wound. Pat dry using gauze.  Shower no      Apply endoform AM into the open wet areas of the wound.    Paint plantar area of the wound with betadine.   Cover with an ABD or dry gauze  Secure with bessie  Change dressing every 3 days    Moisturize leg and foot, not near the wound      Elastic Tubular Stocking-Spandagrip size G     Tubular elastic bandage: Apply from base of foot to behind the knee. Apply in AM, may remove for sleep.  Avoid prolonged standing in one place.  Elevate leg(s) above the level of the heart when sitting or as much as possible.      Remain NON-WEIGHT BEARING!!    Follow up in 2 weeks     Continue VNA to assist with dressing changes.     Standing Status:   Future     Expected Date:   11/22/2024     Expiration Date:   12/6/2024

## 2024-11-22 NOTE — PROGRESS NOTES
Wound Procedure Treatment Left Foot    Performed by: Alivia Fan RN  Authorized by: Emma Burr DPM    Associated wounds:   Wound 11/03/24 Foot Left  Wound cleansed with:  NSS  Applied to periwound:  Moisture lotion  Applied Topical: Betadine    Applied primary dressing:  Collagen dressing  Applied secondary dressing:  ABD  Dressing secured with:  Kerlix, Tape, Elastic tubular stocking and Size G  Comments:  Endoform aM

## 2024-11-22 NOTE — LETTER
[facility]  [address] [address]  Phone#  [phone]    Patient:  Clay Marcial  YOB: 1955  Phone:  [phone]  Date of Visit:  11/22/2024    Orders Placed This Encounter   Procedures   • Debridement Left Foot     This order was created via procedure documentation   • Wound cleansing and dressings Left Foot     Left foot wound:       Wash your hands with soap and water.  Remove old dressing, discard into plastic bag and place in trash.    Cleanse the wound with normal saline prior to applying a clean dressing. Do not use tissue or cotton balls.   Do not scrub the wound. Pat dry using gauze.  Shower no      Apply endoform AM into the open wet areas of the wound.    Paint plantar area of the wound with betadine.   Cover with an ABD or dry gauze  Secure with bessie  Change dressing every 3 days    Moisturize leg and foot, not near the wound      Elastic Tubular Stocking-Spandagrip size G     Tubular elastic bandage: Apply from base of foot to behind the knee. Apply in AM, may remove for sleep.  Avoid prolonged standing in one place.  Elevate leg(s) above the level of the heart when sitting or as much as possible.      Remain NON-WEIGHT BEARING!!    Follow up in 2 weeks     Continue VNA to assist with dressing changes.     Standing Status:   Future     Expected Date:   11/22/2024     Expiration Date:   12/6/2024   • Wound Procedure Treatment Left Foot     This order was created via procedure documentation         Electronically signed by Emma Burr DPM

## 2024-11-22 NOTE — PROGRESS NOTES
Patient ID: Clay Marcial is a 69 y.o. male Date of Birth 1955       Chief Complaint   Patient presents with    Follow Up Wound Care Visit       Allergies:  Patient has no known allergies.    Diagnosis:  1. Type 2 diabetes mellitus with diabetic polyneuropathy ([facility])  -     Wound cleansing and dressings Left Foot; Future; Expected date: 11/22/2024  2. PAD (peripheral artery disease) ([facility])  3. History of transmetatarsal amputation of left foot ([facility])  4. Disruption of external surgical wound, initial encounter  -     Debridement Left Foot  5. Smoking  6. Noncompliance     Diagnosis ICD-10-CM Associated Orders   1. Type 2 diabetes mellitus with diabetic polyneuropathy ([facility])  E11.42 Wound cleansing and dressings Left Foot      2. PAD (peripheral artery disease) ([facility])  I73.9       3. History of transmetatarsal amputation of left foot ([facility])  Z89.432       4. Disruption of external surgical wound, initial encounter  T81.31XA Debridement Left Foot      5. Smoking  F17.200       6. Noncompliance  Z91.199            Assessment & Plan:  See wound orders. (Endoform AM dehisced areas, betadine plantar, dsd)   - Left TMA site appears with 75% healed and 25% superficial incisional dehiscence at 3 areas along incision site. Sutures removed to plantar wound excision site which appears coapted but fragile.  Surgical dbt as below.   - Patient at high risk for poor wound healing, infection and limb loss.   - strict NWB LLE!  - s/p LLE agram w/ AT PTA. Vascular note from 11/8/24 reviewed. Post-agram duplex ordered, patient is to schedule   - better BS control  - tobacco cessation recommended  - 3 small serous blisters to LLE are resolved  - f/u 2wks for recheck (and will order left foot XR)      Subjective:   Clay presents to clinic for left foot check after recent hospital admission.   He is s/p left second toe amputation and wound debridement (DOS: 10/21/2024) and S/p TMA (DOS: 10/28/2024) due to DFI. He is s/p LLE angiogram w/  AT PTA (for stenosis) (DOS 10/25/24) due to PAD.   He had f/u with vascular surgery 11/8/24 where post-agram LEADs ordered.   Has been WB AMA. Has CAM boot.     PMH sig for DM w/ PN (A1c 7.2% 10/16/24), PAD, CKD, chronic tobacco abuse with COPD.           The following portions of the patient's history were reviewed and updated as appropriate:   Patient Active Problem List   Diagnosis    Type 2 diabetes mellitus with diabetic polyneuropathy (HCC)    CAD (coronary artery disease)    Chronic diastolic congestive heart failure (HCC)    Acute on chronic kidney failure  (HCC)    Hyperkalemia    Helicobacter pylori infection    Diabetic foot infection  (HCC)    Stage 3 chronic kidney disease (HCC)    Chronic obstructive pulmonary disease with acute exacerbation (HCC)    ROMEL (obstructive sleep apnea)    PAD (peripheral artery disease) (HCC)    Acute hyponatremia    HTN (hypertension)    Smoking    Hypomagnesemia    Obesity    Anemia due to stage 3b chronic kidney disease  (HCC)    Anemia    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 (HCC)    Atherosclerosis     Past Medical History:   Diagnosis Date    COPD (chronic obstructive pulmonary disease) (HCC)     Diabetes mellitus (HCC) 04/16/2024    Elevated serum creatinine 10/21/2024    Sleep apnea     UTI (urinary tract infection)      Past Surgical History:   Procedure Laterality Date    BACK SURGERY      CLOSURE DELAYED PRIMARY Left 11/3/2024    Procedure: Left foot delayed primary closure;  Surgeon: Robb Page DPM;  Location: [facility];  Service: Podiatry    IR LOWER EXTREMITY ANGIOGRAM  10/25/2024    KY AMPUTATION FOOT TRANSMETARSAL Left 10/28/2024    Procedure: AMPUTATION TRANSMETATARSAL (TMA);  Surgeon: Robb Page DPM;  Location: [facility];  Service: Podiatry    TONSILLECTOMY      WOUND DEBRIDEMENT Left 10/21/2024    Procedure: LEFT FOOT I&D, left second toe amputation with packing;  Surgeon: Emma Burr DPM;   Location:  MAIN OR;  Service: Podiatry     Social History     Socioeconomic History    Marital status: Single     Spouse name: None    Number of children: None    Years of education: None    Highest education level: None   Occupational History    None   Tobacco Use    Smoking status: Former     Types: Cigarettes     Start date: 10/16/2024    Smokeless tobacco: Never   Vaping Use    Vaping status: Never Used   Substance and Sexual Activity    Alcohol use: Yes     Alcohol/week: 7.0 standard drinks of alcohol     Types: 7 Shots of liquor per week    Drug use: Never    Sexual activity: None   Other Topics Concern    None   Social History Narrative    None     Social Drivers of Health     Financial Resource Strain: Not on file   Food Insecurity: No Food Insecurity (10/22/2024)    Nursing - Inadequate Food Risk Classification     Worried About Running Out of Food in the Last Year: Never true     Ran Out of Food in the Last Year: Never true     Ran Out of Food in the Last Year: 1   Transportation Needs: No Transportation Needs (10/22/2024)    Nursing - Transportation Risk Classification     Lack of Transportation: Not on file     Lack of Transportation: 2   Physical Activity: Not on file   Stress: Not on file   Social Connections: Unknown (6/18/2024)    Received from [facility]     How often do you feel lonely or isolated from those around you? (Adult - for ages 18 years and over): Not on file   Intimate Partner Violence: Unknown (10/22/2024)    Nursing IPS     Feels Physically and Emotionally Safe: Not on file     Physically Hurt by Someone: Not on file     Humiliated or Emotionally Abused by Someone: Not on file     Physically Hurt by Someone: 2     Hurt or Threatened by Someone: 2   Housing Stability: Unknown (10/22/2024)    Nursing: Inadequate Housing Risk Classification     Has Housing: Not on file     Worried About Losing Housing: Not on file     Unable to Get Utilities: Not on file     Unable  to Pay for Housing in the Last Year: 2     Has Housin        Current Outpatient Medications:     acetaminophen (TYLENOL) 500 mg tablet, Take 1,000 mg by mouth as needed EVERY 6 to 8 HOURS, Disp: , Rfl:     albuterol (PROVENTIL HFA,VENTOLIN HFA) 90 mcg/act inhaler, Inhale 1 puff every 4 (four) hours as needed for shortness of breath or wheezing, Disp: , Rfl:     aspirin 81 mg chewable tablet, Chew 81 mg daily, Disp: , Rfl:     atorvastatin (LIPITOR) 20 mg tablet, Take 20 mg by mouth daily, Disp: , Rfl:     cetirizine (ZyrTEC) 5 MG tablet, Take 5 mg by mouth daily, Disp: , Rfl:     Cholecalciferol 25 MCG (1000 UT) capsule, Take 25 mcg by mouth daily, Disp: , Rfl:     cyanocobalamin (VITAMIN B-12) 500 MCG tablet, Take 500 mcg by mouth daily, Disp: , Rfl:     DULoxetine (CYMBALTA) 20 mg capsule, Take 20 mg by mouth daily, Disp: , Rfl:     Empagliflozin 25 MG TABS, Take 12.5 mg by mouth daily, Disp: , Rfl:     fluticasone (FLONASE) 50 mcg/act nasal spray, 2 sprays into each nostril daily, Disp: , Rfl:     furosemide (LASIX) 40 mg tablet, Take 40 mg by mouth daily AS NEEDED, Disp: , Rfl:     glipiZIDE (GLUCOTROL) 5 mg tablet, Take 5 mg by mouth 2 (two) times a day before meals, Disp: , Rfl:     insulin glargine (LANTUS) 100 units/mL subcutaneous injection, Inject 40 Units under the skin daily at bedtime (Patient taking differently: Inject 45 Units under the skin daily at bedtime), Disp: 10 mL, Rfl: 0    insulin lispro (HumALOG/ADMELOG) 100 units/mL injection, Inject 18 Units under the skin 3 (three) times a day with meals (Patient not taking: Reported on 11/15/2024), Disp: , Rfl:     isosorbide mononitrate (IMDUR) 30 mg 24 hr tablet, Take 30 mg by mouth daily, Disp: , Rfl:     lisinopril (ZESTRIL) 20 mg tablet, Take 20 mg by mouth daily, Disp: , Rfl:     loratadine (CLARITIN) 10 mg tablet, Take 10 mg by mouth daily, Disp: , Rfl:     metFORMIN (GLUCOPHAGE-XR) 500 mg 24 hr tablet, Take 1,000 mg by mouth 2 (two) times a  day with meals, Disp: , Rfl:     metoprolol succinate (TOPROL-XL) 50 mg 24 hr tablet, Take 50 mg by mouth daily, Disp: , Rfl:     omeprazole (PriLOSEC OTC) 20 MG tablet, Take 20 mg by mouth 2 (two) times a day, Disp: , Rfl:     oxyCODONE (ROXICODONE) 5 immediate release tablet, Take 1 tablet (5 mg total) by mouth every 4 (four) hours as needed for severe pain for up to 20 doses Max Daily Amount: 30 mg, Disp: 20 tablet, Rfl: 0    pregabalin (LYRICA) 200 MG capsule, Take 200 mg by mouth 2 (two) times a day, Disp: , Rfl:     sodium chloride (OCEAN) 0.65 % nasal spray, 2 sprays into each nostril 2 (two) times a day, Disp: , Rfl:     tiotropium-olodaterol (STIOLTO RESPIMAT) 2.5-2.5 MCG/ACT inhaler, Inhale 2 puffs daily, Disp: , Rfl:     traZODone (DESYREL) 50 mg tablet, Take 25 mg by mouth daily at bedtime, Disp: , Rfl:   No family history on file.   Review of Systems   Constitutional:  Negative for activity change, chills and fever.   HENT: Negative.     Respiratory:  Negative for cough, chest tightness and shortness of breath.    Cardiovascular:  Negative for chest pain and leg swelling.   Endocrine: Negative.    Genitourinary: Negative.    Skin:  Positive for wound.   Neurological:  Positive for numbness.   Psychiatric/Behavioral: Negative.  Negative for agitation and behavioral problems.          Objective:  /68   Pulse 97   Temp (!) 96.9 °F (36.1 °C)   Resp 18     Physical Exam  Constitutional:       General: He is not in acute distress.     Appearance: Normal appearance. He is obese. He is not ill-appearing.   Cardiovascular:      Comments: Nonpalpable B/L DP/PT pulses. Pedal hair is absent. Legs to toes warm to cool.   Pulmonary:      Effort: No respiratory distress.   Musculoskeletal:         General: Deformity (s/p left foot TMA) present. No tenderness. Normal range of motion.   Skin:     Capillary Refill: Capillary refill takes less than 2 seconds.      Findings: Lesion (Left TMA incision with  "plantar-distal foot wound.) present. No erythema.      Comments: B/L LE skin is atrophic - thin, dry and shiny in appearance.     No erythema, purulence, crepitus or fluctuance to Left TMA surgical site. There is about 75% healing noted and 25% incision superficial dehiscence at 3 sites along incision line. No deep probe.    Plantar-forefoot wound excision site appears intact without drainage however area is fragile and prone to dehiscence.      Neurological:      General: No focal deficit present.      Mental Status: He is alert and oriented to person, place, and time.      Comments: + PN   Psychiatric:         Mood and Affect: Mood normal.         Behavior: Behavior normal.             Wound 11/03/24 Foot Left (Active)   Wound Image    11/22/24 1238   Wound Description Other (Comment);Pink;Epithelialization 11/22/24 1240   Noni-wound Assessment Scaly;Dry;Intact 11/22/24 1240   Wound Length (cm) 4 cm 11/22/24 1240   Wound Width (cm) 11 cm 11/22/24 1240   Wound Depth (cm) 0.1 cm 11/22/24 1240   Wound Surface Area (cm^2) 44 cm^2 11/22/24 1240   Wound Volume (cm^3) 4.4 cm^3 11/22/24 1240   Calculated Wound Volume (cm^3) 4.4 cm^3 11/22/24 1240   Change in Wound Size % 52.94 11/22/24 1240   Wound Site Closure Sutures;Adhesive closure strips 11/22/24 1240   Drainage Amount Small 11/22/24 1240   Drainage Description Serosanguineous 11/22/24 1240   Non-staged Wound Description Full thickness 11/22/24 1240           Debridement   Wound 11/03/24 Foot Left    Universal Protocol:  procedure performed by consultantConsent: Verbal consent obtained.  Risks and benefits: risks, benefits and alternatives were discussed  Consent given by: patient  Time out: Immediately prior to procedure a \"time out\" was called to verify the correct patient, procedure, equipment, support staff and site/side marked as required.  Patient understanding: patient states understanding of the procedure being performed  Patient consent: the patient's " understanding of the procedure matches consent given  Patient identity confirmed: verbally with patient    Debridement Details  Performed by: physician  Debridement type: surgical  Level of debridement: subcutaneous tissue      Post-debridement measurements  Length (cm): 4  Width (cm): 11  Depth (cm): 0.1  Percent debrided: 50%  Surface Area (cm^2): 44  Area Debrided (cm^2): 22  Volume (cm^3): 4.4    Tissue and other material debrided: adipose and subcutaneous tissue  Devitalized tissue debrided: biofilm, exudate and slough  Instrument(s) utilized: blade and forceps  Technique utilized: excisionalBleeding: small  Hemostasis obtained with: pressure  Procedural pain (0-10): insensate  Post-procedural pain: insensate   Response to treatment: procedure was tolerated well         Results from last 6 Months   Lab Units 10/21/24  1428   WOUND CULTURE  1+ Growth of Actinomyces species*  Few Colonies of Diphtheroids  Few Colonies of - Globicatella Species Globicatella*  1 colony Staphylococcus coagulase negative*         Wound Instructions:  Orders Placed This Encounter   Procedures    Wound cleansing and dressings Left Foot     Left foot wound:     Steri strips leave intact until next visit. If they fall off before next visit, leave off.     Wash your hands with soap and water.  Remove old dressing, discard into plastic bag and place in trash.    Cleanse the wound with normal saline prior to applying a clean dressing. Do not use tissue or cotton balls.   Do not scrub the wound. Pat dry using gauze.  Shower no      Apply endoform AM into the open wet areas of the wound.    Paint plantar area of the wound with betadine.   Cover with an ABD or dry gauze  Secure with bessie  Change dressing every 3 days     Elastic Tubular Stocking-Spandagrip size G     Tubular elastic bandage: Apply from base of foot to behind the knee. Apply in AM, may remove for sleep.  Avoid prolonged standing in one place.  Elevate leg(s) above the level  "of the heart when sitting or as much as possible.      Remain NON-WEIGHT BEARING!!    Follow up in 2 weeks     Continue VNA to assist with dressing changes.     Standing Status:   Future     Expected Date:   11/22/2024     Expiration Date:   12/6/2024    Debridement Left Foot     This order was created via procedure documentation         Emma Burr DPM      Portions of the record may have been created with voice recognition software. Occasional wrong word or \"sound a like\" substitutions may have occurred due to the inherent limitations of voice recognition software. Read the chart carefully and recognize, using context, where substitutions have occurred.    "

## 2024-11-27 ENCOUNTER — TELEPHONE (OUTPATIENT)
Age: 69
End: 2024-11-27

## 2024-12-06 ENCOUNTER — HOSPITAL ENCOUNTER (OUTPATIENT)
Dept: NON INVASIVE DIAGNOSTICS | Facility: HOSPITAL | Age: 69
Discharge: HOME | End: 2024-12-06
Payer: COMMERCIAL

## 2024-12-06 ENCOUNTER — OFFICE VISIT (OUTPATIENT)
Facility: CLINIC | Age: 69
End: 2024-12-06
Payer: COMMERCIAL

## 2024-12-06 ENCOUNTER — HOSPITAL ENCOUNTER (OUTPATIENT)
Dept: RADIOLOGY | Facility: CLINIC | Age: 69
End: 2024-12-06
Payer: COMMERCIAL

## 2024-12-06 VITALS
RESPIRATION RATE: 20 BRPM | TEMPERATURE: 98.2 F | HEART RATE: 87 BPM | DIASTOLIC BLOOD PRESSURE: 69 MMHG | SYSTOLIC BLOOD PRESSURE: 154 MMHG

## 2024-12-06 DIAGNOSIS — E11.42 TYPE 2 DIABETES MELLITUS WITH DIABETIC POLYNEUROPATHY, WITHOUT LONG-TERM CURRENT USE OF INSULIN (HCC): ICD-10-CM

## 2024-12-06 DIAGNOSIS — T81.31XA DISRUPTION OF EXTERNAL SURGICAL WOUND, INITIAL ENCOUNTER: Primary | ICD-10-CM

## 2024-12-06 DIAGNOSIS — F17.200 SMOKING: ICD-10-CM

## 2024-12-06 DIAGNOSIS — I73.9 PAD (PERIPHERAL ARTERY DISEASE) (HCC): ICD-10-CM

## 2024-12-06 DIAGNOSIS — T81.31XA DISRUPTION OF EXTERNAL SURGICAL WOUND, INITIAL ENCOUNTER: ICD-10-CM

## 2024-12-06 DIAGNOSIS — Z89.432 HISTORY OF TRANSMETATARSAL AMPUTATION OF LEFT FOOT (HCC): ICD-10-CM

## 2024-12-06 PROCEDURE — 11042 DBRDMT SUBQ TIS 1ST 20SQCM/<: CPT | Performed by: STUDENT IN AN ORGANIZED HEALTH CARE EDUCATION/TRAINING PROGRAM

## 2024-12-06 PROCEDURE — 93925 LOWER EXTREMITY STUDY: CPT

## 2024-12-06 PROCEDURE — 93923 UPR/LXTR ART STDY 3+ LVLS: CPT

## 2024-12-06 PROCEDURE — 73630 X-RAY EXAM OF FOOT: CPT

## 2024-12-06 NOTE — LETTER
[facility]  [address] [address]  Phone#  [phone]    Patient:  Clay Marcial  YOB: 1955  Phone:  [phone]  Date of Visit:  12/6/2024    Orders Placed This Encounter   Procedures   • Wound cleansing and dressings Left Foot     Left foot wound:       Wash your hands with soap and water.  Remove old dressing, discard into plastic bag and place in trash.    Cleanse the wound with normal saline prior to applying a clean dressing. Wash foot with mild soap and rinse with water. Do not use tissue or cotton balls.   Do not scrub the wound. Pat dry using gauze.  Shower no   Apply moisturizer to foot around wound. Do not get lotion in wound.  Apply endoform AM into the open wet areas of the wound.      Cover with an ABD or dry gauze  Secure with bessie  Change dressing every 3 days     Elastic Tubular Stocking-Spandagrip size G     Tubular elastic bandage: Apply from base of foot to behind the knee. Apply in AM, may remove for sleep.  Avoid prolonged standing in one place.  Elevate leg(s) above the level of the heart when sitting or as much as possible.   Off-loading Instructions:    Wear off-loading device as directed by your physician. Put on immediately when rising in the morning and remove when going to bed.Purchase Darco Toe offloading shoe. Limit walking. Do not walk barefoot or without shoe.    Referral sent to pain management  Obtain xray as ordered.    Follow up in 2 weeks      Continue VNA to assist with dressing changes.     Standing Status:   Future     Expiration Date:   12/13/2024   • Debridement Left Foot     This order was created via procedure documentation   • XR foot 3+ vw left     Standing Status:   Future     Expected Date:   12/6/2024     Expiration Date:   12/6/2028     Scheduling Instructions:      Bring along any outside films relating to this procedure.               Electronically signed by Emma Burr DPM

## 2024-12-06 NOTE — PATIENT INSTRUCTIONS
Orders Placed This Encounter   Procedures    Wound cleansing and dressings Left Foot     Left foot wound:       Wash your hands with soap and water.  Remove old dressing, discard into plastic bag and place in trash.    Cleanse the wound with normal saline prior to applying a clean dressing. Wash foot with mild soap and rinse with water. Do not use tissue or cotton balls.   Do not scrub the wound. Pat dry using gauze.  Shower no   Apply moisturizer to foot around wound. Do not get lotion in wound.  Apply endoform AM into the open wet areas of the wound.      Cover with an ABD or dry gauze  Secure with bessie  Change dressing every 3 days     Elastic Tubular Stocking-Spandagrip size G     Tubular elastic bandage: Apply from base of foot to behind the knee. Apply in AM, may remove for sleep.  Avoid prolonged standing in one place.  Elevate leg(s) above the level of the heart when sitting or as much as possible.   Off-loading Instructions:    Wear off-loading device as directed by your physician. Put on immediately when rising in the morning and remove when going to bed.Purchase Darco Toe offloading shoe. Limit walking. Do not walk barefoot or without shoe.    Referral sent to pain management  Obtain xray as ordered.    Follow up in 2 weeks      Continue VNA to assist with dressing changes.     Standing Status:   Future     Expiration Date:   12/13/2024    Debridement Left Foot     This order was created via procedure documentation    XR foot 3+ vw left     Standing Status:   Future     Expected Date:   12/6/2024     Expiration Date:   12/6/2028     Scheduling Instructions:      Bring along any outside films relating to this procedure.

## 2024-12-06 NOTE — PROGRESS NOTES
Patient ID: Clay Marcial is a 69 y.o. male Date of Birth 1955       Chief Complaint   Patient presents with    Follow Up Wound Care Visit     Left foot wound       Allergies:  Patient has no known allergies.    Diagnosis:  1. Disruption of external surgical wound, initial encounter  -     Wound cleansing and dressings Left Foot; Future  -     XR foot 3+ vw left; Future; Expected date: 12/06/2024  2. Type 2 diabetes mellitus with diabetic polyneuropathy ([facility])  3. PAD (peripheral artery disease) ([facility])  4. History of transmetatarsal amputation of left foot (HCC)  -     XR foot 3+ vw left; Future; Expected date: 12/06/2024  5. Smoking     Diagnosis ICD-10-CM Associated Orders   1. Disruption of external surgical wound, initial encounter  T81.31XA Wound cleansing and dressings Left Foot     XR foot 3+ vw left      2. Type 2 diabetes mellitus with diabetic polyneuropathy ([facility])  E11.42       3. PAD (peripheral artery disease) ([facility])  I73.9       4. History of transmetatarsal amputation of left foot ([facility])  Z89.432 XR foot 3+ vw left      5. Smoking  F17.200            Assessment & Plan:  See wound orders. (Endoform AM dehisced areas, betadine plantar, dsd)   - Left TMA site appears with 90% healed and 10% superficial incisional dehiscence at 3 areas along incision site. Healing well. Surgical dbt as below.   - Patient at high risk for poor wound healing, infection and limb loss.   - strict NWB LLE. Transition to Darco toe unloading shoe (purchase on [employer]) WBAT ADLs only  - s/p LLE agram w/ AT PTA. Vascular note from 11/8/24 reviewed. Post-agram duplex ordered, patient is to schedule   - better BS control  - tobacco cessation recommended  - Left foot XR ordered  - f/u 2wks for recheck     Subjective:   Clay presents to clinic for left foot check after recent hospital admission.   He is s/p left second toe amputation and wound debridement (DOS: 10/21/2024) and S/p TMA (DOS: 10/28/2024) due to DFI. He is s/p LLE  angiogram w/ AT PTA (for stenosis) (DOS 10/25/24) due to PAD.   He had f/u with vascular surgery 11/8/24 where post-agram LEADs ordered.   Has been NWB since last seen.     PMH sig for DM w/ PN (A1c 7.2% 10/16/24), PAD, CKD, chronic tobacco abuse with COPD.           The following portions of the patient's history were reviewed and updated as appropriate:   Patient Active Problem List   Diagnosis    Type 2 diabetes mellitus with diabetic polyneuropathy (HCC)    CAD (coronary artery disease)    Chronic diastolic congestive heart failure (HCC)    Acute on chronic kidney failure  (HCC)    Hyperkalemia    Helicobacter pylori infection    Diabetic foot infection  (HCC)    Stage 3 chronic kidney disease (HCC)    Chronic obstructive pulmonary disease with acute exacerbation (HCC)    ROMEL (obstructive sleep apnea)    PAD (peripheral artery disease) (HCC)    Acute hyponatremia    HTN (hypertension)    Smoking    Hypomagnesemia    Obesity    Anemia due to stage 3b chronic kidney disease  (HCC)    Anemia    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 (HCC)    Atherosclerosis     Past Medical History:   Diagnosis Date    COPD (chronic obstructive pulmonary disease) (HCC)     Diabetes mellitus (HCC) 04/16/2024    Elevated serum creatinine 10/21/2024    Sleep apnea     UTI (urinary tract infection)      Past Surgical History:   Procedure Laterality Date    BACK SURGERY      CLOSURE DELAYED PRIMARY Left 11/3/2024    Procedure: Left foot delayed primary closure;  Surgeon: Robb Page DPM;  Location: [facility];  Service: Podiatry    IR LOWER EXTREMITY ANGIOGRAM  10/25/2024    NH AMPUTATION FOOT TRANSMETARSAL Left 10/28/2024    Procedure: AMPUTATION TRANSMETATARSAL (TMA);  Surgeon: Robb Page DPM;  Location: [facility];  Service: Podiatry    TONSILLECTOMY      WOUND DEBRIDEMENT Left 10/21/2024    Procedure: LEFT FOOT I&D, left second toe amputation with packing;  Surgeon: Emma Burr  DPM;  Location:  MAIN OR;  Service: Podiatry     Social History     Socioeconomic History    Marital status: Single     Spouse name: Not on file    Number of children: Not on file    Years of education: Not on file    Highest education level: Not on file   Occupational History    Not on file   Tobacco Use    Smoking status: Former     Types: Cigarettes     Start date: 10/16/2024    Smokeless tobacco: Never   Vaping Use    Vaping status: Never Used   Substance and Sexual Activity    Alcohol use: Yes     Alcohol/week: 7.0 standard drinks of alcohol     Types: 7 Shots of liquor per week    Drug use: Never    Sexual activity: Not on file   Other Topics Concern    Not on file   Social History Narrative    Not on file     Social Drivers of Health     Financial Resource Strain: Not on file   Food Insecurity: No Food Insecurity (10/22/2024)    Nursing - Inadequate Food Risk Classification     Worried About Running Out of Food in the Last Year: Never true     Ran Out of Food in the Last Year: Never true     Ran Out of Food in the Last Year: 1   Transportation Needs: No Transportation Needs (10/22/2024)    Nursing - Transportation Risk Classification     Lack of Transportation: Not on file     Lack of Transportation: 2   Physical Activity: Not on file   Stress: Not on file   Social Connections: Unknown (6/18/2024)    Received from [facility]     How often do you feel lonely or isolated from those around you? (Adult - for ages 18 years and over): Not on file   Intimate Partner Violence: Unknown (10/22/2024)    Nursing IPS     Feels Physically and Emotionally Safe: Not on file     Physically Hurt by Someone: Not on file     Humiliated or Emotionally Abused by Someone: Not on file     Physically Hurt by Someone: 2     Hurt or Threatened by Someone: 2   Housing Stability: Unknown (10/22/2024)    Nursing: Inadequate Housing Risk Classification     Has Housing: Not on file     Worried About Losing Housing:  Not on file     Unable to Get Utilities: Not on file     Unable to Pay for Housing in the Last Year: 2     Has Housin        Current Outpatient Medications:     acetaminophen (TYLENOL) 500 mg tablet, Take 1,000 mg by mouth as needed EVERY 6 to 8 HOURS, Disp: , Rfl:     albuterol (PROVENTIL HFA,VENTOLIN HFA) 90 mcg/act inhaler, Inhale 1 puff every 4 (four) hours as needed for shortness of breath or wheezing, Disp: , Rfl:     aspirin 81 mg chewable tablet, Chew 81 mg daily, Disp: , Rfl:     atorvastatin (LIPITOR) 20 mg tablet, Take 20 mg by mouth daily, Disp: , Rfl:     cetirizine (ZyrTEC) 5 MG tablet, Take 5 mg by mouth daily, Disp: , Rfl:     Cholecalciferol 25 MCG (1000 UT) capsule, Take 25 mcg by mouth daily, Disp: , Rfl:     cyanocobalamin (VITAMIN B-12) 500 MCG tablet, Take 500 mcg by mouth daily, Disp: , Rfl:     DULoxetine (CYMBALTA) 20 mg capsule, Take 20 mg by mouth daily, Disp: , Rfl:     Empagliflozin 25 MG TABS, Take 12.5 mg by mouth daily, Disp: , Rfl:     fluticasone (FLONASE) 50 mcg/act nasal spray, 2 sprays into each nostril daily, Disp: , Rfl:     furosemide (LASIX) 40 mg tablet, Take 40 mg by mouth daily AS NEEDED, Disp: , Rfl:     glipiZIDE (GLUCOTROL) 5 mg tablet, Take 5 mg by mouth 2 (two) times a day before meals, Disp: , Rfl:     insulin glargine (LANTUS) 100 units/mL subcutaneous injection, Inject 40 Units under the skin daily at bedtime (Patient taking differently: Inject 45 Units under the skin daily at bedtime), Disp: 10 mL, Rfl: 0    insulin lispro (HumALOG/ADMELOG) 100 units/mL injection, Inject 18 Units under the skin 3 (three) times a day with meals (Patient not taking: Reported on 11/15/2024), Disp: , Rfl:     isosorbide mononitrate (IMDUR) 30 mg 24 hr tablet, Take 30 mg by mouth daily, Disp: , Rfl:     lisinopril (ZESTRIL) 20 mg tablet, Take 20 mg by mouth daily, Disp: , Rfl:     loratadine (CLARITIN) 10 mg tablet, Take 10 mg by mouth daily, Disp: , Rfl:     metFORMIN  (GLUCOPHAGE-XR) 500 mg 24 hr tablet, Take 1,000 mg by mouth 2 (two) times a day with meals, Disp: , Rfl:     metoprolol succinate (TOPROL-XL) 50 mg 24 hr tablet, Take 50 mg by mouth daily, Disp: , Rfl:     omeprazole (PriLOSEC OTC) 20 MG tablet, Take 20 mg by mouth 2 (two) times a day, Disp: , Rfl:     oxyCODONE (ROXICODONE) 5 immediate release tablet, Take 1 tablet (5 mg total) by mouth every 4 (four) hours as needed for severe pain for up to 20 doses Max Daily Amount: 30 mg, Disp: 20 tablet, Rfl: 0    pregabalin (LYRICA) 200 MG capsule, Take 200 mg by mouth 2 (two) times a day, Disp: , Rfl:     sodium chloride (OCEAN) 0.65 % nasal spray, 2 sprays into each nostril 2 (two) times a day, Disp: , Rfl:     tiotropium-olodaterol (STIOLTO RESPIMAT) 2.5-2.5 MCG/ACT inhaler, Inhale 2 puffs daily, Disp: , Rfl:     traZODone (DESYREL) 50 mg tablet, Take 25 mg by mouth daily at bedtime, Disp: , Rfl:   No family history on file.   Review of Systems   Constitutional:  Negative for activity change, chills and fever.   HENT: Negative.     Respiratory:  Negative for cough, chest tightness and shortness of breath.    Cardiovascular:  Negative for chest pain and leg swelling.   Endocrine: Negative.    Genitourinary: Negative.    Skin:  Positive for wound.   Neurological:  Positive for numbness.   Psychiatric/Behavioral: Negative.  Negative for agitation and behavioral problems.          Objective:  /69   Pulse 87   Temp 98.2 °F (36.8 °C)   Resp 20     Physical Exam  Constitutional:       General: He is not in acute distress.     Appearance: Normal appearance. He is obese. He is not ill-appearing.   Cardiovascular:      Comments: Nonpalpable B/L DP/PT pulses. Pedal hair is absent. Legs to toes warm to cool.   Pulmonary:      Effort: No respiratory distress.   Musculoskeletal:         General: Deformity (s/p left foot TMA) present. No tenderness. Normal range of motion.   Skin:     Capillary Refill: Capillary refill takes  "less than 2 seconds.      Findings: Lesion (Left TMA incision with plantar-distal foot wound.) present. No erythema.      Comments: B/L LE skin is atrophic - thin, dry and shiny in appearance.     No erythema, purulence, crepitus or fluctuance to Left TMA surgical site. There is about 90% healing noted and 10% incision superficial dehiscence at 3 sites along incision line (med/lat/central). No probe to bone.  Plantar-forefoot wound excision site appears intact and without dehiscence.      Neurological:      General: No focal deficit present.      Mental Status: He is alert and oriented to person, place, and time.      Comments: + PN   Psychiatric:         Mood and Affect: Mood normal.         Behavior: Behavior normal.             Wound 11/03/24 Foot Left (Active)   Wound Image   12/06/24 1304   Wound Description Pink;Yellow 12/06/24 1307   Noni-wound Assessment Scaly;Dry;Intact 12/06/24 1307   Wound Length (cm) 0.2 cm 12/06/24 1307   Wound Width (cm) 0.3 cm 12/06/24 1307   Wound Depth (cm) 0.4 cm 12/06/24 1307   Wound Surface Area (cm^2) 0.06 cm^2 12/06/24 1307   Wound Volume (cm^3) 0.024 cm^3 12/06/24 1307   Calculated Wound Volume (cm^3) 0.02 cm^3 12/06/24 1307   Change in Wound Size % 99.79 12/06/24 1307   Wound Site Closure Sutures;Adhesive closure strips 11/22/24 1240   Drainage Amount Small 12/06/24 1307   Drainage Description Serosanguineous 12/06/24 1307   Non-staged Wound Description Full thickness 12/06/24 1307       Debridement   Wound 11/03/24 Foot Left    Universal Protocol:  procedure performed by consultantConsent: Verbal consent obtained.  Risks and benefits: risks, benefits and alternatives were discussed  Consent given by: patient  Time out: Immediately prior to procedure a \"time out\" was called to verify the correct patient, procedure, equipment, support staff and site/side marked as required.  Patient understanding: patient states understanding of the procedure being performed  Patient consent: " the patient's understanding of the procedure matches consent given  Patient identity confirmed: verbally with patient    Debridement Details  Performed by: physician  Debridement type: surgical  Level of debridement: subcutaneous tissue      Post-debridement measurements  Length (cm): 0.3  Width (cm): 0.3  Depth (cm): 0.4  Percent debrided: 100%  Surface Area (cm^2): 0.09  Area Debrided (cm^2): 0.09  Volume (cm^3): 0.04    Tissue and other material debrided: subcutaneous tissue  Devitalized tissue debrided: biofilm, callus and slough  Instrument(s) utilized: blade  Technique utilized: excisionalBleeding: small  Hemostasis obtained with: pressure  Procedural pain (0-10): insensate  Post-procedural pain: insensate   Response to treatment: procedure was tolerated well         Results from last 6 Months   Lab Units 10/21/24  1428   WOUND CULTURE  1+ Growth of Actinomyces species*  Few Colonies of Diphtheroids  Few Colonies of - Globicatella Species Globicatella*  1 colony Staphylococcus coagulase negative*         Wound Instructions:  Orders Placed This Encounter   Procedures    Wound cleansing and dressings Left Foot     Left foot wound:       Wash your hands with soap and water.  Remove old dressing, discard into plastic bag and place in trash.    Cleanse the wound with normal saline prior to applying a clean dressing. Wash foot with mild soap and rinse with water. Do not use tissue or cotton balls.   Do not scrub the wound. Pat dry using gauze.  Shower no   Apply moisturizer to foot around wound. Do not get lotion in wound.  Apply endoform AM into the open wet areas of the wound.      Cover with an ABD or dry gauze  Secure with bessie  Change dressing every 3 days     Elastic Tubular Stocking-Spandagrip size G     Tubular elastic bandage: Apply from base of foot to behind the knee. Apply in AM, may remove for sleep.  Avoid prolonged standing in one place.  Elevate leg(s) above the level of the heart when sitting or  "as much as possible.   Off-loading Instructions:    Wear off-loading device as directed by your physician. Put on immediately when rising in the morning and remove when going to bed.Purchase Darco Toe offloading shoe. Limit walking. Do not walk barefoot or without shoe.    Referral sent to pain management  Obtain xray as ordered.    Follow up in 2 weeks      Continue VNA to assist with dressing changes.     Standing Status:   Future     Expiration Date:   12/13/2024    Debridement Left Foot     This order was created via procedure documentation    XR foot 3+ vw left     Standing Status:   Future     Expected Date:   12/6/2024     Expiration Date:   12/6/2028     Scheduling Instructions:      Bring along any outside films relating to this procedure.               Emma Burr DPM      Portions of the record may have been created with voice recognition software. Occasional wrong word or \"sound a like\" substitutions may have occurred due to the inherent limitations of voice recognition software. Read the chart carefully and recognize, using context, where substitutions have occurred.    "

## 2024-12-06 NOTE — PROGRESS NOTES
Wound Procedure Treatment Left Foot    Performed by: Raegan Hurtado RN  Authorized by: Emma Burr DPM    Associated wounds:   Wound 11/03/24 Foot Left  Wound cleansed with:  NSS  Applied primary dressing:  Collagen dressing  Applied secondary dressing:  ABD  Dressing secured with:  [place], Tape, Size G and Elastic tubular stocking

## 2024-12-16 ENCOUNTER — TRANSCRIBE ORDERS (OUTPATIENT)
Dept: ADMINISTRATIVE | Facility: HOSPITAL | Age: 69
End: 2024-12-16

## 2024-12-16 DIAGNOSIS — I73.9 PAD (PERIPHERAL ARTERY DISEASE) (HCC): Primary | ICD-10-CM

## 2024-12-19 ENCOUNTER — OFFICE VISIT (OUTPATIENT)
Facility: CLINIC | Age: 69
End: 2024-12-19
Payer: COMMERCIAL

## 2024-12-19 VITALS
HEART RATE: 84 BPM | RESPIRATION RATE: 20 BRPM | DIASTOLIC BLOOD PRESSURE: 65 MMHG | TEMPERATURE: 96.8 F | SYSTOLIC BLOOD PRESSURE: 143 MMHG

## 2024-12-19 DIAGNOSIS — Z91.199 NONCOMPLIANCE: ICD-10-CM

## 2024-12-19 DIAGNOSIS — F17.200 SMOKING: ICD-10-CM

## 2024-12-19 DIAGNOSIS — I73.9 PAD (PERIPHERAL ARTERY DISEASE) (HCC): ICD-10-CM

## 2024-12-19 DIAGNOSIS — E11.42 TYPE 2 DIABETES MELLITUS WITH DIABETIC POLYNEUROPATHY, WITHOUT LONG-TERM CURRENT USE OF INSULIN (HCC): ICD-10-CM

## 2024-12-19 DIAGNOSIS — T81.31XD DISRUPTION OF EXTERNAL SURGICAL WOUND, SUBSEQUENT ENCOUNTER: Primary | ICD-10-CM

## 2024-12-19 DIAGNOSIS — Z89.432 HISTORY OF TRANSMETATARSAL AMPUTATION OF LEFT FOOT (HCC): ICD-10-CM

## 2024-12-19 PROCEDURE — 11042 DBRDMT SUBQ TIS 1ST 20SQCM/<: CPT | Performed by: STUDENT IN AN ORGANIZED HEALTH CARE EDUCATION/TRAINING PROGRAM

## 2024-12-19 NOTE — LETTER
[facility]  [address] [address]  Phone#  [phone]    Patient:  Clay Marcial  YOB: 1955  Phone:  [phone]  Date of Visit:  12/19/2024    Orders Placed This Encounter   Procedures   • Wound cleansing and dressings Left Foot     Left foot wound:        Wash your hands with soap and water.  Remove old dressing, discard into plastic bag and place in trash.    Cleanse the wound with normal saline prior to applying a clean dressing. Wash foot with mild soap and rinse with water. Do not use tissue or cotton balls.   Do not scrub the wound. Pat dry using gauze.  Shower no   Apply moisturizer to foot around wound. Do not get lotion in wound.  Apply endoform AM into the open wet areas of the wound.       Cover with an ABD or dry gauze  Secure with bessie  Change dressing every 3 days     Elastic Tubular Stocking-Spandagrip size G     Tubular elastic bandage: Apply from base of foot to behind the knee. Apply in AM, may remove for sleep.  Avoid prolonged standing in one place.  Elevate leg(s) above the level of the heart when sitting or as much as possible.   Off-loading Instructions:     Wear off-loading device as directed by your physician. Put on immediately when rising in the morning and remove when going to bed.Purchase Darco Toe offloading shoe. Limit walking. Do not walk barefoot or without shoe.     Referral sent to pain management     Follow up in 2 weeks      Continue VNA to assist with dressing changes.     Standing Status:   Future     Expected Date:   12/19/2024     Expiration Date:   1/2/2025   • Debridement Left Foot     This order was created via procedure documentation   • Wound Procedure Treatment Left Foot     This order was created via procedure documentation         Electronically signed by Emma Burr DPM

## 2024-12-19 NOTE — PATIENT INSTRUCTIONS
MA called patient and she states she would still like to  a hard copy paper script.    Orders Placed This Encounter   Procedures    Wound cleansing and dressings Left Foot     Left foot wound:        Wash your hands with soap and water.  Remove old dressing, discard into plastic bag and place in trash.    Cleanse the wound with normal saline prior to applying a clean dressing. Wash foot with mild soap and rinse with water. Do not use tissue or cotton balls.   Do not scrub the wound. Pat dry using gauze.  Shower no   Apply moisturizer to foot around wound. Do not get lotion in wound.  Apply endoform AM into the open wet areas of the wound.       Cover with an ABD or dry gauze  Secure with bessie  Change dressing every 3 days     Elastic Tubular Stocking-Spandagrip size G     Tubular elastic bandage: Apply from base of foot to behind the knee. Apply in AM, may remove for sleep.  Avoid prolonged standing in one place.  Elevate leg(s) above the level of the heart when sitting or as much as possible.   Off-loading Instructions:     Wear off-loading device as directed by your physician. Put on immediately when rising in the morning and remove when going to bed.Purchase Darco Toe offloading shoe. Limit walking. Do not walk barefoot or without shoe.     Referral sent to pain management     Follow up in 2 weeks      Continue VNA to assist with dressing changes.     Standing Status:   Future     Expected Date:   12/19/2024     Expiration Date:   1/2/2025

## 2024-12-19 NOTE — PROGRESS NOTES
Wound Procedure Treatment Left Foot    Performed by: Alivia Fan RN  Authorized by: Emma Burr DPM    Associated wounds:   Wound 11/03/24 Foot Left  Wound cleansed with:  NSS  Applied primary dressing:  Hydrocolloid  Applied secondary dressing:  ABD  Dressing secured with:  [place], Tape, Elastic tubular stocking and Size G  Offloading device appllied:  Forefoot relief shoe  Comments:  Endoform AM

## 2024-12-19 NOTE — PROGRESS NOTES
Patient ID: Clay Marcial is a 69 y.o. male Date of Birth 1955       Chief Complaint   Patient presents with    Follow Up Wound Care Visit       Allergies:  Patient has no known allergies.    Diagnosis:  1. Disruption of external surgical wound, subsequent encounter  -     Wound cleansing and dressings Left Foot; Future; Expected date: 12/19/2024  2. Type 2 diabetes mellitus with diabetic polyneuropathy ([facility])  3. PAD (peripheral artery disease) ([facility])  4. History of transmetatarsal amputation of left foot ([facility])  5. Smoking  6. Noncompliance     Diagnosis ICD-10-CM Associated Orders   1. Disruption of external surgical wound, subsequent encounter  T81.31XD Wound cleansing and dressings Left Foot      2. Type 2 diabetes mellitus with diabetic polyneuropathy ([facility])  E11.42       3. PAD (peripheral artery disease) ([facility])  I73.9       4. History of transmetatarsal amputation of left foot ([facility])  Z89.432       5. Smoking  F17.200       6. Noncompliance  Z91.199            Assessment & Plan:  See wound orders. (Endoform AM dehisced areas, dsd)   - Left TMA site appears with 95% healed and 5% superficial incisional dehiscence at 2 areas along incision site. Healing well. Surgical dbt as below.   - Patient at high risk for poor wound healing, infection and limb loss.   - Darco toe unloading shoe WBAT ADLs only  - s/p LLE agram w/ AT PTA. Vascular note from 11/8/24 reviewed. Post-agram duplex scheduled for 6/27/24  - better BS control  - tobacco cessation recommended  - Left foot XR 12/6/24: stable post surgical changes  - f/u 2wks for recheck     Subjective:   Clay presents to clinic for left foot check after recent hospital admission.   He is s/p left second toe amputation and wound debridement (DOS: 10/21/2024) and S/p TMA (DOS: 10/28/2024) due to DFI. He is s/p LLE angiogram w/ AT PTA (for stenosis) (DOS 10/25/24) due to PAD.   He had f/u with vascular surgery 11/8/24 where post-agram LEADs ordered.   Has been NWB  since last seen.     PMH sig for DM w/ PN (A1c 7.2% 10/16/24), PAD, CKD, chronic tobacco abuse with COPD.           The following portions of the patient's history were reviewed and updated as appropriate:   Patient Active Problem List   Diagnosis    Type 2 diabetes mellitus with diabetic polyneuropathy (HCC)    CAD (coronary artery disease)    Chronic diastolic congestive heart failure (HCC)    Acute on chronic kidney failure  (HCC)    Hyperkalemia    Helicobacter pylori infection    Diabetic foot infection  (HCC)    Stage 3 chronic kidney disease (HCC)    Chronic obstructive pulmonary disease with acute exacerbation (HCC)    ROMEL (obstructive sleep apnea)    PAD (peripheral artery disease) (HCC)    Acute hyponatremia    HTN (hypertension)    Smoking    Hypomagnesemia    Obesity    Anemia due to stage 3b chronic kidney disease  (HCC)    Anemia    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 (HCC)    Atherosclerosis     Past Medical History:   Diagnosis Date    COPD (chronic obstructive pulmonary disease) (HCC)     Diabetes mellitus (HCC) 04/16/2024    Elevated serum creatinine 10/21/2024    Sleep apnea     UTI (urinary tract infection)      Past Surgical History:   Procedure Laterality Date    BACK SURGERY      CLOSURE DELAYED PRIMARY Left 11/3/2024    Procedure: Left foot delayed primary closure;  Surgeon: Robb Page DPM;  Location: [facility];  Service: Podiatry    IR LOWER EXTREMITY ANGIOGRAM  10/25/2024    CA AMPUTATION FOOT TRANSMETARSAL Left 10/28/2024    Procedure: AMPUTATION TRANSMETATARSAL (TMA);  Surgeon: Robb Page DPM;  Location: [facility];  Service: Podiatry    TONSILLECTOMY      WOUND DEBRIDEMENT Left 10/21/2024    Procedure: LEFT FOOT I&D, left second toe amputation with packing;  Surgeon: Emma Burr DPM;  Location: [facility];  Service: Podiatry     Social History     Socioeconomic History    Marital status: Single     Spouse name: None    Number of  children: None    Years of education: None    Highest education level: None   Occupational History    None   Tobacco Use    Smoking status: Former     Types: Cigarettes     Start date: 10/16/2024    Smokeless tobacco: Never   Vaping Use    Vaping status: Never Used   Substance and Sexual Activity    Alcohol use: Yes     Alcohol/week: 7.0 standard drinks of alcohol     Types: 7 Shots of liquor per week    Drug use: Never    Sexual activity: None   Other Topics Concern    None   Social History Narrative    None     Social Drivers of Health     Financial Resource Strain: Not on file   Food Insecurity: No Food Insecurity (10/22/2024)    Nursing - Inadequate Food Risk Classification     Worried About Running Out of Food in the Last Year: Never true     Ran Out of Food in the Last Year: Never true     Ran Out of Food in the Last Year: 1   Transportation Needs: No Transportation Needs (10/22/2024)    Nursing - Transportation Risk Classification     Lack of Transportation: Not on file     Lack of Transportation: 2   Physical Activity: Not on file   Stress: Not on file   Social Connections: Unknown (2024)    Received from [facility]    Social Spacedeck     How often do you feel lonely or isolated from those around you? (Adult - for ages 18 years and over): Not on file   Intimate Partner Violence: Unknown (10/22/2024)    Nursing IPS     Feels Physically and Emotionally Safe: Not on file     Physically Hurt by Someone: Not on file     Humiliated or Emotionally Abused by Someone: Not on file     Physically Hurt by Someone: 2     Hurt or Threatened by Someone: 2   Housing Stability: Unknown (10/22/2024)    Nursing: Inadequate Housing Risk Classification     Has Housing: Not on file     Worried About Losing Housing: Not on file     Unable to Get Utilities: Not on file     Unable to Pay for Housing in the Last Year: 2     Has Housin        Current Outpatient Medications:     acetaminophen (TYLENOL) 500 mg tablet, Take  1,000 mg by mouth as needed EVERY 6 to 8 HOURS, Disp: , Rfl:     albuterol (PROVENTIL HFA,VENTOLIN HFA) 90 mcg/act inhaler, Inhale 1 puff every 4 (four) hours as needed for shortness of breath or wheezing, Disp: , Rfl:     aspirin 81 mg chewable tablet, Chew 81 mg daily, Disp: , Rfl:     atorvastatin (LIPITOR) 20 mg tablet, Take 20 mg by mouth daily, Disp: , Rfl:     cetirizine (ZyrTEC) 5 MG tablet, Take 5 mg by mouth daily, Disp: , Rfl:     Cholecalciferol 25 MCG (1000 UT) capsule, Take 25 mcg by mouth daily, Disp: , Rfl:     cyanocobalamin (VITAMIN B-12) 500 MCG tablet, Take 500 mcg by mouth daily, Disp: , Rfl:     DULoxetine (CYMBALTA) 20 mg capsule, Take 20 mg by mouth daily, Disp: , Rfl:     Empagliflozin 25 MG TABS, Take 12.5 mg by mouth daily, Disp: , Rfl:     fluticasone (FLONASE) 50 mcg/act nasal spray, 2 sprays into each nostril daily, Disp: , Rfl:     furosemide (LASIX) 40 mg tablet, Take 40 mg by mouth daily AS NEEDED, Disp: , Rfl:     glipiZIDE (GLUCOTROL) 5 mg tablet, Take 5 mg by mouth 2 (two) times a day before meals, Disp: , Rfl:     insulin glargine (LANTUS) 100 units/mL subcutaneous injection, Inject 40 Units under the skin daily at bedtime (Patient taking differently: Inject 45 Units under the skin daily at bedtime), Disp: 10 mL, Rfl: 0    insulin lispro (HumALOG/ADMELOG) 100 units/mL injection, Inject 18 Units under the skin 3 (three) times a day with meals (Patient not taking: Reported on 11/15/2024), Disp: , Rfl:     isosorbide mononitrate (IMDUR) 30 mg 24 hr tablet, Take 30 mg by mouth daily, Disp: , Rfl:     lisinopril (ZESTRIL) 20 mg tablet, Take 20 mg by mouth daily, Disp: , Rfl:     loratadine (CLARITIN) 10 mg tablet, Take 10 mg by mouth daily, Disp: , Rfl:     metFORMIN (GLUCOPHAGE-XR) 500 mg 24 hr tablet, Take 1,000 mg by mouth 2 (two) times a day with meals, Disp: , Rfl:     metoprolol succinate (TOPROL-XL) 50 mg 24 hr tablet, Take 50 mg by mouth daily, Disp: , Rfl:     omeprazole  (PriLOSEC OTC) 20 MG tablet, Take 20 mg by mouth 2 (two) times a day, Disp: , Rfl:     oxyCODONE (ROXICODONE) 5 immediate release tablet, Take 1 tablet (5 mg total) by mouth every 4 (four) hours as needed for severe pain for up to 20 doses Max Daily Amount: 30 mg, Disp: 20 tablet, Rfl: 0    pregabalin (LYRICA) 200 MG capsule, Take 200 mg by mouth 2 (two) times a day, Disp: , Rfl:     sodium chloride (OCEAN) 0.65 % nasal spray, 2 sprays into each nostril 2 (two) times a day, Disp: , Rfl:     tiotropium-olodaterol (STIOLTO RESPIMAT) 2.5-2.5 MCG/ACT inhaler, Inhale 2 puffs daily, Disp: , Rfl:     traZODone (DESYREL) 50 mg tablet, Take 25 mg by mouth daily at bedtime, Disp: , Rfl:   No family history on file.   Review of Systems   Constitutional:  Negative for activity change, chills and fever.   HENT: Negative.     Respiratory:  Negative for cough, chest tightness and shortness of breath.    Cardiovascular:  Negative for chest pain and leg swelling.   Endocrine: Negative.    Genitourinary: Negative.    Skin:  Positive for wound.   Neurological:  Positive for numbness.   Psychiatric/Behavioral: Negative.  Negative for agitation and behavioral problems.          Objective:  /65   Pulse 84   Temp (!) 96.8 °F (36 °C)   Resp 20     Physical Exam  Constitutional:       General: He is not in acute distress.     Appearance: Normal appearance. He is obese. He is not ill-appearing.   Cardiovascular:      Comments: Nonpalpable B/L DP/PT pulses. Pedal hair is absent. Legs to toes warm to cool.   Pulmonary:      Effort: No respiratory distress.   Musculoskeletal:         General: Deformity (s/p left foot TMA) present. No tenderness. Normal range of motion.   Skin:     Capillary Refill: Capillary refill takes less than 2 seconds.      Findings: Lesion (Left TMA incision with plantar-distal foot wound.) present. No erythema.      Comments: B/L LE skin is atrophic - thin, dry and shiny in appearance.     No erythema,  "purulence, crepitus or fluctuance to Left TMA surgical site. There is about 95% healing noted and 5% incision superficial dehiscence at now 2 sites along incision line (lat/central). No probe to bone.  Plantar-forefoot wound excision site appears intact and without dehiscence.      Neurological:      General: No focal deficit present.      Mental Status: He is alert and oriented to person, place, and time.      Comments: + PN   Psychiatric:         Mood and Affect: Mood normal.         Behavior: Behavior normal.             Wound 11/03/24 Foot Left (Active)   Wound Image   12/19/24 1508   Wound Description Pink;Yellow;Slough 12/19/24 1512   Noni-wound Assessment Scaly;Dry;Intact 12/19/24 1512   Wound Length (cm) 0.2 cm 12/19/24 1512   Wound Width (cm) 0.5 cm 12/19/24 1512   Wound Depth (cm) 0.5 cm 12/19/24 1512   Wound Surface Area (cm^2) 0.1 cm^2 12/19/24 1512   Wound Volume (cm^3) 0.05 cm^3 12/19/24 1512   Calculated Wound Volume (cm^3) 0.05 cm^3 12/19/24 1512   Change in Wound Size % 99.47 12/19/24 1512   Wound Site Closure Sutures;Adhesive closure strips 11/22/24 1240   Drainage Amount Small 12/19/24 1512   Drainage Description Serosanguineous 12/19/24 1512   Non-staged Wound Description Full thickness 12/19/24 1512             Debridement   Wound 11/03/24 Foot Left    Universal Protocol:  procedure performed by consultantConsent: Verbal consent obtained.  Risks and benefits: risks, benefits and alternatives were discussed  Consent given by: patient  Time out: Immediately prior to procedure a \"time out\" was called to verify the correct patient, procedure, equipment, support staff and site/side marked as required.  Patient understanding: patient states understanding of the procedure being performed  Patient consent: the patient's understanding of the procedure matches consent given  Patient identity confirmed: verbally with patient    Debridement Details  Performed by: physician  Debridement type: surgical  Level " of debridement: subcutaneous tissue      Post-debridement measurements  Length (cm): 0.3  Width (cm): 0.5  Depth (cm): 0.5  Percent debrided: 100%  Surface Area (cm^2): 0.15  Area Debrided (cm^2): 0.15  Volume (cm^3): 0.08    Tissue and other material debrided: subcutaneous tissue  Devitalized tissue debrided: biofilm and slough  Instrument(s) utilized: blade  Technique utilized: excisionalBleeding: small  Hemostasis obtained with: pressure and silver nitrate  Procedural pain (0-10): insensate  Post-procedural pain: insensate   Response to treatment: procedure was tolerated well         Results from last 6 Months   Lab Units 10/21/24  1428   WOUND CULTURE  1+ Growth of Actinomyces species*  Few Colonies of Diphtheroids  Few Colonies of - Globicatella Species Globicatella*  1 colony Staphylococcus coagulase negative*         Wound Instructions:  Orders Placed This Encounter   Procedures    Wound cleansing and dressings Left Foot     Left foot wound:        Wash your hands with soap and water.  Remove old dressing, discard into plastic bag and place in trash.    Cleanse the wound with normal saline prior to applying a clean dressing. Wash foot with mild soap and rinse with water. Do not use tissue or cotton balls.   Do not scrub the wound. Pat dry using gauze.  Shower no   Apply moisturizer to foot around wound. Do not get lotion in wound.  Apply endoform AM into the open wet areas of the wound.       Cover with an ABD or dry gauze  Secure with bessie  Change dressing every 3 days     Elastic Tubular Stocking-Spandagrip size G     Tubular elastic bandage: Apply from base of foot to behind the knee. Apply in AM, may remove for sleep.  Avoid prolonged standing in one place.  Elevate leg(s) above the level of the heart when sitting or as much as possible.   Off-loading Instructions:     Wear off-loading device as directed by your physician. Put on immediately when rising in the morning and remove when going to  "bed.Purchase Darco Toe offloading shoe. Limit walking. Do not walk barefoot or without shoe.     Referral sent to pain management     Follow up in 2 weeks      Continue VNA to assist with dressing changes.     Standing Status:   Future     Expected Date:   12/19/2024     Expiration Date:   1/2/2025    Debridement     This order was created via procedure documentation    Wound Procedure Treatment     This order was created via procedure documentation         Emma Burr DPM      Portions of the record may have been created with voice recognition software. Occasional wrong word or \"sound a like\" substitutions may have occurred due to the inherent limitations of voice recognition software. Read the chart carefully and recognize, using context, where substitutions have occurred.    "

## 2025-06-27 ENCOUNTER — HOSPITAL ENCOUNTER (OUTPATIENT)
Dept: NON INVASIVE DIAGNOSTICS | Facility: HOSPITAL | Age: 70
Discharge: HOME | End: 2025-06-27
Payer: COMMERCIAL

## 2025-06-27 DIAGNOSIS — I73.9 PAD (PERIPHERAL ARTERY DISEASE) (HCC): ICD-10-CM

## 2025-06-27 PROCEDURE — 93922 UPR/L XTREMITY ART 2 LEVELS: CPT | Performed by: STUDENT IN AN ORGANIZED HEALTH CARE EDUCATION/TRAINING PROGRAM

## 2025-06-27 PROCEDURE — 93925 LOWER EXTREMITY STUDY: CPT

## 2025-06-27 PROCEDURE — 93925 LOWER EXTREMITY STUDY: CPT | Performed by: STUDENT IN AN ORGANIZED HEALTH CARE EDUCATION/TRAINING PROGRAM

## 2025-06-27 PROCEDURE — 93923 UPR/LXTR ART STDY 3+ LVLS: CPT

## (undated) DEVICE — NEEDLE 18 G X 1 1/2

## (undated) DEVICE — KERLIX BANDAGE ROLL: Brand: KERLIX

## (undated) DEVICE — PADDING CAST 4 IN  COTTON STRL

## (undated) DEVICE — ABDOMINAL PAD: Brand: DERMACEA

## (undated) DEVICE — SYRINGE 10ML LL

## (undated) DEVICE — GAUZE SPONGES,16 PLY: Brand: CURITY

## (undated) DEVICE — CURITY STRETCH BANDAGE: Brand: CURITY

## (undated) DEVICE — ACE WRAP 4 IN UNSTERILE

## (undated) DEVICE — INTENDED FOR TISSUE SEPARATION, AND OTHER PROCEDURES THAT REQUIRE A SHARP SURGICAL BLADE TO PUNCTURE OR CUT.: Brand: BARD-PARKER ® CARBON RIB-BACK BLADES

## (undated) DEVICE — ACE WRAP 4 IN STERILE

## (undated) DEVICE — BETHLEHEM UNIVERSAL  MIONR EXT: Brand: CARDINAL HEALTH

## (undated) DEVICE — NEEDLE 18 G X 1 1/2 SAFETY

## (undated) DEVICE — GLOVE SRG BIOGEL 7.5

## (undated) DEVICE — FLUFF UNDERPAD,MODERATE: Brand: WINGS

## (undated) DEVICE — CURITY NON-ADHERENT STRIPS: Brand: CURITY

## (undated) DEVICE — GLOVE SRG LF STRL BGL SKNSNS 6.5 PF

## (undated) DEVICE — BETHLEHEM UNIV MAJ EXT ,KIT: Brand: CARDINAL HEALTH

## (undated) DEVICE — PREMIUM DRY TRAY LF: Brand: MEDLINE INDUSTRIES, INC.

## (undated) DEVICE — 3M™ STERI-STRIP™ COMPOUND BENZOIN TINCTURE 40 BAGS/CARTON 4 CARTONS/CASE C1544: Brand: 3M™ STERI-STRIP™

## (undated) DEVICE — PLUMEPEN PRO 10FT

## (undated) DEVICE — NEEDLE 25G X 1 1/2

## (undated) DEVICE — CURITY IDOFORM PACKING STRIP: Brand: CURITY

## (undated) DEVICE — CULTURE TUBE ANAEROBIC

## (undated) DEVICE — GLOVE INDICATOR PI UNDERGLOVE SZ 7.5 BLUE

## (undated) DEVICE — SUT ETHILON 3-0 FS-1 18 IN 663G

## (undated) DEVICE — WET SKIN PREP TRAY: Brand: MEDLINE INDUSTRIES, INC.

## (undated) DEVICE — GLOVE INDICATOR PI UNDERGLOVE SZ 7 BLUE

## (undated) DEVICE — CULTURE TUBE AEROBIC

## (undated) DEVICE — POV-IOD SOLUTION 4OZ BT

## (undated) DEVICE — SKN PRP WNG SPNGE PVP SCRB STR: Brand: MEDLINE INDUSTRIES, INC.

## (undated) DEVICE — DRAPE SHEET THREE QUARTER